# Patient Record
Sex: FEMALE | Race: WHITE | NOT HISPANIC OR LATINO | Employment: OTHER | URBAN - METROPOLITAN AREA
[De-identification: names, ages, dates, MRNs, and addresses within clinical notes are randomized per-mention and may not be internally consistent; named-entity substitution may affect disease eponyms.]

---

## 2017-01-05 ENCOUNTER — COMMUNICATION - HEALTHEAST (OUTPATIENT)
Dept: FAMILY MEDICINE | Facility: CLINIC | Age: 72
End: 2017-01-05

## 2017-01-12 ENCOUNTER — AMBULATORY - HEALTHEAST (OUTPATIENT)
Dept: FAMILY MEDICINE | Facility: CLINIC | Age: 72
End: 2017-01-12

## 2017-01-12 ENCOUNTER — COMMUNICATION - HEALTHEAST (OUTPATIENT)
Dept: NURSING | Facility: CLINIC | Age: 72
End: 2017-01-12

## 2017-01-12 DIAGNOSIS — I26.99 PULMONARY EMBOLISM (H): ICD-10-CM

## 2017-01-13 ENCOUNTER — COMMUNICATION - HEALTHEAST (OUTPATIENT)
Dept: FAMILY MEDICINE | Facility: CLINIC | Age: 72
End: 2017-01-13

## 2017-01-19 ENCOUNTER — COMMUNICATION - HEALTHEAST (OUTPATIENT)
Dept: NURSING | Facility: CLINIC | Age: 72
End: 2017-01-19

## 2017-01-19 DIAGNOSIS — I26.99 PULMONARY EMBOLISM (H): ICD-10-CM

## 2017-01-21 ENCOUNTER — COMMUNICATION - HEALTHEAST (OUTPATIENT)
Dept: FAMILY MEDICINE | Facility: CLINIC | Age: 72
End: 2017-01-21

## 2017-01-21 DIAGNOSIS — I10 ESSENTIAL HYPERTENSION WITH GOAL BLOOD PRESSURE LESS THAN 140/90: ICD-10-CM

## 2017-01-26 ENCOUNTER — COMMUNICATION - HEALTHEAST (OUTPATIENT)
Dept: NURSING | Facility: CLINIC | Age: 72
End: 2017-01-26

## 2017-01-26 DIAGNOSIS — I26.99 PULMONARY EMBOLISM (H): ICD-10-CM

## 2017-02-02 ENCOUNTER — COMMUNICATION - HEALTHEAST (OUTPATIENT)
Dept: NURSING | Facility: CLINIC | Age: 72
End: 2017-02-02

## 2017-02-02 DIAGNOSIS — I26.99 PULMONARY EMBOLISM (H): ICD-10-CM

## 2017-02-09 ENCOUNTER — COMMUNICATION - HEALTHEAST (OUTPATIENT)
Dept: NURSING | Facility: CLINIC | Age: 72
End: 2017-02-09

## 2017-02-09 DIAGNOSIS — I26.99 PULMONARY EMBOLISM (H): ICD-10-CM

## 2017-02-16 ENCOUNTER — COMMUNICATION - HEALTHEAST (OUTPATIENT)
Dept: FAMILY MEDICINE | Facility: CLINIC | Age: 72
End: 2017-02-16

## 2017-02-16 DIAGNOSIS — I26.99 PULMONARY EMBOLISM (H): ICD-10-CM

## 2017-02-23 ENCOUNTER — COMMUNICATION - HEALTHEAST (OUTPATIENT)
Dept: SCHEDULING | Facility: CLINIC | Age: 72
End: 2017-02-23

## 2017-02-23 DIAGNOSIS — I26.99 PULMONARY EMBOLISM (H): ICD-10-CM

## 2017-03-02 ENCOUNTER — COMMUNICATION - HEALTHEAST (OUTPATIENT)
Dept: NURSING | Facility: CLINIC | Age: 72
End: 2017-03-02

## 2017-03-02 DIAGNOSIS — I26.99 PULMONARY EMBOLISM (H): ICD-10-CM

## 2017-03-06 ENCOUNTER — OFFICE VISIT - HEALTHEAST (OUTPATIENT)
Dept: RHEUMATOLOGY | Facility: CLINIC | Age: 72
End: 2017-03-06

## 2017-03-06 DIAGNOSIS — Z79.01 ANTICOAGULANT LONG-TERM USE: ICD-10-CM

## 2017-03-06 DIAGNOSIS — E66.01 MORBID OBESITY DUE TO EXCESS CALORIES (H): ICD-10-CM

## 2017-03-06 DIAGNOSIS — M25.50 PAIN IN JOINT, MULTIPLE SITES: ICD-10-CM

## 2017-03-09 ENCOUNTER — COMMUNICATION - HEALTHEAST (OUTPATIENT)
Dept: NURSING | Facility: CLINIC | Age: 72
End: 2017-03-09

## 2017-03-09 DIAGNOSIS — I26.99 PULMONARY EMBOLISM (H): ICD-10-CM

## 2017-03-16 ENCOUNTER — COMMUNICATION - HEALTHEAST (OUTPATIENT)
Dept: NURSING | Facility: CLINIC | Age: 72
End: 2017-03-16

## 2017-03-16 DIAGNOSIS — I26.99 PULMONARY EMBOLISM (H): ICD-10-CM

## 2017-03-23 ENCOUNTER — AMBULATORY - HEALTHEAST (OUTPATIENT)
Dept: HEALTH INFORMATION MANAGEMENT | Facility: CLINIC | Age: 72
End: 2017-03-23

## 2017-03-24 ENCOUNTER — COMMUNICATION - HEALTHEAST (OUTPATIENT)
Dept: NURSING | Facility: CLINIC | Age: 72
End: 2017-03-24

## 2017-03-24 DIAGNOSIS — I26.99 PULMONARY EMBOLISM (H): ICD-10-CM

## 2017-03-30 ENCOUNTER — COMMUNICATION - HEALTHEAST (OUTPATIENT)
Dept: NURSING | Facility: CLINIC | Age: 72
End: 2017-03-30

## 2017-03-30 DIAGNOSIS — I26.99 PULMONARY EMBOLISM (H): ICD-10-CM

## 2017-04-06 ENCOUNTER — COMMUNICATION - HEALTHEAST (OUTPATIENT)
Dept: NURSING | Facility: CLINIC | Age: 72
End: 2017-04-06

## 2017-04-06 DIAGNOSIS — I26.99 PULMONARY EMBOLISM (H): ICD-10-CM

## 2017-04-08 ENCOUNTER — HOSPITAL ENCOUNTER (OUTPATIENT)
Dept: MAMMOGRAPHY | Facility: HOSPITAL | Age: 72
Discharge: HOME | End: 2017-04-08
Attending: FAMILY MEDICINE

## 2017-04-08 DIAGNOSIS — Z12.31 VISIT FOR SCREENING MAMMOGRAM: ICD-10-CM

## 2017-04-13 ENCOUNTER — COMMUNICATION - HEALTHEAST (OUTPATIENT)
Dept: NURSING | Facility: CLINIC | Age: 72
End: 2017-04-13

## 2017-04-13 DIAGNOSIS — I26.99 PULMONARY EMBOLISM (H): ICD-10-CM

## 2017-04-20 ENCOUNTER — COMMUNICATION - HEALTHEAST (OUTPATIENT)
Dept: NURSING | Facility: CLINIC | Age: 72
End: 2017-04-20

## 2017-04-20 DIAGNOSIS — I26.99 PULMONARY EMBOLISM (H): ICD-10-CM

## 2017-04-28 ENCOUNTER — OFFICE VISIT - HEALTHEAST (OUTPATIENT)
Dept: FAMILY MEDICINE | Facility: CLINIC | Age: 72
End: 2017-04-28

## 2017-04-28 ENCOUNTER — COMMUNICATION - HEALTHEAST (OUTPATIENT)
Dept: NURSING | Facility: CLINIC | Age: 72
End: 2017-04-28

## 2017-04-28 DIAGNOSIS — E78.00 PURE HYPERCHOLESTEROLEMIA: ICD-10-CM

## 2017-04-28 DIAGNOSIS — G47.33 OBSTRUCTIVE SLEEP APNEA (ADULT) (PEDIATRIC): ICD-10-CM

## 2017-04-28 DIAGNOSIS — F33.0 MILD EPISODE OF RECURRENT MAJOR DEPRESSIVE DISORDER (H): ICD-10-CM

## 2017-04-28 DIAGNOSIS — I10 ESSENTIAL HYPERTENSION WITH GOAL BLOOD PRESSURE LESS THAN 140/90: ICD-10-CM

## 2017-04-28 DIAGNOSIS — Z00.01 ENCOUNTER FOR GENERAL ADULT MEDICAL EXAMINATION WITH ABNORMAL FINDINGS: ICD-10-CM

## 2017-04-28 DIAGNOSIS — I26.99 OTHER PULMONARY EMBOLISM WITHOUT ACUTE COR PULMONALE, UNSPECIFIED CHRONICITY (H): ICD-10-CM

## 2017-04-28 DIAGNOSIS — R41.3 MEMORY LOSS: ICD-10-CM

## 2017-04-28 DIAGNOSIS — E03.9 HYPOTHYROIDISM, UNSPECIFIED TYPE: ICD-10-CM

## 2017-04-28 DIAGNOSIS — R82.90 BAD ODOR OF URINE: ICD-10-CM

## 2017-04-28 DIAGNOSIS — E66.01 MORBID OBESITY (H): ICD-10-CM

## 2017-04-28 DIAGNOSIS — D64.9 ANEMIA, UNSPECIFIED: ICD-10-CM

## 2017-04-28 LAB
CHOLEST SERPL-MCNC: 215 MG/DL
FASTING STATUS PATIENT QL REPORTED: YES
HDLC SERPL-MCNC: 50 MG/DL
LDLC SERPL CALC-MCNC: 135 MG/DL
TRIGL SERPL-MCNC: 152 MG/DL

## 2017-04-28 ASSESSMENT — MIFFLIN-ST. JEOR: SCORE: 1902.33

## 2017-04-30 LAB — SYPHILIS RPR SCREEN - HISTORICAL: NORMAL

## 2017-05-01 ENCOUNTER — COMMUNICATION - HEALTHEAST (OUTPATIENT)
Dept: FAMILY MEDICINE | Facility: CLINIC | Age: 72
End: 2017-05-01

## 2017-05-04 ENCOUNTER — COMMUNICATION - HEALTHEAST (OUTPATIENT)
Dept: NURSING | Facility: CLINIC | Age: 72
End: 2017-05-04

## 2017-05-04 DIAGNOSIS — I26.99 OTHER PULMONARY EMBOLISM WITHOUT ACUTE COR PULMONALE, UNSPECIFIED CHRONICITY (H): ICD-10-CM

## 2017-05-18 ENCOUNTER — COMMUNICATION - HEALTHEAST (OUTPATIENT)
Dept: FAMILY MEDICINE | Facility: CLINIC | Age: 72
End: 2017-05-18

## 2017-05-23 ENCOUNTER — COMMUNICATION - HEALTHEAST (OUTPATIENT)
Dept: NURSING | Facility: CLINIC | Age: 72
End: 2017-05-23

## 2017-05-23 ENCOUNTER — AMBULATORY - HEALTHEAST (OUTPATIENT)
Dept: LAB | Facility: CLINIC | Age: 72
End: 2017-05-23

## 2017-05-23 DIAGNOSIS — I26.99 PULMONARY EMBOLISM (H): ICD-10-CM

## 2017-05-23 DIAGNOSIS — I26.99 OTHER PULMONARY EMBOLISM WITHOUT ACUTE COR PULMONALE, UNSPECIFIED CHRONICITY (H): ICD-10-CM

## 2017-05-24 ENCOUNTER — RECORDS - HEALTHEAST (OUTPATIENT)
Dept: ADMINISTRATIVE | Facility: OTHER | Age: 72
End: 2017-05-24

## 2017-05-30 ENCOUNTER — AMBULATORY - HEALTHEAST (OUTPATIENT)
Dept: PULMONOLOGY | Facility: OTHER | Age: 72
End: 2017-05-30

## 2017-05-30 ENCOUNTER — COMMUNICATION - HEALTHEAST (OUTPATIENT)
Dept: PULMONOLOGY | Facility: OTHER | Age: 72
End: 2017-05-30

## 2017-05-30 DIAGNOSIS — R91.1 PULMONARY NODULE: ICD-10-CM

## 2017-06-07 ENCOUNTER — COMMUNICATION - HEALTHEAST (OUTPATIENT)
Dept: FAMILY MEDICINE | Facility: CLINIC | Age: 72
End: 2017-06-07

## 2017-06-07 DIAGNOSIS — F33.9 MAJOR DEPRESSIVE DISORDER, RECURRENT EPISODE, UNSPECIFIED: ICD-10-CM

## 2017-06-07 DIAGNOSIS — R60.9 EDEMA: ICD-10-CM

## 2017-06-07 DIAGNOSIS — E03.9 HYPOTHYROIDISM: ICD-10-CM

## 2017-06-08 ENCOUNTER — COMMUNICATION - HEALTHEAST (OUTPATIENT)
Dept: FAMILY MEDICINE | Facility: CLINIC | Age: 72
End: 2017-06-08

## 2017-06-09 ENCOUNTER — COMMUNICATION - HEALTHEAST (OUTPATIENT)
Dept: FAMILY MEDICINE | Facility: CLINIC | Age: 72
End: 2017-06-09

## 2017-06-09 DIAGNOSIS — Z12.11 COLON CANCER SCREENING: ICD-10-CM

## 2017-06-12 ENCOUNTER — COMMUNICATION - HEALTHEAST (OUTPATIENT)
Dept: FAMILY MEDICINE | Facility: CLINIC | Age: 72
End: 2017-06-12

## 2017-06-13 ENCOUNTER — COMMUNICATION - HEALTHEAST (OUTPATIENT)
Dept: FAMILY MEDICINE | Facility: CLINIC | Age: 72
End: 2017-06-13

## 2017-06-20 ENCOUNTER — RECORDS - HEALTHEAST (OUTPATIENT)
Dept: ADMINISTRATIVE | Facility: OTHER | Age: 72
End: 2017-06-20

## 2017-06-20 ENCOUNTER — COMMUNICATION - HEALTHEAST (OUTPATIENT)
Dept: ADMINISTRATIVE | Facility: CLINIC | Age: 72
End: 2017-06-20

## 2017-06-21 ENCOUNTER — AMBULATORY - HEALTHEAST (OUTPATIENT)
Dept: LAB | Facility: HOSPITAL | Age: 72
End: 2017-06-21

## 2017-06-21 ENCOUNTER — COMMUNICATION - HEALTHEAST (OUTPATIENT)
Dept: NURSING | Facility: CLINIC | Age: 72
End: 2017-06-21

## 2017-06-21 DIAGNOSIS — I26.99 PULMONARY EMBOLISM (H): ICD-10-CM

## 2017-06-21 DIAGNOSIS — I26.99 OTHER PULMONARY EMBOLISM WITHOUT ACUTE COR PULMONALE, UNSPECIFIED CHRONICITY (H): ICD-10-CM

## 2017-06-22 ENCOUNTER — AMBULATORY - HEALTHEAST (OUTPATIENT)
Dept: SCHEDULING | Facility: CLINIC | Age: 72
End: 2017-06-22

## 2017-06-24 ENCOUNTER — RECORDS - HEALTHEAST (OUTPATIENT)
Dept: ADMINISTRATIVE | Facility: OTHER | Age: 72
End: 2017-06-24

## 2017-06-26 ENCOUNTER — OFFICE VISIT - HEALTHEAST (OUTPATIENT)
Dept: PULMONOLOGY | Facility: OTHER | Age: 72
End: 2017-06-26

## 2017-06-26 ENCOUNTER — HOSPITAL ENCOUNTER (OUTPATIENT)
Dept: CT IMAGING | Facility: HOSPITAL | Age: 72
Discharge: HOME | End: 2017-06-26
Attending: INTERNAL MEDICINE

## 2017-06-26 DIAGNOSIS — R91.1 PULMONARY NODULE: ICD-10-CM

## 2017-06-26 DIAGNOSIS — R91.8 LUNG NODULES: ICD-10-CM

## 2017-06-30 ENCOUNTER — HOSPITAL ENCOUNTER (OUTPATIENT)
Dept: PET IMAGING | Facility: HOSPITAL | Age: 72
Discharge: HOME | End: 2017-06-30
Attending: PSYCHIATRY & NEUROLOGY

## 2017-06-30 ENCOUNTER — HOSPITAL ENCOUNTER (OUTPATIENT)
Dept: MRI IMAGING | Facility: HOSPITAL | Age: 72
Discharge: HOME | End: 2017-06-30
Attending: PSYCHIATRY & NEUROLOGY

## 2017-06-30 DIAGNOSIS — R41.89 COGNITIVE DECLINE: ICD-10-CM

## 2017-06-30 ASSESSMENT — MIFFLIN-ST. JEOR: SCORE: 1923.32

## 2017-07-07 ENCOUNTER — COMMUNICATION - HEALTHEAST (OUTPATIENT)
Dept: FAMILY MEDICINE | Facility: CLINIC | Age: 72
End: 2017-07-07

## 2017-07-11 ENCOUNTER — COMMUNICATION - HEALTHEAST (OUTPATIENT)
Dept: NURSING | Facility: CLINIC | Age: 72
End: 2017-07-11

## 2017-07-11 DIAGNOSIS — I26.99 OTHER PULMONARY EMBOLISM WITHOUT ACUTE COR PULMONALE, UNSPECIFIED CHRONICITY (H): ICD-10-CM

## 2017-07-12 ENCOUNTER — COMMUNICATION - HEALTHEAST (OUTPATIENT)
Dept: NURSING | Facility: CLINIC | Age: 72
End: 2017-07-12

## 2017-07-18 ENCOUNTER — COMMUNICATION - HEALTHEAST (OUTPATIENT)
Dept: NURSING | Facility: CLINIC | Age: 72
End: 2017-07-18

## 2017-07-18 ENCOUNTER — AMBULATORY - HEALTHEAST (OUTPATIENT)
Dept: LAB | Facility: CLINIC | Age: 72
End: 2017-07-18

## 2017-07-18 ENCOUNTER — OFFICE VISIT - HEALTHEAST (OUTPATIENT)
Dept: RHEUMATOLOGY | Facility: CLINIC | Age: 72
End: 2017-07-18

## 2017-07-18 DIAGNOSIS — I26.99 OTHER PULMONARY EMBOLISM WITHOUT ACUTE COR PULMONALE, UNSPECIFIED CHRONICITY (H): ICD-10-CM

## 2017-07-18 DIAGNOSIS — E66.01 MORBID OBESITY DUE TO EXCESS CALORIES (H): ICD-10-CM

## 2017-07-18 DIAGNOSIS — M25.562 ACUTE BILATERAL KNEE PAIN: ICD-10-CM

## 2017-07-18 DIAGNOSIS — M25.561 ACUTE BILATERAL KNEE PAIN: ICD-10-CM

## 2017-07-18 ASSESSMENT — MIFFLIN-ST. JEOR: SCORE: 1923.32

## 2017-08-12 ENCOUNTER — COMMUNICATION - HEALTHEAST (OUTPATIENT)
Dept: FAMILY MEDICINE | Facility: CLINIC | Age: 72
End: 2017-08-12

## 2017-08-14 ENCOUNTER — RECORDS - HEALTHEAST (OUTPATIENT)
Dept: ADMINISTRATIVE | Facility: OTHER | Age: 72
End: 2017-08-14

## 2017-08-14 ENCOUNTER — AMBULATORY - HEALTHEAST (OUTPATIENT)
Dept: LAB | Facility: CLINIC | Age: 72
End: 2017-08-14

## 2017-08-14 ENCOUNTER — COMMUNICATION - HEALTHEAST (OUTPATIENT)
Dept: NURSING | Facility: CLINIC | Age: 72
End: 2017-08-14

## 2017-08-14 DIAGNOSIS — I26.99 OTHER PULMONARY EMBOLISM WITHOUT ACUTE COR PULMONALE, UNSPECIFIED CHRONICITY (H): ICD-10-CM

## 2017-08-15 ENCOUNTER — COMMUNICATION - HEALTHEAST (OUTPATIENT)
Dept: FAMILY MEDICINE | Facility: CLINIC | Age: 72
End: 2017-08-15

## 2017-08-25 ENCOUNTER — COMMUNICATION - HEALTHEAST (OUTPATIENT)
Dept: FAMILY MEDICINE | Facility: CLINIC | Age: 72
End: 2017-08-25

## 2017-08-28 ENCOUNTER — COMMUNICATION - HEALTHEAST (OUTPATIENT)
Dept: FAMILY MEDICINE | Facility: CLINIC | Age: 72
End: 2017-08-28

## 2017-08-28 DIAGNOSIS — R60.9 EDEMA: ICD-10-CM

## 2017-08-28 DIAGNOSIS — E03.9 HYPOTHYROIDISM: ICD-10-CM

## 2017-08-28 DIAGNOSIS — F33.9 MAJOR DEPRESSIVE DISORDER, RECURRENT EPISODE, UNSPECIFIED: ICD-10-CM

## 2017-08-28 DIAGNOSIS — E78.5 HYPERLIPIDEMIA: ICD-10-CM

## 2017-08-30 ENCOUNTER — COMMUNICATION - HEALTHEAST (OUTPATIENT)
Dept: NURSING | Facility: CLINIC | Age: 72
End: 2017-08-30

## 2017-08-30 ENCOUNTER — COMMUNICATION - HEALTHEAST (OUTPATIENT)
Dept: SCHEDULING | Facility: CLINIC | Age: 72
End: 2017-08-30

## 2017-08-30 ENCOUNTER — COMMUNICATION - HEALTHEAST (OUTPATIENT)
Dept: FAMILY MEDICINE | Facility: CLINIC | Age: 72
End: 2017-08-30

## 2017-08-30 ENCOUNTER — OFFICE VISIT - HEALTHEAST (OUTPATIENT)
Dept: FAMILY MEDICINE | Facility: CLINIC | Age: 72
End: 2017-08-30

## 2017-08-30 DIAGNOSIS — L28.2 PRURITIC RASH: ICD-10-CM

## 2017-08-30 DIAGNOSIS — I26.99 OTHER PULMONARY EMBOLISM WITHOUT ACUTE COR PULMONALE, UNSPECIFIED CHRONICITY (H): ICD-10-CM

## 2017-08-30 DIAGNOSIS — I26.99 PULMONARY EMBOLISM (H): ICD-10-CM

## 2017-08-30 DIAGNOSIS — B37.2 INTERTRIGINOUS CANDIDIASIS: ICD-10-CM
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
## 2017-09-03 ENCOUNTER — COMMUNICATION - HEALTHEAST (OUTPATIENT)
Dept: SCHEDULING | Facility: CLINIC | Age: 72
End: 2017-09-03

## 2017-09-04 ENCOUNTER — OFFICE VISIT - HEALTHEAST (OUTPATIENT)
Dept: FAMILY MEDICINE | Facility: CLINIC | Age: 72
End: 2017-09-04

## 2017-09-04 DIAGNOSIS — B37.2 YEAST DERMATITIS: ICD-10-CM

## 2017-09-04 DIAGNOSIS — L08.9 PUSTULE: ICD-10-CM

## 2017-09-04 DIAGNOSIS — L28.2 PRURITIC RASH: ICD-10-CM

## 2017-09-06 ENCOUNTER — COMMUNICATION - HEALTHEAST (OUTPATIENT)
Dept: FAMILY MEDICINE | Facility: CLINIC | Age: 72
End: 2017-09-06

## 2017-09-07 ENCOUNTER — COMMUNICATION - HEALTHEAST (OUTPATIENT)
Dept: SCHEDULING | Facility: CLINIC | Age: 72
End: 2017-09-07

## 2017-09-07 ENCOUNTER — COMMUNICATION - HEALTHEAST (OUTPATIENT)
Dept: FAMILY MEDICINE | Facility: CLINIC | Age: 72
End: 2017-09-07

## 2017-09-11 ENCOUNTER — COMMUNICATION - HEALTHEAST (OUTPATIENT)
Dept: FAMILY MEDICINE | Facility: CLINIC | Age: 72
End: 2017-09-11

## 2017-09-11 DIAGNOSIS — I26.99 PULMONARY EMBOLISM (H): ICD-10-CM

## 2017-09-12 ENCOUNTER — RECORDS - HEALTHEAST (OUTPATIENT)
Dept: ADMINISTRATIVE | Facility: OTHER | Age: 72
End: 2017-09-12

## 2017-09-14 ENCOUNTER — COMMUNICATION - HEALTHEAST (OUTPATIENT)
Dept: NURSING | Facility: CLINIC | Age: 72
End: 2017-09-14

## 2017-09-14 ENCOUNTER — COMMUNICATION - HEALTHEAST (OUTPATIENT)
Dept: FAMILY MEDICINE | Facility: CLINIC | Age: 72
End: 2017-09-14

## 2017-09-14 ENCOUNTER — AMBULATORY - HEALTHEAST (OUTPATIENT)
Dept: LAB | Facility: CLINIC | Age: 72
End: 2017-09-14

## 2017-09-14 DIAGNOSIS — I26.99 PULMONARY EMBOLISM (H): ICD-10-CM

## 2017-09-14 DIAGNOSIS — I26.99 OTHER PULMONARY EMBOLISM WITHOUT ACUTE COR PULMONALE, UNSPECIFIED CHRONICITY (H): ICD-10-CM

## 2017-09-18 ENCOUNTER — AMBULATORY - HEALTHEAST (OUTPATIENT)
Dept: LAB | Facility: CLINIC | Age: 72
End: 2017-09-18

## 2017-09-18 ENCOUNTER — COMMUNICATION - HEALTHEAST (OUTPATIENT)
Dept: NURSING | Facility: CLINIC | Age: 72
End: 2017-09-18

## 2017-09-18 DIAGNOSIS — I26.99 PULMONARY EMBOLISM (H): ICD-10-CM

## 2017-09-18 DIAGNOSIS — I26.99 OTHER PULMONARY EMBOLISM WITHOUT ACUTE COR PULMONALE, UNSPECIFIED CHRONICITY (H): ICD-10-CM

## 2017-09-25 ENCOUNTER — AMBULATORY - HEALTHEAST (OUTPATIENT)
Dept: LAB | Facility: CLINIC | Age: 72
End: 2017-09-25

## 2017-09-25 ENCOUNTER — OFFICE VISIT - HEALTHEAST (OUTPATIENT)
Dept: INTERNAL MEDICINE | Facility: CLINIC | Age: 72
End: 2017-09-25

## 2017-09-25 ENCOUNTER — COMMUNICATION - HEALTHEAST (OUTPATIENT)
Dept: FAMILY MEDICINE | Facility: CLINIC | Age: 72
End: 2017-09-25

## 2017-09-25 ENCOUNTER — COMMUNICATION - HEALTHEAST (OUTPATIENT)
Dept: NURSING | Facility: CLINIC | Age: 72
End: 2017-09-25

## 2017-09-25 DIAGNOSIS — R79.1 SUPRATHERAPEUTIC INR: ICD-10-CM

## 2017-09-25 DIAGNOSIS — K62.5 RECTAL BLEEDING: ICD-10-CM

## 2017-09-25 DIAGNOSIS — I26.99 PULMONARY EMBOLISM (H): ICD-10-CM

## 2017-09-25 DIAGNOSIS — I26.99 OTHER PULMONARY EMBOLISM WITHOUT ACUTE COR PULMONALE, UNSPECIFIED CHRONICITY (H): ICD-10-CM

## 2017-09-28 ENCOUNTER — COMMUNICATION - HEALTHEAST (OUTPATIENT)
Dept: NURSING | Facility: CLINIC | Age: 72
End: 2017-09-28

## 2017-09-28 ENCOUNTER — AMBULATORY - HEALTHEAST (OUTPATIENT)
Dept: LAB | Facility: CLINIC | Age: 72
End: 2017-09-28

## 2017-09-28 DIAGNOSIS — I26.99 OTHER PULMONARY EMBOLISM WITHOUT ACUTE COR PULMONALE, UNSPECIFIED CHRONICITY (H): ICD-10-CM

## 2017-09-28 DIAGNOSIS — I26.99 PULMONARY EMBOLISM (H): ICD-10-CM

## 2017-10-05 ENCOUNTER — COMMUNICATION - HEALTHEAST (OUTPATIENT)
Dept: NURSING | Facility: CLINIC | Age: 72
End: 2017-10-05

## 2017-10-05 ENCOUNTER — AMBULATORY - HEALTHEAST (OUTPATIENT)
Dept: LAB | Facility: CLINIC | Age: 72
End: 2017-10-05

## 2017-10-05 DIAGNOSIS — I26.99 OTHER PULMONARY EMBOLISM WITHOUT ACUTE COR PULMONALE, UNSPECIFIED CHRONICITY (H): ICD-10-CM

## 2017-10-05 DIAGNOSIS — I26.99 PULMONARY EMBOLISM (H): ICD-10-CM

## 2017-10-09 ENCOUNTER — COMMUNICATION - HEALTHEAST (OUTPATIENT)
Dept: FAMILY MEDICINE | Facility: CLINIC | Age: 72
End: 2017-10-09

## 2017-10-09 DIAGNOSIS — M54.50 LUMBALGIA: ICD-10-CM

## 2017-10-09 DIAGNOSIS — I26.99 PULMONARY EMBOLISM (H): ICD-10-CM

## 2017-10-09 DIAGNOSIS — M47.816 FACET ARTHRITIS OF LUMBAR REGION: ICD-10-CM

## 2017-10-09 DIAGNOSIS — M53.3 SI (SACROILIAC) JOINT DYSFUNCTION: ICD-10-CM

## 2017-10-09 DIAGNOSIS — M79.18 MYOFASCIAL PAIN: ICD-10-CM

## 2017-10-10 ENCOUNTER — COMMUNICATION - HEALTHEAST (OUTPATIENT)
Dept: FAMILY MEDICINE | Facility: CLINIC | Age: 72
End: 2017-10-10

## 2017-10-10 ENCOUNTER — COMMUNICATION - HEALTHEAST (OUTPATIENT)
Dept: INTERNAL MEDICINE | Facility: CLINIC | Age: 72
End: 2017-10-10

## 2017-10-10 DIAGNOSIS — M54.5 LOW BACK PAIN, UNSPECIFIED BACK PAIN LATERALITY, UNSPECIFIED CHRONICITY, WITH SCIATICA PRESENCE UNSPECIFIED: ICD-10-CM

## 2017-10-10 DIAGNOSIS — M47.816 FACET ARTHRITIS OF LUMBAR REGION: ICD-10-CM

## 2017-10-10 DIAGNOSIS — M79.18 MYOFASCIAL PAIN: ICD-10-CM

## 2017-10-10 DIAGNOSIS — M53.3 SI (SACROILIAC) JOINT DYSFUNCTION: ICD-10-CM

## 2017-10-12 ENCOUNTER — RECORDS - HEALTHEAST (OUTPATIENT)
Dept: ADMINISTRATIVE | Facility: OTHER | Age: 72
End: 2017-10-12

## 2017-10-13 ENCOUNTER — RECORDS - HEALTHEAST (OUTPATIENT)
Dept: ADMINISTRATIVE | Facility: OTHER | Age: 72
End: 2017-10-13

## 2017-10-17 ENCOUNTER — OFFICE VISIT - HEALTHEAST (OUTPATIENT)
Dept: RHEUMATOLOGY | Facility: CLINIC | Age: 72
End: 2017-10-17

## 2017-10-17 ENCOUNTER — COMMUNICATION - HEALTHEAST (OUTPATIENT)
Dept: NURSING | Facility: CLINIC | Age: 72
End: 2017-10-17

## 2017-10-17 ENCOUNTER — AMBULATORY - HEALTHEAST (OUTPATIENT)
Dept: LAB | Facility: CLINIC | Age: 72
End: 2017-10-17

## 2017-10-17 DIAGNOSIS — M25.561 ACUTE BILATERAL KNEE PAIN: ICD-10-CM

## 2017-10-17 DIAGNOSIS — I26.99 OTHER PULMONARY EMBOLISM WITHOUT ACUTE COR PULMONALE, UNSPECIFIED CHRONICITY (H): ICD-10-CM

## 2017-10-17 DIAGNOSIS — I26.99 PULMONARY EMBOLISM (H): ICD-10-CM

## 2017-10-17 DIAGNOSIS — M25.50 PAIN IN JOINT, MULTIPLE SITES: ICD-10-CM

## 2017-10-17 DIAGNOSIS — M25.562 ACUTE BILATERAL KNEE PAIN: ICD-10-CM

## 2017-10-17 ASSESSMENT — MIFFLIN-ST. JEOR: SCORE: 1955.07

## 2017-10-19 ENCOUNTER — COMMUNICATION - HEALTHEAST (OUTPATIENT)
Dept: FAMILY MEDICINE | Facility: CLINIC | Age: 72
End: 2017-10-19

## 2017-11-03 ENCOUNTER — COMMUNICATION - HEALTHEAST (OUTPATIENT)
Dept: NURSING | Facility: CLINIC | Age: 72
End: 2017-11-03

## 2017-11-03 ENCOUNTER — AMBULATORY - HEALTHEAST (OUTPATIENT)
Dept: LAB | Facility: CLINIC | Age: 72
End: 2017-11-03

## 2017-11-03 DIAGNOSIS — I26.99 PULMONARY EMBOLISM (H): ICD-10-CM

## 2017-11-03 DIAGNOSIS — I26.99 OTHER PULMONARY EMBOLISM WITHOUT ACUTE COR PULMONALE, UNSPECIFIED CHRONICITY (H): ICD-10-CM

## 2017-11-27 ENCOUNTER — AMBULATORY - HEALTHEAST (OUTPATIENT)
Dept: LAB | Facility: CLINIC | Age: 72
End: 2017-11-27

## 2017-11-27 ENCOUNTER — COMMUNICATION - HEALTHEAST (OUTPATIENT)
Dept: NURSING | Facility: CLINIC | Age: 72
End: 2017-11-27

## 2017-11-27 DIAGNOSIS — I26.99 PULMONARY EMBOLISM (H): ICD-10-CM

## 2017-11-27 DIAGNOSIS — I26.99 OTHER PULMONARY EMBOLISM WITHOUT ACUTE COR PULMONALE, UNSPECIFIED CHRONICITY (H): ICD-10-CM

## 2017-11-29 ENCOUNTER — COMMUNICATION - HEALTHEAST (OUTPATIENT)
Dept: FAMILY MEDICINE | Facility: CLINIC | Age: 72
End: 2017-11-29

## 2017-11-29 DIAGNOSIS — F33.9 MAJOR DEPRESSIVE DISORDER, RECURRENT EPISODE (H): ICD-10-CM

## 2017-11-29 DIAGNOSIS — I10 HTN (HYPERTENSION): ICD-10-CM

## 2017-11-29 DIAGNOSIS — I10 ESSENTIAL HYPERTENSION WITH GOAL BLOOD PRESSURE LESS THAN 140/90: ICD-10-CM

## 2017-12-13 ENCOUNTER — OFFICE VISIT - HEALTHEAST (OUTPATIENT)
Dept: FAMILY MEDICINE | Facility: CLINIC | Age: 72
End: 2017-12-13

## 2017-12-13 ENCOUNTER — COMMUNICATION - HEALTHEAST (OUTPATIENT)
Dept: FAMILY MEDICINE | Facility: CLINIC | Age: 72
End: 2017-12-13

## 2017-12-13 DIAGNOSIS — M25.562 ACUTE BILATERAL KNEE PAIN: ICD-10-CM

## 2017-12-13 DIAGNOSIS — M25.561 ACUTE BILATERAL KNEE PAIN: ICD-10-CM

## 2017-12-13 ASSESSMENT — MIFFLIN-ST. JEOR: SCORE: 1937.38

## 2017-12-19 ENCOUNTER — COMMUNICATION - HEALTHEAST (OUTPATIENT)
Dept: FAMILY MEDICINE | Facility: CLINIC | Age: 72
End: 2017-12-19

## 2017-12-20 ENCOUNTER — OFFICE VISIT - HEALTHEAST (OUTPATIENT)
Dept: FAMILY MEDICINE | Facility: CLINIC | Age: 72
End: 2017-12-20

## 2017-12-20 ENCOUNTER — COMMUNICATION - HEALTHEAST (OUTPATIENT)
Dept: NURSING | Facility: CLINIC | Age: 72
End: 2017-12-20

## 2017-12-20 DIAGNOSIS — I10 ESSENTIAL HYPERTENSION: ICD-10-CM

## 2017-12-20 DIAGNOSIS — I26.99 OTHER PULMONARY EMBOLISM WITHOUT ACUTE COR PULMONALE, UNSPECIFIED CHRONICITY (H): ICD-10-CM

## 2017-12-20 DIAGNOSIS — M54.5 LOW BACK PAIN, UNSPECIFIED BACK PAIN LATERALITY, UNSPECIFIED CHRONICITY, WITH SCIATICA PRESENCE UNSPECIFIED: ICD-10-CM

## 2017-12-20 DIAGNOSIS — M25.562 LEFT KNEE PAIN: ICD-10-CM

## 2017-12-20 DIAGNOSIS — D64.9 NORMOCHROMIC NORMOCYTIC ANEMIA: ICD-10-CM

## 2017-12-28 ENCOUNTER — COMMUNICATION - HEALTHEAST (OUTPATIENT)
Dept: FAMILY MEDICINE | Facility: CLINIC | Age: 72
End: 2017-12-28

## 2017-12-28 DIAGNOSIS — M25.562 LEFT KNEE PAIN, UNSPECIFIED CHRONICITY: ICD-10-CM

## 2018-01-02 ENCOUNTER — RECORDS - HEALTHEAST (OUTPATIENT)
Dept: ADMINISTRATIVE | Facility: OTHER | Age: 73
End: 2018-01-02

## 2018-01-02 ENCOUNTER — AMBULATORY - HEALTHEAST (OUTPATIENT)
Dept: LAB | Facility: CLINIC | Age: 73
End: 2018-01-02

## 2018-01-02 ENCOUNTER — COMMUNICATION - HEALTHEAST (OUTPATIENT)
Dept: NURSING | Facility: CLINIC | Age: 73
End: 2018-01-02

## 2018-01-02 DIAGNOSIS — I26.99 OTHER PULMONARY EMBOLISM WITHOUT ACUTE COR PULMONALE, UNSPECIFIED CHRONICITY (H): ICD-10-CM

## 2018-01-02 LAB — INR PPP: 2.8 (ref 0.9–1.1)

## 2018-01-09 ENCOUNTER — RECORDS - HEALTHEAST (OUTPATIENT)
Dept: ADMINISTRATIVE | Facility: OTHER | Age: 73
End: 2018-01-09

## 2018-01-09 ENCOUNTER — COMMUNICATION - HEALTHEAST (OUTPATIENT)
Dept: SCHEDULING | Facility: CLINIC | Age: 73
End: 2018-01-09

## 2018-01-17 ENCOUNTER — AMBULATORY - HEALTHEAST (OUTPATIENT)
Dept: LAB | Facility: CLINIC | Age: 73
End: 2018-01-17

## 2018-01-17 ENCOUNTER — COMMUNICATION - HEALTHEAST (OUTPATIENT)
Dept: NURSING | Facility: CLINIC | Age: 73
End: 2018-01-17

## 2018-01-17 DIAGNOSIS — I26.99 OTHER PULMONARY EMBOLISM WITHOUT ACUTE COR PULMONALE, UNSPECIFIED CHRONICITY (H): ICD-10-CM

## 2018-01-17 LAB — INR PPP: 4.2 (ref 0.9–1.1)

## 2018-01-23 ENCOUNTER — COMMUNICATION - HEALTHEAST (OUTPATIENT)
Dept: FAMILY MEDICINE | Facility: CLINIC | Age: 73
End: 2018-01-23

## 2018-01-26 ENCOUNTER — COMMUNICATION - HEALTHEAST (OUTPATIENT)
Dept: FAMILY MEDICINE | Facility: CLINIC | Age: 73
End: 2018-01-26

## 2018-01-26 DIAGNOSIS — M25.562 LEFT KNEE PAIN, UNSPECIFIED CHRONICITY: ICD-10-CM

## 2018-01-27 ENCOUNTER — COMMUNICATION - HEALTHEAST (OUTPATIENT)
Dept: SCHEDULING | Facility: CLINIC | Age: 73
End: 2018-01-27

## 2018-01-29 ENCOUNTER — AMBULATORY - HEALTHEAST (OUTPATIENT)
Dept: FAMILY MEDICINE | Facility: CLINIC | Age: 73
End: 2018-01-29

## 2018-01-29 DIAGNOSIS — M25.562 LEFT KNEE PAIN, UNSPECIFIED CHRONICITY: ICD-10-CM

## 2018-02-05 ENCOUNTER — AMBULATORY - HEALTHEAST (OUTPATIENT)
Dept: LAB | Facility: CLINIC | Age: 73
End: 2018-02-05

## 2018-02-05 ENCOUNTER — COMMUNICATION - HEALTHEAST (OUTPATIENT)
Dept: NURSING | Facility: CLINIC | Age: 73
End: 2018-02-05

## 2018-02-05 DIAGNOSIS — I26.99 OTHER PULMONARY EMBOLISM WITHOUT ACUTE COR PULMONALE, UNSPECIFIED CHRONICITY (H): ICD-10-CM

## 2018-02-05 LAB — INR PPP: 3.8 (ref 0.9–1.1)

## 2018-02-07 ENCOUNTER — COMMUNICATION - HEALTHEAST (OUTPATIENT)
Dept: FAMILY MEDICINE | Facility: CLINIC | Age: 73
End: 2018-02-07

## 2018-02-19 ENCOUNTER — COMMUNICATION - HEALTHEAST (OUTPATIENT)
Dept: NURSING | Facility: CLINIC | Age: 73
End: 2018-02-19

## 2018-02-19 ENCOUNTER — AMBULATORY - HEALTHEAST (OUTPATIENT)
Dept: LAB | Facility: CLINIC | Age: 73
End: 2018-02-19

## 2018-02-19 ENCOUNTER — COMMUNICATION - HEALTHEAST (OUTPATIENT)
Dept: FAMILY MEDICINE | Facility: CLINIC | Age: 73
End: 2018-02-19

## 2018-02-19 DIAGNOSIS — I26.99 PULMONARY EMBOLISM (H): ICD-10-CM

## 2018-02-19 DIAGNOSIS — I26.99 OTHER PULMONARY EMBOLISM WITHOUT ACUTE COR PULMONALE, UNSPECIFIED CHRONICITY (H): ICD-10-CM

## 2018-02-19 LAB — INR PPP: 3.9 (ref 0.9–1.1)

## 2018-02-22 ENCOUNTER — COMMUNICATION - HEALTHEAST (OUTPATIENT)
Dept: FAMILY MEDICINE | Facility: CLINIC | Age: 73
End: 2018-02-22

## 2018-02-22 DIAGNOSIS — I10 HTN (HYPERTENSION): ICD-10-CM

## 2018-02-23 ENCOUNTER — COMMUNICATION - HEALTHEAST (OUTPATIENT)
Dept: FAMILY MEDICINE | Facility: CLINIC | Age: 73
End: 2018-02-23

## 2018-02-23 DIAGNOSIS — M25.562 LEFT KNEE PAIN, UNSPECIFIED CHRONICITY: ICD-10-CM

## 2018-02-27 ENCOUNTER — COMMUNICATION - HEALTHEAST (OUTPATIENT)
Dept: FAMILY MEDICINE | Facility: CLINIC | Age: 73
End: 2018-02-27

## 2018-03-01 ENCOUNTER — COMMUNICATION - HEALTHEAST (OUTPATIENT)
Dept: NURSING | Facility: CLINIC | Age: 73
End: 2018-03-01

## 2018-03-01 ENCOUNTER — AMBULATORY - HEALTHEAST (OUTPATIENT)
Dept: LAB | Facility: HOSPITAL | Age: 73
End: 2018-03-01

## 2018-03-01 DIAGNOSIS — I26.99 PULMONARY EMBOLISM (H): ICD-10-CM

## 2018-03-01 DIAGNOSIS — I26.99 OTHER PULMONARY EMBOLISM WITHOUT ACUTE COR PULMONALE, UNSPECIFIED CHRONICITY (H): ICD-10-CM

## 2018-03-01 LAB — INR PPP: 3.05 (ref 0.9–1.1)

## 2018-03-02 ENCOUNTER — COMMUNICATION - HEALTHEAST (OUTPATIENT)
Dept: FAMILY MEDICINE | Facility: CLINIC | Age: 73
End: 2018-03-02

## 2018-03-03 ENCOUNTER — COMMUNICATION - HEALTHEAST (OUTPATIENT)
Dept: SCHEDULING | Facility: CLINIC | Age: 73
End: 2018-03-03

## 2018-03-15 ENCOUNTER — COMMUNICATION - HEALTHEAST (OUTPATIENT)
Dept: NURSING | Facility: CLINIC | Age: 73
End: 2018-03-15

## 2018-03-15 ENCOUNTER — AMBULATORY - HEALTHEAST (OUTPATIENT)
Dept: LAB | Facility: CLINIC | Age: 73
End: 2018-03-15

## 2018-03-15 DIAGNOSIS — I26.99 PULMONARY EMBOLISM (H): ICD-10-CM

## 2018-03-15 DIAGNOSIS — I26.99 OTHER PULMONARY EMBOLISM WITHOUT ACUTE COR PULMONALE, UNSPECIFIED CHRONICITY (H): ICD-10-CM

## 2018-03-15 LAB — INR PPP: 3 (ref 0.9–1.1)

## 2018-03-26 ENCOUNTER — COMMUNICATION - HEALTHEAST (OUTPATIENT)
Dept: FAMILY MEDICINE | Facility: CLINIC | Age: 73
End: 2018-03-26

## 2018-03-26 DIAGNOSIS — M25.562 LEFT KNEE PAIN, UNSPECIFIED CHRONICITY: ICD-10-CM

## 2018-03-29 ENCOUNTER — AMBULATORY - HEALTHEAST (OUTPATIENT)
Dept: LAB | Facility: CLINIC | Age: 73
End: 2018-03-29

## 2018-03-29 ENCOUNTER — COMMUNICATION - HEALTHEAST (OUTPATIENT)
Dept: FAMILY MEDICINE | Facility: CLINIC | Age: 73
End: 2018-03-29

## 2018-03-29 ENCOUNTER — COMMUNICATION - HEALTHEAST (OUTPATIENT)
Dept: ANTICOAGULATION | Facility: CLINIC | Age: 73
End: 2018-03-29

## 2018-03-29 DIAGNOSIS — I26.99 PULMONARY EMBOLISM (H): ICD-10-CM

## 2018-03-29 DIAGNOSIS — I26.99 OTHER PULMONARY EMBOLISM WITHOUT ACUTE COR PULMONALE, UNSPECIFIED CHRONICITY (H): ICD-10-CM

## 2018-03-29 LAB — INR PPP: 5.5 (ref 0.9–1.1)

## 2018-04-06 ENCOUNTER — COMMUNICATION - HEALTHEAST (OUTPATIENT)
Dept: ANTICOAGULATION | Facility: CLINIC | Age: 73
End: 2018-04-06

## 2018-04-06 ENCOUNTER — AMBULATORY - HEALTHEAST (OUTPATIENT)
Dept: LAB | Facility: CLINIC | Age: 73
End: 2018-04-06

## 2018-04-06 DIAGNOSIS — I26.99 PULMONARY EMBOLISM (H): ICD-10-CM

## 2018-04-06 DIAGNOSIS — I26.99 OTHER PULMONARY EMBOLISM WITHOUT ACUTE COR PULMONALE, UNSPECIFIED CHRONICITY (H): ICD-10-CM

## 2018-04-06 LAB — INR PPP: 3.2 (ref 0.9–1.1)

## 2018-04-16 ENCOUNTER — AMBULATORY - HEALTHEAST (OUTPATIENT)
Dept: LAB | Facility: CLINIC | Age: 73
End: 2018-04-16

## 2018-04-16 ENCOUNTER — COMMUNICATION - HEALTHEAST (OUTPATIENT)
Dept: ANTICOAGULATION | Facility: CLINIC | Age: 73
End: 2018-04-16

## 2018-04-16 DIAGNOSIS — I26.99 OTHER PULMONARY EMBOLISM WITHOUT ACUTE COR PULMONALE, UNSPECIFIED CHRONICITY (H): ICD-10-CM

## 2018-04-16 DIAGNOSIS — I26.99 PULMONARY EMBOLISM (H): ICD-10-CM

## 2018-04-16 LAB — INR PPP: 1.4 (ref 0.9–1.1)

## 2018-04-23 ENCOUNTER — COMMUNICATION - HEALTHEAST (OUTPATIENT)
Dept: ANTICOAGULATION | Facility: CLINIC | Age: 73
End: 2018-04-23

## 2018-04-23 ENCOUNTER — AMBULATORY - HEALTHEAST (OUTPATIENT)
Dept: LAB | Facility: CLINIC | Age: 73
End: 2018-04-23

## 2018-04-23 ENCOUNTER — COMMUNICATION - HEALTHEAST (OUTPATIENT)
Dept: FAMILY MEDICINE | Facility: CLINIC | Age: 73
End: 2018-04-23

## 2018-04-23 DIAGNOSIS — M25.562 LEFT KNEE PAIN, UNSPECIFIED CHRONICITY: ICD-10-CM

## 2018-04-23 DIAGNOSIS — I26.99 PULMONARY EMBOLISM (H): ICD-10-CM

## 2018-04-23 DIAGNOSIS — I26.99 OTHER PULMONARY EMBOLISM WITHOUT ACUTE COR PULMONALE, UNSPECIFIED CHRONICITY (H): ICD-10-CM

## 2018-04-23 LAB — INR PPP: 1.6 (ref 0.9–1.1)

## 2018-04-30 ENCOUNTER — RECORDS - HEALTHEAST (OUTPATIENT)
Dept: ADMINISTRATIVE | Facility: OTHER | Age: 73
End: 2018-04-30

## 2018-05-07 ENCOUNTER — COMMUNICATION - HEALTHEAST (OUTPATIENT)
Dept: ANTICOAGULATION | Facility: CLINIC | Age: 73
End: 2018-05-07

## 2018-05-07 ENCOUNTER — AMBULATORY - HEALTHEAST (OUTPATIENT)
Dept: LAB | Facility: CLINIC | Age: 73
End: 2018-05-07

## 2018-05-07 DIAGNOSIS — I26.99 PULMONARY EMBOLISM (H): ICD-10-CM

## 2018-05-07 DIAGNOSIS — I26.99 OTHER PULMONARY EMBOLISM WITHOUT ACUTE COR PULMONALE, UNSPECIFIED CHRONICITY (H): ICD-10-CM

## 2018-05-07 LAB — INR PPP: 1.6 (ref 0.9–1.1)

## 2018-05-21 ENCOUNTER — COMMUNICATION - HEALTHEAST (OUTPATIENT)
Dept: ANTICOAGULATION | Facility: CLINIC | Age: 73
End: 2018-05-21

## 2018-05-21 ENCOUNTER — AMBULATORY - HEALTHEAST (OUTPATIENT)
Dept: LAB | Facility: CLINIC | Age: 73
End: 2018-05-21

## 2018-05-21 ENCOUNTER — COMMUNICATION - HEALTHEAST (OUTPATIENT)
Dept: FAMILY MEDICINE | Facility: CLINIC | Age: 73
End: 2018-05-21

## 2018-05-21 DIAGNOSIS — E03.9 HYPOTHYROIDISM: ICD-10-CM

## 2018-05-21 DIAGNOSIS — I26.99 OTHER PULMONARY EMBOLISM WITHOUT ACUTE COR PULMONALE, UNSPECIFIED CHRONICITY (H): ICD-10-CM

## 2018-05-21 DIAGNOSIS — R60.9 EDEMA: ICD-10-CM

## 2018-05-21 DIAGNOSIS — I10 ESSENTIAL HYPERTENSION WITH GOAL BLOOD PRESSURE LESS THAN 140/90: ICD-10-CM

## 2018-05-21 DIAGNOSIS — I26.99 PULMONARY EMBOLISM (H): ICD-10-CM

## 2018-05-21 LAB — INR PPP: 3.5 (ref 0.9–1.1)

## 2018-05-23 ENCOUNTER — COMMUNICATION - HEALTHEAST (OUTPATIENT)
Dept: FAMILY MEDICINE | Facility: CLINIC | Age: 73
End: 2018-05-23

## 2018-05-25 ENCOUNTER — COMMUNICATION - HEALTHEAST (OUTPATIENT)
Dept: FAMILY MEDICINE | Facility: CLINIC | Age: 73
End: 2018-05-25

## 2018-05-25 DIAGNOSIS — M25.562 LEFT KNEE PAIN, UNSPECIFIED CHRONICITY: ICD-10-CM

## 2018-06-01 ENCOUNTER — COMMUNICATION - HEALTHEAST (OUTPATIENT)
Dept: ANTICOAGULATION | Facility: CLINIC | Age: 73
End: 2018-06-01

## 2018-06-01 ENCOUNTER — OFFICE VISIT - HEALTHEAST (OUTPATIENT)
Dept: FAMILY MEDICINE | Facility: CLINIC | Age: 73
End: 2018-06-01

## 2018-06-01 DIAGNOSIS — G89.29 CHRONIC LOW BACK PAIN: ICD-10-CM

## 2018-06-01 DIAGNOSIS — I26.99 PULMONARY EMBOLISM (H): ICD-10-CM

## 2018-06-01 DIAGNOSIS — I26.99 OTHER PULMONARY EMBOLISM WITHOUT ACUTE COR PULMONALE, UNSPECIFIED CHRONICITY (H): ICD-10-CM

## 2018-06-01 DIAGNOSIS — I10 ESSENTIAL HYPERTENSION WITH GOAL BLOOD PRESSURE LESS THAN 140/90: ICD-10-CM

## 2018-06-01 DIAGNOSIS — G89.4 CHRONIC PAIN SYNDROME: ICD-10-CM

## 2018-06-01 DIAGNOSIS — M54.50 CHRONIC LOW BACK PAIN: ICD-10-CM

## 2018-06-01 DIAGNOSIS — E03.9 HYPOTHYROIDISM, UNSPECIFIED TYPE: ICD-10-CM

## 2018-06-01 DIAGNOSIS — E78.00 PURE HYPERCHOLESTEROLEMIA: ICD-10-CM

## 2018-06-01 DIAGNOSIS — R60.0 LOCALIZED EDEMA: ICD-10-CM

## 2018-06-01 LAB
ANION GAP SERPL CALCULATED.3IONS-SCNC: 10 MMOL/L (ref 5–18)
BUN SERPL-MCNC: 24 MG/DL (ref 8–28)
CALCIUM SERPL-MCNC: 10.4 MG/DL (ref 8.5–10.5)
CHLORIDE BLD-SCNC: 102 MMOL/L (ref 98–107)
CO2 SERPL-SCNC: 30 MMOL/L (ref 22–31)
CREAT SERPL-MCNC: 1.11 MG/DL (ref 0.6–1.1)
ERYTHROCYTE [DISTWIDTH] IN BLOOD BY AUTOMATED COUNT: 13.3 % (ref 11–14.5)
GFR SERPL CREATININE-BSD FRML MDRD: 48 ML/MIN/1.73M2
GLUCOSE BLD-MCNC: 100 MG/DL (ref 70–125)
HCT VFR BLD AUTO: 28.9 % (ref 35–47)
HGB BLD-MCNC: 9.7 G/DL (ref 12–16)
INR PPP: 2.3 (ref 0.9–1.1)
MCH RBC QN AUTO: 26.9 PG (ref 27–34)
MCHC RBC AUTO-ENTMCNC: 33.3 G/DL (ref 32–36)
MCV RBC AUTO: 81 FL (ref 80–100)
PLATELET # BLD AUTO: 430 THOU/UL (ref 140–440)
PMV BLD AUTO: 6.8 FL (ref 7–10)
POTASSIUM BLD-SCNC: 4.6 MMOL/L (ref 3.5–5)
RBC # BLD AUTO: 3.58 MILL/UL (ref 3.8–5.4)
SODIUM SERPL-SCNC: 142 MMOL/L (ref 136–145)
TSH SERPL DL<=0.005 MIU/L-ACNC: 2.21 UIU/ML (ref 0.3–5)
WBC: 9.7 THOU/UL (ref 4–11)

## 2018-06-11 ENCOUNTER — HOSPITAL ENCOUNTER (OUTPATIENT)
Dept: MAMMOGRAPHY | Facility: CLINIC | Age: 73
Discharge: HOME | End: 2018-06-11
Attending: FAMILY MEDICINE

## 2018-06-11 DIAGNOSIS — Z12.31 VISIT FOR SCREENING MAMMOGRAM: ICD-10-CM

## 2018-06-12 ENCOUNTER — COMMUNICATION - HEALTHEAST (OUTPATIENT)
Dept: ANTICOAGULATION | Facility: CLINIC | Age: 73
End: 2018-06-12

## 2018-06-12 DIAGNOSIS — Z86.711 HISTORY OF PULMONARY EMBOLISM: ICD-10-CM

## 2018-06-13 ENCOUNTER — COMMUNICATION - HEALTHEAST (OUTPATIENT)
Dept: FAMILY MEDICINE | Facility: CLINIC | Age: 73
End: 2018-06-13

## 2018-06-15 ENCOUNTER — COMMUNICATION - HEALTHEAST (OUTPATIENT)
Dept: SCHEDULING | Facility: CLINIC | Age: 73
End: 2018-06-15

## 2018-06-18 ENCOUNTER — COMMUNICATION - HEALTHEAST (OUTPATIENT)
Dept: SCHEDULING | Facility: CLINIC | Age: 73
End: 2018-06-18

## 2018-06-19 ENCOUNTER — AMBULATORY - HEALTHEAST (OUTPATIENT)
Dept: FAMILY MEDICINE | Facility: CLINIC | Age: 73
End: 2018-06-19

## 2018-06-20 ENCOUNTER — COMMUNICATION - HEALTHEAST (OUTPATIENT)
Dept: ANTICOAGULATION | Facility: CLINIC | Age: 73
End: 2018-06-20

## 2018-06-20 ENCOUNTER — AMBULATORY - HEALTHEAST (OUTPATIENT)
Dept: LAB | Facility: HOSPITAL | Age: 73
End: 2018-06-20

## 2018-06-20 DIAGNOSIS — Z86.711 HISTORY OF PULMONARY EMBOLISM: ICD-10-CM

## 2018-06-20 DIAGNOSIS — I26.99 PULMONARY EMBOLISM (H): ICD-10-CM

## 2018-06-20 LAB — INR PPP: 1.5 (ref 0.9–1.1)

## 2018-06-21 ENCOUNTER — COMMUNICATION - HEALTHEAST (OUTPATIENT)
Dept: FAMILY MEDICINE | Facility: CLINIC | Age: 73
End: 2018-06-21

## 2018-06-25 ENCOUNTER — OFFICE VISIT - HEALTHEAST (OUTPATIENT)
Dept: FAMILY MEDICINE | Facility: CLINIC | Age: 73
End: 2018-06-25

## 2018-06-25 DIAGNOSIS — G89.4 CHRONIC PAIN SYNDROME: ICD-10-CM

## 2018-06-25 DIAGNOSIS — M54.9 BACK PAIN: ICD-10-CM

## 2018-06-25 ASSESSMENT — MIFFLIN-ST. JEOR: SCORE: 1884.76

## 2018-06-27 ENCOUNTER — HOSPITAL ENCOUNTER (OUTPATIENT)
Dept: PHYSICAL MEDICINE AND REHAB | Facility: CLINIC | Age: 73
Discharge: HOME | End: 2018-06-27
Attending: PHYSICAL MEDICINE & REHABILITATION

## 2018-06-27 DIAGNOSIS — M47.816 ARTHROPATHY OF LUMBAR FACET JOINT: ICD-10-CM

## 2018-06-27 DIAGNOSIS — M54.50 LUMBAR SPINE PAIN: ICD-10-CM

## 2018-06-27 DIAGNOSIS — E66.3 OVERWEIGHT: ICD-10-CM

## 2018-06-27 DIAGNOSIS — M79.18 MYOFASCIAL PAIN: ICD-10-CM

## 2018-06-27 DIAGNOSIS — M47.816 LUMBAR SPONDYLOSIS: ICD-10-CM

## 2018-06-27 ASSESSMENT — MIFFLIN-ST. JEOR: SCORE: 1866.61

## 2018-06-28 ENCOUNTER — COMMUNICATION - HEALTHEAST (OUTPATIENT)
Dept: PHYSICAL MEDICINE AND REHAB | Facility: CLINIC | Age: 73
End: 2018-06-28

## 2018-06-28 ENCOUNTER — COMMUNICATION - HEALTHEAST (OUTPATIENT)
Dept: FAMILY MEDICINE | Facility: CLINIC | Age: 73
End: 2018-06-28

## 2018-06-28 DIAGNOSIS — M54.50 LUMBAR SPINE PAIN: ICD-10-CM

## 2018-06-28 DIAGNOSIS — M43.16 SPONDYLOLISTHESIS OF LUMBAR REGION: ICD-10-CM

## 2018-06-28 DIAGNOSIS — M47.816 ARTHROPATHY OF LUMBAR FACET JOINT: ICD-10-CM

## 2018-06-29 ENCOUNTER — RECORDS - HEALTHEAST (OUTPATIENT)
Dept: ADMINISTRATIVE | Facility: OTHER | Age: 73
End: 2018-06-29

## 2018-07-03 ENCOUNTER — HOSPITAL ENCOUNTER (OUTPATIENT)
Dept: PHYSICAL MEDICINE AND REHAB | Facility: CLINIC | Age: 73
Discharge: HOME | End: 2018-07-03
Attending: PHYSICAL MEDICINE & REHABILITATION

## 2018-07-03 DIAGNOSIS — M47.816 ARTHROPATHY OF LUMBAR FACET JOINT: ICD-10-CM

## 2018-07-03 DIAGNOSIS — M12.88 OTHER SPECIFIC ARTHROPATHIES, NOT ELSEWHERE CLASSIFIED, OTHER SPECIFIED SITE: ICD-10-CM

## 2018-07-06 ENCOUNTER — COMMUNICATION - HEALTHEAST (OUTPATIENT)
Dept: PHYSICAL MEDICINE AND REHAB | Facility: CLINIC | Age: 73
End: 2018-07-06

## 2018-07-12 ENCOUNTER — COMMUNICATION - HEALTHEAST (OUTPATIENT)
Dept: ANTICOAGULATION | Facility: CLINIC | Age: 73
End: 2018-07-12

## 2018-07-12 ENCOUNTER — OFFICE VISIT - HEALTHEAST (OUTPATIENT)
Dept: FAMILY MEDICINE | Facility: CLINIC | Age: 73
End: 2018-07-12

## 2018-07-12 DIAGNOSIS — I26.99 PULMONARY EMBOLISM (H): ICD-10-CM

## 2018-07-12 DIAGNOSIS — R60.0 LOCALIZED EDEMA: ICD-10-CM

## 2018-07-12 DIAGNOSIS — D64.9 NORMOCHROMIC NORMOCYTIC ANEMIA: ICD-10-CM

## 2018-07-12 DIAGNOSIS — M54.50 CHRONIC LOW BACK PAIN: ICD-10-CM

## 2018-07-12 DIAGNOSIS — I10 ESSENTIAL HYPERTENSION WITH GOAL BLOOD PRESSURE LESS THAN 140/90: ICD-10-CM

## 2018-07-12 DIAGNOSIS — E78.2 MIXED HYPERLIPIDEMIA: ICD-10-CM

## 2018-07-12 DIAGNOSIS — Z86.711 HISTORY OF PULMONARY EMBOLISM: ICD-10-CM

## 2018-07-12 DIAGNOSIS — G89.29 CHRONIC LOW BACK PAIN: ICD-10-CM

## 2018-07-12 LAB
ANION GAP SERPL CALCULATED.3IONS-SCNC: 10 MMOL/L (ref 5–18)
BUN SERPL-MCNC: 26 MG/DL (ref 8–28)
CALCIUM SERPL-MCNC: 10.5 MG/DL (ref 8.5–10.5)
CHLORIDE BLD-SCNC: 99 MMOL/L (ref 98–107)
CHOLEST SERPL-MCNC: 232 MG/DL
CO2 SERPL-SCNC: 30 MMOL/L (ref 22–31)
CREAT SERPL-MCNC: 1.13 MG/DL (ref 0.6–1.1)
ERYTHROCYTE [DISTWIDTH] IN BLOOD BY AUTOMATED COUNT: 14.5 % (ref 11–14.5)
FASTING STATUS PATIENT QL REPORTED: YES
GFR SERPL CREATININE-BSD FRML MDRD: 47 ML/MIN/1.73M2
GLUCOSE BLD-MCNC: 96 MG/DL (ref 70–125)
HCT VFR BLD AUTO: 32.4 % (ref 35–47)
HDLC SERPL-MCNC: 47 MG/DL
HGB BLD-MCNC: 10.9 G/DL (ref 12–16)
INR PPP: 1.5 (ref 0.9–1.1)
LDLC SERPL CALC-MCNC: 137 MG/DL
MCH RBC QN AUTO: 27.2 PG (ref 27–34)
MCHC RBC AUTO-ENTMCNC: 33.5 G/DL (ref 32–36)
MCV RBC AUTO: 81 FL (ref 80–100)
PLATELET # BLD AUTO: 404 THOU/UL (ref 140–440)
PMV BLD AUTO: 7 FL (ref 7–10)
POTASSIUM BLD-SCNC: 4.4 MMOL/L (ref 3.5–5)
RBC # BLD AUTO: 3.99 MILL/UL (ref 3.8–5.4)
SODIUM SERPL-SCNC: 139 MMOL/L (ref 136–145)
WBC: 10.4 THOU/UL (ref 4–11)

## 2018-07-13 ENCOUNTER — COMMUNICATION - HEALTHEAST (OUTPATIENT)
Dept: NURSING | Facility: CLINIC | Age: 73
End: 2018-07-13

## 2018-07-17 ENCOUNTER — HOSPITAL ENCOUNTER (OUTPATIENT)
Dept: PHYSICAL MEDICINE AND REHAB | Facility: CLINIC | Age: 73
Discharge: HOME | End: 2018-07-17
Attending: PHYSICAL MEDICINE & REHABILITATION

## 2018-07-17 DIAGNOSIS — G56.03 BILATERAL CARPAL TUNNEL SYNDROME: ICD-10-CM

## 2018-07-17 DIAGNOSIS — M43.16 SPONDYLOLISTHESIS OF LUMBAR REGION: ICD-10-CM

## 2018-07-17 DIAGNOSIS — M79.18 MYOFASCIAL PAIN: ICD-10-CM

## 2018-07-17 DIAGNOSIS — M54.50 LUMBAR SPINE PAIN: ICD-10-CM

## 2018-07-17 DIAGNOSIS — M47.816 ARTHROPATHY OF LUMBAR FACET JOINT: ICD-10-CM

## 2018-07-17 ASSESSMENT — MIFFLIN-ST. JEOR: SCORE: 1866.61

## 2018-07-18 ENCOUNTER — COMMUNICATION - HEALTHEAST (OUTPATIENT)
Dept: PHYSICAL MEDICINE AND REHAB | Facility: CLINIC | Age: 73
End: 2018-07-18

## 2018-07-19 ENCOUNTER — RECORDS - HEALTHEAST (OUTPATIENT)
Dept: ADMINISTRATIVE | Facility: OTHER | Age: 73
End: 2018-07-19

## 2018-07-20 ENCOUNTER — AMBULATORY - HEALTHEAST (OUTPATIENT)
Dept: NURSING | Facility: CLINIC | Age: 73
End: 2018-07-20

## 2018-07-23 ENCOUNTER — HOSPITAL ENCOUNTER (OUTPATIENT)
Dept: PHYSICAL MEDICINE AND REHAB | Facility: CLINIC | Age: 73
Discharge: HOME | End: 2018-07-23
Attending: PHYSICAL MEDICINE & REHABILITATION

## 2018-07-23 ENCOUNTER — COMMUNICATION - HEALTHEAST (OUTPATIENT)
Dept: FAMILY MEDICINE | Facility: CLINIC | Age: 73
End: 2018-07-23

## 2018-07-23 DIAGNOSIS — M54.50 LUMBAR SPINE PAIN: ICD-10-CM

## 2018-07-23 DIAGNOSIS — I26.99 PULMONARY EMBOLISM (H): ICD-10-CM

## 2018-07-23 DIAGNOSIS — M43.16 SPONDYLOLISTHESIS OF LUMBAR REGION: ICD-10-CM

## 2018-07-23 DIAGNOSIS — M47.816 ARTHROPATHY OF LUMBAR FACET JOINT: ICD-10-CM

## 2018-07-23 DIAGNOSIS — Z79.01 ON WARFARIN THERAPY: ICD-10-CM

## 2018-07-23 DIAGNOSIS — M12.88 OTHER SPECIFIC ARTHROPATHIES, NOT ELSEWHERE CLASSIFIED, OTHER SPECIFIED SITE: ICD-10-CM

## 2018-07-23 LAB — POC INR - HE - HISTORICAL: 1.6 (ref 0.9–1.1)

## 2018-07-25 ENCOUNTER — COMMUNICATION - HEALTHEAST (OUTPATIENT)
Dept: FAMILY MEDICINE | Facility: CLINIC | Age: 73
End: 2018-07-25

## 2018-07-25 DIAGNOSIS — I26.99 PULMONARY EMBOLISM (H): ICD-10-CM

## 2018-07-26 ENCOUNTER — COMMUNICATION - HEALTHEAST (OUTPATIENT)
Dept: FAMILY MEDICINE | Facility: CLINIC | Age: 73
End: 2018-07-26

## 2018-07-26 DIAGNOSIS — E78.5 HYPERLIPIDEMIA: ICD-10-CM

## 2018-07-28 ENCOUNTER — COMMUNICATION - HEALTHEAST (OUTPATIENT)
Dept: PHYSICAL MEDICINE AND REHAB | Facility: CLINIC | Age: 73
End: 2018-07-28

## 2018-07-30 ENCOUNTER — COMMUNICATION - HEALTHEAST (OUTPATIENT)
Dept: ANTICOAGULATION | Facility: CLINIC | Age: 73
End: 2018-07-30

## 2018-07-30 ENCOUNTER — COMMUNICATION - HEALTHEAST (OUTPATIENT)
Dept: FAMILY MEDICINE | Facility: CLINIC | Age: 73
End: 2018-07-30

## 2018-07-30 ENCOUNTER — AMBULATORY - HEALTHEAST (OUTPATIENT)
Dept: LAB | Facility: CLINIC | Age: 73
End: 2018-07-30

## 2018-07-30 ENCOUNTER — OFFICE VISIT - HEALTHEAST (OUTPATIENT)
Dept: RHEUMATOLOGY | Facility: CLINIC | Age: 73
End: 2018-07-30

## 2018-07-30 DIAGNOSIS — E66.01 MORBID OBESITY (H): ICD-10-CM

## 2018-07-30 DIAGNOSIS — M25.562 ACUTE BILATERAL KNEE PAIN: ICD-10-CM

## 2018-07-30 DIAGNOSIS — M25.561 ACUTE BILATERAL KNEE PAIN: ICD-10-CM

## 2018-07-30 DIAGNOSIS — I26.99 PULMONARY EMBOLISM (H): ICD-10-CM

## 2018-07-30 DIAGNOSIS — Z86.711 HISTORY OF PULMONARY EMBOLISM: ICD-10-CM

## 2018-07-30 LAB — INR PPP: 1.6 (ref 0.9–1.1)

## 2018-07-31 ENCOUNTER — COMMUNICATION - HEALTHEAST (OUTPATIENT)
Dept: PHYSICAL MEDICINE AND REHAB | Facility: CLINIC | Age: 73
End: 2018-07-31

## 2018-07-31 ENCOUNTER — COMMUNICATION - HEALTHEAST (OUTPATIENT)
Dept: NURSING | Facility: CLINIC | Age: 73
End: 2018-07-31

## 2018-08-01 ENCOUNTER — COMMUNICATION - HEALTHEAST (OUTPATIENT)
Dept: PHYSICAL MEDICINE AND REHAB | Facility: CLINIC | Age: 73
End: 2018-08-01

## 2018-08-01 ENCOUNTER — AMBULATORY - HEALTHEAST (OUTPATIENT)
Dept: PHYSICAL MEDICINE AND REHAB | Facility: CLINIC | Age: 73
End: 2018-08-01

## 2018-08-01 DIAGNOSIS — M47.16 LUMBAR SPONDYLOSIS WITH MYELOPATHY: ICD-10-CM

## 2018-08-01 DIAGNOSIS — M47.816 SPONDYLOSIS OF LUMBAR REGION WITHOUT MYELOPATHY OR RADICULOPATHY: ICD-10-CM

## 2018-08-02 ENCOUNTER — OFFICE VISIT - HEALTHEAST (OUTPATIENT)
Dept: FAMILY MEDICINE | Facility: CLINIC | Age: 73
End: 2018-08-02

## 2018-08-02 DIAGNOSIS — G89.4 CHRONIC PAIN SYNDROME: ICD-10-CM

## 2018-08-02 DIAGNOSIS — D64.9 NORMOCHROMIC NORMOCYTIC ANEMIA: ICD-10-CM

## 2018-08-02 DIAGNOSIS — I10 ESSENTIAL HYPERTENSION WITH GOAL BLOOD PRESSURE LESS THAN 140/90: ICD-10-CM

## 2018-08-02 DIAGNOSIS — Z86.711 HISTORY OF PULMONARY EMBOLISM: ICD-10-CM

## 2018-08-02 DIAGNOSIS — M54.50 CHRONIC MIDLINE LOW BACK PAIN WITHOUT SCIATICA: ICD-10-CM

## 2018-08-02 DIAGNOSIS — G89.29 CHRONIC MIDLINE LOW BACK PAIN WITHOUT SCIATICA: ICD-10-CM

## 2018-08-02 LAB
ERYTHROCYTE [DISTWIDTH] IN BLOOD BY AUTOMATED COUNT: 14.9 % (ref 11–14.5)
FERRITIN SERPL-MCNC: 26 NG/ML (ref 10–130)
FOLATE SERPL-MCNC: 6.1 NG/ML
HCT VFR BLD AUTO: 31.1 % (ref 35–47)
HGB BLD-MCNC: 10.7 G/DL (ref 12–16)
IRON SATN MFR SERPL: 17 % (ref 20–50)
IRON SERPL-MCNC: 63 UG/DL (ref 42–175)
MCH RBC QN AUTO: 27.3 PG (ref 27–34)
MCHC RBC AUTO-ENTMCNC: 34.3 G/DL (ref 32–36)
MCV RBC AUTO: 80 FL (ref 80–100)
PLATELET # BLD AUTO: 468 THOU/UL (ref 140–440)
PMV BLD AUTO: 7.2 FL (ref 7–10)
RBC # BLD AUTO: 3.9 MILL/UL (ref 3.8–5.4)
TIBC SERPL-MCNC: 375 UG/DL (ref 313–563)
TRANSFERRIN SERPL-MCNC: 300 MG/DL (ref 212–360)
VIT B12 SERPL-MCNC: 397 PG/ML (ref 213–816)
WBC: 12.5 THOU/UL (ref 4–11)

## 2018-08-03 LAB
BASOPHILS # BLD AUTO: 0 THOU/UL (ref 0–0.2)
BASOPHILS NFR BLD AUTO: 0 % (ref 0–2)
EOSINOPHIL # BLD AUTO: 0 THOU/UL (ref 0–0.4)
EOSINOPHIL NFR BLD AUTO: 0 % (ref 0–6)
ERYTHROCYTE [DISTWIDTH] IN BLOOD BY AUTOMATED COUNT: 15.8 % (ref 11–14.5)
HCT VFR BLD AUTO: 33.2 % (ref 35–47)
HGB BLD-MCNC: 10.3 G/DL (ref 12–16)
LAB AP CHARGES (HE HISTORICAL CONVERSION): NORMAL
LYMPHOCYTES # BLD AUTO: 4.3 THOU/UL (ref 0.8–4.4)
LYMPHOCYTES NFR BLD AUTO: 33 % (ref 20–40)
MCH RBC QN AUTO: 26.8 PG (ref 27–34)
MCHC RBC AUTO-ENTMCNC: 31 G/DL (ref 32–36)
MCV RBC AUTO: 86 FL (ref 80–100)
MONOCYTES # BLD AUTO: 1.1 THOU/UL (ref 0–0.9)
MONOCYTES NFR BLD AUTO: 9 % (ref 2–10)
NEUTROPHILS # BLD AUTO: 7.2 THOU/UL (ref 2–7.7)
NEUTROPHILS NFR BLD AUTO: 57 % (ref 50–70)
PATH REPORT.COMMENTS IMP SPEC: NORMAL
PATH REPORT.COMMENTS IMP SPEC: NORMAL
PATH REPORT.FINAL DX SPEC: NORMAL
PATH REPORT.MICROSCOPIC SPEC OTHER STN: ABNORMAL
PATH REPORT.MICROSCOPIC SPEC OTHER STN: NORMAL
PATH REPORT.RELEVANT HX SPEC: NORMAL
PLATELET # BLD AUTO: 469 THOU/UL (ref 140–440)
PMV BLD AUTO: 9.9 FL (ref 8.5–12.5)
RBC # BLD AUTO: 3.85 MILL/UL (ref 3.8–5.4)
WBC: 12.7 THOU/UL (ref 4–11)

## 2018-08-06 ENCOUNTER — AMBULATORY - HEALTHEAST (OUTPATIENT)
Dept: LAB | Facility: CLINIC | Age: 73
End: 2018-08-06

## 2018-08-06 ENCOUNTER — COMMUNICATION - HEALTHEAST (OUTPATIENT)
Dept: ANTICOAGULATION | Facility: CLINIC | Age: 73
End: 2018-08-06

## 2018-08-06 DIAGNOSIS — I26.99 PULMONARY EMBOLISM (H): ICD-10-CM

## 2018-08-06 DIAGNOSIS — Z86.711 HISTORY OF PULMONARY EMBOLISM: ICD-10-CM

## 2018-08-06 LAB — INR PPP: 1.6 (ref 0.9–1.1)

## 2018-08-10 ENCOUNTER — HOSPITAL ENCOUNTER (OUTPATIENT)
Dept: PHYSICAL MEDICINE AND REHAB | Facility: CLINIC | Age: 73
Discharge: HOME | End: 2018-08-10
Attending: PHYSICAL MEDICINE & REHABILITATION

## 2018-08-10 DIAGNOSIS — M47.816 SPONDYLOSIS OF LUMBAR REGION WITHOUT MYELOPATHY OR RADICULOPATHY: ICD-10-CM

## 2018-08-10 DIAGNOSIS — Z79.01 ON WARFARIN THERAPY: ICD-10-CM

## 2018-08-10 LAB — POC INR - HE - HISTORICAL: 1.7 (ref 0.9–1.1)

## 2018-08-13 ENCOUNTER — COMMUNICATION - HEALTHEAST (OUTPATIENT)
Dept: ANTICOAGULATION | Facility: CLINIC | Age: 73
End: 2018-08-13

## 2018-08-13 DIAGNOSIS — I26.99 PULMONARY EMBOLISM (H): ICD-10-CM

## 2018-08-14 ENCOUNTER — COMMUNICATION - HEALTHEAST (OUTPATIENT)
Dept: PHYSICAL MEDICINE AND REHAB | Facility: CLINIC | Age: 73
End: 2018-08-14

## 2018-08-17 ENCOUNTER — COMMUNICATION - HEALTHEAST (OUTPATIENT)
Dept: FAMILY MEDICINE | Facility: CLINIC | Age: 73
End: 2018-08-17

## 2018-08-17 ENCOUNTER — COMMUNICATION - HEALTHEAST (OUTPATIENT)
Dept: NURSING | Facility: CLINIC | Age: 73
End: 2018-08-17

## 2018-08-17 DIAGNOSIS — G89.4 CHRONIC PAIN SYNDROME: ICD-10-CM

## 2018-08-20 ENCOUNTER — COMMUNICATION - HEALTHEAST (OUTPATIENT)
Dept: FAMILY MEDICINE | Facility: CLINIC | Age: 73
End: 2018-08-20

## 2018-08-20 ENCOUNTER — AMBULATORY - HEALTHEAST (OUTPATIENT)
Dept: LAB | Facility: CLINIC | Age: 73
End: 2018-08-20

## 2018-08-20 ENCOUNTER — COMMUNICATION - HEALTHEAST (OUTPATIENT)
Dept: ANTICOAGULATION | Facility: CLINIC | Age: 73
End: 2018-08-20

## 2018-08-20 ENCOUNTER — COMMUNICATION - HEALTHEAST (OUTPATIENT)
Dept: CARE COORDINATION | Facility: CLINIC | Age: 73
End: 2018-08-20

## 2018-08-20 ENCOUNTER — AMBULATORY - HEALTHEAST (OUTPATIENT)
Dept: NURSING | Facility: CLINIC | Age: 73
End: 2018-08-20

## 2018-08-20 DIAGNOSIS — I26.99 PULMONARY EMBOLISM (H): ICD-10-CM

## 2018-08-20 DIAGNOSIS — Z86.711 HISTORY OF PULMONARY EMBOLISM: ICD-10-CM

## 2018-08-20 LAB — INR PPP: 1.8 (ref 0.9–1.1)

## 2018-08-22 ENCOUNTER — HOSPITAL ENCOUNTER (OUTPATIENT)
Dept: PHYSICAL MEDICINE AND REHAB | Facility: CLINIC | Age: 73
Discharge: HOME | End: 2018-08-22
Attending: PHYSICAL MEDICINE & REHABILITATION

## 2018-08-22 DIAGNOSIS — M79.18 MYOFASCIAL PAIN: ICD-10-CM

## 2018-08-22 DIAGNOSIS — M47.816 ARTHROPATHY OF LUMBAR FACET JOINT: ICD-10-CM

## 2018-08-22 DIAGNOSIS — M43.16 SPONDYLOLISTHESIS OF LUMBAR REGION: ICD-10-CM

## 2018-08-22 DIAGNOSIS — M53.3 SACROILIAC JOINT PAIN: ICD-10-CM

## 2018-08-22 DIAGNOSIS — M54.50 LUMBAR SPINE PAIN: ICD-10-CM

## 2018-08-22 ASSESSMENT — MIFFLIN-ST. JEOR: SCORE: 1875.68

## 2018-08-23 ENCOUNTER — COMMUNICATION - HEALTHEAST (OUTPATIENT)
Dept: FAMILY MEDICINE | Facility: CLINIC | Age: 73
End: 2018-08-23

## 2018-08-23 ENCOUNTER — AMBULATORY - HEALTHEAST (OUTPATIENT)
Dept: FAMILY MEDICINE | Facility: CLINIC | Age: 73
End: 2018-08-23

## 2018-08-23 DIAGNOSIS — F32.A DEPRESSION: ICD-10-CM

## 2018-08-23 DIAGNOSIS — G89.29 CHRONIC PAIN: ICD-10-CM

## 2018-08-27 ENCOUNTER — AMBULATORY - HEALTHEAST (OUTPATIENT)
Dept: FAMILY MEDICINE | Facility: CLINIC | Age: 73
End: 2018-08-27

## 2018-08-28 ENCOUNTER — COMMUNICATION - HEALTHEAST (OUTPATIENT)
Dept: NURSING | Facility: CLINIC | Age: 73
End: 2018-08-28

## 2018-09-07 ENCOUNTER — COMMUNICATION - HEALTHEAST (OUTPATIENT)
Dept: NURSING | Facility: CLINIC | Age: 73
End: 2018-09-07

## 2018-09-11 ENCOUNTER — COMMUNICATION - HEALTHEAST (OUTPATIENT)
Dept: ANTICOAGULATION | Facility: CLINIC | Age: 73
End: 2018-09-11

## 2018-09-13 ENCOUNTER — COMMUNICATION - HEALTHEAST (OUTPATIENT)
Dept: FAMILY MEDICINE | Facility: CLINIC | Age: 73
End: 2018-09-13

## 2018-09-13 DIAGNOSIS — M25.569 CHRONIC KNEE PAIN, UNSPECIFIED LATERALITY: ICD-10-CM

## 2018-09-13 DIAGNOSIS — G89.29 CHRONIC KNEE PAIN, UNSPECIFIED LATERALITY: ICD-10-CM

## 2018-09-14 ENCOUNTER — HOSPITAL ENCOUNTER (OUTPATIENT)
Dept: PHYSICAL MEDICINE AND REHAB | Facility: CLINIC | Age: 73
Discharge: HOME | End: 2018-09-14
Attending: PHYSICAL MEDICINE & REHABILITATION

## 2018-09-14 ENCOUNTER — AMBULATORY - HEALTHEAST (OUTPATIENT)
Dept: PHYSICAL MEDICINE AND REHAB | Facility: CLINIC | Age: 73
End: 2018-09-14

## 2018-09-14 ENCOUNTER — COMMUNICATION - HEALTHEAST (OUTPATIENT)
Dept: FAMILY MEDICINE | Facility: CLINIC | Age: 73
End: 2018-09-14

## 2018-09-14 ENCOUNTER — AMBULATORY - HEALTHEAST (OUTPATIENT)
Dept: LAB | Facility: CLINIC | Age: 73
End: 2018-09-14

## 2018-09-14 ENCOUNTER — COMMUNICATION - HEALTHEAST (OUTPATIENT)
Dept: ANTICOAGULATION | Facility: CLINIC | Age: 73
End: 2018-09-14

## 2018-09-14 DIAGNOSIS — M53.3 SACROILIAC JOINT PAIN: ICD-10-CM

## 2018-09-14 DIAGNOSIS — M79.10 MYALGIA: ICD-10-CM

## 2018-09-14 DIAGNOSIS — M25.512 ACUTE PAIN OF LEFT SHOULDER: ICD-10-CM

## 2018-09-14 DIAGNOSIS — M25.561 BILATERAL KNEE PAIN: ICD-10-CM

## 2018-09-14 DIAGNOSIS — M25.562 BILATERAL KNEE PAIN: ICD-10-CM

## 2018-09-14 DIAGNOSIS — G89.29 CHRONIC PAIN: ICD-10-CM

## 2018-09-14 DIAGNOSIS — M25.512 LEFT SHOULDER PAIN: ICD-10-CM

## 2018-09-14 DIAGNOSIS — F32.A DEPRESSION: ICD-10-CM

## 2018-09-14 DIAGNOSIS — I26.01 SEPTIC PULMONARY EMBOLISM WITH ACUTE COR PULMONALE, UNSPECIFIED CHRONICITY (H): ICD-10-CM

## 2018-09-14 DIAGNOSIS — M54.50 LUMBAR SPINE PAIN: ICD-10-CM

## 2018-09-14 LAB
C REACTIVE PROTEIN LHE: 1.1 MG/DL (ref 0–0.8)
ERYTHROCYTE [SEDIMENTATION RATE] IN BLOOD BY WESTERGREN METHOD: 43 MM/HR (ref 0–20)
POC INR - HE - HISTORICAL: 3.1 (ref 0.9–1.1)

## 2018-09-14 ASSESSMENT — MIFFLIN-ST. JEOR: SCORE: 1875.68

## 2018-09-16 ENCOUNTER — COMMUNICATION - HEALTHEAST (OUTPATIENT)
Dept: FAMILY MEDICINE | Facility: CLINIC | Age: 73
End: 2018-09-16

## 2018-09-17 ENCOUNTER — COMMUNICATION - HEALTHEAST (OUTPATIENT)
Dept: PHYSICAL MEDICINE AND REHAB | Facility: CLINIC | Age: 73
End: 2018-09-17

## 2018-09-17 ENCOUNTER — AMBULATORY - HEALTHEAST (OUTPATIENT)
Dept: PHYSICAL MEDICINE AND REHAB | Facility: CLINIC | Age: 73
End: 2018-09-17

## 2018-09-17 DIAGNOSIS — M17.0 OSTEOARTHRITIS OF BOTH KNEES, UNSPECIFIED OSTEOARTHRITIS TYPE: ICD-10-CM

## 2018-09-17 DIAGNOSIS — M25.512 LEFT SHOULDER PAIN: ICD-10-CM

## 2018-09-18 ENCOUNTER — AMBULATORY - HEALTHEAST (OUTPATIENT)
Dept: FAMILY MEDICINE | Facility: CLINIC | Age: 73
End: 2018-09-18

## 2018-09-18 ENCOUNTER — COMMUNICATION - HEALTHEAST (OUTPATIENT)
Dept: NURSING | Facility: CLINIC | Age: 73
End: 2018-09-18

## 2018-09-18 DIAGNOSIS — M25.562 LEFT KNEE PAIN, UNSPECIFIED CHRONICITY: ICD-10-CM

## 2018-09-20 ENCOUNTER — HOSPITAL ENCOUNTER (OUTPATIENT)
Dept: PHYSICAL MEDICINE AND REHAB | Facility: CLINIC | Age: 73
Discharge: HOME | End: 2018-09-20
Attending: PHYSICAL MEDICINE & REHABILITATION

## 2018-09-20 ENCOUNTER — COMMUNICATION - HEALTHEAST (OUTPATIENT)
Dept: FAMILY MEDICINE | Facility: CLINIC | Age: 73
End: 2018-09-20

## 2018-09-20 DIAGNOSIS — M53.3 SACROILIAC JOINT PAIN: ICD-10-CM

## 2018-09-20 DIAGNOSIS — I26.01 SEPTIC PULMONARY EMBOLISM WITH ACUTE COR PULMONALE, UNSPECIFIED CHRONICITY (H): ICD-10-CM

## 2018-09-20 DIAGNOSIS — Z79.01 ON WARFARIN THERAPY: ICD-10-CM

## 2018-09-20 DIAGNOSIS — M54.50 LUMBAR SPINE PAIN: ICD-10-CM

## 2018-09-20 LAB
INR PPP: 3.1 (ref 0.9–1.1)
POC INR - HE - HISTORICAL: 3.1 (ref 0.9–1.1)

## 2018-09-24 ENCOUNTER — COMMUNICATION - HEALTHEAST (OUTPATIENT)
Dept: FAMILY MEDICINE | Facility: CLINIC | Age: 73
End: 2018-09-24

## 2018-09-24 ENCOUNTER — COMMUNICATION - HEALTHEAST (OUTPATIENT)
Dept: SCHEDULING | Facility: CLINIC | Age: 73
End: 2018-09-24

## 2018-09-24 DIAGNOSIS — I26.99 PULMONARY EMBOLISM (H): ICD-10-CM

## 2018-09-25 ENCOUNTER — COMMUNICATION - HEALTHEAST (OUTPATIENT)
Dept: FAMILY MEDICINE | Facility: CLINIC | Age: 73
End: 2018-09-25

## 2018-09-27 ENCOUNTER — COMMUNICATION - HEALTHEAST (OUTPATIENT)
Dept: FAMILY MEDICINE | Facility: CLINIC | Age: 73
End: 2018-09-27

## 2018-09-27 ENCOUNTER — COMMUNICATION - HEALTHEAST (OUTPATIENT)
Dept: ANTICOAGULATION | Facility: CLINIC | Age: 73
End: 2018-09-27

## 2018-09-27 ENCOUNTER — AMBULATORY - HEALTHEAST (OUTPATIENT)
Dept: LAB | Facility: HOSPITAL | Age: 73
End: 2018-09-27

## 2018-09-27 DIAGNOSIS — M25.562 LEFT KNEE PAIN, UNSPECIFIED CHRONICITY: ICD-10-CM

## 2018-09-27 DIAGNOSIS — Z86.711 HISTORY OF PULMONARY EMBOLISM: ICD-10-CM

## 2018-09-27 DIAGNOSIS — I26.01 SEPTIC PULMONARY EMBOLISM WITH ACUTE COR PULMONALE, UNSPECIFIED CHRONICITY (H): ICD-10-CM

## 2018-09-27 LAB — INR PPP: 2.17 (ref 0.9–1.1)

## 2018-09-28 ENCOUNTER — RECORDS - HEALTHEAST (OUTPATIENT)
Dept: ADMINISTRATIVE | Facility: OTHER | Age: 73
End: 2018-09-28

## 2018-10-01 ENCOUNTER — COMMUNICATION - HEALTHEAST (OUTPATIENT)
Dept: FAMILY MEDICINE | Facility: CLINIC | Age: 73
End: 2018-10-01

## 2018-10-01 ENCOUNTER — HOSPITAL ENCOUNTER (OUTPATIENT)
Dept: PHYSICAL MEDICINE AND REHAB | Facility: CLINIC | Age: 73
Discharge: HOME | End: 2018-10-01
Attending: PHYSICAL MEDICINE & REHABILITATION

## 2018-10-01 DIAGNOSIS — Z79.01 ON WARFARIN THERAPY: ICD-10-CM

## 2018-10-01 DIAGNOSIS — R60.9 EDEMA: ICD-10-CM

## 2018-10-01 LAB — POC INR - HE - HISTORICAL: 2.3 (ref 0.9–1.1)

## 2018-10-02 LAB — INR PPP: 2.1 (ref 0.9–1.1)

## 2018-10-03 ENCOUNTER — OFFICE VISIT - HEALTHEAST (OUTPATIENT)
Dept: FAMILY MEDICINE | Facility: CLINIC | Age: 73
End: 2018-10-03

## 2018-10-03 ENCOUNTER — COMMUNICATION - HEALTHEAST (OUTPATIENT)
Dept: FAMILY MEDICINE | Facility: CLINIC | Age: 73
End: 2018-10-03

## 2018-10-03 DIAGNOSIS — F33.9 MAJOR DEPRESSIVE DISORDER, RECURRENT EPISODE (H): ICD-10-CM

## 2018-10-03 DIAGNOSIS — F41.9 ANXIETY: ICD-10-CM

## 2018-10-03 DIAGNOSIS — I10 ESSENTIAL HYPERTENSION WITH GOAL BLOOD PRESSURE LESS THAN 140/90: ICD-10-CM

## 2018-10-03 DIAGNOSIS — I26.01 SEPTIC PULMONARY EMBOLISM WITH ACUTE COR PULMONALE, UNSPECIFIED CHRONICITY (H): ICD-10-CM

## 2018-10-03 DIAGNOSIS — G89.4 CHRONIC PAIN SYNDROME: ICD-10-CM

## 2018-10-03 DIAGNOSIS — Z23 NEED FOR VACCINATION: ICD-10-CM

## 2018-10-04 ENCOUNTER — COMMUNICATION - HEALTHEAST (OUTPATIENT)
Dept: FAMILY MEDICINE | Facility: CLINIC | Age: 73
End: 2018-10-04

## 2018-10-11 ENCOUNTER — COMMUNICATION - HEALTHEAST (OUTPATIENT)
Dept: NURSING | Facility: CLINIC | Age: 73
End: 2018-10-11

## 2018-10-15 ENCOUNTER — COMMUNICATION - HEALTHEAST (OUTPATIENT)
Dept: FAMILY MEDICINE | Facility: CLINIC | Age: 73
End: 2018-10-15

## 2018-10-15 DIAGNOSIS — M25.562 LEFT KNEE PAIN, UNSPECIFIED CHRONICITY: ICD-10-CM

## 2018-10-17 ENCOUNTER — COMMUNICATION - HEALTHEAST (OUTPATIENT)
Dept: ANTICOAGULATION | Facility: CLINIC | Age: 73
End: 2018-10-17

## 2018-10-17 ENCOUNTER — AMBULATORY - HEALTHEAST (OUTPATIENT)
Dept: LAB | Facility: CLINIC | Age: 73
End: 2018-10-17

## 2018-10-17 DIAGNOSIS — I26.01 SEPTIC PULMONARY EMBOLISM WITH ACUTE COR PULMONALE, UNSPECIFIED CHRONICITY (H): ICD-10-CM

## 2018-10-17 DIAGNOSIS — Z86.711 HISTORY OF PULMONARY EMBOLISM: ICD-10-CM

## 2018-10-17 LAB — INR PPP: 2.5 (ref 0.9–1.1)

## 2018-10-24 ENCOUNTER — COMMUNICATION - HEALTHEAST (OUTPATIENT)
Dept: FAMILY MEDICINE | Facility: CLINIC | Age: 73
End: 2018-10-24

## 2018-10-31 ENCOUNTER — COMMUNICATION - HEALTHEAST (OUTPATIENT)
Dept: FAMILY MEDICINE | Facility: CLINIC | Age: 73
End: 2018-10-31

## 2018-10-31 DIAGNOSIS — G89.4 CHRONIC PAIN SYNDROME: ICD-10-CM

## 2018-11-01 ENCOUNTER — COMMUNICATION - HEALTHEAST (OUTPATIENT)
Dept: SCHEDULING | Facility: CLINIC | Age: 73
End: 2018-11-01

## 2018-11-01 DIAGNOSIS — M25.562 LEFT KNEE PAIN, UNSPECIFIED CHRONICITY: ICD-10-CM

## 2018-11-05 ENCOUNTER — OFFICE VISIT - HEALTHEAST (OUTPATIENT)
Dept: FAMILY MEDICINE | Facility: CLINIC | Age: 73
End: 2018-11-05

## 2018-11-05 DIAGNOSIS — J32.9 SINUSITIS: ICD-10-CM

## 2018-11-06 ENCOUNTER — COMMUNICATION - HEALTHEAST (OUTPATIENT)
Dept: ANTICOAGULATION | Facility: CLINIC | Age: 73
End: 2018-11-06

## 2018-11-06 DIAGNOSIS — I26.01 SEPTIC PULMONARY EMBOLISM WITH ACUTE COR PULMONALE, UNSPECIFIED CHRONICITY (H): ICD-10-CM

## 2018-11-07 ENCOUNTER — RECORDS - HEALTHEAST (OUTPATIENT)
Dept: ADMINISTRATIVE | Facility: OTHER | Age: 73
End: 2018-11-07

## 2018-11-12 ENCOUNTER — COMMUNICATION - HEALTHEAST (OUTPATIENT)
Dept: FAMILY MEDICINE | Facility: CLINIC | Age: 73
End: 2018-11-12

## 2018-11-12 DIAGNOSIS — D64.9 ANEMIA, UNSPECIFIED TYPE: ICD-10-CM

## 2018-11-13 ENCOUNTER — COMMUNICATION - HEALTHEAST (OUTPATIENT)
Dept: ANTICOAGULATION | Facility: CLINIC | Age: 73
End: 2018-11-13

## 2018-11-13 ENCOUNTER — AMBULATORY - HEALTHEAST (OUTPATIENT)
Dept: LAB | Facility: CLINIC | Age: 73
End: 2018-11-13

## 2018-11-13 ENCOUNTER — COMMUNICATION - HEALTHEAST (OUTPATIENT)
Dept: NURSING | Facility: CLINIC | Age: 73
End: 2018-11-13

## 2018-11-13 DIAGNOSIS — D64.9 ANEMIA, UNSPECIFIED TYPE: ICD-10-CM

## 2018-11-13 DIAGNOSIS — I26.01 SEPTIC PULMONARY EMBOLISM WITH ACUTE COR PULMONALE, UNSPECIFIED CHRONICITY (H): ICD-10-CM

## 2018-11-13 DIAGNOSIS — Z86.711 HISTORY OF PULMONARY EMBOLISM: ICD-10-CM

## 2018-11-13 LAB
ERYTHROCYTE [DISTWIDTH] IN BLOOD BY AUTOMATED COUNT: 14.4 % (ref 11–14.5)
HCT VFR BLD AUTO: 34.5 % (ref 35–47)
HGB BLD-MCNC: 10.6 G/DL (ref 12–16)
INR PPP: 1.6 (ref 0.9–1.1)
MCH RBC QN AUTO: 27.5 PG (ref 27–34)
MCHC RBC AUTO-ENTMCNC: 30.7 G/DL (ref 32–36)
MCV RBC AUTO: 90 FL (ref 80–100)
PLATELET # BLD AUTO: 373 THOU/UL (ref 140–440)
PMV BLD AUTO: 10 FL (ref 8.5–12.5)
RBC # BLD AUTO: 3.85 MILL/UL (ref 3.8–5.4)
WBC: 12.2 THOU/UL (ref 4–11)

## 2018-11-14 ENCOUNTER — COMMUNICATION - HEALTHEAST (OUTPATIENT)
Dept: FAMILY MEDICINE | Facility: CLINIC | Age: 73
End: 2018-11-14

## 2018-11-14 ENCOUNTER — RECORDS - HEALTHEAST (OUTPATIENT)
Dept: ADMINISTRATIVE | Facility: OTHER | Age: 73
End: 2018-11-14

## 2018-11-14 DIAGNOSIS — D72.829 LEUKOCYTOSIS, UNSPECIFIED TYPE: ICD-10-CM

## 2018-11-14 DIAGNOSIS — D64.9 NORMOCHROMIC NORMOCYTIC ANEMIA: ICD-10-CM

## 2018-11-16 ENCOUNTER — COMMUNICATION - HEALTHEAST (OUTPATIENT)
Dept: FAMILY MEDICINE | Facility: CLINIC | Age: 73
End: 2018-11-16

## 2018-11-20 ENCOUNTER — COMMUNICATION - HEALTHEAST (OUTPATIENT)
Dept: ONCOLOGY | Facility: CLINIC | Age: 73
End: 2018-11-20

## 2018-11-20 DIAGNOSIS — D64.9 NORMOCHROMIC NORMOCYTIC ANEMIA: ICD-10-CM

## 2018-11-20 DIAGNOSIS — D72.829 LEUKOCYTOSIS, UNSPECIFIED TYPE: ICD-10-CM

## 2018-11-26 ENCOUNTER — RECORDS - HEALTHEAST (OUTPATIENT)
Dept: ADMINISTRATIVE | Facility: OTHER | Age: 73
End: 2018-11-26

## 2018-11-28 ENCOUNTER — COMMUNICATION - HEALTHEAST (OUTPATIENT)
Dept: FAMILY MEDICINE | Facility: CLINIC | Age: 73
End: 2018-11-28

## 2018-11-28 ENCOUNTER — COMMUNICATION - HEALTHEAST (OUTPATIENT)
Dept: ANTICOAGULATION | Facility: CLINIC | Age: 73
End: 2018-11-28

## 2018-11-28 ENCOUNTER — AMBULATORY - HEALTHEAST (OUTPATIENT)
Dept: LAB | Facility: CLINIC | Age: 73
End: 2018-11-28

## 2018-11-28 DIAGNOSIS — Z86.711 HISTORY OF PULMONARY EMBOLISM: ICD-10-CM

## 2018-11-28 DIAGNOSIS — I26.01 SEPTIC PULMONARY EMBOLISM WITH ACUTE COR PULMONALE, UNSPECIFIED CHRONICITY (H): ICD-10-CM

## 2018-11-28 LAB — INR PPP: 1.9 (ref 0.9–1.1)

## 2018-12-07 ENCOUNTER — COMMUNICATION - HEALTHEAST (OUTPATIENT)
Dept: ANTICOAGULATION | Facility: CLINIC | Age: 73
End: 2018-12-07

## 2018-12-07 ENCOUNTER — OFFICE VISIT - HEALTHEAST (OUTPATIENT)
Dept: FAMILY MEDICINE | Facility: CLINIC | Age: 73
End: 2018-12-07

## 2018-12-07 DIAGNOSIS — E78.00 PURE HYPERCHOLESTEROLEMIA: ICD-10-CM

## 2018-12-07 DIAGNOSIS — G47.33 OBSTRUCTIVE SLEEP APNEA (ADULT) (PEDIATRIC): ICD-10-CM

## 2018-12-07 DIAGNOSIS — M25.562 LEFT KNEE PAIN, UNSPECIFIED CHRONICITY: ICD-10-CM

## 2018-12-07 DIAGNOSIS — E03.9 HYPOTHYROIDISM, UNSPECIFIED TYPE: ICD-10-CM

## 2018-12-07 DIAGNOSIS — Z86.711 HISTORY OF PULMONARY EMBOLISM: ICD-10-CM

## 2018-12-07 DIAGNOSIS — D64.9 NORMOCHROMIC NORMOCYTIC ANEMIA: ICD-10-CM

## 2018-12-07 DIAGNOSIS — I26.01 SEPTIC PULMONARY EMBOLISM WITH ACUTE COR PULMONALE, UNSPECIFIED CHRONICITY (H): ICD-10-CM

## 2018-12-07 DIAGNOSIS — R73.01 IMPAIRED FASTING GLUCOSE: ICD-10-CM

## 2018-12-07 DIAGNOSIS — G89.4 CHRONIC PAIN SYNDROME: ICD-10-CM

## 2018-12-07 DIAGNOSIS — I10 ESSENTIAL HYPERTENSION WITH GOAL BLOOD PRESSURE LESS THAN 140/90: ICD-10-CM

## 2018-12-07 DIAGNOSIS — Z79.01 ANTICOAGULANT LONG-TERM USE: ICD-10-CM

## 2018-12-07 DIAGNOSIS — Z01.810 PREOP CARDIOVASCULAR EXAM: ICD-10-CM

## 2018-12-07 DIAGNOSIS — F33.0 MILD EPISODE OF RECURRENT MAJOR DEPRESSIVE DISORDER (H): ICD-10-CM

## 2018-12-07 DIAGNOSIS — E66.01 MORBID OBESITY (H): ICD-10-CM

## 2018-12-07 LAB
ANION GAP SERPL CALCULATED.3IONS-SCNC: 11 MMOL/L (ref 5–18)
BUN SERPL-MCNC: 36 MG/DL (ref 8–28)
CALCIUM SERPL-MCNC: 11 MG/DL (ref 8.5–10.5)
CHLORIDE BLD-SCNC: 100 MMOL/L (ref 98–107)
CO2 SERPL-SCNC: 29 MMOL/L (ref 22–31)
CREAT SERPL-MCNC: 1.15 MG/DL (ref 0.6–1.1)
ERYTHROCYTE [DISTWIDTH] IN BLOOD BY AUTOMATED COUNT: 13.4 % (ref 11–14.5)
GFR SERPL CREATININE-BSD FRML MDRD: 46 ML/MIN/1.73M2
GLUCOSE BLD-MCNC: 101 MG/DL (ref 70–125)
HCT VFR BLD AUTO: 31.7 % (ref 35–47)
HGB BLD-MCNC: 10.7 G/DL (ref 12–16)
INR PPP: 1.5 (ref 0.9–1.1)
MCH RBC QN AUTO: 28 PG (ref 27–34)
MCHC RBC AUTO-ENTMCNC: 33.7 G/DL (ref 32–36)
MCV RBC AUTO: 83 FL (ref 80–100)
PLATELET # BLD AUTO: 379 THOU/UL (ref 140–440)
PMV BLD AUTO: 6.9 FL (ref 7–10)
POTASSIUM BLD-SCNC: 4.3 MMOL/L (ref 3.5–5)
RBC # BLD AUTO: 3.82 MILL/UL (ref 3.8–5.4)
SODIUM SERPL-SCNC: 140 MMOL/L (ref 136–145)
WBC: 8.9 THOU/UL (ref 4–11)

## 2018-12-07 ASSESSMENT — MIFFLIN-ST. JEOR: SCORE: 1875.68

## 2018-12-11 ENCOUNTER — COMMUNICATION - HEALTHEAST (OUTPATIENT)
Dept: ANTICOAGULATION | Facility: CLINIC | Age: 73
End: 2018-12-11

## 2018-12-12 ENCOUNTER — COMMUNICATION - HEALTHEAST (OUTPATIENT)
Dept: ANTICOAGULATION | Facility: CLINIC | Age: 73
End: 2018-12-12

## 2018-12-13 ENCOUNTER — AMBULATORY - HEALTHEAST (OUTPATIENT)
Dept: LAB | Facility: CLINIC | Age: 73
End: 2018-12-13

## 2018-12-13 ENCOUNTER — COMMUNICATION - HEALTHEAST (OUTPATIENT)
Dept: ANTICOAGULATION | Facility: CLINIC | Age: 73
End: 2018-12-13

## 2018-12-13 DIAGNOSIS — I26.01 SEPTIC PULMONARY EMBOLISM WITH ACUTE COR PULMONALE, UNSPECIFIED CHRONICITY (H): ICD-10-CM

## 2018-12-13 DIAGNOSIS — Z86.711 HISTORY OF PULMONARY EMBOLISM: ICD-10-CM

## 2018-12-13 LAB — INR PPP: 1.8 (ref 0.9–1.1)

## 2018-12-17 ENCOUNTER — RECORDS - HEALTHEAST (OUTPATIENT)
Dept: ADMINISTRATIVE | Facility: OTHER | Age: 73
End: 2018-12-17

## 2018-12-19 ENCOUNTER — RECORDS - HEALTHEAST (OUTPATIENT)
Dept: ADMINISTRATIVE | Facility: OTHER | Age: 73
End: 2018-12-19

## 2018-12-21 ENCOUNTER — COMMUNICATION - HEALTHEAST (OUTPATIENT)
Dept: FAMILY MEDICINE | Facility: CLINIC | Age: 73
End: 2018-12-21

## 2018-12-21 ENCOUNTER — RECORDS - HEALTHEAST (OUTPATIENT)
Dept: ADMINISTRATIVE | Facility: OTHER | Age: 73
End: 2018-12-21

## 2018-12-24 ENCOUNTER — COMMUNICATION - HEALTHEAST (OUTPATIENT)
Dept: FAMILY MEDICINE | Facility: CLINIC | Age: 73
End: 2018-12-24

## 2018-12-24 DIAGNOSIS — I26.01 SEPTIC PULMONARY EMBOLISM WITH ACUTE COR PULMONALE, UNSPECIFIED CHRONICITY (H): ICD-10-CM

## 2018-12-24 LAB — INR PPP: 1.6 (ref 0.9–1.1)

## 2018-12-26 ENCOUNTER — COMMUNICATION - HEALTHEAST (OUTPATIENT)
Dept: FAMILY MEDICINE | Facility: CLINIC | Age: 73
End: 2018-12-26

## 2018-12-26 ENCOUNTER — COMMUNICATION - HEALTHEAST (OUTPATIENT)
Dept: SCHEDULING | Facility: CLINIC | Age: 73
End: 2018-12-26

## 2018-12-26 DIAGNOSIS — I26.01 SEPTIC PULMONARY EMBOLISM WITH ACUTE COR PULMONALE, UNSPECIFIED CHRONICITY (H): ICD-10-CM

## 2018-12-26 LAB — INR PPP: 1.7 (ref 0.9–1.1)

## 2018-12-28 ENCOUNTER — COMMUNICATION - HEALTHEAST (OUTPATIENT)
Dept: SCHEDULING | Facility: CLINIC | Age: 73
End: 2018-12-28

## 2018-12-28 ENCOUNTER — COMMUNICATION - HEALTHEAST (OUTPATIENT)
Dept: NURSING | Facility: CLINIC | Age: 73
End: 2018-12-28

## 2018-12-28 DIAGNOSIS — I26.01 SEPTIC PULMONARY EMBOLISM WITH ACUTE COR PULMONALE, UNSPECIFIED CHRONICITY (H): ICD-10-CM

## 2018-12-28 LAB — INR PPP: 2.2 (ref 0.9–1.1)

## 2018-12-30 ENCOUNTER — RECORDS - HEALTHEAST (OUTPATIENT)
Dept: ADMINISTRATIVE | Facility: OTHER | Age: 73
End: 2018-12-30

## 2018-12-31 ENCOUNTER — RECORDS - HEALTHEAST (OUTPATIENT)
Dept: ADMINISTRATIVE | Facility: OTHER | Age: 73
End: 2018-12-31

## 2019-01-02 ENCOUNTER — RECORDS - HEALTHEAST (OUTPATIENT)
Dept: ADMINISTRATIVE | Facility: OTHER | Age: 74
End: 2019-01-02

## 2019-01-02 ENCOUNTER — COMMUNICATION - HEALTHEAST (OUTPATIENT)
Dept: FAMILY MEDICINE | Facility: CLINIC | Age: 74
End: 2019-01-02

## 2019-01-02 DIAGNOSIS — I26.01 SEPTIC PULMONARY EMBOLISM WITH ACUTE COR PULMONALE, UNSPECIFIED CHRONICITY (H): ICD-10-CM

## 2019-01-02 LAB — INR PPP: 2.4 (ref 0.9–1.1)

## 2019-01-04 ENCOUNTER — COMMUNICATION - HEALTHEAST (OUTPATIENT)
Dept: FAMILY MEDICINE | Facility: CLINIC | Age: 74
End: 2019-01-04

## 2019-01-04 DIAGNOSIS — F33.9 MAJOR DEPRESSIVE DISORDER, RECURRENT EPISODE (H): ICD-10-CM

## 2019-01-09 ENCOUNTER — OFFICE VISIT - HEALTHEAST (OUTPATIENT)
Dept: FAMILY MEDICINE | Facility: CLINIC | Age: 74
End: 2019-01-09

## 2019-01-09 ENCOUNTER — COMMUNICATION - HEALTHEAST (OUTPATIENT)
Dept: ANTICOAGULATION | Facility: CLINIC | Age: 74
End: 2019-01-09

## 2019-01-09 DIAGNOSIS — Z86.711 HISTORY OF PULMONARY EMBOLISM: ICD-10-CM

## 2019-01-09 DIAGNOSIS — M25.561 CHRONIC PAIN OF RIGHT KNEE: ICD-10-CM

## 2019-01-09 DIAGNOSIS — F41.9 ANXIETY: ICD-10-CM

## 2019-01-09 DIAGNOSIS — N18.30 CKD (CHRONIC KIDNEY DISEASE) STAGE 3, GFR 30-59 ML/MIN (H): ICD-10-CM

## 2019-01-09 DIAGNOSIS — I26.01 SEPTIC PULMONARY EMBOLISM WITH ACUTE COR PULMONALE, UNSPECIFIED CHRONICITY (H): ICD-10-CM

## 2019-01-09 DIAGNOSIS — G47.00 INSOMNIA, UNSPECIFIED TYPE: ICD-10-CM

## 2019-01-09 DIAGNOSIS — G89.29 CHRONIC PAIN OF RIGHT KNEE: ICD-10-CM

## 2019-01-09 DIAGNOSIS — D64.9 ANEMIA, UNSPECIFIED TYPE: ICD-10-CM

## 2019-01-09 LAB
ANION GAP SERPL CALCULATED.3IONS-SCNC: 10 MMOL/L (ref 5–18)
BUN SERPL-MCNC: 24 MG/DL (ref 8–28)
CALCIUM SERPL-MCNC: 10.9 MG/DL (ref 8.5–10.5)
CHLORIDE BLD-SCNC: 98 MMOL/L (ref 98–107)
CO2 SERPL-SCNC: 34 MMOL/L (ref 22–31)
CREAT SERPL-MCNC: 1.09 MG/DL (ref 0.6–1.1)
ERYTHROCYTE [DISTWIDTH] IN BLOOD BY AUTOMATED COUNT: 14.1 % (ref 11–14.5)
GFR SERPL CREATININE-BSD FRML MDRD: 49 ML/MIN/1.73M2
GLUCOSE BLD-MCNC: 96 MG/DL (ref 70–125)
HCT VFR BLD AUTO: 29.3 % (ref 35–47)
HGB BLD-MCNC: 9.6 G/DL (ref 12–16)
INR PPP: 2.9 (ref 0.9–1.1)
MCH RBC QN AUTO: 27.2 PG (ref 27–34)
MCHC RBC AUTO-ENTMCNC: 32.9 G/DL (ref 32–36)
MCV RBC AUTO: 83 FL (ref 80–100)
PLATELET # BLD AUTO: 672 THOU/UL (ref 140–440)
PMV BLD AUTO: 6.5 FL (ref 7–10)
POTASSIUM BLD-SCNC: 4.2 MMOL/L (ref 3.5–5)
RBC # BLD AUTO: 3.54 MILL/UL (ref 3.8–5.4)
SODIUM SERPL-SCNC: 142 MMOL/L (ref 136–145)
WBC: 9.8 THOU/UL (ref 4–11)

## 2019-01-11 ENCOUNTER — RECORDS - HEALTHEAST (OUTPATIENT)
Dept: ADMINISTRATIVE | Facility: OTHER | Age: 74
End: 2019-01-11

## 2019-01-15 ENCOUNTER — COMMUNICATION - HEALTHEAST (OUTPATIENT)
Dept: FAMILY MEDICINE | Facility: CLINIC | Age: 74
End: 2019-01-15

## 2019-01-15 ENCOUNTER — AMBULATORY - HEALTHEAST (OUTPATIENT)
Dept: FAMILY MEDICINE | Facility: CLINIC | Age: 74
End: 2019-01-15

## 2019-01-15 DIAGNOSIS — F41.9 ANXIETY: ICD-10-CM

## 2019-01-16 ENCOUNTER — RECORDS - HEALTHEAST (OUTPATIENT)
Dept: ADMINISTRATIVE | Facility: OTHER | Age: 74
End: 2019-01-16

## 2019-01-17 ENCOUNTER — COMMUNICATION - HEALTHEAST (OUTPATIENT)
Dept: SCHEDULING | Facility: CLINIC | Age: 74
End: 2019-01-17

## 2019-01-18 ENCOUNTER — COMMUNICATION - HEALTHEAST (OUTPATIENT)
Dept: FAMILY MEDICINE | Facility: CLINIC | Age: 74
End: 2019-01-18

## 2019-01-18 DIAGNOSIS — F33.9 MAJOR DEPRESSIVE DISORDER, RECURRENT EPISODE (H): ICD-10-CM

## 2019-01-18 DIAGNOSIS — R60.9 EDEMA: ICD-10-CM

## 2019-01-24 ENCOUNTER — COMMUNICATION - HEALTHEAST (OUTPATIENT)
Dept: ANTICOAGULATION | Facility: CLINIC | Age: 74
End: 2019-01-24

## 2019-01-28 ENCOUNTER — COMMUNICATION - HEALTHEAST (OUTPATIENT)
Dept: NURSING | Facility: CLINIC | Age: 74
End: 2019-01-28

## 2019-01-29 ENCOUNTER — AMBULATORY - HEALTHEAST (OUTPATIENT)
Dept: LAB | Facility: CLINIC | Age: 74
End: 2019-01-29

## 2019-01-29 ENCOUNTER — COMMUNICATION - HEALTHEAST (OUTPATIENT)
Dept: FAMILY MEDICINE | Facility: CLINIC | Age: 74
End: 2019-01-29

## 2019-01-29 DIAGNOSIS — I26.01 SEPTIC PULMONARY EMBOLISM WITH ACUTE COR PULMONALE, UNSPECIFIED CHRONICITY (H): ICD-10-CM

## 2019-01-29 DIAGNOSIS — Z86.711 HISTORY OF PULMONARY EMBOLISM: ICD-10-CM

## 2019-01-29 LAB — INR PPP: 4.2 (ref 0.9–1.1)

## 2019-02-11 ENCOUNTER — COMMUNICATION - HEALTHEAST (OUTPATIENT)
Dept: ANTICOAGULATION | Facility: CLINIC | Age: 74
End: 2019-02-11

## 2019-02-11 ENCOUNTER — COMMUNICATION - HEALTHEAST (OUTPATIENT)
Dept: FAMILY MEDICINE | Facility: CLINIC | Age: 74
End: 2019-02-11

## 2019-02-11 DIAGNOSIS — M25.562 LEFT KNEE PAIN, UNSPECIFIED CHRONICITY: ICD-10-CM

## 2019-02-13 ENCOUNTER — COMMUNICATION - HEALTHEAST (OUTPATIENT)
Dept: FAMILY MEDICINE | Facility: CLINIC | Age: 74
End: 2019-02-13

## 2019-02-13 DIAGNOSIS — E03.9 HYPOTHYROIDISM: ICD-10-CM

## 2019-02-18 ENCOUNTER — COMMUNICATION - HEALTHEAST (OUTPATIENT)
Dept: ANTICOAGULATION | Facility: CLINIC | Age: 74
End: 2019-02-18

## 2019-02-18 ENCOUNTER — AMBULATORY - HEALTHEAST (OUTPATIENT)
Dept: LAB | Facility: CLINIC | Age: 74
End: 2019-02-18

## 2019-02-18 ENCOUNTER — HOSPITAL ENCOUNTER (OUTPATIENT)
Dept: PHYSICAL MEDICINE AND REHAB | Facility: CLINIC | Age: 74
Discharge: HOME | End: 2019-02-18
Attending: PHYSICAL MEDICINE & REHABILITATION

## 2019-02-18 ENCOUNTER — AMBULATORY - HEALTHEAST (OUTPATIENT)
Dept: ADMINISTRATIVE | Facility: REHABILITATION | Age: 74
End: 2019-02-18

## 2019-02-18 DIAGNOSIS — M17.11 OSTEOARTHRITIS OF RIGHT KNEE: ICD-10-CM

## 2019-02-18 DIAGNOSIS — I26.01 SEPTIC PULMONARY EMBOLISM WITH ACUTE COR PULMONALE, UNSPECIFIED CHRONICITY (H): ICD-10-CM

## 2019-02-18 DIAGNOSIS — M25.512 CHRONIC LEFT SHOULDER PAIN: ICD-10-CM

## 2019-02-18 DIAGNOSIS — Z86.711 HISTORY OF PULMONARY EMBOLISM: ICD-10-CM

## 2019-02-18 DIAGNOSIS — Z79.01 ON WARFARIN THERAPY: ICD-10-CM

## 2019-02-18 DIAGNOSIS — M54.50 LUMBAR SPINE PAIN: ICD-10-CM

## 2019-02-18 DIAGNOSIS — M79.18 MYOFASCIAL PAIN: ICD-10-CM

## 2019-02-18 DIAGNOSIS — M43.16 SPONDYLOLISTHESIS OF LUMBAR REGION: ICD-10-CM

## 2019-02-18 DIAGNOSIS — M48.061 BILATERAL STENOSIS OF LATERAL RECESS OF LUMBAR SPINE: ICD-10-CM

## 2019-02-18 DIAGNOSIS — G89.29 CHRONIC LEFT SHOULDER PAIN: ICD-10-CM

## 2019-02-18 LAB — INR PPP: 2.1 (ref 0.9–1.1)

## 2019-02-18 ASSESSMENT — MIFFLIN-ST. JEOR: SCORE: 1862.08

## 2019-02-23 ENCOUNTER — COMMUNICATION - HEALTHEAST (OUTPATIENT)
Dept: SCHEDULING | Facility: CLINIC | Age: 74
End: 2019-02-23

## 2019-02-25 ENCOUNTER — COMMUNICATION - HEALTHEAST (OUTPATIENT)
Dept: FAMILY MEDICINE | Facility: CLINIC | Age: 74
End: 2019-02-25

## 2019-02-25 DIAGNOSIS — M25.562 LEFT KNEE PAIN, UNSPECIFIED CHRONICITY: ICD-10-CM

## 2019-02-26 ENCOUNTER — COMMUNICATION - HEALTHEAST (OUTPATIENT)
Dept: FAMILY MEDICINE | Facility: CLINIC | Age: 74
End: 2019-02-26

## 2019-02-26 DIAGNOSIS — R60.9 EDEMA: ICD-10-CM

## 2019-02-27 ENCOUNTER — COMMUNICATION - HEALTHEAST (OUTPATIENT)
Dept: FAMILY MEDICINE | Facility: CLINIC | Age: 74
End: 2019-02-27

## 2019-02-27 ENCOUNTER — COMMUNICATION - HEALTHEAST (OUTPATIENT)
Dept: NURSING | Facility: CLINIC | Age: 74
End: 2019-02-27

## 2019-02-27 DIAGNOSIS — I10 HTN (HYPERTENSION): ICD-10-CM

## 2019-03-04 ENCOUNTER — COMMUNICATION - HEALTHEAST (OUTPATIENT)
Dept: ANTICOAGULATION | Facility: CLINIC | Age: 74
End: 2019-03-04

## 2019-03-04 ENCOUNTER — AMBULATORY - HEALTHEAST (OUTPATIENT)
Dept: LAB | Facility: CLINIC | Age: 74
End: 2019-03-04

## 2019-03-04 ENCOUNTER — HOSPITAL ENCOUNTER (OUTPATIENT)
Dept: PHYSICAL MEDICINE AND REHAB | Facility: CLINIC | Age: 74
Discharge: HOME | End: 2019-03-04
Attending: PHYSICAL MEDICINE & REHABILITATION

## 2019-03-04 DIAGNOSIS — Z86.711 HISTORY OF PULMONARY EMBOLISM: ICD-10-CM

## 2019-03-04 DIAGNOSIS — M43.16 SPONDYLOLISTHESIS OF LUMBAR REGION: ICD-10-CM

## 2019-03-04 DIAGNOSIS — M48.061 BILATERAL STENOSIS OF LATERAL RECESS OF LUMBAR SPINE: ICD-10-CM

## 2019-03-04 DIAGNOSIS — I26.01 SEPTIC PULMONARY EMBOLISM WITH ACUTE COR PULMONALE, UNSPECIFIED CHRONICITY (H): ICD-10-CM

## 2019-03-04 DIAGNOSIS — Z79.01 ON WARFARIN THERAPY: ICD-10-CM

## 2019-03-04 DIAGNOSIS — M54.50 LUMBAR SPINE PAIN: ICD-10-CM

## 2019-03-04 LAB — INR PPP: 2.1 (ref 0.9–1.1)

## 2019-03-06 ENCOUNTER — RECORDS - HEALTHEAST (OUTPATIENT)
Dept: ADMINISTRATIVE | Facility: OTHER | Age: 74
End: 2019-03-06

## 2019-03-11 ENCOUNTER — COMMUNICATION - HEALTHEAST (OUTPATIENT)
Dept: FAMILY MEDICINE | Facility: CLINIC | Age: 74
End: 2019-03-11

## 2019-03-11 DIAGNOSIS — M25.562 LEFT KNEE PAIN, UNSPECIFIED CHRONICITY: ICD-10-CM

## 2019-03-22 ENCOUNTER — COMMUNICATION - HEALTHEAST (OUTPATIENT)
Dept: FAMILY MEDICINE | Facility: CLINIC | Age: 74
End: 2019-03-22

## 2019-03-25 ENCOUNTER — COMMUNICATION - HEALTHEAST (OUTPATIENT)
Dept: FAMILY MEDICINE | Facility: CLINIC | Age: 74
End: 2019-03-25

## 2019-03-25 DIAGNOSIS — M25.562 LEFT KNEE PAIN, UNSPECIFIED CHRONICITY: ICD-10-CM

## 2019-03-29 ENCOUNTER — COMMUNICATION - HEALTHEAST (OUTPATIENT)
Dept: PHYSICAL MEDICINE AND REHAB | Facility: CLINIC | Age: 74
End: 2019-03-29

## 2019-03-29 ENCOUNTER — COMMUNICATION - HEALTHEAST (OUTPATIENT)
Dept: FAMILY MEDICINE | Facility: CLINIC | Age: 74
End: 2019-03-29

## 2019-03-29 DIAGNOSIS — I26.99 PULMONARY EMBOLISM (H): ICD-10-CM

## 2019-04-01 ENCOUNTER — AMBULATORY - HEALTHEAST (OUTPATIENT)
Dept: LAB | Facility: CLINIC | Age: 74
End: 2019-04-01

## 2019-04-01 ENCOUNTER — COMMUNICATION - HEALTHEAST (OUTPATIENT)
Dept: ANTICOAGULATION | Facility: CLINIC | Age: 74
End: 2019-04-01

## 2019-04-01 DIAGNOSIS — I26.01 SEPTIC PULMONARY EMBOLISM WITH ACUTE COR PULMONALE, UNSPECIFIED CHRONICITY (H): ICD-10-CM

## 2019-04-01 DIAGNOSIS — Z86.711 HISTORY OF PULMONARY EMBOLISM: ICD-10-CM

## 2019-04-01 LAB — INR PPP: 1.5 (ref 0.9–1.1)

## 2019-04-03 ENCOUNTER — COMMUNICATION - HEALTHEAST (OUTPATIENT)
Dept: SCHEDULING | Facility: CLINIC | Age: 74
End: 2019-04-03

## 2019-04-03 ENCOUNTER — COMMUNICATION - HEALTHEAST (OUTPATIENT)
Dept: FAMILY MEDICINE | Facility: CLINIC | Age: 74
End: 2019-04-03

## 2019-04-03 DIAGNOSIS — I26.99 PULMONARY EMBOLISM (H): ICD-10-CM

## 2019-04-09 ENCOUNTER — COMMUNICATION - HEALTHEAST (OUTPATIENT)
Dept: ANTICOAGULATION | Facility: CLINIC | Age: 74
End: 2019-04-09

## 2019-04-09 ENCOUNTER — OFFICE VISIT - HEALTHEAST (OUTPATIENT)
Dept: FAMILY MEDICINE | Facility: CLINIC | Age: 74
End: 2019-04-09

## 2019-04-09 DIAGNOSIS — N18.30 CKD (CHRONIC KIDNEY DISEASE) STAGE 3, GFR 30-59 ML/MIN (H): ICD-10-CM

## 2019-04-09 DIAGNOSIS — E78.5 HYPERLIPIDEMIA LDL GOAL <100: ICD-10-CM

## 2019-04-09 DIAGNOSIS — G89.4 CHRONIC PAIN SYNDROME: ICD-10-CM

## 2019-04-09 DIAGNOSIS — R82.90 MALODOROUS URINE: ICD-10-CM

## 2019-04-09 DIAGNOSIS — I26.01 SEPTIC PULMONARY EMBOLISM WITH ACUTE COR PULMONALE, UNSPECIFIED CHRONICITY (H): ICD-10-CM

## 2019-04-09 DIAGNOSIS — D64.9 ANEMIA, UNSPECIFIED TYPE: ICD-10-CM

## 2019-04-09 DIAGNOSIS — M25.562 LEFT KNEE PAIN, UNSPECIFIED CHRONICITY: ICD-10-CM

## 2019-04-09 DIAGNOSIS — L30.4 INTERTRIGO: ICD-10-CM

## 2019-04-09 DIAGNOSIS — Z86.711 HISTORY OF PULMONARY EMBOLISM: ICD-10-CM

## 2019-04-09 LAB
ALBUMIN SERPL-MCNC: 3.9 G/DL (ref 3.5–5)
ALBUMIN UR-MCNC: NEGATIVE MG/DL
ALP SERPL-CCNC: 91 U/L (ref 45–120)
ALT SERPL W P-5'-P-CCNC: 14 U/L (ref 0–45)
ANION GAP SERPL CALCULATED.3IONS-SCNC: 14 MMOL/L (ref 5–18)
APPEARANCE UR: CLEAR
AST SERPL W P-5'-P-CCNC: 18 U/L (ref 0–40)
BILIRUB SERPL-MCNC: 0.4 MG/DL (ref 0–1)
BILIRUB UR QL STRIP: NEGATIVE
BUN SERPL-MCNC: 26 MG/DL (ref 8–28)
CALCIUM SERPL-MCNC: 10.9 MG/DL (ref 8.5–10.5)
CHLORIDE BLD-SCNC: 101 MMOL/L (ref 98–107)
CHOLEST SERPL-MCNC: 194 MG/DL
CO2 SERPL-SCNC: 27 MMOL/L (ref 22–31)
COLOR UR AUTO: YELLOW
CREAT SERPL-MCNC: 1.06 MG/DL (ref 0.6–1.1)
ERYTHROCYTE [DISTWIDTH] IN BLOOD BY AUTOMATED COUNT: 15.2 % (ref 11–14.5)
FASTING STATUS PATIENT QL REPORTED: YES
GFR SERPL CREATININE-BSD FRML MDRD: 51 ML/MIN/1.73M2
GLUCOSE BLD-MCNC: 92 MG/DL (ref 70–125)
GLUCOSE UR STRIP-MCNC: NEGATIVE MG/DL
HCT VFR BLD AUTO: 29.8 % (ref 35–47)
HDLC SERPL-MCNC: 49 MG/DL
HGB BLD-MCNC: 9.7 G/DL (ref 12–16)
HGB UR QL STRIP: NEGATIVE
INR PPP: 1.6 (ref 0.9–1.1)
KETONES UR STRIP-MCNC: NEGATIVE MG/DL
LDLC SERPL CALC-MCNC: 113 MG/DL
LEUKOCYTE ESTERASE UR QL STRIP: NEGATIVE
MCH RBC QN AUTO: 27.3 PG (ref 27–34)
MCHC RBC AUTO-ENTMCNC: 32.5 G/DL (ref 32–36)
MCV RBC AUTO: 84 FL (ref 80–100)
NITRATE UR QL: NEGATIVE
PH UR STRIP: 5.5 [PH] (ref 5–8)
PLATELET # BLD AUTO: 417 THOU/UL (ref 140–440)
PMV BLD AUTO: 6.8 FL (ref 7–10)
POTASSIUM BLD-SCNC: 4.3 MMOL/L (ref 3.5–5)
PROT SERPL-MCNC: 6.8 G/DL (ref 6–8)
RBC # BLD AUTO: 3.55 MILL/UL (ref 3.8–5.4)
SODIUM SERPL-SCNC: 142 MMOL/L (ref 136–145)
SP GR UR STRIP: 1.01 (ref 1–1.03)
TRIGL SERPL-MCNC: 162 MG/DL
UROBILINOGEN UR STRIP-ACNC: NORMAL
WBC: 10.6 THOU/UL (ref 4–11)

## 2019-04-16 ENCOUNTER — COMMUNICATION - HEALTHEAST (OUTPATIENT)
Dept: SCHEDULING | Facility: CLINIC | Age: 74
End: 2019-04-16

## 2019-04-23 ENCOUNTER — AMBULATORY - HEALTHEAST (OUTPATIENT)
Dept: LAB | Facility: CLINIC | Age: 74
End: 2019-04-23

## 2019-04-23 ENCOUNTER — COMMUNICATION - HEALTHEAST (OUTPATIENT)
Dept: FAMILY MEDICINE | Facility: CLINIC | Age: 74
End: 2019-04-23

## 2019-04-23 ENCOUNTER — COMMUNICATION - HEALTHEAST (OUTPATIENT)
Dept: ANTICOAGULATION | Facility: CLINIC | Age: 74
End: 2019-04-23

## 2019-04-23 DIAGNOSIS — I26.01 SEPTIC PULMONARY EMBOLISM WITH ACUTE COR PULMONALE, UNSPECIFIED CHRONICITY (H): ICD-10-CM

## 2019-04-23 DIAGNOSIS — L30.4 INTERTRIGO: ICD-10-CM

## 2019-04-23 DIAGNOSIS — Z86.711 HISTORY OF PULMONARY EMBOLISM: ICD-10-CM

## 2019-04-23 LAB — INR PPP: 2.2 (ref 0.9–1.1)

## 2019-04-26 ENCOUNTER — AMBULATORY - HEALTHEAST (OUTPATIENT)
Dept: CARE COORDINATION | Facility: CLINIC | Age: 74
End: 2019-04-26

## 2019-04-26 ENCOUNTER — COMMUNICATION - HEALTHEAST (OUTPATIENT)
Dept: NURSING | Facility: CLINIC | Age: 74
End: 2019-04-26

## 2019-04-30 ENCOUNTER — COMMUNICATION - HEALTHEAST (OUTPATIENT)
Dept: NURSING | Facility: CLINIC | Age: 74
End: 2019-04-30

## 2019-05-07 ENCOUNTER — COMMUNICATION - HEALTHEAST (OUTPATIENT)
Dept: SCHEDULING | Facility: CLINIC | Age: 74
End: 2019-05-07

## 2019-05-07 ENCOUNTER — COMMUNICATION - HEALTHEAST (OUTPATIENT)
Dept: ANTICOAGULATION | Facility: CLINIC | Age: 74
End: 2019-05-07

## 2019-05-07 ENCOUNTER — AMBULATORY - HEALTHEAST (OUTPATIENT)
Dept: LAB | Facility: CLINIC | Age: 74
End: 2019-05-07

## 2019-05-07 DIAGNOSIS — Z86.711 HISTORY OF PULMONARY EMBOLISM: ICD-10-CM

## 2019-05-07 DIAGNOSIS — I26.01 SEPTIC PULMONARY EMBOLISM WITH ACUTE COR PULMONALE, UNSPECIFIED CHRONICITY (H): ICD-10-CM

## 2019-05-07 LAB — INR PPP: 2.1 (ref 0.9–1.1)

## 2019-05-09 ENCOUNTER — OFFICE VISIT - HEALTHEAST (OUTPATIENT)
Dept: FAMILY MEDICINE | Facility: CLINIC | Age: 74
End: 2019-05-09

## 2019-05-09 DIAGNOSIS — J34.89 NOSTRIL SORE: ICD-10-CM

## 2019-05-14 ENCOUNTER — COMMUNICATION - HEALTHEAST (OUTPATIENT)
Dept: ANTICOAGULATION | Facility: CLINIC | Age: 74
End: 2019-05-14

## 2019-05-14 DIAGNOSIS — I26.99 OTHER PULMONARY EMBOLISM WITHOUT ACUTE COR PULMONALE, UNSPECIFIED CHRONICITY (H): ICD-10-CM

## 2019-06-05 ENCOUNTER — RECORDS - HEALTHEAST (OUTPATIENT)
Dept: ADMINISTRATIVE | Facility: OTHER | Age: 74
End: 2019-06-05

## 2019-06-06 ENCOUNTER — AMBULATORY - HEALTHEAST (OUTPATIENT)
Dept: LAB | Facility: CLINIC | Age: 74
End: 2019-06-06

## 2019-06-06 ENCOUNTER — COMMUNICATION - HEALTHEAST (OUTPATIENT)
Dept: ANTICOAGULATION | Facility: CLINIC | Age: 74
End: 2019-06-06

## 2019-06-06 DIAGNOSIS — I26.99 OTHER PULMONARY EMBOLISM WITHOUT ACUTE COR PULMONALE, UNSPECIFIED CHRONICITY (H): ICD-10-CM

## 2019-06-06 DIAGNOSIS — I26.01 SEPTIC PULMONARY EMBOLISM WITH ACUTE COR PULMONALE, UNSPECIFIED CHRONICITY (H): ICD-10-CM

## 2019-06-06 LAB — INR PPP: 2.7 (ref 0.9–1.1)

## 2019-07-03 ENCOUNTER — AMBULATORY - HEALTHEAST (OUTPATIENT)
Dept: FAMILY MEDICINE | Facility: CLINIC | Age: 74
End: 2019-07-03

## 2019-07-03 ENCOUNTER — COMMUNICATION - HEALTHEAST (OUTPATIENT)
Dept: ANTICOAGULATION | Facility: CLINIC | Age: 74
End: 2019-07-03

## 2019-07-03 ENCOUNTER — OFFICE VISIT - HEALTHEAST (OUTPATIENT)
Dept: FAMILY MEDICINE | Facility: CLINIC | Age: 74
End: 2019-07-03

## 2019-07-03 DIAGNOSIS — R53.83 FATIGUE, UNSPECIFIED TYPE: ICD-10-CM

## 2019-07-03 DIAGNOSIS — I26.01 SEPTIC PULMONARY EMBOLISM WITH ACUTE COR PULMONALE, UNSPECIFIED CHRONICITY (H): ICD-10-CM

## 2019-07-03 DIAGNOSIS — I26.99 OTHER PULMONARY EMBOLISM WITHOUT ACUTE COR PULMONALE, UNSPECIFIED CHRONICITY (H): ICD-10-CM

## 2019-07-03 DIAGNOSIS — E66.01 MORBID OBESITY (H): ICD-10-CM

## 2019-07-03 DIAGNOSIS — E03.9 HYPOTHYROIDISM, UNSPECIFIED TYPE: ICD-10-CM

## 2019-07-03 DIAGNOSIS — L30.4 INTERTRIGO: ICD-10-CM

## 2019-07-03 DIAGNOSIS — D64.9 ANEMIA, UNSPECIFIED TYPE: ICD-10-CM

## 2019-07-03 DIAGNOSIS — Z01.810 PRE-OPERATIVE CARDIOVASCULAR EXAMINATION: ICD-10-CM

## 2019-07-03 DIAGNOSIS — G47.33 OBSTRUCTIVE SLEEP APNEA (ADULT) (PEDIATRIC): ICD-10-CM

## 2019-07-03 DIAGNOSIS — I10 ESSENTIAL HYPERTENSION WITH GOAL BLOOD PRESSURE LESS THAN 140/90: ICD-10-CM

## 2019-07-03 DIAGNOSIS — M25.562 LEFT KNEE PAIN, UNSPECIFIED CHRONICITY: ICD-10-CM

## 2019-07-03 DIAGNOSIS — N18.30 CKD (CHRONIC KIDNEY DISEASE) STAGE 3, GFR 30-59 ML/MIN (H): ICD-10-CM

## 2019-07-03 DIAGNOSIS — J34.89 NASAL CRUSTING: ICD-10-CM

## 2019-07-03 DIAGNOSIS — E78.00 PURE HYPERCHOLESTEROLEMIA: ICD-10-CM

## 2019-07-03 LAB
ALBUMIN SERPL-MCNC: 4.1 G/DL (ref 3.5–5)
ALP SERPL-CCNC: 85 U/L (ref 45–120)
ALT SERPL W P-5'-P-CCNC: 17 U/L (ref 0–45)
ANION GAP SERPL CALCULATED.3IONS-SCNC: 7 MMOL/L (ref 5–18)
AST SERPL W P-5'-P-CCNC: 21 U/L (ref 0–40)
BILIRUB SERPL-MCNC: 0.5 MG/DL (ref 0–1)
BUN SERPL-MCNC: 25 MG/DL (ref 8–28)
CALCIUM SERPL-MCNC: 10.9 MG/DL (ref 8.5–10.5)
CHLORIDE BLD-SCNC: 99 MMOL/L (ref 98–107)
CO2 SERPL-SCNC: 33 MMOL/L (ref 22–31)
CREAT SERPL-MCNC: 1.09 MG/DL (ref 0.6–1.1)
ERYTHROCYTE [DISTWIDTH] IN BLOOD BY AUTOMATED COUNT: 13.2 % (ref 11–14.5)
GFR SERPL CREATININE-BSD FRML MDRD: 49 ML/MIN/1.73M2
GLUCOSE BLD-MCNC: 91 MG/DL (ref 70–125)
HCT VFR BLD AUTO: 31.7 % (ref 35–47)
HGB BLD-MCNC: 10.5 G/DL (ref 12–16)
INR PPP: 2.4 (ref 0.9–1.1)
MCH RBC QN AUTO: 28.9 PG (ref 27–34)
MCHC RBC AUTO-ENTMCNC: 33.2 G/DL (ref 32–36)
MCV RBC AUTO: 87 FL (ref 80–100)
PLATELET # BLD AUTO: 379 THOU/UL (ref 140–440)
PMV BLD AUTO: 6.7 FL (ref 7–10)
POTASSIUM BLD-SCNC: 4.3 MMOL/L (ref 3.5–5)
PROT SERPL-MCNC: 7 G/DL (ref 6–8)
RBC # BLD AUTO: 3.64 MILL/UL (ref 3.8–5.4)
SODIUM SERPL-SCNC: 139 MMOL/L (ref 136–145)
TSH SERPL DL<=0.005 MIU/L-ACNC: 12.9 UIU/ML (ref 0.3–5)
WBC: 10.4 THOU/UL (ref 4–11)

## 2019-07-03 ASSESSMENT — MIFFLIN-ST. JEOR: SCORE: 1889.29

## 2019-07-05 ENCOUNTER — COMMUNICATION - HEALTHEAST (OUTPATIENT)
Dept: FAMILY MEDICINE | Facility: CLINIC | Age: 74
End: 2019-07-05

## 2019-07-05 DIAGNOSIS — E03.9 HYPOTHYROIDISM, UNSPECIFIED TYPE: ICD-10-CM

## 2019-07-05 LAB
ATRIAL RATE - MUSE: 95 BPM
DIASTOLIC BLOOD PRESSURE - MUSE: NORMAL MMHG
INTERPRETATION ECG - MUSE: NORMAL
P AXIS - MUSE: 28 DEGREES
PR INTERVAL - MUSE: 188 MS
QRS DURATION - MUSE: 80 MS
QT - MUSE: 364 MS
QTC - MUSE: 457 MS
R AXIS - MUSE: 29 DEGREES
SYSTOLIC BLOOD PRESSURE - MUSE: NORMAL MMHG
T AXIS - MUSE: 58 DEGREES
VENTRICULAR RATE- MUSE: 95 BPM

## 2019-07-08 ENCOUNTER — COMMUNICATION - HEALTHEAST (OUTPATIENT)
Dept: FAMILY MEDICINE | Facility: CLINIC | Age: 74
End: 2019-07-08

## 2019-07-08 DIAGNOSIS — I26.01 SEPTIC PULMONARY EMBOLISM WITH ACUTE COR PULMONALE, UNSPECIFIED CHRONICITY (H): ICD-10-CM

## 2019-07-11 ENCOUNTER — COMMUNICATION - HEALTHEAST (OUTPATIENT)
Dept: NURSING | Facility: CLINIC | Age: 74
End: 2019-07-11

## 2019-07-17 ENCOUNTER — RECORDS - HEALTHEAST (OUTPATIENT)
Dept: ADMINISTRATIVE | Facility: OTHER | Age: 74
End: 2019-07-17

## 2019-07-18 ENCOUNTER — RECORDS - HEALTHEAST (OUTPATIENT)
Dept: ADMINISTRATIVE | Facility: OTHER | Age: 74
End: 2019-07-18

## 2019-07-19 ENCOUNTER — COMMUNICATION - HEALTHEAST (OUTPATIENT)
Dept: FAMILY MEDICINE | Facility: CLINIC | Age: 74
End: 2019-07-19

## 2019-07-22 ENCOUNTER — COMMUNICATION - HEALTHEAST (OUTPATIENT)
Dept: FAMILY MEDICINE | Facility: CLINIC | Age: 74
End: 2019-07-22

## 2019-07-22 ENCOUNTER — RECORDS - HEALTHEAST (OUTPATIENT)
Dept: ADMINISTRATIVE | Facility: OTHER | Age: 74
End: 2019-07-22

## 2019-07-22 DIAGNOSIS — R60.9 EDEMA: ICD-10-CM

## 2019-07-22 DIAGNOSIS — I26.01 SEPTIC PULMONARY EMBOLISM WITH ACUTE COR PULMONALE, UNSPECIFIED CHRONICITY (H): ICD-10-CM

## 2019-07-22 LAB — INR PPP: 1.3 (ref 0.9–1.1)

## 2019-07-23 ENCOUNTER — COMMUNICATION - HEALTHEAST (OUTPATIENT)
Dept: FAMILY MEDICINE | Facility: CLINIC | Age: 74
End: 2019-07-23

## 2019-07-24 ENCOUNTER — COMMUNICATION - HEALTHEAST (OUTPATIENT)
Dept: FAMILY MEDICINE | Facility: CLINIC | Age: 74
End: 2019-07-24

## 2019-07-24 ENCOUNTER — RECORDS - HEALTHEAST (OUTPATIENT)
Dept: ADMINISTRATIVE | Facility: OTHER | Age: 74
End: 2019-07-24

## 2019-07-25 ENCOUNTER — COMMUNICATION - HEALTHEAST (OUTPATIENT)
Dept: FAMILY MEDICINE | Facility: CLINIC | Age: 74
End: 2019-07-25

## 2019-07-25 DIAGNOSIS — I26.01 SEPTIC PULMONARY EMBOLISM WITH ACUTE COR PULMONALE, UNSPECIFIED CHRONICITY (H): ICD-10-CM

## 2019-07-25 LAB — INR PPP: 1.9 (ref 0.9–1.1)

## 2019-07-26 ENCOUNTER — COMMUNICATION - HEALTHEAST (OUTPATIENT)
Dept: FAMILY MEDICINE | Facility: CLINIC | Age: 74
End: 2019-07-26

## 2019-07-26 DIAGNOSIS — I26.01 SEPTIC PULMONARY EMBOLISM WITH ACUTE COR PULMONALE, UNSPECIFIED CHRONICITY (H): ICD-10-CM

## 2019-07-26 LAB — INR PPP: 2.3 (ref 0.9–1.1)

## 2019-07-29 ENCOUNTER — COMMUNICATION - HEALTHEAST (OUTPATIENT)
Dept: FAMILY MEDICINE | Facility: CLINIC | Age: 74
End: 2019-07-29

## 2019-07-30 ENCOUNTER — COMMUNICATION - HEALTHEAST (OUTPATIENT)
Dept: NURSING | Facility: CLINIC | Age: 74
End: 2019-07-30

## 2019-07-31 ENCOUNTER — RECORDS - HEALTHEAST (OUTPATIENT)
Dept: ADMINISTRATIVE | Facility: OTHER | Age: 74
End: 2019-07-31

## 2019-08-02 ENCOUNTER — COMMUNICATION - HEALTHEAST (OUTPATIENT)
Dept: FAMILY MEDICINE | Facility: CLINIC | Age: 74
End: 2019-08-02

## 2019-08-02 DIAGNOSIS — I26.01 SEPTIC PULMONARY EMBOLISM WITH ACUTE COR PULMONALE, UNSPECIFIED CHRONICITY (H): ICD-10-CM

## 2019-08-02 LAB — INR PPP: 3.5 (ref 0.9–1.1)

## 2019-08-09 ENCOUNTER — COMMUNICATION - HEALTHEAST (OUTPATIENT)
Dept: FAMILY MEDICINE | Facility: CLINIC | Age: 74
End: 2019-08-09

## 2019-08-09 ENCOUNTER — COMMUNICATION - HEALTHEAST (OUTPATIENT)
Dept: NURSING | Facility: CLINIC | Age: 74
End: 2019-08-09

## 2019-08-09 DIAGNOSIS — I26.01 SEPTIC PULMONARY EMBOLISM WITH ACUTE COR PULMONALE, UNSPECIFIED CHRONICITY (H): ICD-10-CM

## 2019-08-09 LAB — INR PPP: 3.1 (ref 0.9–1.1)

## 2019-08-14 ENCOUNTER — COMMUNICATION - HEALTHEAST (OUTPATIENT)
Dept: FAMILY MEDICINE | Facility: CLINIC | Age: 74
End: 2019-08-14

## 2019-08-14 DIAGNOSIS — I10 ESSENTIAL HYPERTENSION WITH GOAL BLOOD PRESSURE LESS THAN 140/90: ICD-10-CM

## 2019-08-15 ENCOUNTER — COMMUNICATION - HEALTHEAST (OUTPATIENT)
Dept: FAMILY MEDICINE | Facility: CLINIC | Age: 74
End: 2019-08-15

## 2019-08-16 ENCOUNTER — COMMUNICATION - HEALTHEAST (OUTPATIENT)
Dept: FAMILY MEDICINE | Facility: CLINIC | Age: 74
End: 2019-08-16

## 2019-08-16 DIAGNOSIS — I26.01 SEPTIC PULMONARY EMBOLISM WITH ACUTE COR PULMONALE, UNSPECIFIED CHRONICITY (H): ICD-10-CM

## 2019-08-16 LAB — INR PPP: 3.3 (ref 0.9–1.1)

## 2019-08-20 ENCOUNTER — OFFICE VISIT - HEALTHEAST (OUTPATIENT)
Dept: FAMILY MEDICINE | Facility: CLINIC | Age: 74
End: 2019-08-20

## 2019-08-20 DIAGNOSIS — M54.50 ACUTE LOW BACK PAIN WITHOUT SCIATICA, UNSPECIFIED BACK PAIN LATERALITY: ICD-10-CM

## 2019-08-20 DIAGNOSIS — E03.9 HYPOTHYROIDISM, UNSPECIFIED TYPE: ICD-10-CM

## 2019-08-20 DIAGNOSIS — N18.30 CKD (CHRONIC KIDNEY DISEASE) STAGE 3, GFR 30-59 ML/MIN (H): ICD-10-CM

## 2019-08-20 DIAGNOSIS — G89.4 CHRONIC PAIN SYNDROME: ICD-10-CM

## 2019-08-20 DIAGNOSIS — I10 ESSENTIAL HYPERTENSION WITH GOAL BLOOD PRESSURE LESS THAN 140/90: ICD-10-CM

## 2019-08-20 DIAGNOSIS — D64.9 ANEMIA, UNSPECIFIED TYPE: ICD-10-CM

## 2019-08-21 ENCOUNTER — COMMUNICATION - HEALTHEAST (OUTPATIENT)
Dept: CARE COORDINATION | Facility: CLINIC | Age: 74
End: 2019-08-21

## 2019-08-21 ENCOUNTER — COMMUNICATION - HEALTHEAST (OUTPATIENT)
Dept: NURSING | Facility: CLINIC | Age: 74
End: 2019-08-21

## 2019-08-22 ENCOUNTER — COMMUNICATION - HEALTHEAST (OUTPATIENT)
Dept: FAMILY MEDICINE | Facility: CLINIC | Age: 74
End: 2019-08-22

## 2019-08-22 DIAGNOSIS — I26.01 SEPTIC PULMONARY EMBOLISM WITH ACUTE COR PULMONALE, UNSPECIFIED CHRONICITY (H): ICD-10-CM

## 2019-08-22 LAB — INR PPP: 2.6 (ref 0.9–1.1)

## 2019-08-23 ENCOUNTER — RECORDS - HEALTHEAST (OUTPATIENT)
Dept: ADMINISTRATIVE | Facility: OTHER | Age: 74
End: 2019-08-23

## 2019-08-23 ENCOUNTER — COMMUNICATION - HEALTHEAST (OUTPATIENT)
Dept: NURSING | Facility: CLINIC | Age: 74
End: 2019-08-23

## 2019-09-05 ENCOUNTER — HOSPITAL ENCOUNTER (OUTPATIENT)
Dept: MAMMOGRAPHY | Facility: CLINIC | Age: 74
Discharge: HOME | End: 2019-09-05
Attending: FAMILY MEDICINE

## 2019-09-05 ENCOUNTER — COMMUNICATION - HEALTHEAST (OUTPATIENT)
Dept: FAMILY MEDICINE | Facility: CLINIC | Age: 74
End: 2019-09-05

## 2019-09-05 DIAGNOSIS — Z12.31 VISIT FOR SCREENING MAMMOGRAM: ICD-10-CM

## 2019-09-05 DIAGNOSIS — I26.01 SEPTIC PULMONARY EMBOLISM WITH ACUTE COR PULMONALE, UNSPECIFIED CHRONICITY (H): ICD-10-CM

## 2019-09-05 LAB — INR PPP: 2.6 (ref 0.9–1.1)

## 2019-09-13 ENCOUNTER — COMMUNICATION - HEALTHEAST (OUTPATIENT)
Dept: FAMILY MEDICINE | Facility: CLINIC | Age: 74
End: 2019-09-13

## 2019-09-13 DIAGNOSIS — E78.5 HYPERLIPIDEMIA: ICD-10-CM

## 2019-09-19 ENCOUNTER — COMMUNICATION - HEALTHEAST (OUTPATIENT)
Dept: FAMILY MEDICINE | Facility: CLINIC | Age: 74
End: 2019-09-19

## 2019-09-19 DIAGNOSIS — I26.01 SEPTIC PULMONARY EMBOLISM WITH ACUTE COR PULMONALE, UNSPECIFIED CHRONICITY (H): ICD-10-CM

## 2019-09-19 LAB — INR PPP: 3.1 (ref 0.9–1.1)

## 2019-09-21 ENCOUNTER — RECORDS - HEALTHEAST (OUTPATIENT)
Dept: ADMINISTRATIVE | Facility: OTHER | Age: 74
End: 2019-09-21

## 2019-09-24 ENCOUNTER — COMMUNICATION - HEALTHEAST (OUTPATIENT)
Dept: FAMILY MEDICINE | Facility: CLINIC | Age: 74
End: 2019-09-24

## 2019-09-24 DIAGNOSIS — I26.99 PULMONARY EMBOLISM (H): ICD-10-CM

## 2019-10-02 ENCOUNTER — OFFICE VISIT - HEALTHEAST (OUTPATIENT)
Dept: FAMILY MEDICINE | Facility: CLINIC | Age: 74
End: 2019-10-02

## 2019-10-02 ENCOUNTER — COMMUNICATION - HEALTHEAST (OUTPATIENT)
Dept: FAMILY MEDICINE | Facility: CLINIC | Age: 74
End: 2019-10-02

## 2019-10-02 ENCOUNTER — COMMUNICATION - HEALTHEAST (OUTPATIENT)
Dept: ANTICOAGULATION | Facility: CLINIC | Age: 74
End: 2019-10-02

## 2019-10-02 DIAGNOSIS — E03.9 HYPOTHYROIDISM, UNSPECIFIED TYPE: ICD-10-CM

## 2019-10-02 DIAGNOSIS — N18.30 CKD (CHRONIC KIDNEY DISEASE) STAGE 3, GFR 30-59 ML/MIN (H): ICD-10-CM

## 2019-10-02 DIAGNOSIS — D64.9 NORMOCHROMIC NORMOCYTIC ANEMIA: ICD-10-CM

## 2019-10-02 DIAGNOSIS — M79.10 MYALGIA: ICD-10-CM

## 2019-10-02 DIAGNOSIS — I10 ESSENTIAL HYPERTENSION WITH GOAL BLOOD PRESSURE LESS THAN 140/90: ICD-10-CM

## 2019-10-02 DIAGNOSIS — I26.99 OTHER PULMONARY EMBOLISM WITHOUT ACUTE COR PULMONALE, UNSPECIFIED CHRONICITY (H): ICD-10-CM

## 2019-10-02 DIAGNOSIS — Z23 FLU VACCINE NEED: ICD-10-CM

## 2019-10-02 DIAGNOSIS — E78.00 PURE HYPERCHOLESTEROLEMIA: ICD-10-CM

## 2019-10-02 DIAGNOSIS — B37.2 INTERTRIGINOUS CANDIDIASIS: ICD-10-CM

## 2019-10-02 DIAGNOSIS — I26.01 SEPTIC PULMONARY EMBOLISM WITH ACUTE COR PULMONALE, UNSPECIFIED CHRONICITY (H): ICD-10-CM

## 2019-10-02 LAB
ANION GAP SERPL CALCULATED.3IONS-SCNC: 11 MMOL/L (ref 5–18)
BUN SERPL-MCNC: 23 MG/DL (ref 8–28)
CALCIUM SERPL-MCNC: 10.4 MG/DL (ref 8.5–10.5)
CHLORIDE BLD-SCNC: 102 MMOL/L (ref 98–107)
CK SERPL-CCNC: 97 U/L (ref 30–190)
CO2 SERPL-SCNC: 28 MMOL/L (ref 22–31)
CREAT SERPL-MCNC: 1.24 MG/DL (ref 0.6–1.1)
ERYTHROCYTE [DISTWIDTH] IN BLOOD BY AUTOMATED COUNT: 14.5 % (ref 11–14.5)
ERYTHROCYTE [SEDIMENTATION RATE] IN BLOOD BY WESTERGREN METHOD: 58 MM/HR (ref 0–20)
GFR SERPL CREATININE-BSD FRML MDRD: 42 ML/MIN/1.73M2
GLUCOSE BLD-MCNC: 103 MG/DL (ref 70–125)
HCT VFR BLD AUTO: 30.7 % (ref 35–47)
HGB BLD-MCNC: 10.2 G/DL (ref 12–16)
INR PPP: 3.2 (ref 0.9–1.1)
MCH RBC QN AUTO: 27.5 PG (ref 27–34)
MCHC RBC AUTO-ENTMCNC: 33.3 G/DL (ref 32–36)
MCV RBC AUTO: 83 FL (ref 80–100)
PLATELET # BLD AUTO: 426 THOU/UL (ref 140–440)
PMV BLD AUTO: 6.9 FL (ref 7–10)
POTASSIUM BLD-SCNC: 3.9 MMOL/L (ref 3.5–5)
RBC # BLD AUTO: 3.72 MILL/UL (ref 3.8–5.4)
SODIUM SERPL-SCNC: 141 MMOL/L (ref 136–145)
TSH SERPL DL<=0.005 MIU/L-ACNC: 2.58 UIU/ML (ref 0.3–5)
WBC: 10 THOU/UL (ref 4–11)

## 2019-10-03 ENCOUNTER — COMMUNICATION - HEALTHEAST (OUTPATIENT)
Dept: FAMILY MEDICINE | Facility: CLINIC | Age: 74
End: 2019-10-03

## 2019-10-03 ENCOUNTER — AMBULATORY - HEALTHEAST (OUTPATIENT)
Dept: FAMILY MEDICINE | Facility: CLINIC | Age: 74
End: 2019-10-03

## 2019-10-03 DIAGNOSIS — M35.3 POLYMYALGIA RHEUMATICA (H): ICD-10-CM

## 2019-10-08 ENCOUNTER — COMMUNICATION - HEALTHEAST (OUTPATIENT)
Dept: FAMILY MEDICINE | Facility: CLINIC | Age: 74
End: 2019-10-08

## 2019-10-09 ENCOUNTER — COMMUNICATION - HEALTHEAST (OUTPATIENT)
Dept: FAMILY MEDICINE | Facility: CLINIC | Age: 74
End: 2019-10-09

## 2019-10-16 ENCOUNTER — AMBULATORY - HEALTHEAST (OUTPATIENT)
Dept: LAB | Facility: CLINIC | Age: 74
End: 2019-10-16

## 2019-10-16 ENCOUNTER — COMMUNICATION - HEALTHEAST (OUTPATIENT)
Dept: ANTICOAGULATION | Facility: CLINIC | Age: 74
End: 2019-10-16

## 2019-10-16 ENCOUNTER — COMMUNICATION - HEALTHEAST (OUTPATIENT)
Dept: FAMILY MEDICINE | Facility: CLINIC | Age: 74
End: 2019-10-16

## 2019-10-16 DIAGNOSIS — I26.99 OTHER PULMONARY EMBOLISM WITHOUT ACUTE COR PULMONALE, UNSPECIFIED CHRONICITY (H): ICD-10-CM

## 2019-10-16 DIAGNOSIS — I26.01 SEPTIC PULMONARY EMBOLISM WITH ACUTE COR PULMONALE, UNSPECIFIED CHRONICITY (H): ICD-10-CM

## 2019-10-16 DIAGNOSIS — F33.9 MAJOR DEPRESSIVE DISORDER, RECURRENT EPISODE (H): ICD-10-CM

## 2019-10-16 LAB — INR PPP: 2.4 (ref 0.9–1.1)

## 2019-10-30 ENCOUNTER — COMMUNICATION - HEALTHEAST (OUTPATIENT)
Dept: ANTICOAGULATION | Facility: CLINIC | Age: 74
End: 2019-10-30

## 2019-10-30 ENCOUNTER — OFFICE VISIT - HEALTHEAST (OUTPATIENT)
Dept: FAMILY MEDICINE | Facility: CLINIC | Age: 74
End: 2019-10-30

## 2019-10-30 DIAGNOSIS — D64.9 NORMOCHROMIC NORMOCYTIC ANEMIA: ICD-10-CM

## 2019-10-30 DIAGNOSIS — E78.00 PURE HYPERCHOLESTEROLEMIA: ICD-10-CM

## 2019-10-30 DIAGNOSIS — N18.30 CKD (CHRONIC KIDNEY DISEASE) STAGE 3, GFR 30-59 ML/MIN (H): ICD-10-CM

## 2019-10-30 DIAGNOSIS — R73.01 IMPAIRED FASTING GLUCOSE: ICD-10-CM

## 2019-10-30 DIAGNOSIS — I26.99 OTHER PULMONARY EMBOLISM WITHOUT ACUTE COR PULMONALE, UNSPECIFIED CHRONICITY (H): ICD-10-CM

## 2019-10-30 DIAGNOSIS — M35.3 POLYMYALGIA RHEUMATICA (H): ICD-10-CM

## 2019-10-30 LAB
ANION GAP SERPL CALCULATED.3IONS-SCNC: 10 MMOL/L (ref 5–18)
BUN SERPL-MCNC: 24 MG/DL (ref 8–28)
CALCIUM SERPL-MCNC: 10.2 MG/DL (ref 8.5–10.5)
CHLORIDE BLD-SCNC: 99 MMOL/L (ref 98–107)
CHOLEST SERPL-MCNC: 207 MG/DL
CO2 SERPL-SCNC: 31 MMOL/L (ref 22–31)
CREAT SERPL-MCNC: 1.06 MG/DL (ref 0.6–1.1)
ERYTHROCYTE [DISTWIDTH] IN BLOOD BY AUTOMATED COUNT: 14.6 % (ref 11–14.5)
ERYTHROCYTE [SEDIMENTATION RATE] IN BLOOD BY WESTERGREN METHOD: 34 MM/HR (ref 0–20)
FASTING STATUS PATIENT QL REPORTED: YES
GFR SERPL CREATININE-BSD FRML MDRD: 50 ML/MIN/1.73M2
GLUCOSE BLD-MCNC: 98 MG/DL (ref 70–125)
HBA1C MFR BLD: 6.1 % (ref 3.5–6)
HCT VFR BLD AUTO: 31.8 % (ref 35–47)
HDLC SERPL-MCNC: 64 MG/DL
HGB BLD-MCNC: 10.7 G/DL (ref 12–16)
INR PPP: 2.1 (ref 0.9–1.1)
LDLC SERPL CALC-MCNC: 119 MG/DL
MCH RBC QN AUTO: 27.8 PG (ref 27–34)
MCHC RBC AUTO-ENTMCNC: 33.6 G/DL (ref 32–36)
MCV RBC AUTO: 83 FL (ref 80–100)
PLATELET # BLD AUTO: 347 THOU/UL (ref 140–440)
PMV BLD AUTO: 6.9 FL (ref 7–10)
POTASSIUM BLD-SCNC: 4 MMOL/L (ref 3.5–5)
RBC # BLD AUTO: 3.85 MILL/UL (ref 3.8–5.4)
SODIUM SERPL-SCNC: 140 MMOL/L (ref 136–145)
TRIGL SERPL-MCNC: 122 MG/DL
WBC: 11.4 THOU/UL (ref 4–11)

## 2019-11-26 ENCOUNTER — COMMUNICATION - HEALTHEAST (OUTPATIENT)
Dept: ANTICOAGULATION | Facility: CLINIC | Age: 74
End: 2019-11-26

## 2019-11-26 ENCOUNTER — AMBULATORY - HEALTHEAST (OUTPATIENT)
Dept: LAB | Facility: CLINIC | Age: 74
End: 2019-11-26

## 2019-11-26 DIAGNOSIS — I26.99 OTHER PULMONARY EMBOLISM WITHOUT ACUTE COR PULMONALE, UNSPECIFIED CHRONICITY (H): ICD-10-CM

## 2019-11-26 DIAGNOSIS — I26.99 PULMONARY EMBOLISM (H): ICD-10-CM

## 2019-11-26 LAB — INR PPP: 1.8 (ref 0.9–1.1)

## 2019-12-04 ENCOUNTER — COMMUNICATION - HEALTHEAST (OUTPATIENT)
Dept: FAMILY MEDICINE | Facility: CLINIC | Age: 74
End: 2019-12-04

## 2019-12-04 DIAGNOSIS — M35.3 POLYMYALGIA RHEUMATICA (H): ICD-10-CM

## 2019-12-16 ENCOUNTER — AMBULATORY - HEALTHEAST (OUTPATIENT)
Dept: LAB | Facility: CLINIC | Age: 74
End: 2019-12-16

## 2019-12-16 ENCOUNTER — COMMUNICATION - HEALTHEAST (OUTPATIENT)
Dept: ANTICOAGULATION | Facility: CLINIC | Age: 74
End: 2019-12-16

## 2019-12-16 DIAGNOSIS — I26.99 OTHER PULMONARY EMBOLISM WITHOUT ACUTE COR PULMONALE, UNSPECIFIED CHRONICITY (H): ICD-10-CM

## 2019-12-16 DIAGNOSIS — I26.99 PULMONARY EMBOLISM (H): ICD-10-CM

## 2019-12-16 LAB — INR PPP: 1.7 (ref 0.9–1.1)

## 2019-12-31 ENCOUNTER — OFFICE VISIT - HEALTHEAST (OUTPATIENT)
Dept: FAMILY MEDICINE | Facility: CLINIC | Age: 74
End: 2019-12-31

## 2019-12-31 ENCOUNTER — COMMUNICATION - HEALTHEAST (OUTPATIENT)
Dept: ANTICOAGULATION | Facility: CLINIC | Age: 74
End: 2019-12-31

## 2019-12-31 DIAGNOSIS — E03.9 HYPOTHYROIDISM, UNSPECIFIED TYPE: ICD-10-CM

## 2019-12-31 DIAGNOSIS — N18.30 CKD (CHRONIC KIDNEY DISEASE) STAGE 3, GFR 30-59 ML/MIN (H): ICD-10-CM

## 2019-12-31 DIAGNOSIS — I26.99 PULMONARY EMBOLISM (H): ICD-10-CM

## 2019-12-31 DIAGNOSIS — D64.9 NORMOCHROMIC NORMOCYTIC ANEMIA: ICD-10-CM

## 2019-12-31 DIAGNOSIS — M35.3 POLYMYALGIA RHEUMATICA (H): ICD-10-CM

## 2019-12-31 DIAGNOSIS — I26.99 OTHER PULMONARY EMBOLISM WITHOUT ACUTE COR PULMONALE, UNSPECIFIED CHRONICITY (H): ICD-10-CM

## 2019-12-31 DIAGNOSIS — R73.01 IMPAIRED FASTING GLUCOSE: ICD-10-CM

## 2019-12-31 LAB
ANION GAP SERPL CALCULATED.3IONS-SCNC: 9 MMOL/L (ref 5–18)
BUN SERPL-MCNC: 29 MG/DL (ref 8–28)
CALCIUM SERPL-MCNC: 10.5 MG/DL (ref 8.5–10.5)
CHLORIDE BLD-SCNC: 101 MMOL/L (ref 98–107)
CO2 SERPL-SCNC: 31 MMOL/L (ref 22–31)
CREAT SERPL-MCNC: 1.08 MG/DL (ref 0.6–1.1)
ERYTHROCYTE [DISTWIDTH] IN BLOOD BY AUTOMATED COUNT: 14 % (ref 11–14.5)
ERYTHROCYTE [SEDIMENTATION RATE] IN BLOOD BY WESTERGREN METHOD: 38 MM/HR (ref 0–20)
GFR SERPL CREATININE-BSD FRML MDRD: 49 ML/MIN/1.73M2
GLUCOSE BLD-MCNC: 97 MG/DL (ref 70–125)
HCT VFR BLD AUTO: 32.5 % (ref 35–47)
HGB BLD-MCNC: 10.9 G/DL (ref 12–16)
INR PPP: 2.1 (ref 0.9–1.1)
MCH RBC QN AUTO: 28 PG (ref 27–34)
MCHC RBC AUTO-ENTMCNC: 33.6 G/DL (ref 32–36)
MCV RBC AUTO: 83 FL (ref 80–100)
PLATELET # BLD AUTO: 389 THOU/UL (ref 140–440)
PMV BLD AUTO: 7.3 FL (ref 7–10)
POTASSIUM BLD-SCNC: 3.9 MMOL/L (ref 3.5–5)
RBC # BLD AUTO: 3.9 MILL/UL (ref 3.8–5.4)
SODIUM SERPL-SCNC: 141 MMOL/L (ref 136–145)
TSH SERPL DL<=0.005 MIU/L-ACNC: 2.09 UIU/ML (ref 0.3–5)
WBC: 12.1 THOU/UL (ref 4–11)

## 2020-01-03 ENCOUNTER — COMMUNICATION - HEALTHEAST (OUTPATIENT)
Dept: FAMILY MEDICINE | Facility: CLINIC | Age: 75
End: 2020-01-03

## 2020-01-15 ENCOUNTER — COMMUNICATION - HEALTHEAST (OUTPATIENT)
Dept: FAMILY MEDICINE | Facility: CLINIC | Age: 75
End: 2020-01-15

## 2020-01-18 ENCOUNTER — COMMUNICATION - HEALTHEAST (OUTPATIENT)
Dept: FAMILY MEDICINE | Facility: CLINIC | Age: 75
End: 2020-01-18

## 2020-01-18 DIAGNOSIS — M35.3 POLYMYALGIA RHEUMATICA (H): ICD-10-CM

## 2020-01-20 ENCOUNTER — AMBULATORY - HEALTHEAST (OUTPATIENT)
Dept: LAB | Facility: CLINIC | Age: 75
End: 2020-01-20

## 2020-01-20 ENCOUNTER — COMMUNICATION - HEALTHEAST (OUTPATIENT)
Dept: ANTICOAGULATION | Facility: CLINIC | Age: 75
End: 2020-01-20

## 2020-01-20 DIAGNOSIS — I26.99 PULMONARY EMBOLISM (H): ICD-10-CM

## 2020-01-20 DIAGNOSIS — I26.99 OTHER PULMONARY EMBOLISM WITHOUT ACUTE COR PULMONALE, UNSPECIFIED CHRONICITY (H): ICD-10-CM

## 2020-01-20 LAB — INR PPP: 2 (ref 0.9–1.1)

## 2020-02-03 ENCOUNTER — COMMUNICATION - HEALTHEAST (OUTPATIENT)
Dept: FAMILY MEDICINE | Facility: CLINIC | Age: 75
End: 2020-02-03

## 2020-02-03 DIAGNOSIS — E03.9 HYPOTHYROIDISM, UNSPECIFIED TYPE: ICD-10-CM

## 2020-02-11 ENCOUNTER — COMMUNICATION - HEALTHEAST (OUTPATIENT)
Dept: FAMILY MEDICINE | Facility: CLINIC | Age: 75
End: 2020-02-11

## 2020-02-11 DIAGNOSIS — M35.3 POLYMYALGIA RHEUMATICA (H): ICD-10-CM

## 2020-02-16 ENCOUNTER — COMMUNICATION - HEALTHEAST (OUTPATIENT)
Dept: FAMILY MEDICINE | Facility: CLINIC | Age: 75
End: 2020-02-16

## 2020-02-16 DIAGNOSIS — M35.3 POLYMYALGIA RHEUMATICA (H): ICD-10-CM

## 2020-02-16 DIAGNOSIS — I26.99 PULMONARY EMBOLISM (H): ICD-10-CM

## 2020-02-26 ENCOUNTER — COMMUNICATION - HEALTHEAST (OUTPATIENT)
Dept: ANTICOAGULATION | Facility: CLINIC | Age: 75
End: 2020-02-26

## 2020-03-03 ENCOUNTER — COMMUNICATION - HEALTHEAST (OUTPATIENT)
Dept: FAMILY MEDICINE | Facility: CLINIC | Age: 75
End: 2020-03-03

## 2020-03-03 DIAGNOSIS — I10 HTN (HYPERTENSION): ICD-10-CM

## 2020-03-12 ENCOUNTER — COMMUNICATION - HEALTHEAST (OUTPATIENT)
Dept: FAMILY MEDICINE | Facility: CLINIC | Age: 75
End: 2020-03-12

## 2020-03-17 ENCOUNTER — COMMUNICATION - HEALTHEAST (OUTPATIENT)
Dept: ANTICOAGULATION | Facility: CLINIC | Age: 75
End: 2020-03-17

## 2020-03-17 ENCOUNTER — COMMUNICATION - HEALTHEAST (OUTPATIENT)
Dept: FAMILY MEDICINE | Facility: CLINIC | Age: 75
End: 2020-03-17

## 2020-03-17 ENCOUNTER — OFFICE VISIT - HEALTHEAST (OUTPATIENT)
Dept: FAMILY MEDICINE | Facility: CLINIC | Age: 75
End: 2020-03-17

## 2020-03-17 DIAGNOSIS — I26.99 OTHER PULMONARY EMBOLISM WITHOUT ACUTE COR PULMONALE, UNSPECIFIED CHRONICITY (H): ICD-10-CM

## 2020-03-17 DIAGNOSIS — D64.9 NORMOCHROMIC NORMOCYTIC ANEMIA: ICD-10-CM

## 2020-03-17 DIAGNOSIS — R73.01 IMPAIRED FASTING GLUCOSE: ICD-10-CM

## 2020-03-17 DIAGNOSIS — F33.0 MILD EPISODE OF RECURRENT MAJOR DEPRESSIVE DISORDER (H): ICD-10-CM

## 2020-03-17 DIAGNOSIS — M35.3 POLYMYALGIA RHEUMATICA (H): ICD-10-CM

## 2020-03-17 DIAGNOSIS — E03.9 HYPOTHYROIDISM, UNSPECIFIED TYPE: ICD-10-CM

## 2020-03-17 DIAGNOSIS — N18.30 CKD (CHRONIC KIDNEY DISEASE) STAGE 3, GFR 30-59 ML/MIN (H): ICD-10-CM

## 2020-03-17 DIAGNOSIS — I26.99 PULMONARY EMBOLISM (H): ICD-10-CM

## 2020-03-17 LAB
ANION GAP SERPL CALCULATED.3IONS-SCNC: 12 MMOL/L (ref 5–18)
BUN SERPL-MCNC: 28 MG/DL (ref 8–28)
CALCIUM SERPL-MCNC: 10.5 MG/DL (ref 8.5–10.5)
CHLORIDE BLD-SCNC: 99 MMOL/L (ref 98–107)
CO2 SERPL-SCNC: 31 MMOL/L (ref 22–31)
CREAT SERPL-MCNC: 0.94 MG/DL (ref 0.6–1.1)
ERYTHROCYTE [DISTWIDTH] IN BLOOD BY AUTOMATED COUNT: 13.9 % (ref 11–14.5)
ERYTHROCYTE [SEDIMENTATION RATE] IN BLOOD BY WESTERGREN METHOD: 39 MM/HR (ref 0–20)
GFR SERPL CREATININE-BSD FRML MDRD: 58 ML/MIN/1.73M2
GLUCOSE BLD-MCNC: 96 MG/DL (ref 70–125)
HBA1C MFR BLD: 6.2 % (ref 3.5–6)
HCT VFR BLD AUTO: 33.1 % (ref 35–47)
HGB BLD-MCNC: 11 G/DL (ref 12–16)
INR PPP: 2.2 (ref 0.9–1.1)
MCH RBC QN AUTO: 27.8 PG (ref 27–34)
MCHC RBC AUTO-ENTMCNC: 33.2 G/DL (ref 32–36)
MCV RBC AUTO: 84 FL (ref 80–100)
PLATELET # BLD AUTO: 352 THOU/UL (ref 140–440)
PMV BLD AUTO: 7.4 FL (ref 7–10)
POTASSIUM BLD-SCNC: 3.7 MMOL/L (ref 3.5–5)
RBC # BLD AUTO: 3.95 MILL/UL (ref 3.8–5.4)
SODIUM SERPL-SCNC: 142 MMOL/L (ref 136–145)
WBC: 12 THOU/UL (ref 4–11)

## 2020-03-17 ASSESSMENT — ANXIETY QUESTIONNAIRES
6. BECOMING EASILY ANNOYED OR IRRITABLE: NOT AT ALL
4. TROUBLE RELAXING: MORE THAN HALF THE DAYS
1. FEELING NERVOUS, ANXIOUS, OR ON EDGE: MORE THAN HALF THE DAYS
2. NOT BEING ABLE TO STOP OR CONTROL WORRYING: SEVERAL DAYS
5. BEING SO RESTLESS THAT IT IS HARD TO SIT STILL: NOT AT ALL
GAD7 TOTAL SCORE: 8
3. WORRYING TOO MUCH ABOUT DIFFERENT THINGS: MORE THAN HALF THE DAYS
7. FEELING AFRAID AS IF SOMETHING AWFUL MIGHT HAPPEN: SEVERAL DAYS

## 2020-03-17 ASSESSMENT — PATIENT HEALTH QUESTIONNAIRE - PHQ9: SUM OF ALL RESPONSES TO PHQ QUESTIONS 1-9: 6

## 2020-04-06 ENCOUNTER — COMMUNICATION - HEALTHEAST (OUTPATIENT)
Dept: FAMILY MEDICINE | Facility: CLINIC | Age: 75
End: 2020-04-06

## 2020-04-06 DIAGNOSIS — R60.9 EDEMA: ICD-10-CM

## 2020-04-14 ENCOUNTER — COMMUNICATION - HEALTHEAST (OUTPATIENT)
Dept: ANTICOAGULATION | Facility: CLINIC | Age: 75
End: 2020-04-14

## 2020-04-16 ENCOUNTER — COMMUNICATION - HEALTHEAST (OUTPATIENT)
Dept: FAMILY MEDICINE | Facility: CLINIC | Age: 75
End: 2020-04-16

## 2020-04-17 ENCOUNTER — COMMUNICATION - HEALTHEAST (OUTPATIENT)
Dept: ANTICOAGULATION | Facility: CLINIC | Age: 75
End: 2020-04-17

## 2020-04-17 ENCOUNTER — AMBULATORY - HEALTHEAST (OUTPATIENT)
Dept: LAB | Facility: CLINIC | Age: 75
End: 2020-04-17

## 2020-04-17 DIAGNOSIS — I26.99 OTHER PULMONARY EMBOLISM WITHOUT ACUTE COR PULMONALE, UNSPECIFIED CHRONICITY (H): ICD-10-CM

## 2020-04-17 DIAGNOSIS — I26.99 PULMONARY EMBOLISM (H): ICD-10-CM

## 2020-04-17 LAB — INR PPP: 1.7 (ref 0.9–1.1)

## 2020-04-30 ENCOUNTER — COMMUNICATION - HEALTHEAST (OUTPATIENT)
Dept: ANTICOAGULATION | Facility: CLINIC | Age: 75
End: 2020-04-30

## 2020-04-30 DIAGNOSIS — I26.99 PULMONARY EMBOLISM (H): ICD-10-CM

## 2020-05-01 ENCOUNTER — AMBULATORY - HEALTHEAST (OUTPATIENT)
Dept: LAB | Facility: CLINIC | Age: 75
End: 2020-05-01

## 2020-05-01 ENCOUNTER — COMMUNICATION - HEALTHEAST (OUTPATIENT)
Dept: ANTICOAGULATION | Facility: CLINIC | Age: 75
End: 2020-05-01

## 2020-05-01 DIAGNOSIS — I26.99 PULMONARY EMBOLISM (H): ICD-10-CM

## 2020-05-01 LAB — INR PPP: 1.9 (ref 0.9–1.1)

## 2020-05-15 ENCOUNTER — COMMUNICATION - HEALTHEAST (OUTPATIENT)
Dept: ANTICOAGULATION | Facility: CLINIC | Age: 75
End: 2020-05-15

## 2020-05-15 ENCOUNTER — AMBULATORY - HEALTHEAST (OUTPATIENT)
Dept: LAB | Facility: CLINIC | Age: 75
End: 2020-05-15

## 2020-05-15 DIAGNOSIS — I26.99 PULMONARY EMBOLISM (H): ICD-10-CM

## 2020-05-15 LAB — INR PPP: 4 (ref 0.9–1.1)

## 2020-05-22 ENCOUNTER — COMMUNICATION - HEALTHEAST (OUTPATIENT)
Dept: ANTICOAGULATION | Facility: CLINIC | Age: 75
End: 2020-05-22

## 2020-05-22 ENCOUNTER — AMBULATORY - HEALTHEAST (OUTPATIENT)
Dept: LAB | Facility: CLINIC | Age: 75
End: 2020-05-22

## 2020-05-22 DIAGNOSIS — I26.99 PULMONARY EMBOLISM (H): ICD-10-CM

## 2020-05-22 LAB — INR PPP: 4.1 (ref 0.9–1.1)

## 2020-06-04 ENCOUNTER — COMMUNICATION - HEALTHEAST (OUTPATIENT)
Dept: ANTICOAGULATION | Facility: CLINIC | Age: 75
End: 2020-06-04

## 2020-06-04 ENCOUNTER — AMBULATORY - HEALTHEAST (OUTPATIENT)
Dept: LAB | Facility: CLINIC | Age: 75
End: 2020-06-04

## 2020-06-04 DIAGNOSIS — I26.99 PULMONARY EMBOLISM (H): ICD-10-CM

## 2020-06-04 LAB — INR PPP: 4.2 (ref 0.9–1.1)

## 2020-06-17 ENCOUNTER — COMMUNICATION - HEALTHEAST (OUTPATIENT)
Dept: ANTICOAGULATION | Facility: CLINIC | Age: 75
End: 2020-06-17

## 2020-06-17 ENCOUNTER — AMBULATORY - HEALTHEAST (OUTPATIENT)
Dept: LAB | Facility: CLINIC | Age: 75
End: 2020-06-17

## 2020-06-17 DIAGNOSIS — I26.99 PULMONARY EMBOLISM (H): ICD-10-CM

## 2020-06-17 LAB — INR PPP: 2.6 (ref 0.9–1.1)

## 2020-06-22 ENCOUNTER — RECORDS - HEALTHEAST (OUTPATIENT)
Dept: ADMINISTRATIVE | Facility: OTHER | Age: 75
End: 2020-06-22

## 2020-07-02 ENCOUNTER — AMBULATORY - HEALTHEAST (OUTPATIENT)
Dept: LAB | Facility: CLINIC | Age: 75
End: 2020-07-02

## 2020-07-02 ENCOUNTER — COMMUNICATION - HEALTHEAST (OUTPATIENT)
Dept: ANTICOAGULATION | Facility: CLINIC | Age: 75
End: 2020-07-02

## 2020-07-02 DIAGNOSIS — I26.99 PULMONARY EMBOLISM (H): ICD-10-CM

## 2020-07-02 LAB — INR PPP: 2 (ref 0.9–1.1)

## 2020-07-03 ENCOUNTER — COMMUNICATION - HEALTHEAST (OUTPATIENT)
Dept: FAMILY MEDICINE | Facility: CLINIC | Age: 75
End: 2020-07-03

## 2020-07-03 DIAGNOSIS — M35.3 POLYMYALGIA RHEUMATICA (H): ICD-10-CM

## 2020-07-07 ENCOUNTER — OFFICE VISIT - HEALTHEAST (OUTPATIENT)
Dept: FAMILY MEDICINE | Facility: CLINIC | Age: 75
End: 2020-07-07

## 2020-07-07 ENCOUNTER — COMMUNICATION - HEALTHEAST (OUTPATIENT)
Dept: SCHEDULING | Facility: CLINIC | Age: 75
End: 2020-07-07

## 2020-07-07 DIAGNOSIS — R22.42 LOCALIZED SWELLING OF LEFT FOOT: ICD-10-CM

## 2020-07-07 DIAGNOSIS — L02.91 PHLEGMON: ICD-10-CM

## 2020-07-10 ENCOUNTER — COMMUNICATION - HEALTHEAST (OUTPATIENT)
Dept: SCHEDULING | Facility: CLINIC | Age: 75
End: 2020-07-10

## 2020-07-10 ENCOUNTER — OFFICE VISIT - HEALTHEAST (OUTPATIENT)
Dept: INTERNAL MEDICINE | Facility: CLINIC | Age: 75
End: 2020-07-10

## 2020-07-10 DIAGNOSIS — M79.671 PAIN IN BOTH FEET: ICD-10-CM

## 2020-07-10 DIAGNOSIS — M79.672 PAIN IN BOTH FEET: ICD-10-CM

## 2020-07-10 DIAGNOSIS — L03.032 CELLULITIS OF TOE OF LEFT FOOT: ICD-10-CM

## 2020-07-10 ASSESSMENT — MIFFLIN-ST. JEOR: SCORE: 1975.47

## 2020-07-13 ENCOUNTER — OFFICE VISIT - HEALTHEAST (OUTPATIENT)
Dept: FAMILY MEDICINE | Facility: CLINIC | Age: 75
End: 2020-07-13

## 2020-07-13 DIAGNOSIS — L03.90 CELLULITIS, UNSPECIFIED CELLULITIS SITE: ICD-10-CM

## 2020-07-15 ENCOUNTER — OFFICE VISIT - HEALTHEAST (OUTPATIENT)
Dept: FAMILY MEDICINE | Facility: CLINIC | Age: 75
End: 2020-07-15

## 2020-07-15 ENCOUNTER — COMMUNICATION - HEALTHEAST (OUTPATIENT)
Dept: SCHEDULING | Facility: CLINIC | Age: 75
End: 2020-07-15

## 2020-07-15 ENCOUNTER — COMMUNICATION - HEALTHEAST (OUTPATIENT)
Dept: FAMILY MEDICINE | Facility: CLINIC | Age: 75
End: 2020-07-15

## 2020-07-15 DIAGNOSIS — Z20.822 SUSPECTED COVID-19 VIRUS INFECTION: ICD-10-CM

## 2020-07-16 ENCOUNTER — OFFICE VISIT - HEALTHEAST (OUTPATIENT)
Dept: FAMILY MEDICINE | Facility: CLINIC | Age: 75
End: 2020-07-16

## 2020-07-16 DIAGNOSIS — Z20.822 SUSPECTED COVID-19 VIRUS INFECTION: ICD-10-CM

## 2020-07-16 DIAGNOSIS — L03.90 CELLULITIS, UNSPECIFIED CELLULITIS SITE: ICD-10-CM

## 2020-07-16 DIAGNOSIS — R50.9 FEVER, UNSPECIFIED FEVER CAUSE: ICD-10-CM

## 2020-07-16 DIAGNOSIS — R73.01 IMPAIRED FASTING GLUCOSE: ICD-10-CM

## 2020-07-16 DIAGNOSIS — M35.3 POLYMYALGIA RHEUMATICA (H): ICD-10-CM

## 2020-07-22 ENCOUNTER — AMBULATORY - HEALTHEAST (OUTPATIENT)
Dept: CARE COORDINATION | Facility: CLINIC | Age: 75
End: 2020-07-22

## 2020-07-22 ENCOUNTER — COMMUNICATION - HEALTHEAST (OUTPATIENT)
Dept: NURSING | Facility: CLINIC | Age: 75
End: 2020-07-22

## 2020-07-22 ENCOUNTER — COMMUNICATION - HEALTHEAST (OUTPATIENT)
Dept: ANTICOAGULATION | Facility: CLINIC | Age: 75
End: 2020-07-22

## 2020-07-22 ENCOUNTER — COMMUNICATION - HEALTHEAST (OUTPATIENT)
Dept: FAMILY MEDICINE | Facility: CLINIC | Age: 75
End: 2020-07-22

## 2020-07-22 DIAGNOSIS — I10 ESSENTIAL HYPERTENSION WITH GOAL BLOOD PRESSURE LESS THAN 140/90: ICD-10-CM

## 2020-07-22 DIAGNOSIS — U07.1 2019 NOVEL CORONAVIRUS DISEASE (COVID-19): ICD-10-CM

## 2020-07-22 DIAGNOSIS — E66.01 MORBID OBESITY (H): ICD-10-CM

## 2020-07-22 DIAGNOSIS — F33.9 MAJOR DEPRESSIVE DISORDER, RECURRENT EPISODE (H): ICD-10-CM

## 2020-07-22 DIAGNOSIS — I26.99 PULMONARY EMBOLISM (H): ICD-10-CM

## 2020-07-23 ENCOUNTER — COMMUNICATION - HEALTHEAST (OUTPATIENT)
Dept: SCHEDULING | Facility: CLINIC | Age: 75
End: 2020-07-23

## 2020-07-26 ENCOUNTER — COMMUNICATION - HEALTHEAST (OUTPATIENT)
Dept: SCHEDULING | Facility: CLINIC | Age: 75
End: 2020-07-26

## 2020-07-27 ENCOUNTER — COMMUNICATION - HEALTHEAST (OUTPATIENT)
Dept: SCHEDULING | Facility: CLINIC | Age: 75
End: 2020-07-27

## 2020-07-27 ENCOUNTER — COMMUNICATION - HEALTHEAST (OUTPATIENT)
Dept: LAB | Facility: CLINIC | Age: 75
End: 2020-07-27

## 2020-07-27 ENCOUNTER — AMBULATORY - HEALTHEAST (OUTPATIENT)
Dept: LAB | Facility: CLINIC | Age: 75
End: 2020-07-27

## 2020-07-27 ENCOUNTER — OFFICE VISIT - HEALTHEAST (OUTPATIENT)
Dept: FAMILY MEDICINE | Facility: CLINIC | Age: 75
End: 2020-07-27

## 2020-07-27 ENCOUNTER — COMMUNICATION - HEALTHEAST (OUTPATIENT)
Dept: FAMILY MEDICINE | Facility: CLINIC | Age: 75
End: 2020-07-27

## 2020-07-27 DIAGNOSIS — E03.9 ACQUIRED HYPOTHYROIDISM: ICD-10-CM

## 2020-07-27 DIAGNOSIS — F51.01 PRIMARY INSOMNIA: ICD-10-CM

## 2020-07-27 DIAGNOSIS — I26.99 PULMONARY EMBOLISM (H): ICD-10-CM

## 2020-07-27 DIAGNOSIS — F41.9 ANXIETY: ICD-10-CM

## 2020-07-27 DIAGNOSIS — D64.9 ANEMIA, UNSPECIFIED TYPE: ICD-10-CM

## 2020-07-27 DIAGNOSIS — Z86.16 HISTORY OF 2019 NOVEL CORONAVIRUS DISEASE (COVID-19): ICD-10-CM

## 2020-07-27 DIAGNOSIS — I10 BENIGN ESSENTIAL HYPERTENSION: ICD-10-CM

## 2020-07-27 LAB
ALBUMIN SERPL-MCNC: 3.3 G/DL (ref 3.5–5)
ALP SERPL-CCNC: 80 U/L (ref 45–120)
ALT SERPL W P-5'-P-CCNC: 30 U/L (ref 0–45)
ANION GAP SERPL CALCULATED.3IONS-SCNC: 14 MMOL/L (ref 5–18)
AST SERPL W P-5'-P-CCNC: 26 U/L (ref 0–40)
BILIRUB SERPL-MCNC: 0.6 MG/DL (ref 0–1)
BUN SERPL-MCNC: 30 MG/DL (ref 8–28)
CALCIUM SERPL-MCNC: 9.5 MG/DL (ref 8.5–10.5)
CHLORIDE BLD-SCNC: 95 MMOL/L (ref 98–107)
CO2 SERPL-SCNC: 26 MMOL/L (ref 22–31)
CREAT SERPL-MCNC: 1.29 MG/DL (ref 0.6–1.1)
ERYTHROCYTE [DISTWIDTH] IN BLOOD BY AUTOMATED COUNT: 13.9 % (ref 11–14.5)
GFR SERPL CREATININE-BSD FRML MDRD: 40 ML/MIN/1.73M2
GLUCOSE BLD-MCNC: 84 MG/DL (ref 70–125)
HCT VFR BLD AUTO: 34.9 % (ref 35–47)
HGB BLD-MCNC: 11.5 G/DL (ref 12–16)
INR PPP: 4.98 (ref 0.9–1.1)
MCH RBC QN AUTO: 27.6 PG (ref 27–34)
MCHC RBC AUTO-ENTMCNC: 32.8 G/DL (ref 32–36)
MCV RBC AUTO: 84 FL (ref 80–100)
PLATELET # BLD AUTO: 476 THOU/UL (ref 140–440)
PMV BLD AUTO: 7.6 FL (ref 7–10)
POTASSIUM BLD-SCNC: 4.2 MMOL/L (ref 3.5–5)
PROT SERPL-MCNC: 6.1 G/DL (ref 6–8)
RBC # BLD AUTO: 4.16 MILL/UL (ref 3.8–5.4)
SODIUM SERPL-SCNC: 135 MMOL/L (ref 136–145)
TSH SERPL DL<=0.005 MIU/L-ACNC: 0.75 UIU/ML (ref 0.3–5)
WBC: 12.8 THOU/UL (ref 4–11)

## 2020-07-27 ASSESSMENT — ANXIETY QUESTIONNAIRES
2. NOT BEING ABLE TO STOP OR CONTROL WORRYING: NEARLY EVERY DAY
6. BECOMING EASILY ANNOYED OR IRRITABLE: NOT AT ALL
1. FEELING NERVOUS, ANXIOUS, OR ON EDGE: NEARLY EVERY DAY
GAD7 TOTAL SCORE: 11
7. FEELING AFRAID AS IF SOMETHING AWFUL MIGHT HAPPEN: MORE THAN HALF THE DAYS
3. WORRYING TOO MUCH ABOUT DIFFERENT THINGS: NEARLY EVERY DAY
4. TROUBLE RELAXING: NOT AT ALL
5. BEING SO RESTLESS THAT IT IS HARD TO SIT STILL: NOT AT ALL

## 2020-07-28 ENCOUNTER — COMMUNICATION - HEALTHEAST (OUTPATIENT)
Dept: SCHEDULING | Facility: CLINIC | Age: 75
End: 2020-07-28

## 2020-07-28 ENCOUNTER — RECORDS - HEALTHEAST (OUTPATIENT)
Dept: LAB | Facility: CLINIC | Age: 75
End: 2020-07-28

## 2020-07-30 ENCOUNTER — AMBULATORY - HEALTHEAST (OUTPATIENT)
Dept: CARE COORDINATION | Facility: CLINIC | Age: 75
End: 2020-07-30

## 2020-07-30 ENCOUNTER — COMMUNICATION - HEALTHEAST (OUTPATIENT)
Dept: FAMILY MEDICINE | Facility: CLINIC | Age: 75
End: 2020-07-30

## 2020-07-30 DIAGNOSIS — U07.1 2019 NOVEL CORONAVIRUS DISEASE (COVID-19): ICD-10-CM

## 2020-07-30 DIAGNOSIS — I26.99 PULMONARY EMBOLISM (H): ICD-10-CM

## 2020-07-31 ENCOUNTER — COMMUNICATION - HEALTHEAST (OUTPATIENT)
Dept: NURSING | Facility: CLINIC | Age: 75
End: 2020-07-31

## 2020-08-03 ENCOUNTER — COMMUNICATION - HEALTHEAST (OUTPATIENT)
Dept: LAB | Facility: CLINIC | Age: 75
End: 2020-08-03

## 2020-08-03 ENCOUNTER — AMBULATORY - HEALTHEAST (OUTPATIENT)
Dept: LAB | Facility: CLINIC | Age: 75
End: 2020-08-03

## 2020-08-03 DIAGNOSIS — I26.99 PULMONARY EMBOLISM (H): ICD-10-CM

## 2020-08-03 LAB — INR PPP: 3.85 (ref 0.9–1.1)

## 2020-08-04 ENCOUNTER — COMMUNICATION - HEALTHEAST (OUTPATIENT)
Dept: FAMILY MEDICINE | Facility: CLINIC | Age: 75
End: 2020-08-04

## 2020-08-04 ENCOUNTER — COMMUNICATION - HEALTHEAST (OUTPATIENT)
Dept: SCHEDULING | Facility: CLINIC | Age: 75
End: 2020-08-04

## 2020-08-04 ENCOUNTER — OFFICE VISIT - HEALTHEAST (OUTPATIENT)
Dept: PHARMACY | Facility: CLINIC | Age: 75
End: 2020-08-04

## 2020-08-04 ENCOUNTER — NURSE TRIAGE (OUTPATIENT)
Dept: NURSING | Facility: CLINIC | Age: 75
End: 2020-08-04

## 2020-08-04 DIAGNOSIS — L03.90 CELLULITIS, UNSPECIFIED CELLULITIS SITE: ICD-10-CM

## 2020-08-04 DIAGNOSIS — U07.1 COVID-19 VIRUS INFECTION: ICD-10-CM

## 2020-08-04 DIAGNOSIS — F39 MOOD DISORDER (H): ICD-10-CM

## 2020-08-04 DIAGNOSIS — E03.9 HYPOTHYROIDISM, UNSPECIFIED TYPE: ICD-10-CM

## 2020-08-04 DIAGNOSIS — E55.9 VITAMIN D DEFICIENCY: ICD-10-CM

## 2020-08-04 DIAGNOSIS — D64.9 ANEMIA, UNSPECIFIED TYPE: ICD-10-CM

## 2020-08-04 DIAGNOSIS — M35.3 POLYMYALGIA RHEUMATICA (H): ICD-10-CM

## 2020-08-04 DIAGNOSIS — I26.99 PULMONARY EMBOLISM, UNSPECIFIED CHRONICITY, UNSPECIFIED PULMONARY EMBOLISM TYPE, UNSPECIFIED WHETHER ACUTE COR PULMONALE PRESENT (H): ICD-10-CM

## 2020-08-04 DIAGNOSIS — I10 ESSENTIAL HYPERTENSION WITH GOAL BLOOD PRESSURE LESS THAN 140/90: ICD-10-CM

## 2020-08-04 DIAGNOSIS — E78.00 PURE HYPERCHOLESTEROLEMIA: ICD-10-CM

## 2020-08-04 DIAGNOSIS — R65.10 SIRS (SYSTEMIC INFLAMMATORY RESPONSE SYNDROME) (H): ICD-10-CM

## 2020-08-04 PROCEDURE — 99605 MTMS BY PHARM NP 15 MIN: CPT | Performed by: PHARMACIST

## 2020-08-04 PROCEDURE — 99607 MTMS BY PHARM ADDL 15 MIN: CPT | Performed by: PHARMACIST

## 2020-08-05 ENCOUNTER — OFFICE VISIT - HEALTHEAST (OUTPATIENT)
Dept: FAMILY MEDICINE | Facility: CLINIC | Age: 75
End: 2020-08-05

## 2020-08-05 DIAGNOSIS — U07.1 INFECTION DUE TO 2019 NOVEL CORONAVIRUS: ICD-10-CM

## 2020-08-05 DIAGNOSIS — G47.00 INSOMNIA, UNSPECIFIED TYPE: ICD-10-CM

## 2020-08-05 DIAGNOSIS — I10 BENIGN ESSENTIAL HYPERTENSION: ICD-10-CM

## 2020-08-05 DIAGNOSIS — F41.9 ANXIETY: ICD-10-CM

## 2020-08-05 DIAGNOSIS — D64.9 NORMOCHROMIC NORMOCYTIC ANEMIA: ICD-10-CM

## 2020-08-06 NOTE — TELEPHONE ENCOUNTER
Additional Information    Negative: Nursing judgment, per information in Reference    Negative: Information only call about a Well Adult (no illness or injury)    Negative: Nursing judgment or information in reference    Negative: Nursing judgment or information in reference    Negative: Nursing judgment or information in reference    Negative: Nursing judgment or information in reference    Negative: Nursing judgment or information in reference    Negative: Nursing judgment or information in reference    Negative: Nursing judgment or information in reference    Negative: Nursing judgment or information in reference    Negative: Nursing judgment or information in reference    Negative: Nursing judgment or information in reference    Negative: Nursing judgment or information in reference    Negative: Nursing judgment or information in reference    Nursing judgment or information in reference    Protocols used: NO GUIDELINE [id number]

## 2020-08-07 ENCOUNTER — COMMUNICATION - HEALTHEAST (OUTPATIENT)
Dept: SCHEDULING | Facility: CLINIC | Age: 75
End: 2020-08-07

## 2020-08-07 DIAGNOSIS — G89.29 CHRONIC BILATERAL LOW BACK PAIN WITHOUT SCIATICA: ICD-10-CM

## 2020-08-07 DIAGNOSIS — M54.50 CHRONIC BILATERAL LOW BACK PAIN WITHOUT SCIATICA: ICD-10-CM

## 2020-08-07 DIAGNOSIS — G89.4 CHRONIC PAIN SYNDROME: ICD-10-CM

## 2020-08-10 ENCOUNTER — AMBULATORY - HEALTHEAST (OUTPATIENT)
Dept: CARE COORDINATION | Facility: CLINIC | Age: 75
End: 2020-08-10

## 2020-08-10 ENCOUNTER — COMMUNICATION - HEALTHEAST (OUTPATIENT)
Dept: FAMILY MEDICINE | Facility: CLINIC | Age: 75
End: 2020-08-10

## 2020-08-10 ENCOUNTER — COMMUNICATION - HEALTHEAST (OUTPATIENT)
Dept: SCHEDULING | Facility: CLINIC | Age: 75
End: 2020-08-10

## 2020-08-10 ENCOUNTER — COMMUNICATION - HEALTHEAST (OUTPATIENT)
Dept: ANTICOAGULATION | Facility: CLINIC | Age: 75
End: 2020-08-10

## 2020-08-10 ENCOUNTER — AMBULATORY - HEALTHEAST (OUTPATIENT)
Dept: LAB | Facility: CLINIC | Age: 75
End: 2020-08-10

## 2020-08-10 DIAGNOSIS — U07.1 2019 NOVEL CORONAVIRUS DISEASE (COVID-19): ICD-10-CM

## 2020-08-10 DIAGNOSIS — I26.99 PULMONARY EMBOLISM (H): ICD-10-CM

## 2020-08-10 DIAGNOSIS — G89.4 CHRONIC PAIN SYNDROME: ICD-10-CM

## 2020-08-10 DIAGNOSIS — D64.9 ANEMIA, UNSPECIFIED TYPE: ICD-10-CM

## 2020-08-10 DIAGNOSIS — I26.99 PULMONARY EMBOLISM, UNSPECIFIED CHRONICITY, UNSPECIFIED PULMONARY EMBOLISM TYPE, UNSPECIFIED WHETHER ACUTE COR PULMONALE PRESENT (H): ICD-10-CM

## 2020-08-10 DIAGNOSIS — M35.3 POLYMYALGIA RHEUMATICA (H): ICD-10-CM

## 2020-08-10 LAB
ERYTHROCYTE [DISTWIDTH] IN BLOOD BY AUTOMATED COUNT: 13.5 % (ref 11–14.5)
HCT VFR BLD AUTO: 29.2 % (ref 35–47)
HGB BLD-MCNC: 10 G/DL (ref 12–16)
INR PPP: 3.8 (ref 0.9–1.1)
MCH RBC QN AUTO: 28.1 PG (ref 27–34)
MCHC RBC AUTO-ENTMCNC: 34.1 G/DL (ref 32–36)
MCV RBC AUTO: 82 FL (ref 80–100)
PLATELET # BLD AUTO: 523 THOU/UL (ref 140–440)
PMV BLD AUTO: 6.8 FL (ref 7–10)
RBC # BLD AUTO: 3.55 MILL/UL (ref 3.8–5.4)
WBC: 9.9 THOU/UL (ref 4–11)

## 2020-08-13 ENCOUNTER — COMMUNICATION - HEALTHEAST (OUTPATIENT)
Dept: NURSING | Facility: CLINIC | Age: 75
End: 2020-08-13

## 2020-08-13 ENCOUNTER — AMBULATORY - HEALTHEAST (OUTPATIENT)
Dept: LAB | Facility: CLINIC | Age: 75
End: 2020-08-13

## 2020-08-13 ENCOUNTER — COMMUNICATION - HEALTHEAST (OUTPATIENT)
Dept: ANTICOAGULATION | Facility: CLINIC | Age: 75
End: 2020-08-13

## 2020-08-13 DIAGNOSIS — I26.99 PULMONARY EMBOLISM, UNSPECIFIED CHRONICITY, UNSPECIFIED PULMONARY EMBOLISM TYPE, UNSPECIFIED WHETHER ACUTE COR PULMONALE PRESENT (H): ICD-10-CM

## 2020-08-13 DIAGNOSIS — I26.99 PULMONARY EMBOLISM (H): ICD-10-CM

## 2020-08-13 LAB — INR PPP: 2 (ref 0.9–1.1)

## 2020-08-13 ASSESSMENT — ACTIVITIES OF DAILY LIVING (ADL): DEPENDENT_IADLS:: CLEANING;TRANSPORTATION

## 2020-08-17 ENCOUNTER — COMMUNICATION - HEALTHEAST (OUTPATIENT)
Dept: ANTICOAGULATION | Facility: CLINIC | Age: 75
End: 2020-08-17

## 2020-08-17 ENCOUNTER — AMBULATORY - HEALTHEAST (OUTPATIENT)
Dept: LAB | Facility: CLINIC | Age: 75
End: 2020-08-17

## 2020-08-17 DIAGNOSIS — I26.99 PULMONARY EMBOLISM, UNSPECIFIED CHRONICITY, UNSPECIFIED PULMONARY EMBOLISM TYPE, UNSPECIFIED WHETHER ACUTE COR PULMONALE PRESENT (H): ICD-10-CM

## 2020-08-17 DIAGNOSIS — I26.99 PULMONARY EMBOLISM (H): ICD-10-CM

## 2020-08-17 LAB — INR PPP: 1.6 (ref 0.9–1.1)

## 2020-08-18 ENCOUNTER — COMMUNICATION - HEALTHEAST (OUTPATIENT)
Dept: FAMILY MEDICINE | Facility: CLINIC | Age: 75
End: 2020-08-18

## 2020-08-18 DIAGNOSIS — M35.3 POLYMYALGIA RHEUMATICA (H): ICD-10-CM

## 2020-08-19 ENCOUNTER — COMMUNICATION - HEALTHEAST (OUTPATIENT)
Dept: FAMILY MEDICINE | Facility: CLINIC | Age: 75
End: 2020-08-19

## 2020-08-19 DIAGNOSIS — I10 ESSENTIAL HYPERTENSION WITH GOAL BLOOD PRESSURE LESS THAN 140/90: ICD-10-CM

## 2020-08-23 ENCOUNTER — COMMUNICATION - HEALTHEAST (OUTPATIENT)
Dept: SCHEDULING | Facility: CLINIC | Age: 75
End: 2020-08-23

## 2020-08-23 DIAGNOSIS — R20.2 PARESTHESIA OF FOOT, BILATERAL: ICD-10-CM

## 2020-08-24 ENCOUNTER — AMBULATORY - HEALTHEAST (OUTPATIENT)
Dept: LAB | Facility: CLINIC | Age: 75
End: 2020-08-24

## 2020-08-24 ENCOUNTER — COMMUNICATION - HEALTHEAST (OUTPATIENT)
Dept: ANTICOAGULATION | Facility: CLINIC | Age: 75
End: 2020-08-24

## 2020-08-24 DIAGNOSIS — I26.99 PULMONARY EMBOLISM (H): ICD-10-CM

## 2020-08-24 DIAGNOSIS — I26.99 PULMONARY EMBOLISM, UNSPECIFIED CHRONICITY, UNSPECIFIED PULMONARY EMBOLISM TYPE, UNSPECIFIED WHETHER ACUTE COR PULMONALE PRESENT (H): ICD-10-CM

## 2020-08-24 LAB — INR PPP: 1.4 (ref 0.9–1.1)

## 2020-08-25 ENCOUNTER — COMMUNICATION - HEALTHEAST (OUTPATIENT)
Dept: FAMILY MEDICINE | Facility: CLINIC | Age: 75
End: 2020-08-25

## 2020-08-26 ENCOUNTER — COMMUNICATION - HEALTHEAST (OUTPATIENT)
Dept: SCHEDULING | Facility: CLINIC | Age: 75
End: 2020-08-26

## 2020-08-31 ENCOUNTER — AMBULATORY - HEALTHEAST (OUTPATIENT)
Dept: LAB | Facility: CLINIC | Age: 75
End: 2020-08-31

## 2020-08-31 ENCOUNTER — COMMUNICATION - HEALTHEAST (OUTPATIENT)
Dept: ANTICOAGULATION | Facility: CLINIC | Age: 75
End: 2020-08-31

## 2020-08-31 DIAGNOSIS — I26.99 PULMONARY EMBOLISM (H): ICD-10-CM

## 2020-08-31 DIAGNOSIS — I26.99 PULMONARY EMBOLISM, UNSPECIFIED CHRONICITY, UNSPECIFIED PULMONARY EMBOLISM TYPE, UNSPECIFIED WHETHER ACUTE COR PULMONALE PRESENT (H): ICD-10-CM

## 2020-08-31 LAB — INR PPP: 1.9 (ref 0.9–1.1)

## 2020-09-01 ENCOUNTER — COMMUNICATION - HEALTHEAST (OUTPATIENT)
Dept: SCHEDULING | Facility: CLINIC | Age: 75
End: 2020-09-01

## 2020-09-01 DIAGNOSIS — M35.3 POLYMYALGIA RHEUMATICA (H): ICD-10-CM

## 2020-09-08 ENCOUNTER — OFFICE VISIT - HEALTHEAST (OUTPATIENT)
Dept: FAMILY MEDICINE | Facility: CLINIC | Age: 75
End: 2020-09-08

## 2020-09-08 ENCOUNTER — COMMUNICATION - HEALTHEAST (OUTPATIENT)
Dept: FAMILY MEDICINE | Facility: CLINIC | Age: 75
End: 2020-09-08

## 2020-09-08 ENCOUNTER — COMMUNICATION - HEALTHEAST (OUTPATIENT)
Dept: ANTICOAGULATION | Facility: CLINIC | Age: 75
End: 2020-09-08

## 2020-09-08 DIAGNOSIS — F33.9 MAJOR DEPRESSIVE DISORDER, RECURRENT EPISODE (H): ICD-10-CM

## 2020-09-08 DIAGNOSIS — Z78.0 ASYMPTOMATIC MENOPAUSAL STATE: ICD-10-CM

## 2020-09-08 DIAGNOSIS — Z13.820 SCREENING FOR OSTEOPOROSIS: ICD-10-CM

## 2020-09-08 DIAGNOSIS — E78.00 PURE HYPERCHOLESTEROLEMIA: ICD-10-CM

## 2020-09-08 DIAGNOSIS — I26.99 PULMONARY EMBOLISM (H): ICD-10-CM

## 2020-09-08 DIAGNOSIS — F41.9 ANXIETY: ICD-10-CM

## 2020-09-08 DIAGNOSIS — Z86.16 HISTORY OF 2019 NOVEL CORONAVIRUS DISEASE (COVID-19): ICD-10-CM

## 2020-09-08 DIAGNOSIS — G47.33 OBSTRUCTIVE SLEEP APNEA (ADULT) (PEDIATRIC): ICD-10-CM

## 2020-09-08 DIAGNOSIS — E03.9 HYPOTHYROIDISM, UNSPECIFIED TYPE: ICD-10-CM

## 2020-09-08 DIAGNOSIS — E78.5 HYPERLIPIDEMIA: ICD-10-CM

## 2020-09-08 DIAGNOSIS — I26.99 PULMONARY EMBOLISM, UNSPECIFIED CHRONICITY, UNSPECIFIED PULMONARY EMBOLISM TYPE, UNSPECIFIED WHETHER ACUTE COR PULMONALE PRESENT (H): ICD-10-CM

## 2020-09-08 DIAGNOSIS — I10 ESSENTIAL HYPERTENSION WITH GOAL BLOOD PRESSURE LESS THAN 140/90: ICD-10-CM

## 2020-09-08 DIAGNOSIS — R73.01 IMPAIRED FASTING GLUCOSE: ICD-10-CM

## 2020-09-08 DIAGNOSIS — Z00.01 ENCOUNTER FOR GENERAL ADULT MEDICAL EXAMINATION WITH ABNORMAL FINDINGS: ICD-10-CM

## 2020-09-08 DIAGNOSIS — Z23 ENCOUNTER FOR IMMUNIZATION: ICD-10-CM

## 2020-09-08 DIAGNOSIS — M35.3 POLYMYALGIA RHEUMATICA (H): ICD-10-CM

## 2020-09-08 DIAGNOSIS — S31.109A OPEN WOUND OF ABDOMINAL WALL, INITIAL ENCOUNTER: ICD-10-CM

## 2020-09-08 LAB
ANION GAP SERPL CALCULATED.3IONS-SCNC: 12 MMOL/L (ref 5–18)
BUN SERPL-MCNC: 25 MG/DL (ref 8–28)
CALCIUM SERPL-MCNC: 10.6 MG/DL (ref 8.5–10.5)
CHLORIDE BLD-SCNC: 100 MMOL/L (ref 98–107)
CHOLEST SERPL-MCNC: 200 MG/DL
CO2 SERPL-SCNC: 28 MMOL/L (ref 22–31)
CREAT SERPL-MCNC: 0.89 MG/DL (ref 0.6–1.1)
ERYTHROCYTE [DISTWIDTH] IN BLOOD BY AUTOMATED COUNT: 15.2 % (ref 11–14.5)
ERYTHROCYTE [SEDIMENTATION RATE] IN BLOOD BY WESTERGREN METHOD: 38 MM/HR (ref 0–20)
FASTING STATUS PATIENT QL REPORTED: YES
GFR SERPL CREATININE-BSD FRML MDRD: >60 ML/MIN/1.73M2
GLUCOSE BLD-MCNC: 88 MG/DL (ref 70–125)
HBA1C MFR BLD: 5.7 %
HCT VFR BLD AUTO: 33.5 % (ref 35–47)
HDLC SERPL-MCNC: 62 MG/DL
HGB BLD-MCNC: 10.8 G/DL (ref 12–16)
INR PPP: 2.7 (ref 0.9–1.1)
LDLC SERPL CALC-MCNC: 112 MG/DL
MCH RBC QN AUTO: 27.5 PG (ref 27–34)
MCHC RBC AUTO-ENTMCNC: 32.2 G/DL (ref 32–36)
MCV RBC AUTO: 85 FL (ref 80–100)
PLATELET # BLD AUTO: 467 THOU/UL (ref 140–440)
PMV BLD AUTO: 7.2 FL (ref 7–10)
POTASSIUM BLD-SCNC: 4 MMOL/L (ref 3.5–5)
RBC # BLD AUTO: 3.93 MILL/UL (ref 3.8–5.4)
SODIUM SERPL-SCNC: 140 MMOL/L (ref 136–145)
TRIGL SERPL-MCNC: 131 MG/DL
WBC: 15.4 THOU/UL (ref 4–11)

## 2020-09-09 ENCOUNTER — COMMUNICATION - HEALTHEAST (OUTPATIENT)
Dept: FAMILY MEDICINE | Facility: CLINIC | Age: 75
End: 2020-09-09

## 2020-09-10 ENCOUNTER — AMBULATORY - HEALTHEAST (OUTPATIENT)
Dept: SCHEDULING | Facility: CLINIC | Age: 75
End: 2020-09-10

## 2020-09-10 DIAGNOSIS — Z13.820 SCREENING FOR OSTEOPOROSIS: ICD-10-CM

## 2020-09-10 DIAGNOSIS — Z78.0 ASYMPTOMATIC MENOPAUSAL STATE: ICD-10-CM

## 2020-09-15 ENCOUNTER — COMMUNICATION - HEALTHEAST (OUTPATIENT)
Dept: ANTICOAGULATION | Facility: CLINIC | Age: 75
End: 2020-09-15

## 2020-09-15 ENCOUNTER — OFFICE VISIT - HEALTHEAST (OUTPATIENT)
Dept: PODIATRY | Facility: CLINIC | Age: 75
End: 2020-09-15

## 2020-09-15 ENCOUNTER — AMBULATORY - HEALTHEAST (OUTPATIENT)
Dept: LAB | Facility: CLINIC | Age: 75
End: 2020-09-15

## 2020-09-15 DIAGNOSIS — M20.41 HAMMER TOES OF BOTH FEET: ICD-10-CM

## 2020-09-15 DIAGNOSIS — I26.99 PULMONARY EMBOLISM (H): ICD-10-CM

## 2020-09-15 DIAGNOSIS — M77.42 METATARSALGIA OF BOTH FEET: ICD-10-CM

## 2020-09-15 DIAGNOSIS — M20.42 HAMMER TOES OF BOTH FEET: ICD-10-CM

## 2020-09-15 DIAGNOSIS — I26.99 PULMONARY EMBOLISM, UNSPECIFIED CHRONICITY, UNSPECIFIED PULMONARY EMBOLISM TYPE, UNSPECIFIED WHETHER ACUTE COR PULMONALE PRESENT (H): ICD-10-CM

## 2020-09-15 DIAGNOSIS — M77.41 METATARSALGIA OF BOTH FEET: ICD-10-CM

## 2020-09-15 LAB — INR PPP: 1.5 (ref 0.9–1.1)

## 2020-09-15 ASSESSMENT — MIFFLIN-ST. JEOR: SCORE: 1897.23

## 2020-09-16 ENCOUNTER — COMMUNICATION - HEALTHEAST (OUTPATIENT)
Dept: OTHER | Facility: CLINIC | Age: 75
End: 2020-09-16

## 2020-09-17 ENCOUNTER — COMMUNICATION - HEALTHEAST (OUTPATIENT)
Dept: FAMILY MEDICINE | Facility: CLINIC | Age: 75
End: 2020-09-17

## 2020-09-24 ENCOUNTER — COMMUNICATION - HEALTHEAST (OUTPATIENT)
Dept: ANTICOAGULATION | Facility: CLINIC | Age: 75
End: 2020-09-24

## 2020-09-24 ENCOUNTER — AMBULATORY - HEALTHEAST (OUTPATIENT)
Dept: LAB | Facility: CLINIC | Age: 75
End: 2020-09-24

## 2020-09-24 DIAGNOSIS — I26.99 PULMONARY EMBOLISM, UNSPECIFIED CHRONICITY, UNSPECIFIED PULMONARY EMBOLISM TYPE, UNSPECIFIED WHETHER ACUTE COR PULMONALE PRESENT (H): ICD-10-CM

## 2020-09-24 DIAGNOSIS — I26.99 PULMONARY EMBOLISM (H): ICD-10-CM

## 2020-09-24 LAB — INR PPP: 1.4 (ref 0.9–1.1)

## 2020-10-01 ENCOUNTER — COMMUNICATION - HEALTHEAST (OUTPATIENT)
Dept: ANTICOAGULATION | Facility: CLINIC | Age: 75
End: 2020-10-01

## 2020-10-01 ENCOUNTER — AMBULATORY - HEALTHEAST (OUTPATIENT)
Dept: LAB | Facility: CLINIC | Age: 75
End: 2020-10-01

## 2020-10-01 DIAGNOSIS — I26.99 PULMONARY EMBOLISM, UNSPECIFIED CHRONICITY, UNSPECIFIED PULMONARY EMBOLISM TYPE, UNSPECIFIED WHETHER ACUTE COR PULMONALE PRESENT (H): ICD-10-CM

## 2020-10-01 DIAGNOSIS — I26.99 PULMONARY EMBOLISM (H): ICD-10-CM

## 2020-10-01 LAB — INR PPP: 2.6 (ref 0.9–1.1)

## 2020-10-08 ENCOUNTER — COMMUNICATION - HEALTHEAST (OUTPATIENT)
Dept: FAMILY MEDICINE | Facility: CLINIC | Age: 75
End: 2020-10-08

## 2020-10-12 ENCOUNTER — AMBULATORY - HEALTHEAST (OUTPATIENT)
Dept: LAB | Facility: CLINIC | Age: 75
End: 2020-10-12

## 2020-10-12 ENCOUNTER — COMMUNICATION - HEALTHEAST (OUTPATIENT)
Dept: ANTICOAGULATION | Facility: CLINIC | Age: 75
End: 2020-10-12

## 2020-10-12 DIAGNOSIS — I26.99 PULMONARY EMBOLISM, UNSPECIFIED CHRONICITY, UNSPECIFIED PULMONARY EMBOLISM TYPE, UNSPECIFIED WHETHER ACUTE COR PULMONALE PRESENT (H): ICD-10-CM

## 2020-10-12 DIAGNOSIS — I26.99 PULMONARY EMBOLISM (H): ICD-10-CM

## 2020-10-12 LAB — INR PPP: 4 (ref 0.9–1.1)

## 2020-10-13 ENCOUNTER — COMMUNICATION - HEALTHEAST (OUTPATIENT)
Dept: FAMILY MEDICINE | Facility: CLINIC | Age: 75
End: 2020-10-13

## 2020-10-23 ENCOUNTER — COMMUNICATION - HEALTHEAST (OUTPATIENT)
Dept: ANTICOAGULATION | Facility: CLINIC | Age: 75
End: 2020-10-23

## 2020-10-23 ENCOUNTER — OFFICE VISIT - HEALTHEAST (OUTPATIENT)
Dept: VASCULAR SURGERY | Facility: CLINIC | Age: 75
End: 2020-10-23

## 2020-10-23 ENCOUNTER — AMBULATORY - HEALTHEAST (OUTPATIENT)
Dept: LAB | Facility: CLINIC | Age: 75
End: 2020-10-23

## 2020-10-23 DIAGNOSIS — E66.01 OBESITY, CLASS III, BMI 40-49.9 (MORBID OBESITY) (H): ICD-10-CM

## 2020-10-23 DIAGNOSIS — I26.99 PULMONARY EMBOLISM, UNSPECIFIED CHRONICITY, UNSPECIFIED PULMONARY EMBOLISM TYPE, UNSPECIFIED WHETHER ACUTE COR PULMONALE PRESENT (H): ICD-10-CM

## 2020-10-23 DIAGNOSIS — I26.99 PULMONARY EMBOLISM (H): ICD-10-CM

## 2020-10-23 DIAGNOSIS — T80.90XS INJECTION SITE REACTION, SEQUELA: ICD-10-CM

## 2020-10-23 DIAGNOSIS — Z79.52 CURRENT USE OF STEROID MEDICATION: ICD-10-CM

## 2020-10-23 DIAGNOSIS — S31.109A OPEN WOUND OF ABDOMINAL WALL, INITIAL ENCOUNTER: ICD-10-CM

## 2020-10-23 DIAGNOSIS — Z79.01 CHRONIC ANTICOAGULATION: ICD-10-CM

## 2020-10-23 LAB — INR PPP: 1.7 (ref 0.9–1.1)

## 2020-10-23 ASSESSMENT — MIFFLIN-ST. JEOR: SCORE: 1936.92

## 2020-10-26 LAB
BACTERIA SPEC CULT: ABNORMAL
GRAM STAIN RESULT: ABNORMAL
GRAM STAIN RESULT: ABNORMAL

## 2020-10-27 ENCOUNTER — COMMUNICATION - HEALTHEAST (OUTPATIENT)
Dept: VASCULAR SURGERY | Facility: CLINIC | Age: 75
End: 2020-10-27

## 2020-11-16 ENCOUNTER — COMMUNICATION - HEALTHEAST (OUTPATIENT)
Dept: VASCULAR SURGERY | Facility: CLINIC | Age: 75
End: 2020-11-16

## 2020-11-17 ENCOUNTER — AMBULATORY - HEALTHEAST (OUTPATIENT)
Dept: LAB | Facility: CLINIC | Age: 75
End: 2020-11-17

## 2020-11-17 ENCOUNTER — HOSPITAL ENCOUNTER (OUTPATIENT)
Dept: MAMMOGRAPHY | Facility: CLINIC | Age: 75
Discharge: HOME | End: 2020-11-17
Attending: FAMILY MEDICINE

## 2020-11-17 ENCOUNTER — COMMUNICATION - HEALTHEAST (OUTPATIENT)
Dept: ANTICOAGULATION | Facility: CLINIC | Age: 75
End: 2020-11-17

## 2020-11-17 DIAGNOSIS — I26.99 PULMONARY EMBOLISM, UNSPECIFIED CHRONICITY, UNSPECIFIED PULMONARY EMBOLISM TYPE, UNSPECIFIED WHETHER ACUTE COR PULMONALE PRESENT (H): ICD-10-CM

## 2020-11-17 DIAGNOSIS — I26.99 PULMONARY EMBOLISM (H): ICD-10-CM

## 2020-11-17 DIAGNOSIS — Z12.31 VISIT FOR SCREENING MAMMOGRAM: ICD-10-CM

## 2020-11-17 LAB — INR PPP: 2.4 (ref 0.9–1.1)

## 2020-11-23 ENCOUNTER — OFFICE VISIT - HEALTHEAST (OUTPATIENT)
Dept: VASCULAR SURGERY | Facility: CLINIC | Age: 75
End: 2020-11-23

## 2020-11-23 DIAGNOSIS — S31.109A OPEN WOUND OF ABDOMINAL WALL, INITIAL ENCOUNTER: ICD-10-CM

## 2020-11-23 DIAGNOSIS — Z79.01 CHRONIC ANTICOAGULATION: ICD-10-CM

## 2020-11-23 DIAGNOSIS — E66.01 OBESITY, CLASS III, BMI 40-49.9 (MORBID OBESITY) (H): ICD-10-CM

## 2020-11-23 DIAGNOSIS — Z79.52 CURRENT USE OF STEROID MEDICATION: ICD-10-CM

## 2020-11-24 ENCOUNTER — COMMUNICATION - HEALTHEAST (OUTPATIENT)
Dept: FAMILY MEDICINE | Facility: CLINIC | Age: 75
End: 2020-11-24

## 2020-11-24 ENCOUNTER — OFFICE VISIT - HEALTHEAST (OUTPATIENT)
Dept: RHEUMATOLOGY | Facility: CLINIC | Age: 75
End: 2020-11-24

## 2020-11-24 DIAGNOSIS — G89.29 CHRONIC BILATERAL LOW BACK PAIN WITHOUT SCIATICA: ICD-10-CM

## 2020-11-24 DIAGNOSIS — M15.0 PRIMARY OSTEOARTHRITIS INVOLVING MULTIPLE JOINTS: ICD-10-CM

## 2020-11-24 DIAGNOSIS — M54.50 CHRONIC BILATERAL LOW BACK PAIN WITHOUT SCIATICA: ICD-10-CM

## 2020-11-24 DIAGNOSIS — M25.50 PAIN IN JOINT, MULTIPLE SITES: ICD-10-CM

## 2020-11-24 ASSESSMENT — MIFFLIN-ST. JEOR: SCORE: 1936.92

## 2020-12-02 ENCOUNTER — COMMUNICATION - HEALTHEAST (OUTPATIENT)
Dept: ANTICOAGULATION | Facility: CLINIC | Age: 75
End: 2020-12-02

## 2020-12-04 ENCOUNTER — OFFICE VISIT - HEALTHEAST (OUTPATIENT)
Dept: FAMILY MEDICINE | Facility: CLINIC | Age: 75
End: 2020-12-04

## 2020-12-04 ENCOUNTER — COMMUNICATION - HEALTHEAST (OUTPATIENT)
Dept: ANTICOAGULATION | Facility: CLINIC | Age: 75
End: 2020-12-04

## 2020-12-04 DIAGNOSIS — Z01.818 PREOP GENERAL PHYSICAL EXAM: ICD-10-CM

## 2020-12-04 DIAGNOSIS — I26.99 PULMONARY EMBOLISM (H): ICD-10-CM

## 2020-12-04 DIAGNOSIS — F33.0 MILD EPISODE OF RECURRENT MAJOR DEPRESSIVE DISORDER (H): ICD-10-CM

## 2020-12-04 DIAGNOSIS — R73.01 IMPAIRED FASTING GLUCOSE: ICD-10-CM

## 2020-12-04 DIAGNOSIS — I26.99 PULMONARY EMBOLISM, UNSPECIFIED CHRONICITY, UNSPECIFIED PULMONARY EMBOLISM TYPE, UNSPECIFIED WHETHER ACUTE COR PULMONALE PRESENT (H): ICD-10-CM

## 2020-12-04 DIAGNOSIS — I10 ESSENTIAL HYPERTENSION WITH GOAL BLOOD PRESSURE LESS THAN 140/90: ICD-10-CM

## 2020-12-04 DIAGNOSIS — Z01.818 PREOPERATIVE EXAMINATION: ICD-10-CM

## 2020-12-04 DIAGNOSIS — H26.9 CATARACT OF RIGHT EYE, UNSPECIFIED CATARACT TYPE: ICD-10-CM

## 2020-12-04 DIAGNOSIS — M35.3 POLYMYALGIA RHEUMATICA (H): ICD-10-CM

## 2020-12-04 DIAGNOSIS — E03.9 HYPOTHYROIDISM, UNSPECIFIED TYPE: ICD-10-CM

## 2020-12-04 DIAGNOSIS — E78.00 PURE HYPERCHOLESTEROLEMIA: ICD-10-CM

## 2020-12-04 LAB — INR PPP: 1.9 (ref 0.9–1.1)

## 2020-12-04 ASSESSMENT — PATIENT HEALTH QUESTIONNAIRE - PHQ9: SUM OF ALL RESPONSES TO PHQ QUESTIONS 1-9: 1

## 2020-12-28 ENCOUNTER — COMMUNICATION - HEALTHEAST (OUTPATIENT)
Dept: ANTICOAGULATION | Facility: CLINIC | Age: 75
End: 2020-12-28

## 2021-01-04 ENCOUNTER — COMMUNICATION - HEALTHEAST (OUTPATIENT)
Dept: FAMILY MEDICINE | Facility: CLINIC | Age: 76
End: 2021-01-04

## 2021-01-04 DIAGNOSIS — I26.99 PULMONARY EMBOLISM, UNSPECIFIED CHRONICITY, UNSPECIFIED PULMONARY EMBOLISM TYPE, UNSPECIFIED WHETHER ACUTE COR PULMONALE PRESENT (H): ICD-10-CM

## 2021-01-05 ENCOUNTER — AMBULATORY - HEALTHEAST (OUTPATIENT)
Dept: LAB | Facility: CLINIC | Age: 76
End: 2021-01-05

## 2021-01-05 ENCOUNTER — COMMUNICATION - HEALTHEAST (OUTPATIENT)
Dept: ANTICOAGULATION | Facility: CLINIC | Age: 76
End: 2021-01-05

## 2021-01-05 DIAGNOSIS — I26.99 PULMONARY EMBOLISM (H): ICD-10-CM

## 2021-01-05 DIAGNOSIS — I26.99 PULMONARY EMBOLISM, UNSPECIFIED CHRONICITY, UNSPECIFIED PULMONARY EMBOLISM TYPE, UNSPECIFIED WHETHER ACUTE COR PULMONALE PRESENT (H): ICD-10-CM

## 2021-01-05 LAB — INR PPP: 1.4 (ref 0.9–1.1)

## 2021-01-06 ENCOUNTER — COMMUNICATION - HEALTHEAST (OUTPATIENT)
Dept: FAMILY MEDICINE | Facility: CLINIC | Age: 76
End: 2021-01-06

## 2021-01-06 DIAGNOSIS — E78.00 PURE HYPERCHOLESTEROLEMIA: ICD-10-CM

## 2021-01-06 DIAGNOSIS — M35.3 POLYMYALGIA RHEUMATICA (H): ICD-10-CM

## 2021-01-06 DIAGNOSIS — R60.9 EDEMA: ICD-10-CM

## 2021-01-06 DIAGNOSIS — F41.9 ANXIETY: ICD-10-CM

## 2021-01-06 DIAGNOSIS — E03.9 HYPOTHYROIDISM, UNSPECIFIED TYPE: ICD-10-CM

## 2021-01-06 DIAGNOSIS — I10 HTN (HYPERTENSION): ICD-10-CM

## 2021-01-06 DIAGNOSIS — I10 ESSENTIAL HYPERTENSION WITH GOAL BLOOD PRESSURE LESS THAN 140/90: ICD-10-CM

## 2021-01-06 DIAGNOSIS — I26.99 PULMONARY EMBOLISM, UNSPECIFIED CHRONICITY, UNSPECIFIED PULMONARY EMBOLISM TYPE, UNSPECIFIED WHETHER ACUTE COR PULMONALE PRESENT (H): ICD-10-CM

## 2021-01-06 DIAGNOSIS — F33.9 MAJOR DEPRESSIVE DISORDER, RECURRENT EPISODE (H): ICD-10-CM

## 2021-01-22 ENCOUNTER — COMMUNICATION - HEALTHEAST (OUTPATIENT)
Dept: ANTICOAGULATION | Facility: CLINIC | Age: 76
End: 2021-01-22

## 2021-01-22 ENCOUNTER — AMBULATORY - HEALTHEAST (OUTPATIENT)
Dept: LAB | Facility: CLINIC | Age: 76
End: 2021-01-22

## 2021-01-22 DIAGNOSIS — I26.99 PULMONARY EMBOLISM, UNSPECIFIED CHRONICITY, UNSPECIFIED PULMONARY EMBOLISM TYPE, UNSPECIFIED WHETHER ACUTE COR PULMONALE PRESENT (H): ICD-10-CM

## 2021-01-22 DIAGNOSIS — I26.99 PULMONARY EMBOLISM (H): ICD-10-CM

## 2021-01-22 LAB — INR PPP: 3.6 (ref 0.9–1.1)

## 2021-01-29 ENCOUNTER — COMMUNICATION - HEALTHEAST (OUTPATIENT)
Dept: FAMILY MEDICINE | Facility: CLINIC | Age: 76
End: 2021-01-29

## 2021-02-05 ENCOUNTER — AMBULATORY - HEALTHEAST (OUTPATIENT)
Dept: LAB | Facility: CLINIC | Age: 76
End: 2021-02-05

## 2021-02-05 ENCOUNTER — COMMUNICATION - HEALTHEAST (OUTPATIENT)
Dept: ANTICOAGULATION | Facility: CLINIC | Age: 76
End: 2021-02-05

## 2021-02-05 DIAGNOSIS — I26.99 PULMONARY EMBOLISM (H): ICD-10-CM

## 2021-02-05 DIAGNOSIS — I26.99 PULMONARY EMBOLISM, UNSPECIFIED CHRONICITY, UNSPECIFIED PULMONARY EMBOLISM TYPE, UNSPECIFIED WHETHER ACUTE COR PULMONALE PRESENT (H): ICD-10-CM

## 2021-02-05 LAB — INR PPP: 2.8 (ref 0.9–1.1)

## 2021-02-12 ENCOUNTER — IMMUNIZATION (OUTPATIENT)
Dept: PEDIATRICS | Facility: CLINIC | Age: 76
End: 2021-02-12
Payer: COMMERCIAL

## 2021-02-12 PROCEDURE — 91300 PR COVID VAC PFIZER DIL RECON 30 MCG/0.3 ML IM: CPT

## 2021-02-12 PROCEDURE — 0001A PR COVID VAC PFIZER DIL RECON 30 MCG/0.3 ML IM: CPT

## 2021-02-26 ENCOUNTER — COMMUNICATION - HEALTHEAST (OUTPATIENT)
Dept: ANTICOAGULATION | Facility: CLINIC | Age: 76
End: 2021-02-26

## 2021-03-02 ENCOUNTER — RECORDS - HEALTHEAST (OUTPATIENT)
Dept: ADMINISTRATIVE | Facility: OTHER | Age: 76
End: 2021-03-02

## 2021-03-03 ENCOUNTER — AMBULATORY - HEALTHEAST (OUTPATIENT)
Dept: LAB | Facility: CLINIC | Age: 76
End: 2021-03-03

## 2021-03-03 ENCOUNTER — COMMUNICATION - HEALTHEAST (OUTPATIENT)
Dept: ANTICOAGULATION | Facility: CLINIC | Age: 76
End: 2021-03-03

## 2021-03-03 DIAGNOSIS — I26.99 PULMONARY EMBOLISM (H): ICD-10-CM

## 2021-03-03 DIAGNOSIS — I26.99 PULMONARY EMBOLISM, UNSPECIFIED CHRONICITY, UNSPECIFIED PULMONARY EMBOLISM TYPE, UNSPECIFIED WHETHER ACUTE COR PULMONALE PRESENT (H): ICD-10-CM

## 2021-03-03 LAB — INR PPP: 3 (ref 0.9–1.1)

## 2021-03-05 ENCOUNTER — IMMUNIZATION (OUTPATIENT)
Dept: PEDIATRICS | Facility: CLINIC | Age: 76
End: 2021-03-05
Attending: INTERNAL MEDICINE
Payer: COMMERCIAL

## 2021-03-05 PROCEDURE — 0002A PR COVID VAC PFIZER DIL RECON 30 MCG/0.3 ML IM: CPT

## 2021-03-05 PROCEDURE — 91300 PR COVID VAC PFIZER DIL RECON 30 MCG/0.3 ML IM: CPT

## 2021-03-10 ENCOUNTER — COMMUNICATION - HEALTHEAST (OUTPATIENT)
Dept: FAMILY MEDICINE | Facility: CLINIC | Age: 76
End: 2021-03-10

## 2021-03-24 ENCOUNTER — COMMUNICATION - HEALTHEAST (OUTPATIENT)
Dept: FAMILY MEDICINE | Facility: CLINIC | Age: 76
End: 2021-03-24

## 2021-03-25 ENCOUNTER — COMMUNICATION - HEALTHEAST (OUTPATIENT)
Dept: ANTICOAGULATION | Facility: CLINIC | Age: 76
End: 2021-03-25

## 2021-03-25 DIAGNOSIS — I26.99 PULMONARY EMBOLISM, UNSPECIFIED CHRONICITY, UNSPECIFIED PULMONARY EMBOLISM TYPE, UNSPECIFIED WHETHER ACUTE COR PULMONALE PRESENT (H): ICD-10-CM

## 2021-03-30 ENCOUNTER — RECORDS - HEALTHEAST (OUTPATIENT)
Dept: ADMINISTRATIVE | Facility: OTHER | Age: 76
End: 2021-03-30

## 2021-03-30 LAB — INR PPP: 2.4 (ref 0.9–1.1)

## 2021-03-31 ENCOUNTER — COMMUNICATION - HEALTHEAST (OUTPATIENT)
Dept: SCHEDULING | Facility: CLINIC | Age: 76
End: 2021-03-31

## 2021-03-31 DIAGNOSIS — I26.99 PULMONARY EMBOLISM, UNSPECIFIED CHRONICITY, UNSPECIFIED PULMONARY EMBOLISM TYPE, UNSPECIFIED WHETHER ACUTE COR PULMONALE PRESENT (H): ICD-10-CM

## 2021-04-06 ENCOUNTER — RECORDS - HEALTHEAST (OUTPATIENT)
Dept: ADMINISTRATIVE | Facility: OTHER | Age: 76
End: 2021-04-06

## 2021-04-06 LAB — INR PPP: 2.4 (ref 0.9–1.1)

## 2021-04-13 LAB — INR PPP: 1.5 (ref 0.9–1.1)

## 2021-04-14 ENCOUNTER — COMMUNICATION - HEALTHEAST (OUTPATIENT)
Dept: ANTICOAGULATION | Facility: CLINIC | Age: 76
End: 2021-04-14

## 2021-04-14 DIAGNOSIS — I26.99 PULMONARY EMBOLISM, UNSPECIFIED CHRONICITY, UNSPECIFIED PULMONARY EMBOLISM TYPE, UNSPECIFIED WHETHER ACUTE COR PULMONALE PRESENT (H): ICD-10-CM

## 2021-04-20 ENCOUNTER — RECORDS - HEALTHEAST (OUTPATIENT)
Dept: ADMINISTRATIVE | Facility: OTHER | Age: 76
End: 2021-04-20

## 2021-04-20 LAB — INR PPP: 1.9 (ref 0.9–1.1)

## 2021-04-21 ENCOUNTER — COMMUNICATION - HEALTHEAST (OUTPATIENT)
Dept: FAMILY MEDICINE | Facility: CLINIC | Age: 76
End: 2021-04-21

## 2021-04-21 DIAGNOSIS — I26.99 PULMONARY EMBOLISM, UNSPECIFIED CHRONICITY, UNSPECIFIED PULMONARY EMBOLISM TYPE, UNSPECIFIED WHETHER ACUTE COR PULMONALE PRESENT (H): ICD-10-CM

## 2021-04-23 ENCOUNTER — OFFICE VISIT - HEALTHEAST (OUTPATIENT)
Dept: FAMILY MEDICINE | Facility: CLINIC | Age: 76
End: 2021-04-23

## 2021-04-23 DIAGNOSIS — M35.3 POLYMYALGIA RHEUMATICA (H): ICD-10-CM

## 2021-04-23 DIAGNOSIS — M54.50 LUMBAR SPINE PAIN: ICD-10-CM

## 2021-04-23 DIAGNOSIS — E66.01 OBESITY, CLASS III, BMI 40-49.9 (MORBID OBESITY) (H): ICD-10-CM

## 2021-04-23 ASSESSMENT — ANXIETY QUESTIONNAIRES
1. FEELING NERVOUS, ANXIOUS, OR ON EDGE: MORE THAN HALF THE DAYS
3. WORRYING TOO MUCH ABOUT DIFFERENT THINGS: NEARLY EVERY DAY
5. BEING SO RESTLESS THAT IT IS HARD TO SIT STILL: NOT AT ALL
4. TROUBLE RELAXING: MORE THAN HALF THE DAYS
6. BECOMING EASILY ANNOYED OR IRRITABLE: NOT AT ALL
7. FEELING AFRAID AS IF SOMETHING AWFUL MIGHT HAPPEN: NEARLY EVERY DAY
2. NOT BEING ABLE TO STOP OR CONTROL WORRYING: NEARLY EVERY DAY
GAD7 TOTAL SCORE: 13

## 2021-04-23 ASSESSMENT — PATIENT HEALTH QUESTIONNAIRE - PHQ9: SUM OF ALL RESPONSES TO PHQ QUESTIONS 1-9: 2

## 2021-04-27 ENCOUNTER — RECORDS - HEALTHEAST (OUTPATIENT)
Dept: ADMINISTRATIVE | Facility: OTHER | Age: 76
End: 2021-04-27

## 2021-04-27 ENCOUNTER — COMMUNICATION - HEALTHEAST (OUTPATIENT)
Dept: ANTICOAGULATION | Facility: CLINIC | Age: 76
End: 2021-04-27

## 2021-04-27 DIAGNOSIS — I26.99 PULMONARY EMBOLISM, UNSPECIFIED CHRONICITY, UNSPECIFIED PULMONARY EMBOLISM TYPE, UNSPECIFIED WHETHER ACUTE COR PULMONALE PRESENT (H): ICD-10-CM

## 2021-04-27 LAB — INR PPP: 1.9 (ref 0.9–1.1)

## 2021-04-28 ENCOUNTER — COMMUNICATION - HEALTHEAST (OUTPATIENT)
Dept: FAMILY MEDICINE | Facility: CLINIC | Age: 76
End: 2021-04-28

## 2021-05-04 LAB — INR PPP: 4.1 (ref 0.9–1.1)

## 2021-05-05 ENCOUNTER — COMMUNICATION - HEALTHEAST (OUTPATIENT)
Dept: FAMILY MEDICINE | Facility: CLINIC | Age: 76
End: 2021-05-05

## 2021-05-05 DIAGNOSIS — I26.99 PULMONARY EMBOLISM, UNSPECIFIED CHRONICITY, UNSPECIFIED PULMONARY EMBOLISM TYPE, UNSPECIFIED WHETHER ACUTE COR PULMONALE PRESENT (H): ICD-10-CM

## 2021-05-11 ENCOUNTER — AMBULATORY - HEALTHEAST (OUTPATIENT)
Dept: FAMILY MEDICINE | Facility: CLINIC | Age: 76
End: 2021-05-11

## 2021-05-11 ENCOUNTER — COMMUNICATION - HEALTHEAST (OUTPATIENT)
Dept: SCHEDULING | Facility: CLINIC | Age: 76
End: 2021-05-11

## 2021-05-11 DIAGNOSIS — R60.9 EDEMA: ICD-10-CM

## 2021-05-13 ENCOUNTER — COMMUNICATION - HEALTHEAST (OUTPATIENT)
Dept: ANTICOAGULATION | Facility: CLINIC | Age: 76
End: 2021-05-13

## 2021-05-14 ENCOUNTER — RECORDS - HEALTHEAST (OUTPATIENT)
Dept: ADMINISTRATIVE | Facility: OTHER | Age: 76
End: 2021-05-14

## 2021-05-16 LAB — INR PPP: 4.1 (ref 0.9–1.1)

## 2021-05-17 ENCOUNTER — COMMUNICATION - HEALTHEAST (OUTPATIENT)
Dept: FAMILY MEDICINE | Facility: CLINIC | Age: 76
End: 2021-05-17

## 2021-05-17 DIAGNOSIS — I26.99 PULMONARY EMBOLISM, UNSPECIFIED CHRONICITY, UNSPECIFIED PULMONARY EMBOLISM TYPE, UNSPECIFIED WHETHER ACUTE COR PULMONALE PRESENT (H): ICD-10-CM

## 2021-05-24 ENCOUNTER — RECORDS - HEALTHEAST (OUTPATIENT)
Dept: ADMINISTRATIVE | Facility: CLINIC | Age: 76
End: 2021-05-24

## 2021-05-25 ENCOUNTER — RECORDS - HEALTHEAST (OUTPATIENT)
Dept: ADMINISTRATIVE | Facility: CLINIC | Age: 76
End: 2021-05-25

## 2021-05-25 ENCOUNTER — RECORDS - HEALTHEAST (OUTPATIENT)
Dept: ADMINISTRATIVE | Facility: OTHER | Age: 76
End: 2021-05-25

## 2021-05-25 LAB — INR PPP: 1.8 (ref 0.9–1.1)

## 2021-05-26 ENCOUNTER — COMMUNICATION - HEALTHEAST (OUTPATIENT)
Dept: ANTICOAGULATION | Facility: CLINIC | Age: 76
End: 2021-05-26

## 2021-05-26 ENCOUNTER — RECORDS - HEALTHEAST (OUTPATIENT)
Dept: ADMINISTRATIVE | Facility: CLINIC | Age: 76
End: 2021-05-26

## 2021-05-26 DIAGNOSIS — I26.99 PULMONARY EMBOLISM, UNSPECIFIED CHRONICITY, UNSPECIFIED PULMONARY EMBOLISM TYPE, UNSPECIFIED WHETHER ACUTE COR PULMONALE PRESENT (H): ICD-10-CM

## 2021-05-26 NOTE — TELEPHONE ENCOUNTER
Medication Question or Clarification  Who is calling: Patient  What medication are you calling about? HYDROcodone-acetaminophen (NORCO) 7.5-325 mg per tablet   Who prescribed the medication?: Julien Garnica MD  What is your question/concern?: patient states about 3 weeks ago she weaned down to taking 2 tabs two times a day instead of four times a day. Questioning a weaning schedule as she understands these are addictive. Or requesting an alternative medication that would provide pain management that is non controlled?   Pharmacy: [facility]   Okay to leave a detailed message?: Yes  Site CMT - Please call the pharmacy to obtain any additional needed information.

## 2021-05-26 NOTE — TELEPHONE ENCOUNTER
Spoke with patient who declines to meet with Tigist. States she would prefer to have Dr. Hanson call her to discuss this. I did notify patient that it is likely that he will want her to schedule an appointment but patient wants to speak with him over the phone prior to scheduling.     Please call patient per request.

## 2021-05-26 NOTE — TELEPHONE ENCOUNTER
Happy to help! First of all, although I am happy to hear that she is interested in coming off opioids, tapering can lead to withdrawal and I would recommend that we come up with a tapering schedule together! Tapering can depend on how long she has been on the medication, amount taken per day, etc, so it is not one-size fits all!     I would be happy to help to come up with a plan, discuss non-opioid alternatives, and discuss withdrawal medications (if needed).     Could you call her and put her on my schedule for next week to discuss further? Thanks!     Tigist Bedolla, Pharm.D., BCACP  Medication Therapy Management Pharmacist  [facility] and [facility]

## 2021-05-27 ENCOUNTER — RECORDS - HEALTHEAST (OUTPATIENT)
Dept: ADMINISTRATIVE | Facility: CLINIC | Age: 76
End: 2021-05-27

## 2021-05-27 VITALS — OXYGEN SATURATION: 92 % | SYSTOLIC BLOOD PRESSURE: 124 MMHG | HEART RATE: 101 BPM | DIASTOLIC BLOOD PRESSURE: 80 MMHG

## 2021-05-27 ASSESSMENT — PATIENT HEALTH QUESTIONNAIRE - PHQ9
SUM OF ALL RESPONSES TO PHQ QUESTIONS 1-9: 6
SUM OF ALL RESPONSES TO PHQ QUESTIONS 1-9: 1
SUM OF ALL RESPONSES TO PHQ QUESTIONS 1-9: 2

## 2021-05-27 NOTE — TELEPHONE ENCOUNTER
Controlled Substance Refill Request  Medication Name:   Requested Prescriptions     Pending Prescriptions Disp Refills     HYDROcodone-acetaminophen (NORCO) 7.5-325 mg per tablet 60 tablet 0     Sig: Take 2 tablets by mouth every 4 (four) hours as needed for pain. Max 8/day.     Date Last Fill: 3/11/19  Pharmacy: [facility] in [place]  Submit electronically to pharmacy  Controlled Substance Agreement Date Scanned:   Encounter-Level CSA Scan Date:    There are no encounter-level csa scan date.       Last office visit with prescriber/PCP: 1/9/2019 Julien Garnica MD OR same dept: 1/9/2019 Julien Garnica MD OR same specialty: 1/9/2019 Julien Garnica MD  Last physical: 12/7/2018 Last MTM visit: Visit date not found      Patient is asking for a refill of her medication until she can be seen.

## 2021-05-27 NOTE — PROGRESS NOTES
Assessment/Plan:    1. Chronic pain syndrome  Discussed chronic pain management for lower back and left knee concerns.  Right knee continues to improve status post total knee arthroplasty December 17, 2018 with Dr. Daniels.  Patient instructed on use of hydrocodone acetaminophen 7.5/325 using 2 tablets in the morning 1 tablet at bedtime.  Did instruct patient to decrease to 1 tablet morning and evening ideally with afternoon dose as needed.  Total dose of acetaminophen not to exceed 3000 mg in a day.    2. Osteoarthritis Of The Knee  As above, osteoarthritis bilateral knees status post right knee total arthroplasty and is considering left total knee arthroplasty in June or July 2019.    3. CKD (chronic kidney disease) stage 3, GFR 30-59 ml/min (H)  CKD stage III.  Ensure stable renal function.  - Comprehensive Metabolic Panel    4. Anemia, unspecified type  History of acute on chronic anemia.  CBC pending.  - HM2(CBC w/o Differential)    5. Left knee pain, unspecified chronicity  Refill provided as noted above.  - HYDROcodone-acetaminophen (NORCO) 7.5-325 mg per tablet; Take 1 tablet by mouth every 6 (six) hours as needed for pain. Max 8/day.  Dispense: 90 tablet; Refill: 0    6. Malodorous urine  Describes malodorous urine without dysuria urgency or frequency.  May be associated with intertrigo.  UA UC pending.  - Urinalysis-UC if Indicated    7. Intertrigo  Nystatin cream topically 3 times daily as needed.  - nystatin (MYCOSTATIN) cream; apply topically to affected areas three times per day  Dispense: 60 g; Refill: 1          Subjective:    Nancy Oropeza is seen today for chronic lower back and knee pain.  Status post right total knee arthroplasty.  His left knee concerns ongoing and is considering total knee replacement in June or July 2019.  Has had malodorous urine.  Some rash in folds of skin and groin.  Lisinopril hydrochlorothiazide as well as amlodipine for hypertension management.  Acute on chronic  anemia status post total knee arthroplasty with hemoglobin improving from 7.2 up to 8.9 prior to 2019 visit with hemoglobin 9.6 and MCV 83.  Continue citalopram for anxiety management.  Wants LFTs monitor.  Continues warfarin anticoagulation 2.5 mg daily except 3.75 mg on  with history of pulmonary emboli.  No bleeding concerns currently.  Has had issues with constipation at times in the past.  Comprehensive review of systems as above otherwise all negative.       5 children - son with Factor V abnormality  14 grandchildren   9 great-grandchildren   No smoke (quit 16 years ago after 1 ppd x 30+ years)   EtOH: occ wine   Mom -  88 COPD   Dad -  61 massive MI   1 bro -  67 blood clot (lung)   2 sis - one of which is  age 62 small cell lung CA (h/o smoker)  Surgeries: ventral hernia repair with muscle flap 10/4/12 with post-op complication of seroma with J.P. drain in place followed by interventional radiology q 2 weeks);  age 25; groin hernia age 3; CAPRICE-BSO  by Dr. Herbert Carmen (due to benign cyst x 2); right TKA 18 (Dr. Small)  Hospitalizations: as above   Work: retired  ([employer] as )     12/18/15 FYI: Patient was admitted for dyspnea, secondary to bilateral multiple pulmonary emboli. She overall did well and was discharged home on Lovenox, relatively high dose given her weight as well as Warfarin. I would like her to be seen today at your clinic. I believe she has an appointment for INR, CBC, and BMP check as well as re-dosing of her Warfarin. I suspect she will need an additional day of Lovenox as she is not quite therapeutic today. You can refer to my discharge summary for the INR's and the Warfarin dosing. Thank you. Dr. Garza    Past Surgical History:   Procedure Laterality Date     BREAST BIOPSY Left 1970s     HYSTERECTOMY  2010     OOPHORECTOMY  2010        Family History   Problem Relation Age of Onset      Breast cancer Sister 75     Stomach cancer Paternal Grandfather      Lung cancer Sister         Past Medical History:   Diagnosis Date     Depression      Disease of thyroid gland      HTN (hypertension)      Hypercholesteremia      Morbid obesity (H)      Pulmonary emboli (H) 12/14/2015     Yeast infection         Social History     Tobacco Use     Smoking status: Former Smoker     Smokeless tobacco: Never Used     Tobacco comment: quite 20 yrs ago   Substance Use Topics     Alcohol use: No     Comment: occasionally     Drug use: No        Current Outpatient Medications   Medication Sig Dispense Refill     amLODIPine (NORVASC) 5 MG tablet Take 1 tablet (5 mg total) by mouth daily. 90 tablet 3     cholecalciferol, vitamin D3, 1,000 unit tablet Take 1,000 Units by mouth daily. 2 capsules once a day       citalopram (CELEXA) 40 MG tablet Take 1 tablet (40 mg total) by mouth daily. 90 tablet 3     furosemide (LASIX) 20 MG tablet Take 1 tablet (20 mg total) by mouth daily. 90 tablet 3     HYDROcodone-acetaminophen (NORCO) 7.5-325 mg per tablet Take 1 tablet by mouth every 6 (six) hours as needed for pain. Max 8/day. 90 tablet 0     levothyroxine (SYNTHROID, LEVOTHROID) 125 MCG tablet Take 1 tablet (125 mcg total) by mouth daily. 90 tablet 2     lisinopril-hydrochlorothiazide (PRINZIDE,ZESTORETIC) 20-12.5 mg per tablet Take 2 tablets by mouth daily. 180 tablet 3     pravastatin (PRAVACHOL) 80 MG tablet TAKE ONE TABLET BY MOUTH   EVERY NIGHT AT BEDTIME 90 tablet 3     warfarin (COUMADIN) 2.5 MG tablet Take 1 to 1.5 tablets (2.5 to 3.75 mg) by mouth daily. Adjust dose based on INR results as directed. 90 tablet 1     warfarin (COUMADIN/JANTOVEN) 2.5 MG tablet Take 1 to 1.5 tablets (2.5 to 3.75 mg) by mouth daily. Adjust dose per INR results as instructed. 90 tablet 1     nystatin (MYCOSTATIN) cream apply topically to affected areas three times per day 60 g 1     No current facility-administered medications for this visit.            Objective:    Vitals:    04/09/19 1252   BP: 120/60   Pulse: 81   SpO2: 95%   Weight: (!) 294 lb (133.4 kg)      Body mass index is 44.7 kg/m .    Alert.  No apparent distress.  Evidence for intertrigo bilateral groin noted.  Chest clear.  Cardiac exam regular.  Pleasant.  Nontoxic.      This note has been dictated using voice recognition software and as a result may contain minor grammatical errors and unintended word substitutions.

## 2021-05-27 NOTE — TELEPHONE ENCOUNTER
ANTICOAGULATION  MANAGEMENT    Assessment     Today's INR result of 1.5 is Subtherapeutic (goal INR of 2.0-3.0)        Missed dose(s) may be affecting INR- missed on 3/29.    No new diet changes affecting INR    No new medication/supplements affecting INR    Continues to tolerate warfarin with no reported s/s of bleeding or thromboembolism     Previous INR was Therapeutic    Plan:     Spoke with Nancy regarding INR result and instructed:     Warfarin Dosing Instructions:  Take booster dose of 5 mg today only then continue current warfarin dose 3.75 mg daily on Mon; and 2.5 mg daily rest of week  (0 % change)    Instructed patient to follow up no later than: OV on 4/9.     Education provided: importance of taking warfarin as instructed    Nancy verbalizes understanding and agrees to warfarin dosing plan.    Instructed to call the [facility] for any changes, questions or concerns. (#[phone])   ?   Vincent Baird RN    Subjective/Objective:      Nancy Symikki, a 73 y.o. female is on warfarin.     Nancy reports:     Home warfarin dose: verbally confirmed home dose with pt and updated on anticoagulation calendar     Missed doses: Yes, missed on 3/29.     Medication changes:  No     S/S of bleeding or thromboembolism:  No     New Injury or illness:  No     Changes in diet or alcohol consumption:  No     Upcoming surgery, procedure or cardioversion:  No    Anticoagulation Episode Summary     Current INR goal:   2.0-3.0   TTR:   49.5 % (3.3 y)   Next INR check:   4/9/2019   INR from last check:   1.50! (4/1/2019)   Weekly max warfarin dose:      Target end date:   12/21/2016   INR check location:      Preferred lab:      Send INR reminders to:   ANTICOAGULATION POOL B (MPW,HUG,STW,RVL,OAK,RLN)    Indications    Pulmonary embolism (H) [I26.99]           Comments:            Anticoagulation Care Providers     Provider Role Specialty Phone number    Julien Garnica MD Referring Family Medicine  [phone]

## 2021-05-27 NOTE — TELEPHONE ENCOUNTER
"Patient had called and left message on nurse navigation line at 1221 PM today. Patient wanting to talk to provider regarding the \"injections that are not working.\" Returned patient's call to discuss. Left message to return call.     Patient is schedule for follow up with Ibrahima Perdomo DO on 4/8/19.  "

## 2021-05-27 NOTE — TELEPHONE ENCOUNTER
"ANTICOAGULATION  MANAGEMENT    Assessment     Today's INR result of 1.6 is Subtherapeutic (goal INR of 2.0-3.0)        Missed dose(s) may be affecting INR- \"most likely missed a dose\"; unsure what day. Pt will switch to taking in AM again so she doesn't forget.     No new diet changes affecting INR    No new medication/supplements affecting INR    Continues to tolerate warfarin with no reported s/s of bleeding or thromboembolism     Previous INR was Subtherapeutic    Plan:     Spoke with Nancy regarding INR result and instructed:     Warfarin Dosing Instructions:  Take booster dose of 3.75 mg today only then continue current warfarin dose    3.75 mg every Mon; 2.5 mg all other days         Instructed patient to follow up no later than: 2 weeks.     Education provided: importance of taking warfarin as instructed    Nancy verbalizes understanding and agrees to warfarin dosing plan.    Instructed to call the [facility] for any changes, questions or concerns. (#[phone])   ?   Vincent Baird RN    Subjective/Objective:      Nancy Oropeza, a 73 y.o. female is on warfarin.     Nancy reports:     Home warfarin dose: verbally confirmed home dose with pt and updated on anticoagulation calendar     Missed doses: Yes: most likely missed one dose.     Medication changes:  No     S/S of bleeding or thromboembolism:  No     New Injury or illness:  No     Changes in diet or alcohol consumption:  No     Upcoming surgery, procedure or cardioversion:  No    Anticoagulation Episode Summary     Current INR goal:   2.0-3.0   TTR:   49.1 % (3.3 y)   Next INR check:   4/23/2019   INR from last check:   1.60! (4/9/2019)   Weekly max warfarin dose:      Target end date:   12/21/2016   INR check location:      Preferred lab:      Send INR reminders to:   ANTICOAGULATION POOL B (MPW,HUG,STW,RVL,OAK,RLN)    Indications    Pulmonary embolism (H) [I26.99]           Comments:            Anticoagulation Care Providers     " Provider Role Specialty Phone number    Julien Garnica MD Referring Family Medicine [phone]

## 2021-05-27 NOTE — TELEPHONE ENCOUNTER
Medication Request  Medication name: Warfarin  Pharmacy Name and Location: [facility], [place], [address] [phone]  Reason for request: Needs refill, patient has 3 days left of medication  When did you use medication last?:  today  Patient offered appointment:  Patient has appointment on 4/9/19  Okay to leave a detailed message: yes

## 2021-05-27 NOTE — TELEPHONE ENCOUNTER
Call from pt     Status update for patient     > Currently treating the belly rash from most recent visit      > Has going through the 2 tubes of a the Nystatin without resolution  > Not any worse - just not healed      > Wondering if other suggested care?       > She did not want to continue the Nystatin due to cost       A/P:   > Condition worsens with heat / moisture - need to work to limiting that to help heal this         > Suggested placing dry cotton cloth into the folds to help keep everything dry - change as needed   > Discussed topical powders to keep skin dry as well   > ensure skin is clean and left open to air as much as possible    > Callback if this continues to worsen or if would like to try Nystatin again        Wilver Saldana, RN   Triage and Medication Refills

## 2021-05-27 NOTE — TELEPHONE ENCOUNTER
Patient Returning Call  Reason for call:  Provider call  Information relayed to patient:  The patient has opted to come in clinic to discuss the issue in person with provider on April 9 at her appointment.   No call back needed.   Patient has additional questions:  No  If YES, what are your questions/concerns:  NA  Okay to leave a detailed message?: No call back needed

## 2021-05-27 NOTE — TELEPHONE ENCOUNTER
Pt called in states she has constipation.  The constipation is for the last 3 month.  Pt was using stool softener.  The stool was hard.  There was blood in stool.  Pt has chronic constipation.  Pt didn't change diet.  Pt is taking oxycodone.  Has little abdominal pain.  No fever, no vomit.  The disposition is to be seen with in 24 hours.  Care advice given per protocol.  Patient agrees with care advice given.   Agreed to call back if he has additional symptoms or questions.      Tomás Dempsey RN, [facility] Triage/Med Refill 4/3/2019 4:53 PM        Reason for Disposition    [1] Minor bleeding from rectum (e.g., blood just on toilet paper, few drops, streaks on surface of normal formed BM) AND [2] 3 or more times    Protocols used: CONSTIPATION-A-AH

## 2021-05-28 ENCOUNTER — RECORDS - HEALTHEAST (OUTPATIENT)
Dept: ADMINISTRATIVE | Facility: CLINIC | Age: 76
End: 2021-05-28

## 2021-05-28 ENCOUNTER — COMMUNICATION - HEALTHEAST (OUTPATIENT)
Dept: FAMILY MEDICINE | Facility: CLINIC | Age: 76
End: 2021-05-28

## 2021-05-28 ASSESSMENT — ANXIETY QUESTIONNAIRES
GAD7 TOTAL SCORE: 8
GAD7 TOTAL SCORE: 11
GAD7 TOTAL SCORE: 13

## 2021-05-28 NOTE — PROGRESS NOTES
Assessment/Plan:        1. Nostril sore  Exam findings were discussed and reassurance given  Advised to using bacitracin to the anterior nostrils and monitor symptoms      Close follow up with any continuing concerns or symptoms.            Subjective:    Patient ID:   Nancy Oropeza is a 74 y.o. female comes in for having her nostrils checked.  She's been experiencing bleeding and scabs to form mainly in the left nostril for the past 1.5 weeks.  She has been using vaseline to help this condition.  no other concerns.        Review of Systems  Allergy: reviewed  General : negative  ENT : See HPI   Dermatological : negative    Hematological and Lymphatic : negative    The following patient's history were reviewed and updated as appropriate:   She  has a past medical history of Depression, Disease of thyroid gland, HTN (hypertension), Hypercholesteremia, Morbid obesity (H), Pulmonary emboli (H) (12/14/2015), and Yeast infection..      Outpatient Encounter Medications as of 5/9/2019   Medication Sig Dispense Refill     amLODIPine (NORVASC) 5 MG tablet Take 1 tablet (5 mg total) by mouth daily. 90 tablet 3     cholecalciferol, vitamin D3, 1,000 unit tablet Take 1,000 Units by mouth daily. 2 capsules once a day       citalopram (CELEXA) 40 MG tablet Take 1 tablet (40 mg total) by mouth daily. 90 tablet 3     furosemide (LASIX) 20 MG tablet Take 1 tablet (20 mg total) by mouth daily. 90 tablet 3     HYDROcodone-acetaminophen (NORCO) 7.5-325 mg per tablet Take 1 tablet by mouth every 6 (six) hours as needed for pain. Max 8/day. 90 tablet 0     levothyroxine (SYNTHROID, LEVOTHROID) 125 MCG tablet Take 1 tablet (125 mcg total) by mouth daily. 90 tablet 2     lisinopril-hydrochlorothiazide (PRINZIDE,ZESTORETIC) 20-12.5 mg per tablet Take 2 tablets by mouth daily. 180 tablet 3     nystatin (MYCOSTATIN) cream apply topically to affected areas three times per day 60 g 1     pravastatin (PRAVACHOL) 80 MG tablet TAKE ONE TABLET  BY MOUTH   EVERY NIGHT AT BEDTIME 90 tablet 3     triamcinolone (KENALOG) 0.5 % cream Apply to rash twice per day for 2 weeks. 30 g 2     warfarin (COUMADIN) 2.5 MG tablet Take 1 to 1.5 tablets (2.5 to 3.75 mg) by mouth daily. Adjust dose based on INR results as directed. 90 tablet 1     warfarin (COUMADIN/JANTOVEN) 2.5 MG tablet Take 1 to 1.5 tablets (2.5 to 3.75 mg) by mouth daily. Adjust dose per INR results as instructed. 90 tablet 1     No facility-administered encounter medications on file as of 5/9/2019.          Objective:   /78 (Patient Site: Right Arm, Patient Position: Sitting, Cuff Size: Adult Large)   Pulse 94   Wt (!) 301 lb 3.2 oz (136.6 kg)   SpO2 96%   BMI 45.80 kg/m        Physical Exam  General: NAD, well hydrated   Facial exam: normal to inspection, symmetric with no swelling  Nasal exam: No acute bleeding or epistaxis, anterior nostrils are normal with no scab formation, or folliculitis  Oropharynx: Normal

## 2021-05-28 NOTE — TELEPHONE ENCOUNTER
Please see below message and advise  (patient is requesting nystatin, diflucan, and triamcinolone cream)    Reviewed up-to-date and it doesn't appear triamcinolone cream is recommended for treatment of intertrigo

## 2021-05-28 NOTE — TELEPHONE ENCOUNTER
"Anticoagulation Annual Referral Renewal Review    Nancy Oropeza's chart reviewed for annual renewal of referral to anticoagulation monitoring.        Criteria for anticoagulation nurse and/or pharmacist renewal met   Warfarin indication: PE Yes , DVT/PE with previous provider documentation patient to be on extended anticoagulation   Current with INR monitoring/compliant Yes Yes   Date of last office visit 5/9/19 Yes, had office visit within last year   Time in Therapeutic Range (TTR) 27 % No, TTR < 60 %       Nancy Oropeza did NOT meet all criteria for anticoagulation management program initiated renewal and requires provider review. Using dot phrase, \".acmrenewalprovider\", please advise if Nancy's anticoagulation management referral should be renewed or if patient should be seen in office to review anticoagulation therapy      Vincent Baird RN  8:28 AM      "

## 2021-05-28 NOTE — TELEPHONE ENCOUNTER
ANTICOAGULATION  MANAGEMENT    Assessment     Today's INR result of 2.1 is Therapeutic (goal INR of 2.0-3.0)        Warfarin taken as previously instructed    No new diet changes affecting INR    No new medication/supplements affecting INR    Continues to tolerate warfarin with no reported s/s of bleeding or thromboembolism     Previous INR was Therapeutic    Plan:     Spoke with Nancy regarding INR result and instructed:     Warfarin Dosing Instructions:  Continue current warfarin dose    3.75 mg every Mon; 2.5 mg all other days         Instructed patient to follow up no later than: 4 weeks. Appt is made for 6/4.     Education provided: importance of taking warfarin as instructed    Nancy verbalizes understanding and agrees to warfarin dosing plan.    Instructed to call the [facility] for any changes, questions or concerns. (#[phone])   ?   Vincent Baird RN    Subjective/Objective:      Nancy Oropeza, a 73 y.o. female is on warfarin.     Nancy reports:     Home warfarin dose: template incorrect; verbally confirmed home dose with pt and updated on anticoagulation calendar     Missed doses: No     Medication changes:  No     S/S of bleeding or thromboembolism:  No     New Injury or illness:  No     Changes in diet or alcohol consumption:  No     Upcoming surgery, procedure or cardioversion:  No    Anticoagulation Episode Summary     Current INR goal:   2.0-3.0   TTR:   49.5 % (3.4 y)   Next INR check:   6/4/2019   INR from last check:   2.10 (5/7/2019)   Weekly max warfarin dose:      Target end date:   12/21/2016   INR check location:      Preferred lab:      Send INR reminders to:   ANTICOAGULATION POOL B (MPW,HUG,STW,RVL,OAK,RLN)    Indications    Pulmonary embolism (H) [I26.99]           Comments:            Anticoagulation Care Providers     Provider Role Specialty Phone number    Julien Garnica MD Referring Family Medicine [phone]

## 2021-05-28 NOTE — TELEPHONE ENCOUNTER
ANTICOAGULATION  MANAGEMENT    Assessment     Today's INR result of 2.2 is Therapeutic (goal INR of 2.0-3.0)        Warfarin taken as previously instructed    No new diet changes affecting INR    No new medication/supplements affecting INR    Continues to tolerate warfarin with no reported s/s of bleeding or thromboembolism     Previous INR was Subtherapeutic    Plan:     Spoke with Nancy regarding INR result and instructed:     Warfarin Dosing Instructions:  Continue current warfarin dose    3.75 mg every Mon; 2.5 mg all other days         Instructed patient to follow up no later than: 2 weeks. Appt is made for 5/7.     Education provided: importance of taking warfarin as instructed    Nancy verbalizes understanding and agrees to warfarin dosing plan.    Instructed to call the [facility] for any changes, questions or concerns. (#[phone])   ?   Vincent Baird RN    Subjective/Objective:      Nancy Oropeza, a 73 y.o. female is on warfarin.     Nancy reports:     Home warfarin dose: verbally confirmed home dose with pt and updated on anticoagulation calendar     Missed doses: No     Medication changes:  No     S/S of bleeding or thromboembolism:  No     New Injury or illness:  No     Changes in diet or alcohol consumption:  No     Upcoming surgery, procedure or cardioversion:  No    Anticoagulation Episode Summary     Current INR goal:   2.0-3.0   TTR:   48.9 % (3.3 y)   Next INR check:   5/7/2019   INR from last check:   2.20 (4/23/2019)   Weekly max warfarin dose:      Target end date:   12/21/2016   INR check location:      Preferred lab:      Send INR reminders to:   ANTICOAGULATION POOL B (MPW,HUG,STW,RVL,OAK,RLN)    Indications    Pulmonary embolism (H) [I26.99]           Comments:            Anticoagulation Care Providers     Provider Role Specialty Phone number    Julien Garnica MD Referring Family Medicine [phone]

## 2021-05-28 NOTE — TELEPHONE ENCOUNTER
Scab that continues to build in left side of nose.  Has tried vasoline in nose but it dries up.    Dried blood comes out at times.  Trying to keep scab soft x 1.5 weeks.    Patient would like the sore looked at since it does not seem to be healing.    Made appt at her next available convienece.    Fabienne Quintana, RN, [facility] Nurse Triage/Med Refills RN       Reason for Disposition    [1] Wound > 48 hours old AND [2] it becomes more tender    Protocols used: WOUND INFECTION-A-AH

## 2021-05-28 NOTE — PROGRESS NOTES
Patient has accomplished goals and has no other goals that this patient would like to work with Clinic Care Coordination/Health Care Home. Care Guide sent request to [facility] RN pool to review for Maintenance and update emergency plan.

## 2021-05-29 ENCOUNTER — RECORDS - HEALTHEAST (OUTPATIENT)
Dept: ADMINISTRATIVE | Facility: CLINIC | Age: 76
End: 2021-05-29

## 2021-05-29 NOTE — TELEPHONE ENCOUNTER
ANTICOAGULATION  MANAGEMENT    Assessment     Today's INR result of 2.7 is Therapeutic (goal INR of 2.0-3.0)        Warfarin taken as previously instructed    No new diet changes affecting INR    No new medication/supplements affecting INR    Continues to tolerate warfarin with no reported s/s of bleeding or thromboembolism     Previous INR was Therapeutic     Left knee surgery 7/16.     Plan:     Spoke with Nancy regarding INR result and instructed:     Warfarin Dosing Instructions:  Continue current warfarin dose    3.75 mg every Mon; 2.5 mg all other days       Instructed patient to follow up no later than: 4 weeks.     Education provided: importance of taking warfarin as instructed    Nancy verbalizes understanding and agrees to warfarin dosing plan.    Instructed to call the [facility] for any changes, questions or concerns. (#[phone])   ?   Vincent Baird RN    Subjective/Objective:      Nancy Oropeza, a 74 y.o. female is on warfarin.     Nancy reports:     Home warfarin dose: template incorrect; verbally confirmed home dose with pt and updated on anticoagulation calendar     Missed doses: No     Medication changes:  No     S/S of bleeding or thromboembolism:  No     New Injury or illness:  No     Changes in diet or alcohol consumption:  No     Upcoming surgery, procedure or cardioversion:  Yes: left knee surgery 7/16.     Anticoagulation Episode Summary     Current INR goal:   2.0-3.0   TTR:   50.7 % (3.4 y)   Next INR check:   7/4/2019   INR from last check:   2.70 (6/6/2019)   Weekly max warfarin dose:      Target end date:   12/21/2016   INR check location:      Preferred lab:      [facility]    Indications    Pulmonary embolism (H) [I26.99]           Comments:            Anticoagulation Care Providers     Provider Role Specialty Phone number    Julien Garnica MD Referring Family Medicine [phone]

## 2021-05-30 ENCOUNTER — RECORDS - HEALTHEAST (OUTPATIENT)
Dept: ADMINISTRATIVE | Facility: CLINIC | Age: 76
End: 2021-05-30

## 2021-05-30 ENCOUNTER — RECORDS - HEALTHEAST (OUTPATIENT)
Dept: ADMINISTRATIVE | Facility: OTHER | Age: 76
End: 2021-05-30

## 2021-05-30 ENCOUNTER — HEALTH MAINTENANCE LETTER (OUTPATIENT)
Age: 76
End: 2021-05-30

## 2021-05-30 VITALS — WEIGHT: 293 LBS | BODY MASS INDEX: 47.99 KG/M2

## 2021-05-30 VITALS — BODY MASS INDEX: 47.09 KG/M2 | WEIGHT: 293 LBS | HEIGHT: 66 IN

## 2021-05-30 NOTE — TELEPHONE ENCOUNTER
Yes the thyroid test is abnormal and may indicate she needs to increase her dose.   Please confirm the dose she has been taking if and how often she might miss taking a dose of her current medication.  Based on follow-up we will decide what we should do.

## 2021-05-30 NOTE — TELEPHONE ENCOUNTER
ACN called back and spoke with pt. Advised her that such small increase of Levothyroxine should not affect her INR. Therefore we does not have to adjust her warfarin dosing.

## 2021-05-30 NOTE — TELEPHONE ENCOUNTER
Patient Returning Call  Reason for call:  Increase blood pressure  Information relayed to patient:  Patient denies any shortness of breath, chest pain or change in sensation.  She states she had surgery and some of the medications were stopped.  She is now taking the furosemide 20 mg daily, amlodipine 5 mg daily and generic Prinzide  20-12.5 mg daily for hypertension.     Patient has additional questions:  No  If YES, what are your questions/concerns:  NA  Okay to leave a detailed message?: Yes

## 2021-05-30 NOTE — TELEPHONE ENCOUNTER
Please call patient: We will increase her levothyroxine up to 137 mcg/day.  She should return for a lab visit in 8 weeks to recheck thyroid.

## 2021-05-30 NOTE — TELEPHONE ENCOUNTER
Have patient use amlodipine 5 mg twice daily instead of only once daily.  Notify with update regarding blood pressure results in next 2 weeks.

## 2021-05-30 NOTE — TELEPHONE ENCOUNTER
Who is calling:  Patient   Reason for Call:  calling to check on below request. Patient requesting someone please call her today.  Date of last appointment with primary care: 7/3/19  Okay to leave a detailed message: Yes

## 2021-05-30 NOTE — TELEPHONE ENCOUNTER
Patient Returning Call  Reason for call:  Returning VM  Information relayed to patient:  Informed Levothyroxine  will be increased up to 137 mcg/day.  She should return for a lab visit in 8 weeks to recheck thyroid.  Patient has additional questions:  No  If YES, what are your questions/concerns:  n/a  Okay to leave a detailed message?: Yes

## 2021-05-30 NOTE — TELEPHONE ENCOUNTER
Orders being requested: Skilled nursing  2 x week x 2   1 week x 7  2 PRN   Reason service is needed/diagnosis:  INR  and pain management   When are orders needed by: today  Where to send Orders: Phone:  [phone]  Okay to leave detailed message?  Yes      Orders being requested:    Reason service is needed/diagnosis:  Advance care directive  When are orders needed by: today Where to send Orders: Phone:  Phone:  [phone]  Okay to leave detailed message?  Yes

## 2021-05-30 NOTE — TELEPHONE ENCOUNTER
I don't understand the below message    Reason contacted:  Orders request  Information relayed:  Called and spoke with Latricia to get more information. She states the order for Tubigrip needs to be sent to [facility] rather than [facility]. Requested more information in regards to the catheter and Latricia states patient does not use a catheter. She said she notified Britany that patients calf is 54 cm around.     Also there is a drug interaction between citalopram and warfarin    Per micromedex:   Concurrent use of CITALOPRAM and ANTICOAGULANTS may result in an increased risk of bleeding.         **Please advise on drug interaction between citalopram and warfarin.    Additional questions:  No  Further follow-up needed:  Yes  Okay to leave a detailed message:  Yes

## 2021-05-30 NOTE — TELEPHONE ENCOUNTER
ACN reviewed hold/ bridge plan as ordered in encounter 7/3.     Pt is having knee surgery on 7/17 at 5 am. Pt said the plan is keep her for a couple nights in hospital after surgery.     Instructed pt to start holding warfarin 7/12 to 7/16. Resume warfarin the evening of surgery if ok with surgeon.  Start Lovenox 7/14 AM. Take Lovenox q12h. Last dose of Lovenox will be 7/15 PM.   Surgeon will decide when to resume on Lovenox. Continue Lovenox until INR is >= 2.3    Recheck INR by 7/22. INR appt is made for 7/22.     Pt was able to read back instructions correctly.

## 2021-05-30 NOTE — TELEPHONE ENCOUNTER
Order faxed    Reason contacted:  Orders request  Information relayed:  Patient notified the order was sent to [facility] per request.  Additional questions:  No  Further follow-up needed:  No  Okay to leave a detailed message:  No

## 2021-05-30 NOTE — TELEPHONE ENCOUNTER
Provider Communication  Who is calling:  [facility] in which provider is associated:  [facility]  Reason for call:  [facility] said the request for Tubi- has to be faxed directly to them from the Dr. [place] can't request anymore.  Please fax Prescription Tubi-  Catheter 54cm around to [facility] at [phone]  Urgency for return call:  as available  Okay to leave detailed message?:  Yes

## 2021-05-30 NOTE — TELEPHONE ENCOUNTER
FYI - Status Update  Who is Calling: Patient  Update: Patient started levothyroxine 137 MCG on 7/6/19 and the patient is having surgery on the 7/17/19 and is wondering how this will affect their dosing. Please call the patient back to discuss this further, thank you.  Okay to leave a detailed message?:  Yes

## 2021-05-30 NOTE — TELEPHONE ENCOUNTER
Orders being requested: PT 2 x/wk for 4 wks, then 1 x/wk for 2 wks.   ROM strengthening, gait, ambulating.  Reason service is needed/diagnosis: Left TKA  When are orders needed by: asap  Where to send Orders: Phone:  Gabriela at [phone]  Okay to leave detailed message?  Yes      PT states also noted on patient to have a systolic blood pressure over the perimeter of 150.  Blood pressure today was 158/84

## 2021-05-30 NOTE — TELEPHONE ENCOUNTER
This patient have any symptoms of concern such as lightheadedness, chest pain shortness of breath or any change overall in how she feels?

## 2021-05-30 NOTE — TELEPHONE ENCOUNTER
Left message to call back for: orders request  Information to relay to patient:  Notified ok for requested orders per Dr. Garnica. Please advise if this is not ok

## 2021-05-30 NOTE — TELEPHONE ENCOUNTER
Called and spoke with Nancy  Regular compression stockings are too difficult to get one the patient therefore the home care nurse suggested patient get Tubigrip size G    See order pended under the communications tab  They would like this order sent to [facility] today if possible.

## 2021-05-30 NOTE — TELEPHONE ENCOUNTER
ANTICOAGULATION  MANAGEMENT    Assessment     Today's INR result of 2.4 is Therapeutic (goal INR of 2.0-3.0)        Warfarin taken as previously instructed    No new diet changes affecting INR    No new medication/supplements affecting INR    Continues to tolerate warfarin with no reported s/s of bleeding or thromboembolism     Previous INR was Therapeutic     Left knee surgery 7/17. To hold warfarin 5 days prior and bridge with Lovenox per PCP. Awaiting bridge orders from pharmacist (see other anticoagulation encounter from today)    Plan:     Spoke with Nancy regarding INR result and instructed:     Warfarin Dosing Instructions:  Continue current warfarin dose    3.75 mg every Mon; 2.5 mg all other days     ACN will call patient next week to review hold and bridge instructions.     Instructed patient to follow up no later than: 4-6 days after resuming warfarin.    Education provided: importance of taking warfarin as instructed    Nancy verbalizes understanding and agrees to warfarin dosing plan.    Instructed to call the [facility] for any changes, questions or concerns. (#[phone])   ?   Vincent Baird RN    Subjective/Objective:      Nancy Oropeza, a 74 y.o. female is on warfarin.     Nancy reports:     Home warfarin dose: verbally confirmed home dose with pt and updated on anticoagulation calendar     Missed doses: No     Medication changes:  No     S/S of bleeding or thromboembolism:  No     New Injury or illness:  No     Changes in diet or alcohol consumption:  No     Upcoming surgery, procedure or cardioversion:  Yes: knee surgery 7/17.     Anticoagulation Episode Summary     Current INR goal:   2.0-3.0   TTR:   51.8 % (3.5 y)   Next INR check:   7/22/2019   INR from last check:   No new INR was available at last check   Weekly max warfarin dose:      Target end date:   12/21/2016   INR check location:      Preferred lab:      Send INR reminders to:   [facility]    Indications     Pulmonary embolism (H) [I26.99]           Comments:            Anticoagulation Care Providers     Provider Role Specialty Phone number    Julien Garnica MD Referring Family Medicine [phone]

## 2021-05-30 NOTE — TELEPHONE ENCOUNTER
ANTICOAGULATION  MANAGEMENT- Home Care/Care Facility Result    Assessment     Today's INR result of 2.3 is Therapeutic (goal INR of 2.0-3.0)        Warfarin taken as previously instructed    No new diet changes affecting INR    No new medication/supplements affecting INR    Continues to tolerate warfarin with no reported s/s of bleeding or thromboembolism     Previous INR was Subtherapeutic    Plan:     Spoke with home care nurse Dasha discussed INR result and instructed:     Warfarin Dosing Instructions: Resume home maintenance dose    3.75 mg every Mon; 2.5 mg all other days     Discontinue Lovenox now.     Next INR to be drawn: 1 week.    Education provided: importance of taking warfarin as instructed    Dasha verbalizes understanding and agrees to warfarin dosing plan.   ?   Vincent Baird RN    Subjective/Objective:      Nancy GARZON Satabhay, a 74 y.o. female is established on warfarin.     Home care/care facility RN's report of Nancy INR, recent warfarin dosing, diet changes, medication changes, and symptoms is documented below.    Additional findings: verbally confirmed home dose with Dasha and updated on anticoagulation calendar    Anticoagulation Episode Summary     Current INR goal:   2.0-3.0   TTR:   51.5 % (3.6 y)   Next INR check:   8/2/2019   INR from last check:   2.30 (7/26/2019)   Weekly max warfarin dose:      Target end date:   12/21/2016   INR check location:      Preferred lab:      Send INR reminders to:   [facility]    Indications    Pulmonary embolism (H) [I26.99]           Comments:            Anticoagulation Care Providers     Provider Role Specialty Phone number    Julien Garnica MD Referring Family Medicine [phone]

## 2021-05-30 NOTE — TELEPHONE ENCOUNTER
Left message to call back for: orders request  Information to relay to patient:  Below message via VM

## 2021-05-30 NOTE — TELEPHONE ENCOUNTER
"Orders being requested: \"[employer]\" compression stockings, size G  Reason service is needed/diagnosis: Bilateral knee replacement, post-op  When are orders needed by: as soon as possible   Where to send Orders: [facility] in [place] (Nancy will call back with the fax and phone number)  Okay to leave detailed message?  Yes  [phone]    "

## 2021-05-30 NOTE — TELEPHONE ENCOUNTER
Given the ongoing nature of elevated blood pressure and pulse with the absence of any symptoms we will send to Dr. Garnica to address tomorrow.  If any symptoms arise would recommend patient is evaluated on a more basis.

## 2021-05-30 NOTE — TELEPHONE ENCOUNTER
Reason contacted:  results  Information relayed:  Below message    Spoke with patient and confirmed that she has been taking Levothyroxine 125 mg once daily.  She does not believe she has missed any doses of her Levothyroxine.    She would like a new prescription to go to [facility] on [address] in [place]       Please advise  Additional questions:  No  Further follow-up needed:  Yes  Okay to leave a detailed message:  Yes

## 2021-05-30 NOTE — TELEPHONE ENCOUNTER
Orders being requested: Home Care   Delay of start until 7/22/19  Reason service is needed/diagnosis: caller stated the patient is asking for this delay of start of home care services including skilled nursing, physical therapy and occupational therapy.   When are orders needed by: ASAP  Where to send Orders: Phone:  verbal orders to caller  Okay to leave detailed message?  Yes

## 2021-05-30 NOTE — TELEPHONE ENCOUNTER
TAVIA-PROCEDURAL ANTICOAGULATION  MANAGEMENT    Assessment     Warfarin interruption plan for knee surgery on 2019.    PE Treatment and History of VTE      Risk stratification for thromboembolism: high. (Due to ortho procedure)      2018 American Society of Hematology recommends against bridging in low and moderate risk VTE patients holding warfarin    Plan       Pre-Procedure:    Hold warfarin until after procedure startin2019     Lovenox 140 mg subq Q 12 hrs (1 mg/kg Q 12 hr for CrCl 45ml/min with IBW)     Start Lovenox: 2019 AM  Last dose of Lovenox prior to procedure: 07/15/2019 PM or  2019 AM if surgery later than 10AM 2019     Post-Procedure:    Resume home warfarin dose if okay with provider doing procedure on night of procedure, PM: 3.75mg    Resume Lovenox ~ 24 hrs post procedure when okay with provider doing procedure. Continue until INR >= 2.3    Recheck INR 5 days after resuming warfarin   ?   Marlene Mcmillan, PharmD    Subjective/Objective:      stephanie Shah 74 y.o. female    Reason for Anticoagulation: History of VTE    Goal INR Range: 2-3    Patient bridged in past: Yes    Pertinent History:    Wt Readings from Last 3 Encounters:   19 (!) 300 lb (136.1 kg)   19 (!) 301 lb 3.2 oz (136.6 kg)   19 (!) 294 lb (133.4 kg)        Ideal body weight: 63.9 kg (140 lb 14 oz)  Adjusted ideal body weight: 92.8 kg (204 lb 8.4 oz)     BMI: There is no height or weight on file to calculate BMI.    Lab Results   Component Value Date    INR 2.40 (H) 2019    INR 2.70 (H) 2019    INR 2.10 (H) 2019     Lab Results   Component Value Date    HGB 10.5 (L) 2019    HCT 31.7 (L) 2019     2019     Lab Results   Component Value Date    CREATININE 1.06 2019    CREATININE 1.09 2019    CREATININE 1.15 (H) 2018     Estimated CrCl: CrCl cannot be calculated (Patient's most recent lab result is older than the  maximum 5 days allowed.).

## 2021-05-30 NOTE — TELEPHONE ENCOUNTER
Left message to call back for: status update regarding BP and pulse  Information to relay to patient:  Below message

## 2021-05-30 NOTE — TELEPHONE ENCOUNTER
Who is calling:  The patient   Reason for Call:  The patient is calling to check the status of the medication request. The patient is aware that the request is pending review. The patient would like this marked as high priority. The patient would like to  the script today.   Date of last appointment with primary care: 7/3/2019  Okay to leave a detailed message: No

## 2021-05-30 NOTE — TELEPHONE ENCOUNTER
Central PA team  [phone]  Pool: HE PA MED ([id number])          PA has been initiated.       PA form completed and faxed insurance via Cover My Meds     Key:  Key: [id number] - PA Case ID: [id number]     Medication:  Bactroban Nasal 2% ointment    Insurance:  [employer]        Response will be received via fax and may take up to 5-10 business days depending on plan

## 2021-05-30 NOTE — TELEPHONE ENCOUNTER
Per Dr. Garnica ok to continue citalopram and will monitor    Left message to call back for: medication problem  Information to relay to patient:  Latricia notified of the above information via VM

## 2021-05-30 NOTE — PROGRESS NOTES
Preoperative Exam    Scheduled Procedure: left total knee TKA  Surgery Date:  7/17/19  Surgery Location: [facility]    Surgeon:  Dr. Bertram Garnica    Assessment/Plan:     1. Pre-operative cardiovascular examination  Preoperative cardiovascular exam for left total knee arthroplasty secondary to severe osteoarthritis.  No contraindication identified to schedule procedure.    2. Osteoarthritis Of The Knee  Left knee osteoarthritis.  Scheduled total knee arthroplasty as noted.  Chronic pain syndrome reviewed.  Patient is requesting refill on hydrocodone acetaminophen 7.5/325 using 2 tablets every 4 hours as needed.  - HYDROcodone-acetaminophen (NORCO) 7.5-325 mg per tablet; Take 1 tablet by mouth every 6 (six) hours as needed for pain. Max 8/day.  Dispense: 90 tablet; Refill: 0    3. Septic pulmonary embolism with acute cor pulmonale, unspecified chronicity (H)  History of bilateral pulmonary emboli.  Chronic anticoagulation required.  Has seen Dr. Rooney with [facility] November 26, 2018.  Hypercoagulable workup negative including factor V prothrombin mutation as well as lupus anticoagulant and cardiolipin antibody.  Discussion regarding need for chronic anticoagulation.  Consideration for switching to Xarelto 20 mg daily without need for loading dose per hematologist, Dr. Rooney.  Inclined to continue warfarin anticoagulation with Lovenox bridging with anticoagulation nurse management.  INR pending.    4. Essential hypertension with goal blood pressure less than 140/90  Hypertension, stable.  Continues lisinopril hydrochlorothiazide 20/12.5 using 2 tablets daily and amlodipine 5 mg daily.    5. Hypercholesterolemia  Continues pravastatin 80 mg at bedtime for lipid management.    6. Hypothyroidism, unspecified type  Check TSH with current levothyroxine 125 mcg daily.    7. CKD (chronic kidney disease) stage 3, GFR 30-59 ml/min (H)  History of CKD stage III.    8. Anemia, unspecified type  Check CBC regarding  underlying history of any with prior hemoglobin 9.7.    9. Obstructive Sleep Apnea  History of CALE.  Not requiring CPAP.  Fatigue issues.  Consider repeat sleep study if persistent concerns.    10. Morbid Obesity  BMI 45.61.  Therapeutic lifestyle changes reviewed for weight loss goal.    11. Fatigue, unspecified type  Fatigue likely multifactorial.  Labs pending.  Notify with results.  - HM2(CBC w/o Differential)  - Comprehensive Metabolic Panel  - Thyroid Stimulating Hormone (TSH)    12. Intertrigo  Intertrigo.  Lamisil AT powder plus nystatin cream as directed.    13. Left knee pain, unspecified chronicity  As above.    14. Nasal crusting  Mupirocin 2% nasal ointment as directed for right sided crusting described.  - mupirocin (BACTROBAN) 2 % nasal ointment; Use one-half of tube in each nostril twice daily for five (5) days. After application, press sides of nose together and gently massage.  Dispense: 10 g; Refill: 0    15. Other pulmonary embolism without acute cor pulmonale, unspecified chronicity (H)  INR obtained today.  - INR          Surgical Procedure Risk: Vascular/High (reported cardiac risk often > 5%)  Have you had prior anesthesia?: Yes  Have you or any family members had a previous anesthesia reaction:  Yes: slow to wake up  Do you or any family members have a history of a clotting or bleeding disorder?: son with Factor V  Cardiac Risk Assessment: no increased risk for major cardiac complications    Patient approved for surgery with general or local anesthesia.    Please Note:  Patient is taking medications for Chronic Pain.    Functional Status: Independent  Patient plans to recover at home with family.     Subjective:      Nancy Oropeza is a 74 y.o. female who presents for a preoperative consultation.  History of bilateral knee osteoarthritis.  Status post right total knee arthroplasty with Dr. Daniels.  Now has scheduled left total knee arthroplasty with Dr. Bertram Garnica scheduled at  [facility]. Chronic pain syndrome associated with this as well as other arthralgias etc.  Patient is requesting use utilization of hydrocodone is seen in  typically 2 tablets every 4 hours as needed.  This does work for pain however does cause her to feel a little loopy and will use lowest effective dose.  Has seen hematologist through [facility] with hypercoagulable workup negative.  Was told that she could discontinue warfarin and switch to Xarelto and use 20 mg daily without need for loading dose.  Patient has elected to remain on warfarin anticoagulation. Remains on chronic meds for hypertension, hypercholesterolemia and hypothyroidism.  History of CALE however does not require CPAP.  Impaired fasting glucose history.  No recent illness.  No bleeding concerns currently.  Comprehensive review of systems as above otherwise all negative.    All other systems reviewed and are negative, other than those listed in the HPI.       5 children - son with Factor V abnormality  14 grandchildren   9 great-grandchildren   Grew up in [place], [place] til age 12...   No smoke (quit 16 years ago after 1 ppd x 30+ years)   EtOH: occ wine   Mom -  88 COPD   Dad -  61 massive MI   1 bro -  67 blood clot (lung)   2 sis - one of which is  age 62 small cell lung CA (h/o smoker)  Surgeries: ventral hernia repair with muscle flap 10/4/12 with post-op complication of seroma with J.P. drain in place followed by interventional radiology q 2 weeks);  age 25; groin hernia age 3; CAPRICE-BSO 2011 by Dr. Herbert Carmen (due to benign cyst x 2); right TKA 16 (Dr. Small)  Hospitalizations: as above   Work: retired  ([employer] as )     Pertinent History  Do you have difficulty breathing or chest pain after walking up a flight of stairs: No - sometimes  History of obstructive sleep apnea: Yes: not using CPAP due to poor tolerance  Steroid use in the last 6 months:  No  Frequent Aspirin/NSAID use: No  Prior Blood Transfusion: No  Prior Blood Transfusion Reaction: No  If for some reason prior to, during or after the procedure, if it is medically indicated, would you be willing to have a blood transfusion?:  There is no transfusion refusal.    Current Outpatient Medications   Medication Sig Dispense Refill     amLODIPine (NORVASC) 5 MG tablet Take 1 tablet (5 mg total) by mouth daily. 90 tablet 3     cholecalciferol, vitamin D3, 1,000 unit tablet Take 1,000 Units by mouth daily. 2 capsules once a day       citalopram (CELEXA) 40 MG tablet Take 1 tablet (40 mg total) by mouth daily. 90 tablet 3     furosemide (LASIX) 20 MG tablet Take 1 tablet (20 mg total) by mouth daily. 90 tablet 3     HYDROcodone-acetaminophen (NORCO) 7.5-325 mg per tablet Take 1 tablet by mouth every 6 (six) hours as needed for pain. Max 8/day. 90 tablet 0     levothyroxine (SYNTHROID, LEVOTHROID) 125 MCG tablet Take 1 tablet (125 mcg total) by mouth daily. 90 tablet 2     lisinopril-hydrochlorothiazide (PRINZIDE,ZESTORETIC) 20-12.5 mg per tablet Take 2 tablets by mouth daily. 180 tablet 3     pravastatin (PRAVACHOL) 80 MG tablet TAKE ONE TABLET BY MOUTH   EVERY NIGHT AT BEDTIME 90 tablet 3     warfarin (COUMADIN) 2.5 MG tablet Take 1 to 1.5 tablets (2.5 to 3.75 mg) by mouth daily. Adjust dose based on INR results as directed. 90 tablet 1     warfarin (COUMADIN/JANTOVEN) 2.5 MG tablet Take 1 to 1.5 tablets (2.5 to 3.75 mg) by mouth daily. Adjust dose per INR results as instructed. 90 tablet 1     mupirocin (BACTROBAN) 2 % nasal ointment Use one-half of tube in each nostril twice daily for five (5) days. After application, press sides of nose together and gently massage. 10 g 0     nystatin (MYCOSTATIN) cream apply topically to affected areas three times per day 60 g 1     No current facility-administered medications for this visit.         Allergies   Allergen Reactions     Atorvastatin Myalgia     Legs  hurt/sore     Simvastatin Myalgia     Legs hurt, 12/2015 tolerated pravastatin at home     Sulfa (Sulfonamide Antibiotics) Rash     Sulfasalazine Rash       Patient Active Problem List   Diagnosis     Morbid Obesity     Osteoarthritis Of The Knee     Back pain     Major Depression, Recurrent     Obstructive Sleep Apnea     Essential hypertension with goal blood pressure less than 140/90     Edema     Hypothyroidism     Hypercholesterolemia     Normochromic, Normocytic Anemia     Arthralgias In Multiple Sites     Cataract     Impaired Fasting Glucose     Multiple lung nodules on CT     Angioedema     Hypokalemia     Pulmonary embolism (H)     Anticoagulant long-term use     Acute bilateral knee pain     Chronic pain syndrome       Past Medical History:   Diagnosis Date     Depression      Disease of thyroid gland      HTN (hypertension)      Hypercholesteremia      Morbid obesity (H)      Pulmonary emboli (H) 12/14/2015     Yeast infection        Past Surgical History:   Procedure Laterality Date     BREAST BIOPSY Left 1970s     HYSTERECTOMY  2010     OOPHORECTOMY  2010       Social History     Socioeconomic History     Marital status:      Spouse name: Not on file     Number of children: Not on file     Years of education: Not on file     Highest education level: Not on file   Occupational History     Not on file   Social Needs     Financial resource strain: Not on file     Food insecurity:     Worry: Not on file     Inability: Not on file     Transportation needs:     Medical: Not on file     Non-medical: Not on file   Tobacco Use     Smoking status: Former Smoker     Smokeless tobacco: Never Used     Tobacco comment: quite 20 yrs ago   Substance and Sexual Activity     Alcohol use: No     Comment: occasionally     Drug use: No     Sexual activity: Not on file   Lifestyle     Physical activity:     Days per week: Not on file     Minutes per session: Not on file     Stress: Not on file   Relationships      "Social connections:     Talks on phone: Not on file     Gets together: Not on file     Attends Mosque service: Not on file     Active member of club or organization: Not on file     Attends meetings of clubs or organizations: Not on file     Relationship status: Not on file     Intimate partner violence:     Fear of current or ex partner: Not on file     Emotionally abused: Not on file     Physically abused: Not on file     Forced sexual activity: Not on file   Other Topics Concern     Not on file   Social History Narrative    Patient arrived in the ED with her sister Kassy 09/10/17       Patient Care Team:  Julien Garnica MD as PCP - General  Tigist Bedolla PharmD as Pharmacist (Pharmacist)  Cathy Willams MBBS as Physician (Rheumatology)  Martine Rick as Clinic Care Coordination Care Guide (Primary Care - CC)          Objective:     Vitals:    07/03/19 1115   BP: 122/70   Pulse: 97   SpO2: 95%   Weight: (!) 300 lb (136.1 kg)   Height: 5' 8\" (1.727 m)         Physical Exam:  Physical Exam   General Appearance: Alert, pleasant, appears stated age.  Morbid obesity.  Head: Normocephalic, without obvious abnormality  Eyes: PERRL, conjunctiva/corneas clear, EOM's intact  Ears: Normal TM's and external ear canals, both ears  Nose: Nares normal, septum midline,mucosa normal, no drainage.  Mild crusting inferior aspect right nares.  Throat: Lips, mucosa, and tongue normal; teeth and gums normal; oropharynx is clear  Neck: Supple,without lymphadenopathy or thyromegally  Lungs: Clear to auscultation bilaterally, respirations unlabored  Breasts: Nopalpable masses, tenderness, asymmetry, or nipple discharge. No axillary or supraclavicular lymphadenopathy  Heart: Regular rate and rhythm, no murmur   Abdomen: Soft, non-tender, no masses, no organomegaly  Pelvic:Not examined  Extremities: Extremities with strong and symmetric pulses, no cyanosis or edema  Skin: Skin color, texture normal, or lesions.  Right groin " and left inferior breast intertrigo noted.  Neurologic: Normal      There are no Patient Instructions on file for this visit.    EKG:  NSR.  Normal axis.    Labs:  Recent Results (from the past 24 hour(s))   Electrocardiogram Perform and Read    Collection Time: 07/03/19 11:25 AM   Result Value Ref Range    SYSTOLIC BLOOD PRESSURE  mmHg    DIASTOLIC BLOOD PRESSURE  mmHg    VENTRICULAR RATE 95 BPM    ATRIAL RATE 95 BPM    P-R INTERVAL 188 ms    QRS DURATION 80 ms    Q-T INTERVAL 364 ms    QTC CALCULATION (BEZET) 457 ms    P Axis 28 degrees    R AXIS 29 degrees    T AXIS 58 degrees    MUSE DIAGNOSIS       Normal sinus rhythm with sinus arrhythmia  Normal ECG  When compared with ECG of 05-NOV-2018 18:04,  No significant change was found     INR    Collection Time: 07/03/19 12:36 PM   Result Value Ref Range    INR 2.40 (H) 0.90 - 1.10       Immunization History   Administered Date(s) Administered     Influenza high dose, seasonal 09/19/2014, 09/11/2015, 10/03/2018     Influenza, seasonal,quad inj 6-35 mos 10/01/2013     Pneumo Conj 13-V (2010&after) 01/06/2015     Pneumo Polysac 23-V 10/21/2008     Td, adult adsorbed, PF 10/07/2003     Tdap 02/03/2012     ZOSTER, LIVE 05/26/2009           Electronically signed by Julien Garnica MD 07/03/19 11:17 AM

## 2021-05-30 NOTE — TELEPHONE ENCOUNTER
ANTICOAGULATION  MANAGEMENT- Home Care/Care Facility Result    Assessment     Today's INR result of 1.9 is Subtherapeutic (goal INR of 2.0-3.0)        Warfarin taken as previously instructed    No new diet changes affecting INR    Concurrent use of Lovenox and warfarin may increase risk of bleeding, but not expected to affect INR    Continues to tolerate warfarin with no reported s/s of bleeding or thromboembolism     Previous INR was Subtherapeutic    Plan:     Spoke with home care nurse Dasha discussed INR result and instructed:     Warfarin Dosing Instructions: Take total of 5 mg today.    Continue Lovenox q12h.     Next INR to be drawn: tmr Fri 7/26.     Education provided: importance of taking warfarin as instructed    Dasha verbalizes understanding and agrees to warfarin dosing plan.   ?   Vincent Baird RN    Subjective/Objective:      Nancy Oropeza, a 74 y.o. female is established on warfarin.     Home care/care facility RN's report of Nancy INR, recent warfarin dosing, diet changes, medication changes, and symptoms is documented below.    Additional findings: verbally confirmed home dose with Dasha and updated on anticoagulation calendar    Anticoagulation Episode Summary     Current INR goal:   2.0-3.0   TTR:   51.4 % (3.6 y)   Next INR check:   7/26/2019   INR from last check:   1.90! (7/25/2019)   Weekly max warfarin dose:      Target end date:   12/21/2016   INR check location:      Preferred lab:      Send INR reminders to:   XENIA HURTADO    Indications    Pulmonary embolism (H) [I26.99]           Comments:            Anticoagulation Care Providers     Provider Role Specialty Phone number    Julien Garnica MD Referring Family Medicine [phone]

## 2021-05-30 NOTE — TELEPHONE ENCOUNTER
INR result is 2.3  INR   Date Value Ref Range Status   07/25/2019 1.90 (!) 0.9 - 1.1 Final       Will the patient be seen, or did they already see, MD or CNP today? No    Most Recent Warfarin dose day/week- home care only started seeing patient on the 22nd Sunday Monday Tuesday Wednesday Thursday Friday Saturday Sunday Monday Tuesday Wednesday Thursday Friday Saturday    5 5 2.5 5         Has the patient missed any doses of Coumadin, Warfarin, Jantoven in the past 7 days? No    Has the patients medications changed since the last visit? No    Has the patient experienced any bleeding recently? No    Has the patient experienced any injuries or illness recently? No    Has the patient experienced any 'new' shortness of breath, severe headaches, or changes in vision recently? No    Has the patient had any changes in their diet, or alcohol consumption? No    Is the patient here today to prepare for any type of upcoming surgery, procedure, or for a cardioversion procedure? No    What phone number can we reach the patient at today? home phone listed in demographics.

## 2021-05-30 NOTE — TELEPHONE ENCOUNTER
INR result is   INR   Date Value Ref Range Status   07/22/2019 1.30 (!) 0.9 - 1.1 Final     7/25/19         1.9    Will the patient be seen, or did they already see, MD or CNP today? No    Most Recent Warfarin dose day/week  Sunday Monday Tuesday Wednesday Thursday Friday Saturday Sunday Monday Tuesday Wednesday Thursday Friday Saturday    5 mg 5 mg 2.5  2.5          Has the patient missed any doses of Coumadin, Warfarin, Jantoven in the past 7 days? No    Has the patients medications changed since the last visit? No    Has the patient experienced any bleeding recently? No    Has the patient experienced any injuries or illness recently? No    Has the patient experienced any 'new' shortness of breath, severe headaches, or changes in vision recently? No    Has the patient had any changes in their diet, or alcohol consumption? No    Is the patient here today to prepare for any type of upcoming surgery, procedure, or for a cardioversion procedure? No    What phone number can we reach the patient at today? [facility]  [phone].

## 2021-05-30 NOTE — TELEPHONE ENCOUNTER
INR result is 1.3  INR   Date Value Ref Range Status   07/03/2019 2.40 (H) 0.90 - 1.10 Final       Will the patient be seen, or did they already see, MD or CNP today? No    Most Recent Warfarin dose day/week  Sunday Monday Tuesday Wednesday Thursday Friday Saturday    1.5 2.5 2.5 2.5 2.5 2.5     Sunday Monday Tuesday Wednesday Thursday Friday Saturday   2.5 1.5        Patient is also on lovenox because of a recent surgery  Has the patient missed any doses of Coumadin, Warfarin, Jantoven in the past 7 days? No    Has the patients medications changed since the last visit? No    Has the patient experienced any bleeding recently? No    Has the patient experienced any injuries or illness recently? No    Has the patient experienced any 'new' shortness of breath, severe headaches, or changes in vision recently? Yes headache that comes and goes    Has the patient had any changes in their diet, or alcohol consumption? No    Is the patient here today to prepare for any type of upcoming surgery, procedure, or for a cardioversion procedure? No    What phone number can we reach the patient at today? Please call Geovanna back with dosing.

## 2021-05-30 NOTE — TELEPHONE ENCOUNTER
Who is calling:  Dasha giron  Reason for Call:  States noted that patients blood pressure has been in upper 150/80's and heart rates 105-110 for last week and a half.  Date of last appointment with primary care: 7/3/2019  Okay to leave a detailed message: Yes

## 2021-05-30 NOTE — PROGRESS NOTES
Scheduled Follow Up Call: Attempt 1   Community Health Worker called and left a message for the patient. If the patient is returning my call, please transfer the patient to Martine Rick at ext. [phone].  Next Outreach: 8/8/19  Last Assessment date: 7/20/18- Pt on CCC Maintenance since 4/30/19

## 2021-05-30 NOTE — TELEPHONE ENCOUNTER
Test Results  Who is calling?:  Lesly  Who ordered the test:  Julien Garnica MD   Type of test: Lab  Date of test:  7/3/19  Where was the test performed:  clinic  What are your questions/concerns?:  Patient's TSH level is 12.90.  She had Dr. Garnica check her TSH because she has been feeling tired, a little cold, and sluggish.  Okay to leave a detailed message?:  Yes

## 2021-05-30 NOTE — TELEPHONE ENCOUNTER
ANTICOAGULATION  MANAGEMENT- Home Care/Care Facility Result    Assessment     Today's INR result of 1.3 is Subtherapeutic (goal INR of 2.0-3.0)        Warfarin recently held as instructed which may be affecting INR. Resumed after knee surgery 7/17.     No new diet changes affecting INR    Concurrent use of Lovenox q12h and warfarin may increase risk of bleeding, but not expected to affect INR    Continues to tolerate warfarin with no reported s/s of bleeding or thromboembolism     Previous INR was Therapeutic    Plan:     Spoke with home care nurse Geovanna discussed INR result and instructed:     Warfarin Dosing Instructions: Take booster dose of 5 mg today and 5 mg tomorrow 7/23 then continue current warfarin dose    3.75 mg every Mon; 2.5 mg all other days     Continue Lovenox 30 mg q12h.     Next INR to be drawn: Thurs 7/25.     Education provided: importance of taking warfarin as instructed    Geovanna verbalizes understanding and agrees to warfarin dosing plan.   ?   Vincent Baird RN    Subjective/Objective:      Nancy Oropeza, a 74 y.o. female is established on warfarin.     Home care/care facility RN's report of Nancy INR, recent warfarin dosing, diet changes, medication changes, and symptoms is documented below.    Additional findings: template incorrect; verbally confirmed home dose with Geovanna and updated on anticoagulation calendar    Anticoagulation Episode Summary     Current INR goal:   2.0-3.0   TTR:   51.6 % (3.6 y)   Next INR check:   7/25/2019   INR from last check:   1.30! (7/22/2019)   Weekly max warfarin dose:      Target end date:   12/21/2016   INR check location:      Preferred lab:      [facility]    Indications    Pulmonary embolism (H) [I26.99]           Comments:            Anticoagulation Care Providers     Provider Role Specialty Phone number    Julien Garnica MD Referring Family Medicine [phone]

## 2021-05-30 NOTE — TELEPHONE ENCOUNTER
Who is calling:  Patient's daughter, Nancy  Reason for Call:  She is calling back to give information (as noted in previous message.)   Please send compression stockings order to [facility].  Phone number is [phone].  Fax number is [phone].  Date of last appointment with primary care: n/a  Okay to leave a detailed message: Yes.  Please let Nancy know when order is faxed.

## 2021-05-30 NOTE — TELEPHONE ENCOUNTER
Dr. Garnica,    Looks like you would like patient to bridge with Lovenox for upcoming knee surgery. ACN will have ACM Pharmacist assist with bridge orders. But how many days would you like patient to hold warfarin prior to surgery?

## 2021-05-31 VITALS — HEIGHT: 67 IN | BODY MASS INDEX: 45.99 KG/M2 | WEIGHT: 293 LBS

## 2021-05-31 VITALS — BODY MASS INDEX: 47.82 KG/M2 | WEIGHT: 293 LBS

## 2021-05-31 VITALS — BODY MASS INDEX: 45.99 KG/M2 | WEIGHT: 293 LBS | HEIGHT: 67 IN

## 2021-05-31 VITALS — WEIGHT: 293 LBS | BODY MASS INDEX: 45.99 KG/M2 | HEIGHT: 67 IN

## 2021-05-31 VITALS — BODY MASS INDEX: 47.93 KG/M2 | WEIGHT: 293 LBS

## 2021-05-31 VITALS — WEIGHT: 293 LBS | BODY MASS INDEX: 49.9 KG/M2

## 2021-05-31 VITALS — WEIGHT: 293 LBS | BODY MASS INDEX: 48.4 KG/M2

## 2021-05-31 VITALS — WEIGHT: 293 LBS | BODY MASS INDEX: 48.93 KG/M2

## 2021-05-31 NOTE — TELEPHONE ENCOUNTER
INR result is 3.1  INR   Date Value Ref Range Status   08/02/2019 3.50 (!) 0.9 - 1.1 Final       Will the patient be seen, or did they already see, MD or CNP today? No    Most Recent Warfarin dose day/week  Sunday Monday Tuesday Wednesday Thursday Friday Saturday        1.25 2.5     Sunday Monday Tuesday Wednesday Thursday Friday Saturday   2.5 3.75 2.5 2.5 2.5         Has the patient missed any doses of Coumadin, Warfarin, Jantoven in the past 7 days? No    Has the patients medications changed since the last visit? Yes Started Levothyroxin for Thyroid    Has the patient experienced any bleeding recently? No    Has the patient experienced any injuries or illness recently? No    Has the patient experienced any 'new' shortness of breath, severe headaches, or changes in vision recently? No    Has the patient had any changes in their diet, or alcohol consumption? No    Is the patient here today to prepare for any type of upcoming surgery, procedure, or for a cardioversion procedure? No    What phone number can we reach the patient at today? Malena [phone]

## 2021-05-31 NOTE — TELEPHONE ENCOUNTER
ANTICOAGULATION  MANAGEMENT- Home Care/Care Facility Result    Assessment     Today's INR result of 3.3 is Supratherapeutic (goal INR of 2.0-3.0)        Warfarin taken as previously instructed    Decreased greens/vitamink K intake may be affecting INR- will get back to normal greens.     No new medication/supplements affecting INR    Continues to tolerate warfarin with no reported s/s of bleeding or thromboembolism     Previous INR was Supratherapeutic    Plan:     Spoke with [facility] nurse Opal discussed INR result and instructed:     Warfarin Dosing Instructions: Take one time lower dose of 1.25 mg today only then continue current warfarin dose 2.5 mg daily.    Next INR to be drawn: 1 week.    Education provided: importance of consistent vitamin K intake and importance of taking warfarin as instructed    Opal verbalizes understanding and agrees to warfarin dosing plan.   ?   Vincent Baird RN    Subjective/Objective:      Nancy Oropeza, a 74 y.o. female is established on warfarin.     Home care/care facility RN's report of Nancy INR, recent warfarin dosing, diet changes, medication changes, and symptoms is documented below.    Additional findings: none    Anticoagulation Episode Summary     Current INR goal:   2.0-3.0   TTR:   50.9 % (3.6 y)   Next INR check:   8/23/2019   INR from last check:   3.30! (8/16/2019)   Weekly max warfarin dose:      Target end date:   12/21/2016   INR check location:      Preferred lab:      Send INR reminders to:   XENIA HURTADO    Indications    Pulmonary embolism (H) [I26.99]           Comments:            Anticoagulation Care Providers     Provider Role Specialty Phone number    Julien Garnica MD Referring Family Medicine [phone]

## 2021-05-31 NOTE — PROGRESS NOTES
Clinic Care Coordination Contact    Assessment: Care Coordinator [facility] contacted patient for 2 month follow up.  Patient has continued to follow the plan of care and assessment is negative for any new needs or concerns.    Enrollment status: Graduated.      Plan: No further outreaches at this time.  Patient will continue to follow the plan of care.  If new needs arise a new Care Coordination referral may be placed.  FYI to PCP      Living with Osteoarthritis   Osteoarthritis is a chronic disease and the most common type of arthritis. But it doesn t have to keep you from leading an active life. You can help control symptoms by exercising and losing weight if you are overweight. Using special tools also helps make life easier. Be sure to see your healthcare provider for scheduled checkups and lab work. If you have questions or concerns between office visits, call your healthcare provider's office.  Make exercise part of your life  Gentle exercise can help lessen your pain. Keep the following in mind:  Choose exercises that improve joint motion and make your muscles stronger. Your healthcare provider or a physical therapist may suggest a few.  Stretching and flexibility activities such as yoga and maryann chi may improve pain and joint motion.  Try low-impact sports, such as walking, biking, or doing exercises in a warm pool.  Most people should exercise for at least 30 minutes a day on most days of the week. This can be broken up into shorter periods throughout the day.  Don t push yourself too hard at first. Slowly build up over time.  Make sure you warm up for 5 to 10 minutes before you exercise.  If pain and stiffness increase, don't exercise as hard or as long.  Watch your weight  If you weigh more than you should, your weight-bearing joints are under extra pressure. This makes your symptoms worse. To reduce pain and stiffness, try losing a few of those extra pounds. The tips below may help:  Start a weight-loss program  with the help of your healthcare provider.  Ask your friends and family for support.  Join a weight-loss group.  Use special tools  Even simple tasks can be hard to do when your joints hurt. Special tools called assistive devices can make things easier by reducing strain and protecting your joints. Ask your healthcare provider where to find these and other helpful tools:  Long-handled reachers or grabbers  Jar openers and button threaders  Large  for pencils, garden tools, and other handheld objects  Use mobility and other aids  People with arthritis and other joint problems often use mobility aids to help with walking. For example, they may use canes or walkers. They may also use splints or braces to support joints. Talk with your healthcare provider or physical therapist about these aids:  A cane to reduce knee or hip pain and help prevent falls  Splints for your wrists or other joints  A brace to support a weak knee joint  Orthotics for toe and foot involvement  Medical and surgical treatments  Discuss medical treatments with your healthcare provider to help reduce your pain and improve joint mobility. Treatments may include:  Topical medicines such as lidocaine, capsaicin, and diclofenac gel  Oral medicines such as acetaminophen, NSAIDs (nonsteroidal anti-inflammatory drugs) such as ibuprofen and naproxen, or opioids  Injections in affected joints such as corticosteroids in various joints, or hyaluronic acid in the knee joints  Surgical repair or surgical joint replacement with artificial joints  Complementary therapies such as heat and cold treatment, massage, acupuncture, supplements, cognitive training, meditation, and others. Discuss these options with your healthcare provider.     Pulmonary Embolism (PE)  A pulmonary embolus is most often due to a blood clot that develops in a deep vein of the leg (deep vein thrombosis). If that clot, breaks loose and travels to the lung, it is called a pulmonary  embolism (PE). This can cut off the flow of blood in the lungs.  A blood clot in the lungs is a medical emergency and may cause death.   Healthcare providers use the term venous thromboembolism (VTE) to describe these 2 conditions: deep vein thrombosis and pulmonary embolism. They use the term VTE because the 2 conditions are very closely related. And, because their prevention and treatment are also closely related.    How is pulmonary embolism diagnosed?  Your healthcare provider examines you and asks about your symptoms and health history. You may also have one or more of the following:    Blood tests to check for blood clotting or other problems    Imaging tests to look for clots in the veins or lung    Electrocardiography (ECG) to test how well the heart is working  How is pulmonary embolism treated?    Blood-thinning medicines (anticoagulants). These medicines thin the blood. They may be given as a pill, as an injection, or through a tube into a vein (intravenous or IV). Blood thinners help prevent more blood clots from forming. They also help to prevent an existing clot from getting larger.    Thrombolysis. Thrombolytic medicines are used to quickly dissolve a blood clot. A long, narrow tube (catheter) is used to deliver medicine directly to the clot. Thrombolytic medicines increase the risk of bleeding so they are used very carefully.    Inferior vena cava (IVC) filter surgery. The vena cava is the body s largest vein. It carries blood from the body to the heart. A small filter traps blood clots in the lower body and prevents them from traveling to the lungs. The filter is inserted into the vein through a catheter. The filter may be used if blood thinners cannot be taken or if they don't work.     Pulmonary embolectomy. This is a procedure to remove a blood clot in the lungs. It may be done with surgery or with a catheter inserted in the body. It may be done when other treatments aren't safe or don't  work.  What are the long-term concerns?  With treatment, blood clots are usually dissolved or removed. Some treatments can even help prevent future clots. But having a PE can put you at risk for another life-threatening blood clot. So, you will likely need to take anticoagulants to help keep blood clots from forming again. You may need to take this medicine for months or years.  You may also need to make lifestyle changes. This may include getting more active and eating healthier. You may need to wear elastic (compression) stockings and and take breaks on long trips.     Call 911  Call 911 or get emergency help if you have symptoms of a blood clot that has traveled to the lungs. The symptoms include:    Chest pain    Trouble breathing    Coughing (may cough up blood)    Fainting    Fast heartbeat    Sweating  Call 911 if you have heavy or uncontrolled bleeding.  When to call your healthcare provider  Call your healthcare provider if you have swelling or pain in your leg, arm, or other area. These are symptoms of a blood clot.  You may have bleeding if you take medicine to help prevent blood clots.  Call your healthcare provider if you have signs or symptoms of bleeding. This includes:    Blood in the urine    Bleeding with bowel movements    Bleeding from the nose, gums, a cut, or vagina      High Blood Pressure (Hypertension)  You have been diagnosed with high blood pressure (also called hypertension). This means the force of blood against your artery walls is too strong. It also means your heart is working hard to move blood. High blood pressure usually has no symptoms, but over time, it can damage your heart, blood vessels, eyes, kidneys, and other organs. With help from your doctor, you can manage your blood pressure and protect your health.  Taking medications    Learn to take your own blood pressure. Keep a record of your results. Ask your doctor which readings mean that you need medical attention.    Take  your blood pressure medication exactly as directed. Don t skip doses. Missing doses can cause your blood pressure to get out of control.    Avoid medications that contain heart stimulants, including over-the-counter drugs. Check for warnings about high blood pressure on the label.    Check with your doctor before taking a decongestant. Some decongestants can worsen high blood pressure.  Lifestyle changes    Maintain a healthy weight. Get help to lose any extra pounds.    Cut back on salt.  o Limit canned, dried, packaged, and fast foods.  o Don t add salt to your food at the table.  o Season foods with herbs instead of salt when you cook.    Follow the DASH (Dietary Approaches to Stop Hypertension) eating plan. This plan recommends vegetables, fruits, whole gains, and other heart healthy foods.    Begin an exercise program. Ask your doctor how to get started. The American Heart Association recommends aerobic exercise 3 to 4 times a week for an average of 40 minutes at a time, with your doctor's approval. Simple activities like walking or gardening can help.    Break the smoking habit. Enroll in a stop-smoking program to improve your chances of success. Ask your health care provider about programs and medications to help you stop smoking.    Limit drinks that contain caffeine (coffee, black or green tea, cola) to 2 per day.    Never take stimulants such as amphetamines or cocaine; these drugs can be deadly for someone with high blood pressure.    Control your stress. Learn stress-management techniques.    Limit alcohol to no more than 1 drink a day for women and 2 drinks a day for men.  Follow-up care  Make a follow-up appointment as directed by our staff.     When to seek medical care  Call your doctor immediately if you have any of the following:    Chest pain or shortness of breath (call 911)    Moderate to severe headache    Weakness in the muscles of your face, arms, or legs    Trouble speaking    Extreme  drowsiness    Confusion    Fainting or dizziness    Pulsating or rushing sound in your ears    Unexplained nosebleed    Weakness, tingling, or numbness of your face, arms, or legs    Change in vision    Blood pressure measured at home that is greater than 180/110

## 2021-05-31 NOTE — PROGRESS NOTES
"Assessment/Plan:    1. Osteoarthritis Of The Knee  Left knee osteoarthritis, advanced status post left total knee arthroplasty July 18, 2019.  Continues to do well.  No longer requiring physical therapy outside of home exercise program etc.  No longer requiring hydrocodone acetaminophen 7.5/325 for breakthrough symptoms of pain fortunately.    2. Hypothyroidism, unspecified type  Prior TSH was elevated at 12.9 with fatigue noted.  Had increase levothyroxine from 125 mcg up to 137 mcg daily.  Will reassess TSH on new dose in October at follow-up office visit with patient filling prescription on August 2, 2019 for current dosing.    3. Essential hypertension with goal blood pressure less than 140/90  Continues antihypertensive medication.  Recognizes benefits of appropriate arm cough circumference with blood pressure 110/60 today.    4. CKD (chronic kidney disease) stage 3, GFR 30-59 ml/min (H)  History of CKD stage III.    5. Anemia, unspecified type  Acute on chronic blood loss anemia following surgery with preoperative hemoglobin 10.5 and hemoglobin 9.4 on July 18, 2019.  Reassess at follow-up visit in October 2019.    6. Chronic pain syndrome  Discussed chronic pain syndrome.  Discussed avoidance of narcotic analgesics.  She has a \"few tablets\" of the hydrocodone acetaminophen for breakthrough symptoms only otherwise anticipate no additional refills.  Encouraged patient not to use any of these pills so that emotionally she is never without.  Can be seen back through [facility] for described lower back pain.    7. Acute low back pain without sciatica, unspecified back pain laterality  As above, activity as tolerated and avoid sedentary performance in order to improve lower back concerns.  Has been seen through spine care clinic and describes issues at L3 and L5.    TORIE 7 questionnaire 9 out of 21 with PHQ 9 questionnaire 5 out of 27.    40 minutes total time with patient, > 50% with counseling and " coordination of cares.           Subjective:    Nancy Oropeza is seen today for hospital follow-up.  Had been seen July total knee arthroplasty.  Performed in the hospital.  No complications.  Doing well.  Not requiring physical therapy other than home exercises.  Status post right total knee arthroplasty 2018 with Dr. Daniels also noted.  Patient did have poor energy with described fatigue thyroid level of 12.9 July 3, 2019.  Increased levothyroxine from 125 mcg up to 137 mcg daily.  Last prescription filled 2019.  Still has poor energy.  Normochromic normocytic anemia with hemoglobin 10.5 preoperatively with hemoglobin mary of 9.4 noted 2019 without ongoing concerns for ecchymoses, left knee swelling etc. at surgical site.  No cough or shortness of breath with prior history of PE noted.  Continues chronic anticoagulation management.  Feels her mood is been stable.  Comprehensive review of systems as above otherwise all negative.       5 children - son with Factor V abnormality  14 grandchildren   9 great-grandchildren   Grew up in [place], [place] til age 12...   No smoke (quit 16 years ago after 1 ppd x 30+ years)   EtOH: occ wine   Mom -  88 COPD   Dad -  61 massive MI   1 bro -  67 blood clot (lung)   2 sis - one of which is  age 62 small cell lung CA (h/o smoker)  Surgeries: ventral hernia repair with muscle flap 10/4/12 with post-op complication of seroma with J.P. drain in place followed by interventional radiology q 2 weeks);  age 25; groin hernia age 3; CAPRICE-BSO  by Dr. Herbert Carmen (due to benign cyst x 2); right TKA 18 (Dr. Small)  Hospitalizations: as above   Work: retired  ([employer] as )     12/18/15 FYI: Patient was admitted for dyspnea, secondary to bilateral multiple pulmonary emboli. She overall did well and was discharged home on Lovenox, relatively high dose given her weight as  well as Warfarin. I would like her to be seen today at your clinic. I believe she has an appointment for INR, CBC, and BMP check as well as re-dosing of her Warfarin. I suspect she will need an additional day of Lovenox as she is not quite therapeutic today. You can refer to my discharge summary for the INR's and the Warfarin dosing. Thank you. Dr. Garza    Past Surgical History:   Procedure Laterality Date     BREAST BIOPSY Left 1970s     HYSTERECTOMY  2010     OOPHORECTOMY  2010        Family History   Problem Relation Age of Onset     Breast cancer Sister 75     Stomach cancer Paternal Grandfather      Lung cancer Sister         Past Medical History:   Diagnosis Date     Depression      Disease of thyroid gland      HTN (hypertension)      Hypercholesteremia      Morbid obesity (H)      Pulmonary emboli (H) 12/14/2015     Yeast infection         Social History     Tobacco Use     Smoking status: Former Smoker     Smokeless tobacco: Never Used     Tobacco comment: quite 20 yrs ago   Substance Use Topics     Alcohol use: No     Comment: occasionally     Drug use: No        Current Outpatient Medications   Medication Sig Dispense Refill     amLODIPine (NORVASC) 5 MG tablet Take 1 tablet (5 mg total) by mouth daily. 90 tablet 3     citalopram (CELEXA) 40 MG tablet Take 1 tablet (40 mg total) by mouth daily. 90 tablet 3     furosemide (LASIX) 20 MG tablet Take 1 tablet (20 mg total) by mouth daily. 90 tablet 3     levothyroxine (SYNTHROID, LEVOTHROID) 137 MCG tablet Take 1 tablet (137 mcg total) by mouth daily. 30 tablet 6     lisinopril-hydrochlorothiazide (PRINZIDE,ZESTORETIC) 20-12.5 mg per tablet Take 2 tablets by mouth daily. 180 tablet 3     pravastatin (PRAVACHOL) 80 MG tablet TAKE ONE TABLET BY MOUTH   EVERY NIGHT AT BEDTIME 90 tablet 3     warfarin (COUMADIN) 2.5 MG tablet Take 1 to 1.5 tablets (2.5 to 3.75 mg) by mouth daily. Adjust dose based on INR results as directed. 90 tablet 1     cholecalciferol,  vitamin D3, 1,000 unit tablet Take 1,000 Units by mouth daily. 2 capsules once a day       HYDROcodone-acetaminophen (NORCO) 7.5-325 mg per tablet Take 1 tablet by mouth every 6 (six) hours as needed for pain. Max 8/day. 90 tablet 0     No current facility-administered medications for this visit.           Objective:    Vitals:    08/20/19 1221   BP: 110/60   Pulse: (!) 104   SpO2: 95%   Weight: (!) 296 lb (134.3 kg)      Body mass index is 45.01 kg/m .    Alert.  No apparent distress.  Left knee status post total knee arthroplasty with anterior incision site healed well without wound dehiscence or signs of infection etc.  No significant calf tenderness or acute pitting edema etc. at the ankle.  Cardiac exam regular.  Chest clear.      This note has been dictated using voice recognition software and as a result may contain minor grammatical errors and unintended word substitutions.

## 2021-05-31 NOTE — TELEPHONE ENCOUNTER
ANTICOAGULATION  MANAGEMENT- Home Care/Care Facility Result    Assessment     Today's INR result of 2.6 is Therapeutic (goal INR of 2.0-3.0)        Warfarin taken as previously instructed    No new diet changes affecting INR    No new medication/supplements affecting INR    Continues to tolerate warfarin with no reported s/s of bleeding or thromboembolism     Previous INR was Supratherapeutic    Plan:     Spoke with home care nurse Dasha discussed INR result and instructed:     Warfarin Dosing Instructions: Continue current warfarin dose 2.5 mg daily.    Next INR to be drawn: 2 weeks.     Education provided: importance of taking warfarin as instructed    Dasha verbalizes understanding and agrees to warfarin dosing plan.   ?   Vincent Baird RN    Subjective/Objective:      Nancy Oropeza, a 74 y.o. female is established on warfarin.     Home care/care facility RN's report of Nancy INR, recent warfarin dosing, diet changes, medication changes, and symptoms is documented below.    Additional findings: verbally confirmed home dose with Dasha and updated on anticoagulation calendar    Anticoagulation Episode Summary     Current INR goal:   2.0-3.0   TTR:   51.0 % (3.6 y)   Next INR check:   9/5/2019   INR from last check:   2.60 (8/22/2019)   Weekly max warfarin dose:      Target end date:   12/21/2016   INR check location:      Preferred lab:      Send INR reminders to:   XENIA HURTADO    Indications    Pulmonary embolism (H) [I26.99]           Comments:            Anticoagulation Care Providers     Provider Role Specialty Phone number    Julien Garnica MD Referring Family Medicine [phone]

## 2021-05-31 NOTE — TELEPHONE ENCOUNTER
Left message to call back for: BP  Information to relay to patient:  Dasha notified of the below message via .      Reason contacted:  BP  Information relayed:  Lesly notified of the below message   Additional questions:  No  Further follow-up needed:  No  Okay to leave a detailed message:  No

## 2021-05-31 NOTE — TELEPHONE ENCOUNTER
INR result is 3.3 finger stick  INR   Date Value Ref Range Status   08/09/2019 3.10 (!) 0.9 - 1.1 Final       Will the patient be seen, or did they already see, MD or CNP today? No    Most Recent Warfarin dose day/week  Sunday Monday Tuesday Wednesday Thursday Friday Saturday        2.5 2.5     Sunday Monday Tuesday Wednesday Thursday Friday Saturday   2.5 2.5 2.5 2.5 2.5         Has the patient missed any doses of Coumadin, Warfarin, Jantoven in the past 7 days? No    Has the patients medications changed since the last visit? No    Has the patient experienced any bleeding recently? No    Has the patient experienced any injuries or illness recently? No    Has the patient experienced any 'new' shortness of breath, severe headaches, or changes in vision recently? No    Has the patient had any changes in their diet, or alcohol consumption? No    Is the patient here today to prepare for any type of upcoming surgery, procedure, or for a cardioversion procedure? No    What phone number can we reach the patient at today? [facility]  [phone].

## 2021-05-31 NOTE — TELEPHONE ENCOUNTER
INR result is 3.5  INR   Date Value Ref Range Status   07/26/2019 2.30 (!) 0.9 - 1.1 Final       Will the patient be seen, or did they already see, MD or CNP today? No    Most Recent Warfarin dose day/week  Sunday Monday Tuesday Wednesday Thursday Friday Saturday   2.5 3.75 2.5 2.5 2.5 2.5 2.5     Sunday Monday Tuesday Wednesday Thursday Friday Saturday                Has the patient missed any doses of Coumadin, Warfarin, Jantoven in the past 7 days? No    Has the patients medications changed since the last visit? No    Has the patient experienced any bleeding recently? No    Has the patient experienced any injuries or illness recently? No    Has the patient experienced any 'new' shortness of breath, severe headaches, or changes in vision recently? No    Has the patient had any changes in their diet, or alcohol consumption? No    Is the patient here today to prepare for any type of upcoming surgery, procedure, or for a cardioversion procedure? No    What phone number can we reach the patient at today? home phone listed in demographics.

## 2021-05-31 NOTE — TELEPHONE ENCOUNTER
INR result is 2.6  INR   Date Value Ref Range Status   08/16/2019 3.30 (!) 0.9 - 1.1 Final       Will the patient be seen, or did they already see, MD or CNP today? No    Most Recent Warfarin dose day/week  Sunday Monday Tuesday Wednesday Thursday Friday Saturday        1.25 2.5     Sunday Monday Tuesday Wednesday Thursday Friday Saturday   2.5 2.5 2.5 2.5          Has the patient missed any doses of Coumadin, Warfarin, Jantoven in the past 7 days? No    Has the patients medications changed since the last visit? No    Has the patient experienced any bleeding recently? No    Has the patient experienced any injuries or illness recently? No    Has the patient experienced any 'new' shortness of breath, severe headaches, or changes in vision recently? No    Has the patient had any changes in their diet, or alcohol consumption? No    Is the patient here today to prepare for any type of upcoming surgery, procedure, or for a cardioversion procedure? No    What phone number can we reach the patient at today? SALONI Lou [phone]

## 2021-05-31 NOTE — TELEPHONE ENCOUNTER
FYI - Status Update  Who is Calling: Home Care  Update: Ron from [facility] is discharging patient form PT. Patient has met all her goals.  Okay to leave a detailed message?:  No return call needed

## 2021-05-31 NOTE — TELEPHONE ENCOUNTER
ANTICOAGULATION  MANAGEMENT- Home Care/Care Facility Result    Assessment     Today's INR result of 3.5 is Supratherapeutic (goal INR of 2.0-3.0)        Warfarin taken as previously instructed    No new diet changes affecting INR    No new medication/supplements affecting INR    Continues to tolerate warfarin with no reported s/s of bleeding or thromboembolism     Previous INR was Therapeutic    Plan:     Spoke with home care nurse Dasha discussed INR result and instructed:     Warfarin Dosing Instructions: Take one time lower dose of 1.25 mg today only then continue current warfarin dose    3.75 mg every Mon; 2.5 mg all other days         Next INR to be drawn: 1 week.     Education provided: importance of taking warfarin as instructed    Dasha verbalizes understanding and agrees to warfarin dosing plan.   ?   Vincent Baird RN    Subjective/Objective:      Nancy Oropeza, a 74 y.o. female is established on warfarin.     Home care/care facility RN's report of Nancy INR, recent warfarin dosing, diet changes, medication changes, and symptoms is documented below.    Additional findings: verbally confirmed home dose with Dasha and updated on anticoagulation calendar    Anticoagulation Episode Summary     Current INR goal:   2.0-3.0   TTR:   51.5 % (3.6 y)   Next INR check:   8/9/2019   INR from last check:   3.50! (8/2/2019)   Weekly max warfarin dose:      Target end date:   12/21/2016   INR check location:      Preferred lab:      Send INR reminders to:   XENIA HURTADO    Indications    Pulmonary embolism (H) [I26.99]           Comments:            Anticoagulation Care Providers     Provider Role Specialty Phone number    Julien Garnica MD Referring Family Medicine [phone]

## 2021-05-31 NOTE — TELEPHONE ENCOUNTER
ANTICOAGULATION  MANAGEMENT- Home Care/Care Facility Result    Assessment     Today's INR result of 3.1 is Supratherapeutic (goal INR of 2.0-3.0)        Warfarin taken as previously instructed    No new diet changes affecting INR    No new medication/supplements affecting INR    Continues to tolerate warfarin with no reported s/s of bleeding or thromboembolism     Previous INR was Supratherapeutic    Plan:     Spoke with home care nurse Malena discussed INR result and instructed:     Warfarin Dosing Instructions: Change warfarin dose to 2.5 mg daily (same amount pt took the past 7 days)    Next INR to be drawn: 1 week.     Education provided: importance of taking warfarin as instructed    Malena verbalizes understanding and agrees to warfarin dosing plan.   ?   Vincent Baird RN    Subjective/Objective:      Nancy GARZON Kerriemikki, a 74 y.o. female is established on warfarin.     Home care/care facility RN's report of Nancy INR, recent warfarin dosing, diet changes, medication changes, and symptoms is documented below.    Additional findings: verbally confirmed home dose with Malena and updated on anticoagulation calendar    Anticoagulation Episode Summary     Current INR goal:   2.0-3.0   TTR:   51.2 % (3.6 y)   Next INR check:   8/16/2019   INR from last check:   3.10! (8/9/2019)   Weekly max warfarin dose:      Target end date:   12/21/2016   INR check location:      Preferred lab:      Send INR reminders to:   XENIA HURTADO    Indications    Pulmonary embolism (H) [I26.99]           Comments:            Anticoagulation Care Providers     Provider Role Specialty Phone number    Julien Garnica MD Referring Family Medicine [phone]

## 2021-05-31 NOTE — PROGRESS NOTES
Patient has accomplished goals and has no other goals that this patient would like to work with Clinic Care Coordination/Health Care Home. Care Guide sent request to [facility] RN pool to review for Graduation and update emergency plan.    Maintenance since 4/30/19

## 2021-05-31 NOTE — TELEPHONE ENCOUNTER
Refill Approved    Rx renewed per Medication Renewal Policy. Medication was last renewed on 7/12/18.    Sneha Crockett, [facility] Triage/Med Refill 8/14/2019     Requested Prescriptions   Pending Prescriptions Disp Refills     amLODIPine (NORVASC) 5 MG tablet 90 tablet 3     Sig: Take 1 tablet (5 mg total) by mouth daily.       Calcium-Channel Blockers Protocol Passed - 8/14/2019  9:54 AM        Passed - PCP or prescribing provider visit in past 12 months or next 3 months     Last office visit with prescriber/PCP: 4/9/2019 Julien Garnica MD OR same dept: 4/9/2019 Julien Garnica MD OR same specialty: 5/9/2019 Jay Hanley MD  Last physical: 7/3/2019 Last MTM visit: Visit date not found   Next visit within 3 mo: Visit date not found  Next physical within 3 mo: Visit date not found  Prescriber OR PCP: Julien Garnica MD  Last diagnosis associated with med order: 1. Essential hypertension with goal blood pressure less than 140/90  - amLODIPine (NORVASC) 5 MG tablet; Take 1 tablet (5 mg total) by mouth daily.  Dispense: 90 tablet; Refill: 3    If protocol passes may refill for 12 months if within 3 months of last provider visit (or a total of 15 months).             Passed - Blood pressure filed in past 12 months     BP Readings from Last 1 Encounters:   07/03/19 122/70

## 2021-05-31 NOTE — PROGRESS NOTES
My Clinic Care Coordination Wellness Plan  This Graduation Wellness Plan provides private information in regard to the work I have done with my Care Team at my Primary Care Clinic.  This document provides insight on the goals I have accomplished.  My Care Team congratulates me on my journey to maintain wellness.  This document will help guide me on my journey to maintain a healthy lifestyle.  I will use this to help me overcome any barriers I may encounter.  If I should have any questions or concerns, I will contact the members of my Care Team or contact my Primary Care Clinic for assistance.     [facility]  [address], [address] [place] [address]  [phone]    My Preferred Method of Contact:  Phone: [phone]    My Primary/Preferred Language:  English    Preferred Learning Style:  Reading information and Pictures/Diagrams    Emergency Contact: Extended Emergency Contact Information  Primary Emergency Contact: Nancy Dacosta   [place]  Mobile Phone: [phone]  Relation: Child  Secondary Emergency Contact: July Barr   [facility]  Home Phone: [phone]  Relation: Child     PCP:  Julien Garnica MD  Specialists:    Care Team            Julien Garnica MD   [phone] PCP - General    Tigist Bedolla, PharmD Pharmacist, Pharmacist    Cathy Willams MBBS   [phone] Physician, Rheumatology        Accomplishments:  Goals        Patient Stated      COMPLETED: I have accomplished finding an agencies that could help with housekeeping services. (pt-stated)      Personal Plan  I have been approved for a Waiver and have a house keeper now to help me clean my home. If I start having trouble with that I will call my Waiver worker.         COMPLETED: I have accomplished finding services to help me get a lift chair.   (pt-stated)      Personal Plan  I have been approved for a Waiver and my Waiver worker is assisting me in getting a Lift Chair  through the Waiver.        COMPLETED: I have accomplished looking into [employer] with the help of my Care Guide. (pt-stated)      Personal Plan  I have decided not to sign up for [employer] but if I change my mind in the future I will call my Waiver worker to get signed up.         COMPLETED: I have accplished regining the strength in my legs and arms.  (pt-stated)      Personal Plan  If I feel like I am having trouble with not having enough strength in my arms and legs I will make an appointment with my PCP.             Advanced Directive/Living Will: The patient was given information regarding Adanced Directives/Living Will    Clinical Emergency Plan    Living with Osteoarthritis   Osteoarthritis is a chronic disease and the most common type of arthritis. But it doesn t have to keep you from leading an active life. You can help control symptoms by exercising and losing weight if you are overweight. Using special tools also helps make life easier. Be sure to see your healthcare provider for scheduled checkups and lab work. If you have questions or concerns between office visits, call your healthcare provider's office.  Make exercise part of your life  Gentle exercise can help lessen your pain. Keep the following in mind:    Choose exercises that improve joint motion and make your muscles stronger. Your healthcare provider or a physical therapist may suggest a few.    Stretching and flexibility activities such as yoga and maryann chi may improve pain and joint motion.    Try low-impact sports, such as walking, biking, or doing exercises in a warm pool.    Most people should exercise for at least 30 minutes a day on most days of the week. This can be broken up into shorter periods throughout the day.    Don t push yourself too hard at first. Slowly build up over time.    Make sure you warm up for 5 to 10 minutes before you exercise.    If pain and stiffness increase, don't exercise as hard or as long.  Watch  your weight  If you weigh more than you should, your weight-bearing joints are under extra pressure. This makes your symptoms worse. To reduce pain and stiffness, try losing a few of those extra pounds. The tips below may help:    Start a weight-loss program with the help of your healthcare provider.    Ask your friends and family for support.    Join a weight-loss group.  Use special tools  Even simple tasks can be hard to do when your joints hurt. Special tools called assistive devices can make things easier by reducing strain and protecting your joints. Ask your healthcare provider where to find these and other helpful tools:    Long-handled reachers or grabbers    Jar openers and button threaders    Large  for pencils, garden tools, and other handheld objects  Use mobility and other aids  People with arthritis and other joint problems often use mobility aids to help with walking. For example, they may use canes or walkers. They may also use splints or braces to support joints. Talk with your healthcare provider or physical therapist about these aids:    A cane to reduce knee or hip pain and help prevent falls    Splints for your wrists or other joints    A brace to support a weak knee joint    Orthotics for toe and foot involvement  Medical and surgical treatments  Discuss medical treatments with your healthcare provider to help reduce your pain and improve joint mobility. Treatments may include:    Topical medicines such as lidocaine, capsaicin, and diclofenac gel    Oral medicines such as acetaminophen, NSAIDs (nonsteroidal anti-inflammatory drugs) such as ibuprofen and naproxen, or opioids    Injections in affected joints such as corticosteroids in various joints, or hyaluronic acid in the knee joints    Surgical repair or surgical joint replacement with artificial joints  Complementary therapies such as heat and cold treatment, massage, acupuncture, supplements, cognitive training, meditation, and  others. Discuss these options with your healthcare provider.      Pulmonary Embolism (PE)  A pulmonary embolus is most often due to a blood clot that develops in a deep vein of the leg (deep vein thrombosis). If that clot, breaks loose and travels to the lung, it is called a pulmonary embolism (PE). This can cut off the flow of blood in the lungs.  A blood clot in the lungs is a medical emergency and may cause death.   Healthcare providers use the term venous thromboembolism (VTE) to describe these 2 conditions: deep vein thrombosis and pulmonary embolism. They use the term VTE because the 2 conditions are very closely related. And, because their prevention and treatment are also closely related.    How is pulmonary embolism diagnosed?  Your healthcare provider examines you and asks about your symptoms and health history. You may also have one or more of the following:    Blood tests to check for blood clotting or other problems    Imaging tests to look for clots in the veins or lung    Electrocardiography (ECG) to test how well the heart is working  How is pulmonary embolism treated?    Blood-thinning medicines (anticoagulants). These medicines thin the blood. They may be given as a pill, as an injection, or through a tube into a vein (intravenous or IV). Blood thinners help prevent more blood clots from forming. They also help to prevent an existing clot from getting larger.    Thrombolysis. Thrombolytic medicines are used to quickly dissolve a blood clot. A long, narrow tube (catheter) is used to deliver medicine directly to the clot. Thrombolytic medicines increase the risk of bleeding so they are used very carefully.    Inferior vena cava (IVC) filter surgery. The vena cava is the body s largest vein. It carries blood from the body to the heart. A small filter traps blood clots in the lower body and prevents them from traveling to the lungs. The filter is inserted into the vein through a catheter. The filter  may be used if blood thinners cannot be taken or if they don't work.     Pulmonary embolectomy. This is a procedure to remove a blood clot in the lungs. It may be done with surgery or with a catheter inserted in the body. It may be done when other treatments aren't safe or don't work.  What are the long-term concerns?  With treatment, blood clots are usually dissolved or removed. Some treatments can even help prevent future clots. But having a PE can put you at risk for another life-threatening blood clot. So, you will likely need to take anticoagulants to help keep blood clots from forming again. You may need to take this medicine for months or years.  You may also need to make lifestyle changes. This may include getting more active and eating healthier. You may need to wear elastic (compression) stockings and and take breaks on long trips.      Call 911  Call 911 or get emergency help if you have symptoms of a blood clot that has traveled to the lungs. The symptoms include:    Chest pain    Trouble breathing    Coughing (may cough up blood)    Fainting    Fast heartbeat    Sweating  Call 911 if you have heavy or uncontrolled bleeding.  When to call your healthcare provider  Call your healthcare provider if you have swelling or pain in your leg, arm, or other area. These are symptoms of a blood clot.  You may have bleeding if you take medicine to help prevent blood clots.  Call your healthcare provider if you have signs or symptoms of bleeding. This includes:    Blood in the urine    Bleeding with bowel movements    Bleeding from the nose, gums, a cut, or vagina      High Blood Pressure (Hypertension)  You have been diagnosed with high blood pressure (also called hypertension). This means the force of blood against your artery walls is too strong. It also means your heart is working hard to move blood. High blood pressure usually has no symptoms, but over time, it can damage your heart, blood vessels, eyes,  kidneys, and other organs. With help from your doctor, you can manage your blood pressure and protect your health.  Taking medications    Learn to take your own blood pressure. Keep a record of your results. Ask your doctor which readings mean that you need medical attention.    Take your blood pressure medication exactly as directed. Don t skip doses. Missing doses can cause your blood pressure to get out of control.    Avoid medications that contain heart stimulants, including over-the-counter drugs. Check for warnings about high blood pressure on the label.    Check with your doctor before taking a decongestant. Some decongestants can worsen high blood pressure.  Lifestyle changes    Maintain a healthy weight. Get help to lose any extra pounds.    Cut back on salt.  ? Limit canned, dried, packaged, and fast foods.  ? Don t add salt to your food at the table.  ? Season foods with herbs instead of salt when you cook.    Follow the DASH (Dietary Approaches to Stop Hypertension) eating plan. This plan recommends vegetables, fruits, whole gains, and other heart healthy foods.    Begin an exercise program. Ask your doctor how to get started. The American Heart Association recommends aerobic exercise 3 to 4 times a week for an average of 40 minutes at a time, with your doctor's approval. Simple activities like walking or gardening can help.    Break the smoking habit. Enroll in a stop-smoking program to improve your chances of success. Ask your health care provider about programs and medications to help you stop smoking.    Limit drinks that contain caffeine (coffee, black or green tea, cola) to 2 per day.    Never take stimulants such as amphetamines or cocaine; these drugs can be deadly for someone with high blood pressure.    Control your stress. Learn stress-management techniques.    Limit alcohol to no more than 1 drink a day for women and 2 drinks a day for men.  Follow-up care  Make a follow-up appointment as  directed by our staff.     When to seek medical care  Call your doctor immediately if you have any of the following:    Chest pain or shortness of breath (call 911)    Moderate to severe headache    Weakness in the muscles of your face, arms, or legs    Trouble speaking    Extreme drowsiness    Confusion    Fainting or dizziness    Pulsating or rushing sound in your ears    Unexplained nosebleed    Weakness, tingling, or numbness of your face, arms, or legs    Change in vision    Blood pressure measured at home that is greater than 180/110         All [facility] clinic patients have access to a Nurse 24 hours a day, 7 days a week.  If you have questions or want advice from a Nurse, please know [facility] is here for you.  You can call your clinic and they will connect you or you can call Care Connection at [phone].  [facility] also has Walk In Care clinics in multiple locations.  Call the number listed above for more information about our Walk In Care clinics or visit the [facility] website at [email or web address].

## 2021-05-31 NOTE — PROGRESS NOTES
Patient has accomplished goals and has no other goals that this patient would like to work with Clinic Care Coordination/Health Care Home. Care Guide sent request to [facility] RN pool to review for Graduation and update emergency plan.     Maintenance since 4/30/19

## 2021-06-01 ENCOUNTER — RECORDS - HEALTHEAST (OUTPATIENT)
Dept: ADMINISTRATIVE | Facility: CLINIC | Age: 76
End: 2021-06-01

## 2021-06-01 VITALS — BODY MASS INDEX: 44.85 KG/M2 | WEIGHT: 293 LBS

## 2021-06-01 VITALS — BODY MASS INDEX: 44.41 KG/M2 | HEIGHT: 68 IN | WEIGHT: 293 LBS

## 2021-06-01 VITALS — WEIGHT: 293 LBS | HEIGHT: 68 IN | BODY MASS INDEX: 44.41 KG/M2

## 2021-06-01 VITALS — WEIGHT: 293 LBS | BODY MASS INDEX: 45.16 KG/M2

## 2021-06-01 VITALS — HEIGHT: 68 IN | WEIGHT: 293 LBS | BODY MASS INDEX: 44.41 KG/M2

## 2021-06-01 VITALS — WEIGHT: 293 LBS | BODY MASS INDEX: 46.13 KG/M2

## 2021-06-01 VITALS — BODY MASS INDEX: 47.61 KG/M2 | WEIGHT: 293 LBS

## 2021-06-01 NOTE — TELEPHONE ENCOUNTER
INR result is   INR   Date Value Ref Range Status   08/22/2019 2.60 (!) 0.9 - 1.1 Final     9/05/19        2.6    Will the patient be seen, or did they already see, MD or CNP today? No    Most Recent Warfarin dose day/week  Sunday Monday Tuesday Wednesday Thursday Friday Saturday       2.5 mg 2.5 mg 2.5 mg     Sunday Monday Tuesday Wednesday Thursday Friday Saturday   2.5 mg 2.5 mg 2.5 mg 2.5 mg          Has the patient missed any doses of Coumadin, Warfarin, Jantoven in the past 7 days? No    Has the patients medications changed since the last visit? No    Has the patient experienced any bleeding recently? No    Has the patient experienced any injuries or illness recently? No    Has the patient experienced any 'new' shortness of breath, severe headaches, or changes in vision recently? No    Has the patient had any changes in their diet, or alcohol consumption? No    Is the patient here today to prepare for any type of upcoming surgery, procedure, or for a cardioversion procedure? No    What phone number can we reach the patient at today? [facility]  [phone].

## 2021-06-01 NOTE — TELEPHONE ENCOUNTER
RN cannot approve Refill Request    RN can NOT refill this medication Protocol failed and NO refill given        Sneha Crockett, [facility] Triage/Med Refill 9/25/2019    Requested Prescriptions   Pending Prescriptions Disp Refills     warfarin (COUMADIN) 2.5 MG tablet 90 tablet 1     Sig: Take 1 to 1.5 tablets (2.5 to 3.75 mg) by mouth daily. Adjust dose based on INR results as directed.       Warfarin Refill Protocol  Failed - 9/24/2019  1:36 PM        Failed -  Route to appropriate pool/provider     Last Anticoagulation Summary:   Anticoagulation Episode Summary     Current INR goal:   2.0-3.0   TTR:   54.0 %   Next INR check:   10/2/2019   INR from last check:   3.10! (9/19/2019)   Weekly max warfarin dose:      Target end date:   12/21/2016   INR check location:      Preferred lab:      Send INR reminders to:   XENIA HURTADO    Indications    Pulmonary embolism (H) [I26.99]           Comments:            Anticoagulation Care Providers     Provider Role Specialty Phone number    Julien Garnica MD Referring Family Medicine [phone]                Passed - Provider visit in last year     Last office visit with prescriber/PCP: 8/20/2019 Julien Garnica MD OR same dept: 8/20/2019 Julien Garnica MD OR same specialty: 8/20/2019 Julien Garnica MD  Last physical: 7/3/2019 Last MTM visit: Visit date not found    Next appt within 3 mo: Visit date not found Next physical within 3 mo: Visit date not found  Prescriber OR PCP: Julien Garnica MD  Last diagnosis associated with med order: 1. Pulmonary embolism (H)  - warfarin (COUMADIN) 2.5 MG tablet; Take 1 to 1.5 tablets (2.5 to 3.75 mg) by mouth daily. Adjust dose based on INR results as directed.  Dispense: 90 tablet; Refill: 1    If protocol passes may refill for 6 months if within 3 months of last provider visit (or a total of 9 months).

## 2021-06-01 NOTE — TELEPHONE ENCOUNTER
ANTICOAGULATION  MANAGEMENT    Assessment     Today's INR result of 3.2 is Supratherapeutic (goal INR of 2.0-3.0)        Warfarin taken as previously instructed    No new diet changes affecting INR    No new medication/supplements affecting INR    Continues to tolerate warfarin with no reported s/s of bleeding or thromboembolism     Previous INR was Supratherapeutic    Plan:     Spoke with Nancy regarding INR result and instructed:     Warfarin Dosing Instructions:  Change warfarin dose to 1.25 mg daily on Wed; and 2.5 mg daily rest of week  (7 % change)    Instructed patient to follow up no later than: 2 weeks. Appt is made for 10/16.     Education provided: importance of taking warfarin as instructed    Ginny verbalizes understanding and agrees to warfarin dosing plan.    Instructed to call the [facility] for any changes, questions or concerns. (#[phone])   ?   Vincent Baird RN    Subjective/Objective:      Nancy Oropeza, a 74 y.o. female is on warfarin.     Nancy reports:     Home warfarin dose: template incorrect; verbally confirmed home dose with pt and updated on anticoagulation calendar     Missed doses: No     Medication changes:  No     S/S of bleeding or thromboembolism:  No     New Injury or illness:  No     Changes in diet or alcohol consumption:  No     Upcoming surgery, procedure or cardioversion:  No    Anticoagulation Episode Summary     Current INR goal:   2.0-3.0   TTR:   51.1 %   Next INR check:   10/16/2019   INR from last check:   3.20! (10/2/2019)   Weekly max warfarin dose:      Target end date:   12/21/2016   INR check location:      Preferred lab:      Send INR reminders to:   XENIA HURTADO    Indications    Pulmonary embolism (H) [I26.99]           Comments:            Anticoagulation Care Providers     Provider Role Specialty Phone number    Julien Garnica MD Referring Family Medicine [phone]

## 2021-06-01 NOTE — TELEPHONE ENCOUNTER
Patient Returning Call  Reason for call:  Patient returin clinic call  Information relayed to patient: Patient says that the INR nurse contacted her about the results and the dose plan.  Patient has additional questions:  No  If YES, what are your questions/concerns:  NA    Okay to leave a detailed message?: No call back needed

## 2021-06-01 NOTE — TELEPHONE ENCOUNTER
Refill Request  Did you contact pharmacy: Yes.  Patient was informed there were no remaining refills on file at her pharmacy.  Medication name:   Requested Prescriptions     Pending Prescriptions Disp Refills     warfarin (COUMADIN) 2.5 MG tablet 90 tablet 1     Sig: Take 1 to 1.5 tablets (2.5 to 3.75 mg) by mouth daily. Adjust dose based on INR results as directed.     Who prescribed the medication: Julien Garnica MD   Pharmacy Name and Location: [facility])  Is patient out of medication: No.  4 days left  Patient notified refills processed in 72 hours:  yes  Okay to leave a detailed message: no

## 2021-06-01 NOTE — TELEPHONE ENCOUNTER
ANTICOAGULATION  MANAGEMENT- Home Care/Care Facility Result    Assessment     Today's INR result of 2.6 is Therapeutic (goal INR of 2.0-3.0)        Warfarin taken as previously instructed    No new diet changes affecting INR    No new medication/supplements affecting INR    Continues to tolerate warfarin with no reported s/s of bleeding or thromboembolism     Previous INR was Therapeutic    Plan:     Spoke with home care nurse Dasha discussed INR result and instructed:     Warfarin Dosing Instructions: Continue current warfarin dose 2.5 mg daily.    Next INR to be drawn: 2 weeks.     Education provided: importance of taking warfarin as instructed    Dasha verbalizes understanding and agrees to warfarin dosing plan.   ?   Vincent Baird RN    Subjective/Objective:      Nancy Oropeza, a 74 y.o. female is established on warfarin.     Home care/care facility RN's report of Nancy INR, recent warfarin dosing, diet changes, medication changes, and symptoms is documented below.    Additional findings: verbally confirmed home dose with Dasha and updated on anticoagulation calendar    Anticoagulation Episode Summary     Current INR goal:   2.0-3.0   TTR:   53.2 %   Next INR check:   9/19/2019   INR from last check:   2.60 (9/5/2019)   Weekly max warfarin dose:      Target end date:   12/21/2016   INR check location:      Preferred lab:      Send INR reminders to:   XENIA HURTADO    Indications    Pulmonary embolism (H) [I26.99]           Comments:            Anticoagulation Care Providers     Provider Role Specialty Phone number    Julien Garnica MD Referring Family Medicine [phone]

## 2021-06-01 NOTE — TELEPHONE ENCOUNTER
INR result is 3.1  INR   Date Value Ref Range Status   09/05/2019 2.60 (!) 0.9 - 1.1 Final       Will the patient be seen, or did they already see, MD or CNP today? No    Most Recent Warfarin dose day/week  Sunday Monday Tuesday Wednesday Thursday Friday Saturday       2.5 mg 2.5 mg 2.5 mg     Sunday Monday Tuesday Wednesday Thursday Friday Saturday   2.5 mg 2.5 mg 2.5 mg 2.5 mg          Has the patient missed any doses of Coumadin, Warfarin, Jantoven in the past 7 days? No    Has the patients medications changed since the last visit? No    Has the patient experienced any bleeding recently? No    Has the patient experienced any injuries or illness recently? No    Has the patient experienced any 'new' shortness of breath, severe headaches, or changes in vision recently? No    Has the patient had any changes in their diet, or alcohol consumption? No    Is the patient here today to prepare for any type of upcoming surgery, procedure, or for a cardioversion procedure? No    What phone number can we reach the patient at today? [phone] Dasha.

## 2021-06-01 NOTE — TELEPHONE ENCOUNTER
Medication Question or Clarification  Who is calling: Patient   nystatin-triamcinolone (MYCOLOG) ointment 60 g 1 10/2/2019  --   Sig: apply topically to affected area twice daily x 14 days as needed   Sent to pharmacy as: nystatin-triamcinolone 100,000 unit/gram-0.1 % topical ointment (MYCOLOG)   E-Prescribing Status: Receipt confirmed by pharmacy (10/2/2019  1:57 PM CDT)     What medication are you calling about ?: see above   What dose do you take ?     How often are you taking the medication?:     Who prescribed the medication?:  Julien Garnica MD  What is your question/concern?: PT just seen in clinic.  RX not covered by insurance- please send 2 different RX for above requested med to be covered, Please call patient asap and advise  Pharmacy:  [facility] #[id number] [[facility] [address] [place] [address] N.  [phone]  Associate Signed OrdersProvidersCurrent Interactions  Okay to leave a detailed message?: Yes  Site CMT - Please call the pharmacy to obtain any additional needed information.

## 2021-06-01 NOTE — TELEPHONE ENCOUNTER
ANTICOAGULATION  MANAGEMENT- Home Care/Care Facility Result    Assessment     Today's INR result of 3.1 is Supratherapeutic (goal INR of 2.0-3.0)        Warfarin taken as previously instructed    No new diet changes affecting INR    No new medication/supplements affecting INR    Continues to tolerate warfarin with no reported s/s of bleeding or thromboembolism     Previous INR was Therapeutic     Home care is discharging today.     Plan:     Spoke with home care nurse Dasha discussed INR result and instructed:     Warfarin Dosing Instructions: Continue current warfarin dose 2.5 mg daily.    Next INR to be drawn: OV on 10/2.     Education provided: importance of taking warfarin as instructed    Dasha verbalizes understanding and agrees to warfarin dosing plan.   ?   Vincent Baird RN    Subjective/Objective:      Nancy Oropeza, a 74 y.o. female is established on warfarin.     Home care/care facility RN's report of Nancy INR, recent warfarin dosing, diet changes, medication changes, and symptoms is documented below.    Additional findings: verbally confirmed home dose with Dasha and updated on anticoagulation calendar    Anticoagulation Episode Summary     Current INR goal:   2.0-3.0   TTR:   54.2 %   Next INR check:   10/2/2019   INR from last check:   3.10! (9/19/2019)   Weekly max warfarin dose:      Target end date:   12/21/2016   INR check location:      Preferred lab:      Send INR reminders to:   XENIA HURTADO    Indications    Pulmonary embolism (H) [I26.99]           Comments:            Anticoagulation Care Providers     Provider Role Specialty Phone number    Julien Garnica MD Referring Family Medicine [phone]

## 2021-06-01 NOTE — PROGRESS NOTES
Assessment/Plan:    1. Hypothyroidism, unspecified type  Hypothyroidism.  Recent TSH elevation at 12.9 with subsequent increase of levothyroxine from 125 mcg up to 137 mcg daily.  Recheck TSH today.  Will notify with results.  - Thyroid Stimulating Hormone (TSH)    2. CKD (chronic kidney disease) stage 3, GFR 30-59 ml/min (H)  CKD stage III.  Ensure stable renal function.  - Basic Metabolic Panel    3. Normochromic normocytic anemia  History of normochromic normocytic anemia.  Repeat CBC.  - HM2(CBC w/o Differential)    4. Myalgia  Myalgias likely secondary to multiple concerns.  Check CK level while on statin therapy and hold off on pravastatin 80 mg at bedtime until follow-up evaluation in 4 weeks.  Check sedimentation rate to ensure no evidence for polymyalgia rheumatica etc.  - Erythrocyte Sedimentation Rate  - Basic Metabolic Panel  - CK Total    5. Essential hypertension with goal blood pressure less than 140/90  Hypertension stable on amlodipine 5 mg daily and lisinopril hydrochlorothiazide 20/12.5 using 2 tablets daily.    6.  Intertriginous candidiasis  Nystatin triamcinolone ointment twice daily x14 days as needed.    7.  Hyperlipidemia  Hold off on pravastatin 80 mg at bedtime until scheduled follow-up in 4 weeks due to multiple myalgias.    8. Flu vaccine need  High-dose flu shot provided.  Shingrix immunization recommended through local pharmacy.  - Influenza High Dose, Seasonal 65+ yrs          Subjective:    Nancy GARZON Sandip is seen today for multiple concerns.  Achiness and soreness.  Wondering if it is because of hypothyroidism.  Recent TSH was elevated at 12.9 therefore increase levothyroxine from 125 mcg up to 137 mcg daily.  Still achy.  No longer using Vicodin or oxycodone.  Uses Tylenol Extra Strength 3 tablets twice daily.  Patient with history of hypertension.  Continues amlodipine as well as lisinopril hydrochlorothiazide.  Normochromic normocytic anemia.  No history of polymyalgia  rheumatica.  Is on warfarin anticoagulation needs INR recheck today.  Is not had shingles series through local pharmacy.  Fatigue issues.  Prior hemoglobin 9.4 with MCV 92.  Comprehensive review of systems as above otherwise all negative.       5 children - son with Factor V abnormality  14 grandchildren   9 great-grandchildren   Grew up in [place], [place] til age 12...   No smoke (quit 16 years ago after 1 ppd x 30+ years)   EtOH: occ wine   Mom -  88 COPD   Dad -  61 massive MI   1 bro -  67 blood clot (lung)   2 sis - one of which is  age 62 small cell lung CA (h/o smoker)  Surgeries: ventral hernia repair with muscle flap 10/4/12 with post-op complication of seroma with J.P. drain in place followed by interventional radiology q 2 weeks);  age 25; groin hernia age 3; CAPRICE-BSO 2011 by Dr. Herbert Carmen (due to benign cyst x 2); right TKA 18 (Dr. Small)  Hospitalizations: as above   Work: retired  ([employer] as )     Past Surgical History:   Procedure Laterality Date     BREAST BIOPSY Left 1970s     HYSTERECTOMY  2010     OOPHORECTOMY          Family History   Problem Relation Age of Onset     Breast cancer Sister 75     Stomach cancer Paternal Grandfather      Lung cancer Sister         Past Medical History:   Diagnosis Date     Depression      Disease of thyroid gland      HTN (hypertension)      Hypercholesteremia      Morbid obesity (H)      Pulmonary emboli (H) 2015     Yeast infection         Social History     Tobacco Use     Smoking status: Former Smoker     Smokeless tobacco: Never Used     Tobacco comment: quite 20 yrs ago   Substance Use Topics     Alcohol use: No     Comment: occasionally     Drug use: No        Current Outpatient Medications   Medication Sig Dispense Refill     amLODIPine (NORVASC) 5 MG tablet Take 1 tablet (5 mg total) by mouth daily. 90 tablet 3     cholecalciferol, vitamin D3, 1,000 unit tablet Take  1,000 Units by mouth daily. 2 capsules once a day       citalopram (CELEXA) 40 MG tablet Take 1 tablet (40 mg total) by mouth daily. 90 tablet 3     furosemide (LASIX) 20 MG tablet Take 1 tablet (20 mg total) by mouth daily. 90 tablet 3     levothyroxine (SYNTHROID, LEVOTHROID) 137 MCG tablet Take 1 tablet (137 mcg total) by mouth daily. 30 tablet 6     lisinopril-hydrochlorothiazide (PRINZIDE,ZESTORETIC) 20-12.5 mg per tablet Take 2 tablets by mouth daily. 180 tablet 3     pravastatin (PRAVACHOL) 80 MG tablet TAKE ONE TABLET BY MOUTH   EVERY NIGHT AT BEDTIME 90 tablet 3     warfarin (COUMADIN) 2.5 MG tablet Take 1 tablet (2.5 mg) by mouth daily. Adjust dose based on INR results as directed. 90 tablet 1     nystatin-triamcinolone (MYCOLOG) ointment apply topically to affected area twice daily x 14 days as needed 60 g 1     No current facility-administered medications for this visit.           Objective:    Vitals:    10/02/19 1329   BP: 128/60   Pulse: (!) 104   SpO2: 95%   Weight: (!) 297 lb (134.7 kg)      Body mass index is 45.16 kg/m .    Alert.  No apparent distress.  Blood pressure at goal.  Chest clear.  Cardiac exam regular.  Extremities warm and dry.  No rash.      This note has been dictated using voice recognition software and as a result may contain minor grammatical errors and unintended word substitutions.

## 2021-06-01 NOTE — TELEPHONE ENCOUNTER
Refill Approved    Rx renewed per Medication Renewal Policy. Medication was last renewed on 7/26/18.    Sneha Crockett, [facility] Triage/Med Refill 9/13/2019     Requested Prescriptions   Pending Prescriptions Disp Refills     pravastatin (PRAVACHOL) 80 MG tablet [Pharmacy Med Name: PRAVASTATIN  TAB 80MG] 90 tablet 3     Sig: TAKE ONE TABLET BY MOUTH   EVERY NIGHT AT BEDTIME       Statins Refill Protocol (Hmg CoA Reductase Inhibitors) Passed - 9/13/2019 12:28 PM        Passed - PCP or prescribing provider visit in past 12 months      Last office visit with prescriber/PCP: 8/20/2019 Julien Garnica MD OR same dept: 8/20/2019 Julien Garnica MD OR same specialty: 8/20/2019 Julien Garnica MD  Last physical: 7/3/2019 Last MTM visit: Visit date not found   Next visit within 3 mo: Visit date not found  Next physical within 3 mo: Visit date not found  Prescriber OR PCP: Julien Garnica MD  Last diagnosis associated with med order: 1. Hyperlipidemia  - pravastatin (PRAVACHOL) 80 MG tablet [Pharmacy Med Name: PRAVASTATIN  TAB 80MG]; TAKE ONE TABLET BY MOUTH   EVERY NIGHT AT BEDTIME  Dispense: 90 tablet; Refill: 3    If protocol passes may refill for 12 months if within 3 months of last provider visit (or a total of 15 months).

## 2021-06-02 ENCOUNTER — RECORDS - HEALTHEAST (OUTPATIENT)
Dept: ADMINISTRATIVE | Facility: CLINIC | Age: 76
End: 2021-06-02

## 2021-06-02 VITALS — WEIGHT: 293 LBS | BODY MASS INDEX: 46.51 KG/M2

## 2021-06-02 VITALS — BODY MASS INDEX: 45.8 KG/M2 | WEIGHT: 293 LBS

## 2021-06-02 VITALS — BODY MASS INDEX: 44.41 KG/M2 | WEIGHT: 293 LBS | HEIGHT: 68 IN

## 2021-06-02 VITALS — WEIGHT: 292 LBS | BODY MASS INDEX: 44.4 KG/M2

## 2021-06-02 VITALS — BODY MASS INDEX: 44.7 KG/M2 | WEIGHT: 293 LBS

## 2021-06-02 VITALS — WEIGHT: 293 LBS | HEIGHT: 68 IN | BODY MASS INDEX: 44.41 KG/M2

## 2021-06-02 NOTE — PROGRESS NOTES
Assessment/Plan:    1. Polymyalgia rheumatica (H)  Polymyalgia rheumatica, improved.  Repeat sedimentation rate with a recent level of 58.  Is on prednisone 15 mg daily and feels outstanding.  Will decrease to 12.5 mg over next 4 weeks then 10 mg x 4 weeks then recheck in office.  Will likely decrease after that by approximately 1 mg every 2 to 4 weeks as tolerated.  - Erythrocyte Sedimentation Rate    2. CKD (chronic kidney disease) stage 3, GFR 30-59 ml/min (H)  Ensure stable renal function with CKD stage III.  - Basic Metabolic Panel    3. Normochromic normocytic anemia  History normochromic normocytic anemia prior hemoglobin 10.2 and MCV 83.  CBC pending.  - HM2(CBC w/o Differential)    4. Hypercholesterolemia  Check lipid cascade today while fasting.  - Lipid Cascade    5. Impaired fasting glucose  History of impaired fasting glucose.  Is on prednisone 15 mg daily currently.  Check A1c and will continue to follow closely while on prednisone taper for polymyalgia rheumatica.  - Basic Metabolic Panel  - Glycosylated Hemoglobin A1c      The following high BMI interventions were performed this visit: encouragement to exercise, weight monitoring, weight loss from baseline weight and lifestyle education regarding diet .  Ensure ongoing efforts to achieve weight goal < 280 pounds initially, < 260 pounds ideally.         Subjective:    Nancy GARZON Sandip is seen today for follow-up evaluation.  Had been seen October 2, 2019 with multiple myalgias.  Sedimentation rate elevated at 58.  Started on prednisone 50 mg daily.  Feels outstanding.  Does have some insomnia.  Increased appetite.  Always feels like she needs to finish a project due to medication side effect however these are not of any significant concern for patient and she wants to continue to feel well like this.  Does have underlying CKD stage III as well as normochromic normocytic anemia.  Hypercholesterolemia remains on pravastatin 80 mg at bedtime.  Fasting.   Impaired fasting glucose history with prior A1c of 5.9% November 3, 2016.  Has underlying hypothyroidism with recent TSH 12.9 improved to 2.58 after dose increased from 125 mcg up to 137 mcg daily.  Review of systems otherwise suggest euthyroid.  Comprehensive review of systems as above otherwise all negative.       5 children - son with Factor V abnormality  14 grandchildren   9 great-grandchildren   Grew up in [place], [place] til age 12...   No smoke (quit 16 years ago after 1 ppd x 30+ years)   EtOH: occ wine   Mom -  88 COPD   Dad -  61 massive MI   1 bro -  67 blood clot (lung)   2 sis - one of which is  age 62 small cell lung CA (h/o smoker)  Surgeries: ventral hernia repair with muscle flap 10/4/12 with post-op complication of seroma with J.P. drain in place followed by interventional radiology q 2 weeks);  age 25; groin hernia age 3; CAPRICE-BSO 2011 by Dr. Herbert Carmen (due to benign cyst x 2); right TKA 18 (Dr. Small)  Hospitalizations: as above   Work: retired  ([employer] as )     Past Surgical History:   Procedure Laterality Date     BREAST BIOPSY Left 1970s     HYSTERECTOMY  2010     OOPHORECTOMY  2010        Family History   Problem Relation Age of Onset     Breast cancer Sister 75     Stomach cancer Paternal Grandfather      Lung cancer Sister         Past Medical History:   Diagnosis Date     Depression      Disease of thyroid gland      HTN (hypertension)      Hypercholesteremia      Morbid obesity (H)      Pulmonary emboli (H) 2015     Yeast infection         Social History     Tobacco Use     Smoking status: Former Smoker     Smokeless tobacco: Never Used     Tobacco comment: quite 20 yrs ago   Substance Use Topics     Alcohol use: No     Comment: occasionally     Drug use: No        Current Outpatient Medications   Medication Sig Dispense Refill     amLODIPine (NORVASC) 5 MG tablet Take 1 tablet (5 mg total) by mouth  daily. 90 tablet 3     cholecalciferol, vitamin D3, 1,000 unit tablet Take 1,000 Units by mouth daily. 2 capsules once a day       citalopram (CELEXA) 40 MG tablet Take 1 tablet (40 mg total) by mouth daily. 90 tablet 3     furosemide (LASIX) 20 MG tablet Take 1 tablet (20 mg total) by mouth daily. 90 tablet 3     levothyroxine (SYNTHROID, LEVOTHROID) 137 MCG tablet Take 1 tablet (137 mcg total) by mouth daily. 30 tablet 6     lisinopril-hydrochlorothiazide (PRINZIDE,ZESTORETIC) 20-12.5 mg per tablet Take 2 tablets by mouth daily. 180 tablet 3     nystatin-triamcinolone (MYCOLOG) ointment apply topically to affected area twice daily x 14 days as needed 60 g 1     pravastatin (PRAVACHOL) 80 MG tablet TAKE ONE TABLET BY MOUTH   EVERY NIGHT AT BEDTIME 90 tablet 3     predniSONE (DELTASONE) 5 MG tablet Take 15 mg by mouth daily. 90 tablet 1     warfarin (COUMADIN) 2.5 MG tablet Take 1 tablet (2.5 mg) by mouth daily. Adjust dose based on INR results as directed. 90 tablet 1     No current facility-administered medications for this visit.           Objective:    Vitals:    10/30/19 1054   BP: 120/60   Patient Site: Right Arm   Patient Position: Sitting   Cuff Size: Adult Large   Pulse: 80   Weight: (!) 299 lb 4.8 oz (135.8 kg)      Body mass index is 45.51 kg/m .    Alert.  No apparent distress.  Morbid obesity.  Chest clear.  Cardiac exam regular.  Extremities warm and dry.      This note has been dictated using voice recognition software and as a result may contain minor grammatical errors and unintended word substitutions.

## 2021-06-02 NOTE — TELEPHONE ENCOUNTER
Test Results  Who is calling?:  Patient   Who ordered the test:  Julien Garnica MD    Type of test: Lab  Date of test:  Yesterday  Where was the test performed:  In clinic lab  What are your questions/concerns?:  I would like Julien Garnica MD feedback on my lab results. Patient was given the following message Written by Julien Garnica MD on 10/2/2019  6:00 PM   Your complete blood count results show stable anemia.  Mild.  We will continue to follow closely.  Please follow up with other labs.    Okay to leave a detailed message?:  Yes

## 2021-06-02 NOTE — TELEPHONE ENCOUNTER
Who is calling:  Patient  Reason for Call:  Patient was checking the status of this request. Patient would like a call back before the weekend starts.  Date of last appointment with primary care: 8/20/19  Okay to leave a detailed message: yes

## 2021-06-02 NOTE — TELEPHONE ENCOUNTER
Central PA team  [phone]  Pool: HE PA MED ([id number])          PA has been initiated.       PA form completed and faxed insurance via Cover My Meds     Key:  [id number] - PA Case ID: [id number] - Rx #: [id number]     Medication:    Nystatin-Triamcinolone 457393-9.1UNIT/GM-% ointment    Insurance:  [facility]        Response will be received via fax and may take up to 5-10 business days depending on plan

## 2021-06-02 NOTE — TELEPHONE ENCOUNTER
Patient Returning Call  Reason for call: Patient calling back   Information relayed to patient:NONE-   Patient picked up her Prednisone from pharmacy, spoke to MD last night regarding results,  Patient questions, does she still take Tylenol  Extra strength & has her Levothyroxine dose changed at all.        Patient has additional questions:  Yes  If YES, what are your questions/concerns:  Patient questions, does she still take Tylenol  Extra strength & has her Levothyroxine dose changed at all.          Okay to leave a detailed message?:  Yes

## 2021-06-02 NOTE — TELEPHONE ENCOUNTER
Prior Authorization Request  Who s requesting:  Pharmacy  Pharmacy Name and Location: [facility] #[id number]  Medication Name: nystatin-triamcinolone ointment   Insurance Plan: [employer]   Insurance Member ID Number:  [id number]  Informed patient that prior authorizations can take up to 10 business days for response:   No  Okay to leave a detailed message: No        KEY: [id number]

## 2021-06-02 NOTE — TELEPHONE ENCOUNTER
ANTICOAGULATION  MANAGEMENT    Assessment     Today's INR result of 2.1 is Therapeutic (goal INR of 2.0-3.0)        Warfarin taken as previously instructed    No new diet changes affecting INR    No new medication/supplements affecting INR    Continues to tolerate warfarin with no reported s/s of bleeding or thromboembolism     Previous INR was Therapeutic    Plan:     Spoke with Nancy regarding INR result and instructed:     Warfarin Dosing Instructions:  Continue current warfarin dose 1.25 mg daily on Wednesdays; and 2.5 mg daily rest of week  (0 % change)    Instructed patient to follow up no later than: one  Month.    Education provided: importance of therapeutic range, importance of following up for INR monitoring at instructed interval and importance of taking warfarin as instructed    Lesly verbalizes understanding and agrees to warfarin dosing plan.    Instructed to call the [facility] for any changes, questions or concerns. (#[phone])   ?   Mari Marin RN    Subjective/Objective:      Nancy Oropeza, a 74 y.o. female is on warfarin.     Nancy reports:     Home warfarin dose: verbally confirmed home dose with Lesly and updated on anticoagulation calendar     Missed doses: No     Medication changes:  No     S/S of bleeding or thromboembolism:  No     New Injury or illness:  No     Changes in diet or alcohol consumption:  No     Upcoming surgery, procedure or cardioversion:  No    Anticoagulation Episode Summary     Current INR goal:   2.0-3.0   TTR:   50.1 %   Next INR check:   11/27/2019   INR from last check:   2.10 (10/30/2019)   Weekly max warfarin dose:      Target end date:   12/21/2016   INR check location:      Preferred lab:      Send INR reminders to:   XENIA HURTADO    Indications    Pulmonary embolism (H) [I26.99]           Comments:            Anticoagulation Care Providers     Provider Role Specialty Phone number    Julien Garnica MD Referring Family Medicine  [phone]

## 2021-06-02 NOTE — TELEPHONE ENCOUNTER
ANTICOAGULATION  MANAGEMENT    Assessment     Today's INR result of 2.4 is Therapeutic (goal INR of 2.0-3.0)        Warfarin taken as previously instructed    No new diet changes affecting INR    No new medication/supplements affecting INR    Continues to tolerate warfarin with no reported s/s of bleeding or thromboembolism     Previous INR was Supratherapeutic    Plan:     Spoke with Nancy regarding INR result and instructed:     Warfarin Dosing Instructions:  Continue current warfarin dose 1.25 mg daily on Wed; and 2.5 mg daily rest of week  (0 % change)    Instructed patient to follow up no later than: 2 weeks (OV 10/3)    Education provided: importance of taking warfarin as instructed    Nancy verbalizes understanding and agrees to warfarin dosing plan.    Instructed to call the [facility] for any changes, questions or concerns. (#[phone])   ?   Vincent Baird RN    Subjective/Objective:      Nancy Oropeza, a 74 y.o. female is on warfarin.     Nancy reports:     Home warfarin dose: verbally confirmed home dose with pt and updated on anticoagulation calendar     Missed doses: No     Medication changes:  No     S/S of bleeding or thromboembolism:  No     New Injury or illness:  No     Changes in diet or alcohol consumption:  No     Upcoming surgery, procedure or cardioversion:  No    Anticoagulation Episode Summary     Current INR goal:   2.0-3.0   TTR:   50.1 %   Next INR check:   10/30/2019   INR from last check:   2.40 (10/16/2019)   Weekly max warfarin dose:      Target end date:   12/21/2016   INR check location:      Preferred lab:      [facility]    Indications    Pulmonary embolism (H) [I26.99]           Comments:            Anticoagulation Care Providers     Provider Role Specialty Phone number    Julien Garnica MD Referring Family Medicine [phone]

## 2021-06-02 NOTE — TELEPHONE ENCOUNTER
Can she continue tylenol extra strength?   TSH was normal so she should continue her current thyroid dose correct?

## 2021-06-02 NOTE — TELEPHONE ENCOUNTER
FYI - Status Update  Who is Calling: Patient  Update: Requests call back, has questions about dosing.  Okay to leave a detailed message?:  Yes

## 2021-06-02 NOTE — TELEPHONE ENCOUNTER
Notify patient.  Yes, okay to use Tylenol.  Also, okay to continue current dose of thyroid medication.

## 2021-06-02 NOTE — TELEPHONE ENCOUNTER
Refill Approved    Rx renewed per Medication Renewal Policy. Medication was last renewed on 1/9/19.    Sneha Crockett, [facility] Connection Triage/Med Refill 10/16/2019     Requested Prescriptions   Pending Prescriptions Disp Refills     citalopram (CELEXA) 40 MG tablet 90 tablet 3     Sig: Take 1 tablet (40 mg total) by mouth daily.       SSRI Refill Protocol  Passed - 10/16/2019  7:15 PM        Passed - PCP or prescribing provider visit in last year     Last office visit with prescriber/PCP: 10/2/2019 Julien Garnica MD OR same dept: 10/2/2019 Julien Garnica MD OR same specialty: 10/2/2019 Julien Garnica MD  Last physical: 7/3/2019 Last MTM visit: Visit date not found   Next visit within 3 mo: Visit date not found  Next physical within 3 mo: Visit date not found  Prescriber OR PCP: Julien Garnica MD  Last diagnosis associated with med order: 1. Major depressive disorder, recurrent episode  - citalopram (CELEXA) 40 MG tablet; Take 1 tablet (40 mg total) by mouth daily.  Dispense: 90 tablet; Refill: 3    If protocol passes may refill for 12 months if within 3 months of last provider visit (or a total of 15 months).

## 2021-06-03 VITALS — WEIGHT: 293 LBS | BODY MASS INDEX: 45.01 KG/M2

## 2021-06-03 VITALS
DIASTOLIC BLOOD PRESSURE: 60 MMHG | BODY MASS INDEX: 45.16 KG/M2 | SYSTOLIC BLOOD PRESSURE: 128 MMHG | WEIGHT: 293 LBS | OXYGEN SATURATION: 95 % | HEART RATE: 104 BPM

## 2021-06-03 VITALS
SYSTOLIC BLOOD PRESSURE: 120 MMHG | BODY MASS INDEX: 45.51 KG/M2 | WEIGHT: 293 LBS | DIASTOLIC BLOOD PRESSURE: 60 MMHG | HEART RATE: 80 BPM

## 2021-06-03 VITALS
BODY MASS INDEX: 47.01 KG/M2 | WEIGHT: 293 LBS | HEART RATE: 95 BPM | OXYGEN SATURATION: 95 % | SYSTOLIC BLOOD PRESSURE: 124 MMHG | DIASTOLIC BLOOD PRESSURE: 76 MMHG

## 2021-06-03 VITALS — BODY MASS INDEX: 44.41 KG/M2 | WEIGHT: 293 LBS | HEIGHT: 68 IN

## 2021-06-03 NOTE — TELEPHONE ENCOUNTER
ANTICOAGULATION  MANAGEMENT    Assessment     Today's INR result of 1.8 is Subtherapeutic (goal INR of 2.0-3.0)        Warfarin taken as previously instructed    No new diet changes affecting INR    No new medication/supplements affecting INR    Continues to tolerate warfarin with no reported s/s of bleeding or thromboembolism     Previous INR was Therapeutic    Plan:     Spoke with Nancy regarding INR result and instructed:     Warfarin Dosing Instructions:  Change warfarin dose to 2.5 mg daily  (7.7 % change)    Instructed patient to follow up no later than: 2 weeks.    Education provided: importance of taking warfarin as instructed    Nancy verbalizes understanding and agrees to warfarin dosing plan.    Instructed to call the [facility] for any changes, questions or concerns. (#[phone])   ?   Vincent Baird RN    Subjective/Objective:      Nancy Oropeza, a 74 y.o. female is on warfarin.     Nancy reports:     Home warfarin dose: as updated on anticoagulation calendar per template     Missed doses: No     Medication changes:  No     S/S of bleeding or thromboembolism:  No     New Injury or illness:  No     Changes in diet or alcohol consumption:  No     Upcoming surgery, procedure or cardioversion:  No    Anticoagulation Episode Summary     Current INR goal:   2.0-3.0   TTR:   52.4 % (1 y)   Next INR check:   12/10/2019   INR from last check:   1.80! (11/26/2019)   Weekly max warfarin dose:      Target end date:   12/21/2016   INR check location:      Preferred lab:      Send INR reminders to:   XEN[facility] HURTADO    Indications    Pulmonary embolism (H) [I26.99]           Comments:            Anticoagulation Care Providers     Provider Role Specialty Phone number    Julien Garnica MD Referring Family Medicine [phone]

## 2021-06-04 ENCOUNTER — COMMUNICATION - HEALTHEAST (OUTPATIENT)
Dept: ANTICOAGULATION | Facility: CLINIC | Age: 76
End: 2021-06-04

## 2021-06-04 ENCOUNTER — COMMUNICATION - HEALTHEAST (OUTPATIENT)
Dept: FAMILY MEDICINE | Facility: CLINIC | Age: 76
End: 2021-06-04

## 2021-06-04 VITALS
HEART RATE: 104 BPM | WEIGHT: 293 LBS | OXYGEN SATURATION: 96 % | BODY MASS INDEX: 49.02 KG/M2 | RESPIRATION RATE: 16 BRPM | SYSTOLIC BLOOD PRESSURE: 140 MMHG | TEMPERATURE: 98.2 F | DIASTOLIC BLOOD PRESSURE: 78 MMHG

## 2021-06-04 VITALS
HEART RATE: 94 BPM | TEMPERATURE: 97.7 F | SYSTOLIC BLOOD PRESSURE: 124 MMHG | WEIGHT: 293 LBS | DIASTOLIC BLOOD PRESSURE: 80 MMHG | RESPIRATION RATE: 22 BRPM | BODY MASS INDEX: 44.41 KG/M2 | HEIGHT: 68 IN | OXYGEN SATURATION: 94 %

## 2021-06-04 VITALS
BODY MASS INDEX: 47.74 KG/M2 | DIASTOLIC BLOOD PRESSURE: 70 MMHG | OXYGEN SATURATION: 92 % | WEIGHT: 293 LBS | SYSTOLIC BLOOD PRESSURE: 130 MMHG | HEART RATE: 102 BPM

## 2021-06-04 VITALS
SYSTOLIC BLOOD PRESSURE: 150 MMHG | OXYGEN SATURATION: 95 % | HEART RATE: 113 BPM | WEIGHT: 293 LBS | DIASTOLIC BLOOD PRESSURE: 80 MMHG | TEMPERATURE: 97.6 F | BODY MASS INDEX: 47.93 KG/M2

## 2021-06-04 VITALS
HEIGHT: 67 IN | RESPIRATION RATE: 20 BRPM | WEIGHT: 293 LBS | DIASTOLIC BLOOD PRESSURE: 64 MMHG | HEART RATE: 80 BPM | SYSTOLIC BLOOD PRESSURE: 136 MMHG | TEMPERATURE: 98.8 F | BODY MASS INDEX: 45.99 KG/M2

## 2021-06-04 VITALS
HEART RATE: 96 BPM | BODY MASS INDEX: 45.99 KG/M2 | SYSTOLIC BLOOD PRESSURE: 122 MMHG | TEMPERATURE: 97.8 F | WEIGHT: 293 LBS | RESPIRATION RATE: 18 BRPM | HEIGHT: 67 IN | DIASTOLIC BLOOD PRESSURE: 64 MMHG

## 2021-06-04 DIAGNOSIS — I26.99 PULMONARY EMBOLISM, UNSPECIFIED CHRONICITY, UNSPECIFIED PULMONARY EMBOLISM TYPE, UNSPECIFIED WHETHER ACUTE COR PULMONALE PRESENT (H): ICD-10-CM

## 2021-06-04 DIAGNOSIS — M54.50 LUMBAR SPINE PAIN: ICD-10-CM

## 2021-06-04 DIAGNOSIS — M35.3 POLYMYALGIA RHEUMATICA (H): ICD-10-CM

## 2021-06-04 NOTE — TELEPHONE ENCOUNTER
ANTICOAGULATION  MANAGEMENT    Assessment     Today's INR result of 1.7 is Subtherapeutic (goal INR of 2.0-3.0)        Warfarin taken as previously instructed    No new diet changes affecting INR    Interaction between prednisone and warfarin may be affecting INR - reports she started about 3-4 weeks ago, was doing 3 tablets daily in the beginning, but now doing 2 tablets daily    Reported shortness of breath on template, reports she has a cold x1.5 weeks, shortness of breath is with activity, discussed when she should seek medical treatment    Continues to tolerate warfarin with no reported s/s of bleeding or thromboembolism     Previous INR was Subtherapeutic    Plan:     Met with Nancy in clinic regarding INR result and instructed:     Warfarin Dosing Instructions:  Take 3.75 mg today only then change warfarin dose to 3.75 mg daily on Wed; and 2.5 mg daily rest of week  (7.1 % change)    Instructed patient to follow up no later than: 1-2 weeks     Education provided: impact of vitamin K foods on INR, target INR goal and significance of current INR result, potential interaction between warfarin and prednisone, importance of notifying clinic for changes in medications, importance of notifying clinic for diarrhea, nausea/vomiting, reduced intake and/or illness and written instructions provided    Lesly verbalizes understanding and agrees to warfarin dosing plan.    Instructed to call the [facility] for any changes, questions or concerns. (#[phone])   ?   Sarah Toledo RN    Subjective/Objective:      Nancy Oropeza, a 74 y.o. female is on warfarin.     Nancy reports:     Home warfarin dose: verbally confirmed home dose with Lesly and updated on anticoagulation calendar     Missed doses: No     Medication changes:  Yes: prednisone for past 3-4 weeks     S/S of bleeding or thromboembolism:  No     New Injury or illness:  Yes: cold/cough for 1.5 weeks,      Changes in diet or alcohol consumption:   No     Upcoming surgery, procedure or cardioversion:  No    Anticoagulation Episode Summary     Current INR goal:   2.0-3.0   TTR:   52.4 % (1 y)   Next INR check:   12/30/2019   INR from last check:   1.70! (12/16/2019)   Weekly max warfarin dose:      Target end date:   12/21/2016   INR check location:      Preferred lab:      Send INR reminders to:   [facility] HURTAD[facility]    Indications    Pulmonary embolism (H) [I26.99]           Comments:            Anticoagulation Care Providers     Provider Role Specialty Phone number    Julien Garnica MD Referring Family Medicine [phone]

## 2021-06-04 NOTE — TELEPHONE ENCOUNTER
Prior Authorization Request  Who s requesting:  Patient  Pharmacy Name and Location: [facility]  Medication Name: predniSONE (DELTASONE) 5 MG tablet  Insurance Plan: [employer]  Insurance Member ID Number:  [id number]  Informed patient that prior authorizations can take up to 10 business days for response:   Yes  Okay to leave a detailed message: Yes

## 2021-06-04 NOTE — TELEPHONE ENCOUNTER
ANTICOAGULATION  MANAGEMENT    Assessment     Today's INR result of 2.1 is Therapeutic (goal INR of 2.0-3.0)        Warfarin taken as previously instructed    No new diet changes affecting INR    No new medication/supplements affecting INR    Continues to tolerate warfarin with no reported s/s of bleeding or thromboembolism     Previous INR was Subtherapeutic    Plan:     Spoke with Nancy regarding INR result and instructed:     Warfarin Dosing Instructions:  Continue current warfarin dose    3.75 mg every Wed; 2.5 mg all other days         Instructed patient to follow up no later than: 2 weeks.    Education provided: importance of taking warfarin as instructed    Nancy verbalizes understanding and agrees to warfarin dosing plan.    Instructed to call the [facility] for any changes, questions or concerns. (#[phone])   ?   Vincent Baird RN    Subjective/Objective:      Nancy Oropeza, a 74 y.o. female is on warfarin.     Nancy reports:     Home warfarin dose: template incorrect; verbally confirmed home dose with pt and updated on anticoagulation calendar     Missed doses: No     Medication changes:  No     S/S of bleeding or thromboembolism:  No     New Injury or illness:  No     Changes in diet or alcohol consumption:  No     Upcoming surgery, procedure or cardioversion:  No    Anticoagulation Episode Summary     Current INR goal:   2.0-3.0   TTR:   52.3 % (1 y)   Next INR check:   1/14/2020   INR from last check:   2.10 (12/31/2019)   Weekly max warfarin dose:      Target end date:   12/21/2016   INR check location:      Preferred lab:      Send INR reminders to:   [facility]EN[facility] HURTADO    Indications    Pulmonary embolism (H) [I26.99]           Comments:            Anticoagulation Care Providers     Provider Role Specialty Phone number    Julien Garnica MD Referring Family Medicine [phone]

## 2021-06-04 NOTE — PROGRESS NOTES
Assessment/Plan:    1. Polymyalgia rheumatica (H)  Polymyalgia rheumatica with significant improvement.  Patient had not decreased from 12.5 mg daily however and will decrease to 10 mg daily for the next 4 weeks then anticipate decreasing by 1 mg every 2 to 4 weeks following.  Follow-up in office in 3 months.  Check sedimentation rate today.  CBC repeated with likely mild leukocytosis associated with prednisone use anticipated.  - Erythrocyte Sedimentation Rate  - HM2(CBC w/o Differential)    2. Other pulmonary embolism without acute cor pulmonale, unspecified chronicity (H)  History of pulmonary emboli noted.  Continues chronic warfarin anticoagulation with INR 2.1 today.  - INR    3. CKD (chronic kidney disease) stage 3, GFR 30-59 ml/min (H)  CKD stage III.  Ensure stable.  - Basic Metabolic Panel    4. Normochromic normocytic anemia  Has had mild normochromic normocytic anemia.  Ensure stable.  - HM2(CBC w/o Differential)    5. Impaired fasting glucose  Impaired fasting glucose.  Is on prednisone currently 12.5 mg and completing slow taper for polymyalgia rheumatica management.  Recent A1c of 6.1% October 30, 2019 will reassess at follow-up in 3 months.  - Basic Metabolic Panel    6. Hypothyroidism, unspecified type  Check TSH.  Continues levothyroxine 137 mcg daily.  Refill provided per patient request.  - levothyroxine (SYNTHROID, LEVOTHROID) 137 MCG tablet; Take 1 tablet (137 mcg total) by mouth daily.  Dispense: 90 tablet; Refill: 3  - Thyroid Stimulating Hormone (TSH)            Subjective:    Nancy RYANN Oropeza is seen today for follow-up evaluation.  Diagnosed recently with polymyalgia rheumatica at office visit October 2, 2019.  Started on prednisone with nearly complete resolution of multiple myalgias.  Sedimentation rate was 58 initially with subsequent improvement noted.  Has gained 10 pounds.  Hungry all the time.  Some night sweats.  No cough.  Continues cholesterol management with pravastatin 80 mg  at bedtime.  History of pulmonary emboli.  Continues chronic warfarin anticoagulation.  Prior A1c increasing from 5.9% up to 6.1% 2019.  Thyroid improvement with dose adjustment of levothyroxine from 125 mcg up to 137 mcg daily with most recent TSH improvement from 12.9 down to 2.58.  Comprehensive review of systems as above otherwise all negative.       5 children - son with Factor V abnormality  14 grandchildren   9 great-grandchildren   Grew up in [place], [place] til age 12...   No smoke (quit 16 years ago after 1 ppd x 30+ years)   EtOH: occ wine   Mom -  88 COPD   Dad -  61 massive MI   1 bro -  67 blood clot (lung)   2 sis - one of which is  age 62 small cell lung CA (h/o smoker)  Surgeries: ventral hernia repair with muscle flap 10/4/12 with post-op complication of seroma with J.P. drain in place followed by interventional radiology q 2 weeks);  age 25; groin hernia age 3; CAPRICE-BSO  by Dr. Herbert Carmen (due to benign cyst x 2); right TKA 18 (Dr. Small)  Hospitalizations: as above   Work: retired  ([employer] as )     Past Surgical History:   Procedure Laterality Date     BREAST BIOPSY Left 1970s     HYSTERECTOMY  2010     OOPHORECTOMY          Family History   Problem Relation Age of Onset     Breast cancer Sister 75     Stomach cancer Paternal Grandfather      Lung cancer Sister         Past Medical History:   Diagnosis Date     Depression      Disease of thyroid gland      HTN (hypertension)      Hypercholesteremia      Morbid obesity (H)      Pulmonary emboli (H) 2015     Yeast infection         Social History     Tobacco Use     Smoking status: Former Smoker     Smokeless tobacco: Never Used     Tobacco comment: quite 20 yrs ago   Substance Use Topics     Alcohol use: No     Comment: occasionally     Drug use: No        Current Outpatient Medications   Medication Sig Dispense Refill     amLODIPine (NORVASC)  5 MG tablet Take 1 tablet (5 mg total) by mouth daily. 90 tablet 3     cholecalciferol, vitamin D3, 1,000 unit tablet Take 1,000 Units by mouth daily. 2 capsules once a day       citalopram (CELEXA) 40 MG tablet Take 1 tablet (40 mg total) by mouth daily. 90 tablet 3     furosemide (LASIX) 20 MG tablet Take 1 tablet (20 mg total) by mouth daily. 90 tablet 3     levothyroxine (SYNTHROID, LEVOTHROID) 137 MCG tablet Take 1 tablet (137 mcg total) by mouth daily. 90 tablet 3     lisinopril-hydrochlorothiazide (PRINZIDE,ZESTORETIC) 20-12.5 mg per tablet Take 2 tablets by mouth daily. 180 tablet 3     nystatin-triamcinolone (MYCOLOG) ointment apply topically to affected area twice daily x 14 days as needed 60 g 1     pravastatin (PRAVACHOL) 80 MG tablet TAKE ONE TABLET BY MOUTH   EVERY NIGHT AT BEDTIME 90 tablet 3     predniSONE (DELTASONE) 5 MG tablet Take 15 mg by mouth daily. 90 tablet 1     warfarin (COUMADIN) 2.5 MG tablet Take 1 tablet (2.5 mg) by mouth daily. Adjust dose based on INR results as directed. 90 tablet 1     No current facility-administered medications for this visit.           Objective:    Vitals:    12/31/19 1019   BP: 124/76   Patient Site: Right Arm   Patient Position: Sitting   Cuff Size: Adult Large   Pulse: 95   SpO2: 95%   Weight: (!) 309 lb 3.2 oz (140.3 kg)      Body mass index is 47.01 kg/m .    Alert.  No apparent distress.  Chest clear.  Cardiac exam regular.  Extremities warm and dry.  BMI 47.01.      This note has been dictated using voice recognition software and as a result may contain minor grammatical errors and unintended word substitutions.

## 2021-06-04 NOTE — TELEPHONE ENCOUNTER
RN cannot approve Refill Request    RN can NOT refill this medication med is not covered by policy/route to provider. Last office visit: 10/30/2019 Julien Garnica MD Last Physical: 7/3/2019 Last MTM visit: Visit date not found Last visit same specialty: 10/30/2019 Julien Garnica MD.  Next visit within 3 mo: Visit date not found  Next physical within 3 mo: Visit date not found      Marlene Soliz, [facility] Triage/Med Refill 12/4/2019    Requested Prescriptions   Pending Prescriptions Disp Refills     predniSONE (DELTASONE) 5 MG tablet 90 tablet 1     Sig: Take 15 mg by mouth daily.       There is no refill protocol information for this order

## 2021-06-04 NOTE — TELEPHONE ENCOUNTER
Central PA team  [phone]  Pool: HE PA MED ([id number])          PA has been initiated.       PA form completed and faxed insurance via Cover My Meds     Key:  [id number]     Medication:  Prednisone 5mg    Insurance:  [facility]         Response will be received via fax and may take up to 5-10 business days depending on plan

## 2021-06-04 NOTE — TELEPHONE ENCOUNTER
Test Results  Who is calling?: patient  Who ordered the test:  Julien Garnica MD  Type of test: Lab  Date of test:  12/31/19  Where was the test performed:  [facility]  What are your questions/concerns?:  Requesting results. Relayed all result comments from provider to the patient. Please mail test results to the patient.   Okay to leave a detailed message?:  Yes

## 2021-06-05 VITALS
WEIGHT: 293 LBS | HEIGHT: 67 IN | BODY MASS INDEX: 45.99 KG/M2 | HEART RATE: 88 BPM | SYSTOLIC BLOOD PRESSURE: 134 MMHG | DIASTOLIC BLOOD PRESSURE: 76 MMHG

## 2021-06-05 NOTE — TELEPHONE ENCOUNTER
New Appointment Needed  What is the reason for the visit:    Same Date/Next Day Appt Request  What is the reason for your visit?: Same day INR lab for today at 1:50pm    Provider Preference: Lab  How soon do you need to be seen?: today  Waitlist offered?: No  Okay to leave a detailed message:  Yes, not needed

## 2021-06-05 NOTE — TELEPHONE ENCOUNTER
RN cannot approve Refill Request    RN can NOT refill this medication med is not covered by policy/route to provider. Last office visit: 12/31/2019 Julien Garnica MD Last Physical: 7/3/2019 Last MTM visit: Visit date not found Last visit same specialty: 12/31/2019 Julien Garnica MD.  Next visit within 3 mo: Visit date not found  Next physical within 3 mo: Visit date not found      Rain Frye, [facility] Triage/Med Refill 1/18/2020    Requested Prescriptions   Pending Prescriptions Disp Refills     predniSONE (DELTASONE) 5 MG tablet [Pharmacy Med Name: predniSONE Oral Tablet 5 MG] 72 tablet 0     Sig: TAKE THREE TABLETS BY MOUTH DAILY       There is no refill protocol information for this order

## 2021-06-05 NOTE — TELEPHONE ENCOUNTER
ANTICOAGULATION  MANAGEMENT    Assessment     Today's INR result of 2.0 is Therapeutic (goal INR of 2.0-3.0)        Warfarin taken as previously instructed    No new diet changes affecting INR    No new medication/supplements affecting INR    Continues to tolerate warfarin with no reported s/s of bleeding or thromboembolism     Previous INR was Therapeutic    Plan:     Spoke with Nancy regarding INR result and instructed:     Warfarin Dosing Instructions:  Continue current warfarin dose    3.75 mg every Wed; 2.5 mg all other days         Instructed patient to follow up no later than: 4 weeks. Appt is made for 2/17.    Education provided: importance of taking warfarin as instructed    Nancy verbalizes understanding and agrees to warfarin dosing plan.    Instructed to call the [facility] for any changes, questions or concerns. (#[phone])   ?   Vincent Baird RN    Subjective/Objective:      Nancy Oropeza, a 74 y.o. female is on warfarin.     Nancy reports:     Home warfarin dose: verbally confirmed home dose with pt and updated on anticoagulation calendar     Missed doses: No     Medication changes:  No     S/S of bleeding or thromboembolism:  No     New Injury or illness:  No     Changes in diet or alcohol consumption:  No     Upcoming surgery, procedure or cardioversion:  No    Anticoagulation Episode Summary     Current INR goal:   2.0-3.0   TTR:   54.8 % (1 y)   Next INR check:   2/17/2020   INR from last check:   2.00 (1/20/2020)   Weekly max warfarin dose:      Target end date:   12/21/2016   INR check location:      Preferred lab:      Send INR reminders to:   XEN[facility] HURTADO    Indications    Pulmonary embolism (H) [I26.99]           Comments:            Anticoagulation Care Providers     Provider Role Specialty Phone number    Julien Garnica MD Referring Family Medicine [phone]

## 2021-06-06 NOTE — TELEPHONE ENCOUNTER
Who is calling:  Patient  Reason for Call:  Patient stated that she would like this prescription sent to [facility] instead of [id number].  Okay to leave a detailed message: Yes  [phone]

## 2021-06-06 NOTE — TELEPHONE ENCOUNTER
Upcoming Appointment Question  When is the appointment: 03/17/20  What is your appointment for?: Three months F/U appointment for [facility]  Who is your appointment scheduled with?: PCP only  What is your question/concern?: patient is questioning if Julien García traveled out of country in last three weeks . If provider traveled patient wants to cancel her appointment . Please call patient with information .  Okay to leave a detailed message?: No

## 2021-06-06 NOTE — TELEPHONE ENCOUNTER
ANTICOAGULATION  MANAGEMENT PROGRAM    Nancy Oropeza is overdue for INR check.     Left message to call and schedule INR appointment as soon as possible.      Kirsten Augustin RN

## 2021-06-06 NOTE — TELEPHONE ENCOUNTER
Reason contacted:  General question  Information relayed:  Notified Dr. Garnica has not traveled out of the country within the last three weeks.   Additional questions:  No  Further follow-up needed:  No  Okay to leave a detailed message:  No

## 2021-06-06 NOTE — TELEPHONE ENCOUNTER
RN cannot approve Refill Request    RN can NOT refill this medication med is not covered by policy/route to provider. Last office visit: 12/31/2019 Julien Garnica MD Last Physical: 7/3/2019 Last MTM visit: Visit date not found Last visit same specialty: 12/31/2019 Julien Garnica MD.  Next visit within 3 mo: Visit date not found  Next physical within 3 mo: Visit date not found      Evie Conn, [facility] Triage/Med Refill 2/15/2020    Requested Prescriptions   Pending Prescriptions Disp Refills     predniSONE (DELTASONE) 5 MG tablet [Pharmacy Med Name: predniSONE Oral Tablet 5 MG] 72 tablet 0     Sig: TAKE THREE TABLETS BY MOUTH DAILY       There is no refill protocol information for this order

## 2021-06-06 NOTE — TELEPHONE ENCOUNTER
Who is calling:  Patient   Reason for Call:  Patient has an appointment on March 17th with her PCP and states that a nurse told her she can get her INR done at that visit.  Patient declined to schedule an additional appointment   Date of last appointment with primary care: n/a  Okay to leave a detailed message: Yes

## 2021-06-06 NOTE — PROGRESS NOTES
Assessment/Plan:    1. Polymyalgia rheumatica (H)  Polymyalgia rheumatica, improved.  Sed rate pending.  Prednisone 10 mg will decrease to 9 mg daily x4 weeks then 8 mg daily x4 weeks etc.  Reassess at annual wellness visit in 4 months.  Mild white count elevation secondary to prednisone use.  Stable.  - Erythrocyte Sedimentation Rate  - predniSONE (DELTASONE) 1 MG tablet; Take 4 mg by mouth daily Use along with 5 mg tablet (9 mg total).  Decrease by 1 mg per month for polymyalgia rheumatica as directed..  Dispense: 360 tablet; Refill: 1    2. Impaired fasting glucose  Impaired fasting glucose.  Basic metabolic panel obtained.  A1c today 6.2% while on prednisone 10 mg daily with anticipated improvement as continued dose taper.  - Basic Metabolic Panel  - Glycosylated Hemoglobin A1c    3. CKD (chronic kidney disease) stage 3, GFR 30-59 ml/min (H)  CKD stage IIIa.  Ensure stable renal function.  Ensure adequate hydration.  - Basic Metabolic Panel    4. Normochromic normocytic anemia  CBC to ensure stable mild normochromic normocytic anemia.  - HM2(CBC w/o Differential)    5. Hypothyroidism, unspecified type  Continues levothyroxine 137 mcg daily.    6. Other pulmonary embolism without acute cor pulmonale, unspecified chronicity (H)  INR obtained.  INR 2.2.  Continue warfarin anticoagulation.  - INR    7.  Recurrent major depression  Continue citalopram 40 mg daily.  PHQ 9 questionnaire 6 out of 27 and TORIE 7 questionnaire 8 out of 21.      Subjective:    Nancy RYANN Oropeza is seen today for polymyalgia rheumatica follow-up.  Utilizing prednisone 10 mg daily.  Impaired fasting glucose associated with prednisone use and underlying morbid obesity.  CKD stage III.  Normochromic normocytic anemia.  Hypothyroidism with thyroid replacement with levothyroxine 137 mcg daily.  Pravastatin 80 mg at bedtime.  Continue citalopram 40 mg daily as well for depression which she feels is adequately controlled.  Amlodipine 5 mg daily and  lisinopril hydrochlorothiazide .5 use 2 tablets daily.  Denies cough, shortness of breath or fever.  No recent illness.  Comprehensive review of systems as above otherwise all negative.       5 children - son with Factor V abnormality  14 grandchildren   9 great-grandchildren   Grew up in [place], [place] til age 12...   No smoke (quit 16 years ago after 1 ppd x 30+ years)   EtOH: occ wine   Mom -  88 COPD   Dad -  61 massive MI   1 bro -  67 blood clot (lung)   2 sis - one of which is  age 62 small cell lung CA (h/o smoker)  Surgeries: ventral hernia repair with muscle flap 10/4/12 with post-op complication of seroma with J.P. drain in place followed by interventional radiology q 2 weeks);  age 25; groin hernia age 3; CAPRICE-BSO 2011 by Dr. Herbert Carmen (due to benign cyst x 2); right TKA 18 (Dr. Small)  Hospitalizations: as above   Work: retired  ([employer] as )     Past Surgical History:   Procedure Laterality Date     BREAST BIOPSY Left 1970s     HYSTERECTOMY  2010     OOPHORECTOMY          Family History   Problem Relation Age of Onset     Breast cancer Sister 75     Stomach cancer Paternal Grandfather      Lung cancer Sister         Past Medical History:   Diagnosis Date     Depression      Disease of thyroid gland      HTN (hypertension)      Hypercholesteremia      Morbid obesity (H)      Pulmonary emboli (H) 2015     Yeast infection         Social History     Tobacco Use     Smoking status: Former Smoker     Smokeless tobacco: Never Used     Tobacco comment: quite 20 yrs ago   Substance Use Topics     Alcohol use: No     Comment: occasionally     Drug use: No        Current Outpatient Medications   Medication Sig Dispense Refill     amLODIPine (NORVASC) 5 MG tablet Take 1 tablet (5 mg total) by mouth daily. 90 tablet 3     cholecalciferol, vitamin D3, 1,000 unit tablet Take 1,000 Units by mouth daily. 2 capsules once a day        citalopram (CELEXA) 40 MG tablet Take 1 tablet (40 mg total) by mouth daily. 90 tablet 3     furosemide (LASIX) 20 MG tablet Take 1 tablet (20 mg total) by mouth daily. 90 tablet 3     levothyroxine (SYNTHROID, LEVOTHROID) 137 MCG tablet Take 1 tablet (137 mcg total) by mouth daily. 90 tablet 3     lisinopriL-hydrochlorothiazide (PRINZIDE,ZESTORETIC) 20-12.5 mg per tablet Take 2 tablets by mouth daily. 180 tablet 3     pravastatin (PRAVACHOL) 80 MG tablet TAKE ONE TABLET BY MOUTH   EVERY NIGHT AT BEDTIME 90 tablet 3     predniSONE (DELTASONE) 5 MG tablet Take 10 mg by mouth daily .  Continue to wean from medication as directed for polymyalgia rheumatica management.. 60 tablet 2     warfarin ANTICOAGULANT (COUMADIN) 2.5 MG tablet Take 1 tablet (2.5 mg) by mouth daily. Adjust dose based on INR results as directed. 90 tablet 1     nystatin-triamcinolone (MYCOLOG) ointment apply topically to affected area twice daily x 14 days as needed 60 g 1     predniSONE (DELTASONE) 1 MG tablet Take 4 mg by mouth daily Use along with 5 mg tablet (9 mg total).  Decrease by 1 mg per month for polymyalgia rheumatica as directed.. 360 tablet 1     No current facility-administered medications for this visit.           Objective:    Vitals:    03/17/20 0957   BP: 130/70   Pulse: (!) 102   SpO2: 92%   Weight: (!) 314 lb (142.4 kg)      Body mass index is 47.74 kg/m .    Alert.  No apparent distress.  BMI 47.74.  Cooperative and forthcoming.  Does not appear depressed etc.  Smiling.  No active psychoses.      This note has been dictated using voice recognition software and as a result may contain minor grammatical errors and unintended word substitutions.

## 2021-06-06 NOTE — TELEPHONE ENCOUNTER
RN cannot approve Refill Request    RN can NOT refill this medication med is not covered by policy/route to provider and Protocol failed and NO refill given.    Sneha Crockett, [facility] Connection Triage/Med Refill 2/19/2020    Requested Prescriptions   Pending Prescriptions Disp Refills     warfarin ANTICOAGULANT (COUMADIN) 2.5 MG tablet 90 tablet 1     Sig: Take 1 tablet (2.5 mg) by mouth daily. Adjust dose based on INR results as directed.       Warfarin Refill Protocol  Failed - 2/16/2020 12:08 PM        Failed -  Route to appropriate pool/provider     Last Anticoagulation Summary:   Anticoagulation Episode Summary     Current INR goal:   2.0-3.0   TTR:   57.0 % (11.2 mo)   Next INR check:   2/17/2020   INR from last check:   2.00 (1/20/2020)   Weekly max warfarin dose:      Target end date:   12/21/2016   INR check location:      Preferred lab:      Send INR reminders to:   XENIA HURTADO    Indications    Pulmonary embolism (H) [I26.99]           Comments:            Anticoagulation Care Providers     Provider Role Specialty Phone number    Julien Garnica MD Referring Family Medicine [phone]                Passed - Provider visit in last year     Last office visit with prescriber/PCP: 12/31/2019 Julien Garnica MD OR same dept: 12/31/2019 Julien Garnica MD OR same specialty: 12/31/2019 Julien Garnica MD  Last physical: 7/3/2019 Last MTM visit: Visit date not found    Next appt within 3 mo: Visit date not found Next physical within 3 mo: Visit date not found  Prescriber OR PCP: Julien Garnica MD  Last diagnosis associated with med order: 1. Pulmonary embolism (H)  - warfarin ANTICOAGULANT (COUMADIN) 2.5 MG tablet; Take 1 tablet (2.5 mg) by mouth daily. Adjust dose based on INR results as directed.  Dispense: 90 tablet; Refill: 1    2. Polymyalgia rheumatica (H)  - predniSONE (DELTASONE) 5 MG tablet; Take 10 mg by mouth daily .  Continue to wean from medication as directed for polymyalgia  rheumatica management..  Dispense: 60 tablet; Refill: 2    If protocol passes may refill for 6 months if within 3 months of last provider visit (or a total of 9 months).          predniSONE (DELTASONE) 5 MG tablet 60 tablet 2     Sig: Take 10 mg by mouth daily .  Continue to wean from medication as directed for polymyalgia rheumatica management..       There is no refill protocol information for this order

## 2021-06-06 NOTE — TELEPHONE ENCOUNTER
Refill Approved    Rx renewed per Medication Renewal Policy. Medication was last renewed on 2/27/19.    Marlene Soliz, [facility] Connection Triage/Med Refill 3/4/2020     Requested Prescriptions   Pending Prescriptions Disp Refills     lisinopriL-hydrochlorothiazide (PRINZIDE,ZESTORETIC) 20-12.5 mg per tablet 180 tablet 3     Sig: Take 2 tablets by mouth daily.       Diuretics/Combination Diuretics Refill Protocol  Passed - 3/3/2020  9:17 AM        Passed - Visit with PCP or prescribing provider visit in past 12 months     Last office visit with prescriber/PCP: 12/31/2019 Julien Garnica MD OR same dept: 12/31/2019 Julien Garnica MD OR same specialty: 12/31/2019 Julien Garnica MD  Last physical: 7/3/2019 Last MTM visit: Visit date not found   Next visit within 3 mo: Visit date not found  Next physical within 3 mo: Visit date not found  Prescriber OR PCP: Julien Garnica MD  Last diagnosis associated with med order: 1. HTN (hypertension)  - lisinopriL-hydrochlorothiazide (PRINZIDE,ZESTORETIC) 20-12.5 mg per tablet; Take 2 tablets by mouth daily.  Dispense: 180 tablet; Refill: 3    If protocol passes may refill for 12 months if within 3 months of last provider visit (or a total of 15 months).             Passed - Serum Potassium in past 12 months      Lab Results   Component Value Date    Potassium 3.9 12/31/2019             Passed - Serum Sodium in past 12 months      Lab Results   Component Value Date    Sodium 141 12/31/2019             Passed - Blood pressure on file in past 12 months     BP Readings from Last 1 Encounters:   12/31/19 124/76             Passed - Serum Creatinine in past 12 months      Creatinine   Date Value Ref Range Status   12/31/2019 1.08 0.60 - 1.10 mg/dL Final

## 2021-06-07 NOTE — TELEPHONE ENCOUNTER
Anticoagulation Annual Referral Renewal Review    Nancy Oropeza's chart reviewed for annual renewal of referral to anticoagulation monitoring.        Criteria for anticoagulation nurse and/or pharmacist renewal met   Warfarin indication: PE Yes , DVT/PE with previous provider documentation patient to be on extended anticoagulation   Current with INR monitoring/compliant Yes Yes   Date of last office visit 3/17/20 Yes, had office visit within last year   Time in Therapeutic Range (TTR) 66 % Yes, TTR > 60%       Nancy Oropeza met all criteria for anticoagulation management program initiated renewal.  New INR standing orders and anticoagulation referral renewal placed.      Vincent Baird RN  1:43 PM

## 2021-06-07 NOTE — TELEPHONE ENCOUNTER
ANTICOAGULATION  MANAGEMENT PROGRAM    Nancy Oropeza is overdue for INR check.     Spoke with pt and scheduled INR appointment on 4/17.      Vincent Baird RN

## 2021-06-07 NOTE — TELEPHONE ENCOUNTER
ANTICOAGULATION  MANAGEMENT    Assessment     Today's INR result of 1.7 is Subtherapeutic (goal INR of 2.0-3.0)        More warfarin taken than instructed which may be affecting INR    Increased greens/vitamin K intake may be affecting INR- will cut back to normal greens.     No new medication/supplements affecting INR    Continues to tolerate warfarin with no reported s/s of bleeding or thromboembolism     Previous INR was Therapeutic    Plan:     Spoke with Nancy regarding INR result and instructed:     Warfarin Dosing Instructions:  Take booster dose of 3.75 mg today only then change warfarin dose to 2.5 mg daily (same amount pt took the past 7 days).    Instructed patient to follow up no later than: 2 weeks.     Education provided: importance of taking warfarin as instructed    Nancy verbalizes understanding and agrees to warfarin dosing plan.    Instructed to call the [facility] for any changes, questions or concerns. (#[phone])   ?   Vincent Baird RN    Subjective/Objective:      Nancy Oropeza, a 74 y.o. female is on warfarin.     Nancy reports:     Home warfarin dose: verbally confirmed home dose with pt and updated on anticoagulation calendar     Missed doses: No     Medication changes:  No     S/S of bleeding or thromboembolism:  No     New Injury or illness:  No     Changes in diet or alcohol consumption:  Yes: had more broccoli     Upcoming surgery, procedure or cardioversion:  No    Anticoagulation Episode Summary     Current INR goal:   2.0-3.0   TTR:   66.3 % (1 y)   Next INR check:   5/1/2020   INR from last check:   1.70! (4/17/2020)   Weekly max warfarin dose:      Target end date:   12/21/2016   INR check location:      Preferred lab:      Send INR reminders to:   XENIA HURTADO    Indications    Pulmonary embolism (H) [I26.99]           Comments:            Anticoagulation Care Providers     Provider Role Specialty Phone number    Julien Garnica MD Referring Family  Medicine [phone]

## 2021-06-07 NOTE — TELEPHONE ENCOUNTER
Who is calling:  Patient  Reason for Call:  Patient stated she never received her results on her blood work from 3/17/20. Patient was informed of her results from the letter sent on 3/17/20.    Patient stated she does not use [employer] anymore because she does not have a computer anymore. Patient stated she would like the letter mailed to her.  Date of last appointment with primary care: 3/17/20  Okay to leave a detailed message: No call back needed.

## 2021-06-07 NOTE — TELEPHONE ENCOUNTER
ANTICOAGULATION  MANAGEMENT    Assessment     Today's INR result of 1.9 is Subtherapeutic (goal INR of 2.0-3.0)        Warfarin taken as previously instructed    No new diet changes affecting INR    No new medication/supplements affecting INR    Continues to tolerate warfarin with no reported s/s of bleeding or thromboembolism     Previous INR was Subtherapeutic    Plan:     Spoke with Nancy regarding INR result and instructed:     Warfarin Dosing Instructions:  Change warfarin dose to 3.75 mg daily on Fri; and 2.5 mg daily rest of week  (7 % change)    Instructed patient to follow up no later than: 2 weeks.     Education provided: importance of taking warfarin as instructed    Nancy verbalizes understanding and agrees to warfarin dosing plan.    Instructed to call the [facility] for any changes, questions or concerns. (#[phone])   ?   Vincent Baird RN    Subjective/Objective:      Nancy Oropeza, a 74 y.o. female is on warfarin.     Nancy reports:     Home warfarin dose: template incorrect; verbally confirmed home dose with pt and updated on anticoagulation calendar     Missed doses: No     Medication changes:  No     S/S of bleeding or thromboembolism:  No     New Injury or illness:  No     Changes in diet or alcohol consumption:  No     Upcoming surgery, procedure or cardioversion:  No    Anticoagulation Episode Summary     Current INR goal:   2.0-3.0   TTR:   62.7 % (1 y)   Next INR check:   5/15/2020   INR from last check:   1.90! (5/1/2020)   Weekly max warfarin dose:      Target end date:   12/21/2016   INR check location:      Preferred lab:      Send INR reminders to:   XENIA HURTADO    Indications    Pulmonary embolism (H) [I26.99]           Comments:            Anticoagulation Care Providers     Provider Role Specialty Phone number    Julien Garnica MD Referring Family Medicine [phone]

## 2021-06-08 NOTE — TELEPHONE ENCOUNTER
ANTICOAGULATION  MANAGEMENT    Assessment     Today's INR result of 4.1 is Supratherapeutic (goal INR of 2.0-3.0)        More warfarin taken than instructed which may be affecting INR    No new diet changes affecting INR    No new medication/supplements affecting INR    Continues to tolerate warfarin with no reported s/s of bleeding or thromboembolism     Previous INR was Supratherapeutic    Plan:     Spoke with Nancy regarding INR result and instructed:     Warfarin Dosing Instructions:  Hold today only then continue current warfarin dose 1.25 mg daily on Sat; and 2.5 mg daily rest of week.    Instructed patient to follow up no later than: 10 days. Appt is made for 6/1.    Education provided: importance of following up for INR monitoring at instructed interval and importance of taking warfarin as instructed    Nancy verbalizes understanding and agrees to warfarin dosing plan.    Instructed to call the [facility] for any changes, questions or concerns. (#[phone])   ?   Vincent Baird RN    Subjective/Objective:      Nancy Oropeza, a 75 y.o. female is on warfarin.     Nancy reports:     Home warfarin dose: verbally confirmed home dose with pt and updated on anticoagulation calendar     Missed doses: No     Medication changes:  No     S/S of bleeding or thromboembolism:  No     New Injury or illness:  No     Changes in diet or alcohol consumption:  No     Upcoming surgery, procedure or cardioversion:  No    Anticoagulation Episode Summary     Current INR goal:   2.0-3.0   TTR:   58.8 % (1 y)   Next INR check:   6/1/2020   INR from last check:   4.10! (5/22/2020)   Weekly max warfarin dose:      Target end date:   12/21/2016   INR check location:      Preferred lab:      Send INR reminders to:   XENIA HURTADO    Indications    Pulmonary embolism (H) [I26.99]           Comments:            Anticoagulation Care Providers     Provider Role Specialty Phone number    Julien Garnica MD Referring  Family Medicine [phone]

## 2021-06-08 NOTE — TELEPHONE ENCOUNTER
ANTICOAGULATION  MANAGEMENT    Assessment     Today's INR result of 2.6 is Therapeutic (goal INR of 2.0-3.0)        Warfarin taken as previously instructed    No new diet changes affecting INR    No new medication/supplements affecting INR    Continues to tolerate warfarin with no reported s/s of bleeding or thromboembolism     Previous INR was Supratherapeutic    Plan:     Spoke with Nancy regarding INR result and instructed:     Warfarin Dosing Instructions:  Continue current warfarin dose    1.25 mg every Mon, Wed, Sat; 2.5 mg all other days         Instructed patient to follow up no later than: 2 weeks.    Education provided: importance of taking warfarin as instructed    Nancy verbalizes understanding and agrees to warfarin dosing plan.    Instructed to call the [facility] for any changes, questions or concerns. (#[phone])   ?   Vincent Baird RN    Subjective/Objective:      Nancy Oropeza, a 75 y.o. female is on warfarin.     Nancy reports:     Home warfarin dose: verbally confirmed home dose with pt and updated on anticoagulation calendar     Missed doses: No     Medication changes:  No     S/S of bleeding or thromboembolism:  No     New Injury or illness:  No     Changes in diet or alcohol consumption:  No     Upcoming surgery, procedure or cardioversion:  No    Anticoagulation Episode Summary     Current INR goal:   2.0-3.0   TTR:   52.6 % (1 y)   Next INR check:   7/1/2020   INR from last check:   2.60 (6/17/2020)   Weekly max warfarin dose:      Target end date:   12/21/2016   INR check location:      Preferred lab:      Send INR reminders to:   XENIA HURTADO    Indications    Pulmonary embolism (H) [I26.99]           Comments:            Anticoagulation Care Providers     Provider Role Specialty Phone number    Julien Garnica MD Referring Family Medicine [phone]

## 2021-06-08 NOTE — TELEPHONE ENCOUNTER
ANTICOAGULATION  MANAGEMENT    Assessment     Today's INR result of 4.0 is Supratherapeutic (goal INR of 2.0-3.0)        Less warfarin taken than instructed which may be affecting INR    No new diet changes affecting INR    No new medication/supplements affecting INR    Continues to tolerate warfarin with no reported s/s of bleeding or thromboembolism     Previous INR was Subtherapeutic    Plan:     Spoke with Nancy regarding INR result and instructed:     Warfarin Dosing Instructions: hold warfarin today then  Change warfarin dose to 1.25 mg daily on Sat; and 2.5 mg daily rest of week  (13 % change)    Instructed patient to follow up no later than: 7-10 days    Education provided: importance of therapeutic range and target INR goal and significance of current INR result    Nancy verbalizes understanding and agrees to warfarin dosing plan.    Instructed to call the [facility] for any changes, questions or concerns. (#[phone])   ?   Kisrten Augustin RN    Subjective/Objective:      Nancy Oropeza, a 75 y.o. female is on warfarin.     Nancy reports:     Home warfarin dose: verbally confirmed home dose with Nancy and updated on anticoagulation calendar     Missed doses: No     Medication changes:  No     S/S of bleeding or thromboembolism:  No     New Injury or illness:  No     Changes in diet or alcohol consumption:  No     Upcoming surgery, procedure or cardioversion:  No    Anticoagulation Episode Summary     Current INR goal:   2.0-3.0   TTR:   60.7 % (1 y)   Next INR check:   5/25/2020   INR from last check:   4.00! (5/15/2020)   Weekly max warfarin dose:      Target end date:   12/21/2016   INR check location:      Preferred lab:      Send INR reminders to:   XENIA HURTADO    Indications    Pulmonary embolism (H) [I26.99]           Comments:            Anticoagulation Care Providers     Provider Role Specialty Phone number    Julien Garnica MD Referring Family Medicine [phone]

## 2021-06-08 NOTE — TELEPHONE ENCOUNTER
ANTICOAGULATION  MANAGEMENT    Assessment     Today's INR result of 4.2 is Supratherapeutic (goal INR of 2.0-3.0)        Warfarin taken as previously instructed    No new diet changes affecting INR    No new medication/supplements affecting INR    Continues to tolerate warfarin with no reported s/s of bleeding or thromboembolism     Previous INR was Supratherapeutic    Plan:     Spoke with Nancy regarding INR result and instructed:     Warfarin Dosing Instructions:  Hold today only then change warfarin dose to 1.25 mg daily on Mon, Wed, Sat; and 2.5 mg daily rest of week  (15 % change)    Instructed patient to follow up no later than: 10 days.     Education provided: importance of following up for INR monitoring at instructed interval and importance of taking warfarin as instructed    Nancy verbalizes understanding and agrees to warfarin dosing plan.    Instructed to call the [facility] for any changes, questions or concerns. (#[phone])   ?   Vincent Baird RN    Subjective/Objective:      Nancy Oropeza, a 75 y.o. female is on warfarin.     Nancy reports:     Home warfarin dose: verbally confirmed home dose with pt and updated on anticoagulation calendar     Missed doses: No     Medication changes:  No     S/S of bleeding or thromboembolism:  No     New Injury or illness:  No     Changes in diet or alcohol consumption:  No     Upcoming surgery, procedure or cardioversion:  No    Anticoagulation Episode Summary     Current INR goal:   2.0-3.0   TTR:   55.3 % (1 y)   Next INR check:   6/15/2020   INR from last check:   4.20! (6/4/2020)   Weekly max warfarin dose:      Target end date:   12/21/2016   INR check location:      Preferred lab:      Send INR reminders to:   XENIA HURTADO    Indications    Pulmonary embolism (H) [I26.99]           Comments:            Anticoagulation Care Providers     Provider Role Specialty Phone number    Julien Garnica MD Referring Family Medicine [phone]

## 2021-06-09 ENCOUNTER — COMMUNICATION - HEALTHEAST (OUTPATIENT)
Dept: FAMILY MEDICINE | Facility: CLINIC | Age: 76
End: 2021-06-09

## 2021-06-09 ENCOUNTER — RECORDS - HEALTHEAST (OUTPATIENT)
Dept: ADMINISTRATIVE | Facility: OTHER | Age: 76
End: 2021-06-09

## 2021-06-09 DIAGNOSIS — I26.99 PULMONARY EMBOLISM, UNSPECIFIED CHRONICITY, UNSPECIFIED PULMONARY EMBOLISM TYPE, UNSPECIFIED WHETHER ACUTE COR PULMONALE PRESENT (H): ICD-10-CM

## 2021-06-09 LAB — INR PPP: 5.2 (ref 0.9–1.1)

## 2021-06-09 NOTE — PROGRESS NOTES
Clinic Care Coordination Contact  Community Health Worker Initial Outreach            Patient accepts CC:No, I'm good. I'm so glad to be home. I don't want to go back there, it wasn't good. The nursing staff were very good. It was just hard being there.   But I'm feeling good. She asked about her call out to Dr. Garnica. I let her know it is still pending, but I can see the note in the chart. She thanked me for letting her know.

## 2021-06-09 NOTE — PROGRESS NOTES
Internal Medicine Office Visit  [facility] [place]  Patient Name: Nancy Oropeza  Patient Age: 75 y.o.  YOB: 1945  MRN: [id number]    Date of Visit: 7/10/2020  Reason for Office Visit:   Chief Complaint   Patient presents with     Cellulitis     left leg. Follow up from [facility]. Still having swelling in some of her toes. Patient is elevating. Still taking antibitotic,            Assessment / Plan / Medical Decision Makin. Pain in both feet  - Ambulatory referral to Podiatry    2. Cellulitis of toe of left foot  - cefTRIAXone (ROCEPHIN) injection 1 g  Patient is advised to continue Augmentin as prescribed she will follow-up on Monday and video visit, sooner if needed.  Patient advised if symptoms persist, worsen or new symptoms arise they are to seek medical care.  All patients questions addressed. Patient verbalized understanding and agreement with plan.       Orders Placed This Encounter   Procedures     Ambulatory referral to Podiatry   Followup: Return in about 4 days (around 2020). earlier if needed.    Health Maintenance Review  Health Maintenance   Topic Date Due     DEPRESSION ACTION PLAN  1945     DXA SCAN  2008     ZOSTER VACCINES (2 of 3) 2009     MEDICARE ANNUAL WELLNESS VISIT  2018     INFLUENZA VACCINE RULE BASED (1) 2020     FALL RISK ASSESSMENT  10/30/2020     TD 18+ HE  2022     ADVANCE CARE PLANNING  2022     LIPID  10/30/2024     PNEUMOCOCCAL IMMUNIZATION 65+ HIGH/HIGHEST RISK  Completed         I am having Lesly Oropeza maintain her cholecalciferol (vitamin D3), amLODIPine, pravastatin, citalopram, levothyroxine, warfarin ANTICOAGULANT, lisinopriL-hydrochlorothiazide, predniSONE, furosemide, predniSONE, and amoxicillin-clavulanate. We administered cefTRIAXone.      HPI:  Nancy Oropeza is a 75 y.o. year old who presents to the office today with chronic conditions including Depression, the  hypertension, hypothyroidism, hypercholesterolemia, morbid obesity, pulmonary embolism.  Patient was seen in walk-in care on 7/7/2020 due to foot pain diagnosed with cellulitis she was given Augmentin 875 twice daily x10.  Patient reports that improvement was noted except for on the third toe left foot and of area of dorsal aspect left foot.  Patient reports that she does not walk on her forefoot more around the pads of her foot and has increased pain that runs from the plantar to dorsal aspect of her foot due to altered gait.  She indicates that she feels like she has socks on her toes.  She has not been followed by podiatry.       Review of Systems- pertinent positive in bold:  Constitutional: Fever, chills, night sweats, fainting, weight change, fatigue, dizziness, sleeping difficulties, loud snoring/pauses in breathing  Eyes: change in vision, blurred or double vision, redness/eye pain  Ears, nose, mouth, throat: change in hearing, ear pain, hoarseness, difficulty swallowing, sores in the mouth or throat  Respiratory: shortness of breath, cough, bloody sputum, wheezing  Cardiovascular: chest pain, palpitations   Gastrointestinal: abdominal pain, heartburn/indigestion, nausea/vomiting, change in appetite, change in bowel habits, constipation or diarrhea, rectal bleeding/dark stools, difficulty swallowing  Urinary: painful urination, frequent urination, urinary urgency/incontinence, blood in urine/dark urine, nocturia (new or increasing), muscle cramps, swelling of hands, feet, ankles, leg pain/redness  Skin: See HPI  Hematologic/lymphatic: swollen lymph glands, abnormal bruising/bleeding  Endocrine: excessive thirst/urination, cold or heat intolerance  Neurologic/emotional: worrisome memory change, numbness/tingling, anxiety, mood swings      Current Scheduled Meds:  Outpatient Encounter Medications as of 7/10/2020   Medication Sig Dispense Refill     amLODIPine (NORVASC) 5 MG tablet Take 1 tablet (5 mg total) by  mouth daily. 90 tablet 3     amoxicillin-clavulanate (AUGMENTIN) 875-125 mg per tablet Take 1 tablet by mouth 2 (two) times a day for 10 days. 20 tablet 0     cholecalciferol, vitamin D3, 1,000 unit tablet Take 1,000 Units by mouth daily. 2 capsules once a day       citalopram (CELEXA) 40 MG tablet Take 1 tablet (40 mg total) by mouth daily. 90 tablet 3     furosemide (LASIX) 20 MG tablet TAKE 1 TABLET DAILY 90 tablet 3     levothyroxine (SYNTHROID, LEVOTHROID) 137 MCG tablet Take 1 tablet (137 mcg total) by mouth daily. 90 tablet 3     lisinopriL-hydrochlorothiazide (PRINZIDE,ZESTORETIC) 20-12.5 mg per tablet Take 2 tablets by mouth daily. 180 tablet 3     pravastatin (PRAVACHOL) 80 MG tablet TAKE ONE TABLET BY MOUTH   EVERY NIGHT AT BEDTIME 90 tablet 3     predniSONE (DELTASONE) 1 MG tablet Take 4 mg by mouth daily Use along with 5 mg tablet (9 mg total).  Decrease by 1 mg per month for polymyalgia rheumatica as directed.. (Patient taking differently: Take 1 mg by mouth daily Use along with 5 mg tablet (9 mg total).  Decrease by 1 mg per month for polymyalgia rheumatica as directed..) 360 tablet 1     predniSONE (DELTASONE) 5 MG tablet TAKE 2 TABLETS ( 10 MG ) DAILY. CONTINUE TO WEAN FROM MEDICATION AS DIRECTED FOR POLYMYALGIA RHEUMATICA MANAGEMENT. (Patient taking differently: Take 5 mg by mouth daily .) 60 tablet 11     warfarin ANTICOAGULANT (COUMADIN) 2.5 MG tablet Take 1 tablet (2.5 mg) by mouth daily. Adjust dose based on INR results as directed. 90 tablet 1     Facility-Administered Encounter Medications as of 7/10/2020   Medication Dose Route Frequency Provider Last Rate Last Dose     [COMPLETED] cefTRIAXone (ROCEPHIN) injection 1 g  1 g Intramuscular Once Khushboo Lind CNP   1 g at 07/10/20 2524     Past Medical History:   Diagnosis Date     Depression      Disease of thyroid gland      HTN (hypertension)      Hypercholesteremia      Morbid obesity (H)      Pulmonary emboli (H) 12/14/2015      "Yeast infection      Past Surgical History:   Procedure Laterality Date     BREAST BIOPSY Left 1970s     HYSTERECTOMY  2010     OOPHORECTOMY  2010     Social History     Tobacco Use     Smoking status: Former Smoker     Smokeless tobacco: Never Used     Tobacco comment: quite 20 yrs ago   Substance Use Topics     Alcohol use: No     Comment: occasionally     Drug use: No     Family History   Problem Relation Age of Onset     Breast cancer Sister 75     Stomach cancer Paternal Grandfather      Lung cancer Sister      Social History     Social History Narrative    Patient arrived in the ED with her sister Kassy 09/10/17       Objective / Physical Examination:  Vitals:    07/10/20 1528   BP: 124/80   Pulse: 94   Resp: 22   Temp: 97.7  F (36.5  C)   SpO2: 94%   Weight: (!) 319 lb (144.7 kg)   Height: 5' 8\" (1.727 m)     Wt Readings from Last 3 Encounters:   07/10/20 (!) 319 lb (144.7 kg)   07/07/20 (!) 322 lb 6.4 oz (146.2 kg)   03/17/20 (!) 314 lb (142.4 kg)     Body mass index is 48.5 kg/m .     General Appearance: Alert and oriented, cooperative, affect appropriate, speech clear, in no apparent distress  Head: Normocephalic, atraumatic  Eyes:  Conjunctivae clear and sclerae non-icteric  Lungs:  Normal inspiratory and expiratory effort  Feet: Patient notes of a mild 1 swelling bilateral feet she has erythema at the base of the third toe left foot along with the third toe she has discomfort on the plantar and dorsal aspect of the left foot at the base of the third toe  Neuro: Alert and oriented, follows commands appropriately.     Us Foot Non Vascular Left    Result Date: 7/7/2020  EXAM DATE:         07/07/2020 EXAM: US EXTREMITY LIMITED LEFT LOCATION: [facility] DATE/TIME: 7/7/2020 2:00 PM INDICATION: Swelling and pain over the pad of the left plantar surface. R/o mass or abscess. COMPARISON: None. TECHNIQUE: Routine. FINDINGS: No foreign material or abscess seen. There is nonmarginated diminished " echogenicity within the superficial foot tissue which is most suggestive of edema or phlegmon. IMPRESSION: 1.  Plantar foot edema or phlegmon in the area of pain. 2.  No foreign material seen. 3.  No abscess seen.     Us Venous Leg Left    Result Date: 7/7/2020  EXAM DATE:         07/07/2020 EXAM: US LOWER EXTREMITY VEINS LTD LEFT LOCATION: [facility] DATE/TIME: 7/7/2020 2:45 PM INDICATION: Foot swelling. History of DVT. On blood thinners. COMPARISON: 12/14/2015 TECHNIQUE: Venous Duplex ultrasound of the left lower extremity with and without compression, augmentation and duplex. Color flow and spectral Doppler with waveform analysis performed. FINDINGS: Exam includes the common femoral, femoral, popliteal, and contralateral common femoral veins as well as segmentally visualized deep calf veins and greater saphenous vein. LEFT: No deep vein thrombosis. No superficial thrombophlebitis. No popliteal cyst. IMPRESSION: 1.  No deep venous thrombosis in the left lower extremity.     Recent Results (from the past 240 hour(s))   INR   Result Value Ref Range    INR 2.00 (H) 0.90 - 1.10           Khushboo Lind, CNP

## 2021-06-09 NOTE — TELEPHONE ENCOUNTER
Dr. Garnica can you call patient per request? She would like to discuss with you stopping the prednisone she takes for polymyalgia rheumatics. She reports since starting this medication she has had problems with her blood sugar and weight gain. She would really like to hear from you.    Phone number: [phone].        Patient notified of the below information regarding dexamethasone and warfarin.   She is scheduled for a lab only appointment on Monday to have her INR checked.   She is to call [phone] upon her arrival to the clinic and wait in her car.  Staff will need to wear full PPE and bring patient to the old physical therapy office to have her INR checked.      She reports her first symptoms started on 7/16/2020. She sounded well on the phone.

## 2021-06-09 NOTE — PROGRESS NOTES
"Nancy Oropeza is a 75 y.o. female who is being evaluated via a billable video visit.      The patient has been notified of following:     \"This video visit will be conducted via a call between you and your physician/provider. We have found that certain health care needs can be provided without the need for an in-person physical exam.  This service lets us provide the care you need with a video conversation.  If a prescription is necessary we can send it directly to your pharmacy.  If lab work is needed we can place an order for that and you can then stop by our lab to have the test done at a later time.    Video visits are billed at different rates depending on your insurance coverage. Please reach out to your insurance provider with any questions.    If during the course of the call the physician/provider feels a video visit is not appropriate, you will not be charged for this service.\"    Patient has given verbal consent to a Video visit? Yes  How would you like to obtain your AVS? AVS Preference: Mail a copy.  If dropped by the video visit, the video invitation should be sent to: Text to cell phone: [phone]  Will anyone else be joining your video visit? No        Video Start Time: 2:22 PM    Additional provider notes: GENERAL: Healthy, alert and no distress  EYES: Eyes grossly normal to inspection. No discharge or erythema, or obvious scleral/conjunctival abnormalities.  RESP: No audible wheeze, cough, or visible cyanosis.  No visible retractions or increased work of breathing.    NEURO: Cranial nerves grossly intact. Mentation and speech appropriate for age.  PSYCH: Mentation appears normal, affect normal/bright, judgement and insight intact, normal speech and appearance well-groomed    Video visit completed.  Patient states feeling awful.  Has had fever with chills.  Describes T-max is 101 with current temperature 98.9.  Using Tylenol Extra Strength 2 tablets every 6 hours.  Has been treated July 7 " for left foot cellulitis and started on Augmentin.  Subsequently seen in follow-up July 10 and received ceftriaxone injection.  3 days after starting Augmentin had developed diarrhea.  Has felt like poor appetite.  Headache.  Patient's daughter with recent diagnosis of Covid-19.  Last exposure perhaps a week and a half ago however additional daughter with more recent exposure who lives with her.  Patient treated for polymyalgia rheumatica and has been using prednisone 6 mg daily with benefits described.  No current issues with cough or shortness of breath.  Denies rash.  Remains on chronic medications including lisinopril hydrochlorothiazide 20/12.5 using 2 tablets daily plus amlodipine 5 mg daily for hypertension.  Thyroid replacement with levothyroxine 137 mcg daily.  Virtual visit completed yesterday with suspected COVID-19 virus infection with COVID-19 testing scheduled for tomorrow.  Comprehensive review of systems as above otherwise all negative.      1. Fever, unspecified fever cause  Fever noted.  T-max 101 described.  Tylenol Extra Strength using 2 tablets every 6 hours as needed as analgesic antipyretic management with benefit.    2. Suspected COVID-19 virus infection  Patient's daughter with Covid-19 infection.  Her other daughter currently has testing pending.  Due to significant comorbidities did recommend further assessment for immediate Covid-19 testing and face-to-face assessment to determine etiology of current symptoms of fever, headache, etc.    3. Cellulitis, unspecified cellulitis site  Left foot cellulitis and is completing Augmentin.  Had received ceftriaxone.  Diarrhea associated with Augmentin use likely.  Fever with chills.  Recommend assessment and consideration for blood cultures etc.    4. Polymyalgia rheumatica (H)  Has been utilizing prednisone 6 mg daily with benefit.  Did not take morning dose today.    5. Impaired fasting glucose  Impaired fasting glucose associated with chronic  prednisone use for polymyalgia rheumatica.  Most recent A1c is 6.2% March 17, 2020.           Us Foot Non Vascular Left  Result Date: 7/7/2020    EXAM DATE:         07/07/2020 EXAM: US EXTREMITY LIMITED LEFT LOCATION: [facility] DATE/TIME: 7/7/2020 2:00 PM INDICATION: Swelling and pain over the pad of the left plantar surface. R/o mass or abscess. COMPARISON: None. TECHNIQUE: Routine. FINDINGS: No foreign material or abscess seen. There is nonmarginated diminished echogenicity within the superficial foot tissue which is most suggestive of edema or phlegmon. IMPRESSION: 1.  Plantar foot edema or phlegmon in the area of pain. 2.  No foreign material seen. 3.  No abscess seen.          Us Venous Leg Left  Result Date: 7/7/2020    EXAM DATE:         07/07/2020 EXAM: US LOWER EXTREMITY VEINS LTD LEFT LOCATION: [facility] DATE/TIME: 7/7/2020 2:45 PM INDICATION: Foot swelling. History of DVT. On blood thinners. COMPARISON: 12/14/2015 TECHNIQUE: Venous Duplex ultrasound of the left lower extremity with and without compression, augmentation and duplex. Color flow and spectral Doppler with waveform analysis performed. FINDINGS: Exam includes the common femoral, femoral, popliteal, and contralateral common femoral veins as well as segmentally visualized deep calf veins and greater saphenous vein. LEFT: No deep vein thrombosis. No superficial thrombophlebitis. No popliteal cyst. IMPRESSION: 1.  No deep venous thrombosis in the left lower extremity.       Video-Visit Details    Type of service:  Video Visit    Video End Time (time video stopped): 2:39 PM  Originating Location (pt. Location): Home    Distant Location (provider location):  [facility]     Platform used for Video Visit: Suki Garnica MD   [facility] - Axel Valente, Benjamin Castanon, Tiffany, Ramiro Suggs  Office Number: [phone]    Initial Consult Note    CHIEF COMPLAINT: Patient is a 47y old  Female who presents with a chief complaint of Slurred Speech, left arm weakness (17 Sep 2018 09:42)      HPI:  47 year old female with PMH of asthma and fibroids presenting with a chief complaint of slurred speech, left arm weakness and unsteady gait. Patient states around 4PM patient noticed when she spoke her words sounded incoherent and her left arm began to feel weak. Patient's  observed the right side of her face began to droop and as she walked towards her  she her gait was unsteady but was able to localize whether her left or right leg felt weak. Patient stated symptoms continued for about 5-10 minutes and then resolved. Patient denies previous episodes in the past. At time of interview patient denied any light headedness, weakness, difficulty swallowing, chest pain, difficulty breathing, or paresthesias. Patient endorses on and off cough for which she was prescribed a 5 day course of prednisone and symbicort inhaler (started course yesterday). Patient starting L-estro 2 weeks ago for abnormal uterine bleeding and smoked 1/2 pack cigarettes/day.     In the ED, HR; 82, BP: 124/50, RR: 18, T:98, 100% on RA  Labs significant for microcytic anemia - hemoglobin of 8.5 with MCV of 71, normal D-dimer of 187, hyopkalemia of 3.4, Hermes WNL, LFTs WNL  EKG: NSR, T wave inversions in V1-V3.  CXR: pending official read, on personal review appears unchanged from previous done on 2017.  CT Head non contrast: Extensive bilateral basal ganglia and cerebellar dentate nuclei calcifications. No acute hemorrhage, edema territorial infarct, or mass effect.    Patient given 40 K Cl, one dose aspirin. (16 Sep 2018 19:03)    On my interview, she reports that her symptoms have resolved.  She has no cardiac history, and has never seen a cardiologist.  SHe denies chest pain, dyspnea, PND, orthopnea, LE swelling, dizziness, or syncope.       PAST MEDICAL & SURGICAL HISTORY:  Fibroids  Asthma  H/O:  section      SOCIAL HISTORY:  Current smoker    FAMILY HISTORY:  No pertinent family history in first degree relatives    No family history of acute MI or sudden cardiac death.    MEDICATIONS  (STANDING):  aspirin enteric coated 325 milliGRAM(s) Oral daily  buDESOnide 160 MICROgram(s)/formoterol 4.5 MICROgram(s) Inhaler 2 Puff(s) Inhalation two times a day  enoxaparin Injectable 40 milliGRAM(s) SubCutaneous every 24 hours  ferrous    sulfate 325 milliGRAM(s) Oral daily  influenza   Vaccine 0.5 milliLiter(s) IntraMuscular once  loratadine 10 milliGRAM(s) Oral daily  montelukast 10 milliGRAM(s) Oral daily  predniSONE   Tablet 20 milliGRAM(s) Oral daily    MEDICATIONS  (PRN):  ALBUTerol    90 MICROgram(s) HFA Inhaler 1 Puff(s) Inhalation every 6 hours PRN Shortness of Breath and/or Wheezing      Allergies    penicillin (Rash)          REVIEW OF SYSTEMS:    All other review of systems is negative unless indicated above    VITAL SIGNS:   Vital Signs Last 24 Hrs  T(C): 36.7 (17 Sep 2018 07:30), Max: 37.2 (16 Sep 2018 21:18)  T(F): 98 (17 Sep 2018 07:30), Max: 98.9 (16 Sep 2018 21:18)  HR: 79 (17 Sep 2018 07:30) (74 - 100)  BP: 136/68 (17 Sep 2018 07:30) (119/55 - 158/79)  BP(mean): --  RR: 19 (17 Sep 2018 07:30) (16 - 19)  SpO2: 98% (17 Sep 2018 07:30) (98% - 100%)    I&O's Summary    16 Sep 2018 07:01  -  17 Sep 2018 07:00  --------------------------------------------------------  IN: 120 mL / OUT: 0 mL / NET: 120 mL        On Exam:    Constitutional: NAD, alert and oriented x 3  Lungs:  Non-labored, breath sounds are clear bilaterally, No wheezing, rales or rhonchi  Cardiovascular: RRR.  S1 and S2 positive.  No murmurs, rubs, gallops or clicks  Gastrointestinal: Bowel Sounds present, soft, nontender.   Lymph: No peripheral edema. No cervical lymphadenopathy.  Neurological: Alert, no focal deficits  Skin: No rashes or ulcers   Psych:  Mood & affect appropriate.    LABS: All Labs Reviewed:                        8.1    9.35  )-----------( 465      ( 17 Sep 2018 06:46 )             29.7                         8.5    12.16 )-----------( 477      ( 16 Sep 2018 17:39 )             30.8     17 Sep 2018 06:46    145    |  114    |  11     ----------------------------<  111    4.2     |  24     |  0.58   16 Sep 2018 17:39    143    |  110    |  12     ----------------------------<  90     3.4     |  24     |  0.70     Ca    8.2        17 Sep 2018 06:46  Ca    8.6        16 Sep 2018 17:39    TPro  7.4    /  Alb  3.5    /  TBili  0.1    /  DBili  x      /  AST  16     /  ALT  27     /  AlkPhos  49     16 Sep 2018 17:39    PT/INR - ( 16 Sep 2018 17:39 )   PT: 11.6 sec;   INR: 1.06 ratio         PTT - ( 16 Sep 2018 17:39 )  PTT:27.2 sec  CARDIAC MARKERS ( 16 Sep 2018 17:39 )  <.015 ng/mL / x     / 85 U/L / x     / <1.0 ng/mL      Blood Culture:      @ 06:46  TSH: 0.50      RADIOLOGY:  < from: US Duplex Carotid Arteries Complete, Bilateral (18 @ 09:04) >    EXAM:  US DPLX CAROTIDS COMPL BI                            PROCEDURE DATE:  2018          INTERPRETATION:  CLINICAL STATEMENT: Evaluate for carotid artery   stenosis.   CVA/TIA.    TECHNIQUE: Carotid artery ultrasound was performed.    COMPARISON: None.    FINDINGS:  There is no significant narrowing in the visualized common carotid and   internal carotid arteries.    Peak systolic velocity measurements in centimeters per second as   described below.    Findings on the right:  CCA: 86  ICA: 69  ICA/CCA ratio: 0.8  There is antegrade flow in the right vertebral artery.    Findings on the left:  CCA: 89  ICA: 99  ICA/CCA ratio: 1.1  There is antegrade flow in the left vertebral artery.    IMPRESSION:  No evidence of hemodynamically significant stenosis by velocity   measurements.    Measurement of carotid stenosis is based on velocity parameters that   correlate the residual internal carotid diameter with that of the more   distal vessel in accordance with a method such as the [place] American   Symptomatic Carotid Endarterectomy Trial (NASCET).                 TABITHA WHEELER M.D., ATTENDING RADIOLOGIST  This document has been electronically signed. Sep 17 2018 10:23AM              < end of copied text >        < from: Xray Chest 2 Views PA/Lat (18 @ 19:05) >    EXAM:  XR CHEST PA LAT 2V                            PROCEDURE DATE:  2018          INTERPRETATION:  Cough.    PA lateral. Prior 2017.    Cardiac silhouette appears slightly prominent but is exaggerated by   shallow inspiration. No consolidation or effusion. Bilateral rudimentary   cervical ribs.    Impression: No active disease.                MALENA GARCIA M.D., ATTENDING RADIOLOGIST  This document has been electronically signed. Sep 17 2018  9:47AM              < end of copied text >      < from: CT Head No Cont (18 @ 17:43) >    EXAM:  CT BRAIN                            PROCEDURE DATE:  2018          INTERPRETATION:  History: Left facial droop.      Noncontrast head CT.  Normal parenchymal brain volume and ventricular size and configuration.   Cavum septum pellucidum, anatomic variant. Extensive bilateral basal   ganglia and cerebellar dentate nuclei calcifications. There is no acute   intra-axial or extra-axial hemorrhage edema territorial infarct or mass   effect. Mastoids paranasal sinuses clear. Cranium intact.    Impression: No acute findings.                MALENA GARCIA M.D., ATTENDING RADIOLOGIST  This document has been electronically signed. Sep 16 2018  5:49PM              < end of copied text >    EKG:  NSR with nonspecific ST and T changes

## 2021-06-09 NOTE — TELEPHONE ENCOUNTER
Patient states for the past three days her left foot has been swollen, including the toes and the pad of her foot is hot.  Has been icing and elevating; Tylenol provides slight relief.  Patient has difficulty walking.  History of pulmonary embolism.  Advised to be seen in office today.  No appointment availability; patient will go to [facility].    SALONI [facility] Triage Nurse Advisor    Reason for Disposition    SEVERE pain (e.g., excruciating, unable to do any normal activities)    Additional Information    Negative: Followed an ankle or foot injury    Negative: Ankle pain is the main symptom    Negative: Entire foot is cool or blue in comparison to other foot    Negative: Purple or black skin on foot or toe    Negative: Red area or streak and fever    Negative: Swollen foot and fever    Negative: Patient sounds very sick or weak to the triager    Protocols used: FOOT PAIN-A-OH

## 2021-06-09 NOTE — TELEPHONE ENCOUNTER
Per review of ER notes patient should take dexamethasone 6 mg for 10 days starting 7/19/2020 and stop prednisone while taking dexamethasone. Resume prednisone 6 mg daily once dexamethasone is complete.    Confirmed with ACM [facility] patients current warfarin dose is 1.25 mg on Monday, Wednesday, and Saturday and 2.5 mg all other days (Tueday, Thursday, Friday, Sunday).  She will need an INR check on Monday 7/27/2020      Dr. Garnica: please review how we should proceed with patient having her INR checked on Monday due to positive COVID-19 result?  [facility] needs patients INR checked on Monday and this cannot be postponed to a later date.

## 2021-06-09 NOTE — TELEPHONE ENCOUNTER
Patient Returning Call  Reason for call:  Patient returning call to check on the status of this request.  Information relayed to patient:  I need Julien Garnica MD to return my call today Please! I only need to speak to him and I wanted all day yesterday.  Patient has additional questions:  yes  If YES, what are your questions/concerns:  Please return call ASAP  Okay to leave a detailed message?: Yes

## 2021-06-09 NOTE — TELEPHONE ENCOUNTER
RN cannot approve Refill Request    RN can NOT refill this medication med is not covered by policy/route to provider. Last office visit: 3/17/2020 Julien Garnica MD Last Physical: 7/3/2019 Last MTM visit: Visit date not found Last visit same specialty: 3/17/2020 Julien Garnica MD.  Next visit within 3 mo: Visit date not found  Next physical within 3 mo: Visit date not found      Rain Frye, [facility] Triage/Med Refill 7/4/2020    Requested Prescriptions   Pending Prescriptions Disp Refills     predniSONE (DELTASONE) 5 MG tablet [Pharmacy Med Name: PREDNISONE TABS 5MG] 60 tablet 11     Sig: TAKE 2 TABLETS ( 10 MG ) DAILY. CONTINUE TO WEAN FROM MEDICATION AS DIRECTED FOR POLYMYALGIA RHEUMATICA MANAGEMENT.       There is no refill protocol information for this order

## 2021-06-09 NOTE — TELEPHONE ENCOUNTER
RN Triage:     Daughter calling in stating that since Sunday night/Monday morning. Body aches and fever 102 temp.  Fever goes down with tylenol. She is drinking fluids. NO difficulty breath, she has body aches. She has some loose stools but on antibiotics for a cellulitis.    Patient advised to have a virtual visit today.   Home care suggestions reviewed with patient.   Pippa Alvarado RN, BSN [facility] Triage Nurse      COVID 19 Nurse Triage Plan/Patient Instructions    Please be aware that novel coronavirus (COVID-19) may be circulating in the community. If you develop symptoms such as fever, cough, or SOB or if you have concerns about the presence of another infection including coronavirus (COVID-19), please contact your health care provider or visit [email or web address].     Disposition/Instructions    Additional COVID19 information to add for patients.   How can I protect others?  If you have symptoms (fever, cough, body aches or trouble breathing): Stay home and away from others (self-isolate) until:    At least 10 days have passed since your symptoms started. And     You ve had no fever--and no medicine that reduces fever--for 3 full days (72 hours). And      Your other symptoms have resolved (gotten better).     If you don t have symptoms, but a test showed that you have COVID-19 (you tested positive):    Stay home and away from others (self-isolate) until at least 10 days have passed since the date of your first positive COVID-19 test.    During this time:    Stay in your own room, even for meals. Use your own bathroom if you can.     Stay away from others in your home. No hugging, kissing or shaking hands. No visitors.    Don t go to work, school or anywhere else.     Clean  high touch  surfaces often (doorknobs, counters, handles, etc.). Use a household cleaning spray or wipes. You ll find a full list on the EPA website:   www.epa.gov/pesticide-registration/list-n-disinfectants-use-against-sars-cov-2.    Cover your mouth and nose with a mask, tissue or washcloth to avoid spreading germs.    Wash your hands and face often. Use soap and water.    Caregivers in these groups are at risk for severe illness due to COVID-19:  o People 65 years and older  o People who live in a nursing home or long-term care facility  o People with chronic disease (lung, heart, cancer, diabetes, kidney, liver, immunologic)  o People who have a weakened immune system, including those who:  - Are in cancer treatment  - Take medicine that weakens the immune system, such as corticosteroids  - Had a bone marrow or organ transplant  - Have an immune deficiency  - Have poorly controlled HIV or AIDS  - Are obese (body mass index of 40 or higher)  - Smoke regularly    Caregivers should wear gloves while washing dishes, handling laundry and cleaning bedrooms and bathrooms.    Use caution when washing and drying laundry: Don t shake dirty laundry, and use the warmest water setting that you can.    For more tips, go to www.cdc.gov/coronavirus/2019-ncov/downloads/10Things.pdf.    How can I take care of myself?  1. Get lots of rest. Drink extra fluids (unless a doctor has told you not to).     2. Take Tylenol (acetaminophen) for fever or pain. If you have liver or kidney problems, ask your family doctor if it s okay to take Tylenol.     Adults can take either:     650 mg (two 325 mg pills) every 4 to 6 hours, or     1,000 mg (two 500 mg pills) every 8 hours as needed.     Note: Don t take more than 3,000 mg in one day.   Acetaminophen is found in many medicines (both prescribed and over-the-counter medicines). Read all labels to be sure you don t take too much.     For children, check the Tylenol bottle for the right dose. The dose is based on the child s age or weight.    3. If you have other health problems (like cancer, heart failure, an organ transplant or severe  kidney disease): Call your specialty clinic if you don t feel better in the next 2 days.    4. Know when to call 911: Emergency warning signs include:    Trouble breathing or shortness of breath    Pain or pressure in the chest that doesn t go away    Feeling confused like you haven t felt before, or not being able to wake up    Bluish-colored lips or face    What are the symptoms of COVID-19?     The most common symptoms are cough, fever and trouble breathing.     Less common symptoms include body aches, chills, diarrhea (loose, watery poops), fatigue (feeling very tired), headache, runny nose, sore throat and loss of smell.    COVID-19 can cause severe coughing (bronchitis) and lung infection (pneumonia).    How does it spread?     The virus may spread when a person coughs or sneezes into the air. The virus can travel about 6 feet this way, and it can live on surfaces.      Common  (household disinfectants) will kill the virus.    Who is at risk?  Anyone can catch COVID-19 if they re around someone who has the virus.    How can others protect themselves?     Stay away from people who have COVID-19 (or symptoms of COVID-19).    Wash hands often with soap and water. Or, use hand  with at least 60% alcohol.    Avoid touching the eyes, nose or mouth.     Wear a face mask when you go out in public, when sick or when caring for a sick person.    Where can I get more information?    [facility]: About COVID-19: [email or web address]    CDC: What to Do If You re Sick: www.cdc.gov/coronavirus/2019-ncov/about/steps-when-sick.html    CDC: Ending Home Isolation: www.cdc.gov/coronavirus/2019-ncov/hcp/disposition-in-home-patients.html     CDC: Caring for Someone: www.cdc.gov/coronavirus/2019-ncov/if-you-are-sick/care-for-someone.html    [facility]: Interim Guidance for Hospital Discharge to Home: www.health.[facility].[place].us/diseases/coronavirus/hcp/hospdischarge.pdf    [facility]  trials (COVID-19 research studies): clinicalaffairs.[facility]/umn-clinical-trials     Below are the COVID-19 hotlines at the [place] Department of [facility] ([facility]). Interpreters are available.   o For health questions: Call 948-981-3357 or 1-600.838.9453 (7 a.m. to 7 p.m.)  o For questions about schools and childcare: Call 930-208-5146 or 1-492.894.2771 (7 a.m. to 7 p.m.)            Thank you for taking steps to prevent the spread of this virus.  o Limit your contact with others.  o Wear a simple mask to cover your cough.  o Wash your hands well and often.    [facility]: About COVID-19: [email or web address]    CDC: What to Do If You're Sick: www.cdc.gov/coronavirus/2019-ncov/about/steps-when-sick.html    CDC: Ending Home Isolation: www.cdc.gov/coronavirus/2019-ncov/hcp/disposition-in-home-patients.html     CDC: Caring for Someone: www.cdc.gov/coronavirus/2019-ncov/if-you-are-sick/care-for-someone.html     [facility]: Interim Guidance for Hospital Discharge to Home: www.[facility].mn./diseases/coronavirus/hcp/hospdischarge.pdf    [facility] clinical trials (COVID-19 research studies): clinicalaffairs.[facility]-clinical-trials     Below are the COVID-19 hotlines at the [place] [facility] ([facility]). Interpreters are available.   o For health questions: Call 172-561-0828 or 1-171.360.9189 (7 a.m. to 7 p.m.)  o For questions about schools and childcare: Call 684-463-1053 or 1-669.824.5030 (7 a.m. to 7 p.m.)       COVID 19 Nurse Triage Plan/Patient Instructions    Please be aware that novel coronavirus (COVID-19) may be circulating in the community. If you develop symptoms such as fever, cough, or SOB or if you have concerns about the presence of another infection including coronavirus (COVID-19), please contact your health care provider or visit [email or web address].     Disposition/Instructions    Virtual Visit with provider recommended. Reference Visit Selection Guide.    Thank  you for taking steps to prevent the spread of this virus.  o Limit your contact with others.  o Wear a simple mask to cover your cough.  o Wash your hands well and often.    Resources    [facility]: About COVID-19: [email or web address]    CDC: What to Do If You're Sick: www.cdc.gov/coronavirus/2019-ncov/about/steps-when-sick.html    CDC: Ending Home Isolation: www.cdc.gov/coronavirus/2019-ncov/hcp/disposition-in-home-patients.html     CDC: Caring for Someone: www.cdc.gov/coronavirus/2019-ncov/if-you-are-sick/care-for-someone.html     [facility]: Interim Guidance for Hospital Discharge to Home: www.[facility].mn./diseases/coronavirus/hcp/hospdischarge.pdf    [facility] clinical trials (COVID-19 research studies): clinicalaffairs.[facility]-clinical-trials     Below are the COVID-19 hotlines at the [place] Department of Health ([facility]). Interpreters are available.   o For health questions: Call 202-791-6623 or 1-387.181.4621 (7 a.m. to 7 p.m.)  o For questions about schools and childcare: Call 280-748-5474 or 1-325.455.5109 (7 a.m. to 7 p.m.)     Reason for Disposition    [1] Fever > 101 F (38.3 C) AND [2] age > 60    HIGH RISK patient (e.g., age > 64 years, diabetes, heart or lung disease, weak immune system)    Additional Information    Negative: SEVERE difficulty breathing (e.g., struggling for each breath, speaks in single words)    Negative: Difficult to awaken or acting confused (e.g., disoriented, slurred speech)    Negative: Bluish (or gray) lips or face now    Negative: Shock suspected (e.g., cold/pale/clammy skin, too weak to stand, low BP, rapid pulse)    Negative: Sounds like a life-threatening emergency to the triager    Negative: [1] COVID-19 exposure AND [2] no symptoms    Negative: COVID-19 and Breastfeeding, questions about    Negative: [1] Adult with possible COVID-19 symptoms AND [2] triager concerned about severity of symptoms or other causes    Negative: SEVERE or  constant chest pain or pressure (Exception: mild central chest pain, present only when coughing)    Negative: MODERATE difficulty breathing (e.g., speaks in phrases, SOB even at rest, pulse 100-120)    Negative: Fever > 103 F (39.4 C)    Negative: Chest pain or pressure    Negative: MILD difficulty breathing (e.g., minimal/no SOB at rest, SOB with walking, pulse <100)    Negative: Patient sounds very sick or weak to the triager    Protocols used: CORONAVIRUS (COVID-19) DIAGNOSED OR [id number] 5.16.20

## 2021-06-09 NOTE — PROGRESS NOTES
"Nancy Oropeza is a 75 y.o. female who is being evaluated via a billable telephone visit.      The patient has been notified of following:     \"This telephone visit will be conducted via a call between you and your physician/provider. We have found that certain health care needs can be provided without the need for a physical exam.  This service lets us provide the care you need with a short phone conversation.  If a prescription is necessary we can send it directly to your pharmacy.  If lab work is needed we can place an order for that and you can then stop by our lab to have the test done at a later time.    Telephone visits are billed at different rates depending on your insurance coverage. During this emergency period, for some insurers they may be billed the same as an in-person visit.  Please reach out to your insurance provider with any questions.    If during the course of the call the physician/provider feels a telephone visit is not appropriate, you will not be charged for this service.\"    Patient has given verbal consent to a Telephone visit? Yes    What phone number would you like to be contacted at? [phone]    Patient would like to receive their AVS by AVS Preference: Nisha.    Additional provider notes:  Assessment/Plan:    1. Suspected COVID-19 virus infection  Symptoms concerning for potential COVID19 infection. Discussed recommendation for testing and pt agrees; discussed pt will receive phone call to schedule testing. Discussed general supportive cares including tylenol as needed; discussed signs/symptoms that would warrant ER evaluation. Discussed self isolation until test results return.  - Symptomatic COVID-19 Virus (CORONAVIRUS) PCR; Future      Phone call duration:  9 minutes    Follow up: as needed    Norma Doss MD  [facility]    Subjective:    Patient ID: Nancy Oropeza is a 75 y.o. female had telephone visit today for acute illness    Acute " illness  -body aches, fever 102F, chills/sweaty - started Sunday/Monday  -no shortness of breath   -coughing, headache  -some diarrhea  -on antibiotics for cellulitis - augmentin  -using tylenol - helpful temporarily  -family members also not feeling well  -daughter is caregiver - she is sick, sx started Monday, flu-like sx, work recommended testing for COVID19, feeling good now - daughter, son in law and son all scheduled for testing  -no loss of taste or smell  -no nausea or vomiting  -nasal congestion  -no sore throat      Exam:  General: Answering questions appropriately, linear thought process, does not sound short of breath, no cough heard - daughter primarily answering questions    Patient Active Problem List   Diagnosis     Morbid Obesity     Osteoarthritis Of The Knee     Back pain     Major Depression, Recurrent     Obstructive Sleep Apnea     Essential hypertension with goal blood pressure less than 140/90     Edema     Hypothyroidism     Hypercholesterolemia     Normochromic, Normocytic Anemia     Arthralgias In Multiple Sites     Cataract     Impaired Fasting Glucose     Multiple lung nodules on CT     Angioedema     Hypokalemia     Pulmonary embolism (H)     Anticoagulant long-term use     Acute bilateral knee pain     Chronic pain syndrome     Past Medical History:   Diagnosis Date     Depression      Disease of thyroid gland      HTN (hypertension)      Hypercholesteremia      Morbid obesity (H)      Pulmonary emboli (H) 12/14/2015     Yeast infection      Past Surgical History:   Procedure Laterality Date     BREAST BIOPSY Left 1970s     HYSTERECTOMY  2010     OOPHORECTOMY  2010     Current Outpatient Medications on File Prior to Visit   Medication Sig Dispense Refill     amLODIPine (NORVASC) 5 MG tablet Take 1 tablet (5 mg total) by mouth daily. 90 tablet 3     amoxicillin-clavulanate (AUGMENTIN) 875-125 mg per tablet Take 1 tablet by mouth 2 (two) times a day for 10 days. 20 tablet 0      cholecalciferol, vitamin D3, 1,000 unit tablet Take 1,000 Units by mouth daily. 2 capsules once a day       citalopram (CELEXA) 40 MG tablet Take 1 tablet (40 mg total) by mouth daily. 90 tablet 3     furosemide (LASIX) 20 MG tablet TAKE 1 TABLET DAILY 90 tablet 3     levothyroxine (SYNTHROID, LEVOTHROID) 137 MCG tablet Take 1 tablet (137 mcg total) by mouth daily. 90 tablet 3     lisinopriL-hydrochlorothiazide (PRINZIDE,ZESTORETIC) 20-12.5 mg per tablet Take 2 tablets by mouth daily. 180 tablet 3     multivitamin (ONE A DAY) per tablet Take 1 tablet by mouth.       pravastatin (PRAVACHOL) 80 MG tablet TAKE ONE TABLET BY MOUTH   EVERY NIGHT AT BEDTIME 90 tablet 3     predniSONE (DELTASONE) 1 MG tablet Take 4 mg by mouth daily Use along with 5 mg tablet (9 mg total).  Decrease by 1 mg per month for polymyalgia rheumatica as directed.. (Patient taking differently: Take 1 mg by mouth daily Use along with 5 mg tablet (9 mg total).  Decrease by 1 mg per month for polymyalgia rheumatica as directed..) 360 tablet 1     predniSONE (DELTASONE) 5 MG tablet TAKE 2 TABLETS ( 10 MG ) DAILY. CONTINUE TO WEAN FROM MEDICATION AS DIRECTED FOR POLYMYALGIA RHEUMATICA MANAGEMENT. (Patient taking differently: Take 5 mg by mouth daily .) 60 tablet 11     warfarin ANTICOAGULANT (COUMADIN) 2.5 MG tablet Take 1 tablet (2.5 mg) by mouth daily. Adjust dose based on INR results as directed. 90 tablet 1     No current facility-administered medications on file prior to visit.      Allergies   Allergen Reactions     Atorvastatin Myalgia     Legs hurt/sore     Paroxetine Unknown     Simvastatin Myalgia     Legs hurt, 12/2015 tolerated pravastatin at home     Sulfa (Sulfonamide Antibiotics) Rash     Sulfasalazine Rash     Social History     Socioeconomic History     Marital status:      Spouse name: Not on file     Number of children: Not on file     Years of education: Not on file     Highest education level: Not on file   Occupational  History     Not on file   Social Needs     Financial resource strain: Not on file     Food insecurity     Worry: Not on file     Inability: Not on file     Transportation needs     Medical: Not on file     Non-medical: Not on file   Tobacco Use     Smoking status: Former Smoker     Smokeless tobacco: Never Used     Tobacco comment: quite 20 yrs ago   Substance and Sexual Activity     Alcohol use: No     Comment: occasionally     Drug use: No     Sexual activity: Not on file   Lifestyle     Physical activity     Days per week: Not on file     Minutes per session: Not on file     Stress: Not on file   Relationships     Social connections     Talks on phone: Not on file     Gets together: Not on file     Attends [employer] service: Not on file     Active member of club or organization: Not on file     Attends meetings of clubs or organizations: Not on file     Relationship status: Not on file     Intimate partner violence     Fear of current or ex partner: Not on file     Emotionally abused: Not on file     Physically abused: Not on file     Forced sexual activity: Not on file   Other Topics Concern     Not on file   Social History Narrative    Patient arrived in the ED with her sister Kassy 09/10/17     Family History   Problem Relation Age of Onset     Breast cancer Sister 75     Stomach cancer Paternal Grandfather      Lung cancer Sister      Review of systems is as stated in HPI, and the remainder of system review is otherwise negative.

## 2021-06-09 NOTE — TELEPHONE ENCOUNTER
ANTICOAGULATION  MANAGEMENT    Assessment     Today's INR result of 2.0 is Therapeutic (goal INR of 2.0-3.0)        Warfarin taken as previously instructed    No new diet changes affecting INR    No new medication/supplements affecting INR    Continues to tolerate warfarin with no reported s/s of bleeding or thromboembolism     Previous INR was Therapeutic    Plan:     Spoke with Nancy regarding INR result and instructed:     Warfarin Dosing Instructions:  Continue current warfarin dose    1.25 mg every Mon, Wed, Sat; 2.5 mg all other days         Instructed patient to follow up no later than: OV on 7/23.    Education provided: importance of taking warfarin as instructed    Nancy verbalizes understanding and agrees to warfarin dosing plan.    Instructed to call the [facility] for any changes, questions or concerns. (#[phone])   ?   Vincent Baird RN    Subjective/Objective:      Nancy Oropeza, a 75 y.o. female is on warfarin.     Nancy reports:     Home warfarin dose: verbally confirmed home dose with pt and updated on anticoagulation calendar     Missed doses: No     Medication changes:  No     S/S of bleeding or thromboembolism:  No     New Injury or illness:  No     Changes in diet or alcohol consumption:  No     Upcoming surgery, procedure or cardioversion:  No    Anticoagulation Episode Summary     Current INR goal:   2.0-3.0   TTR:   52.5 % (1 y)   Next INR check:   7/23/2020   INR from last check:   2.00 (7/2/2020)   Weekly max warfarin dose:      Target end date:   12/21/2016   INR check location:      Preferred lab:      Send INR reminders to:   XENIA HURTADO    Indications    Pulmonary embolism (H) [I26.99]           Comments:            Anticoagulation Care Providers     Provider Role Specialty Phone number    Julien Garnica MD Referring Family Medicine [phone]

## 2021-06-09 NOTE — TELEPHONE ENCOUNTER
Just got home from [facility] x1 night  [place] for x6 night    Patient has started to have itching on her torso and arms.    Took a hot shower, did not help, in fact made things worse    Hives all over torso and arms  -getting bigger  -clusters    Dexamethasone   Lovanox at hospital   -Only new meds  -Was on the whole time in hospital    Oxygen 95%  HR 84    No fever 97.3  No tongue swelling  No facial swelling   No abdominal pain     Triaged to a disposition of Home Care. Home Care advice given. Patient to call back tomorrow if symptoms are not better or are worse to schedule a telephone visit. If emergent symptoms develop tonight, patient to call 911.     COVID 19 Nurse Triage Plan/Patient Instructions    Please be aware that novel coronavirus (COVID-19) may be circulating in the community. If you develop symptoms such as fever, cough, or SOB or if you have concerns about the presence of another infection including coronavirus (COVID-19), please contact your health care provider or visit [email or web address].     Disposition/Instructions    Home care recommended. Follow home care protocol based instructions. and Virtual Visit with provider recommended. Reference Visit Selection Guide.    Thank you for taking steps to prevent the spread of this virus.  o Limit your contact with others.  o Wear a simple mask to cover your cough.  o Wash your hands well and often.    Resources    [facility]: About COVID-19: [email or web address]    CDC: What to Do If You're Sick: www.cdc.gov/coronavirus/2019-ncov/about/steps-when-sick.html    CDC: Ending Home Isolation: www.cdc.gov/coronavirus/2019-ncov/hcp/disposition-in-home-patients.html     CDC: Caring for Someone: www.cdc.gov/coronavirus/2019-ncov/if-you-are-sick/care-for-someone.html     [facility]: Interim Guidance for Hospital Discharge to Home: www.health.[facility].mn.[place]/diseases/coronavirus/hcp/hospdischarge.pdf    [facility] clinical trials  (COVID-19 research studies): clinicalaffairs.[facility]-clinical-trials     Below are the COVID-19 hotlines at the [place] Department of Health ([facility]). Interpreters are available.   o For health questions: Call 038-644-8681 or 1-135.657.4505 (7 a.m. to 7 p.m.)  o For questions about schools and childcare: Call 777-486-7708 or 1-249.581.4145 (7 a.m. to 7 p.m.)     Reason for Disposition    Widespread hives    Additional Information    Negative: [1] Life-threatening reaction (anaphylaxis) in the past to similar substance (e.g., food, insect bite/sting, chemical, etc.) AND [2] < 2 hours since exposure    Negative: Difficulty breathing or wheezing now    Negative: [1] Swollen tongue AND [2] rapid onset    Negative: [1] Hoarseness or cough now AND [2] rapid onset    Negative: Shock suspected (e.g., cold/pale/clammy skin, too weak to stand, low BP, rapid pulse)    Negative: Difficult to awaken or acting confused (e.g., disoriented, slurred speech)    Negative: Sounds like a life-threatening emergency to the triager    Negative: [1] Bee, wasp, or yellow jacket sting AND [2] within last 24 hours    Negative: Blood-colored, dark red or purple rash    Negative: [1] Drug rash suspected AND [2] started taking new medicine within last 2 weeks(Exception: antihistamine, eye drops, ear drops, decongestant or other OTC cough/cold medicines)    Negative: Doesn't match the SYMPTOMS of hives    Negative: Swollen tongue    Negative: [1] Widespread hives AND [2] onset < 2 hours of exposure to high-risk allergen (e.g., peanuts, tree nuts, fish or shellfish)    Negative: Patient sounds very sick or weak to the triager    Negative: [1] MODERATE-SEVERE hives persist (i.e., hives interfere with normal activities or work) AND [2] taking antihistamine (e.g., Benadryl, Claritin) > 24 hours    Negative: [1] Hives have become worse AND [2] taking oral steroids (e.g., prednisone) > 24 hours    Negative: Joint swelling    Negative: [1] Abdominal  pain AND [2] pain present > 12 hours    Negative: Fever    Negative: [1] Widespread hives, itching or facial swelling AND [2] onset > 2 hours after exposure to high-risk allergen (e.g., sting, nuts, 1st dose of antibiotic)    Negative: [1] Hives from food reaction AND [2] diagnosis never confirmed by a HCP    Negative: Hives persist > 1 week    Negative: [1] Hives has occurred 3 or more times in the last year AND [2] the cause was not found    Negative: [1] Hives from food reaction AND [2] diagnosis already confirmed    Negative: Localized hives    Protocols used: HIVES-CYRIL-CHAZ Tejada RN Triage Nurse Advisor

## 2021-06-09 NOTE — PROGRESS NOTES
ASSESSMENT AND PLAN:  Nancy Oropeza 71 y.o. female is here with exacerbation of pain in her knees, she rated this as severe.  Various options were discussed.  Of these she chooses to go for corticosteroid injections.  She is aware of the other options including Visco supplementation for example.  She does not feel that she is ready for surgical intervention.  Under aseptic technique the following injections were put in each knee under ultrasound guidance.  She is to return for follow-up on as-needed basis.  Diagnoses and all orders for this visit:    Osteoarthritis Of The Knee  -     triamcinolone acetonide 40 mg/mL injection 40 mg (KENALOG-40); Inject 1 mL (40 mg total) into the joint once.  -     triamcinolone acetonide 40 mg/mL injection 40 mg (KENALOG-40); Inject 1 mL (40 mg total) into the joint once.    Morbid obesity due to excess calories    Arthralgias In Multiple Sites  -     triamcinolone acetonide 40 mg/mL injection 40 mg (KENALOG-40); Inject 1 mL (40 mg total) into the joint once.  -     triamcinolone acetonide 40 mg/mL injection 40 mg (KENALOG-40); Inject 1 mL (40 mg total) into the joint once.    Anticoagulant long-term use          HISTORY OF PRESENTING ILLNESS:  Nancy Oropeza 71 y.o. is here for  severe flare up of pain.  Joints affected include both knee(s). This has gone on for a few weeks ago. Pain is described as sharp. It is when active and at night/ wakes from sleep at night.  Her symptoms are severe. The symptoms are gradually worsening. Symptoms include pain, tenderness to touch.  Treatment to date has been without significant relief.    Further historical information, including ROS and limitation in activities as noted in the multidimensional health assessment questionnaire scanned in the EMR and in the assessment and plan section.    ALLERGIES:Atorvastatin; Simvastatin; and Sulfa (sulfonamide antibiotics)    PAST MEDICAL/ACTIVE PROBLEMS/MEDICATION/SOCIAL DATA  Past Medical  History:   Diagnosis Date     Depression      Disease of thyroid gland      HTN (hypertension)      Hypercholesteremia      Morbid obesity      Pulmonary emboli 12/14/2015     History   Smoking Status     Former Smoker   Smokeless Tobacco     Not on file     Comment: quite 20 yrs ago     Patient Active Problem List   Diagnosis     Morbid Obesity     Osteoarthritis Of The Knee     Lower Back Pain     Major Depression, Recurrent     Obstructive Sleep Apnea     Hypertension     Edema     Hypothyroidism     Hypercholesterolemia     Normochromic, Normocytic Anemia     Arthralgias In Multiple Sites     Cataract     Impaired Fasting Glucose     Multiple lung nodules on CT     Angioedema     Hypokalemia     Pulmonary embolism     Current Outpatient Prescriptions   Medication Sig Dispense Refill     amLODIPine (NORVASC) 10 MG tablet Take 1 tablet (10 mg total) by mouth daily. 30 tablet 5     citalopram (CELEXA) 40 MG tablet Take 1 tablet (40 mg total) by mouth daily. 90 tablet 3     diclofenac sodium (VOLTAREN) 1 % Gel apply 2 grams QID to affected areas as needed (avoid use with occlusive dressings or heat) 100 g 2     ergocalciferol (ERGOCALCIFEROL) 50,000 unit capsule Take 1 capsule (50,000 Units total) by mouth 2 (two) times a week. x 12 weeks, then use vitamin D 2,000 units daily 24 capsule 0     furosemide (LASIX) 20 MG tablet Take 1 tablet (20 mg total) by mouth daily. 90 tablet 3     levothyroxine (SYNTHROID, LEVOTHROID) 125 MCG tablet Take 1 tablet (125 mcg total) by mouth daily. 90 tablet 3     lisinopril-hydrochlorothiazide (PRINZIDE,ZESTORETIC) 20-12.5 mg per tablet Take 2 tablets by mouth  daily 180 tablet 3     pravastatin (PRAVACHOL) 80 MG tablet Take 1 tablet by mouth at  bedtime 90 tablet 3     traMADol (ULTRAM) 50 mg tablet Take 1 tab for pain. Max 3/day. 30 tablet 0     No current facility-administered medications for this visit.        DETAILED EXAMINATION (six area) :  Vitals:    03/06/17 1242   BP:  140/80   Patient Site: Left Arm   Patient Position: Sitting   Cuff Size: Adult Regular   Pulse: 88   Weight: (!) 311 lb (141.1 kg)     Alert oriented. Head including the face is examined for malar rash, heliotropes, scarring, lupus pernio. Eyes examined for redness such as in episcleritis/scleritis, periorbital lesions.   Neck examined  for lymph nodes, range of motion Both upper and lower extremities (all four) examined for swollen, warm &/or  tender joints, range of motion, rash, muscle weakness, edema. The salient normal / abnormal findings are appended.  Joint line tenderness, bilaterally, knees.  Scattered [place] such as right index.      LAB / IMAGING DATA:  ALT   Date Value Ref Range Status   11/03/2016 18 0 - 45 U/L Final   05/26/2016 16 0 - 45 U/L Final   12/13/2015 15 0 - 45 U/L Final     ALBUMIN   Date Value Ref Range Status   11/03/2016 4.0 3.5 - 5.0 g/dL Final   05/26/2016 4.0 3.5 - 5.0 g/dL Final   12/13/2015 3.7 3.5 - 5.0 g/dL Final     CREATININE   Date Value Ref Range Status   11/03/2016 0.85 0.60 - 1.10 mg/dL Final   05/26/2016 0.81 0.60 - 1.10 mg/dL Final   02/26/2016 0.72 0.60 - 1.10 mg/dL Final       WBC   Date Value Ref Range Status   11/03/2016 8.3 4.0 - 11.0 thou/uL Final   05/26/2016 9.0 4.0 - 11.0 thou/uL Final   01/06/2015 10.1 4.0 - 11.0 thou/uL Final     HEMOGLOBIN   Date Value Ref Range Status   11/03/2016 11.8 (L) 12.0 - 16.0 g/dL Final   05/26/2016 11.6 (L) 12.0 - 16.0 g/dL Final   02/26/2016 12.1 12.0 - 16.0 g/dL Final     PLATELETS   Date Value Ref Range Status   11/03/2016 327 140 - 440 thou/uL Final   05/26/2016 358 140 - 440 thou/uL Final   02/26/2016 398 140 - 440 thou/uL Final       No results found for: SHAE, RF, SEDRATE

## 2021-06-09 NOTE — PATIENT INSTRUCTIONS - HE
-Complete antibiotics as ordered. Take with food to help prevent stomach upset.  -Elevate the affected limb as much as possible. This will help keep the swelling down.  -Tylenol as needed for discomfort  -If develop a fever, increased redness, pain, drainage or swelling from the area, should contact primary care clinic/doctor.  -Should see improvement in next 3-5 days after starting antibiotics.  -Follow up if symptoms worsen or fail to improve.

## 2021-06-09 NOTE — TELEPHONE ENCOUNTER
Patient calls today about worsening symptoms r/t foot infection. She was seen on Went to walk in clinic on Monday for swollen toes. Has been Augmentin since Monday afternoon. Symptoms have mostly gotten better, however she states she has developed new red streak on the middle toe. This new red streak is warm to the touch. She also has some increased pain on the pad of the foot. Symptoms seem localized to the middle toe and middle of the foot. Denies fevers. Denies numbness/tingling in the foot.    Recommended that patient be seen today for the worsening red streak. Office visit scheduled this afternoon with Khushboo Lind.     Reason for Disposition    [1] Red streak runs from area of infection AND [2] new onset    Additional Information    Negative: SEVERE pain    Negative: [1] SEVERE pain with bending of finger (or toe) AND [2] cellulitis on hand (or foot)    Negative: Fever > 104 F (40 C)    Negative: [1] Widespread rash AND [2] bright red, sunburn-like    Negative: Black (necrotic), dark purple, or blisters develop in area of cellulitis    Negative: Patient sounds very sick or weak to the triager    [1] Taking antibiotic > 24 hours AND [2] red streak (or line) runs from area of infection    Negative: [1] Taking antibiotic > 24 hours AND [2] fever > 100.5 F (38.1 C)    Negative: [1] Fever > 100.0 F (37.8 C) AND [2] new onset    Protocols used: CELLULITIS ON ANTIBIOTIC FOLLOW-UP CALL-A-

## 2021-06-09 NOTE — TELEPHONE ENCOUNTER
ANTICOAGULATION  MANAGEMENT: Discharge Review    Nancy Oropeza chart reviewed for anticoagulation continuity of care    Hospital admission on  7/16 to 7/21 for hypoxia; Covid-19 virus infection.     Discharge disposition: Home    INR Results:       Recent labs: (last 7 days)     07/16/20  1738 07/17/20  0644 07/17/20  1433 07/18/20  0640 07/19/20  1025 07/20/20  1327 07/21/20  0848   INR 1.32* 1.44* 1.39* 1.62* 1.87* 2.16* 2.49*       Warfarin inpatient management: more warfarin administered than maintenance regimen    Warfarin discharge instructions: home regimen continued     Medication Changes Affecting Anticoagulation: Yes: Dexamethasone x3 days.    Additional Factors Affecting Anticoagulation: Yes, covid virus infection.     Plan     Recommend to check INR on 7/27 due to unstable INR.    Claire De Jesus CMA spoke with pt. INR appt was made for 7/27.    Anticoagulation calendar updated    Vincent Baird RN

## 2021-06-09 NOTE — TELEPHONE ENCOUNTER
Caller is daughter; reports patient exposed to Covid- 19 an patient has been ill for  3 days with fever and body aches and has scheduled  testing   Inquiring about care for  patient;  Daughter has tested positive for Covid but has minor symptoms; other family symptomatic but tests pending   Triage protocol reviewed  Advised  That  Patient should be monitired  Closely and may need assistance with  ADL's   advised to refer to  [facility] guidelines for  Home caring for  Covid- 19 patient's  Reviewed    COVID 19 Nurse Triage Plan/Patient Instructions    Please be aware that novel coronavirus (COVID-19) may be circulating in the community. If you develop symptoms such as fever, cough, or SOB or if you have concerns about the presence of another infection including coronavirus (COVID-19), please contact your health care provider or visit [email or web address].     Disposition/Instructions    Home care recommended. Follow home care protocol based instructions.    Thank you for taking steps to prevent the spread of this virus.  o Limit your contact with others.  o Wear a simple mask to cover your cough.  o Wash your hands well and often.    Resources    [facility]: About COVID-19: [email or web address]    CDC: What to Do If You're Sick: www.cdc.gov/coronavirus/2019-ncov/about/steps-when-sick.html    CDC: Ending Home Isolation: www.cdc.gov/coronavirus/2019-ncov/hcp/disposition-in-home-patients.html     CDC: Caring for Someone: www.cdc.gov/coronavirus/2019-ncov/if-you-are-sick/care-for-someone.html     [facility]: Interim Guidance for Hospital Discharge to Home: www.health.[facility].mn.us/diseases/coronavirus/hcp/hospdischarge.pdf    [facility] clinical trials (COVID-19 research studies): clinicalaffairs.[facility].edu/umn-clinical-trials     Below are the COVID-19 hotlines at the [facility] ([facility]). Interpreters are available.   o For health questions: Call 962-531-4141 or 1-365.533.5240 (7 a.m. to 7  p.m.)  o For questions about schools and childcare: Call 519-329-0033 or 1-887.873.2828 (7 a.m. to 7 p.m.)            Reason for Disposition    [1] Fever > 101 F (38.3 C) AND [2] age > 60    Additional Information    Negative: SEVERE difficulty breathing (e.g., struggling for each breath, speaks in single words)    Negative: Difficult to awaken or acting confused (e.g., disoriented, slurred speech)    Negative: Bluish (or gray) lips or face now    Negative: Shock suspected (e.g., cold/pale/clammy skin, too weak to stand, low BP, rapid pulse)    Negative: Sounds like a life-threatening emergency to the triager    Negative: [1] COVID-19 exposure AND [2] no symptoms    Negative: COVID-19 and Breastfeeding, questions about    Negative: [1] Adult with possible COVID-19 symptoms AND [2] triager concerned about severity of symptoms or other causes    Negative: SEVERE or constant chest pain or pressure (Exception: mild central chest pain, present only when coughing)    Negative: MODERATE difficulty breathing (e.g., speaks in phrases, SOB even at rest, pulse 100-120)    Negative: Patient sounds very sick or weak to the triager    Protocols used: CORONAVIRUS (COVID-19) DIAGNOSED OR [id number] 5.16.20

## 2021-06-09 NOTE — TELEPHONE ENCOUNTER
Medication Question or Clarification  Who is calling: Lesly  What medication are you calling about (include dose and sig)?: Dexamethasone 2 mg, 3 tablets for 3 days  Who prescribed the medication?: Hospitalist  What is your question/concern?:     1) She wants to clarify that she would not take her daily dose of Prednisone while taking dexamethasone.    2)  Patient's INR is at 2.49.  What dose of warfarin should she take?    Requested Pharmacy: n/a  Okay to leave a detailed message?: Yes

## 2021-06-09 NOTE — PROGRESS NOTES
"Nancy Oropeza is a 75 y.o. female who is being evaluated via a billable video visit.    Chief Complaint   Patient presents with     Follow-up     Cellulitis     Nancy Oropeza is seen in follow up of ongoing treatment of cellulitis of the left foot/leg. She has received 1 gm of Rocephin on 7/10/20 and has continued Augmentin twice daily. She had a negative doppler for DVT and plain xray which showed edema only. She is now feeling better, she has 3 days of more of Augmentin, she still has one swollen toe and this is still has thick calluses on her forefoot.  She wonders if there is anything she can do to help with there callus areas of her foot. She as not heard from the podiatrist yet.  ROS: negative for fever, cough, malaise, fatigue, myalgias and as in HPI.      The patient has been notified of following:     \"This video visit will be conducted via a call between you and your physician/provider. We have found that certain health care needs can be provided without the need for an in-person physical exam.  This service lets us provide the care you need with a video conversation.  If a prescription is necessary we can send it directly to your pharmacy.  If lab work is needed we can place an order for that and you can then stop by our lab to have the test done at a later time.    Video visits are billed at different rates depending on your insurance coverage. Please reach out to your insurance provider with any questions.    If during the course of the call the physician/provider feels a video visit is not appropriate, you will not be charged for this service.\"    Patient has given verbal consent to a Video visit? Yes  How would you like to obtain your AVS? AVS Preference: [employer]  Patient would like the video invitation sent by: Other e-mail: [email or web address]  Will anyone else be joining your video visit? No      Video Start Time: 1100    Additional provider notes: face: no flushing, no edema, symetric.  GENERAL: " Healthy, alert and no distress  EYES: Eyes grossly normal to inspection. No discharge or erythema, or obvious scleral/conjunctival abnormalities.  RESP: No audible wheeze, cough, or visible cyanosis.  No visible retractions or increased work of breathing.    NEURO: Cranial nerves grossly intact. Mentation and speech appropriate for age.  PSYCH: Mentation appears normal, affect normal/bright, judgement and insight intact, normal speech and appearance well-groomed      Video-Visit Details    Type of service:  Video Visit    Video End Time (time video stopped): 11:14 AM  Originating Location (pt. Location): Home    Distant Location (provider location):  [facility]     Platform used for Video Visit: [facility]  Assessment and Plan:  1. Cellulitis, unspecified cellulitis site       Finish antibiotics, continue to elevate leg and stay off your feet until antibiotic is complete. Epson salt soaks 2-3 times daily for 10 minutes and vasiline on feet at night with cotton socks to soften calluses. Do not use pumas since there is current infection. Follow up with podiatry as planned. They should call for appointment.  She voices understanding.    Rosalba Helton PA-C

## 2021-06-09 NOTE — TELEPHONE ENCOUNTER
Discussed with patient recent hospitalization for Covid-19.  Now at home and doing well.  Decreased appetite remains and has lost about 15 pounds otherwise able to maintain hydration etc.  Has had some decreased taste and smell associated with the recent infection.  Patient interested in remaining off prednisone after completing dexamethasone for Covid-19 treatment and will monitor for recurrent concerns for polymyalgia rheumatica while off steroids.  Will follow-up as scheduled.

## 2021-06-10 NOTE — TELEPHONE ENCOUNTER
ANTICOAGULATION  MANAGEMENT    Assessment     Today's INR result of 2.0 is Therapeutic (goal INR of 2.0-3.0)        Warfarin taken as previously instructed    No new diet changes affecting INR    No new medication/supplements affecting INR    Continues to tolerate warfarin with no reported s/s of bleeding or thromboembolism     Previous INR was Supratherapeutic    Plan:     Spoke on phone with Nancy regarding INR result and instructed:     Warfarin Dosing Instructions:  Continue current warfarin dose 1.25 mg daily.    Instructed patient to follow up no later than: Mon 8/17.    Education provided: importance of following up for INR monitoring at instructed interval and importance of taking warfarin as instructed    Nancy verbalizes understanding and agrees to warfarin dosing plan.    Instructed to call the [facility] for any changes, questions or concerns. (#[phone])   ?   Vincent Baird RN    Subjective/Objective:      Nancy Oropeza, a 75 y.o. female is on warfarin.     Nancy reports:     Home warfarin dose: verbally confirmed home dose with pt and updated on anticoagulation calendar     Missed doses: No     Medication changes:  No     S/S of bleeding or thromboembolism:  No     New Injury or illness:  No     Changes in diet or alcohol consumption:  No     Upcoming surgery, procedure or cardioversion:  No    Anticoagulation Episode Summary     Current INR goal:   2.0-3.0   TTR:   52.1 % (11.6 mo)   Next INR check:   8/17/2020   INR from last check:   2.00 (8/13/2020)   Weekly max warfarin dose:      Target end date:   12/21/2016   INR check location:      Preferred lab:      Send INR reminders to:   XENIA HURTADO    Indications    Pulmonary embolism (H) [I26.99]           Comments:            Anticoagulation Care Providers     Provider Role Specialty Phone number    Julien Garnica MD Referring Family Medicine [phone]

## 2021-06-10 NOTE — TELEPHONE ENCOUNTER
The patient calls today requesting pain medicine from her PCP Dr. Garnica for her polymyalgia. She states she was previously on prednisone for this but stopped that medication a few weeks ago. Since stopping the prednisone the polymalgia pain has gotten worse. She asks to have pain medicine prescribed. Patient states she was recently in the hospital 07/28/20-07/30/20 for sepsis and COVID.     I told her I will send message to PCP Dr. Garnica about this request. Someone from his care team will call her back today with his request.     FYI: I did complete a separate encounter note for this patient today (see in chart) regarding new onset RLE swelling. Reports right lower leg is noticeably more swollen than the left and hot to the touch. Patient advised to be seen in ER for this today to r/o infection or DVT. Patient verbalizes understanding of this disposition and will have family member bring her to the ER today for assessment of this issue.    Britany Shaikh RN    Reason for Disposition    Caller requesting a NON-URGENT new prescription or refill and triager unable to refill per department policy    Protocols used: MEDICATION QUESTION CALL-A-OH

## 2021-06-10 NOTE — TELEPHONE ENCOUNTER
FYI - Status Update  Who is Calling: Patient  Update: Released from [facility] today and wondering what her dosing for her warfarin should be. Please call patient.  Okay to leave a detailed message?:  Yes

## 2021-06-10 NOTE — TELEPHONE ENCOUNTER
Just home from hospital, after COVID hospitalization, doing okay.     Takes warfarin, unsure of todays dose. Last checked on Tuesday, was on Lovenox in hospital.     Has appointment in am to recheck INR. She is going to go by her usual Sunday dose of a full tab of 2.5 mg and check in am when see's Dr. Garnica.

## 2021-06-10 NOTE — TELEPHONE ENCOUNTER
"Please notify patient:  \"As for repeat testing, the CDC now suggests that those who were diagnosed with symptomatic COVID-19, and who remain asymptomatic after recovery, should not have a retest within three months. Neither, the agency advises, do they need to re-quarantine, even if they come into close contact with someone currently infected with coronavirus.  Only if symptoms reemerge should another test be carried out.\"    FYI:  Patient's first positive test was 7/16/20.  "

## 2021-06-10 NOTE — TELEPHONE ENCOUNTER
Spoke with patient and received verbal communication to speak with her daughter Lakeisha.  Notified Lakeisha of the below information and communicated with INR nurse to contact the patient regarding INR.    Lakeisha states she works in a clinic and is concerned that the patient was sent out of the clinic without having a provider consulted with her elevated INR and symptoms of feeling weak and cold etc.     She states she understands concerns regarding COVID-19 but this patient is also a person and should be treated with compassion and like any other patient. She would like supervisor to know this information.

## 2021-06-10 NOTE — PROGRESS NOTES
Clinic Care Coordination Contact  Community Health Worker Initial Outreach    CHW Initial Information Gathering:  Referral Source: ED Follow-Up  Preferred Hospital: [facility]  [phone]  Preferred Urgent Care: [facility], [phone]  Current living arrangement:: I live alone  Type of residence:: Other([place] [facility] [place])  Community Resources: PCA  Supplies used at home:: None  Equipment Currently Used at Home: none  Informal Support system:: Friends  No PCP office visit in Past Year: No  Transportation means:: Friend, Family       Patient accepts CC: Yes. Patient scheduled for assessment with the RN on 8/13/20 at 10:00 am. Patient noted desire to discuss concerns regarding her COVID-19 testing..    Next Outreach: 9/2/20

## 2021-06-10 NOTE — TELEPHONE ENCOUNTER
ANTICOAGULATION  MANAGEMENT    Assessment     Today's INR result of 3.8 is Supratherapeutic (goal INR of 2.0-3.0)        Warfarin taken as previously instructed    No new diet changes affecting INR  Has low appetite, finished levaquin yesterday.    No new medication/supplements affecting INR    Continues to tolerate warfarin with no reported s/s of bleeding or thromboembolism     Previous INR was Supratherapeutic    Plan:     Spoke with Nancy regarding INR result and instructed:     Warfarin Dosing Instructions:  hold today then change warfarin dose to 3.85 mg daily on sun/tue/thu; and 1.25 mg daily rest of week  (9 % change)    Instructed patient to follow up no later than: one week      Lesly verbalizes understanding and agrees to warfarin dosing plan.    Instructed to call the [facility] for any changes, questions or concerns. (#[phone])   ?   Renan Boo RN    Subjective/Objective:      Nancy Oropeza, a 75 y.o. female is on warfarin.     Nancy reports:     Home warfarin dose: verbally confirmed home dose with Lesly and updated on anticoagulation calendar     Missed doses: No     Medication changes:  No     S/S of bleeding or thromboembolism:  No     New Injury or illness:  No     Changes in diet or alcohol consumption:  No     Upcoming surgery, procedure or cardioversion:  No    Anticoagulation Episode Summary     Current INR goal:   2.0-3.0   TTR:   51.6 % (11.6 mo)   Next INR check:   8/10/2020   INR from last check:   3.85! (8/3/2020)   Weekly max warfarin dose:      Target end date:   12/21/2016   INR check location:      Preferred lab:      Send INR reminders to:   XENIA HURTADO    Indications    Pulmonary embolism (H) [I26.99]           Comments:            Anticoagulation Care Providers     Provider Role Specialty Phone number    Julien Garnica MD Referring Family Medicine [phone]

## 2021-06-10 NOTE — TELEPHONE ENCOUNTER
ANTICOAGULATION  MANAGEMENT    Assessment     Yesterday's INR result of 3.04 is Therapeutic (goal INR of 2.0-3.0)        Warfarin taken as previously instructed    No new diet changes affecting INR    Interaction between levofloxacin  and warfarin may be affecting INR - for 5 days, will be complete 8/2    Continues to tolerate warfarin with no reported s/s of bleeding or thromboembolism     Previous INR was Supratherapeutic     Patient went from ED to hospital admission 7/28/2020 and was discharged same day. She presented with fever, diaphoresis, tachycardia, and hypotension. Recently diagnosed with COVID19, they ruled it at SIRS/sepis.     Plan:     Spoke with Nancy regarding INR result and instructed:     Warfarin Dosing Instructions:  Continue current warfarin dose 1.25 mg daily on Mondays, Wednesdays and Saturdays; and 2.5 mg daily rest of week  (0 % change)    Instructed patient to follow up no later than: 8/3/2020, to ensure INR remains therapeutic, per micromedex levofloxacin may cause a rise in INR     Education provided: importance of therapeutic range, importance of following up for INR monitoring at instructed interval, importance of taking warfarin as instructed, potential interaction between warfarin and levofloxacin  and when to seek medical attention/emergency care    Lesly verbalizes understanding and agrees to warfarin dosing plan. She was able to repeat plan back to writer.     Instructed to call the [facility] for any changes, questions or concerns. (#[phone])   ?   Priscilla Frost RN    Subjective/Objective:      Nancy Oropeza, a 75 y.o. female is on warfarin.     Nancy reports:     Home warfarin dose: verbally confirmed home dose with Lesly  and updated on anticoagulation calendar     Missed doses: No     Medication changes:  Yes, levofloxacin 5 days      S/S of bleeding or thromboembolism:  No     New Injury or illness:  Yes, sepsis r/t COVID19      Changes in diet or alcohol  consumption:  No     Upcoming surgery, procedure or cardioversion:  No    Anticoagulation Episode Summary     Current INR goal:   2.0-3.0   TTR:   51.2 % (11.7 mo)   Next INR check:   8/3/2020   INR from last check:   3.04! (7/30/2020)   Weekly max warfarin dose:      Target end date:   12/21/2016   INR check location:      Preferred lab:      Send INR reminders to:   XENIA HURTADO    Indications    Pulmonary embolism (H) [I26.99]           Comments:            Anticoagulation Care Providers     Provider Role Specialty Phone number    Julien Garnica MD Referring Family Medicine [phone]

## 2021-06-10 NOTE — TELEPHONE ENCOUNTER
Nancy was recently in the hospital and has been home now for three days and is feeling weak and muscles are tightening.   Lesly was diagnosed with covid cough is still present and blood pressure is good and sats are 92 or 93%.  Denies shortness of breath.  FNA reminded Lesly that she has a telephone visit today regarding ambulation and Lesly forgot and will wait for call.  Lesly has questions on when she should set up a primary visit.  FNA advised to speak with MD first at 10:00 am and he will advise.      COVID 19 Nurse Triage Plan/Patient Instructions    Please be aware that novel coronavirus (COVID-19) may be circulating in the community. If you develop symptoms such as fever, cough, or SOB or if you have concerns about the presence of another infection including coronavirus (COVID-19), please contact your health care provider or visit [email or web address].     Disposition/Instructions    Home care recommended. Follow home care protocol based instructions.    Thank you for taking steps to prevent the spread of this virus.  o Limit your contact with others.  o Wear a simple mask to cover your cough.  o Wash your hands well and often.    Resources    [facility]: About COVID-19: [email or web address]    CDC: What to Do If You're Sick: www.cdc.gov/coronavirus/2019-ncov/about/steps-when-sick.html    CDC: Ending Home Isolation: www.cdc.gov/coronavirus/2019-ncov/hcp/disposition-in-home-patients.html     CDC: Caring for Someone: www.cdc.gov/coronavirus/2019-ncov/if-you-are-sick/care-for-someone.html     [facility]: Interim Guidance for Hospital Discharge to Home: www.health.[facility].mn.us/diseases/coronavirus/hcp/hospdischarge.pdf    [facility] clinical trials (COVID-19 research studies): clinicalaffairs.[facility]/umn-clinical-trials     Below are the COVID-19 hotlines at the [place] Department of Health ([facility]). Interpreters are available.   o For health questions: Call 105-584-1704 or 1-323.633.8073 (7  a.m. to 7 p.m.)  o For questions about schools and childcare: Call 254-213-5122 or 1-642.452.5609 (7 a.m. to 7 p.m.)       Reason for Disposition    Nursing judgment or information in reference    Additional Information    Negative: Nursing judgment, per information in Reference    Negative: Information only call about a Well Adult (no illness or injury)    Negative: Nursing judgment or information in reference    Negative: Nursing judgment or information in reference    Negative: Nursing judgment or information in reference    Negative: Nursing judgment or information in reference    Negative: Nursing judgment or information in reference    Negative: Nursing judgment or information in reference    Negative: Nursing judgment or information in reference    Negative: Nursing judgment or information in reference    Negative: Nursing judgment or information in reference    Negative: Nursing judgment or information in reference    Negative: Nursing judgment or information in reference    Negative: Nursing judgment or information in reference    Negative: Nursing judgment or information in reference    Protocols used: NO GUIDELINE [id number]

## 2021-06-10 NOTE — TELEPHONE ENCOUNTER
Spoke with patient.  Patient wants to remain off prednisone with h/o PMR.  Multiple arthralgias/myalgias.  Short-term use of oxycodone 5 mg use 1/2-1 tablet every 6 hours as needed.

## 2021-06-10 NOTE — TELEPHONE ENCOUNTER
Patient Returning Call  Reason for call:  Patient is returning call to Julien Garnica MD   Information relayed to patient:  Patient was advised a referral was entered for rheumatologist.   Patient has additional questions:  Yes  If YES, what are your questions/concerns:  Patient stated I need to talk to him ASAP as I need something for this pain! Please return call.    Okay to leave a detailed message?: Yes

## 2021-06-10 NOTE — TELEPHONE ENCOUNTER
Reason contacted:  Medication question  Information relayed:  Below message. Patient had to cancel an appointment with her podiatrist because she tested positive for COVID-19. She reports they will not reschedule her appointment until she has a negative COVID-19 test.    Could you place an order for her to be retested?    Additional questions:  No  Further follow-up needed:  Yes  Okay to leave a detailed message:  No

## 2021-06-10 NOTE — TELEPHONE ENCOUNTER
Patient calling w/ some questions.    Patient asked when her amlodipine was renewed.  FNA advised Rx refilled was sent on 8/23/20 to [facility].     Patient says she just finished the medrol dose pack yesterday and says was told to call back if she needs another medication. Patient says she feels good today.  FNA advised she should call back if she has any symptoms and message can be relayed to Dr. Garnica to see what he recommends for patient.    Patient asked is she needs another COVID-19 test if she had it in July.  Patient reports no symptoms.  FNA advised of the CDC guideline for being off self-isolation and that she does not need one unless there's certain circumstances it is needed again.    Patient asked if her [facility] message re: podiatry was seen in [facility].  FNA advise message did show up in her chart.    Patient verbalized understanding.        Reason for Disposition    Information only question and nurse able to answer    Additional Information    Negative: Nursing judgment    Negative: Nursing judgment    Negative: Nursing judgment    Negative: Nursing judgment    Protocols used: INFORMATION ONLY CALL - NO TRIAGE-A-OH

## 2021-06-10 NOTE — TELEPHONE ENCOUNTER
ANTICOAGULATION  MANAGEMENT    Assessment     Today's INR result of 1.4 is Subtherapeutic (goal INR of 2.0-3.0)        Warfarin taken as previously instructed    No new diet changes affecting INR    Interaction between Prednisone and warfarin may be affecting INR- will take last dose tmr. (for polymyalgia).     Continues to tolerate warfarin with no reported s/s of bleeding or thromboembolism     Previous INR was Subtherapeutic    Plan:     Spoke on phone with Nancy regarding INR result and instructed:     Warfarin Dosing Instructions:  Take booster dose of 3.75 mg today only then change warfarin dose to 2.5 mg daily on Mon, Thur; and 1.25 mg daily rest of week  (12.5 % change)    Instructed patient to follow up no later than: 1 week.     Education provided: importance of following up for INR monitoring at instructed interval and importance of taking warfarin as instructed    Nancy verbalizes understanding and agrees to warfarin dosing plan.    Instructed to call the [facility] for any changes, questions or concerns. (#[phone])   ?   Vincent Baird RN    Subjective/Objective:      Nancy Oropeza, a 75 y.o. female is on warfarin.     Nancy reports:     Home warfarin dose: template incorrect; verbally confirmed home dose with pt and updated on anticoagulation calendar     Missed doses: No     Medication changes:  Yes: Prednisone     S/S of bleeding or thromboembolism:  No     New Injury or illness:  Yes: polymyalgia     Changes in diet or alcohol consumption:  No     Upcoming surgery, procedure or cardioversion:  No    Anticoagulation Episode Summary     Current INR goal:   2.0-3.0   TTR:   50.3 % (11.6 mo)   Next INR check:   8/31/2020   INR from last check:   1.40! (8/24/2020)   Weekly max warfarin dose:      Target end date:   12/21/2016   INR check location:      Preferred lab:      Send INR reminders to:   XENIA HURTADO    Indications    Pulmonary embolism (H) [I26.99]           Comments:             Anticoagulation Care Providers     Provider Role Specialty Phone number    Julien Garnica MD Referring Family Medicine [phone]

## 2021-06-10 NOTE — TELEPHONE ENCOUNTER
Question following Office Visit  When did you see your provider: today  What is your question: I have 2 questions and a concern.    We were told Trazodone would be ordered to [facility] to assist with sleep and anxiety.  This has not been send and I am waiting for this as she is not able to get to the pharmacy she is too weak.    I am very disappointed as to the way she was treated due to having a Hx of a positive Covid 19 test.  Please let me explain on the call back.    What is going to happen with the INR at 6.4?   Okay to leave a detailed message: Yes    Caller stated this is an urgent request and I will be calling back at 4:00 if no returned call.

## 2021-06-10 NOTE — PROGRESS NOTES
"Nancy Oropeza is a 75 y.o. female who is being evaluated via a billable telephone visit.      The patient has been notified of following:     \"This telephone visit will be conducted via a call between you and your physician/provider. We have found that certain health care needs can be provided without the need for a physical exam.  This service lets us provide the care you need with a short phone conversation.  If a prescription is necessary we can send it directly to your pharmacy.  If lab work is needed we can place an order for that and you can then stop by our lab to have the test done at a later time.    Telephone visits are billed at different rates depending on your insurance coverage. During this emergency period, for some insurers they may be billed the same as an in-person visit.  Please reach out to your insurance provider with any questions.    If during the course of the call the physician/provider feels a telephone visit is not appropriate, you will not be charged for this service.\"    Patient has given verbal consent to a Telephone visit? Yes    What phone number would you like to be contacted at? [phone]    Patient would like to receive their AVS by AVS Preference: MyChart.       5 children (Nancy, Lakeisha, etc.) - son with Factor V abnormality  14 grandchildren   9 great-grandchildren   Grew up in [place], [place] til age 12...   No smoke (quit 16 years ago after 1 ppd x 30+ years)   EtOH: occ wine   Mom -  88 COPD   Dad -  61 massive MI   1 bro -  67 blood clot (lung)   2 sis - one of which is  age 62 small cell lung CA (h/o smoker)  Surgeries: ventral hernia repair with muscle flap 10/4/12 with post-op complication of seroma with J.P. drain in place followed by interventional radiology q 2 weeks);  age 25; groin hernia age 3; CAPRICE-BSO  by Dr. Herbert Carmen (due to benign cyst x 2); right TKA 18 (Dr. Small)  Hospitalizations: as " above   Work: retired 2001 ([employer] as )     Additional provider notes: Virtual visit completed.  Reviewed recent hospitalization initially at [facility] with transfer to [place] July 17 July through July 21, 2020 secondary to COVID-19 infection.  It did receive EUA round disappear and dexamethasone.  Was able to be subsequently discharged to home.  Due to low blood pressure readings did present to ER September 17 and was transferred to [facility] on discharged after admission July 28 July 30, 2020 at home to complete a 14-day quarantine.  Some muscle pain in The Arms Otherwise No Hip or Shoulder Concerns with Polymyalgia Rheumatica History.  Has Been on Prednisone 6 Mg Daily in the past and Does Not Want to Go Back on Any Steroids despite Potential Risk of Adrenal Insufficiency Concerns.  Remains on Lisinopril Hydrochlorothiazide 20/12.5 Using 2 Tablets Daily and Amlodipine 5 Mg Daily for Hypertension.  Citalopram 40 Mg Daily for Anxiety with Benefit.  Does Remain on Pravastatin 80 Mg at Bedtime Which She Has Tolerated in the past without History of Myositis Etc.        Assessment/Plan:    1. Infection due to 2019 novel coronavirus  Transitional care management reviewed.  Recent hospitalization at [facility] through [place] July 17 through July 21 with ED U a REM disappear and dexamethasone utilized.  Subsequent readmission with hypotension July 28 through July 30, 2020 at [facility].  Patient continues to improve.  Dry nonproductive cough.  No shortness of breath or fever.  Has completed 7 days of a 14-day quarantine and continues to do better slowly.  We will continue to monitor notify if any setbacks etc.    2. Insomnia, unspecified type  Insomnia.  Did discuss correlation with steroid use including dexamethasone.  Anticipate self-limited.  Patient declines further intervention and is is using Excedrin PM as needed.    3. Normochromic normocytic  anemia  Normochromic normocytic anemia.  Recent worsening of hemoglobin noted.  Will recheck hemogram on August 10 at time of INR draw otherwise has resumed chronic warfarin anticoagulation.    4. Anxiety  Anxiety disorder.  Citalopram 40 mg daily beneficial.    5. Benign essential hypertension  States blood pressure 149/78 at home today.  We will continue to monitor while on lisinopril hydrochlorothiazide 20/12.5 using 2 tablets daily and amlodipine 5 mg daily.        7/28/20:  CT Chest  IMPRESSION:      1.  Multifocal subpleural lung opacities are present which are patchy greatest in the posterior lower lobes. These kind of opacities have been described in the setting of COVID pneumonia, however the sparsity and shape suggest the organizing/fibrosing   phase of lung inflammation suggesting infection has been present for more than 1 week.   2.  Emphysema.  3.  Cholelithiasis      Phone call duration:  15 minutes    Julien Garnica MD

## 2021-06-10 NOTE — TELEPHONE ENCOUNTER
Patient asked for blood pressure check.  Added pt to CSS schedule and went to find a staff member.    Went back to room to inform patient that someone would be in shortly and she had left.

## 2021-06-10 NOTE — TELEPHONE ENCOUNTER
INITIAL INR > 5.5 NOTIFICATION- Anticoagulation Management Program    Nancy Oropeza had an initial point of care INR of 6.4    Venous confirmation of INR required per protocol. STAT venous sample drawn and being sent out for confirmation.    Patient left due to recent positive COVID DX  Please contact patient at home.    Thank you!    Fabienne Archer, CMA

## 2021-06-10 NOTE — TELEPHONE ENCOUNTER
There is an interaction between citalopram and trazodone so I had to find a new medication to prescribe for sleep. I have sent in hydroxyzine instead. She can take this at night to help with sleep. She can also take it during the day to help with anxiety. The INR team should be in touch with warfarin instructions.

## 2021-06-10 NOTE — TELEPHONE ENCOUNTER
"Nancy has multiple concerns:    1) has 6 tablets of amlodipine left, requests Rx to be sent to [facility] - FNA explained that a renewal Rx sent today.    2) Asks for schedule of appointments:  - 8/24/20 INR appointment  - 9/8/20 9:40 AM with Dr. Garnica for AWV  - 11/24/20 11 AM with Dr. Willams (rheumatology)    3) Reports that she feels her polymyalgia symptoms have significantly improved with Medrol dosepak. \"I am feeling good now I am on this methylprednosolone.\" - FNA advised her to update clinic when treatment is completed, to inform whether symptoms have improved or not.    4) Asks for an appointment with podiatry, Dr. Matthews. Previously scheduled to be seen on 8/10 but cancelled as she tested positive for COVID. Requests referral to podiatry.   Concern: toes are curling up and she walks on the pad of her left foot. Has numbness on both feet \"feels like I am wearing socks.\"    No fever, no shortness of breath. No COVID symptoms.    Disposition: Call PCP in 24 hours. She verbalized understanding of above instructions.    Lisa Burger RN/[place] Nurse Advisor      Reason for Disposition    [1] Swollen foot AND [2] no fever  (Exceptions: localized bump from bunions, calluses, insect bite, sting)    Additional Information    Negative: Entire foot is cool or blue in comparison to other foot    Negative: Purple or black skin on foot or toe    Negative: [1] Red area or streak AND [2] fever    Negative: [1] Swollen foot AND [2] fever    Negative: Patient sounds very sick or weak to the triager    Negative: [1] SEVERE pain (e.g., excruciating, unable to do any normal activities) AND [2] not improved after 2 hours of pain medicine    Negative: [1] Looks infected (spreading redness, pus) AND [2] large red area (> 2 in. or 5 cm)    Negative: Looks like a boil, infected sore, or deep ulcer    Negative: [1] Redness of the skin AND [2] no fever    Protocols used: FOOT PAIN-A-AH      "

## 2021-06-10 NOTE — TELEPHONE ENCOUNTER
INITIAL INR > 5.5 NOTIFICATION- Anticoagulation Management Program    Nancy Oropeza had an initial point of care INR of 6.8    Venous confirmation of INR required per protocol. STAT venous sample drawn and being sent out for confirmation.    Anticoagulation template scanned into EHR. Patient phone call with Anticoagulation Management Program (ACM) being arranged for patient triage prior to discharge.     MYRANDA AndersonT

## 2021-06-10 NOTE — TELEPHONE ENCOUNTER
Patient Returning Call  Reason for call:  Patient called back.  Information relayed to patient:  n/a  Patient has additional questions:  Yes  If YES, what are your questions/concerns:  States needs covering provider to send the rest of the med to the new pharmacy listed.  Please address today and call patient when sent.  Okay to leave a detailed message?: Yes

## 2021-06-10 NOTE — TELEPHONE ENCOUNTER
I spoke with Lesly this morning. I agree that I would recommend an appointment with Dr. Garnica sooner than Sept. She abruptly stopped her prednisone and I think it would be a good idea for her to be assessed for any adrenal issues from not taper the prednisone. Thanks!     Tigist Bedolla, Pharm.D., [facility]  Medication Therapy Management Pharmacist  [facility] and [facility]

## 2021-06-10 NOTE — TELEPHONE ENCOUNTER
Given her positive COVID-19 result on 7/28/2020 should this be an in clinic appointment or virtual appointment?

## 2021-06-10 NOTE — TELEPHONE ENCOUNTER
ANTICOAGULATION  MANAGEMENT    Assessment     Today's INR result of 4.98 is Supratherapeutic (goal INR of 2.0-3.0)        Warfarin taken as previously instructed    No new diet changes affecting INR    Interaction between Dexamethasone and warfarin may be affecting INR- finished.    Continues to tolerate warfarin with no reported s/s of bleeding or thromboembolism     Previous INR was Therapeutic     Hospitalized 7/16-7/21 for Covid-19 infection.    Plan:     Spoke with Nancy regarding INR result and instructed:     Warfarin Dosing Instructions:  Hold today and tmr 7/28 then continue current warfarin dose 1.25 mg every Mon, Wed, Sat; 2.5 mg all other days    Instructed patient to follow up no later than: Thurs 7/30. Appt made.     Education provided: importance of following up for INR monitoring at instructed interval, importance of taking warfarin as instructed, monitoring for bleeding signs and symptoms and when to seek medical attention/emergency care    Nancy verbalizes understanding and agrees to warfarin dosing plan.    Instructed to call the [facility] for any changes, questions or concerns. (#[phone])   ?   Vincent Baird RN    Subjective/Objective:      Nancy Oropeza, a 75 y.o. female is on warfarin.     Nancy reports:     Home warfarin dose: verbally confirmed home dose with pt and updated on anticoagulation calendar     Missed doses: No     Medication changes:  Yes: dexamethasone     S/S of bleeding or thromboembolism:  No     New Injury or illness:  Yes: covid 19     Changes in diet or alcohol consumption:  No     Upcoming surgery, procedure or cardioversion:  No    Anticoagulation Episode Summary     Current INR goal:   2.0-3.0   TTR:   51.3 % (11.8 mo)   Next INR check:   7/30/2020   INR from last check:   4.98! (7/27/2020)   Weekly max warfarin dose:      Target end date:   12/21/2016   INR check location:      Preferred lab:      Send INR reminders to:   XENIA HURTADO     Indications    Pulmonary embolism (H) [I26.99]           Comments:            Anticoagulation Care Providers     Provider Role Specialty Phone number    Julien Garinca MD Referring Family Medicine [phone]

## 2021-06-10 NOTE — TELEPHONE ENCOUNTER
Drug Change Request  Who is calling?:  Patient  What is the current medication?:    oxyCODONE (ROXICODONE) 5 MG immediate release tablet  20 tablet  0  8/11/2020      Sig - Route: Take 0.5-1 tablets (2.5-5 mg total) by mouth every 6 (six) hours as needed for pain. - Oral     Sent to pharmacy as: oxyCODONE 5 mg tablet (ROXICODONE)     Earliest Fill Date: 8/11/2020     Notes to Pharmacy: chronic pain     E-Prescribing Status: Receipt confirmed by pharmacy (8/11/2020  1:14 PM CDT)         What alternative is being requested?:   Provider to recommend.  Why the request to change?:    Patient states the above medication did not relieve the pain due to polymyalgia.  Patient does not want prednisone.  Requested Pharmacy?: [id number]  Okay to leave a detailed message?:  Yes

## 2021-06-10 NOTE — TELEPHONE ENCOUNTER
"Pt calling  Was inpatient, discharged 7/21/2020 , was tx for covid 19 infection & acute  Hypoxic respiratory failure     \"Shakey from nerves\"   Feels weak & anxious  \"Anxiety is through the roof\"  Is on celexa   PO2 93    /55  No appetite  Last BM 4-5 days ago  + dry cough  Denies shortness of breath  Denies chest pain, pressure, or tightness  Is generally feeling better with exception of anxiety & constipation.    Per protocol pt to be seen within 3 days  Pt has not had post hospital appt yet.  scheduled phone visit for today   Pt agrees with plan    Sneha Garzon RN  [place] Nurse Advisor            Reason for Disposition    Patient wants to be seen     Pt feels anxiety is related to heightened fear of covid-19 symptoms possibly returning in the future    Additional Information    Negative: Chest pain    Negative: Palpitations, skipped heart beat, or rapid heart beat    Negative: Cough is main symptom    Negative: Suicide thoughts, threats, attempts, or questions    Negative: Depression is main problem or symptom (e.g., feelings of sadness or hopelessness)    Negative: Difficulty breathing and persists > 10 minutes and not relieved by reassurance provided by triager    Negative: Lightheadedness or dizziness and persists > 10 minutes and not relieved by reassurance provided by triager    Negative: Alcohol or drug abuse, known or suspected, and feeling very shaky (i.e., visible tremors of hands)    Negative: Patient sounds very sick or weak to the triager    Negative: Patient sounds very upset or troubled to the triager    Negative: Symptoms interfere with work or school    Negative: Symptoms of anxiety or panic and has not been evaluated for this by physician    Negative: Started on anti-anxiety medication and no relief    Negative: Significant weight loss (or gain) and not dieting    Negative: Taking thyroid medications    Negative: Caffeine abuse, known or suspected (e.g., > 2 cups of coffee/tea or > " 4 cans of soda / day)    Negative: Alcohol or drug abuse, known or suspected    Negative: Taking herbal remedies    Negative: Recent traumatic event (e.g., death of a loved one, job loss, victim/witness of crime)    Negative: Requesting to talk to a counselor (e.g., mental health worker, psychiatrist)    Protocols used: ANXIETY AND PANIC ATTACK-A-OH

## 2021-06-10 NOTE — TELEPHONE ENCOUNTER
"ANTICOAGULATION  MANAGEMENT    Nancy Oropeza is on warfarin and had a point of care INR result > 5.5 or an \"error message\" on point of care meter today.    Afternoon INR; STAT venous INR processing and STAT  requested from lab    Spoke with Nancy via phone while at the lab and reviewed triage questions for potential signs and symptoms of bleeding.      Patient Response     Have you had any bleeding in the last week?   No   Have you passed any red, black, or tarry stools in last week?   No   Have you vomited/spit up any red or coffee ground material in last week?   No   Have you had any new, severe abdominal pain or bloating develop in last week?   No   Have you fallen or had any injuries in last week?   No   Do you currently have a severe, sudden onset headache?   No   Do you currently have any severe sudden changes in your vision?   No   Do you currently have any new onset numbness, weakness/paralysis?   No     Assessment/Plan:     Nancy's responses were negative for signs and symptoms of bleeding; may discharge from clinic    Nancy instructed to:       Hold warfarin today until venous INR result returns and receives follow up call from [facility]    Seek medical attention for new signs and symptoms of bleeding or a fall with injury.       "

## 2021-06-10 NOTE — PROGRESS NOTES
MTM Transition of Care Encounter  Assessment & Plan                                                     Post Discharge Medication Reconciliation Status: discharge medications reconciled and changed, per note/orders    Sepsis: Patient is feeling much better and completed antibiotic course.  Continue to monitor heart rate and oxygen at home.    COVID-19: Patient is recovering.  Reviewed to continue to take COVID precautions.    Recent left lower extremity cellulitis: Defer to podiatry next week.     Chronic pulmonary emboli: Last INR supra therapeutic.  Reviewed this is likely due to drug interaction with levofloxacin.  Will be rechecking INR next week, and at that point levofloxacin should be washed out.     Polymyalgia rheumatica: Taking no medication. Patient abruptly stopped prednisone which may cause symptoms of adrenal insufficiency. Per chart review, she has been on prednisone since at last Oct 2019. Will defer patient to PCP to address possible cortisol deficiency and risk of her underlying disease returning.   PLAN:   1. Defer to PCP about her abrupt prednisone discontinuation and assess her current symptoms due to risk of adrenal insufficiency     Hypertension/edema: Blood pressure at home uncontrolled.  Will be checked on Monday at podiatry appointment.  Encouraged her to continue to monitor at home, especially since she has lost weight.  If blood pressure is low, consider decreasing her antihypertensive and such as amlodipine which may be contributing to her edema.    Depression/anxiety: Patient is having some anxiety.  She does not want to use hydroxyzine.  We discussed that hydroxyzine and her hospitalization were likely unrelated.  She would like to work on lifestyle changes to help with her anxiety.  Discussed that she is on a higher than recommended dose of citalopram, therefore once her anxiety improves with her techniques, I would recommend decreasing citalopram to 20 mg.     Hypothyroidism: Last  TSH at goal.  Continue current dose and monitoring.    Chronic anemia: Last hemoglobin level was slightly low.  We will recheck with her upcoming INR to ensure that it is improving and not worsening.  Plan:  1.  Future hemoglobin level --order placed for Monday    Hyperlipidemia: Stable.  Patient is taking for primary prevention.  Reviewed that unfortunately pravastatin should be taken in the evening.  In the future, can consider switching her to rosuvastatin, which she can take in the morning with her other medications.  I can call her in a few months when she is running low on pravastatin to discuss this further.    Vitamin D deficiency: Last vitamin D level was right at goal, but that was an older value.  Did not discuss today, but due to her past chronic prednisone use, I would strongly suggest monitoring her bone density and ensuring adequate calcium and vitamin D supplementation.    Follow Up  1-2 months with MTM.  Will have Dr. Garnica follow-up with her    Subjective & Objective                                                       Nancy Oropeza is a 75 y.o. female called for a transitions of care visit. she was discharged from [facility] on 7/30/20 for sepsis. Of note, patient was previously in [place] from 7/17-7/21 with COVID-19.     Patient consented to a telehealth visit: Yes    Chief Complaint: Muscle aches     Medication Adherence/Access: Patient was familiar with her medications and had them with her for the appointment. Takes all of her medications in the morning, except for pravastatin in the evening which she admits she occasionally forgets.     Sepsis: Initially presented to [facility] on 7/28/2020, then was transferred to [facility] until 7/30/2020.  Presented with 1 day history of fever, tachycardia, hypotension, and leukocytosis.  Thought that her presentation was likely related to previous COVID.  Was discharged and recommended to return if respiratory issues worsen.  Was discharged on  levofloxacin 750 mg daily for 3 days. She completed the abx and denies any respiratory issues. Reports having a fever one day but otehrwise none. O2 at home has been 93 or 94%. HR continues to be fast, but she thinks she is anxious.     COVID-19: Positive test after known exposure.  Initially presented with fever, cough, diarrhea.  Patient was transferred to [place].  Onset of COVID symptoms started 7/11/2020 and date of positive test results on 7/16/2020.  She was given EUA remdesivir and dexamethasone.  She was eligible for convalescent plasma, but she improved clinically before starting. Since discharge she has lost her sense of smell and taste. Lost 15 lbs. Diarrhea has resolved.     Recent left lower extremity cellulitis:  Patient recently treated with 10 days of oral antibiotics for cellulitis and 4 of her left toes.  Reports her left toe is still ugly, but better overall. Will be meeting with podiatry on 8/10/20.       Chronic pulmonary emboli: History of unprovoked PE in 2015.  Taking warfarin.  No significant bleeding.   Last INR = 3.85 yesterday      Polymyalgia rheumatica: Taking no medication. Was on prednisone 6 mg daily chronically. Reports she is having muscle tightness and fatigue since stopping prednisone. She abruptly stopped and did not taper. Denies headache, nausea, or vomiting. She felt like the prednisone made her gain weight, insomnia, and affected her blood sugar. She does not want to be on prednisone ever again. Reports however that it was helpful for her pain.      Hypertension/edema: TEOFILO in hospital normalized, and she was resumed on furosemide and lisinopril-hydrochlorothiazide.  Currently taking lisinopril-hydrochlorothiazide 20-12.5 mg x 2 daily, furosemide 20 mg daily, and amlodipine 5 mg daily. Patient does monitor BP at home. Home BP =  116/70 mmHg . Denies lightheadedness/dizziness. Lost 15 lbs. Legs always swell, but in the morning they are down  BP Readings from Last 3  Encounters:   07/30/20 149/68   07/28/20 114/55   07/21/20 153/73     Wt Readings from Last 3 Encounters:   07/28/20 (!) 309 lb 11.2 oz (140.5 kg)   07/28/20 (!) 300 lb (136.1 kg)   07/17/20 (!) 317 lb 4.8 oz (143.9 kg)     Depression/anxiety: Continues citalopram 40 mg daily met with Dr. Del Cid on 7/27/2020 and given hydroxyzine 25 mg tablets to use as needed for anxiety. Took two then back into the hospital and she does not want to take more. Fine without them. Used to have a hx of panic attacks and worked herself out of it.      Hypothyroidism: Currently taking levothyroxine 137 mcg.  Last TSH checked 7/27/2020.     Chronic anemia: She wonders about her hemoglobin level.  She is not taking an iron supplement.   Last hemoglobin level = 8.7 on 7/30/2020    Hyperlipidemia: Currently taking pravastatin 80 mg at bedtime. Sometimes forgets since that is the only medication she takes in the evening.  Taking for primary prevention.  Denies myalgias, but history of myalgias on simvastatin and atorvastatin.. Last lipids checked 10/30/2019.    Vitamin D deficiency: Taking vitamin D 2000 units daily  Vitamin D, Total (25-Hydroxy)   Date Value Ref Range Status   06/21/2017 30.0 30.0 - 80.0 ng/mL Final         PMH: reviewed in EPIC   Allergies/ADRs: reviewed in EPIC   Alcohol: Reviewed in epic  Tobacco:   Social History     Tobacco Use   Smoking Status Former Smoker   Smokeless Tobacco Never Used   Tobacco Comment    quite 20 yrs ago     Recent Vitals:   BP Readings from Last 3 Encounters:   07/30/20 149/68   07/28/20 114/55   07/21/20 153/73      Wt Readings from Last 3 Encounters:   07/28/20 (!) 309 lb 11.2 oz (140.5 kg)   07/28/20 (!) 300 lb (136.1 kg)   07/17/20 (!) 317 lb 4.8 oz (143.9 kg)     ----------------    The patient declined an after visit summary    I spent 38 minutes with this patient today;  . All changes were made via collaborative practice agreement with Julien Garnica MD. A copy of the visit note was  provided to the patient's provider.     Tigist Bedolla PharmJewellD., BCACP  Medication Therapy Management Pharmacist  [facility] and [facility]    Telemedicine Visit Details    Type of service:  Telephone     Start Time: 10:04 AM  End Time (time video/phone call stopped): 10:42 AM    Originating Location (pt. Location): Home    Distant Location (provider location):  [facility] MEDICATION THERAPY MANAGEMENT [facility]    Mode of Communication:   Telephone     Current Outpatient Medications   Medication Sig Dispense Refill     amLODIPine (NORVASC) 5 MG tablet Take 1 tablet (5 mg total) by mouth daily. 90 tablet 3     cholecalciferol, vitamin D3, 1,000 unit tablet Take 2,000 Units by mouth daily.        citalopram (CELEXA) 40 MG tablet Take 1 tablet (40 mg total) by mouth daily. 90 tablet 3     furosemide (LASIX) 20 MG tablet TAKE 1 TABLET DAILY 90 tablet 3     hydrOXYzine HCL (ATARAX) 25 MG tablet Take 1-2 tablets by mouth every 6 hours as needed for anxiety or sleep. 30 tablet 1     levothyroxine (SYNTHROID, LEVOTHROID) 137 MCG tablet Take 1 tablet (137 mcg total) by mouth daily. 90 tablet 3     lisinopriL-hydrochlorothiazide (PRINZIDE,ZESTORETIC) 20-12.5 mg per tablet Take 2 tablets by mouth daily. 180 tablet 3     loperamide (IMODIUM) 2 mg capsule Take 1 capsule (2 mg total) by mouth 4 (four) times a day as needed for diarrhea.  0     pravastatin (PRAVACHOL) 80 MG tablet TAKE ONE TABLET BY MOUTH   EVERY NIGHT AT BEDTIME 90 tablet 3     warfarin ANTICOAGULANT (COUMADIN/JANTOVEN) 2.5 MG tablet Take 1.25-2.5 mg by mouth See Admin Instructions. Take half a tablet (1.25mg) on Monday, Wednesday and Saturday; Take 1 tablet (2.5mg) on all other days of the week. Adjust dose based on INR results as directed.       No current facility-administered medications for this visit.

## 2021-06-10 NOTE — TELEPHONE ENCOUNTER
FYI - Status Update  Who is Calling: Patient  Update: The [facility] pharmacy does not have the medication in stock,  Patient calls to request the prescription be sent to [id number], as she called them and they have the medication is stock.  Okay to leave a detailed message?:  No return call needed

## 2021-06-10 NOTE — PROGRESS NOTES
"Nancy Oropeza is a 75 y.o. female who is being evaluated via a billable telephone visit.      The patient has been notified of following:     \"This telephone visit will be conducted via a call between you and your physician/provider. We have found that certain health care needs can be provided without the need for a physical exam.  This service lets us provide the care you need with a short phone conversation.  If a prescription is necessary we can send it directly to your pharmacy.  If lab work is needed we can place an order for that and you can then stop by our lab to have the test done at a later time.    Telephone visits are billed at different rates depending on your insurance coverage. During this emergency period, for some insurers they may be billed the same as an in-person visit.  Please reach out to your insurance provider with any questions.    If during the course of the call the physician/provider feels a telephone visit is not appropriate, you will not be charged for this service.\"    Patient has given verbal consent to a Telephone visit? Yes    What phone number would you like to be contacted at? [phone] daughter is with her today     Patient would like to receive their AVS by AVS Preference: Nisha.        Assessment/Plan:     1. Anxiety     2. Acquired hypothyroidism  Thyroid Stimulating Hormone (TSH)   3. Anemia, unspecified type  HM2(CBC w/o Differential)   4. Benign essential hypertension  Comprehensive Metabolic Panel   5. History of 2019 novel coronavirus disease (COVID-19)         Diagnoses and all orders for this visit:    Anxiety  She is currently taking citalopram 40 mg daily.  We will have her continue this medication.  We will use hydroxyzine in addition to this on an as-needed basis for symptoms of anxiety.  Counseled her on use of this medication.  Prescription for hydroxyzine 25 mg tablet sent to pharmacy and directed her to take 1 to 2 tablets every 6 hours as needed.  " Encouraged follow-up with PCP for ongoing management of anxiety.  We will also check TSH today since she is coming in for labs to ensure that her dose of levothyroxine is appropriate    Acquired hypothyroidism  -     Thyroid Stimulating Hormone (TSH); Future; Expected date: 07/27/2020  -She is due for check of her TSH.  This will be done today.  Will adjust dose of levothyroxine as needed.  Discussed that if dose of levothyroxine is too high it can cause symptoms of anxiety as well as tachycardia    Anemia, unspecified type  -     HM2(CBC w/o Differential); Future; Expected date: 07/27/2020  -Reviewed labs during recent hospital stay at [place].  Noted presence of anemia.  Will recheck hemogram today given reported elevated pulse rate at home.  Encourage adequate nutrition.    Benign essential hypertension  -     Comprehensive Metabolic Panel; Future; Expected date: 07/27/2020  -She reports blood pressure is controlled with current meds.  We will check comprehensive metabolic panel    History of 2019 novel coronavirus disease (COVID-19)  She is recovering.  Provided reassurance.  Elevated pulse rate could be secondary to recent COVID-19 infection.  Continue supportive cares.  Recommend follow-up with PCP for hospital follow-up visit.             Subjective:      Nancy Oropeza is a 75 y.o. female who is evaluated today via telephone encounter to discuss concerns of anxiety as well as insomnia since recent hospitalization for COVID-19 infection.  Patient was hospitalized at [facility] July 17 through July 21 due to acute respiratory failure and hypoxia related to COVID-19 infection.  She has been home now for nearly 1 week.  Since she has been home she reports that she is shaky all the time.  She is very anxious and irritable.  She is not sleeping well at night.  She is eating and drinking well although she does have a decreased appetite.  Reports that she really has not slept much for the last 5 days.   She is monitoring her vital signs and reports that they are good.  Blood pressure has been good.  Oxygen sats have been around 95% on room air.  She has not had shortness of breath.  States that she has noticed that her pulse rate is a little bit high.  She is on citalopram 40 mg daily for anxiety and she has been on this medication for years.  They are currently on their way to the clinic for an INR check.  We reviewed her medications and allergies.  No other concerns or questions today.    Current Outpatient Medications   Medication Sig Dispense Refill     amLODIPine (NORVASC) 5 MG tablet Take 1 tablet (5 mg total) by mouth daily. 90 tablet 3     cholecalciferol, vitamin D3, 1,000 unit tablet Take 2,000 Units by mouth daily.        citalopram (CELEXA) 40 MG tablet Take 1 tablet (40 mg total) by mouth daily. 90 tablet 3     furosemide (LASIX) 20 MG tablet TAKE 1 TABLET DAILY 90 tablet 3     levothyroxine (SYNTHROID, LEVOTHROID) 137 MCG tablet Take 1 tablet (137 mcg total) by mouth daily. 90 tablet 3     lisinopriL-hydrochlorothiazide (PRINZIDE,ZESTORETIC) 20-12.5 mg per tablet Take 2 tablets by mouth daily. 180 tablet 3     pravastatin (PRAVACHOL) 80 MG tablet TAKE ONE TABLET BY MOUTH   EVERY NIGHT AT BEDTIME 90 tablet 3     warfarin ANTICOAGULANT (COUMADIN/JANTOVEN) 2.5 MG tablet Take 1.25-2.5 mg by mouth See Admin Instructions. Take half a tablet (1.25mg) on Monday, Wednesday and Saturday; Take 1 tablet (2.5mg) on all other days of the week. Adjust dose based on INR results as directed.       hydrOXYzine HCL (ATARAX) 25 MG tablet Take 1-2 tablets by mouth every 6 hours as needed for anxiety or sleep. 30 tablet 1     loperamide (IMODIUM) 2 mg capsule Take 1 capsule (2 mg total) by mouth 4 (four) times a day as needed for diarrhea.  0     No current facility-administered medications for this visit.        Past Medical History, Family History, and Social History reviewed.  Past Medical History:   Diagnosis Date      Depression      Disease of thyroid gland      HTN (hypertension)      Hypercholesteremia      Morbid obesity (H)      Pulmonary emboli (H) 12/14/2015     Yeast infection      Past Surgical History:   Procedure Laterality Date     BREAST BIOPSY Left 1970s     HYSTERECTOMY  2010     OOPHORECTOMY  2010     Atorvastatin; Paroxetine; Simvastatin; Sulfa (sulfonamide antibiotics); and Sulfasalazine  Family History   Problem Relation Age of Onset     Breast cancer Sister 75     Stomach cancer Paternal Grandfather      Lung cancer Sister      Social History     Socioeconomic History     Marital status:      Spouse name: Not on file     Number of children: Not on file     Years of education: Not on file     Highest education level: Not on file   Occupational History     Not on file   Social Needs     Financial resource strain: Not on file     Food insecurity     Worry: Not on file     Inability: Not on file     Transportation needs     Medical: Not on file     Non-medical: Not on file   Tobacco Use     Smoking status: Former Smoker     Smokeless tobacco: Never Used     Tobacco comment: quite 20 yrs ago   Substance and Sexual Activity     Alcohol use: No     Comment: occasionally     Drug use: No     Sexual activity: Not on file   Lifestyle     Physical activity     Days per week: Not on file     Minutes per session: Not on file     Stress: Not on file   Relationships     Social connections     Talks on phone: Not on file     Gets together: Not on file     Attends Anabaptism service: Not on file     Active member of club or organization: Not on file     Attends meetings of clubs or organizations: Not on file     Relationship status: Not on file     Intimate partner violence     Fear of current or ex partner: Not on file     Emotionally abused: Not on file     Physically abused: Not on file     Forced sexual activity: Not on file   Other Topics Concern     Not on file   Social History Narrative    Patient arrived in the  ED with her sister Kassy 09/10/17         Review of systems is as stated in HPI, and the remainder of the 10 system review is otherwise negative.    Objective:     There were no vitals filed for this visit. There is no height or weight on file to calculate BMI.    Exam: She is alert. She is speaking clearly. She does not sound short of breath      This note has been dictated using voice recognition software. Any grammatical or context distortions are unintentional and inherent to the the software.             Phone call duration:  10 minutes    Brittanie Del Cid MD

## 2021-06-10 NOTE — TELEPHONE ENCOUNTER
ANTICOAGULATION  MANAGEMENT    Assessment     Today's INR result of 1.6 is Subtherapeutic (goal INR of 2.0-3.0)        Warfarin taken as previously instructed    No new diet changes affecting INR    No new medication/supplements affecting INR    Continues to tolerate warfarin with no reported s/s of bleeding or thromboembolism     Previous INR was Therapeutic    Plan:     Spoke on phone with Nancy regarding INR result and instructed:     Warfarin Dosing Instructions:  Change warfarin dose to 2.5 mg daily on Mon; and 1.25 mg daily rest of week  (14 % change)    Instructed patient to follow up no later than: 1 week.    Education provided: importance of following up for INR monitoring at instructed interval and importance of taking warfarin as instructed    Nancy verbalizes understanding and agrees to warfarin dosing plan.    Instructed to call the [facility] for any changes, questions or concerns. (#[phone])   ?   Vincent Baird RN    Subjective/Objective:      Nancy Oropeza, a 75 y.o. female is on warfarin.     Nancy reports:     Home warfarin dose: verbally confirmed home dose with pt and updated on anticoagulation calendar     Missed doses: No     Medication changes:  No     S/S of bleeding or thromboembolism:  No     New Injury or illness:  No     Changes in diet or alcohol consumption:  No     Upcoming surgery, procedure or cardioversion:  No    Anticoagulation Episode Summary     Current INR goal:   2.0-3.0   TTR:   52.1 % (11.6 mo)   Next INR check:   8/24/2020   INR from last check:   1.60! (8/17/2020)   Weekly max warfarin dose:      Target end date:   12/21/2016   INR check location:      Preferred lab:      Send INR reminders to:   XENIA HURTADO    Indications    Pulmonary embolism (H) [I26.99]           Comments:            Anticoagulation Care Providers     Provider Role Specialty Phone number    Julien Garnica MD Referring Family Medicine [phone]

## 2021-06-10 NOTE — TELEPHONE ENCOUNTER
Daughter Lakeisha called stating that patient has a temp of 99.4.    Shaky, weak and cold.      Drinking and urinating.     Home care advice given per protocol.     Marlene Louise RN/[facility] Nurse Advisors    COVID 19 Nurse Triage Plan/Patient Instructions    Please be aware that novel coronavirus (COVID-19) may be circulating in the community. If you develop symptoms such as fever, cough, or SOB or if you have concerns about the presence of another infection including coronavirus (COVID-19), please contact your health care provider or visit [email or web address].     Disposition/Instructions    Home care recommended. Follow home care protocol based instructions.    Thank you for taking steps to prevent the spread of this virus.  o Limit your contact with others.  o Wear a simple mask to cover your cough.  o Wash your hands well and often.    Resources    [facility]: About COVID-19: [email or web address]    CDC: What to Do If You're Sick: www.cdc.gov/coronavirus/2019-ncov/about/steps-when-sick.html    CDC: Ending Home Isolation: www.cdc.gov/coronavirus/2019-ncov/hcp/disposition-in-home-patients.html     CDC: Caring for Someone: www.cdc.gov/coronavirus/2019-ncov/if-you-are-sick/care-for-someone.html     [facility]: Interim Guidance for Hospital Discharge to Home: www.health.[facility].mn.us/diseases/coronavirus/hcp/hospdischarge.pdf    [facility] clinical trials (COVID-19 research studies): clinicalaffairs.[facility]-clinical-trials     Below are the COVID-19 hotlines at the [facility] ([facility]). Interpreters are available.   o For health questions: Call 128-726-4661 or 1-908.783.5709 (7 a.m. to 7 p.m.)  o For questions about schools and childcare: Call 815-866-0033 or 1-391.473.7443 (7 a.m. to 7 p.m.)       Reason for Disposition    [1] Fever AND [2] no signs of serious infection or localizing symptoms (all other triage questions negative)    Additional Information    Negative:  Shock suspected (e.g., cold/pale/clammy skin, too weak to stand, low BP, rapid pulse)    Negative: Difficult to awaken or acting confused (e.g., disoriented, slurred speech)    Negative: [1] Difficulty breathing AND [2] bluish lips, tongue or face    Negative: New onset rash with multiple purple (or blood-colored) spots or dots    Negative: Sounds like a life-threatening emergency to the triager    Negative: Fever in a cancer patient who is currently (or recently) receiving chemotherapy or radiation therapy, or cancer patient who has metastatic or end-stage cancer and is receiving palliative care    Negative: Pregnant    Negative: Postpartum (from 0 to 6 weeks after delivery)    Negative: Fever onset within 24 hours of receiving vaccine    Negative: [1] Fever AND [2] within 21 days of Ebola EXPOSURE    Negative: Other symptom is present, see that guideline  (e.g., symptoms of cough, runny nose, sore throat, earache, abdominal pain, diarrhea, vomiting)    Negative: [1] Headache AND [2] stiff neck (can't touch chin to chest)    Negative: Difficulty breathing    Negative: IV drug abuse    Negative: [1] Drinking very little AND [2] dehydration suspected (e.g., no urine > 12 hours, very dry mouth, very lightheaded)    Negative: Patient sounds very sick or weak to the triager  (Exception: mild weakness and hasn't taken fever medicine)    Negative: Fever > 104 F (40 C)    Negative: [1] Fever > 101 F (38.3 C) AND [2] age > 60    Negative: [1] Fever > 100.0 F (37.8 C) AND [2] bedridden (e.g., nursing home patient, CVA, chronic illness, recovering from surgery)    Negative: [1] Fever > 100.0 F (37.8 C) AND [2] indwelling urinary catheter (e.g., Guerra, Coude)    Negative: [1] Fever > 100.0 F (37.8 C) AND [2] has port (portacath), central line, or PICC line    Negative: [1] Fever > 100.0 F (37.8 C) AND [2] diabetes mellitus or weak immune system (e.g., HIV positive, cancer chemo, splenectomy, organ transplant, chronic  steroids)    Negative: [1] Fever > 100.0 F (37.8 C) AND [2] surgery in the last month    Negative: Transplant patient (e.g., kidney, liver, lung, heart)    Negative: Fever present > 3 days (72 hours)    Negative: [1] Fever > 100.0 F (37.8 C) AND [2] foreign travel to a developing country in the last month    Negative: [1] Intermittent fever > 100.0 F (37.8 C) AND [2] lasts > 3 weeks    Protocols used: FEVER-A-AH

## 2021-06-10 NOTE — TELEPHONE ENCOUNTER
Prescription sent for Medrol Dosepak.  Patient to notify with update next week regarding response.  Telephone number was given for [facility] rheumatology in order to schedule appointment.

## 2021-06-10 NOTE — TELEPHONE ENCOUNTER
Who is calling:  Patient  Reason for Call:    Patient did go to Emergency Department today.  Patient declined pain management while she was there.  Patient is requesting status of pain medication request.  Please reach out to patient and advise.  Date of last appointment with primary care: 8/5/2020  Okay to leave a detailed message: Yes

## 2021-06-10 NOTE — TELEPHONE ENCOUNTER
Patient calls today about increased swelling in RLE. She states she typically has some edema in both lower extremities, however the right leg is a lot more swollen today. The right leg is also hot to the touch. This started 1-2 days ago but has gotten noticeably worse just since yesterday. Denies discoloration of the RLE. Denies numbness, tingling, pain, or weakness. Denies trouble breathing today. Does not think she has fever.    The patient was recently hospitalized for sepsis and COVID 07/28/20-07/30/20. Unable to schedule office visit for her today d/t COVID positive. The patient states she is unable to do video visit. I recommended that she return to the ER for the leg symptoms she is having as there is concern for infection or DVT at this time. The patient verbalizes understanding of this advice. She will call a family member to take her to the ER today to have R leg assessed.    Britany Shaikh RN      Additional Information    Negative: Difficulty breathing at rest    Negative: Entire foot is cool or blue in comparison to other side    Negative: SEVERE swelling (e.g., swelling extends above knee, entire leg is swollen, weeping fluid)    Negative: Thigh or calf pain and only 1 side and present > 1 hour    Thigh, calf, or ankle swelling in both legs, but one side is definitely more swollen    Negative: Thigh, calf, or ankle swelling in only one leg    Protocols used: LEG SWELLING AND EDEMA-A-OH

## 2021-06-10 NOTE — TELEPHONE ENCOUNTER
Nancy, patient's daughter calling reporting patient had a temperature 102.1. States her oxygen level is 93% with a blood pressure of 86/51. Nancy states she just left the patient but concerned about the her symptoms. RN offered to call patient to triage, but Nancy states she is not far from patient and will go to patient. Advised to call back when with patient to triage.     Usama Tavarez RN  [facility] Nurse Advisors

## 2021-06-10 NOTE — TELEPHONE ENCOUNTER
Called a half hour ago, here at 1130  Patient's daughter had been over to her mothers home because she could not get her to answer the phone. When she got there, the patient was confused, weak, and shaking.    At that time  Fever 102.1  BP 86/57  O2 93    Daughter gave patient tylenol at 11:30pm    Daughter called HE when she was on way home, because she just did not feel right leaving her alone. They asked her to go back and be near patient so that the proper questions could be asked.     Daughter calling back now.     History:   Today's INR 6.4  COVID19 positive 15 days ago   At [facility] for 5 days  Today was seen by Dr. Del Cid via virtual visit for anxiety. Patient was prescribed Hydroxyzine.  The doctor told her to take 2 at 4:30pm and 2 at 11pm  Patient took her BP meds in the morning, none at night    Patient is still weak and in and out of it.     Daughter took vitals again with home equipment    Now temp 101.3  Oxygen 94  79/62  Pulse 117    Triaged to a disposition of Call 911 EMS Now. Daughter is agreeable and will call now. Will have patient lay down with feet elevated until the EMS arrive. Will notify EMS of previous positive COVID19    COVID 19 Nurse Triage Plan/Patient Instructions    Please be aware that novel coronavirus (COVID-19) may be circulating in the community. If you develop symptoms such as fever, cough, or SOB or if you have concerns about the presence of another infection including coronavirus (COVID-19), please contact your health care provider or visit [email or web address].     Disposition/Instructions    Call to EMS/911 recommended. Follow protocol based instructions.    Bring Your Own Device:  Please also bring your smart device(s) (smart phones, tablets, laptops) and their charging cables for your personal use and to communicate with your care team during your visit.      Thank you for taking steps to prevent the spread of this virus.  o Limit your contact with others.  o Wear a simple mask  to cover your cough.  o Wash your hands well and often.    Resources    [facility]: About COVID-19: [email or web address]    CDC: What to Do If You're Sick: www.cdc.gov/coronavirus/2019-ncov/about/steps-when-sick.html    CDC: Ending Home Isolation: www.cdc.gov/coronavirus/2019-ncov/hcp/disposition-in-home-patients.html     CDC: Caring for Someone: www.cdc.gov/coronavirus/2019-ncov/if-you-are-sick/care-for-someone.html     [facility]: Interim Guidance for Hospital Discharge to Home: www.[facility].mn.us/diseases/coronavirus/hcp/hospdischarge.pdf    [facility] clinical trials (COVID-19 research studies): clinicalaffairs.[facility]-clinical-trials     Below are the COVID-19 hotlines at the [place] [facility] ([facility]). Interpreters are available.   o For health questions: Call 828-241-8433 or 1-487.543.9182 (7 a.m. to 7 p.m.)  o For questions about schools and childcare: Call 118-194-3914 or 1-342.140.8494 (7 a.m. to 7 p.m.)       Reason for Disposition    [1] Systolic BP < 90 AND [2] dizzy, lightheaded, or weak    Difficult to awaken or acting confused (e.g., disoriented, slurred speech)    Protocols used: LOW BLOOD PRESSURE-WILL Tejada RN Triage Nurse Advisor

## 2021-06-10 NOTE — TELEPHONE ENCOUNTER
FYI - Status Update  Who is Calling: Home Care  Update: Caller stated the patient is refusing skilled nursing. Caller reported she did talked to the patient over the phone and the patient seems to know and understand how to take her medications.   Okay to leave a detailed message: Yes  [phone]

## 2021-06-10 NOTE — TELEPHONE ENCOUNTER
Medication Question or Clarification  Who is calling: Patient states this is her fourth call?  What medication are you calling about (include dose and sig)?:     oxyCODONE (ROXICODONE) 5 MG immediate release tablet  2.5-5 mg, Oral, Every 6 hours PRN         Summary: Take 0.5-1 tablets (2.5-5 mg total) by mouth every 6 (six) hours as needed for pain., Starting Fri 8/7/2020, Normal      Who prescribed the medication?: Dr Garnica    What is your question/concern?: The patient states she only was given 12 tablets and quantity was 20.  Please call pharmacy and update patient.   Requested Pharmacy: [facility]   Heather to leave a detailed message?: Yes Please update patient.  [phone]

## 2021-06-10 NOTE — PROGRESS NOTES
Assessment and Plan:     1. Encounter for general adult medical examination with abnormal findings  Annual wellness visit completed.  Risk factors associated with poor diet, memory issues noted.  Immunizations reviewed and up-to-date.  Will complete Cologuard at earliest convenience and if normal likely no further testing indicated.  - Ambulatory referral to Gastroenterology    2. Morbid obesity  Dietary exercise modifications for weight goal less than 280 pounds initially, less than 260 pounds ideally.    3. Bad odor of urine  Urinalysis without evidence for obvious urinary tract infection.  Urine culture pending.  Ensure adequate hydration.  - Urinalysis-UC if Indicated    4. Essential hypertension with goal blood pressure less than 140/90  Continued use of lisinopril hydrochlorothiazide 20/12.5 taking 2 tablets once daily.  Ensure stable renal function and electrolytes.  Otherwise no side effects noted.  - Comprehensive Metabolic Panel    5. Hypercholesterolemia  Continues pravastatin 80 mg at bedtime for lipid management.  Check lipid cascade today while fasting.  - Lipid Cascade    6. Hypothyroidism, unspecified type  Continued use of levothyroxine 125  g daily for thyroid replacement.  Check TSH.  - Comprehensive Metabolic Panel  - Thyroid Stimulating Hormone (TSH)    7. Other pulmonary embolism without acute cor pulmonale, unspecified chronicity  Repeat INR today.  Patient would like to have INR is drawn back to this office and set a home monitoring.  Chronic anticoagulation noted with pulmonary emboli history.  - INR    8. Obstructive Sleep Apnea  Stable.    9. Mild episode of recurrent major depressive disorder  Continued use a citalopram 40 mg taking half tablet once daily.  PHQ 9 questionnaire 7 out of 27 with TORIE 7 questionnaire 9 out of 21.    10. Normochromic, Normocytic Anemia  Recheck CBC ensure stable or resolved anemia.  - HM2(CBC w/o Differential)    11. Memory loss  Discussed concerns with  progressive memory loss especially short-term memory.  Long-term memory appears excellent.  Did check vitamin B12, comprehensive metabolic panel, VDRL, TSH and CBC.  Referral was placed to neurology as well for further evaluation and treatment options.  - Vitamin B12  - Comprehensive Metabolic Panel  - Syphilis Screen, [place]  - Ambulatory referral to Neurology          - Urinalysis-UC if Indicated      The patient's current medical problems were reviewed.    I have had an Advance Directives discussion with the patient.  The following high BMI interventions were performed this visit: encouragement to exercise, weight monitoring, weight loss from baseline weight and lifestyle education regarding diet  The following health maintenance schedule was reviewed with the patient and provided in printed form in the after visit summary:   Health Maintenance   Topic Date Due     COLONOSCOPY  05/14/1961     DXA SCAN  05/14/2008     INFLUENZA VACCINE RULE BASED (1) 08/01/2016     DEPRESSION FOLLOW UP  10/28/2017     FALL RISK ASSESSMENT  04/28/2018     MAMMOGRAM  04/08/2019     ADVANCE DIRECTIVES DISCUSSED WITH PATIENT  01/06/2020     TD 18+ HE  02/03/2022     PNEUMOCOCCAL POLYSACCHARIDE VACCINE AGE 65 AND OVER  Completed     PNEUMOCOCCAL CONJUGATE VACCINE FOR ADULTS (PCV13 OR PREVNAR)  Completed     ZOSTER VACCINE  Completed        Subjective:   Chief Complaint: Nancy Oropeza is an 71 y.o. female here for an Annual Wellness visit.   HPI: Patient seen today for wellness visit.  Memory difficulties identified more short-term memory.  No headache.  No vision change.  No significant family history of cognitive impairment.  Continues vitamin D 2000 units using 2 capsules daily.  Levothyroxine 125  g daily for thyroid replacement.  Urine malodor.  No dysuria urgency or frequency.  Lisinopril hydrochlorothiazide 20/12.5 using 2 tablets daily for hypertension.  Pravastatin 80 mg at bedtime for lipid management.  Citalopram 40 mg  daily for depression, stable.  History of pulmonary emboli and is on chronic anticoagulation.  Home monitoring however this is expensive apparently would like to have INRs followed to this office going forward.  Denies chest pain or shortness of breath.  No regular exercise.    Review of Systems:  Please see above.  The rest of the review of systems are negative for all systems.    Patient Care Team:  Julien Garnica MD as PCP - General  Tigist Bedolla PharmD as Pharmacist (Pharmacist)  RENETTA Spence as Physician (Rheumatology)     Patient Active Problem List   Diagnosis     Morbid Obesity     Osteoarthritis Of The Knee     Lower Back Pain     Major Depression, Recurrent     Obstructive Sleep Apnea     Hypertension     Edema     Hypothyroidism     Hypercholesterolemia     Normochromic, Normocytic Anemia     Arthralgias In Multiple Sites     Cataract     Impaired Fasting Glucose     Multiple lung nodules on CT     Angioedema     Hypokalemia     Pulmonary embolism     Anticoagulant long-term use     Past Medical History:   Diagnosis Date     Depression      Disease of thyroid gland      HTN (hypertension)      Hypercholesteremia      Morbid obesity      Pulmonary emboli 12/14/2015      Past Surgical History:   Procedure Laterality Date     BREAST BIOPSY Left 1970s     HYSTERECTOMY  2010     OOPHORECTOMY  2010      Family History   Problem Relation Age of Onset     Breast cancer Sister 75     Stomach cancer Paternal Grandfather      Lung cancer Sister       Social History     Social History     Marital status:      Spouse name: N/A     Number of children: N/A     Years of education: N/A     Occupational History     Not on file.     Social History Main Topics     Smoking status: Former Smoker     Smokeless tobacco: Not on file      Comment: quite 20 yrs ago     Alcohol use No      Comment: occasionally     Drug use: No     Sexual activity: Not on file     Other Topics Concern     Not on file     Social  "History Narrative      Current Outpatient Prescriptions   Medication Sig Dispense Refill     amLODIPine (NORVASC) 10 MG tablet Take 1 tablet (10 mg total) by mouth daily. 30 tablet 5     cholecalciferol, vitamin D3, 1,000 unit tablet Take 1,000 Units by mouth daily. 2 capsules once a day       citalopram (CELEXA) 40 MG tablet Take 1 tablet (40 mg total) by mouth daily. 90 tablet 3     furosemide (LASIX) 20 MG tablet Take 1 tablet (20 mg total) by mouth daily. 90 tablet 3     levothyroxine (SYNTHROID, LEVOTHROID) 125 MCG tablet Take 1 tablet (125 mcg total) by mouth daily. 90 tablet 3     lisinopril-hydrochlorothiazide (PRINZIDE,ZESTORETIC) 20-12.5 mg per tablet Take 2 tablets by mouth  daily 180 tablet 3     pravastatin (PRAVACHOL) 80 MG tablet Take 1 tablet by mouth at  bedtime 90 tablet 3     traMADol (ULTRAM) 50 mg tablet Take 1 tab for pain. Max 3/day. 30 tablet 0     diclofenac sodium (VOLTAREN) 1 % Gel apply 2 grams QID to affected areas as needed (avoid use with occlusive dressings or heat) 100 g 2     ergocalciferol (ERGOCALCIFEROL) 50,000 unit capsule Take 1 capsule (50,000 Units total) by mouth 2 (two) times a week. x 12 weeks, then use vitamin D 2,000 units daily 24 capsule 0     No current facility-administered medications for this visit.       Objective:   Vital Signs:   Visit Vitals     /60     Pulse (!) 120     Ht 5' 6.25\" (1.683 m)     Wt (!) 309 lb (140.2 kg)     Breastfeeding No     BMI 49.5 kg/m2        VisionScreening:  No exam data present     PHYSICAL EXAM    General Appearance: Alert, pleasant, appears stated age.  Morbid obesity.  Head: Normocephalic, without obvious abnormality  Eyes: PERRL, conjunctiva/corneas clear, EOM's intact  Ears: Normal TM's and external ear canals, both ears  Nose: Nares normal, septum midline,mucosa normal, no drainage  Throat: Lips, mucosa, and tongue normal; teeth and gums normal; oropharynx is clear  Neck: Supple,without lymphadenopathy or " thyromegally  Lungs: Clear to auscultation bilaterally, respirations unlabored  Breasts: Nopalpable masses, tenderness, asymmetry, or nipple discharge. No axillary or supraclavicular lymphadenopathy  Heart: Regular rate and rhythm, no murmur   Abdomen: Soft, non-tender, no masses, no organomegaly  Pelvic:Not examined  Extremities: Extremities with strong and symmetric pulses, no cyanosis or edema  Skin: Skin color, texture normal, no rashes or lesions  Neurologic: Normal      Assessment Results 4/28/2017   Activities of Daily Living No help needed   Instrumental Activities of Daily Living No help needed   Get Up and Go Score Less than 12 seconds   Mini Cog Total Score 2     A Mini-Cog score of 0-2 suggests the possibility of dementia, score of 3-5 suggests no dementia    Identified Health Risks:     The patient was counseled and encouraged to consider modifying their diet and eating habits. She was provided with information on recommended healthy diet options.  The patient's PHQ-9 score is consistent with mild depression. She was provided with information regarding depression and was advised to schedule a follow up appointment in 12 weeks to further address this issue.  Information regarding advance directives (living funes), including where she can download the appropriate form, was provided to the patient via the AVS.       The patient was provided with appropriate referrals to address her memory problem.

## 2021-06-10 NOTE — TELEPHONE ENCOUNTER
RN Triage:    Spoke with 75 yr old Nancy who c/o:    Rx given for pain medication:  Oxycodone 5 mg.    Pt is asking if she can take Tylenol in between doses of Oxycodone?    PLAN:  Will consult with PCP or covering MD per protocol.  Please advise.    Sofia De La Paz RN   [facility] RN Triage        Reason for Disposition    Caller has URGENT medication question about med that PCP prescribed and triager unable to answer question    Additional Information    Negative: Drug overdose and triager unable to answer question    Negative: Caller requesting information unrelated to medicine    Negative: Caller requesting a prescription for Strep throat and has a positive culture result    Negative: Rash while taking a medication or within 3 days of stopping it    Negative: Immunization reaction suspected    Negative: Asthma and having symptoms of asthma (cough, wheezing, etc.)    Negative: Breastfeeding questions about mother's medicines and diet    Negative: MORE THAN A DOUBLE DOSE of a prescription or over-the-counter (OTC) drug    Negative: DOUBLE DOSE (an extra dose or lesser amount) of over-the-counter (OTC) drug and any symptoms (e.g., dizziness, nausea, pain, sleepiness)    Negative: DOUBLE DOSE (an extra dose or lesser amount) of prescription drug and any symptoms (e.g., dizziness, nausea, pain, sleepiness)    Negative: Took another person's prescription drug    Negative: DOUBLE DOSE (an extra dose or lesser amount) of prescription drug and NO symptoms (Exception: a double dose of antibiotics)    Negative: Diabetes drug error or overdose (e.g., took wrong type of insulin or took extra dose)    Negative: Caller has medication question about med not prescribed by PCP and triager unable to answer question (e.g., compatibility with other med, storage)    Negative: Request for URGENT new prescription or refill of 'essential' medication (i.e., likelihood of harm to patient if not taken) and triager unable to fill per  department policy    Negative: Prescription not at pharmacy and was prescribed today by PCP    Negative: Pharmacy calling with prescription questions and triager unable to answer question    Protocols used: MEDICATION QUESTION CALL-A-OH

## 2021-06-10 NOTE — TELEPHONE ENCOUNTER
Called the pharmacy and patient received 12 out of 20 tablets of oxycodone on 8/7/2020    Do you want to refill for another 20 tablets or 8 tablets?

## 2021-06-11 ENCOUNTER — COMMUNICATION - HEALTHEAST (OUTPATIENT)
Dept: FAMILY MEDICINE | Facility: CLINIC | Age: 76
End: 2021-06-11

## 2021-06-11 DIAGNOSIS — I26.99 PULMONARY EMBOLISM, UNSPECIFIED CHRONICITY, UNSPECIFIED PULMONARY EMBOLISM TYPE, UNSPECIFIED WHETHER ACUTE COR PULMONALE PRESENT (H): ICD-10-CM

## 2021-06-11 LAB — INR PPP: 4 (ref 0.9–1.1)

## 2021-06-11 NOTE — TELEPHONE ENCOUNTER
ANTICOAGULATION  MANAGEMENT    Assessment     9/15's INR result of 1.5 is Subtherapeutic (goal INR of 2.0-3.0)        Warfarin taken as previously instructed since last INR.    No new diet changes affecting INR    Interaction between Prednisone and warfarin may be affecting INR- noted pt started on 5 mg prednisone as maintenance dose since 9/11.    Continues to tolerate warfarin with no reported s/s of bleeding or thromboembolism     Previous INR was Therapeutic    Plan:     Spoke on phone with Nancy regarding INR result and instructed:     Warfarin Dosing Instructions:  Change warfarin dose to 1.25 mg daily on Mon, Thur, Sat; and 2.5 mg daily rest of week  (10 % change)    Instructed patient to follow up no later than: 1 week.    Education provided: importance of following up for INR monitoring at instructed interval and importance of taking warfarin as instructed    Nancy verbalizes understanding and agrees to warfarin dosing plan.    Instructed to call the [facility] for any changes, questions or concerns. (#[phone])   ?   Vincent Baird RN    Subjective/Objective:      Nancy Oropeza, a 75 y.o. female is on warfarin.     Nancy reports:     Home warfarin dose: verbally confirmed home dose with pt and updated on anticoagulation calendar     Missed doses: No     Medication changes:  No     S/S of bleeding or thromboembolism:  No     New Injury or illness:  No     Changes in diet or alcohol consumption:  No     Upcoming surgery, procedure or cardioversion:  No    Anticoagulation Episode Summary     Current INR goal:   2.0-3.0   TTR:   47.2 % (11.6 mo)   Next INR check:   9/22/2020   INR from last check:   1.50! (9/15/2020)   Weekly max warfarin dose:      Target end date:   12/21/2016   INR check location:      Preferred lab:      Send INR reminders to:   XENIA HURTADO    Indications    Pulmonary embolism (H) [I26.99]           Comments:            Anticoagulation Care Providers     Provider  Role Specialty Phone number    Julien Garnica MD Referring Family Medicine [phone]

## 2021-06-11 NOTE — PROGRESS NOTES
ASSESSMENT AND PLAN:  Nancy Oropeza 72 y.o. female is here for exacerbation of pain.  This is in the knees bilaterally.  She has well described osteoarthritis.  Her previous injections done here 4 months ago provided her only relief for about 3 weeks.  In the remote past she remembers having of Visco supplementation which was of no use she recalls.  She has the option of going for surgical intervention now replacement of the knee for example.  She wonders if she can still try a different type or a different dose of corticosteroid injections.  She has been getting triamcinolon here.  In some patients change of the steroid salt can for reasons are unclear provide better relief from pain.  This could happen in this patient.  To this and we talked about still doing the corticosteroid injections but this time with methylprednisolone otherwise felt to be more soluble than the crystalline triamcinolone.  Today under aseptic technique  40 mg of methylprednisolone injected into each of the knees.  Given the distribution of adipose tissues, the right knee was injected under ultrasound guidance of the left with the traditional approach.  She is to return for follow-up on as-needed basis.           Diagnoses and all orders for this visit:    Osteoarthritis Of The Knee  -     methylPREDNISolone acetate injection 40 mg (DEPO-MEDROL); Inject 1 mL (40 mg total) into the joint once.  -     methylPREDNISolone acetate injection 40 mg (DEPO-MEDROL); Inject 1 mL (40 mg total) into the joint once.    Morbid obesity due to excess calories    Acute bilateral knee pain          HISTORY OF PRESENTING ILLNESS:  Nancy Oropeza 72 y.o. is here for  severe flare up of pain.  Joints affected include both knee(s). This has gone on for a few weeks ago. Pain is described as sharp. It is when active and at night/ wakes from sleep at night.  Her symptoms are severe.  She had corticosteroid injections done here 4 months ago.  This is his  triamcinolone.  That provided her with 3 weeks or so of relief.  Prior to that she has had Visco supplementation elsewhere.  The symptoms are gradually worsening. Symptoms include pain, tenderness to touch.  Treatment to date has been without significant relief.    Further historical information, including ROS and limitation in activities as noted in the multidimensional health assessment questionnaire scanned in the EMR and in the assessment and plan section.    ALLERGIES:Atorvastatin; Simvastatin; and Sulfa (sulfonamide antibiotics)    PAST MEDICAL/ACTIVE PROBLEMS/MEDICATION/SOCIAL DATA  Past Medical History:   Diagnosis Date     Depression      Disease of thyroid gland      HTN (hypertension)      Hypercholesteremia      Morbid obesity      Pulmonary emboli 12/14/2015     History   Smoking Status     Former Smoker   Smokeless Tobacco     Not on file     Comment: quite 20 yrs ago     Patient Active Problem List   Diagnosis     Morbid Obesity     Osteoarthritis Of The Knee     Lower Back Pain     Major Depression, Recurrent     Obstructive Sleep Apnea     Hypertension     Edema     Hypothyroidism     Hypercholesterolemia     Normochromic, Normocytic Anemia     Arthralgias In Multiple Sites     Cataract     Impaired Fasting Glucose     Multiple lung nodules on CT     Angioedema     Hypokalemia     Pulmonary embolism     Anticoagulant long-term use     Current Outpatient Prescriptions   Medication Sig Dispense Refill     amLODIPine (NORVASC) 10 MG tablet Take 1 tablet (10 mg total) by mouth daily. 30 tablet 5     cholecalciferol, vitamin D3, 1,000 unit tablet Take 1,000 Units by mouth daily. 2 capsules once a day       citalopram (CELEXA) 40 MG tablet Take 1 tablet (40 mg total) by mouth daily. 90 tablet 0     furosemide (LASIX) 20 MG tablet Take 1 tablet (20 mg total) by mouth daily. 90 tablet 0     levothyroxine (SYNTHROID, LEVOTHROID) 125 MCG tablet Take 1 tablet (125 mcg total) by mouth daily. 90 tablet 0      "lisinopril-hydrochlorothiazide (PRINZIDE,ZESTORETIC) 20-12.5 mg per tablet Take 2 tablets by mouth  daily 180 tablet 3     pravastatin (PRAVACHOL) 80 MG tablet Take 1 tablet by mouth at  bedtime 90 tablet 3     traMADol (ULTRAM) 50 mg tablet Take 1 tab for pain. Max 3/day. 30 tablet 0     warfarin (COUMADIN) 5 MG tablet Take 5 mg by mouth daily. Take 1/2 tab Fridays, take 1 tab all other days. Adjust dose based on INR results as directed.       No current facility-administered medications for this visit.      DETAILED EXAMINATION  07/18/17  :  Vitals:    07/18/17 1042   BP: 136/70   Patient Site: Left Arm   Patient Position: Sitting   Cuff Size: Adult Large   Weight: (!) 311 lb (141.1 kg)   Height: 5' 7\" (1.702 m)     Alert oriented. Head including the face is examined for malar rash, heliotropes, scarring, lupus pernio. Eyes examined for redness such as in episcleritis/scleritis, periorbital lesions.   Neck examined  for lymph nodes, range of motion Both upper and lower extremities (all four) examined for swollen, warm &/or  tender joints, range of motion, rash, muscle weakness, edema. The salient normal / abnormal findings are appended.    Joint line tenderness, bilaterally, knees.  Scattered [place] such as right index.      LAB / IMAGING DATA:  ALT   Date Value Ref Range Status   04/28/2017 18 0 - 45 U/L Final   11/03/2016 18 0 - 45 U/L Final   05/26/2016 16 0 - 45 U/L Final     Albumin   Date Value Ref Range Status   04/28/2017 3.9 3.5 - 5.0 g/dL Final   11/03/2016 4.0 3.5 - 5.0 g/dL Final   05/26/2016 4.0 3.5 - 5.0 g/dL Final     Creatinine   Date Value Ref Range Status   04/28/2017 1.07 0.60 - 1.10 mg/dL Final   11/03/2016 0.85 0.60 - 1.10 mg/dL Final   05/26/2016 0.81 0.60 - 1.10 mg/dL Final       WBC   Date Value Ref Range Status   04/28/2017 12.1 (H) 4.0 - 11.0 thou/uL Final   11/03/2016 8.3 4.0 - 11.0 thou/uL Final   01/06/2015 10.1 4.0 - 11.0 thou/uL Final     Hemoglobin   Date Value Ref Range Status "   04/28/2017 11.6 (L) 12.0 - 16.0 g/dL Final   11/03/2016 11.8 (L) 12.0 - 16.0 g/dL Final   05/26/2016 11.6 (L) 12.0 - 16.0 g/dL Final     Platelets   Date Value Ref Range Status   04/28/2017 387 140 - 440 thou/uL Final   11/03/2016 327 140 - 440 thou/uL Final   05/26/2016 358 140 - 440 thou/uL Final       No results found for: SHAE, RF, SEDRATE

## 2021-06-11 NOTE — TELEPHONE ENCOUNTER
ANTICOAGULATION  MANAGEMENT    Assessment     Today's INR result of 1.4 is Subtherapeutic (goal INR of 2.0-3.0)        Warfarin taken as previously instructed    No new diet changes affecting INR    No new medication/supplements affecting INR    Continues to tolerate warfarin with no reported s/s of bleeding or thromboembolism     Previous INR was Subtherapeutic    Plan:     Spoke on phone with Nancy regarding INR result and instructed:     Warfarin Dosing Instructions:  Change warfarin dose to 1.25 mg daily on Mon; and 2.5 mg daily rest of week  (18 % change)    Instructed patient to follow up no later than: 1 week.    Education provided: importance of following up for INR monitoring at instructed interval and importance of taking warfarin as instructed    Nancy verbalizes understanding and agrees to warfarin dosing plan.    Instructed to call the [facility] for any changes, questions or concerns. (#[phone])   ?   Vincent Baird RN    Subjective/Objective:      Nancy Oropeza, a 75 y.o. female is on warfarin.     Nancy reports:     Home warfarin dose: verbally confirmed home dose with pt and updated on anticoagulation calendar     Missed doses: No     Medication changes:  No     S/S of bleeding or thromboembolism:  No     New Injury or illness:  No     Changes in diet or alcohol consumption:  No     Upcoming surgery, procedure or cardioversion:  No    Anticoagulation Episode Summary     Current INR goal:   2.0-3.0   TTR:   47.1 % (11.6 mo)   Next INR check:   10/1/2020   INR from last check:   1.40! (9/24/2020)   Weekly max warfarin dose:      Target end date:   12/21/2016   INR check location:      Preferred lab:      Send INR reminders to:   XENIA HURTADO    Indications    Pulmonary embolism (H) [I26.99]           Comments:            Anticoagulation Care Providers     Provider Role Specialty Phone number    Julien Garnica MD Referring Family Medicine [phone]

## 2021-06-11 NOTE — PROGRESS NOTES
FOOT AND ANKLE SURGERY/PODIATRY CONSULT NOTE         ASSESSMENT:   Hammertoes both feet  Metatarsalgia of both feet      TREATMENT:  I have recommended orthotics.  I informed the patient that if she continues to have discomfort after wearing her orthotics continuously for at least 2 to 3 months she should return to the clinic at which time I may recommend hammertoe correction in an attempt to alleviate her symptoms.        HPI: I was asked to see Nancy Oropeza today to evaluate and treat pain on the ball of both feet.  The patient stated that the pain is quite aggravating with weightbearing and ambulation.  It is an aching pain which is only relieved after she has been resting.  She has never had any trauma to her feet.  She has had surgery done on her feet in the past.  She had a bunionectomy done both feet.  She had lipomas removed from both ankles.  She complains of hammertoes both feet.  She stated that the toes do not cause any pain.  The patient was seen in consultation at the request of Julien Garniac MD for evaluation and treatment of bilateral foot pain.     Past Medical History:   Diagnosis Date     Depression      Disease of thyroid gland      HTN (hypertension)      Hypercholesteremia      Morbid obesity (H)      Pulmonary emboli (H) 12/14/2015     Yeast infection        Past Surgical History:   Procedure Laterality Date     BREAST BIOPSY Left 1970s     HYSTERECTOMY  2010     OOPHORECTOMY  2010       Allergies   Allergen Reactions     Atorvastatin Myalgia     Legs hurt/sore     Paroxetine Unknown     Simvastatin Myalgia     Legs hurt, 12/2015 tolerated pravastatin at home     Sulfa (Sulfonamide Antibiotics) Rash     Sulfasalazine Rash         Current Outpatient Medications:      amLODIPine (NORVASC) 5 MG tablet, TAKE 1 TABLET DAILY (NEW   DOSE 6/1/18), Disp: 90 tablet, Rfl: 3     cholecalciferol, vitamin D3, 1,000 unit tablet, Take 2,000 Units by mouth daily. , Disp: , Rfl:      citalopram (CELEXA)  40 MG tablet, Take 1 tablet (40 mg total) by mouth daily., Disp: 90 tablet, Rfl: 3     furosemide (LASIX) 20 MG tablet, TAKE 1 TABLET DAILY, Disp: 90 tablet, Rfl: 3     hydrOXYzine HCL (ATARAX) 25 MG tablet, Take 1-2 tablets by mouth every 6 hours as needed for anxiety or sleep., Disp: 30 tablet, Rfl: 1     levothyroxine (SYNTHROID, LEVOTHROID) 137 MCG tablet, Take 1 tablet (137 mcg total) by mouth daily., Disp: 90 tablet, Rfl: 3     lisinopriL-hydrochlorothiazide (PRINZIDE,ZESTORETIC) 20-12.5 mg per tablet, Take 2 tablets by mouth daily., Disp: 180 tablet, Rfl: 3     pravastatin (PRAVACHOL) 80 MG tablet, Take 1 tablet (80 mg total) by mouth at bedtime., Disp: 90 tablet, Rfl: 3     predniSONE (DELTASONE) 5 MG tablet, Take 5 mg by mouth daily., Disp: 30 tablet, Rfl: 2     warfarin ANTICOAGULANT (COUMADIN/JANTOVEN) 2.5 MG tablet, Take 0.5-1 tablets (1.25-2.5 mg total) by mouth See Admin Instructions. Take half a tablet (1.25mg) on Monday, Wednesday and Saturday; Take 1 tablet (2.5mg) on all other days of the week. Adjust dose based on INR results as directed., Disp: 90 tablet, Rfl: 3    Family History   Problem Relation Age of Onset     Breast cancer Sister 75     Stomach cancer Paternal Grandfather      Lung cancer Sister        Social History     Socioeconomic History     Marital status:      Spouse name: Not on file     Number of children: Not on file     Years of education: Not on file     Highest education level: Not on file   Occupational History     Not on file   Social Needs     Financial resource strain: Not on file     Food insecurity     Worry: Not on file     Inability: Not on file     Transportation needs     Medical: Not on file     Non-medical: Not on file   Tobacco Use     Smoking status: Former Smoker     Smokeless tobacco: Never Used     Tobacco comment: quite 20 yrs ago   Substance and Sexual Activity     Alcohol use: No     Comment: occasionally     Drug use: No     Sexual activity: Not on  file   Lifestyle     Physical activity     Days per week: Not on file     Minutes per session: Not on file     Stress: Not on file   Relationships     Social connections     Talks on phone: Not on file     Gets together: Not on file     Attends Orthodox service: Not on file     Active member of club or organization: Not on file     Attends meetings of clubs or organizations: Not on file     Relationship status: Not on file     Intimate partner violence     Fear of current or ex partner: Not on file     Emotionally abused: Not on file     Physically abused: Not on file     Forced sexual activity: Not on file   Other Topics Concern     Not on file   Social History Narrative    Patient arrived in the ED with her sister Kassy 09/10/17       Review of Systems - Patient denies fever, chills, rash, wound, stiffness, limping, numbness, weakness, heart burn, blood in stool, chest pain with activity, calf pain when walking, shortness of breath with activity, chronic cough, easy bleeding/bruising, swelling of ankles, excessive thirst, fatigue, depression, anxiety.  Patient admits to bilateral foot pain.      OBJECTIVE:  Appearance: alert, well appearing, and in no distress.    Vitals:    09/15/20 1403   BP: 136/64   Pulse: 80   Resp: 20   Temp: 98.8  F (37.1  C)       BMI= Body mass index is 48.81 kg/m .    General appearance: Patient is alert and fully cooperative with history & exam.  No sign of distress is noted during the visit.  Psychiatric: Affect is pleasant & appropriate.  Patient appears motivated to improve health.  Respiratory: Breathing is regular & unlabored while sitting.  HEENT: Hearing is intact to spoken word.  Speech is clear.  No gross evidence of visual impairment that would impact ambulation.    Vascular: Dorsalis pedis and posterior tibial pulses are palpable. There is no pedal hair growth bilaterally.  CFT < 3 sec from anterior tibial surface to distal digits bilaterally. There is no appreciable  edema noted.  Dermatologic: Turgor and texture are within normal limits. No coloration or temperature changes. No primary or secondary lesions noted.  Neurologic: All epicritic and proprioceptive sensations are grossly intact bilaterally.  Musculoskeletal: All active and passive ankle, subtalar, midtarsal, and 1st MPJ range of motion are grossly intact without pain or crepitus, with the exception of none. Manual muscle strength is within normal limits bilaterally. All dorsiflexors, plantarflexors, invertors, evertors are intact bilaterally. Tenderness present to the metatarsal heads both feet on palpation.  No tenderness to bilateral feet or ankles with range of motion.  Prominent metatarsal heads noted 2 through 5 bilaterally on the plantar aspect of both feet.  Calf is soft/non-tender without warmth/induration    Imaging:         No results found.       Luiz Watson; KUSUM  [facility] Foot & Ankle Surgery/Podiatry

## 2021-06-11 NOTE — TELEPHONE ENCOUNTER
ANTICOAGULATION  MANAGEMENT    Assessment     Today's INR result of 2.7 is Therapeutic (goal INR of 2.0-3.0)        Missed dose(s) may be affecting INR- missed yesterday    No new diet changes affecting INR    Interaction between Medrol pack and warfarin may be affecting INR- started on 9/1 and will finish the pack tmr, then will start on Prednisone 5 mg daily as maintenance dose.    Continues to tolerate warfarin with no reported s/s of bleeding or thromboembolism     Previous INR was Subtherapeutic    Plan:     Spoke on phone with Nancy regarding INR result and instructed:     Warfarin Dosing Instructions:  Continue current warfarin dose    2.5 mg every Mon, Wed, Fri; 1.25 mg all other days         Instructed patient to follow up no later than: 1 week.    Education provided: importance of following up for INR monitoring at instructed interval and importance of taking warfarin as instructed    Nancy verbalizes understanding and agrees to warfarin dosing plan.    Instructed to call the [facility] for any changes, questions or concerns. (#[phone])   ?   Vincent Baird RN    Subjective/Objective:      Nancy Oropeza, a 75 y.o. female is on warfarin.     Nancy reports:     Home warfarin dose: verbally confirmed home dose with pt and updated on anticoagulation calendar     Missed doses: Yes: yesterday.     Medication changes:  Yes: Medrol pack and prednisone.     S/S of bleeding or thromboembolism:  No     New Injury or illness:  Yes: polymyalgia rheumatica     Changes in diet or alcohol consumption:  No     Upcoming surgery, procedure or cardioversion:  No    Anticoagulation Episode Summary     Current INR goal:   2.0-3.0   TTR:   48.0 % (11.6 mo)   Next INR check:   9/15/2020   INR from last check:   2.70 (9/8/2020)   Weekly max warfarin dose:      Target end date:   12/21/2016   INR check location:      Preferred lab:      Send INR reminders to:   XENIA Wilson    Pulmonary  embolism (H) [I26.99]           Comments:            Anticoagulation Care Providers     Provider Role Specialty Phone number    Julien Garnica MD Referring Family Medicine [phone]

## 2021-06-11 NOTE — PATIENT INSTRUCTIONS - HE
Please call one of the [place] locations below to schedule an appointment. If you received a prescription please bring it with you to your appointment. Some locations are limited to what they carry.    Office Locations    [facility]  [address] [place] [id number]  [facility], [address]   Phone: [phone]   Orthotics and Prosthetics, [address]   Phone: [phone]

## 2021-06-11 NOTE — PROGRESS NOTES
CCx: lung nodule follow up    HPI: Ms. Oropeza is a 72 year old female who returns for lung nodule follow up.  She had 3mm lung nodules found on a CTA of her chest in December of 2015.  She had follow up CTs in Dec 2016 and again today.  She has no new symptoms to report and feels her lung health is good.  She has been on coumadin since her PE in 2015.  She gets some bruising on her arms from it, but otherwise has no issues and is on a fairly simple dose without INR fluctuations.      ROS:  Review of Systems - History obtained from the patient  General ROS: negative  Psychological ROS: negative  ENT ROS: negative  Allergy and Immunology ROS: negative  Endocrine ROS: negative  Respiratory ROS:  negative for - cough, hemoptysis, orthopnea, pleuritic pain or shortness of breath  Cardiovascular ROS: no chest pain or palpitations  Gastrointestinal ROS: no abdominal pain, change in bowel habits, or black or bloody stools  Genito-Urinary ROS: no dysuria, trouble voiding, or hematuria  Musculoskeletal ROS: negative  Neurological ROS: no TIA or stroke symptoms  Dermatological ROS: bruising from coumadin on arms      Current Meds:  Current Outpatient Prescriptions   Medication Sig     amLODIPine (NORVASC) 10 MG tablet Take 1 tablet (10 mg total) by mouth daily.     cholecalciferol, vitamin D3, 1,000 unit tablet Take 1,000 Units by mouth daily. 2 capsules once a day     citalopram (CELEXA) 40 MG tablet Take 1 tablet (40 mg total) by mouth daily.     furosemide (LASIX) 20 MG tablet Take 1 tablet (20 mg total) by mouth daily.     levothyroxine (SYNTHROID, LEVOTHROID) 125 MCG tablet Take 1 tablet (125 mcg total) by mouth daily.     lisinopril-hydrochlorothiazide (PRINZIDE,ZESTORETIC) 20-12.5 mg per tablet Take 2 tablets by mouth  daily     pravastatin (PRAVACHOL) 80 MG tablet Take 1 tablet by mouth at  bedtime     traMADol (ULTRAM) 50 mg tablet Take 1 tab for pain. Max 3/day.     warfarin (COUMADIN) 5 MG tablet Take 5 mg by  mouth daily. Take 1/2 tab Fridays, take 1 tab all other days. Adjust dose based on INR results as directed.       Labs:  No results found for this or any previous visit (from the past 72 hour(s)).    I have personally reviewed all pertinent imaging studies and PFT results unless otherwise noted.    Imaging studies:  Ct Chest Without Contrast    Result Date: 6/26/2017  CT CHEST WO CONTRAST 6/26/2017 8:40 AM INDICATION: Follow-up pulmonary nodules. TECHNIQUE: Routine chest. Dose reduction techniques were used. IV CONTRAST: None. COMPARISON: 12/6/2016, 12/14/2015 and 10/21/2012 FINDINGS: LUNGS AND PLEURA: Stable tiny right upper lobe nodules measuring 3 mm or less on images 17, 18, 19, 24, 25, 33 and 42. Biapical pleural parenchymal scar. No new mass or infiltrate. Calcified granuloma left lower lobe. Central airways are patent. MEDIASTINUM: No adenopathy. 3 vessel coronary atherosclerosis. No pleural nor pericardial effusion. LIMITED UPPER ABDOMEN: Cholelithiasis. Extensive diverticulosis. MUSCULOSKELETAL: Negative.     CONCLUSION: 1.  Stable tiny pulmonary nodules measuring 3 mm or less dating back to 2012 compatible with benign etiology. 2.  Cholelithiasis and colonic diverticulosis redemonstrated.        Physical Exam:  /76  Pulse 80  Resp 20  Wt (!) 311 lb 8 oz (141.3 kg)  SpO2 95% Comment: RA  BMI 49.9 kg/m2  General - Well nourished, obese  Ears/Mouth - TMs clear bilaterally,  OP pink moist, no thrush  Neck - no cervical lymphadenopathy  Lungs - Clear to ausculation bilaterally   CVS - regular rhythm with no murmurs, rubs or gallups  Abdomen - soft, NT, ND, NABS  Ext - no cyanosis, clubbing or edema  Skin - no rash - some ecchymoses on arms  Psychology - alert and oriented, answers appropriate      Assessment and Plan:Nancy Oropeza is a 72 y.o. with a past medical history significant for unprovoked blood clot and pulmonary nodules who presents to clinic today for follow up of her pulmonary  nodules.  She has stable 3mm nodules dating back to 2012 and no longer needs CT scans to follow these.  She should remain on anticoagulation indefinitely given her blood clots were unprovoked.     1) lung nodules- stable and now considered benign.  Stop imaging  2) Hx of PE - on coumadin, stable with minimal side effects.  Suggest she stay on this indefinitely  3) CALE - she is not using CPAP, she says her CALE was mild when diagnosed, but also admits she has gained a lot of weight since then  4) RTC prn      Electronically signed by:    Camden Guillen MD PhD  [facility] Pulmonary and Critical Care Medicine

## 2021-06-11 NOTE — TELEPHONE ENCOUNTER
ANTICOAGULATION  MANAGEMENT    Assessment     Today's INR result of 1.9 is Subtherapeutic (goal INR of 2.0-3.0)        Warfarin taken as previously instructed    No new diet changes affecting INR    No new medication/supplements affecting INR    Continues to tolerate warfarin with no reported s/s of bleeding or thromboembolism     Previous INR was Subtherapeutic    Plan:     Spoke on phone with Nancy regarding INR result and instructed:     Warfarin Dosing Instructions:  Change warfarin dose to 2.5 mg daily on Mon, Wed, Fri; and 1.25 mg daily rest of week  (11 % change)    Instructed patient to follow up no later than: OV 9/8.    Education provided: importance of following up for INR monitoring at instructed interval and importance of taking warfarin as instructed    Nancy verbalizes understanding and agrees to warfarin dosing plan.    Instructed to call the [facility] for any changes, questions or concerns. (#[phone])   ?   Vincent Baird RN    Subjective/Objective:      Nancy Oropeza, a 75 y.o. female is on warfarin.     Nancy reports:     Home warfarin dose: verbally confirmed home dose with pt and updated on anticoagulation calendar     Missed doses: No     Medication changes:  No     S/S of bleeding or thromboembolism:  No     New Injury or illness:  No     Changes in diet or alcohol consumption:  No     Upcoming surgery, procedure or cardioversion:  No    Anticoagulation Episode Summary     Current INR goal:   2.0-3.0   TTR:   48.3 % (11.6 mo)   Next INR check:   9/8/2020   INR from last check:   1.90! (8/31/2020)   Weekly max warfarin dose:      Target end date:   12/21/2016   INR check location:      Preferred lab:      Send INR reminders to:   XENIA HURTADO    Indications    Pulmonary embolism (H) [I26.99]           Comments:            Anticoagulation Care Providers     Provider Role Specialty Phone number    Julien Garnica MD Referring Family Medicine [phone]

## 2021-06-11 NOTE — PROGRESS NOTES
Assessment and Plan:       1. Encounter for general adult medical examination with abnormal findings  Annual wellness visit completed.  Suboptimal diet and need for exercise.  Memory difficulties short-term more so, described as stable.  We will continue to follow closely.  Annual wellness visits to continue.    2. Major depressive disorder, recurrent episode  History of depression and anxiety better with citalopram 40 mg daily.  PHQ 9 questionnaire 9 out of 27 with TORIE 7 questionnaire 11 out of 21.  - citalopram (CELEXA) 40 MG tablet; Take 1 tablet (40 mg total) by mouth daily.  Dispense: 90 tablet; Refill: 3    3. Hyperlipidemia  Pravastatin 80 mg at bedtime.  Check lipid cascade today while fasting.    4. Anxiety  History of depression and anxiety better with citalopram 40 mg daily.  PHQ 9 questionnaire 9 out of 27 with TORIE 7 questionnaire 11 out of 21.  - citalopram (CELEXA) 40 MG tablet; Take 1 tablet (40 mg total) by mouth daily.  Dispense: 90 tablet; Refill: 3    5. Hypothyroidism, unspecified type  Continues levothyroxine 137 mcg daily.    6. Pulmonary embolism, unspecified chronicity, unspecified pulmonary embolism type, unspecified whether acute cor pulmonale present (H)  Continues warfarin anticoagulation currently 2.5 mg using 1 tablet 3 days a week otherwise half tablet daily.  INR to be read checked today.  - warfarin ANTICOAGULANT (COUMADIN/JANTOVEN) 2.5 MG tablet; Take 0.5-1 tablets (1.25-2.5 mg total) by mouth See Admin Instructions. Take half a tablet (1.25mg) on Monday, Wednesday and Saturday; Take 1 tablet (2.5mg) on all other days of the week. Adjust dose based on INR results as directed.  Dispense: 90 tablet; Refill: 3    7. Essential hypertension with goal blood pressure less than 140/90  Hypertension reviewed blood pressure 146/80 on recheck.  Continue to monitor for goal less than 130/80 while on lisinopril hydrochlorothiazide 20/12.5 using 2 tablets daily and amlodipine 5 mg daily.  Blood  pressure reassessment in 3 months.  - Basic Metabolic Panel    8. Hypercholesterolemia  Pravastatin 80 mg at bedtime continues.  Check lipid cascade.  - pravastatin (PRAVACHOL) 80 MG tablet; Take 1 tablet (80 mg total) by mouth at bedtime.  Dispense: 90 tablet; Refill: 3  - Lipid Cascade    9. Impaired fasting glucose  Impaired fasting glucose.  A1c obtained as well as fasting glucose.  - Basic Metabolic Panel  - A1c    10. Obstructive Sleep Apnea  CALE history, stable.    11. Open wound of abdominal wall, initial encounter  Lower abdominal wound following prior hospitalization.  Wound care clinic referral provided.  Triple antibiotic ointment twice daily over next 2 weeks.  No significant surrounding cellulitis at this time requiring oral antibiotic.  - Ambulatory referral to Wound Clinic    12. Encounter for immunization  High-dose flu shot provided.  Shingrix immunization recommended to local pharmacy.  - Influenza,Quad,High Dose,PF 65 YR+    13. Screening for osteoporosis  Bone density scan obtained.  - DXA Bone Density Scan; Future    14. Asymptomatic menopausal state   As above.  - DXA Bone Density Scan; Future    15. Polymyalgia rheumatica (H)  PMR.  Improved with Medrol Dosepak currently.  He sees rheumatologist November 24, 2020.  Prednisone 5 mg daily recommended until assessed by rheumatologist.  - predniSONE (DELTASONE) 5 MG tablet; Take 5 mg by mouth daily.  Dispense: 30 tablet; Refill: 2  - HM2(CBC w/o Differential)  - Erythrocyte Sedimentation Rate    16. History of 2019 novel coronavirus disease (COVID-19)  Prior COVID-19 illness noted, resolved.        The patient's current medical problems were reviewed.    I have had an Advance Directives discussion with the patient.  The following are part of a depression follow up plan for the patient:  mental health care assessment  The following high BMI interventions were performed this visit: encouragement to exercise, weight monitoring, weight loss from  baseline weight and lifestyle education regarding diet  The following health maintenance schedule was reviewed with the patient and provided in printed form in the after visit summary:   Health Maintenance   Topic Date Due     DXA SCAN  05/14/2008     ZOSTER VACCINES (2 of 3) 07/21/2009     INFLUENZA VACCINE RULE BASED (1) 08/01/2020     MEDICARE ANNUAL WELLNESS VISIT  09/08/2021     FALL RISK ASSESSMENT  09/08/2021     TD 18+ HE  02/03/2022     LIPID  10/30/2024     ADVANCE CARE PLANNING  09/08/2025     PNEUMOCOCCAL IMMUNIZATION 65+ HIGH/HIGHEST RISK  Completed     DEPRESSION ACTION PLAN  Addressed     HEPATITIS B VACCINES  Aged Out        Subjective:     Chief Complaint: Nancy Oropeza is an 75 y.o. female here for an Annual Wellness visit.     HPI: In general doing well.  Prior Covid-19 infection earlier this summer with resolution noted.  Underlying depression and anxiety stable citalopram 40 mg daily.  Polymyalgia rheumatica.  Has wanted to be off prednisone.  Continues to have flares.  Better with Medrol Dosepak.  Pravastatin 80 mg at bedtime for lipid management.  Normochromic normocytic anemia history.  Lisinopril hydrochlorothiazide 20/12.5 using 2 tablets daily and amlodipine 5 mg daily for hypertension.  Underlying obstructive sleep apnea.  Has not had bone density scan in over 10 years.  Impaired fasting glucose history.  Suboptimal exercise and diet.  Short-term memory issues stable without difficulty with long-term memory described.  Feels that she just does not focus her try to remember.  Denies cough.  Needs INR checked with history of PE on chronic warfarin anticoagulation.  Has a wound which initially was a blister lower abdomen at time of hospitalization but now continues delayed healing with scant drainage.  Applying dressing changes herself daily.    Review of Systems:  Please see above.  The rest of the review of systems are negative for all systems.       5 children (Nancy  Lakeisha, etc.) - son with Factor V abnormality  14 grandchildren   9 great-grandchildren   Grew up in [facility] age 12...   No smoke (quit 16 years ago after 1 ppd x 30+ years)   EtOH: occ wine   Mom -  88 COPD   Dad -  61 massive MI   1 bro -  67 blood clot (lung)   2 sis - one of which is  age 62 small cell lung CA (h/o smoker)  Surgeries: ventral hernia repair with muscle flap 10/4/12 with post-op complication of seroma with J.P. drain in place followed by interventional radiology q 2 weeks);  age 25; groin hernia age 3; CAPRICE-BSO 2011 by Dr. Herbert Carmen (due to benign cyst x 2); right TKA 18 (Dr. Small)  Hospitalizations: as above   Work: retired  ([employer] as )     Patient Care Team:  Julien Garnica MD as PCP - General  Tigist Bedolla PharmD as Pharmacist (Pharmacist)  Cathy Willams MBBS as Physician (Rheumatology)  Julien Garnica MD as Assigned PCP     Patient Active Problem List   Diagnosis     Morbid Obesity     Osteoarthritis Of The Knee     Back pain     Major Depression, Recurrent     Obstructive Sleep Apnea     Essential hypertension with goal blood pressure less than 140/90     Edema     Hypothyroidism     Hypercholesterolemia     Normochromic, Normocytic Anemia     Arthralgias In Multiple Sites     Cataract     Impaired Fasting Glucose     Multiple lung nodules on CT     Angioedema     Hypokalemia     Pulmonary embolism (H)     Anticoagulant long-term use     Acute bilateral knee pain     Chronic pain syndrome     Hypoxia     2019 novel coronavirus disease (COVID-19)     Diarrhea     TEOFILO (acute kidney injury) (H)     Polymyalgia rheumatica (H)     Mood disorder (H)     COVID-19 virus infection     History of pulmonary embolism     SIRS (systemic inflammatory response syndrome) (H)     Sepsis (H)     Past Medical History:   Diagnosis Date     Depression      Disease of thyroid gland      HTN (hypertension)       Hypercholesteremia      Morbid obesity (H)      Pulmonary emboli (H) 12/14/2015     Yeast infection       Past Surgical History:   Procedure Laterality Date     BREAST BIOPSY Left 1970s     HYSTERECTOMY  2010     OOPHORECTOMY  2010      Family History   Problem Relation Age of Onset     Breast cancer Sister 75     Stomach cancer Paternal Grandfather      Lung cancer Sister       Social History     Socioeconomic History     Marital status:      Spouse name: Not on file     Number of children: Not on file     Years of education: Not on file     Highest education level: Not on file   Occupational History     Not on file   Social Needs     Financial resource strain: Not on file     Food insecurity     Worry: Not on file     Inability: Not on file     Transportation needs     Medical: Not on file     Non-medical: Not on file   Tobacco Use     Smoking status: Former Smoker     Smokeless tobacco: Never Used     Tobacco comment: quite 20 yrs ago   Substance and Sexual Activity     Alcohol use: No     Comment: occasionally     Drug use: No     Sexual activity: Not on file   Lifestyle     Physical activity     Days per week: Not on file     Minutes per session: Not on file     Stress: Not on file   Relationships     Social connections     Talks on phone: Not on file     Gets together: Not on file     Attends [employer] service: Not on file     Active member of club or organization: Not on file     Attends meetings of clubs or organizations: Not on file     Relationship status: Not on file     Intimate partner violence     Fear of current or ex partner: Not on file     Emotionally abused: Not on file     Physically abused: Not on file     Forced sexual activity: Not on file   Other Topics Concern     Not on file   Social History Narrative    Patient arrived in the ED with her sister Kassy 09/10/17      Current Outpatient Medications   Medication Sig Dispense Refill     amLODIPine (NORVASC) 5 MG tablet TAKE 1 TABLET  DAILY (NEW   DOSE 6/1/18) 90 tablet 3     cholecalciferol, vitamin D3, 1,000 unit tablet Take 2,000 Units by mouth daily.        citalopram (CELEXA) 40 MG tablet Take 1 tablet (40 mg total) by mouth daily. 90 tablet 3     furosemide (LASIX) 20 MG tablet TAKE 1 TABLET DAILY 90 tablet 3     hydrOXYzine HCL (ATARAX) 25 MG tablet Take 1-2 tablets by mouth every 6 hours as needed for anxiety or sleep. 30 tablet 1     levothyroxine (SYNTHROID, LEVOTHROID) 137 MCG tablet Take 1 tablet (137 mcg total) by mouth daily. 90 tablet 3     lisinopriL-hydrochlorothiazide (PRINZIDE,ZESTORETIC) 20-12.5 mg per tablet Take 2 tablets by mouth daily. 180 tablet 3     pravastatin (PRAVACHOL) 80 MG tablet Take 1 tablet (80 mg total) by mouth at bedtime. 90 tablet 3     warfarin ANTICOAGULANT (COUMADIN/JANTOVEN) 2.5 MG tablet Take 0.5-1 tablets (1.25-2.5 mg total) by mouth See Admin Instructions. Take half a tablet (1.25mg) on Monday, Wednesday and Saturday; Take 1 tablet (2.5mg) on all other days of the week. Adjust dose based on INR results as directed. 90 tablet 3     methylPREDNISolone (MEDROL DOSEPACK) 4 mg tablet Follow package directions 21 tablet 0     oxyCODONE (ROXICODONE) 5 MG immediate release tablet Take 0.5-1 tablets (2.5-5 mg total) by mouth every 6 (six) hours as needed for pain. 20 tablet 0     predniSONE (DELTASONE) 5 MG tablet Take 5 mg by mouth daily. 30 tablet 2     No current facility-administered medications for this visit.       Objective:   Vital Signs:   Visit Vitals  /80   Pulse (!) 113   Temp 97.6  F (36.4  C)   Wt (!) 306 lb (138.8 kg)   SpO2 95%   BMI 47.93 kg/m           VisionScreening:  No exam data present     PHYSICAL EXAM  General Appearance: Alert, pleasant, appears stated age.  BMI 47.93.  Head: Normocephalic, without obvious abnormality  Eyes: PERRL, conjunctiva/corneas clear, EOM's intact  Ears: Normal TM's and external ear canals, both ears  Nose: Nares normal, septum midline,mucosa normal, no  drainage  Throat: Lips, mucosa, and tongue normal; teeth and gums normal; oropharynx is clear  Neck: Supple,without lymphadenopathy or thyromegally  Lungs: Clear to auscultation bilaterally, respirations unlabored  Breasts: Deferred.  Mammogram to be arranged.  Heart: Regular rate and rhythm, no murmur   Abdomen: Soft, non-tender, no masses, no organomegaly   Pelvic:Not examined  Extremities: Extremities with strong and symmetric pulses, no cyanosis or edema  Skin: Skin color, texture normal, no rashes.  Shallow ulcer lower abdomen with scant whitish lining without evidence for purulence.  Minimal surrounding erythema.  Neurologic: Normal.  No significant memory difficulties with history taking today.    Assessment Results 9/8/2020   Activities of Daily Living No help needed   Instrumental Activities of Daily Living No help needed   Get Up and Go Score Less than 12 seconds   Mini Cog Total Score 0   Some recent data might be hidden     A Mini-Cog score of 0-2 suggests the possibility of dementia, score of 3-5 suggests no dementia      Identified Health Risks:     She is at risk for lack of exercise and has been provided with information to increase physical activity for the benefit of her well-being.  The patient was counseled and encouraged to consider modifying their diet and eating habits. She was provided with information on recommended healthy diet options.  The patient's PHQ-9 score is consistent with mild depression. She was provided with information regarding depression and was advised to schedule a follow up appointment in 12 weeks to further address this issue.  Patient's advanced directive was discussed and I am comfortable with the patient's wishes.        The patient was provided with appropriate referrals to address her memory problem.

## 2021-06-11 NOTE — TELEPHONE ENCOUNTER
Anticoagulation Management     Today's INR result of 1.5 is Subtherapeutic (goal INR of 2.0-3.0).     Follow up required to confirm warfarin doses taken. Patient is not home until 5 pm this evening to confirm the dose. ACN instructed patient to take booster dose of 2.5 mg warfarin tonight. ACN will call patient back tomorrow 9/16.         ACN to follow up    Vincent Baird RN

## 2021-06-11 NOTE — TELEPHONE ENCOUNTER
Refill provided for Medrol dosepak.  Will review 9/8/20 at scheduled follow-up office visit with me.  Scheduled to see Dr. Willams on 11/24/20.  H/O polymyalgia rheumatica reviewed.

## 2021-06-11 NOTE — TELEPHONE ENCOUNTER
Patient Returning Call  Reason for call:  Patient called back.  Information relayed to patient:  n/a  Patient has additional questions:  Yes  If YES, what are your questions/concerns:  Calling on the status of this message.  Patient is requesting providers nurse call her back today.  Okay to leave a detailed message?: Yes

## 2021-06-11 NOTE — TELEPHONE ENCOUNTER
Who is requesting the letter?  Nancy  Why is the letter needed? Patient has a history of polymyalgia rheumatica, osteoarthritis of the knees and back pain. Due to this diagnosis, she has difficulty getting out of chairs.  She would like Dr. Garnica to write a letter, stating patient's medical condition and his support in her getting a lift chair.    Patient gets financial assistance from [facility].    How would you like this letter returned? Please fax to Cora Snell, [facility], fax [phone].    Okay to leave a detailed message? Yes

## 2021-06-11 NOTE — TELEPHONE ENCOUNTER
ANTICOAGULATION  MANAGEMENT    Assessment     Today's INR result of 2.6 is Therapeutic (goal INR of 2.0-3.0)        Warfarin taken as previously instructed    No new diet changes affecting INR    No new medication/supplements affecting INR    Continues to tolerate warfarin with no reported s/s of bleeding or thromboembolism     Previous INR was Subtherapeutic    Plan:     Spoke on phone with Nancy regarding INR result and instructed:     Warfarin Dosing Instructions:  Continue current warfarin dose 1.25 mg every Mon; 2.5 mg all other days    Instructed patient to follow up no later than: Fri 10/9.    Education provided: importance of following up for INR monitoring at instructed interval and importance of taking warfarin as instructed    Nancy verbalizes understanding and agrees to warfarin dosing plan.    Instructed to call the [facility] for any changes, questions or concerns. (#[phone])   ?   Vincent Baird RN    Subjective/Objective:      Nancy Oropeza, a 75 y.o. female is on warfarin.     Nancy reports:     Home warfarin dose: verbally confirmed home dose with pt and updated on anticoagulation calendar     Missed doses: No     Medication changes:  No     S/S of bleeding or thromboembolism:  No     New Injury or illness:  No     Changes in diet or alcohol consumption:  No     Upcoming surgery, procedure or cardioversion:  No    Anticoagulation Episode Summary     Current INR goal:  2.0-3.0   TTR:  48.3 % (11.6 mo)   Next INR check:  10/9/2020   INR from last check:  2.60 (10/1/2020)   Weekly max warfarin dose:     Target end date:  12/21/2016   INR check location:     Preferred lab:     Send INR reminders to:  XENIA HURTADO    Indications    Pulmonary embolism (H) [I26.99]           Comments:           Anticoagulation Care Providers     Provider Role Specialty Phone number    Julien Garnica MD Referring Family Medicine [phone]

## 2021-06-11 NOTE — TELEPHONE ENCOUNTER
Has been off medication 6 days methyprednisone, and now pain is returning.  Pain is in arms and knees, and back.  Pain now 7/10 when she is moving and up and around.  Using heating pad.    Can she have something else for the pain till she is seen on 9/8/2-020 in clinic?    Uses [facility] on [address] in [place].    Provider please advise and have team update patient    Fabienne Quintana RN [facility] Triage Nurse Advisor

## 2021-06-12 NOTE — PATIENT INSTRUCTIONS - HE
Culture was taken today; this will take 5 days to get results we will call you with these and next steps    Go to your pharmacy and  Mupirocin ointment      Wound Care Instructions    Every 3 days Cleanse your abdominal wound wound(s) with Normal Saline after you have showered    Pat Dry with non-sterile gauze    Apply small dab of mupirocin ointment into/onto the wounds    Cover with endoform collagen; cut to the size of the wound    Cover with allevyn bordered foam 4x4      It is ok to get your wound wet in the bath or shower    SEEK MEDICAL CARE IF:    You have an increase in swelling, pain, or redness around the wound.    You have an increase in the amount of pus coming from the wound.    There is a bad smell coming from the wound.    The wound appears to be worsening/enlarging    You have a fever greater than 101.5 F        It is ok to continue current wound care treatment/products for the next 2-3 days until new wound care supplies are ordered and arrive. If longer than this please contact our office at [phone].      Jenn Nuñez DNP, RN, CNP, [facility]  [facility]  [phone]

## 2021-06-12 NOTE — TELEPHONE ENCOUNTER
FYI - Status Update  Who is Calling: Patient  Update: Patient stated she wanted to let Julien Garnica MD know that she is upset with the wound clinic, because they have canceled and rescheduled her appointment with them twice now. Patient stated she asked to see someone else but was told that option was available. Patient stated she will see them on 10/23/20.  Okay to leave a detailed message?:  No return call needed

## 2021-06-12 NOTE — TELEPHONE ENCOUNTER
Who is calling:  Patient  Reason for Call:  The patient is calling ACN regarding her Warfarin dosing as she has lost the slip she wrote it on. Please advise at the number provided.  Okay to leave a detailed message: Yes

## 2021-06-12 NOTE — TELEPHONE ENCOUNTER
Spoke with pt. She said she can't come in for INR tmr 10/9. ACN advised pt to check early next week. Appt is made for Monday 10/12. Pt verbalized understanding.

## 2021-06-12 NOTE — PROGRESS NOTES
S  Nancy Oropeza is a 72 y.o. female who presents in [facility] for 2 rashes:   -Rash all over chest, arms, legs- looks like bites. Present for at least a couple weeks. No one else lives at home with her and no one has this rash. Was given permethrin and treated 2 days ago. No difference afterward. The spots start out looking like a pustule and then they open up. Her son-in-law who is an  looked all over her apartment and saw no bed bugs. She has washed all her sheets and clothes in hot water but is still getting new spots. Taking benadryl and zyrtec every day but is still very itchy.      -Has had a yeast rash under breasts and pannus for about 5 weeks. This is getting better with fluconazole pills. Not using any topicals.   Past Medical History:   Diagnosis Date     Depression      Disease of thyroid gland      HTN (hypertension)      Hypercholesteremia      Morbid obesity      Pulmonary emboli 12/14/2015   Reviewed.   Current Outpatient Prescriptions on File Prior to Visit   Medication Sig Dispense Refill     amLODIPine (NORVASC) 10 MG tablet Take 1 tablet (10 mg total) by mouth daily. 30 tablet 5     cholecalciferol, vitamin D3, 1,000 unit tablet Take 1,000 Units by mouth daily. 2 capsules once a day       citalopram (CELEXA) 40 MG tablet TAKE ONE TABLET BY MOUTH EVERY DAY 90 tablet 0     furosemide (LASIX) 20 MG tablet TAKE ONE TABLET BY MOUTH EVERY DAY 90 tablet 2     levothyroxine (SYNTHROID, LEVOTHROID) 125 MCG tablet TAKE ONE TABLET BY MOUTH EVERY DAY 90 tablet 2     lisinopril-hydrochlorothiazide (PRINZIDE,ZESTORETIC) 20-12.5 mg per tablet Take 2 tablets by mouth  daily 180 tablet 3     permethrin (ELIMITE) 5 % cream apply topically to affected areas x 1, then repeat in 14 days 120 g 1     pravastatin (PRAVACHOL) 80 MG tablet TAKE ONE TABLET BY MOUTH EVERY NIGHT AT BEDTIME 90 tablet 2     warfarin (COUMADIN) 5 MG tablet Take 1/2 tab Fridays, take 1 tab all other days. Adjust dose based on INR  results as directed. 100 tablet 3     crotamiton (EURAX) 10 % lotion apply topically to affected areas x 1, then repeat in 24 hours 454 g 1     traMADol (ULTRAM) 50 mg tablet Take 1 tab for pain. Max 3/day. 30 tablet 0     No current facility-administered medications on file prior to visit.      ?  ROS:   General: No fevers, chills, weight loss  CV: No chest pain or palpitations.  Resp: No shortness of breath or cough. No hemoptysis.  GI: No nausea, vomiting, constipation, diarrhea, melena or abdominal pain  MS: No arthralgias or myalgias  Psych: Positive for anxiety  Skin: see HPI?  O  /68  Pulse 95  Temp 98.6  F (37  C) (Oral)   Resp 12  Wt (!) 305 lb 4.8 oz (138.5 kg)  SpO2 95%  BMI 47.82 kg/m2   Vitals reviewed. Nursing note reviewed.  General Appearance: Pleasant and alert, slightly anxious- appearing  HEENT: mucous membranes moist  CV: RRR, no murmur, rubs, gallops  Resp: No respiratory distress. Clear to auscultation bilaterally. No wheezes, rales, rhonchi  Skin: rash scattered across arms, chest, legs. Resembles bites, some scabbed over, some with excoriations. No rash on hands, no tracts or burrows noted. One pustule on chest. Shiny rash under breasts, resembles yeast.   A/P  Nancy was seen today for skin problem.    Diagnoses and all orders for this visit:    Pruritic rash: will try topical steroid, as she has not done this yet. STrongly encouraged NOT to scratch. If she develops new lesions, should consider alternative diagnosis such as bacterial, given presence of pustule (see below).   -     triamcinolone (KENALOG) 0.5 % cream; Apply to rash twice per day for 2 weeks.    Pustule: [facility] will call with results and prescribe antibiotic if indicated.    -     Culture, Wound    Yeast dermatitis: encouraged use of drying antifungal 2x/week to help keep area dry and help prevent continued intertrigo.   -     nystatin (MYCOSTATIN) powder;     Options for treatment and follow-up care were reviewed  with the patient and/or guardian. Nancy Oropeza and/or guardian engaged in the decision making process and verbalized understanding of the options discussed and agreed with the final plan.    Macrina Jenkins MD

## 2021-06-12 NOTE — TELEPHONE ENCOUNTER
Who is calling:  patient  Reason for Call:  Patient is scheduled for 10.9.2020 for INR- but her other appointment in the area got cancelled and patient does not want to make a trip just to have an INR done.   Wants to changer her INR to 10.23.2020 so she can do both visits on the same day  Date of last appointment with primary care: na  Okay to leave a detailed message: Yes

## 2021-06-12 NOTE — PROGRESS NOTES
Assessment:    1. Intertriginous candidiasis     2. Pruritic rash  crotamiton (EURAX) 10 % lotion   3. Insect bite     4. Other pulmonary embolism without acute cor pulmonale, unspecified chronicity  INR         Plan:    Did discuss intertrigo likely yeast etiology.  Patient is completing fluconazole 100 mg daily ×14 days.  Will repeat INR today while on warfarin anticoagulation with history of pulmonary emboli due to drug interaction that may result in elevated INR.  Did discuss pruritic rash unclear etiology with evidence for insect bites.  Daughter states that son-in-law check for bedbugs without evidence.  Did provide Eurax 10% lotion topically repeat 24 hours.  Notify persistent concerns.  Follow-up as scheduled.        Subjective:    Nancy Oropeza is seen today for ongoing rash.  Both in the groin region bilaterally as well as on extremities.  Insect bites on left ankle noted also areas involving forearm and right posterior aspect of arm and upper chest and lower abdomen.  Not involving hands at this time or behind ears etc.  No recent illness otherwise.  History of pulmonary emboli.  Continues chronic warfarin anticoagulation.  Was started recently on fluconazole 100 mg daily ×14 days due to intertrigo findings.  Is on day 3 apparently.  No nausea vomiting.  No palpitations or shortness of breath.    Previously seen by Dr. Donnell Funez (after Dr. Rigo Mcclain x 32 years)   Grew up in [place], [place] til age 12...      5 children - son with Factor V abnormality  14 grandchildren   9 great-grandchildren   No smoke (quit 16 years ago after 1 ppd x 30+ years)   EtOH: occ wine   Mom -  88 COPD   Dad -  61 massive MI   1 bro -  67 blood clot (lung)   2 sis - one of which is  age 62 small cell lung CA (h/o smoker)  Surgeries: ventral hernia repair with muscle flap 10/4/12 with post-op complication of seroma with J.P. drain in place followed by interventional radiology q  2 weeks);  age 25; groin hernia age 3; CAPRICE-BSO 2011 by Dr. Herbert Carmen (due to benign cyst x 2);   Hospitalizations: as above   Work: retired  ([employer] as )     12/18/15 FYI: Patient was admitted for dyspnea, secondary to bilateral multiple pulmonary emboli. She overall did well and was discharged home on Lovenox, relatively high dose given her weight as well as Warfarin. I would like her to be seen today at your clinic. I believe she has an appointment for INR, CBC, and BMP check as well as re-dosing of her Warfarin. I suspect she will need an additional day of Lovenox as she is not quite therapeutic today. You can refer to my discharge summary for the INR's and the Warfarin dosing. Thank you. Dr. Garza    Past Surgical History:   Procedure Laterality Date     BREAST BIOPSY Left 1970s     HYSTERECTOMY  2010     OOPHORECTOMY  2010        Family History   Problem Relation Age of Onset     Breast cancer Sister 75     Stomach cancer Paternal Grandfather      Lung cancer Sister         Past Medical History:   Diagnosis Date     Depression      Disease of thyroid gland      HTN (hypertension)      Hypercholesteremia      Morbid obesity      Pulmonary emboli 2015        Social History   Substance Use Topics     Smoking status: Former Smoker     Smokeless tobacco: None      Comment: quite 20 yrs ago     Alcohol use No      Comment: occasionally        Current Outpatient Prescriptions   Medication Sig Dispense Refill     amLODIPine (NORVASC) 10 MG tablet Take 1 tablet (10 mg total) by mouth daily. 30 tablet 5     cholecalciferol, vitamin D3, 1,000 unit tablet Take 1,000 Units by mouth daily. 2 capsules once a day       citalopram (CELEXA) 40 MG tablet TAKE ONE TABLET BY MOUTH EVERY DAY 90 tablet 0     furosemide (LASIX) 20 MG tablet TAKE ONE TABLET BY MOUTH EVERY DAY 90 tablet 2     levothyroxine (SYNTHROID, LEVOTHROID) 125 MCG tablet TAKE ONE TABLET BY MOUTH EVERY DAY 90 tablet 2      lisinopril-hydrochlorothiazide (PRINZIDE,ZESTORETIC) 20-12.5 mg per tablet Take 2 tablets by mouth  daily 180 tablet 3     pravastatin (PRAVACHOL) 80 MG tablet TAKE ONE TABLET BY MOUTH EVERY NIGHT AT BEDTIME 90 tablet 2     warfarin (COUMADIN) 5 MG tablet Take 1/2 tab Fridays, take 1 tab all other days. Adjust dose based on INR results as directed. 100 tablet 3     crotamiton (EURAX) 10 % lotion apply topically to affected areas x 1, then repeat in 24 hours 454 g 1     traMADol (ULTRAM) 50 mg tablet Take 1 tab for pain. Max 3/day. 30 tablet 0     No current facility-administered medications for this visit.           Objective:    Vitals:    08/30/17 1152   BP: 110/70   Pulse: 100   Weight: (!) 306 lb (138.8 kg)      Body mass index is 47.93 kg/(m^2).    Alert.  No apparent distress.  HEENT exam normal.  Chest clear.  Cardiac exam regular.  Skin exam with excoriations right lateral calf.  2 discrete papules left anterolateral ankle consistent with likely insect bite.  Multiple lesions on trunk and upper chest.  Relative sparing of wrists and anterior digital regions of hands etc.  No involvement behind the ears.

## 2021-06-12 NOTE — TELEPHONE ENCOUNTER
Called and spoke with pt. Reviewed warfarin dose: 1.25 mg daily on Mon, Wed, Fri; and 2.5 mg daily rest of week.   Pt verbalized understanding.

## 2021-06-12 NOTE — TELEPHONE ENCOUNTER
FYI - Status Update  Who is Calling: Patient  Update: Returned call, ACN not available for transfer at this time. Please call patient again.  Okay to leave a detailed message?:  Yes

## 2021-06-13 NOTE — PROGRESS NOTES
"Nancy Oropeza is a 75 y.o. female who is being evaluated via a billable video visit.      The patient has been notified of following:     \"This video visit will be conducted via a call between you and your physician/provider. We have found that certain health care needs can be provided without the need for an in-person physical exam.  This service lets us provide the care you need with a video conversation.  If a prescription is necessary we can send it directly to your pharmacy.  If lab work is needed we can place an order for that and you can then stop by our lab to have the test done at a later time.    Video visits are billed at different rates depending on your insurance coverage. Please reach out to your insurance provider with any questions.    If during the course of the call the physician/provider feels a video visit is not appropriate, you will not be charged for this service.\"    Patient has given verbal consent to a Video visit? Yes  How would you like to obtain your AVS? AVS Preference: Mail a copy.  If dropped by the video visit, the video invitation should be sent to: Text to cell phone: [phone]  Will anyone else be joining your video visit? No    Patient reports:    Change in status of the wound:  yes    Change of drainage- color, amount, odor:  no    Change of swelling:  no    Change in Pain status:  no    New wounds developed:  no    Reviewed with patient that I will contact them with scheduling a follow up appointment, supply needs and any further teaching that is identified by the provider during the call. I will also send out an AVS with all education, a wound measuring tape and their next scheduled visit. I will include instructions to assist with installing the application on their mobile device if appropriate for future video visits.   Video Start Time:     Additional provider notes:     Video-Visit Details    Type of service:  Video Visit    Video End Time (time video " stopped):  Originating Location (pt. Location): Home    Distant Location (provider location):  [facility]     Platform used for Video Visit: Suki Trorez [facility]

## 2021-06-13 NOTE — TELEPHONE ENCOUNTER
New Appointment Needed  What is the reason for the visit:  Dr. Julien Garnica does not have any pre op's listed before patient surgery on 12/15  Pre-Op Appt Request  When is the surgery : R.Cataract surgery on 12/15 with Dr. Church from [facility] Where is the surgery?:  [place] [facility]  Who is the surgeon? :  Dr. Church   What type of surgery is being done?: Right eye cataract surgery   Provider Preference: Julien Garnica   How soon do you need to be seen?: Before 12/15  Waitlist offered?: No   Okay to leave a detailed message:  Yes

## 2021-06-13 NOTE — TELEPHONE ENCOUNTER
Spoke with Lesly and provided the email address to send the photo to. She again stated that she doesn't think the appointment is really needed. Lesly will send the photo some time today. Follow up scheduled with Jenn Nuñez CNP on 11/24.

## 2021-06-13 NOTE — TELEPHONE ENCOUNTER
ANTICOAGULATION  MANAGEMENT    Assessment     Today's INR result of 2.4 is Therapeutic (goal INR of 2.0-3.0)        Warfarin taken as previously instructed    No new diet changes affecting INR    No new medication/supplements affecting INR    Continues to tolerate warfarin with no reported s/s of bleeding or thromboembolism     Previous INR was Subtherapeutic    Plan:     Spoke on phone with Nancy regarding INR result and instructed:     Warfarin Dosing Instructions:  Continue current warfarin dose 1.25 mg daily on sun/wed; and 2.5 mg daily rest of week  (0 % change)    Instructed patient to follow up no later than: two weeks    Lesly verbalizes understanding and agrees to warfarin dosing plan.    Instructed to call the [facility] for any changes, questions or concerns. (#[phone])   ?   Renan Boo RN    Subjective/Objective:      Nnacy Oropeza, a 75 y.o. female is on warfarin.     Nancy reports:     Home warfarin dose: as updated on anticoagulation calendar per template     Missed doses: No     Medication changes:  No     S/S of bleeding or thromboembolism:  No     New Injury or illness:  No     Changes in diet or alcohol consumption:  No     Upcoming surgery, procedure or cardioversion:  No    Anticoagulation Episode Summary     Current INR goal:  2.0-3.0   TTR:  44.9 % (11.6 mo)   Next INR check:  12/1/2020   INR from last check:  2.40 (11/17/2020)   Weekly max warfarin dose:     Target end date:  12/21/2016   INR check location:     Preferred lab:     Send INR reminders to:  XENIA HURTADO    Indications    Pulmonary embolism (H) [I26.99]           Comments:           Anticoagulation Care Providers     Provider Role Specialty Phone number    Julien Garnica MD Referring Family Medicine [phone]

## 2021-06-13 NOTE — PROGRESS NOTES
ASSESSMENT AND PLAN:  Nancy Oropeza 72 y.o. female is having exacerbation of pain.  This is in the knees bilaterally.  This is discussed with her.  She has osteoarthritis.  We discussed differential diagnosis including anserine bursitis referred pain in the distal femoral etiologies.  Overall the balance is in keeping with osteoarthritis related pain.  She has found methylprednisolone to be more useful than triamcinolone.  This was injected 40 mg of methylprednisolone under ultrasound guidance into each of her knees.  She tolerated the procedure well.  She is to return for follow-up here as needed basis.           Diagnoses and all orders for this visit:    Osteoarthritis Of The Knee  -     methylPREDNISolone acetate injection 40 mg (DEPO-MEDROL); Inject 1 mL (40 mg total) into the joint once.  -     methylPREDNISolone acetate injection 40 mg (DEPO-MEDROL); Inject 1 mL (40 mg total) into the joint once.    Arthralgias In Multiple Sites    Acute bilateral knee pain          HISTORY OF PRESENTING ILLNESS:  Nancy Oropeza 72 y.o. is here for  severe flare up of pain.  Joints affected include both knee(s). This has gone on for a few weeks . Pain is described as sharp.  She rates the pain 6.5/10.  On her previous visit she had methylprednisolone injected instead of Kenalog.  She thinks that methylprednisolone provided her more durable and better quality of response then did Kenalog prior to that.  She had those done under ultrasound guidance.   It is when active and at night/ wakes from sleep at night.  Her symptoms are severe.  she has had Visco supplementation elsewhere.  The symptoms are gradually worsening. Symptoms include pain, tenderness to touch. She has noted mild discomfort should discomfort in other areas such as the hands commensurate with osteoarthritis.  She has noted difficulty performing several day-to-day activities.  In the morning she noted stiffness lasting 15 minutes.    Further historical  information, including ROS and limitation in activities as noted in the multidimensional health assessment questionnaire scanned in the EMR and in the assessment and plan section.    ALLERGIES:Atorvastatin; Simvastatin; Sulfa (sulfonamide antibiotics); and Sulfasalazine    PAST MEDICAL/ACTIVE PROBLEMS/MEDICATION/SOCIAL DATA  Past Medical History:   Diagnosis Date     Depression      Disease of thyroid gland      HTN (hypertension)      Hypercholesteremia      Morbid obesity      Pulmonary emboli 12/14/2015     Yeast infection      History   Smoking Status     Former Smoker   Smokeless Tobacco     Never Used     Comment: quite 20 yrs ago     Patient Active Problem List   Diagnosis     Morbid Obesity     Osteoarthritis Of The Knee     Lower Back Pain     Major Depression, Recurrent     Obstructive Sleep Apnea     Hypertension     Edema     Hypothyroidism     Hypercholesterolemia     Normochromic, Normocytic Anemia     Arthralgias In Multiple Sites     Cataract     Impaired Fasting Glucose     Multiple lung nodules on CT     Angioedema     Hypokalemia     Pulmonary embolism     Anticoagulant long-term use     Acute bilateral knee pain     Current Outpatient Prescriptions   Medication Sig Dispense Refill     acetaminophen 500 mg coapsule Take 2 capsules by mouth.       amLODIPine (NORVASC) 10 MG tablet Take 1 tablet (10 mg total) by mouth daily. 30 tablet 5     cholecalciferol, vitamin D3, 1,000 unit tablet Take 1,000 Units by mouth daily. 2 capsules once a day       citalopram (CELEXA) 40 MG tablet TAKE ONE TABLET BY MOUTH EVERY DAY 90 tablet 0     furosemide (LASIX) 20 MG tablet TAKE ONE TABLET BY MOUTH EVERY DAY 90 tablet 2     levothyroxine (SYNTHROID, LEVOTHROID) 125 MCG tablet TAKE ONE TABLET BY MOUTH EVERY DAY 90 tablet 2     lisinopril-hydrochlorothiazide (PRINZIDE,ZESTORETIC) 20-12.5 mg per tablet Take 2 tablets by mouth  daily 180 tablet 3     pravastatin (PRAVACHOL) 80 MG tablet TAKE ONE TABLET BY MOUTH EVERY  "NIGHT AT BEDTIME 90 tablet 2     traMADol (ULTRAM) 50 mg tablet Take 1 tablet (50 mg total) by mouth every 8 (eight) hours as needed for pain. Max 3/day. 30 tablet 2     triamcinolone (KENALOG) 0.5 % cream Apply to rash twice per day for 2 weeks. 30 g 2     warfarin (COUMADIN) 5 MG tablet Take 1/2 tab Fridays, take 1 tab all other days. Adjust dose based on INR results as directed. 100 tablet 3     No current facility-administered medications for this visit.      DETAILED EXAMINATION  10/17/17  :  Vitals:    10/17/17 0956   BP: 132/70   Pulse: 80   Weight: (!) 318 lb (144.2 kg)   Height: 5' 7\" (1.702 m)     Alert oriented. Head including the face is examined for malar rash, heliotropes, scarring, lupus pernio. Eyes examined for redness such as in episcleritis/scleritis, periorbital lesions.   Neck examined  for range of motion Both upper and lower extremities (all four) examined for swollen, warm &/or  tender joints, range of motion, rash, muscle weakness, edema. The salient normal / abnormal findings are appended.   Joint line tenderness, bilaterally, knees.  She does not have detectable effusion in the knee joints.  Hamstrings are minimally tender.  Scattered [place].  LAB / IMAGING DATA:  ALT   Date Value Ref Range Status   09/10/2017 17 0 - 45 U/L Final   04/28/2017 18 0 - 45 U/L Final   11/03/2016 18 0 - 45 U/L Final     Albumin   Date Value Ref Range Status   09/10/2017 3.8 3.5 - 5.0 g/dL Final   04/28/2017 3.9 3.5 - 5.0 g/dL Final   11/03/2016 4.0 3.5 - 5.0 g/dL Final     Creatinine   Date Value Ref Range Status   09/10/2017 1.08 0.60 - 1.10 mg/dL Final   04/28/2017 1.07 0.60 - 1.10 mg/dL Final   11/03/2016 0.85 0.60 - 1.10 mg/dL Final       WBC   Date Value Ref Range Status   09/10/2017 11.1 (H) 4.0 - 11.0 thou/uL Final   04/28/2017 12.1 (H) 4.0 - 11.0 thou/uL Final   01/06/2015 10.1 4.0 - 11.0 thou/uL Final     Hemoglobin   Date Value Ref Range Status   09/10/2017 11.3 (L) 12.0 - 16.0 g/dL Final "   04/28/2017 11.6 (L) 12.0 - 16.0 g/dL Final   11/03/2016 11.8 (L) 12.0 - 16.0 g/dL Final     Platelets   Date Value Ref Range Status   09/10/2017 373 140 - 440 thou/uL Final   04/28/2017 387 140 - 440 thou/uL Final   11/03/2016 327 140 - 440 thou/uL Final       No results found for: SHAE, RF, SEDRATE

## 2021-06-13 NOTE — PROGRESS NOTES
[facility]  [address]  [facility] [address]  Dept: [phone]  Dept Fax: [phone]  Primary Provider: Natasha Moss MD  Pre-op Performing Provider: NATASHA MOSS    PREOPERATIVE EVALUATION:  Today's date: 12/4/2020    Nancy Oropeza is a 75 y.o. female who presents for a preoperative evaluation.    Surgical Information:  Surgery/Procedure: Cataract in the Right eye  Surgery Location: [facility]  Surgeon: DEEDEE  Surgery Date: 12/15/2020  Time of Surgery: NA  Where patient plans to recover: At home with family  Fax number for surgical facility: PH: [phone] F: [phone]    Type of Anesthesia Anticipated: to be determined    Subjective     HPI related to upcoming procedure: 75-year-old female with scheduled right eye cataract repair 12/15/2020. Had left eye cataract repair approximately 3 to 4 years ago. Underlying health problems have otherwise been stable. Remains on chronic warfarin anticoagulation with history of prior pulmonary emboli. Did have COVID-19 infection on 7/28/20. Complete recovery without residual concerns noted. History of PMR. Prednisone 5 mg daily. Had seen Dr. Willams 11/24/2020 as well. X-rays lower back without significant SI joint sacroiliitis. Continues home meds for underlying hypertension, hyperlipidemia, hypothyroidism. Not requiring CPAP for CALE history. Prior impaired fasting glucose with A1c of 5.7% 09/08/2020.      No flowsheet data found.    Health Care Directive:  Patient does not have a Health Care Directive or Living Will: Patient states has Advance Directive and will bring in a copy to clinic.    Preoperative Review of :    reviewed - no record of controlled substances prescribed.    See problem list for active medical problems.  Problems all longstanding and stable, except as noted/documented.  See ROS for pertinent symptoms related to these conditions.      Review of Systems  CONSTITUTIONAL: NEGATIVE for fever,  chills, change in weight  ENT/MOUTH: Negative for ear, mouth, and throat problems  RESP: NEGATIVE for significant cough or SOB  CV: NEGATIVE for chest pain, palpitations or peripheral edema    Patient Active Problem List    Diagnosis Date Noted     Primary osteoarthritis involving multiple joints 11/24/2020     SIRS (systemic inflammatory response syndrome) (H) 07/28/2020     Sepsis (H) 07/28/2020     COVID-19 virus infection 07/17/2020     Hypoxia 07/16/2020     2019 novel coronavirus disease (COVID-19) 07/16/2020     Diarrhea 07/16/2020     TEOFILO (acute kidney injury) (H) 07/16/2020     Polymyalgia rheumatica (H) 07/16/2020     Mood disorder (H) 07/16/2020     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     Postoperative anemia due to acute blood loss 12/18/2018     Chronic pain syndrome 06/01/2018     Acute bilateral knee pain 07/18/2017     Anticoagulant long-term use 03/06/2017     Pulmonary embolism (H) 12/21/2015     Multiple lung nodules on CT 12/17/2015     Angioedema 12/17/2015     Hypokalemia 12/17/2015     History of pulmonary embolism 01/01/2015     Cataract      Impaired Fasting Glucose      Morbid Obesity      Osteoarthritis Of The Knee      Back pain      Major Depression, Recurrent      Obstructive Sleep Apnea      Essential hypertension with goal blood pressure less than 140/90      Edema      Hypothyroidism      Hypercholesterolemia      Normochromic, Normocytic Anemia      Arthralgias In Multiple Sites      Past Medical History:   Diagnosis Date     2019 novel coronavirus disease (COVID-19) 7/16/2020     Acute bilateral knee pain 7/18/2017     TEOFILO (acute kidney injury) (H) 7/16/2020     Angioedema 12/17/2015     Anticoagulant long-term use 3/6/2017     Arthralgias In Multiple Sites     Created by Conversion      Back pain     Created by Conversion      Cataract     Created by Conversion      Chronic pain syndrome 6/1/2018     COVID-19 virus infection 7/17/2020     Depression      Diarrhea 7/16/2020     Disease of  thyroid gland      Edema     Created by Conversion      Essential hypertension with goal blood pressure less than 140/90     Created by Conversion  Replacement Utility updated for latest IMO load     History of pulmonary embolism 1/1/2015    Overview:  bilateral with acute cor pulmonale      HTN (hypertension)      Hypercholesteremia      Hypercholesterolemia     Created by Conversion      Hypokalemia 12/17/2015     Hypothyroidism     Created by Conversion  Replacement Utility updated for latest IMO load     Hypoxia 7/16/2020     Impaired fasting glucose     Created by Conversion      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     Major Depression, Recurrent     Created by Conversion  Replacement Utility updated for latest IMO load     Mood disorder (H) 7/16/2020     Morbid obesity (H)      Multiple lung nodules on CT 12/17/2015     Normochromic, Normocytic Anemia     Created by Conversion      Obstructive Sleep Apnea     Created by Conversion      Osteoarthritis Of The Knee     Created by Conversion  Replacement Utility updated for latest IMO load     Polymyalgia rheumatica (H) 7/16/2020     Postoperative anemia due to acute blood loss 12/18/2018     Pulmonary emboli (H) 12/14/2015     Pulmonary embolism (H) 12/21/2015     Sepsis (H) 7/28/2020     SIRS (systemic inflammatory response syndrome) (H) 7/28/2020     Yeast infection      Past Surgical History:   Procedure Laterality Date     BREAST BIOPSY Left 1970s     HERNIA REPAIR       HYSTERECTOMY  2010     OOPHORECTOMY  2010     Current Outpatient Medications   Medication Sig Dispense Refill     amLODIPine (NORVASC) 5 MG tablet TAKE 1 TABLET DAILY (NEW   DOSE 6/1/18) 90 tablet 3     cholecalciferol, vitamin D3, 1,000 unit tablet Take 2,000 Units by mouth daily.        citalopram (CELEXA) 40 MG tablet Take 1 tablet (40 mg total) by mouth daily. 90 tablet 3     furosemide (LASIX) 20 MG tablet TAKE 1 TABLET DAILY 90 tablet 3     hydrOXYzine HCL (ATARAX) 25 MG tablet Take 1-2 tablets by  mouth every 6 hours as needed for anxiety or sleep. 30 tablet 1     levothyroxine (SYNTHROID, LEVOTHROID) 137 MCG tablet Take 1 tablet (137 mcg total) by mouth daily. 90 tablet 3     lisinopriL-hydrochlorothiazide (PRINZIDE,ZESTORETIC) 20-12.5 mg per tablet Take 2 tablets by mouth daily. 180 tablet 3     pravastatin (PRAVACHOL) 80 MG tablet Take 1 tablet (80 mg total) by mouth at bedtime. 90 tablet 3     predniSONE (DELTASONE) 5 MG tablet Take 5 mg by mouth daily. 30 tablet 2     warfarin ANTICOAGULANT (COUMADIN/JANTOVEN) 2.5 MG tablet Take 0.5-1 tablets (1.25-2.5 mg total) by mouth See Admin Instructions. Take half a tablet (1.25mg) on Monday, Wednesday and Saturday; Take 1 tablet (2.5mg) on all other days of the week. Adjust dose based on INR results as directed. 90 tablet 3     prednisoLONE acetate (PRED-FORTE) 1 % ophthalmic suspension INSTILL 1 DROP INTO THE RIGHT EYE 4 TIMES A DAY FOR 1 WEEK, THEN TAPER. START AFTER SURGERY.       No current facility-administered medications for this visit.        Allergies   Allergen Reactions     Atorvastatin Myalgia     Legs hurt/sore     Paroxetine Unknown     Simvastatin Myalgia     Legs hurt, 12/2015 tolerated pravastatin at home     Sulfa (Sulfonamide Antibiotics) Rash     Sulfasalazine Rash       Social History     Tobacco Use     Smoking status: Former Smoker     Smokeless tobacco: Never Used     Tobacco comment: quite 20 yrs ago   Substance Use Topics     Alcohol use: No     Comment: occasionally      Family History   Problem Relation Age of Onset     Breast cancer Sister 75     Stomach cancer Paternal Grandfather      Lung cancer Sister      COPD Mother      Heart disease Mother      Heart attack Father      Social History     Substance and Sexual Activity   Drug Use No        Objective     /80 (Patient Site: Left Arm, Patient Position: Sitting, Cuff Size: Adult Large) Comment (Patient Site): Forearm  Pulse (!) 101   SpO2 92%   Physical Exam  GENERAL  APPEARANCE: healthy, alert and no distress  HENT: ear canals and TM's normal and nose and mouth without ulcers or lesions  RESP: lungs clear to auscultation - no rales, rhonchi or wheezes  CV: regular rate and rhythm, normal S1 S2, no S3 or S4 and no murmur, click or rub  ABDOMEN: soft, nontender, no HSM or masses and bowel sounds normal  NEURO: Normal strength and tone, sensory exam grossly normal, mentation intact and speech normal    Recent Labs   Lab Test 11/17/20  1344 10/23/20  1043 09/08/20  1049 09/08/20  1049 08/10/20  1424 08/10/20  1424 08/07/20  1130   HGB  --   --   --  10.8*  --  10.0* 9.3*   PLT  --   --   --  467*  --  523* 378   INR 2.40* 1.70*   < >  --    < > 3.80* 4.30*   NA  --   --   --  140  --   --  141   K  --   --   --  4.0  --   --  3.6   CREATININE  --   --   --  0.89  --   --  1.00    < > = values in this interval not displayed.        PRE-OP Diagnostics:   No labs were ordered during this visit.  No EKG required for low risk surgery (cataract, skin procedure, breast biopsy, etc).    REVISED CARDIAC RISK INDEX (RCRI)   The patient has the following serious cardiovascular risks for perioperative complications:   - No serious cardiac risks = 0 points    RCRI INTERPRETATION: 0 points: Class I (very low risk - 0.4% complication rate)            EXAM DATE:         11/24/2020  EXAM: X-RAY SACROILIAC JOINTS  LOCATION: [facility]  DATE/TIME: 11/24/2020 11:45 AM     INDICATION: Chronic pain.  COMPARISON: None.     IMPRESSION: Minimal degenerative change of both SI joints. No evidence of sacroiliitis.          Assessment & Plan      The proposed surgical procedure is considered LOW risk.    Preoperative examination  Preoperative examination completed for right eye cataract repair scheduled 12/15/2020. No contraindication identified.    Cataract of right eye, unspecified cataract type  Right eye cataract. Scheduled repair 12/15/2020.    Essential hypertension with goal blood  pressure less than 140/90  Continue current management for hypertension with lisinopril hydrochlorothiazide 20/12.5 using 2 tablets daily and amlodipine 5 mg daily. Recent basic metabolic panel 09/08/2020 unremarkable with normal renal function and electrolytes.    Results for orders placed or performed in visit on 09/08/20   Basic Metabolic Panel   Result Value Ref Range    Sodium 140 136 - 145 mmol/L    Potassium 4.0 3.5 - 5.0 mmol/L    Chloride 100 98 - 107 mmol/L    CO2 28 22 - 31 mmol/L    Anion Gap, Calculation 12 5 - 18 mmol/L    Glucose 88 70 - 125 mg/dL    Calcium 10.6 (H) 8.5 - 10.5 mg/dL    BUN 25 8 - 28 mg/dL    Creatinine 0.89 0.60 - 1.10 mg/dL    GFR MDRD Af Amer >60 >60 mL/min/1.73m2    GFR MDRD Non Af Amer >60 >60 mL/min/1.73m2          Mild episode of recurrent major depressive disorder (H)  Underlying depression stable with citalopram 40 mg daily.    Hypothyroidism, unspecified type  Levothyroxine 137 mcg daily.    Pulmonary embolism, unspecified chronicity, unspecified pulmonary embolism type, unspecified whether acute cor pulmonale present (H)  Continues warfarin anticoagulation. Check INR today.    Hypercholesterolemia  Continues use of pravastatin 80 mg at bedtime. Lipid cascade 09/08/2020 with total cholesterol 200, triglycerides 131, HDL 62 and .    Polymyalgia rheumatica (H)  Prednisone 5 mg daily currently and will continue recommendation for weaning by 1 mg every 4 weeks. Prior CBC 09/08/2020 with white count 15.4 likely secondary to ongoing prednisone use with stable normochromic normocytic anemia with hemoglobin 10.8 and MCV 85 likely secondary to anemia of chronic disease.    Impaired fasting glucose  Recent A1c of 5.7% 09/08/2020 and will follow closely while on prednisone taper.    Pulmonary embolism (H)  As above, INR while on chronic warfarin anticoagulation.  - INR    Lab Results   Component Value Date    WBC 15.4 (H) 09/08/2020    HGB 10.8 (L) 09/08/2020    HCT 33.5 (L)  09/08/2020    MCV 85 09/08/2020     (H) 09/08/2020        Risks and Recommendations:  The patient has the following additional risks and recommendations for perioperative complications:   - No identified additional risk factors other than previously addressed    Medication Instructions:  Patient is to take all scheduled medications on the day of surgery    RECOMMENDATION:  APPROVAL GIVEN to proceed with proposed procedure, without further diagnostic evaluation.    Signed Electronically by: Julien Garnica MD    Copy of this evaluation report is provided to requesting physician.    [facility] Preop Guidelines    Revised Cardiac Risk Index

## 2021-06-13 NOTE — TELEPHONE ENCOUNTER
ANTICOAGULATION  MANAGEMENT PROGRAM    Nancy Oropeza is overdue for INR check.     Spoke with Lesly and patient will have INR checked at next office visit on 12/4.      Sarah Toledo, RN

## 2021-06-13 NOTE — PROGRESS NOTES
"This document was created using a software with less than 100% fidelity, at times resulting in unintended, even erroneous syntax and grammar.  The reader is advised to keep this under consideration while reviewing, interpreting this note.      ASSESSMENT AND PLAN:  Nancy Oropeza 75 y.o. female is seen here on 11/24/20 for evaluation of low back pain, chronic, question if this might represent polymyalgia rheumatica.  While she has had quite impressive response to modest dose of prednisone, sed rate CRP remain chronically elevated, and she has widespread axial and appendicular degenerative joint disease, the likelihood that her low back pain represents polymyalgia rheumatica would appear to be less likely.  She does not have risk factors for some of the seronegative spondyloarthropathy group of conditions.  We will check x-rays of the sacroiliac area.  We will meet here in the next few weeks.  Diagnoses and all orders for this visit:    Chronic bilateral low back pain without sciatica  -     XR Sacroliac Joints 3 Or More VWS; Future; Expected date: 11/24/2020  -     XR Sacroliac Joints 3 Or More VWS    Arthralgias In Multiple Sites    Primary osteoarthritis involving multiple joints          HISTORY OF PRESENTING ILLNESS ON 11/24/20 :  Nancy Oropeza 75 y.o. is here for a moderately severe flare up of pain.  This is in her lower back.  This is gone on for several years.  She feels that the best option for her in this pain has been prednisone.  When she gets 10 mg of prednisone she feels like \"20 years old\" the pain subsides substantially.  As she is dropped to 5 mg daily the pain returns.  She has been to spine clinic more than once.  She has had several injections in the lumbar spine which provide her transient relief.  She reports no headache jaw claudication double vision.  She has not had pain across her shoulders or proximal upper extremities.  Her sed rate and CRP have been chronically, modestly, " elevated.  She reports pain at the bases of the thumbs.  She has had osteoarthritis of the knees and has felt quite a bit better since she had arthroplasties done there bilaterally over the last couple of years.  Her family physicians is concern was if given the response to the prednisone and the back pain it might represent polymyalgia with a similar syndrome.  Here for a moderately severe flare up of pain.  She reports no history of psoriasis herself or the family, ulcerative colitis, Crohn's disease.    There is no associated recent fall or trauma.  Over-the-counter treatment to date has been without significant relief.    Further historical information, including ROS and limitation in activities as noted in the multidimensional health assessment questionnaire scanned in the EMR and in the assessment and plan section.    ALLERGIES:Atorvastatin, Paroxetine, Simvastatin, Sulfa (sulfonamide antibiotics), and Sulfasalazine    PAST MEDICAL/ACTIVE PROBLEMS/MEDICATION/SOCIAL DATA  Past Medical History:   Diagnosis Date     2019 novel coronavirus disease (COVID-19) 7/16/2020     Acute bilateral knee pain 7/18/2017     TEOFILO (acute kidney injury) (H) 7/16/2020     Angioedema 12/17/2015     Anticoagulant long-term use 3/6/2017     Arthralgias In Multiple Sites     Created by Conversion      Back pain     Created by Conversion      Cataract     Created by Conversion      Chronic pain syndrome 6/1/2018     COVID-19 virus infection 7/17/2020     Depression      Diarrhea 7/16/2020     Disease of thyroid gland      Edema     Created by Conversion      Essential hypertension with goal blood pressure less than 140/90     Created by Conversion  Replacement Utility updated for latest IMO load     History of pulmonary embolism 1/1/2015    Overview:  bilateral with acute cor pulmonale      HTN (hypertension)      Hypercholesteremia      Hypercholesterolemia     Created by Conversion      Hypokalemia 12/17/2015     Hypothyroidism      Created by Conversion  Replacement Utility updated for latest IMO load     Hypoxia 7/16/2020     Impaired fasting glucose     Created by Conversion      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     Major Depression, Recurrent     Created by Conversion  Replacement Utility updated for latest IMO load     Mood disorder (H) 7/16/2020     Morbid obesity (H)      Multiple lung nodules on CT 12/17/2015     Normochromic, Normocytic Anemia     Created by Conversion      Obstructive Sleep Apnea     Created by Conversion      Osteoarthritis Of The Knee     Created by Conversion  Replacement Utility updated for latest IMO load     Polymyalgia rheumatica (H) 7/16/2020     Postoperative anemia due to acute blood loss 12/18/2018     Pulmonary emboli (H) 12/14/2015     Pulmonary embolism (H) 12/21/2015     Sepsis (H) 7/28/2020     SIRS (systemic inflammatory response syndrome) (H) 7/28/2020     Yeast infection      Social History     Tobacco Use   Smoking Status Former Smoker   Smokeless Tobacco Never Used   Tobacco Comment    quite 20 yrs ago     Patient Active Problem List   Diagnosis     Morbid Obesity     Osteoarthritis Of The Knee     Back pain     Major Depression, Recurrent     Obstructive Sleep Apnea     Essential hypertension with goal blood pressure less than 140/90     Edema     Hypothyroidism     Hypercholesterolemia     Normochromic, Normocytic Anemia     Arthralgias In Multiple Sites     Cataract     Impaired Fasting Glucose     Multiple lung nodules on CT     Angioedema     Hypokalemia     Pulmonary embolism (H)     Anticoagulant long-term use     Acute bilateral knee pain     Chronic pain syndrome     Hypoxia     2019 novel coronavirus disease (COVID-19)     Diarrhea     TEOFILO (acute kidney injury) (H)     Polymyalgia rheumatica (H)     Mood disorder (H)     COVID-19 virus infection     History of pulmonary embolism     SIRS (systemic inflammatory response syndrome) (H)     Sepsis (H)     Postoperative anemia due to acute blood loss  "    Left knee DJD     Current Outpatient Medications   Medication Sig Dispense Refill     amLODIPine (NORVASC) 5 MG tablet TAKE 1 TABLET DAILY (NEW   DOSE 6/1/18) 90 tablet 3     cholecalciferol, vitamin D3, 1,000 unit tablet Take 2,000 Units by mouth daily.        citalopram (CELEXA) 40 MG tablet Take 1 tablet (40 mg total) by mouth daily. 90 tablet 3     furosemide (LASIX) 20 MG tablet TAKE 1 TABLET DAILY 90 tablet 3     hydrOXYzine HCL (ATARAX) 25 MG tablet Take 1-2 tablets by mouth every 6 hours as needed for anxiety or sleep. 30 tablet 1     levothyroxine (SYNTHROID, LEVOTHROID) 137 MCG tablet Take 1 tablet (137 mcg total) by mouth daily. 90 tablet 3     lisinopriL-hydrochlorothiazide (PRINZIDE,ZESTORETIC) 20-12.5 mg per tablet Take 2 tablets by mouth daily. 180 tablet 3     pravastatin (PRAVACHOL) 80 MG tablet Take 1 tablet (80 mg total) by mouth at bedtime. 90 tablet 3     predniSONE (DELTASONE) 5 MG tablet Take 5 mg by mouth daily. 30 tablet 2     warfarin ANTICOAGULANT (COUMADIN/JANTOVEN) 2.5 MG tablet Take 0.5-1 tablets (1.25-2.5 mg total) by mouth See Admin Instructions. Take half a tablet (1.25mg) on Monday, Wednesday and Saturday; Take 1 tablet (2.5mg) on all other days of the week. Adjust dose based on INR results as directed. 90 tablet 3     No current facility-administered medications for this visit.          DETAILED EXAMINATION  11/24/20  :  Vitals:    11/24/20 1045   BP: 134/76   Patient Site: Left Arm   Patient Position: Sitting   Cuff Size: Adult Regular   Pulse: 88   Weight: (!) 314 lb (142.4 kg)   Height: 5' 7\" (1.702 m)     Alert oriented. Head including the face is examined for malar rash, heliotropes, scarring, lupus pernio. Eyes examined for redness such as in episcleritis/scleritis, periorbital lesions.   Neck/ Face examined for parotid gland swelling, range of motion of neck.  Left upper and lower and right upper and lower extremities examined for tenderness, swelling, warmth of the " appendicular joints, range of motion, edema, rash.  Some of the important findings included: No synovitis in palpable joints of upper and lower extremities mild tenderness in the first CMC she has intact range of motion of the shoulders no tenderness across the upper back.  As she noted her pain in the lumbosacral area.  Bilateral TKA scars.  No tenderness in the temporal areas.           LAB / IMAGING DATA:  ALT   Date Value Ref Range Status   07/27/2020 30 0 - 45 U/L Final   07/17/2020 23 0 - 45 U/L Final   07/16/2020 28 0 - 45 U/L Final     Albumin   Date Value Ref Range Status   07/27/2020 3.3 (L) 3.5 - 5.0 g/dL Final   07/17/2020 3.7 3.5 - 5.0 g/dL Final   07/16/2020 3.7 3.5 - 5.0 g/dL Final     Creatinine   Date Value Ref Range Status   09/08/2020 0.89 0.60 - 1.10 mg/dL Final   08/07/2020 1.00 0.60 - 1.10 mg/dL Final   07/30/2020 1.00 0.60 - 1.10 mg/dL Final       WBC   Date Value Ref Range Status   09/08/2020 15.4 (H) 4.0 - 11.0 thou/uL Final   08/10/2020 9.9 4.0 - 11.0 thou/uL Final   01/06/2015 10.1 4.0 - 11.0 thou/uL Final     Hemoglobin   Date Value Ref Range Status   09/08/2020 10.8 (L) 12.0 - 16.0 g/dL Final   08/10/2020 10.0 (L) 12.0 - 16.0 g/dL Final   08/07/2020 9.3 (L) 12.0 - 16.0 g/dL Final     Platelets   Date Value Ref Range Status   09/08/2020 467 (H) 140 - 440 thou/uL Final   08/10/2020 523 (H) 140 - 440 thou/uL Final   08/07/2020 378 140 - 440 thou/uL Final       Lab Results   Component Value Date    SEDRATE 38 (H) 09/08/2020

## 2021-06-13 NOTE — TELEPHONE ENCOUNTER
WU there are 2 notes in here with the same information. Actually 3, was having a computer glitch. Sorry about that!  If you look at the next telephone encounter it will have all the surgery information needed to help schedule the patient for their pre op. Dr. Julien Garnica does not have any pre ops until 12/16 and patient is having surgery on 12/15 if you read the next telephone encounter with all the information. Thank you!

## 2021-06-13 NOTE — TELEPHONE ENCOUNTER
Who is calling:  Patient  Reason for Call:  Patient states she is returning missed call from [facility].  Date of last appointment with primary care: na  Okay to leave a detailed message: Yes

## 2021-06-13 NOTE — PATIENT INSTRUCTIONS - HE
Your wound is now healed  Can leave open to the air  If the wound reopens or begins to drain begin to redress this and call the clinic for follow up appt

## 2021-06-13 NOTE — TELEPHONE ENCOUNTER
Patient returned call to come in 1 hour earlier for apt on 11/24, however she is unsure if the apt is needed.  She is stating that her wound is healing and will email us a photo to determine if she should come in.

## 2021-06-13 NOTE — PATIENT INSTRUCTIONS - HE

## 2021-06-14 NOTE — TELEPHONE ENCOUNTER
Refill request from [facility] for the following medications:    Amlodipine  Pravastatin  Prednisone

## 2021-06-14 NOTE — TELEPHONE ENCOUNTER
ANTICOAGULATION  MANAGEMENT PROGRAM    Nancy Oropeza is overdue for INR check.     Spoke with Lesly and scheduled INR appointment on 1/22/21.      Evie Lagos RN

## 2021-06-14 NOTE — TELEPHONE ENCOUNTER
Received a phone call from patient who states she is no longer using [facility] and her insurance requires she use [facility] instead.    She is wanting her prescriptions sent to [facility].  I reviewed all of her medications to ensure the dose and instructions are correct.     Please send to the pharmacy if appropriate.

## 2021-06-14 NOTE — PROGRESS NOTES
Assessment:    1. Left knee pain  HYDROcodone-acetaminophen (NORCO) 7.5-325 mg per tablet    Ambulatory referral to Orthopedic Surgery   2. Low back pain, unspecified back pain laterality, unspecified chronicity, with sciatica presence unspecified     3. Essential hypertension  Basic Metabolic Panel   4. Other pulmonary embolism without acute cor pulmonale, unspecified chronicity     5. Normochromic normocytic anemia  HM2(CBC w/o Differential)         Plan:    Discussed left knee pain likely osteoarthritic etiology as well.  Hydrocodone acetaminophen 7.5/325 use 1 tablet 3 times daily as needed sparingly for breakthrough pain otherwise continues Tylenol 1 g 3 times daily.  Do not take more than 3000 mg total dose of acetaminophen in a day.  Referral to see Dr. Wali Small with [facility] for further evaluation and treatment options otherwise states follow-up scheduled with Dr. collins in February 2018 for repeat injection etc.  Basic metabolic panel pending regarding hypertension management.  Warfarin 5 mg on Friday otherwise 2.5 mg daily.  Patient requesting piroxicam use for back and knee pain however did discuss interaction with warfarin and risk for GI bleed etc.        Subjective:    Nancy GARZON Satabhay is seen today for ongoing left knee pain.  Right knee actually feels better.  Has had injections in the past by Dr. collins.  Lower back pain more left-sided.  No significant radicular symptoms at this time.  Does have underlying hypertension.  Continues warfarin anticoagulation 5 mg on Friday otherwise 2.5 mg daily due to issues with prior pulmonary emboli without recurrence currently.  No shortness of breath or chest pain.  Had been on piroxicam in the distant past likely 2012 and felt that this is quite helpful however did discuss side effects of medication as well as drug in her extractions with warfarin.  Continues remainder of home medication including lisinopril hydrochlorthiazide 20/12.5  using 2 tablets daily.    Past Surgical History:   Procedure Laterality Date     BREAST BIOPSY Left 1970s     HYSTERECTOMY  2010     OOPHORECTOMY  2010        Family History   Problem Relation Age of Onset     Breast cancer Sister 75     Stomach cancer Paternal Grandfather      Lung cancer Sister         Past Medical History:   Diagnosis Date     Depression      Disease of thyroid gland      HTN (hypertension)      Hypercholesteremia      Morbid obesity      Pulmonary emboli 12/14/2015     Yeast infection         Social History   Substance Use Topics     Smoking status: Former Smoker     Smokeless tobacco: Never Used      Comment: quite 20 yrs ago     Alcohol use No      Comment: occasionally        Current Outpatient Prescriptions   Medication Sig Dispense Refill     amLODIPine (NORVASC) 10 MG tablet TAKE ONE TABLET (10 MG) BY MOUTH EVERY DAY 90 tablet 1     cholecalciferol, vitamin D3, 1,000 unit tablet Take 1,000 Units by mouth daily. 2 capsules once a day       citalopram (CELEXA) 40 MG tablet TAKE ONE TABLET BY MOUTH EVERY DAY 90 tablet 3     diclofenac sodium (VOLTAREN) 1 % Gel Apply twice daily 50 g 0     furosemide (LASIX) 20 MG tablet TAKE ONE TABLET BY MOUTH EVERY DAY 90 tablet 2     levothyroxine (SYNTHROID, LEVOTHROID) 125 MCG tablet TAKE ONE TABLET BY MOUTH EVERY DAY 90 tablet 2     lisinopril-hydrochlorothiazide (PRINZIDE,ZESTORETIC) 20-12.5 mg per tablet TAKE TWO TABLETS BY MOUTH EVERY  tablet 1     pravastatin (PRAVACHOL) 80 MG tablet TAKE ONE TABLET BY MOUTH EVERY NIGHT AT BEDTIME 90 tablet 2     warfarin (COUMADIN) 5 MG tablet Take 1/2 tab Fridays, take 1 tab all other days. Adjust dose based on INR results as directed. 100 tablet 3     HYDROcodone-acetaminophen (NORCO) 7.5-325 mg per tablet Take 1 tablet by mouth 3 (three) times a day as needed for pain. 60 tablet 0     No current facility-administered medications for this visit.           Objective:    Vitals:    12/20/17 1307   BP: 140/62    Pulse: 100   Weight: (!) 309 lb (140.2 kg)      Body mass index is 48.4 kg/(m^2).    Alert.  No apparent distress.  Transfer slowly however.  Osteoarthritic change of knees noted.  Chest clear.  Cardiac exam regular.  Extremities warm and dry.

## 2021-06-14 NOTE — PROGRESS NOTES
Assessment:     1. Acute bilateral knee pain  diclofenac sodium (VOLTAREN) 1 % Gel   2. Osteoarthritis Of The Knee  diclofenac sodium (VOLTAREN) 1 % Gel       Plan:     1. Acute bilateral knee pain  Patient's osteoarthritis history reviewed and noted she is ambulatory complaining of pain in her left knee realizes that she probably will need surgery has been getting injections by orthopedic surgery is trying to get some help so she can get through the [place] area.  Patient is on tramadol for pain allergies to sulfa noted therefore a trial of Celebrex contraindicated.  Patient will be treated with the following topical anti-inflammatory sensitivity to salicylates noted cross-reactivity and problem with this probably remote but noted risk-benefit ratio favors a trial of this will also increase her Tylenol to 2 extra strength tablets in the morning and in the evening  - diclofenac sodium (VOLTAREN) 1 % Gel; Apply twice daily  Dispense: 50 g; Refill: 0    2. Osteoarthritis Of The Knee  Patient will apply the following medication 3 times daily in addition to increasing her Tylenol dosage  - diclofenac sodium (VOLTAREN) 1 % Gel; Apply twice daily  Dispense: 50 g; Refill: 0      Subjective:   Patient is being seen complaining of pain in her left knee and her left leg and some radiation up from her left back patient does have severe osteoarthritis with end-stage disease of both knees she walks with a valgus deformity she is on the care of orthopedic surgery she is getting injections she realizes she is probably coming to surgery she like to get through the [place] holidays.  I did review her chart she is on tramadol for pain control.  I feel she is not taking enough Tylenol will increase her Tylenol Extra Strength to 2 tablets in the morning 2 in the evening, not suggest we try some topical Voltaren cross-reactivity sensitivity as salicylates noted.  Patient is on warfarin had PE and past no evidence PE at this exam no  "evidence DVT at this exam all medical questions asked answered patient will let us know how her clinical course goes along here follow-up let us know if pain changes in any way shape or form or there is no improvement.  Was a pleasure to meet and see the patient.    Review of Systems: A complete 14 point review of systems was obtained and is negative or as stated in the history of present illness.    Past Medical History:   Diagnosis Date     Depression      Disease of thyroid gland      HTN (hypertension)      Hypercholesteremia      Morbid obesity      Pulmonary emboli 12/14/2015     Yeast infection      Family History   Problem Relation Age of Onset     Breast cancer Sister 75     Stomach cancer Paternal Grandfather      Lung cancer Sister      Past Surgical History:   Procedure Laterality Date     BREAST BIOPSY Left 1970s     HYSTERECTOMY  2010     OOPHORECTOMY  2010     Social History   Substance Use Topics     Smoking status: Former Smoker     Smokeless tobacco: Never Used      Comment: quite 20 yrs ago     Alcohol use No      Comment: occasionally         Objective:   /60 (Patient Site: Left Arm, Patient Position: Sitting, Cuff Size: Adult Large)  Pulse 98  Temp 98.4  F (36.9  C) (Oral)   Resp 18  Ht 5' 7\" (1.702 m)  Wt (!) 314 lb 1.6 oz (142.5 kg)  SpO2 96%  BMI 49.2 kg/m2    General Appearance:  Alert, cooperative, no distress  Head:  Normocephalic, no obvious abnormality  Ears: TM anatomy normal  Eyes:  PERRL, EOM's intact, conjunctiva and corneas clear  Nose:  Nares symmetrical, septum midline, mucosa pink, no sinus tenderness  Throat:  Lips, tongue, and mucosa are moist, pink, and intact  Neck:  Supple, symmetrical, trachea midline, no adenopathy; thyroid: no enlargement, symmetric,no tenderness/mass/nodules; no carotid bruit, no JVD  Back:  Symmetrical, no curvature, ROM normal, no CVA tenderness  Chest/Breast:  No mass or tenderness  Lungs:  Clear to auscultation bilaterally, respirations " unlabored   Heart:  Normal PMI, regular rate & rhythm, S1 and S2 normal, no murmurs, rubs, or gallops  Abdomen:  Soft, non-tender, bowel sounds active all four quadrants, no mass, or organomegaly  Musculoskeletal: Patient complaining of compartment pain in left knee slightly worse than right knee patient has a valgus deformity of both knees due to osteoarthritis of both knees; trial will be topical anti-inflammatory and increase in Tylenol dosage I feel knee surgery is probably eminent down the road but her weight is a problem  Lymphatic:  No adenopathy  Skin/Hair/Nails:  Skin warm, dry, and intact, no rashes  Neurologic:  Alert and oriented x3, no cranial nerve deficits, normal strength and tone, gait steady  Extremities:  No edema.  Laura's sign negative.  No signs of DVT; patient is on Coumadin  Genitourinary: deferred  Pulses:  Equal bilaterally           This note has been dictated using voice recognition software. Any grammatical or context distortions are unintentional and inherent to the the software.

## 2021-06-14 NOTE — TELEPHONE ENCOUNTER
FYI - Status Update  Who is Calling: Patient  Update: Patient has questions, concerns about current dosing and her INR's. Requests call back from ACN.  Okay to leave a detailed message?:  No - as she has questions for you.

## 2021-06-14 NOTE — TELEPHONE ENCOUNTER
Who is calling:  Patient  Reason for Call:  Returning phone call from [facility]  Date of last appointment with primary care:   Okay to leave a detailed message: Yes

## 2021-06-14 NOTE — TELEPHONE ENCOUNTER
ANTICOAGULATION  MANAGEMENT PROGRAM    Nancy Oropeza is overdue for INR check.     Spoke with Amanda and scheduled INR appointment on 1/5.      Sarah Toledo, RN

## 2021-06-14 NOTE — TELEPHONE ENCOUNTER
Patient was calling to request her Warfarin be sent to [facility] and wanted to know what her out of pocket cost for this medication is.    Patient notified that we do not know what her out of pocket cost for medication is at the pharmacy.    Since she knows this is covered completely at her local pharmacy she will pick her medication up at [facility].

## 2021-06-15 NOTE — TELEPHONE ENCOUNTER
ANTICOAGULATION  MANAGEMENT PROGRAM    Nancy GARZON Kerriemikki is overdue for INR check.     Spoke with Lesly who declined to schedule INR at this time. States that she spoke with her insurance who informed her the home monitor should be getting mailed to her soon. She wants to wait for the home monitor to check her INR.    Yumiko Kaufman RN

## 2021-06-15 NOTE — TELEPHONE ENCOUNTER
Who is calling:  Patient   Reason for Call:  Has questions regarding home monitoring   Date of last appointment with primary care:   Okay to leave a detailed message: Yes

## 2021-06-15 NOTE — TELEPHONE ENCOUNTER
Called and spoke with Lesly.  She is wondering about the status of her home monitor-- states she spoke with [facility] who said they cover the monitor (50-80%) and Medicare will cover the rest.   Patient is wondering about the cost of the monitor in case insurance doesn't cover it completely.   Lesly also stated that the [facility] rep told her that she will either get a Rx from  for the monitor, or have the monitor mailed to her house, but they will need information on which one will be happening.     Informed patient that the process does take some time but that it has been started and we are just waiting on the order to be signed by .     Patient verbalized understanding and requested that ACN call her with any updates.       Yumiko Kaufman RN  Anticoagulation Clinic

## 2021-06-15 NOTE — TELEPHONE ENCOUNTER
ANTICOAGULATION  MANAGEMENT    Assessment     Today's INR result of 2.8 is Therapeutic (goal INR of 2.0-3.0)        Warfarin taken as previously instructed    No new diet changes affecting INR    No new medication/supplements affecting INR    Continues to tolerate warfarin with no reported s/s of bleeding or thromboembolism     Previous INR was Supratherapeutic    Plan:     Spoke on phone with Lesly regarding INR result and instructed:      Warfarin Dosing Instructions:  Continue current warfarin dose 1.25 mg daily on Fri; and 2.5 mg daily rest of week  (0 % change)    Instructed patient to follow up no later than: 2 weeks    Education provided: target INR goal and significance of current INR result, importance of following up for INR monitoring at instructed interval, importance of taking warfarin as instructed, importance of notifying clinic for changes in medications and importance of notifying clinic for diarrhea, nausea/vomiting, reduced intake and/or illness    Lesly verbalizes understanding and agrees to warfarin dosing plan.    Instructed to call the [facility] for any changes, questions or concerns. (#[phone])   ?   Sarah Toledo RN    Subjective/Objective:      Nancy Oropeza, a 75 y.o. female is on warfarin. Lesly Grace reports:     Home warfarin dose: as updated on anticoagulation calendar per template     Missed doses: No     Medication changes:  No     S/S of bleeding or thromboembolism:  No     New Injury or illness:  No     Changes in diet or alcohol consumption:  No     Upcoming surgery, procedure or cardioversion:  No    Anticoagulation Episode Summary     Current INR goal:  2.0-3.0   TTR:  40.5 % (11.6 mo)   Next INR check:  2/19/2021   INR from last check:  2.80 (2/5/2021)   Weekly max warfarin dose:     Target end date:  12/21/2016   INR check location:     Preferred lab:     Send INR reminders to:  XENIA HURTADO    Indications    Pulmonary embolism (H) [I26.99]            Comments:           Anticoagulation Care Providers     Provider Role Specialty Phone number    Julien Garnica MD Referring Family Medicine [phone]

## 2021-06-15 NOTE — TELEPHONE ENCOUNTER
ANTICOAGULATION  MANAGEMENT    Assessment     Today's INR result of 3.0 is Therapeutic (goal INR of 2.0-3.0)        Warfarin taken as previously instructed    No new diet changes affecting INR    No new medication/supplements affecting INR    Continues to tolerate warfarin with no reported s/s of bleeding or thromboembolism     Previous INR was Therapeutic    Plan:     Spoke on phone with Lesly regarding INR result and instructed:      Warfarin Dosing Instructions:  Continue current warfarin dose 1.25 mg daily on Fri; and 2.5 mg daily rest of week  (0 % change)    Instructed patient to follow up no later than: 4 weeks-- will call back to schedule if she doesn't get her home monitor by then.    Education provided: target INR goal and significance of current INR result, importance of notifying clinic for changes in medications, importance of notifying clinic for diarrhea, nausea/vomiting, reduced intake and/or illness and importance of notifying clinic of upcoming surgeries and procedures 2 weeks in advance    Lesly verbalizes understanding and agrees to warfarin dosing plan.    Instructed to call the [facility] for any changes, questions or concerns. (#[phone])   ?   Yumiko Kaufman RN    Subjective/Objective:      Nancy Oropeza, a 75 y.o. female is on warfarin. Lesly Grace reports:     Home warfarin dose: as updated on anticoagulation calendar per template     Missed doses: No     Medication changes:  No     S/S of bleeding or thromboembolism:  No     New Injury or illness:  No     Changes in diet or alcohol consumption:  No     Upcoming surgery, procedure or cardioversion:  No     Anticoagulation Episode Summary     Current INR goal:  2.0-3.0   TTR:  40.5 % (11.6 mo)   Next INR check:  3/31/2021   INR from last check:  3.00 (3/3/2021)   Weekly max warfarin dose:     Target end date:  12/21/2016   INR check location:     Preferred lab:     Send INR reminders to:  XENIA Wilson     Pulmonary embolism (H) [I26.99]           Comments:           Anticoagulation Care Providers     Provider Role Specialty Phone number    Julien Garnica MD Referring Family Medicine [phone]

## 2021-06-15 NOTE — TELEPHONE ENCOUNTER
Anticoagulation Management    Discussed INR home monitoring program with Nancy Oropeza reviewing:      [employer] requirements: >= 3 months of anticoagulation therapy, indication for chronic anticoagulation and order from provider    Required testing frequency (q1-2 weeks)    Home meters, testing supplies, meter training, and reporting of INR results done through an outside company. Patient would be contacted by home monitoring company to review insurance coverage with home monitoring company prior to enrolling.    [facility].    Home monitoring application may take several weeks and must continue to follow up with recommended INR monitoring in clinic until receives monitor and training completed.     Home monitoring terms reviewed with patient      Patient agrees to frequency of testing as directed by referring provider ( weekly or biweekly) Yes    Testing to be performed during business hours of [facility] Yes    Patient agrees they have the skill ( or a designated caregiver) necessary to perform the self test Yes    Patient agrees to report all INR results to INR home monitoring company No    Patient agrees to have additional INR test in clinic if a home result is critical Yes    Patient agrees to schedule an INR test at a [facility] clinic yearly for technique observation and quality check of INR results with their home meter Yes    Patient agrees to use a [facility] approved service provider and device for home monitoring Yes          Nancy Oropeza is interested home INR monitoring and requests order be submitted.        Sarah Toledo RN

## 2021-06-16 ENCOUNTER — COMMUNICATION - HEALTHEAST (OUTPATIENT)
Dept: ANTICOAGULATION | Facility: CLINIC | Age: 76
End: 2021-06-16

## 2021-06-16 NOTE — TELEPHONE ENCOUNTER
"Called [facility] again regarding not recieveing patient's results.   Spoke with Zohreh,  who stated that she sees the message from last week regarding the change request, however it has not been updated yet.     Zohreh placed another request. States that \"sometime in the next week\" we will receive a fax with a form to fill out and return, and then they can change the fax number.     Zohreh informed writer that patient's INR from yesterday was 1.9.      Yumiko Kaufman RN  Anticoagulation Clinic    "

## 2021-06-16 NOTE — TELEPHONE ENCOUNTER
Who is calling:  Patient   Reason for Call:  See below  Date of last appointment with primary care:   Okay to leave a detailed message: Yes    Patient is calling because her INR was done yesterday and she hasn't received a call back. Patient is very upset that she isn't getting a call back with the results of her INR the last few weeks.    Please call and advise.

## 2021-06-16 NOTE — TELEPHONE ENCOUNTER
ANTICOAGULATION  MANAGEMENT    Assessment     Today's INR result of 2.4 is Therapeutic (goal INR of 2.0-3.0)        Warfarin taken as previously instructed    No new diet changes affecting INR    No new medication/supplements affecting INR    Continues to tolerate warfarin with no reported s/s of bleeding or thromboembolism     Previous INR was Therapeutic    Plan:     Spoke on phone with Lesly regarding INR result and instructed:      Warfarin Dosing Instructions:  Continue current warfarin dose 1.25 mg daily on Fr; and 2.5 mg daily rest of week  (0 % change)    Instructed patient to follow up no later than: 1 week with home monitor    Education provided: target INR goal and significance of current INR result, importance of notifying clinic for changes in medications, monitoring for bleeding signs and symptoms, monitoring for clotting signs and symptoms, when to seek medical attention/emergency care, importance of notifying clinic for diarrhea, nausea/vomiting, reduced intake and/or illness and importance of notifying clinic of upcoming surgeries and procedures 2 weeks in advance    Lesly verbalizes understanding and agrees to warfarin dosing plan.    Instructed to call the [facility] for any changes, questions or concerns. (#[phone])   ?   Yumiko Kaufman RN    Subjective/Objective:      Nancy Oropeza, a 75 y.o. female is on warfarin.       Lesly reports:     Home warfarin dose: verbally confirmed home dose with Lesly and updated on anticoagulation calendar     Missed doses: No     Medication changes:  No     S/S of bleeding or thromboembolism:  No     New Injury or illness:  No     Changes in diet or alcohol consumption:  No     Upcoming surgery, procedure or cardioversion:  No    Anticoagulation Episode Summary     Current INR goal:  2.0-3.0   TTR:  40.6 % (11.6 mo)   Next INR check:  4/7/2021   INR from last check:  2.40 (3/30/2021)   Weekly max warfarin dose:     Target end date:  12/21/2016    INR check location:     Preferred lab:     Send INR reminders to:  XENIA HURTADO    Indications    Pulmonary embolism (H) [I26.99]           Comments:           Anticoagulation Care Providers     Provider Role Specialty Phone number    Julien Garnica MD Referring Family Medicine [phone]

## 2021-06-16 NOTE — TELEPHONE ENCOUNTER
Who is calling:  Robert  Reason for Call:  Notification -- See below  Date of last appointment with primary care:   Okay to leave a detailed message: therese Jones calling to notify PCP that patient is now dong her INR's at home. She successfully completed her 1st INR at home yesterday 3/30.

## 2021-06-16 NOTE — TELEPHONE ENCOUNTER
"ANTICOAGULATION  MANAGEMENT    Nancy Oropeza received home monitor and has submitted first result.  Reviewed home monitoring program management:      INR testing:    Ordered INR testing frequency is:  Q 1 week    INR should be tested Monday-Friday prior to 2 pm and submitted directly to home monitoring company on day of testing    INR test and monitor review at [facility] required annually for technique observation and quality check.     Venous INR drawn in clinic/lab required if INR > 5.5    Patient will be discharged from home monitoring if they do not meet requirements of home monitoring company or in clinic check.       Management and Communication with AC:    Patient will be called regarding all out of range INRS on the business day result is received for assessment and dosing instructions. If no call received by 4 PM; please contact [facility] at: [phone]    [facility] typically does not call patient for therapeutic results. Patient is to continue current warfarin maintenance dose and continue testing INR at ordered frequency.     Patient must call and notify AC if new medication started, dose missed, s/sx of bleeding or clotting or upcoming procedure    Patient will receive a check in call at least once every 12 weeks if INRs remain in range.    Nancy verbalizes understanding and agrees to INR testing and manage by exception communication plan    [employer] Maintenance Modifier: \"Anticoagulation: Home Monitoring\" added to chart    Yumiko Kaufman RN        "

## 2021-06-16 NOTE — TELEPHONE ENCOUNTER
FYI - Status Update  Who is Calling: Patient  Update: Needing to speak to an INR nurse in regards to INR machine she has.   Okay to leave a detailed message?:  Yes

## 2021-06-16 NOTE — TELEPHONE ENCOUNTER
Telephone Encounter by Kera Niño at 10/14/2019  1:58 PM     Author: Kera Niño Service: -- Author Type: --    Filed: 10/17/2019  7:44 AM Encounter Date: 10/9/2019 Status: Addendum    : Kera Niño    Related Notes: Original Note by Kera Niño filed at 10/14/2019  2:00 PM       [place] APPROVED:    Approval start date:07/16/2019  Approval end date:10/13/2020    Pharmacy has been notified of approval and will contact patient when medication is ready for pickup.                 patient

## 2021-06-16 NOTE — TELEPHONE ENCOUNTER
Telephone Encounter by Vincent Baird RN at 3/17/2020 12:32 PM     Author: Vincent Baird RN Service: -- Author Type: RN, Care Manager    Filed: 3/17/2020 12:40 PM Encounter Date: 3/17/2020 Status: Attested    : Vincent Baird RN (RN, Care Manager) Cosigner: Julien Garnica MD at 3/17/2020  1:49 PM    Attestation signed by Julien Garnica MD at 3/17/2020  1:49 PM                     ANTICOAGULATION  MANAGEMENT    Assessment     Today's INR result of 2.2 is Therapeutic (goal INR of 2.0-3.0)        Less warfarin taken than instructed which may be affecting INR- has been taking 1.25 mg every Wed for the past two weeks.     No new diet changes affecting INR    No new medication/supplements affecting INR    Continues to tolerate warfarin with no reported s/s of bleeding or thromboembolism     Previous INR was Therapeutic    Plan:     Spoke with Nancy regarding INR result and instructed:     Warfarin Dosing Instructions:  Continue current warfarin dose pt has been taking 1.25 mg daily on Wed; and 2.5 mg daily rest of week.    Instructed patient to follow up no later than: 3 weeks.    Education provided: importance of taking warfarin as instructed    Nancy verbalizes understanding and agrees to warfarin dosing plan.    Instructed to call the [facility] for any changes, questions or concerns. (#[phone])   ?   Vincent Baird RN    Subjective/Objective:      Nancy Oropeza, a 74 y.o. female is on warfarin.     Nancy reports:     Home warfarin dose: template incorrect; verbally confirmed home dose with pt and updated on anticoagulation calendar     Missed doses: No     Medication changes:  Yes, will be decreasing Prednisone from 10 mg daily to 9 mg daily.     S/S of bleeding or thromboembolism:  No     New Injury or illness:  No     Changes in diet or alcohol consumption:  No     Upcoming surgery, procedure or cardioversion:  No    Anticoagulation Episode Summary     Current  INR goal:   2.0-3.0   TTR:   62.9 % (1 y)   Next INR check:   4/7/2020   INR from last check:   2.20 (3/17/2020)   Weekly max warfarin dose:      Target end date:   12/21/2016   INR check location:      Preferred lab:      Send INR reminders to:   XENIA HURTADO    Indications    Pulmonary embolism (H) [I26.99]           Comments:            Anticoagulation Care Providers     Provider Role Specialty Phone number    Julien Garnica MD Referring Family Medicine [phone]

## 2021-06-16 NOTE — TELEPHONE ENCOUNTER
Who is calling:  Lesly  Reason for Call:  Patient called back stating she has been checking her INR. She reported her INR was 1.5 on 4/13 & 2.4 on 4/6. She said she called it it to her home monitoring company.  Date of last appointment with primary care: 12/4/2020  Okay to leave a detailed message: Yes

## 2021-06-16 NOTE — TELEPHONE ENCOUNTER
Telephone Encounter by Kera Niño at 7/10/2019  2:07 PM     Author: Kera Niño Service: -- Author Type: --    Filed: 7/10/2019  2:07 PM Encounter Date: 7/8/2019 Status: Signed    : Kera Niño APPROVED:    Approval start date:04/11/2019  Approval end date:07/09/2020    Pharmacy has been notified of approval and will contact patient when medication is ready for pickup.

## 2021-06-16 NOTE — PROGRESS NOTES
"Nancy Oropeza is a 75 y.o. female who is being evaluated via a billable telephone visit.      What phone number would you like to be contacted at? [phone]  How would you like to obtain your AVS? AVS Preference: Nisha.    Assessment & Plan     Lumbar spine pain  Reviewed patient's history of chronic lumbar spine pain, gradually worsening over the past several months.  Previously followed by the spine clinic.  We discussed options for evaluation and management going forward. I feel it would be best if patient is scheduled to follow-up with the spine clinic since she has been managed there previously for her symptoms.  She may need steroid injections.  We also discussed possibility of restarting physical therapy.  She will start with spine clinic initially.  She should notify us of any new symptoms in the meantime.  - Ambulatory referral to [facility] [place]    Polymyalgia rheumatica (H)  Continue prednisone 5 mg.    Obesity, Class III, BMI 40-49.9 (morbid obesity) (H)  Encouraged healthy lifestyle modifications in regards to diet and exercise for weight loss management.       BMI:   Estimated body mass index is 49.18 kg/m  as calculated from the following:    Height as of 11/24/20: 5' 7\" (1.702 m).    Weight as of 11/24/20: 314 lb (142.4 kg).       No follow-ups on file.    Rehana Luna, [facility]   Nancy Oropeza is 75 y.o. and presents today for the following health issues   HPI   Patient is here today to discuss chronic low back pain.  She has history of polymyalgia rheumatica and has been on prednisone 5 mg for a long time.  This has previously seem to help with her low back pain.  Her low back pain has been gradually worsening over the last several months.  Walking has become more difficult.  She feels that her posture is off and that she needs to grab onto something in order to help her ambulate.  For instance, walking around the grocery store " "holding onto the grocery cart is much more comfortable for her than walking independently.  She does have a cane and a walker but does not like to use it.  She has been trying to lose weight but finds this difficult.  She acknowledges that excess weight is likely contributing to the issue.  She has previously been seen at the spine clinic.  Reports having steroid injections in the past and also did some physical therapy.  She is wondering what the next step should be given worsening of symptoms.    Review of Systems  Review of Systems - pertinent positives noted in HPI, otherwise ROS is negative.        Objective    Vitals - Patient Reported  Systolic (Patient Reported): 125  Diastolic (Patient Reported): 78  Weight (Patient Reported): 310 lb (140.6 kg)  Height (Patient Reported): 5' 7.5\" (171.5 cm)  BMI (Based on Pt Reported Ht/Wt): 47.84  Pulse (Patient Reported): 80  Vitals:  No vitals were obtained today due to virtual visit.    Physical Exam    General:  Patient is pleasant and cooperative over the phone. No obvious sounds of distress. Answers questions appropriately.          Phone call duration: 9 minutes  "

## 2021-06-16 NOTE — TELEPHONE ENCOUNTER
Telephone Encounter by Vincent Baird RN at 5/15/2019  8:15 AM     Author: Vincent Baird RN Service: -- Author Type: RN, Care Manager    Filed: 5/15/2019  8:17 AM Encounter Date: 5/14/2019 Status: Signed    : Vincent Baird RN (RN, Care Manager)       Julien Garnica MD  You 15 hours ago (4:52 PM)      Provider Review: Anticoagulation Annual Referral Renewal     [facility] Renewal Decision:  Renew [facility] warfarin management     INR Range:   Continue management at current INR goal   Anticipated Duration of Therapy (from today):  Long-term anticoagulation     Julien Garnica MD   4:52 PM     Routing comment

## 2021-06-16 NOTE — TELEPHONE ENCOUNTER
Called and spoke with Lesly.   States that while she was waiting for [facility] to return call, she called the machine company who has set up a training time for her.     No further action needed.     Closing encounter.      Yumiko Kaufman RN  Anticoagulation Clinic

## 2021-06-16 NOTE — TELEPHONE ENCOUNTER
Telephone Encounter by Ale Braden LPN at 4/23/2019 11:20 AM     Author: Ale Braden LPN Service: -- Author Type: Licensed Nurse    Filed: 4/23/2019 11:22 AM Encounter Date: 4/23/2019 Status: Signed    : Ale Braden LPN (Licensed Nurse)       Medication Request  Medication name:   nystatin 100,000 unit/gram Apply to affected area 3 times daily      triamcinolone acetonide 0.5 % Apply to rash twice per day for 2 weeks.     And Diflucan tablets  [facility] and [facility]  Reason for request:  Yeast infection Rash under stomach folds      Nystatin that was prescribed on 4/9/2019 is not touching this  When did you use medication last?:   2017  Patient offered appointment:  patient declined  Okay to leave a detailed message: yes  [phone]

## 2021-06-16 NOTE — TELEPHONE ENCOUNTER
"Anticoagulation Annual Referral Renewal Review    Nancy Oropeza's chart reviewed for annual renewal of referral to anticoagulation monitoring.        Criteria for anticoagulation nurse and/or pharmacist renewal met   Warfarin indication: PE   Goal 2-3 Yes , DVT/PE with previous provider documentation patient to be on extended anticoagulation   Current with INR monitoring/compliant Yes No   Date of last office visit 12/4/20 Yes, had office visit within last year   Time in Therapeutic Range (TTR) 40.5 % No, TTR < 60 %       Nancy Oropeza did NOT meet all criteria for anticoagulation management program initiated renewal and requires provider review. Using dot phrase, \".acmrenewalprovider\", please advise if Nancy's anticoagulation management referral should be renewed or if patient should be seen in office to review anticoagulation therapy      Yumiko Kaufman RN  12:35 PM      "

## 2021-06-16 NOTE — TELEPHONE ENCOUNTER
Telephone Encounter by Maryam Foster at 1/3/2020  3:56 PM     Author: Maryam Foster Service: -- Author Type: --    Filed: 1/3/2020  3:58 PM Encounter Date: 1/3/2020 Status: Signed    : Maryam Foster APPROVED:    Approval start date: 1/1/2020  Approval end date:  1/30/2023    Pharmacy has been notified of approval and will contact patient when medication is ready for pickup.

## 2021-06-16 NOTE — TELEPHONE ENCOUNTER
ANTICOAGULATION  MANAGEMENT PROGRAM    Nancy Oropeza is overdue for INR check.     Left message to check INR with home meter and call results to home monitoring company as soon as possible.      Yumiko Kaufman RN

## 2021-06-17 ENCOUNTER — COMMUNICATION - HEALTHEAST (OUTPATIENT)
Dept: FAMILY MEDICINE | Facility: CLINIC | Age: 76
End: 2021-06-17

## 2021-06-17 DIAGNOSIS — I26.99 PULMONARY EMBOLISM, UNSPECIFIED CHRONICITY, UNSPECIFIED PULMONARY EMBOLISM TYPE, UNSPECIFIED WHETHER ACUTE COR PULMONALE PRESENT (H): ICD-10-CM

## 2021-06-17 LAB — INR PPP: 3 (ref 0.9–1.1)

## 2021-06-17 NOTE — TELEPHONE ENCOUNTER
Telephone Encounter by Marta Barboza RN at 12/4/2020  4:02 PM     Author: Marta Barboza RN Service: -- Author Type: Registered Nurse    Filed: 12/4/2020  4:21 PM Encounter Date: 12/4/2020 Status: Signed    : Marta Barboza RN (Registered Nurse)       ANTICOAGULATION  MANAGEMENT    Assessment     Today's INR result of 1.90 is Subtherapeutic (goal INR of 2.0-3.0)        No new diet changes affecting INR    No new medication/supplements affecting INR    Continues to tolerate warfarin with no reported s/s of bleeding or thromboembolism     Previous INR was Therapeutic 2.40 on 11/17/20.    Scheduled for RIGHT cataract surgery on 12/15/20    Plan:     Left detailed message for Lesly regarding INR result and instructed:   - advised to call back, if warfarin dose is taken differently.    Warfarin Dosing Instructions:   (has 2.5mg tabs)   - tonight, advised one time boostre ith 3.75mg warfarin dose, then continue current warfarin dose,   - thereafter, 1/25 mg daily on Wed/Sun; and 2.5 mg daily rest of week.    Instructed patient to follow up no later than:  1- 2 wk.                                                                                                                                                                                                                                                      Education provided: importance of consistent vitamin K intake, target INR goal and significance of current INR result and importance of taking warfarin as instructed    Instructed to call the [facility] for any changes, questions or concerns. (#[phone])   ?   Marta Barboza RN    Subjective/Objective:      Nancy Oropeza, a 75 y.o. female is on warfarin.     Nancy reports:     Home warfarin dose: N/A.     Missed doses: No     Medication changes:  No     S/S of bleeding or thromboembolism:  No     New Injury or illness:  No     Changes in diet or alcohol consumption:  No      Upcoming surgery, procedure or cardioversion:  Yes: scheduled right eye cataract surgery on 12/15/20    Anticoagulation Episode Summary     Current INR goal:  2.0-3.0   TTR:  48.8 % (11.6 mo)   Next INR check:  12/18/2020   INR from last check:  1.90 (12/4/2020)   Weekly max warfarin dose:     Target end date:  12/21/2016   INR check location:     Preferred lab:     Send INR reminders to:  XENIA HURTADO    Indications    Pulmonary embolism (H) [I26.99]           Comments:           Anticoagulation Care Providers     Provider Role Specialty Phone number    Julien Garnica MD Referring Family Medicine [phone]

## 2021-06-17 NOTE — TELEPHONE ENCOUNTER
Telephone Encounter by Yumiko Kaufman RN at 4/27/2021  4:01 PM     Author: Yumiko Kaufman RN Service: -- Author Type: Registered Nurse    Filed: 4/27/2021  4:42 PM Encounter Date: 4/27/2021 Status: Signed    : Yumiko Kaufman RN (Registered Nurse)       ANTICOAGULATION  MANAGEMENT    Assessment     Today's INR result of 1.9 is Subtherapeutic (goal INR of 2.0-3.0), verbally reported by patient, confirmed by mdINR       Warfarin taken as previously instructed    No new diet changes affecting INR    No new medication/supplements affecting INR    Continues to tolerate warfarin with no reported s/s of bleeding or thromboembolism     Previous INR was Subtherapeutic    Plan:     Spoke on phone with Lesly regarding INR result and instructed:      Warfarin Dosing Instructions:  Change warfarin dose to 3.75 mg daily on Tu/Th/Sa; and 2.5 mg daily rest of week  (6.3 % change)    Instructed patient to follow up no later than: 1 week with home monitor    Education provided: target INR goal and significance of current INR result, monitoring for clotting signs and symptoms and when to seek medical attention/emergency care    Lesly verbalizes understanding and agrees to warfarin dosing plan.    Instructed to call the [facility] for any changes, questions or concerns. (#[phone])   ?   Yumiko Kaufman RN    Subjective/Objective:      Nancy RYANN Oropeza, a 75 y.o. female is on warfarin. Lesly Grace reports:     Home warfarin dose: verbally confirmed home dose with Lesly and updated on anticoagulation calendar     Missed doses: No     Medication changes:  No     S/S of bleeding or thromboembolism:  No     New Injury or illness:  No     Changes in diet or alcohol consumption:  No     Upcoming surgery, procedure or cardioversion:  No    Anticoagulation Episode Summary     Current INR goal:  2.0-3.0   TTR:  43.4 % (11.6 mo)   Next INR check:  5/4/2021   INR from last check:  1.90 (4/27/2021)    Weekly max warfarin dose:     Target end date:  Indefinite   INR check location:     Preferred lab:     Send INR reminders to:  XENIA HURTADO    Indications    Pulmonary embolism (H) [I26.99]           Comments:           Anticoagulation Care Providers     Provider Role Specialty Phone number    Julien Garnica MD Referring Family Medicine [phone]

## 2021-06-17 NOTE — TELEPHONE ENCOUNTER
Telephone Encounter by Sarah Toledo RN at 1/22/2021 11:30 AM     Author: Sarah Toledo RN Service: -- Author Type: Registered Nurse    Filed: 1/22/2021 11:33 AM Encounter Date: 1/22/2021 Status: Signed    : Sarah Toledo RN (Registered Nurse)       ANTICOAGULATION  MANAGEMENT    Assessment     Today's INR result of 3.6 is Supratherapeutic (goal INR of 2.0-3.0)        Warfarin taken as previously instructed    No new diet changes affecting INR    No new medication/supplements affecting INR    Continues to tolerate warfarin with no reported s/s of bleeding or thromboembolism     Previous INR was Subtherapeutic    Plan:     Spoke on phone with Nancy regarding INR result and instructed:     Warfarin Dosing Instructions:  Change warfarin dose to 1.25 mg daily on Fri; and 2.5 mg daily rest of week  (7.1 % change)    Instructed patient to follow up no later than: 2 weeks    Education provided: target INR goal and significance of current INR result, importance of following up for INR monitoring at instructed interval, importance of taking warfarin as instructed, importance of notifying clinic for changes in medications and importance of notifying clinic for diarrhea, nausea/vomiting, reduced intake and/or illness    Lesly verbalizes understanding and agrees to warfarin dosing plan.    Instructed to call the [facility] for any changes, questions or concerns. (#[phone])   ?   Sarah Toledo RN    Subjective/Objective:      Nancy Oropeza, a 75 y.o. female is on warfarin.     Nancy reports:     Home warfarin dose: verbally confirmed home dose with Lesly and updated on anticoagulation calendar     Missed doses: No     Medication changes:  No     S/S of bleeding or thromboembolism:  No     New Injury or illness:  No     Changes in diet or alcohol consumption:  No     Upcoming surgery, procedure or cardioversion:  No    Anticoagulation Episode Summary     Current INR goal:  2.0-3.0   TTR:  43.5 %  (11.6 mo)   Next INR check:  2/5/2021   INR from last check:  3.60 (1/22/2021)   Weekly max warfarin dose:     Target end date:  12/21/2016   INR check location:     Preferred lab:     Send INR reminders to:  XENIA HURTADO    Indications    Pulmonary embolism (H) [I26.99]           Comments:           Anticoagulation Care Providers     Provider Role Specialty Phone number    Julien Garnica MD Referring Family Medicine [phone]

## 2021-06-17 NOTE — TELEPHONE ENCOUNTER
Telephone Encounter by Yumiko Kaufman RN at 5/5/2021  2:07 PM     Author: Yumiko Kaufman RN Service: -- Author Type: Registered Nurse    Filed: 5/5/2021  2:11 PM Encounter Date: 5/5/2021 Status: Signed    : Yumiko Kaufman RN (Registered Nurse)       ANTICOAGULATION  MANAGEMENT    Assessment   Patient reported results, confirmed with mdIN[facility]    Yesterday's INR result of 4.1 is Supratherapeutic (goal INR of 2.0-3.0)        Warfarin taken as previously instructed    No new diet changes affecting INR    No new medication/supplements affecting INR    Continues to tolerate warfarin with no reported s/s of bleeding or thromboembolism     Previous INR was Subtherapeutic     Chart reviewed-- previously when therapeutic, patient was on a weekly total of 16.25mg. Due to multiple subtherapeutic INRs, dosing was increased weekly with patient taking 21.25mg weekly.     Plan:     Spoke on phone with Lesly regarding INR result and instructed:      Warfarin Dosing Instructions: warfarin was taken yesterday prior to checking INR-- hold warfarin today then change warfarin dose to 3.75 mg daily on Fr; and 2.5 mg daily rest of week  (11.8 % change)    Instructed patient to follow up no later than: 1 week with home monitor    Education provided: target INR goal and significance of current INR result, monitoring for bleeding signs and symptoms and when to seek medical attention/emergency care    Lesly verbalizes understanding and agrees to warfarin dosing plan.    Instructed to call the [facility] for any changes, questions or concerns. (#[phone])   ?   Yumiko Kaufman RN    Subjective/Objective:      Nancy Oropeza, a 75 y.o. female is on warfarin.       Lesly reports:     Home warfarin dose: verbally confirmed home dose with Lesly and updated on anticoagulation calendar     Missed doses: No     Medication changes:  No     S/S of bleeding or thromboembolism:  No     New Injury or illness:  No      Changes in diet or alcohol consumption:  No     Upcoming surgery, procedure or cardioversion:  No    Anticoagulation Episode Summary     Current INR goal:  2.0-3.0   TTR:  43.6 % (11.6 mo)   Next INR check:  5/11/2021   INR from last check:  4.10 (5/4/2021)   Weekly max warfarin dose:     Target end date:  Indefinite   INR check location:     Preferred lab:     Send INR reminders to:  XENIA HURTADO    Indications    Pulmonary embolism (H) [I26.99]           Comments:           Anticoagulation Care Providers     Provider Role Specialty Phone number    Julien Garnica MD Referring Family Medicine [phone]

## 2021-06-17 NOTE — TELEPHONE ENCOUNTER
Does the fax number need to be change to show the coagulation clinic? If so they need a request to change in the letter head stating the patients name and date of birth and what needs to be changed like the fax number. They would need a signature form Dr. Garnica. Im sorry if the message is a little confusing, I was not sure on exactly what she was needing so if you need more info please give them a call.

## 2021-06-17 NOTE — TELEPHONE ENCOUNTER
Telephone Encounter by Yumiko Kaufman RN at 4/14/2021  1:19 PM     Author: Yumiko Kaufman RN Service: -- Author Type: Registered Nurse    Filed: 4/14/2021  1:26 PM Encounter Date: 4/14/2021 Status: Signed    : Yumiko Kaufman RN (Registered Nurse)       Called [facility]-- fax number was incorrect, so results were not being appropriately sent to [facility].   Updated fax number and results will be sent over.   Did verbally confirm the result patient reported of 1.5 yesterday.     ANTICOAGULATION  MANAGEMENT    Assessment     Yesterday's INR result of 1.5 is Subtherapeutic (goal INR of 2.0-3.0)        Warfarin taken as previously instructed    Increased greens/vitamin K intake may be affecting INR-- plans to continue to eat more greens, trying to eat healthier and lose weight    No new medication/supplements affecting INR    Continues to tolerate warfarin with no reported s/s of bleeding or thromboembolism     Previous INR was Therapeutic    Plan:     Spoke on phone with Lesly regarding INR result and instructed:      Warfarin Dosing Instructions:  Change warfarin dose to 3.75 mg daily on We; and 2.5 mg daily rest of week  (15.4 % change)    Instructed patient to follow up no later than: 1 week with home monitor    Education provided: importance of consistent vitamin K intake, impact of vitamin K foods on INR, target INR goal and significance of current INR result, monitoring for clotting signs and symptoms and when to seek medical attention/emergency care    Lesly verbalizes understanding and agrees to warfarin dosing plan.    Instructed to call the [facility] for any changes, questions or concerns. (#[phone])   ?   Yumiko Kaufman RN    Subjective/Objective:      Nancy Oropeza, a 75 y.o. female is on warfarin.       Lesly reports:     Home warfarin dose: verbally confirmed home dose with Lesly and updated on anticoagulation calendar     Missed doses: No     Medication changes:  No      S/S of bleeding or thromboembolism:  No     New Injury or illness:  No     Changes in diet or alcohol consumption:  Yes: increased greens     Upcoming surgery, procedure or cardioversion:  No    Anticoagulation Episode Summary     Current INR goal:  2.0-3.0   TTR:  43.5 % (11.6 mo)   Next INR check:  4/21/2021   INR from last check:  1.50 (4/13/2021)   Weekly max warfarin dose:     Target end date:  Indefinite   INR check location:     Preferred lab:     Send INR reminders to:  XEN[facility] HURTADO    Indications    Pulmonary embolism (H) [I26.99]           Comments:           Anticoagulation Care Providers     Provider Role Specialty Phone number    Julien Garnica MD Referring Family Medicine [phone]

## 2021-06-17 NOTE — TELEPHONE ENCOUNTER
Covid vaccine 2nd dose last month .  Hospitalized for covid in 7/2020 .  FNA triage call :   Presenting problem :  Pt called. Worseing leg swelling in the last 2-3 weeks up bilaterally the ankles , and already on Lasix 20 mg  and hydrochlorothiazide 12.5mg x 2 With Lisinopril  Daily.  Currently : 1&0, eating , and mostly  homebound  activity her usual , new pain in feet / ankles up to 7/10 at worse =  after  Standing or walking for awhile , no color change - but more numbness than usual , hx of numbness in feet especially the toes.   Guideline used : Leg swelling and Edema A Oh.   Disposition and recommendations : COVID 19 Nurse Triage Plan/Patient Instructions    Please be aware that novel coronavirus (COVID-19) may be circulating in the community. If you develop symptoms such as fever, cough, or SOB or if you have concerns about the presence of another infection including coronavirus (COVID-19), please contact your health care provider or visit  [email or web address].    Disposition/Instructions/ see provider in 3 days but Pt prefers virtual visit with provider within  1  Day but  No virtual or physical appt availabe with her PCP until 6/9/21 .   Hi priority message  Routed to her  PCP Nurse pool , Pt would like to speak to PCP to discuss adjusting Rxs  Due to worsening bilateral ankle swelling that is tender to the touch and painful  When prolonged  standing  Or walking   .     Virtual Visit with provider recommended. Reference Visit Selection Guide. and In-Person Visit with provider recommended. Reference Visit Selection Guide.    Thank you for taking steps to prevent the spread of this virus.  o Limit your contact with others.  o Wear a simple mask to cover your cough.  o Wash your hands well and often.    Caller verbalizes understanding and denies further questions and will call back if further symptoms to triage or questions  . Janell Willard RN  - [place] Nurse Advisor     Reason for Disposition     MILD swelling of both ankles (i.e., pedal edema) AND new onset or worsening    Additional Information    Negative: Sounds like a life-threatening emergency to the triager    Negative: Chest pain    Negative: Small area of swelling and followed an insect bite to the area    Negative: Followed a knee injury    Negative: Ankle or foot injury    Negative: Pregnant with leg swelling or edema    Negative: Difficulty breathing at rest    Negative: Entire foot is cool or blue in comparison to other side    Negative: SEVERE swelling (e.g., swelling extends above knee, entire leg is swollen, weeping fluid)    Negative: Thigh or calf pain and only 1 side and present > 1 hour    Negative: Thigh, calf, or ankle swelling in only one leg    Negative: Thigh, calf, or ankle swelling in both legs, but one side is definitely more swollen (Exception: longstanding difference between legs)    Negative: Cast on leg or ankle and has increasing pain    Negative: Can't walk or can barely stand (new onset)    Negative: Fever and red area (or area very tender to touch)    Negative: Patient sounds very sick or weak to the triager    Negative: Swelling of face, arm or hands (Exception: slight puffiness of fingers during hot weather)    Negative: Pregnant > 20 weeks and sudden weight gain (i.e., > 2 lbs, 1 kg in one week)    Negative: MODERATE swelling of both ankles (e.g., swelling extends up to the knees) AND new onset or worsening    Negative: Difficulty breathing with exertion AND worsening or new onset    Negative: Looks like a boil, infected sore, deep ulcer, or other infected rash (spreading redness, pus)    Negative: Patient wants to be seen    Protocols used: LEG SWELLING AND EDEMA-A-OH

## 2021-06-17 NOTE — TELEPHONE ENCOUNTER
Telephone Encounter by Renan Boo RN at 1/5/2021 10:18 AM     Author: Renan Boo RN Service: -- Author Type: Registered Nurse    Filed: 1/5/2021 10:24 AM Encounter Date: 1/5/2021 Status: Signed    : Renan Boo RN (Registered Nurse)       ANTICOAGULATION  MANAGEMENT    Assessment     Today's INR result of 1.4 is Subtherapeutic (goal INR of 2.0-3.0)        Warfarin taken as previously instructed    No new diet changes affecting INR    No new medication/supplements affecting INR    Continues to tolerate warfarin with no reported s/s of bleeding or thromboembolism     Previous INR was Subtherapeutic    Plan:     Spoke on phone with Nancy regarding INR result and instructed:     Warfarin Dosing Instructions:  Change warfarin dose to 2.5 mg daily  (16 % change)    Instructed patient to follow up no later than: one week      Lesly verbalizes understanding and agrees to warfarin dosing plan.    Instructed to call the [facility] for any changes, questions or concerns. (#[phone])   ?   Renan Boo RN    Subjective/Objective:      Nancy Oropeza, a 75 y.o. female is on warfarin.     Nancy reports:     Home warfarin dose: verbally confirmed home dose with Lesly and updated on anticoagulation calendar     Missed doses: No     Medication changes:  No     S/S of bleeding or thromboembolism:  No     New Injury or illness:  No     Changes in diet or alcohol consumption:  No     Upcoming surgery, procedure or cardioversion:  No    Anticoagulation Episode Summary     Current INR goal:  2.0-3.0   TTR:  46.2 % (11.6 mo)   Next INR check:  1/12/2021   INR from last check:  1.40 (1/5/2021)   Weekly max warfarin dose:     Target end date:  12/21/2016   INR check location:     Preferred lab:     Send INR reminders to:  XENIA HURTADO    Indications    Pulmonary embolism (H) [I26.99]           Comments:           Anticoagulation Care Providers     Provider Role Specialty Phone number    Julien Garnica  MD R[facility] [phone]

## 2021-06-17 NOTE — TELEPHONE ENCOUNTER
Telephone Encounter by Vincent Baird RN at 10/12/2020  3:47 PM     Author: Vincent Baird RN Service: -- Author Type: RN, Care Manager    Filed: 10/12/2020  3:51 PM Encounter Date: 10/12/2020 Status: Signed    : Vincent Baird RN (RN, Care Manager)       ANTICOAGULATION  MANAGEMENT    Assessment     Today's INR result of 4.0 is Supratherapeutic (goal INR of 2.0-3.0)        Warfarin taken as previously instructed    No new diet changes affecting INR    No new medication/supplements affecting INR    Continues to tolerate warfarin with no reported s/s of bleeding or thromboembolism     Previous INR was Therapeutic    Plan:     Spoke on phone with Nancy regarding INR result and instructed:     Warfarin Dosing Instructions:  Hold today only then change warfarin dose to 1.25 mg daily on Mon, Wed, Fri; and 2.5 mg daily rest of week  (15 % change)    Instructed patient to follow up no later than: 10 days    Education provided: importance of following up for INR monitoring at instructed interval and importance of taking warfarin as instructed    Nancy verbalizes understanding and agrees to warfarin dosing plan.    Instructed to call the [facility] for any changes, questions or concerns. (#[phone])   ?   Vincent Baird RN    Subjective/Objective:      Nancy Oropeza, a 75 y.o. female is on warfarin.     Nancy reports:     Home warfarin dose: verbally confirmed home dose with pt and updated on anticoagulation calendar     Missed doses: No     Medication changes:  No     S/S of bleeding or thromboembolism:  No     New Injury or illness:  No     Changes in diet or alcohol consumption:  No     Upcoming surgery, procedure or cardioversion:  No    Anticoagulation Episode Summary     Current INR goal:  2.0-3.0   TTR:  47.0 % (11.6 mo)   Next INR check:  10/23/2020   INR from last check:  4.00 (10/12/2020)   Weekly max warfarin dose:     Target end date:  12/21/2016   INR check  location:     Preferred lab:     Send INR reminders to:  XENIA HURTADO    Indications    Pulmonary embolism (H) [I26.99]           Comments:           Anticoagulation Care Providers     Provider Role Specialty Phone number    Julien Garnica MD Referring Family Medicine [phone]

## 2021-06-17 NOTE — TELEPHONE ENCOUNTER
Who is calling:  Lesly  Reason for Call:  Patient checked her INR at home yesterday with a result of 4.1 & hasn't received a call with instructions.  Date of last appointment with primary care: 4/23/21  Okay to leave a detailed message: Yes

## 2021-06-17 NOTE — TELEPHONE ENCOUNTER
Telephone Encounter by Kirstie Sethi at 11/20/2020  8:51 AM     Author: Kirstie Sethi Service: -- Author Type: Patient Access    Filed: 11/20/2020  8:53 AM Encounter Date: 11/16/2020 Status: Signed    : Kirstie Sethi (Patient Access)

## 2021-06-17 NOTE — TELEPHONE ENCOUNTER
Telephone Encounter by Yumiko Kaufman RN at 3/26/2021  9:23 AM     Author: Yumiko Kaufman RN Service: -- Author Type: Registered Nurse    Filed: 3/26/2021  9:25 AM Encounter Date: 3/25/2021 Status: Signed    : Yumiko Kaufman RN (Registered Nurse)       Julien Garnica MD Wolyniec, Brittany M, RN   Caller: Unspecified (Yesterday, 12:35 PM)          Provider Review: Anticoagulation Annual Referral Renewal     ACM Renewal Decision:  Renew ACM warfarin management     INR Range:   Continue management at current INR goal   Anticipated Duration of Therapy (from today):  Long-term anticoagulation       Julien Garnica MD   6:40 PM       ACM renewal referral placed.   Standing INR ordered.    Yumiko Kaufman RN  Anticoagulation Clinic

## 2021-06-17 NOTE — TELEPHONE ENCOUNTER
Telephone Encounter by Vincent Baird RN at 10/23/2020  1:28 PM     Author: Vincent Baird RN Service: -- Author Type: RN, Care Manager    Filed: 10/23/2020  1:29 PM Encounter Date: 10/23/2020 Status: Signed    : Vincent Baird RN (RN, Care Manager)       ANTICOAGULATION  MANAGEMENT    Assessment     Today's INR result of 1.7 is Subtherapeutic (goal INR of 2.0-3.0)        Warfarin taken as previously instructed    No new diet changes affecting INR    No new medication/supplements affecting INR    Continues to tolerate warfarin with no reported s/s of bleeding or thromboembolism     Previous INR was Supratherapeutic    Plan:     Spoke on phone with Nancy regarding INR result and instructed:     Warfarin Dosing Instructions:  Change warfarin dose to 1.25 mg daily on Sun, Wed; and 2.5 mg daily rest of week  (9 % change)    Instructed patient to follow up no later than: 2 weeks.    Education provided: importance of following up for INR monitoring at instructed interval and importance of taking warfarin as instructed    Nancy verbalizes understanding and agrees to warfarin dosing plan.    Instructed to call the [facility] for any changes, questions or concerns. (#[phone])   ?   Vincent Baird RN    Subjective/Objective:      Nancy Oropeza, a 75 y.o. female is on warfarin.     Nancy reports:     Home warfarin dose: verbally confirmed home dose with pt and updated on anticoagulation calendar     Missed doses: No     Medication changes:  No     S/S of bleeding or thromboembolism:  No     New Injury or illness:  No     Changes in diet or alcohol consumption:  No     Upcoming surgery, procedure or cardioversion:  No    Anticoagulation Episode Summary     Current INR goal:  2.0-3.0   TTR:  45.3 % (11.6 mo)   Next INR check:  11/6/2020   INR from last check:  1.70 (10/23/2020)   Weekly max warfarin dose:     Target end date:  12/21/2016   INR check location:     Preferred lab:      Send INR reminders to:  XENIA HURTADO    Indications    Pulmonary embolism (H) [I26.99]           Comments:           Anticoagulation Care Providers     Provider Role Specialty Phone number    Julien Garnica MD Referring Family Medicine [phone]

## 2021-06-17 NOTE — TELEPHONE ENCOUNTER
FYI - Status Update  Who is Calling: Patient  Update: INR was 4.1 was drawn on Tuesday 05.04.21 from home. Patient stated that she called in result. Im not seeing any messages. Patient does not know what her dosing should be.   Okay to leave a detailed message?:  Yes

## 2021-06-17 NOTE — TELEPHONE ENCOUNTER
Telephone Encounter by Yumiko Kaufman RN at 4/21/2021  3:42 PM     Author: Yumiko Kaufman RN Service: -- Author Type: Registered Nurse    Filed: 4/21/2021  3:44 PM Encounter Date: 4/21/2021 Status: Signed    : Yumiko Kaufman RN (Registered Nurse)       ANTICOAGULATION  MANAGEMENT    Assessment     Today's INR result of 1.9 is Subtherapeutic (goal INR of 2.0-3.0)        Warfarin taken as previously instructed    No new diet changes affecting INR-- continues with healthier eating/salads    No new medication/supplements affecting INR    Continues to tolerate warfarin with no reported s/s of bleeding or thromboembolism     Previous INR was Subtherapeutic    Plan:     Spoke on phone with Lesly regarding INR result and instructed:      Warfarin Dosing Instructions:  Change warfarin dose to 3.75 mg daily on W/Sa; and 2.5 mg daily rest of week  (6.7 % change)    Instructed patient to follow up no later than: 1 week with home monitor    Education provided: importance of consistent vitamin K intake, impact of vitamin K foods on INR, target INR goal and significance of current INR result, monitoring for clotting signs and symptoms, when to seek medical attention/emergency care and Importance of contacting anticoagulation clinic if you have not received warfarin dosing instructions on day of INR testing    Lesly verbalizes understanding and agrees to warfarin dosing plan.    Instructed to call the [facility] for any changes, questions or concerns. (#[phone])   ?   Yumiko Kaufman RN    Subjective/Objective:      Nancy Oropeza, a 75 y.o. female is on warfarin.       Lesly reports:     Home warfarin dose: verbally confirmed home dose with Lesly and updated on anticoagulation calendar     Missed doses: No     Medication changes:  No     S/S of bleeding or thromboembolism:  No     New Injury or illness:  No     Changes in diet or alcohol consumption:  No     Upcoming surgery, procedure or  cardioversion:  No    Anticoagulation Episode Summary     Current INR goal:  2.0-3.0   TTR:  43.5 % (11.6 mo)   Next INR check:  4/27/2021   INR from last check:  1.90 (4/20/2021)   Weekly max warfarin dose:     Target end date:  Indefinite   INR check location:     Preferred lab:     Send INR reminders to:  XENIA HURTADO    Indications    Pulmonary embolism (H) [I26.99]           Comments:           Anticoagulation Care Providers     Provider Role Specialty Phone number    Julien Garnica MD Referring Family Medicine [phone]

## 2021-06-17 NOTE — TELEPHONE ENCOUNTER
Telephone Encounter by Sylvie Carcamo RN at 1/4/2021  6:25 PM     Author: Sylvie Carcamo RN Service: -- Author Type: Registered Nurse    Filed: 1/4/2021  6:25 PM Encounter Date: 1/4/2021 Status: Signed    : Sylvie Carcamo RN (Registered Nurse)       RN cannot approve Refill Request    RN can NOT refill this medication med is not covered by policy/route to provider. Last office visit: 3/17/2020 Julien Garnica MD Last Physical: 12/4/2020 Last MTM visit: Visit date not found Last visit same specialty: 3/17/2020 Julien Garnica MD.  Next visit within 3 mo: Visit date not found  Next physical within 3 mo: Visit date not found      Kathya Higgins Connection Triage/Med Refill 1/4/2021    Requested Prescriptions   Pending Prescriptions Disp Refills   ? warfarin ANTICOAGULANT (COUMADIN/JANTOVEN) 2.5 MG tablet [Pharmacy Med Name: Warfarin Sodium Oral Tablet 2.5 MG] 90 tablet 0     Sig: Take 1 tablet (2.5 mg) by mouth daily. Adjust dose based on INR results as directed.       Warfarin Refill Protocol  Failed - 1/4/2021  6:20 PM        Failed -  Route to appropriate pool/provider     Last Anticoagulation Summary:   Anticoagulation Episode Summary     Current INR goal:  2.0-3.0   TTR:  51.0 % (10.6 mo)   Next INR check:  12/18/2020   INR from last check:  1.90 (12/4/2020)   Weekly max warfarin dose:     Target end date:  12/21/2016   INR check location:     Preferred lab:     Send INR reminders to:  XENIA HURTADO    Indications    Pulmonary embolism (H) [I26.99]           Comments:           Anticoagulation Care Providers     Provider Role Specialty Phone number    Julien Garnica MD Referring Family Medicine [phone]                Passed - Provider visit in last year     Last office visit with prescriber/PCP: 3/17/2020 Julien Garnica MD OR same dept: 3/17/2020 Julien Garnica MD OR same specialty: 3/17/2020 Julien Garnica MD  Last physical: 12/4/2020 Last MTM visit: Visit date not found     Next appt within 3 mo: Visit date not found Next physical within 3 mo: Visit date not found  Prescriber OR PCP: Julien Garnica MD  Last diagnosis associated with med order: 1. Pulmonary embolism, unspecified chronicity, unspecified pulmonary embolism type, unspecified whether acute cor pulmonale present (H)  - warfarin ANTICOAGULANT (COUMADIN/JANTOVEN) 2.5 MG tablet [Pharmacy Med Name: Warfarin Sodium Oral Tablet 2.5 MG]; Take 1 tablet (2.5 mg) by mouth daily. Adjust dose based on INR results as directed.  Dispense: 90 tablet; Refill: 0    If protocol passes may refill for 6 months if within 3 months of last provider visit (or a total of 9 months).

## 2021-06-17 NOTE — TELEPHONE ENCOUNTER
Spoke with patient.  Swelling in feet more so than ankles.  Will increase furosemide from 20 mg up to 40 mg each morning.  May titrate to lowest effective dose.  Patient does agree to schedule fasting physical exam in the next month or so in order to continue ongoing monitoring and management of chronic medical concerns.  Will notify if asymmetric swelling, worsening without improvement, orthopnea or PND symptoms etc.

## 2021-06-17 NOTE — TELEPHONE ENCOUNTER
ANTICOAGULATION  MANAGEMENT PROGRAM    Nancy Oropeza is overdue for INR check.     Spoke with Lesly. She received incorrect supplies from [employer] so is waiting on replacements and will then check her INR. She should be receiving the new supplies this weekend.      Yumiko Kaufman RN

## 2021-06-17 NOTE — TELEPHONE ENCOUNTER
Telephone Encounter by Yumiko Kaufman RN at 5/17/2021  5:27 PM     Author: Yumiko Kaufman RN Service: -- Author Type: Registered Nurse    Filed: 5/17/2021  5:28 PM Encounter Date: 5/17/2021 Status: Signed    : Yumiko Kaufman RN (Registered Nurse)       ANTICOAGULATION  MANAGEMENT    Assessment     Yesterday's self-reported INR result of 4.1 is Supratherapeutic (goal INR of 2.0-3.0) confirmed dose with mdINR       Warfarin taken as previously instructed    No new diet changes affecting INR    No new medication/supplements affecting INR    Continues to tolerate warfarin with no reported s/s of bleeding or thromboembolism     Previous INR was Supratherapeutic    Plan:     Spoke on phone with Lesly regarding INR result and instructed:      Warfarin Dosing Instructions:  hold today then change warfarin dose to 1.25 mg daily on W; and 2.5 mg daily rest of week  (13.3 % change)    Instructed patient to follow up no later than: 1 week with home monitor    Education provided: target INR goal and significance of current INR result, monitoring for bleeding signs and symptoms and when to seek medical attention/emergency care    Lesly verbalizes understanding and agrees to warfarin dosing plan.    Instructed to call the [facility] for any changes, questions or concerns. (#[phone])   ?   Yumiko Kaufman RN    Subjective/Objective:      Nancy Oropeza, a 76 y.o. female is on warfarin.       Lesly reports:     Home warfarin dose: verbally confirmed home dose with Lesly and updated on anticoagulation calendar     Missed doses: No     Medication changes:  No     S/S of bleeding or thromboembolism:  No     New Injury or illness:  No     Changes in diet or alcohol consumption:  No     Upcoming surgery, procedure or cardioversion:  No    Anticoagulation Episode Summary     Current INR goal:  2.0-3.0   TTR:  42.5 % (11.6 mo)   Next INR check:  5/24/2021   INR from last check:  4.10 (5/16/2021)    Weekly max warfarin dose:     Target end date:  Indefinite   INR check location:     Preferred lab:     Send INR reminders to:  XENIA HURTADO    Indications    Pulmonary embolism (H) [I26.99]           Comments:           Anticoagulation Care Providers     Provider Role Specialty Phone number    Julien Garnica MD Referring Family Medicine [phone]

## 2021-06-18 NOTE — PROGRESS NOTES
"Assessment/Plan:    1. Localized edema  Discussed multiple etiologies for edema including amlodipine, recent NSAID use, increased environment temperature this spring, dietary indiscretion, and lymphedema.  Did recommend compression stockings and patient will consider this however states \"they are expensive\".  Increase furosemide from 20 mg up to 40 mg daily ×5 days then may resume 20 mg dose if able.  Decrease amlodipine from 10 mg down to 5 mg currently and recheck blood pressure no later than 3 months.  Remain off piroxicam which she had discontinued 3 days ago.  Check basic metabolic panel, CBC and TSH.  - Basic Metabolic Panel  - HM2(CBC w/o Differential)    2. Hypercholesterolemia  History of hypercholesterolemia currently on pravastatin 80 mg at bedtime.    3. Hypothyroidism, unspecified type  Prior TSH 0.65 April 28, 2017.  Recheck TSH today.  - Thyroid Stimulating Hormone (TSH)    4. Essential hypertension with goal blood pressure less than 140/90  Blood pressure 120/70.  Decrease amlodipine 10 mg down to half tablet (5 mg) daily in order to determine if improvement in peripheral edema.  Recheck blood pressure in office later than 3 months.  - Basic Metabolic Panel  - amLODIPine (NORVASC) 10 MG tablet; Take 0.5 tablets (5 mg total) by mouth daily.  Dispense: 45 tablet; Refill: 1    5. Chronic pain syndrome  Chronic pain syndrome.  Using Tylenol Extra Strength 2 tablets up to 4 times daily.  Vicodin had not been helpful in the past.  Recently stopped piroxicam as well and encourage patient not to resume in future due to risk of GI bleed etc. while on chronic warfarin anticoagulation with history of bilateral pulmonary emboli.  - Ambulatory referral to Spine Care    6. Chronic low back pain  Worsening chronic low back pain.  Has been seen through spine care clinic in the past and referral was placed for reevaluation and other treatment options in order to improve ability to ambulate and quality of life.  - " Ambulatory referral to Spine Care    PHQ 9 questionnaire 3 out of 27 with TORIE 7 questionnaire 9 out of 21.        Subjective:    Nancy Oropeza is seen today for ankle swelling.  Perhaps left greater than right.  May have change more recently with weather.  Using furosemide 20 mg each morning.  Remains on amlodipine 10 mg daily as well as lisinopril hydrochlorothiazide 20/12.5 using 2 tablets daily for hypertension management.  Does not use compression stockings.  Citalopram 40 mg daily for depression.  Levothyroxine 125 mcg daily for thyroid replacement with TSH 0.65 2017.  Stop piroxicam 3 days ago.  Vicodin was not helpful for chronic low back pain.  Did have some benefits with home exercise program through spine care clinic in the past however has not been seen recently.  Wondering about possible use a lidocaine patch OTC.    Grew up in [facility] [place] 12...      5 children - son with Factor V abnormality  14 grandchildren   9 great-grandchildren   No smoke (quit 16 years ago after 1 ppd x 30+ years)   EtOH: occ wine   Mom -  88 COPD   Dad -  61 massive MI   1 bro -  67 blood clot (lung)   2 sis - one of which is  age 62 small cell lung CA (h/o smoker)  Surgeries: ventral hernia repair with muscle flap 10/4/12 with post-op complication of seroma with J.P. drain in place followed by interventional radiology q 2 weeks);  age 25; groin hernia age 3; CAPRICE-BSO  by Dr. Herbert Carmen (due to benign cyst x 2);   Hospitalizations: as above   Work: retired  ([employer] as )     12/18/15 FYI: Patient was admitted for dyspnea, secondary to bilateral multiple pulmonary emboli. She overall did well and was discharged home on Lovenox, relatively high dose given her weight as well as Warfarin. I would like her to be seen today at your clinic. I believe she has an appointment for INR, CBC, and BMP check as well as re-dosing of her Warfarin. I  suspect she will need an additional day of Lovenox as she is not quite therapeutic today. You can refer to my discharge summary for the INR's and the Warfarin dosing. Thank you. Dr. Garza    Past Surgical History:   Procedure Laterality Date     BREAST BIOPSY Left 1970s     HYSTERECTOMY  2010     OOPHORECTOMY  2010        Family History   Problem Relation Age of Onset     Breast cancer Sister 75     Stomach cancer Paternal Grandfather      Lung cancer Sister         Past Medical History:   Diagnosis Date     Depression      Disease of thyroid gland      HTN (hypertension)      Hypercholesteremia      Morbid obesity      Pulmonary emboli 12/14/2015     Yeast infection         Social History   Substance Use Topics     Smoking status: Former Smoker     Smokeless tobacco: Never Used      Comment: quite 20 yrs ago     Alcohol use No      Comment: occasionally        Current Outpatient Prescriptions   Medication Sig Dispense Refill     amLODIPine (NORVASC) 10 MG tablet Take 0.5 tablets (5 mg total) by mouth daily. 45 tablet 1     cholecalciferol, vitamin D3, 1,000 unit tablet Take 1,000 Units by mouth daily. 2 capsules once a day       citalopram (CELEXA) 40 MG tablet TAKE ONE TABLET BY MOUTH EVERY DAY 90 tablet 3     furosemide (LASIX) 20 MG tablet Take 1 tablet (20 mg total) by mouth daily. 90 tablet 1     levothyroxine (SYNTHROID, LEVOTHROID) 125 MCG tablet TAKE ONE TABLET BY MOUTH   EVERY DAY 90 tablet 2     lisinopril-hydrochlorothiazide (PRINZIDE,ZESTORETIC) 20-12.5 mg per tablet Take 2 tablets by mouth daily. 180 tablet 3     pravastatin (PRAVACHOL) 80 MG tablet TAKE ONE TABLET BY MOUTH EVERY NIGHT AT BEDTIME 90 tablet 2     warfarin (COUMADIN) 2.5 MG tablet Take 1/2 to 1 tablets (1.25 to 2.5 mg) by mouth daily. Adjust dose based on INR results as directed. 90 tablet 1     No current facility-administered medications for this visit.           Objective:    Vitals:    06/01/18 1011   BP: 120/70   Pulse: 97   SpO2:  97%   Weight: (!) 304 lb (137.9 kg)      Body mass index is 47.61 kg/(m^2).    Alert.  No apparent distress.  Transfer slowly however independently.  BMI 47.6.  Chest clear.  Cardiac exam regular.  Extremities with trace to 1+ left greater than right ankle edema with lymphedema changes without calf tenderness or redness etc.      This note has been dictated using voice recognition software and as a result may contain minor grammatical errors and unintended word substitutions.

## 2021-06-18 NOTE — PATIENT INSTRUCTIONS - HE
Patient Instructions by Julien Garnica MD at 9/8/2020  9:40 AM     Author: Julien Garnica MD Service: -- Author Type: Physician    Filed: 9/8/2020 10:20 AM Encounter Date: 9/8/2020 Status: Signed    : Julien Garnica MD (Physician)         Patient Education     Exercise for a Healthier Heart  You may wonder how you can improve the health of your heart. If youre thinking about exercise, youre on the right track. You dont need to become an athlete, but you do need a certain amount of brisk exercise to help strengthen your heart. If you have been diagnosed with a heart condition, your doctor may recommend exercise to help stabilize your condition. To help make exercise a habit, choose safe, fun activities.       Be sure to check with your health care provider before starting an exercise program.    Why exercise?  Exercising regularly offers many healthy rewards. It can help you do all of the following:    Improve your blood cholesterol levels to help prevent further heart trouble    Lower your blood pressure to help prevent a stroke or heart attack    Control diabetes, or reduce your risk of getting this disease    Improve your heart and lung function    Reach and maintain a healthy weight    Make your muscles stronger and more limber so you can stay active    Prevent falls and fractures by slowing the loss of bone mass (osteoporosis)    Manage stress better  Exercise tips  Ease into your routine. Set small goals. Then build on them.  Exercise on most days. Aim for a total of 150 or more minutes of moderate to  vigorous intensity activity each week. Consider 40 minutes, 3 to 4 times a week. For best results, activity should last for 40 minutes on average. It is OK to work up to the 40 minute period over time. Examples of moderate-intensity activity is walking one mile in 15 minutes or 30 to 45 minutes of yard work.  Step up your daily activity level. Along with your exercise program, try being more active  throughout the day. Walk instead of drive. Do more household tasks or yard work.  Choose one or more activities you enjoy. Walking is one of the easiest things you can do. You can also try swimming, riding a bike, or taking an exercise class.  Stop exercising and call your doctor if you:    Have chest pain or feel dizzy or lightheaded    Feel burning, tightness, pressure, or heaviness in your chest, neck, shoulders, back, or arms    Have unusual shortness of breath    Have increased joint or muscle pain    Have palpitations or an irregular heartbeat      [id number] [employer]. [address] [place] [address]. All rights reserved. This information is not intended as a substitute for professional medical care. Always follow your healthcare professional's instructions.         Patient Education   Understanding [employer] MyPlate  The USDA (US Department of Agriculture) has guidelines to help you make healthy food choices. These are called MyPlate. MyPlate shows the food groups that make up healthy meals using the image of a place setting. Before you eat, think about the healthiest choices for what to put onto your plate or into your cup or bowl. To learn more about building a healthy plate, visit www.choosemyplate.gov.       The Food Groups    Fruits: Any fruit or 100% fruit juice counts as part of the Fruit Group. Fruits may be fresh, canned, frozen, or dried, and may be whole, cut-up, or pureed. Make half your plate fruits and vegetables.    Vegetables: Any vegetable or 100% vegetable juice counts as a member of the Vegetable Group. Vegetables may be fresh, frozen, canned, or dried. They can be served raw or cooked and may be whole, cut-up, or mashed. Make half your plate fruits and vegetables.     Grains: All foods made from grains are part of the Grains Group. These include wheat, rice, oats, cornmeal, and barley such as bread, pasta, oatmeal, cereal, tortillas, and grits. Grains should be no more  than a quarter of your plate. At least half of your grains should be whole grains.    Protein: This group includes meat, poultry, seafood, beans and peas, eggs, processed soy products (like tofu), nuts (including nut butters), and seeds. Make protein choices no more than a quarter of your plate. Meat and poultry choices should be lean or low fat.    Dairy: All fluid milk products and foods made from milk that contain calcium, like yogurt and cheese are part of the Dairy Group. (Foods that have little calcium, such as cream, butter, and cream cheese, are not part of the group.) Most dairy choices should be low-fat or fat-free.    Oils: These are fats that are liquid at room temperature. They include canola, corn, olive, soybean, and sunflower oil. Foods that are mainly oil include mayonnaise, certain salad dressings, and soft margarines. You should have only 5 to 7 teaspoons of oils a day. You probably already get this much from the food you eat.  Use [employer] to Help Build Your Meals  The [employer] can help you plan and track your meals and activity. You can look up individual foods to see or compare their nutritional value. You can get guidelines for what and how much you should eat. You can compare your food choices. And you can assess personal physical activities and see ways you can improve. Go to www.[employer].gov/supertracker/.    9173-8256 [employer]. [address] [place] [address]. All rights reserved. This information is not intended as a substitute for professional medical care. Always follow your healthcare professional's instructions.           Patient Education   Depression and Suicide in Older Adults  Nearly 2 million older Americans have some type of depression. Sadly, some of them even take their own lives. Yet depression among older adults is often ignored. Learn the warning signs. You may help spare a loved one needless pain. You may also save a life.       What  Is Depression?  Depression is a mood disorder that affects the way you think and feel. The most common symptom is a feeling of deep sadness. People who are depressed also may seem tired and listless. And nothing seems to give them pleasure. Its normal to grieve or be sad sometimes. But sadness lessens or passes with time. Depression rarely goes away or improves on its own. Other symptoms of depression are:    Sleeping more or less than normal    Eating more or less than normal    Having headaches, stomachaches, or other pains that dont go away    Feeling nervous, empty, or worthless    Crying a great deal    Thinking or talking about suicide or death    Feeling confused or forgetful  What Causes It?  The causes of depression arent fully known. Certain chemicals in the brain play a role. Depression does run in families. And life stresses can also trigger depression in some people. That may be the case with older adults. They often face great burdens, such as the death of friends or a spouse. They may have failing health. And they are more likely to be alone, lonely, or poor.  How You Can Help  Often, depressed people may not want to ask for help. When they do, they may be ignored. Or, they may receive the wrong treatment. You can help by showing parents and older friends love and support. If they seem depressed, help them find the right treatment. Talk to your doctor. Or contact a local mental health center, social service agency, or hospital. With modern treatment, no one has to suffer from depression.  Resources:    National Ralph of Mental Health  125.188.9958  www.nimh.nih.gov    National Clarion on Mental Illness  548.427.9758  www.britney.org    Mental Health [facility]i  544.568.2271  www.[facility].org    National Suicide Hotline  136.930.2187 (800-SUICIDE)      [phone] [employer]. [address] [place] [address], [place] [address]. All rights reserved. This information is not intended as a substitute for  professional medical care. Always follow your healthcare professional's instructions.           Advance Directive  Patients advance directive was discussed and I am comfortable with the patients wishes.  Patient Education   Personalized Prevention Plan  You are due for the preventive services outlined below.  Your care team is available to assist you in scheduling these services.  If you have already completed any of these items, please share that information with your care team to update in your medical record.  Health Maintenance   Topic Date Due   ? DEPRESSION ACTION PLAN  1945   ? DXA SCAN  05/14/2008   ? ZOSTER VACCINES (2 of 3) 07/21/2009   ? MEDICARE ANNUAL WELLNESS VISIT  04/28/2018   ? INFLUENZA VACCINE RULE BASED (1) 08/01/2020   ? FALL RISK ASSESSMENT  10/30/2020   ? TD 18+ HE  02/03/2022   ? ADVANCE CARE PLANNING  04/28/2022   ? LIPID  10/30/2024   ? PNEUMOCOCCAL IMMUNIZATION 65+ HIGH/HIGHEST RISK  Completed   ? HEPATITIS B VACCINES  Aged Out

## 2021-06-18 NOTE — PROGRESS NOTES
Assessment/Plan:      Diagnoses and all orders for this visit:    Lumbar spine pain  -     MR Lumbar Spine Without Contrast; Future; Expected date: 6/27/18  -     MR Lumbar Spine Without Contrast; Standing  -     MR Lumbar Spine Without Contrast    Arthropathy of lumbar facet joint  -     MR Lumbar Spine Without Contrast; Future; Expected date: 6/27/18  -     MR Lumbar Spine Without Contrast; Standing  -     MR Lumbar Spine Without Contrast    Lumbar spondylosis  -     MR Lumbar Spine Without Contrast; Future; Expected date: 6/27/18  -     MR Lumbar Spine Without Contrast; Standing  -     MR Lumbar Spine Without Contrast    Myofascial pain    Overweight  -     Ambulatory referral to Bariatric Care: Surgical and Non-Surgical        Assessment: Pleasant 73-year-old female with a history of osteoarthritis of the knees, depression, hypertension, sleep apnea, hypothyroidism, hyper lipidemia,   pulmonary embolism with:    1.  Chronic lumbar spine pain for at least 20+ years worsening over the past 2 months at the lumbosacral junction.  Most consistent with facet arthropathy and myofascial pain.  She does have degenerative disc disease in lower lumbar spine with facet arthropathy and disc height loss L5-S1.  2.  Overweight/obesity.      Discussion:    1.  I discussed the diagnoses and treatment options.  We discussed that she has had pain for quite some time she has had some plain films and no advanced imaging.  She has had some physical therapy 2 years ago.  Has been on numerous medications as well.  2.  Given the duration of her symptoms recommend MRI lumbar spine to evaluate extent of facet arthropathy to identify any potential targets for intervention.  3.  We will get her to the bariatric center for nonoperative weight loss consultation.  4.  We will contact her with the results of MRI when available.  At that point would likely recommend physical therapy in combination with facet injections based on MRI  imaging.      It was our pleasure caring for your patient today, if there any questions or concerns please do not hesitate to contact us.      Subjective:   Patient ID: Nancy Oropeza is a 73 y.o. female.    History of Present Illness:Patient presents for evaluation of low back pain.  It has been approximately 2 years since her last visit.  Was referred by Dr. Garnica.  She has had at least 20 year history of pain across lumbosacral junction.  Has wax and wane but worsened over the past 2 months.  She was kneeling down extra bed to get some the off the floor difficult time getting up in April of this year.  Since then has had worsening pain which is fairly constant worse with any walking or standing as well as leaning\extension.  Better with heat as well as sitting.  She does bend forward or use a shopping cart which also helps.  No radiation down the legs numbness tingling weakness bowel or bladder incontinence.  Has had medications in the past given her constipation.    Is currently using Tylenol as needed.  She has had physical therapy 2 visits 2 years ago.  No interventions.  Had plain films at no advanced imaging.    Imaging: Plain films lumbar spine personally reviewed and discussed with the patient.  This is from May 2016.  Reveals d facet arthropathy most significant L5-S1 reported mild to moderate L1-2, L2-3 and L3-4 interbody height loss.  On my review, there appears to be disc height loss at L5-S1.    Review of systems: Complains of poor sleep not related to pain.  She has back pain joint pain knee pain swelling of her feet anxiety vision changes.  Remainder of 12 point review systems negative unless listed above.    Past Medical History:   Diagnosis Date     Depression      Disease of thyroid gland      HTN (hypertension)      Hypercholesteremia      Morbid obesity (H)      Pulmonary emboli (H) 12/14/2015     Yeast infection        The following portions of the patient's history were reviewed and  updated as appropriate: allergies, current medications, past family history, past medical history, past social history, past surgical history and problem list.      WHO 5: 8    AVANI Score: 60      Objective:   Physical Exam:    Vitals:    06/27/18 1355   BP: 141/67   Pulse: 91       General:  Well-appearing female in no acute distress.  Obese, pleasant, cooperative, and interactive throughout the examination and interview.  CV: Appears to have some mild nonpitting edema lower extremities..  Lymphatics: No cervical lymphadenopathy palpated. Eyes: sclera clear. Skin: No rashes or lesions seen over the head/neck, hairline, arms, legs,  .  Respirations unlabored.  MSK: Gait is nonantalgic.  Able to heel-toe stand without difficulty.  Negative Romberg.  Spine: normal AP curves of the C, T, and L spine.  Mildly decreased lumbar flexion on finger to floor testing..  Palpation: Tenderness to palpation over *lumbosacral junction L4-5 and L5-S1 paraspinals.  Extremities: Full range of motion of the elbows, and wrists with no effusions or tenderness to palpation.   Full range of motion of the hips,  and ankles with no effusions or tenderness to palpation.  Rapid SI knee with flexion-extension testing.  No effusions noted of significance.   No hypermobility of the upper or lower extremities.  Neurologic exam: Mental status: Patient is alert and oriented with normal affect.  Attention, knowledge, memory, and language are intact.  Normal coordination throughout the examination.  Reflexes are 2+ and symmetric biceps, triceps, brachioradialis, trace patellar, and 0 Achilles with down-going toes and Negative Leandro's.  Sensation is intact to light touch throughout the upper and lower extremities bilaterally.  Manual muscle testing reveals 5 out of 5 in the hip flexors, knee flexors/extensors, ankle plantar flexors, ankle  dorsiflexors, and EHL.  Upper extremities: Grossly normal strength . Normal muscle bulk and tone in the arms  and legs.    Negative seated   straight leg raise bilaterally.

## 2021-06-18 NOTE — PROGRESS NOTES
Assessment/Plan:    1. Chronic pain syndrome  2. Back pain  Discussed options spinal injection for management of chronic back pain since this has helped her in the past.  Referral placed to spine care center for evaluation and management per patient request.  Advised that she discontinue use of acetaminophen-codeine as this is not helping her pain and is causing worsening constipation.  Encouraged use of over-the-counter MiraLAX for management of constipation.  Patient agrees to this.  Discussed referral to pain clinic if spine injection does not provide adequate pain relief.  She understands and is content with this plan of care.    - Ambulatory referral to Spine Care    The following are part of a depression follow up plan for the patient:  mental health screening assessment    Subjective:    Nancy Oropeza is a 73 year old female seen today for follow-up of chronic pain. Symptoms have persisted but not worsened since her previous visit.  No new injury.   Patient reports that she has had back pain and joint pain for many years.  She has been taking acetaminophen-codeine lately for management of pain.  This is not providing adequate relief.  Also takes Tylenol extra strength 2 tablets up to 4 times a day for arthritic pain without relief.  Previously she was taking tramadol which was ineffective for her.  Naproxen use in the past caused significant lower extremity edema. Not recommended that she take ibuprofen due to warfarin use.  She reports pain with walking in particular.  Referral was made to spine care at her last visit on June 1, however never scheduled appointment.  She is requesting today that another referral be made.  She feels that a spine injection may be a good option for her.  Have this done over 20 years ago which provided significant relief of back pain. Review of systems is as stated in HPI, and the remainder of the 10 system review is otherwise unremarkable.    Past Medical History, Family  History, and Social History reviewed.  Previously seen by Dr. Donnell Funez (after Dr. Rigo Mcclain x 32 years)   Grew up in [place], [place] til age 12...      5 children - son with Factor V abnormality  14 grandchildren   9 great-grandchildren   No smoke (quit 16 years ago after 1 ppd x 30+ years)   EtOH: occ wine   Mom -  88 COPD   Dad -  61 massive MI   1 bro -  67 blood clot (lung)   2 sis - one of which is  age 62 small cell lung CA (h/o smoker)  Surgeries: ventral hernia repair with muscle flap 10/4/12 with post-op complication of seroma with J.P. drain in place followed by interventional radiology q 2 weeks);  age 25; groin hernia age 3; CAPRICE-BSO  by Dr. Herbert Carmen (due to benign cyst x 2);   Hospitalizations: as above   Work: retired  ([employer] as )     12/18/15 FYI: Patient was admitted for dyspnea, secondary to bilateral multiple pulmonary emboli. She overall did well and was discharged home on Lovenox, relatively high dose given her weight as well as Warfarin. I would like her to be seen today at your clinic. I believe she has an appointment for INR, CBC, and BMP check as well as re-dosing of her Warfarin. I suspect she will need an additional day of Lovenox as she is not quite therapeutic today. You can refer to my discharge summary for the INR's and the Warfarin dosing. Thank you. Dr. Garza    Past Surgical History:   Procedure Laterality Date     BREAST BIOPSY Left 1970s     HYSTERECTOMY  2010     OOPHORECTOMY  2010        Family History   Problem Relation Age of Onset     Breast cancer Sister 75     Stomach cancer Paternal Grandfather      Lung cancer Sister         Past Medical History:   Diagnosis Date     Depression      Disease of thyroid gland      HTN (hypertension)      Hypercholesteremia      Morbid obesity (H)      Pulmonary emboli (H) 2015     Yeast infection         Social History   Substance Use Topics      "Smoking status: Former Smoker     Smokeless tobacco: Never Used      Comment: quite 20 yrs ago     Alcohol use No      Comment: occasionally        Current Outpatient Prescriptions   Medication Sig Dispense Refill     acetaminophen-codeine (TYLENOL #3) 300-30 mg per tablet Take 1 tablet by mouth every 4 (four) hours as needed for pain. 60 tablet 0     cholecalciferol, vitamin D3, 1,000 unit tablet Take 1,000 Units by mouth daily. 2 capsules once a day       citalopram (CELEXA) 40 MG tablet TAKE ONE TABLET BY MOUTH EVERY DAY 90 tablet 3     furosemide (LASIX) 20 MG tablet Take 1 tablet (20 mg total) by mouth daily. 90 tablet 1     levothyroxine (SYNTHROID, LEVOTHROID) 125 MCG tablet TAKE ONE TABLET BY MOUTH   EVERY DAY 90 tablet 2     lisinopril-hydrochlorothiazide (PRINZIDE,ZESTORETIC) 20-12.5 mg per tablet Take 2 tablets by mouth daily. 180 tablet 3     pravastatin (PRAVACHOL) 80 MG tablet TAKE ONE TABLET BY MOUTH EVERY NIGHT AT BEDTIME 90 tablet 2     warfarin (COUMADIN) 2.5 MG tablet Take 1/2 to 1 tablets (1.25 to 2.5 mg) by mouth daily. Adjust dose based on INR results as directed. 90 tablet 1     amLODIPine (NORVASC) 10 MG tablet Take 0.5 tablets (5 mg total) by mouth daily. 45 tablet 1     No current facility-administered medications for this visit.           Objective:    Vitals:    06/25/18 1110   BP: 120/62   Patient Site: Left Arm   Patient Position: Sitting   Cuff Size: Adult Large   Pulse: 100   Weight: (!) 299 lb (135.6 kg)   Height: 5' 8\" (1.727 m)      Body mass index is 45.46 kg/(m^2).      General Appearance:  Alert, cooperative, no distress, appears stated age   Lungs:   Clear to auscultation bilaterally, respirations unlabored.  No expiratory wheeze or inspiratory crackles noted.   Heart:    Back:  Regular rate and rhythm, S1, S2 normal, no murmur, rub or gallop  Range of motion and strength intact.  Negative straight leg raise.  No CVA tenderness.   Extremities: Extremities normal.  No cyanosis " or edema   Neurologic:  Equal strength, sensation, reflexes throughout       This note has been dictated using voice recognition software. Any grammatical or context distortions are unintentional and inherent to the use of this software.

## 2021-06-19 NOTE — PROGRESS NOTES
Assessment/Plan:        Patient in clinic with daughter for assessment.  Daughter provides primary support.  Does not live with patient but sees her several times during the week and helps with household tasks. Patient is unable to walk far or participate in activiites due to painful knees and back.  Is having injections in knees and a block next week in back.  Is not a good candidate for surgery. Needs assistance in home with housekeeping and some errands.  Is able to provide own personal hygiene cares at this time.          Subjective:    Patient ID: Nancy Oropeza is a 73 y.o. female.    HPI    The following portions of the patient's history were reviewed and updated as appropriate: allergies, current medications, past family history, past medical history, past social history, past surgical history and problem list.    Review of Systems          Objective:    Physical Exam        RN Recommendations and Referrals  Plan to request a referral for home care PT for strengthening due to back pain and knee pain and decreased mobility.    Action Plan    RN Will  Be available to the patient as nursing needs arise  Reach out to anticoagulation to report that patient will now begin taking coumadin in the evening.      Care Guide Will  Review goals set at PCAM visit and create or add to action plan.  Reach out to [facility] if feel appropriate for patient to see her for assistance with insurance and home care.     Goals  Goals        Patient Stated      I would like assistance in finding agencies that could help with housekeeping services. (pt-stated)            Action steps to achieve this goal  1.  I will speak with the care guide when they reach out to discuss resources for home care for housekeeping services.    2.  I will explore the options that the care guides give me regarding any options for care in my home.    Date goal set: 7/20/18        I would like to find resources for a lift chair. (pt-stated)             Action steps to achieve this goal  1.  I will check with my insurance prior to speaking with a care guide to see if there is coverage for a lift chair.  2.  I will speak with the care guide when they reach out to explore options for a lift chair for my home.    Date goal set: 7/20/18.        I would like to regain strength in my legs and arms.  (pt-stated)            Action steps to achieve this goal  1.  I will accept home care for PT for exercises if my doctor orders this.  2.  I will do exercises as prescribed by PT if ordered to retain some strength.    Date goal set:  7/20/18              Clinic Care Coordination RN Assessed Needs  Patient Centered Assessment Method-PCAM TOTAL SCORE: 29 (7/20/2018  3:41 PM)  Level 2:  A score of 25-36 indicates that the patient has a moderate initial need for RN or SW intervention at the discretion of the .  The RN will add this patient to her panel and follow closely in partnership with the care guide until stable.  She will reach out to the care guide for support in care coordination needs and graduate the patient to standard care guide outreach when appropriate.      PCAM (Patient Centered Assessment Method)   HEALTH AND WELL-BEING  Other Physical Health Concerns:: Pain in back and knees, obesity  RN Assessment: Physical Health Needs: Mod to severe symptoms or problems that impact on daily life  RN Assessment: Physical Health Problems: Mod to severe impact upon mental well-being and preventing enjoyment of usual activities  Mental Health Concerns: Depression  RN Assessment:Other Mental Well-Being Concern: Mod to severe problems that interfere with function  Lifestyle/Habit Concerns: Exercise/Activity  RN Assessment: Lifestyle Behaviors: No identified areas of concern  SOCIAL ENVIRONMENT  Home Environment Concerns: Denies concerns that require further investigation  RN Assessment: Home Environment: Safe, stable, but with some inconsistency  Other Daily Activities  Concerns:: Not active outside the home due to pain with walking and actifity.   RN Assessment: Daily Activites: Restricted participation with some degree of social isolation  Social Network Concerns: Socially isolated  RN Assessment: Social Network: Restricted participation with some degree of social isolation (Has good support with family.  Used to [facility] activities [facility] center, but due to pain in knees and back is unable to walk there. )  Financial Status and Service Concerns: Denies concerns that require further investigation  RN Assessment: Financial Resources: Financially secure, some resource challenges  HEALTH LITERACY AND COMMUNICATION  Understanding of Health and Wellbeing Concerns: Little understanding which impacts on their ability to undertake better management  RN Assessment: Health Literacy: Reasonable to good understanding and already engages in managing health or is willing to undertake better management  Engagement Concerns: Some difficulties in communication with or without moderate barriers  RN Assessment: Engagement: Adequate communication, with or without minor barriers  Barriers to Compliance with Medical Recommendations: Denies concerns that require further investigation  SERVICE COORDINATION  Other Services: Other care/services in place with some coordination barriers  Coordination of Services: Required care/services in place with some coordination barriers  PCAM TOTAL SCORE: 29      Emergency Plan  Preventing Falls    Having a health problem can make you more likely to fall. Taking certain kinds of medicines may also increase your risk of falls. So, improving your health can help you avoid a fall. Work with your healthcare provider to manage health problems and to review your medicines. If you have your health under control, your risk of falling is lessened.    When to call your healthcare provider  Be sure to call your healthcare provider if you fall. Also call if you have any  of these signs or symptoms (someone else may need to point them out to you):    Feeling lightheaded or dizzy more than once a day    Losing your balance often or feeling unsteady on your feet    Feeling numbness in your legs or feet, or noticing a change in the way you walk    Having a steady decline in your memory or mental sharpness  Emergency Plan Recommendation:    When to Use the Emergency Department (ED)  An emergency means you could die if you don t get care quickly. Or you could be hurt permanently (disabled). Read below to know when to use -- and when not to use -- an emergency department (also called ED).    Dangers to your life  Here are examples of emergencies. These need immediate care:  A hard time breathing  Severe chest pain  Choking  Severe bleeding  Suddenly not able to move or speak  Blacking out (fainting)`  Poisoning    Dangers of permanent injuries  Here are other emergencies. These also need immediate care:  Deep cuts or severe burns  Broken bones, or sudden severe pain and swelling in a joint    When it s an emergency  If you have an emergency, follow these steps:    1. Go to the nearest emergency department  If you can, go to the hospital ED closest to you right away.  If you cannot get there right away, or if it is not safe to take yourself, call 911 or your police emergency number.  2. Call your primary care doctor  Tell your doctor about the emergency. Call within 24 hours of going to the ED.  If you cannot call, have someone call for you.  Go to your doctor (not the ED) for any follow-up care.    When it s not an emergency  If a problem is not an emergency, follow these steps:    1. Call your primary care doctor  If you don t know the name of your doctor, call your health plan.  If you cannot call, have someone call for you.  2. Follow instructions  Your doctor will tell you what you should do.  You may be told to see your doctor right away. You may be told to go to the ED. Or you may be  told to go to an urgent care center.  Follow your doctor s advice.  Managing Chronic Pain: Medicines  Medicines can help you live better with chronic pain. You may use over-the-counter or prescription medicines. It can take some time and trial and error to work out the best treatment plan for you. Work with your health care provider to find the best medicines for you, and to use them safely and effectively.  Tell your health care provider about all medicines you`re taking, including herbs and vitamins.   A part of your treatment plan  Depending on your situation and the type of pain, you may take medicines:    At times when pain is more intense than usual.    For pain relief throughout the day.    Before activities that tend to trigger pain, like going shopping or doing physical therapy.     To decrease sensitivity to pain and help you sleep.  There are 4 major groupings of medicines for the treatment of chronic pain:  Non-opioids. These include the commonly used medicine acetaminophen as well as the non-steroidal anti-inflammatory drugs (NSAIDs), like aspirin and ibuprofen, naproxen sodium, and ketoprofen. These all help control pain but NSAIDs also help relieve inflammation. These drugs are available over-the-counter. Some NSAIDS are available by prescription only.  Acetaminophen can cause liver damage if taken above the recommended dose. NSAIDs may cause stomach problems like bleeding ulcers. Using them over the long-term can cause heart problems and stroke in a very small number of people. None of these drugs is addictive.  Opioids. This includes drugs, like morphine, oxycodone, codeine, fentanyl, and methadone. Opioids may be used to treat breakthrough pain or severe chronic pain. Opioids are available only by prescription. These medicines may be effective for managing chronic pain. But they are controversial because of their side effects and because they can be addictive.    Adjuvants. This group includes  medicines that were originally made to treat other conditions, but were also found to relieve pain. Examples of adjuvant drugs include antidepressants and anticonvulsants.  Antidepressants. These help pain by working on the same brain chemicals that play a role in depression. They also help improve sleep. Tricyclic antidepressants are 1 group of antidepressants used for treating chronic pain caused by nerve injury (neuropathic pain). Examples include amitriptyline, nortriptyline, and desipramine. Serotonin-norepinephrine reuptake inhibitors (SNRIs), like duloxetine and milnacipran, are also used.  Some types of antidepressants are used in low doses for sleep problems. They may also be prescribed if you are very sensitive to pain or some kinds of nerve pain.  Anticonvulsants, developed to prevent seizures, can help certain pain conditions, particularly nerve (neuropathic) pain. Examples include gabapentin and pregabalin.  Other pain medicines    Topical. These medicines, like lidocaine or capsaicin, are applied to the skin to treat pain in one location.    Muscle relaxants. These may be used to stop painful muscle spasms. Drugs like cyclobenzaprine can be sedating.  Taking medicine safely    Take your medicine on time and in the right dose as prescribed.    Tell your health care professional if your medicine doesn't relieve your pain or work for a long enough time, or if you have side effects.    Don't take other people's medicines. They may not be safe for you.  Avoid alcohol, tobacco, and illegal drugs. These may interact with your medicines causing you harm.

## 2021-06-19 NOTE — PROGRESS NOTES
"Assessment  SW met with pt and daughter Nancy to discuss help at home.  Pt lives in one level [facility] home.  She has a Section 8 voucher that was originally her mothers and passed to her after mother's death.  She pays $525/month- which includes $45 for garage.  Her income is $933 Social Security and $365 pension from [employer].  Nancy currently assists pt with laundry, shopping, errands, meal prep.  Pt has grab bars and bathtub cut out for bathing/grooming.  Pt has order for lift chair from PCP.  ANALISA discussed pt contacting DME companies to look into getting chair potentially covered by Medicare.      ANALISA discussed requesting assessment for Alternative Care waiver through [facility].  Based on pts age and assets she may qualify for this wavier.  ANALISA described process of [facility] RN coming to home to do assessment and if approved pt being assigned wavier  to manage services such as , meals on wheels, life alert, scooter, ILS worker etc.  Pt understands and would like to request assessment.  ANALISA called [facility] and left message requesting pt or daughter be contacted to schedule assessment.      ANALISA provided pt with HCD.  Discussed instructions for completing, including notarizing and bringing copy to clinic to be scanned into chart.       Action Plan:    will:  1) Follow up on status of [place] assessment      Care Guide will:  Care Guide Delegation:   1)  Due Date:  Next outreach        Delegation:  Ask if pt scheduled with [place] assessment and update SW    2)  Due Date:   Next outreach       Delegation:  Ask if pt contacted DME about order for lift chair      Subjective     \"My daughter Nancy helps me with everything at this point.\"        Objective    Well groomed, appears slightly younger than stated age, pleasant, memory/judgment/insight appear intact       "

## 2021-06-19 NOTE — PROGRESS NOTES
Assessment/Plan:    1. Osteoarthritis Of The Knee  Severe osteoarthritis bilateral knee.  Follows with Dr. Willams, rheumatologist for knee injections etc.  Face-to-face visit completed today for seat lift chair.  Prescription provided.    2. Chronic midline low back pain without sciatica  Chronic mid low back pain.  Seen through spine care clinic.  This also contributes to difficulty with transfers from seated to standing position.    3. History of pulmonary embolism  History of pulmonary emboli.  Continues warfarin anticoagulation.  Mild dyspnea with exertion.    4. Normochromic normocytic anemia  History normochromic normocytic anemia.  The check CBC, iron levels, B12 and folate as well as peripheral smear today.  - HM2(CBC w/o Differential)  - Ferritin  - Morphology,Smear Review (MORP)  - Iron and Transferrin Iron Binding Capacity  - Vitamin B12  - Folate, Serum    5. Essential hypertension with goal blood pressure less than 140/90  Hypertension, stable.  Continue home meds.    6. Chronic pain syndrome  Patient requesting pain medication for knee pain as well as low back pain.  Has tried multiple medications including tramadol,, sulindac, piroxicam, oxycodone and hydrocodone acetaminophen.  Side effects or intolerance noted these medications as well as avoidance of NSAIDs ideally.  Trial of Tylenol 3 using 1-2 tablets every 6 hours as needed for breakthrough pain.  Maximum 3000 mg Tylenol per day.  - acetaminophen-codeine (TYLENOL #3) 300-30 mg per tablet; Take 1-2 tablets by mouth every 6 (six) hours as needed for pain.  Dispense: 60 tablet; Refill: 1        Subjective:    Nancy Oropeza is seen today for face-to-face visit for seat lift chair.  Patient with difficulties transferring from seated to standing position due to bilateral severe knee osteoarthritis.  Receives corticosteroid injections etc. with rheumatologist Dr. Willams.  Also chronic low back pain without sciatica.  Seen through spine care clinic  and has had multiple local injections etc. chronic pain associated with these concerns.  Has had intolerance previously to multiple medications including tramadol, oxycodone, Norco, piroxicam and sulindac.  Patient requesting other medication to be utilized on as-needed basis.  Known history of normochromic normocytic anemia prior hemoglobin 10.9 MCV 81.  Prior thyroid testing 2.21 on 2018.  Comprehensive review of systems as above otherwise all negative.    Grew up in [facility] age 12...      5 children - son with Factor V abnormality  14 grandchildren   9 great-grandchildren   No smoke (quit 16 years ago after 1 ppd x 30+ years)   EtOH: occ wine   Mom -  88 COPD   Dad -  61 massive MI   1 bro -  67 blood clot (lung)   2 sis - one of which is  age 62 small cell lung CA (h/o smoker)  Surgeries: ventral hernia repair with muscle flap 10/4/12 with post-op complication of seroma with J.P. drain in place followed by interventional radiology q 2 weeks);  age 25; groin hernia age 3; CAPRICE-BSO 2011 by Dr. Herbert Carmen (due to benign cyst x 2);   Hospitalizations: as above   Work: retired  ([employer] as )     Past Surgical History:   Procedure Laterality Date     BREAST BIOPSY Left 1970s     HYSTERECTOMY  2010     OOPHORECTOMY          Family History   Problem Relation Age of Onset     Breast cancer Sister 75     Stomach cancer Paternal Grandfather      Lung cancer Sister         Past Medical History:   Diagnosis Date     Depression      Disease of thyroid gland      HTN (hypertension)      Hypercholesteremia      Morbid obesity (H)      Pulmonary emboli (H) 2015     Yeast infection         Social History   Substance Use Topics     Smoking status: Former Smoker     Smokeless tobacco: Never Used      Comment: quite 20 yrs ago     Alcohol use No      Comment: occasionally        Current Outpatient Prescriptions   Medication Sig Dispense  Refill     acetaminophen (TYLENOL ORAL) Take by mouth.       amLODIPine (NORVASC) 5 MG tablet Take 1 tablet (5 mg total) by mouth daily. 90 tablet 3     cholecalciferol, vitamin D3, 1,000 unit tablet Take 1,000 Units by mouth daily. 2 capsules once a day       citalopram (CELEXA) 40 MG tablet TAKE ONE TABLET BY MOUTH EVERY DAY 90 tablet 3     furosemide (LASIX) 20 MG tablet Take 1 tablet (20 mg total) by mouth daily. 90 tablet 1     levothyroxine (SYNTHROID, LEVOTHROID) 125 MCG tablet TAKE ONE TABLET BY MOUTH   EVERY DAY 90 tablet 2     lisinopril-hydrochlorothiazide (PRINZIDE,ZESTORETIC) 20-12.5 mg per tablet Take 2 tablets by mouth daily. 180 tablet 3     pravastatin (PRAVACHOL) 80 MG tablet TAKE ONE TABLET BY MOUTH   EVERY NIGHT AT BEDTIME 90 tablet 3     warfarin (COUMADIN) 2.5 MG tablet Take 1/2 to 1 tablets (1.25 to 2.5 mg) by mouth daily. Adjust dose based on INR results as directed. 90 tablet 1     acetaminophen-codeine (TYLENOL #3) 300-30 mg per tablet Take 1-2 tablets by mouth every 6 (six) hours as needed for pain. 60 tablet 1     No current facility-administered medications for this visit.           Objective:    Vitals:    08/02/18 0847   BP: 128/78   Pulse: (!) 101   SpO2: 95%   Weight: (!) 297 lb (134.7 kg)      Body mass index is 45.16 kg/(m^2).    Alert.  No apparent distress at rest however difficulty with transfers due to bilateral knee pain and lower back pain.  Musculoskeletal exam shows evidence for significant osteoarthritis involving both knees.  Patient does demonstrate ability to ambulate once up however significant need for use of arms etc. putting strain on shoulders and upper back described.  Neurologic exam otherwise intact.      This note has been dictated using voice recognition software and as a result may contain minor grammatical errors and unintended word substitutions.

## 2021-06-19 NOTE — PROGRESS NOTES
Assessment/Plan:    1. Localized edema  Peripheral edema, improved following discontinuation of piroxicam as well as decreasing amlodipine from 10 mg down to 5 mg daily.  Has resumed furosemide 20 mg daily after increasing the 40 mg daily ×5 days.  Declines compression stockings.  We will continue to follow closely.    2. Essential hypertension with goal blood pressure less than 140/90  New prescription provided for lower dose amlodipine 5 mg daily due to peripheral edema concerns with 10 mg dosing.  Continues lisinopril hydrochlorothiazide 20/12.5 using 2 tablets daily.  Check basic metabolic panel to ensure stable renal function with history of mild renal insufficiency per  - Basic Metabolic Panel  - amLODIPine (NORVASC) 5 MG tablet; Take 1 tablet (5 mg total) by mouth daily.  Dispense: 90 tablet; Refill: 3    3. Chronic low back pain  Reviewed chronic lower back pain.  Had recent injections last Tuesday described at L4 and L5.  Follows up with Dr. aguilar July 17, 2018 and will discuss additional pain management options with patient receiving some relief from lidocaine patch otherwise prior poor response with hydrocodone, oxycodone, and tramadol.  Did not tolerate Tylenol with Codeine well.  - Ambulatory referral to Care Management (Primary Care)    4. Normochromic normocytic anemia  Repeat CBC to ensure stable or improved normochromic normocytic anemia with recent hemoglobin 9.7 MCV 81.  - HM2(CBC w/o Differential)    5. Mixed hyperlipidemia  Nonfasting today however to check total cholesterol as well as HDL and LDL cholesterol while on pravastatin 80 mg at bedtime.  - HDL Cholesterol  - Cholesterol, Total  - LDL Cholesterol, Direct    6. History of pulmonary embolism  INR pending regarding pulmonary emboli anticoagulation.  - INR            Subjective:    Nancy Oropeza is seen today for follow-up evaluation regarding leg swelling.  Had been seen in June 1, 2018.  Had increase furosemide to 40 mg daily ×5  days then was to resume 20 mg daily dose.  Also told to decrease amlodipine from 10 mg down to 5 mg daily.  Remained off piroxicam which may have been associated with edema.  Noted renal insufficiency with creatinine 1.11 GFR 48 hemoglobin 9.7 decreased from 11.1 with MCV 81 without concerns for current bleeding issues.  Received recent back injections ×2 with Dr. Theodore's office and will follow up with Dr. Theodore 2018.  Still has significant pain.  Patient does not drive.  Needs assistance with activities of daily living and wondering about PCA services or homemaker.  Continues thyroid replacement.  Also on lisinopril hydrochlorothiazide 20/12.5 using 2 tablets daily for hypertension management.    Previously seen by Dr. Donnell Funez (after Dr. Rigo Mcclain x 32 years)   Grew up in [place], [place] til age 12...      5 children - son with Factor V abnormality  14 grandchildren   9 great-grandchildren   No smoke (quit 16 years ago after 1 ppd x 30+ years)   EtOH: occ wine   Mom -  88 COPD   Dad -  61 massive MI   1 bro -  67 blood clot (lung)   2 sis - one of which is  age 62 small cell lung CA (h/o smoker)  Surgeries: ventral hernia repair with muscle flap 10/4/12 with post-op complication of seroma with J.P. drain in place followed by interventional radiology q 2 weeks);  age 25; groin hernia age 3; CAPRICE-BSO  by Dr. Herbert Carmen (due to benign cyst x 2);   Hospitalizations: as above   Work: retired  ([employer] as )     Past Surgical History:   Procedure Laterality Date     BREAST BIOPSY Left 1970s     HYSTERECTOMY  2010     OOPHORECTOMY  2010        Family History   Problem Relation Age of Onset     Breast cancer Sister 75     Stomach cancer Paternal Grandfather      Lung cancer Sister         Past Medical History:   Diagnosis Date     Depression      Disease of thyroid gland      HTN (hypertension)      Hypercholesteremia      Morbid  obesity (H)      Pulmonary emboli (H) 12/14/2015     Yeast infection         Social History   Substance Use Topics     Smoking status: Former Smoker     Smokeless tobacco: Never Used      Comment: quite 20 yrs ago     Alcohol use No      Comment: occasionally        Current Outpatient Prescriptions   Medication Sig Dispense Refill     amLODIPine (NORVASC) 5 MG tablet Take 1 tablet (5 mg total) by mouth daily. 90 tablet 3     cholecalciferol, vitamin D3, 1,000 unit tablet Take 1,000 Units by mouth daily. 2 capsules once a day       citalopram (CELEXA) 40 MG tablet TAKE ONE TABLET BY MOUTH EVERY DAY 90 tablet 3     furosemide (LASIX) 20 MG tablet Take 1 tablet (20 mg total) by mouth daily. 90 tablet 1     levothyroxine (SYNTHROID, LEVOTHROID) 125 MCG tablet TAKE ONE TABLET BY MOUTH   EVERY DAY 90 tablet 2     lisinopril-hydrochlorothiazide (PRINZIDE,ZESTORETIC) 20-12.5 mg per tablet Take 2 tablets by mouth daily. 180 tablet 3     pravastatin (PRAVACHOL) 80 MG tablet TAKE ONE TABLET BY MOUTH EVERY NIGHT AT BEDTIME 90 tablet 2     warfarin (COUMADIN) 2.5 MG tablet Take 1/2 to 1 tablets (1.25 to 2.5 mg) by mouth daily. Adjust dose based on INR results as directed. 90 tablet 1     No current facility-administered medications for this visit.           Objective:    Vitals:    07/12/18 1208   BP: 130/70   Pulse: 94   SpO2: 96%   Weight: (!) 295 lb (133.8 kg)      Body mass index is 44.85 kg/(m^2).    Alert.  No apparent distress.  Peripheral edema is improved significantly with some residual lymphedema however.  No calf tenderness.  Symmetric exam.  Chest clear.  Cardiac exam regular with blood pressure 124/64 on recheck.      This note has been dictated using voice recognition software and as a result may contain minor grammatical errors and unintended word substitutions.

## 2021-06-19 NOTE — PROGRESS NOTES
Scheduled F/U Call:  Attempt 1    Care Guide called patient.  If this patient is returning our call please transfer to Martine Rick at ext [phone].  Email: [email or web address]  Marital Status:   Children: Yes 2 daughters  Born and Raised:   Occupation: Retired  Living Situation: Lives alone in an apartment in [place]  Smoking, Alcohol, other:  Services receiving: Medicare  [facility] Enrollment: 7/20/18  [facility]/HC Follow up s: Every two weeks.  Next follow up date: 8/14/18    Action Plan     RN Will  Be available to the patient as nursing needs arise  Reach out to anticoagulation to report that patient will now begin taking coumadin in the evening.       Care Guide Will  Review goals set at PCAM visit and create or add to action plan. -Done  Reach out to [facility] SW if feel appropriate for patient to see her for assistance with insurance and home care. -Meeting with SW has been rescheduled for 8/20/18.

## 2021-06-19 NOTE — PROGRESS NOTES
Email: [email or web address]  Marital Status:   Children: Yes 2 daughters  Born and Raised:   Occupation: Retired  Living Situation: Lives alone in an apartment in [place]  Smoking, Alcohol, other:  Services receiving: Medicare  [facility] Enrollment: 7/20/18  [facility] Follow up s: Every two weeks.  Next follow up date: 8/14/18    Action Plan     RN Will  Be available to the patient as nursing needs arise  Reach out to anticoagulation to report that patient will now begin taking coumadin in the evening.       Care Guide Will  Review goals set at PCAM visit and create or add to action plan. -Done  Reach out to [facility] SW if feel appropriate for patient to see her for assistance with insurance and home care. -Meeting with SW on 8/13/18.

## 2021-06-19 NOTE — PROGRESS NOTES
My Clinic Care Coordination Care Plan    [facility]  [address], [address] [place] [address]  [phone]    My Preferred Method of Contact:  Phone: [phone]    My Primary/Preferred Language:  English    Preferred Learning Style:  Reading information and Pictures/Diagrams    Emergency Contact: Extended Emergency Contact Information  Primary Emergency Contact: Lakeisha Mcclain   [facility]  Home Phone: [phone]  Relation: Child  Secondary Emergency Contact: BarrJuly   [facility]  Home Phone: [phone]  Relation: Child     PCP:  Julien Garnica MD  Specialists:    Care Team            Julien Garnica MD PCP - General    [phone]     Tigist Bedolla, PharmD Pharmacist, Pharmacist    RENETTA Spence Physician, Rheumatology    [phone]     Cora Pearson, RN Registered Nurse    Martine Rick [facility] Care Coordination Care Guide, Clinic Care Coordination    [facility]. Phone: [phone]. Fax: [phone]          Hospitalizations and/or ED Visits  Most Recent: December 2015     Reason: Abnormal Lab    Concerns regarding my health Pain in my back and knees, finding services and equipment for in the home.     Advanced Directive/Living Will: The patient was given information regarding Adanced Directives/Living Will      Nancy elected to enroll in the [facility] Care Coordination.  Nancy was given a copy of the Clinic Care Coordination brochure and full description of how to access their care team both during clinic hours and after hours.     My Care Guide's Name and Phone Number: Martine Rick [phone]    The Care Guide and myself agreed to be in contact every 2 weeks.      Medication Update  The patient's medication list is up to date per Epic    Health Maintenance and Immunization Update  The patient's preventive health screenings and immunizations are up to date per Epic.    My Emergency Plan  Preventing Falls     Having a health problem can make you more likely to fall. Taking certain kinds of medicines may also increase your risk of falls. So, improving your health can help you avoid a fall. Work with your healthcare provider to manage health problems and to review your medicines. If you have your health under control, your risk of falling is lessened.     When to call your healthcare provider  Be sure to call your healthcare provider if you fall. Also call if you have any of these signs or symptoms (someone else may need to point them out to you):    Feeling lightheaded or dizzy more than once a day    Losing your balance often or feeling unsteady on your feet    Feeling numbness in your legs or feet, or noticing a change in the way you walk    Having a steady decline in your memory or mental sharpness  Emergency Plan Recommendation:     When to Use the Emergency Department (ED)  An emergency means you could die if you don t get care quickly. Or you could be hurt permanently (disabled). Read below to know when to use -- and when not to use -- an emergency department (also called ED).     Dangers to your life  Here are examples of emergencies. These need immediate care:  A hard time breathing  Severe chest pain  Choking  Severe bleeding  Suddenly not able to move or speak  Blacking out (fainting)`  Poisoning     Dangers of permanent injuries  Here are other emergencies. These also need immediate care:  Deep cuts or severe burns  Broken bones, or sudden severe pain and swelling in a joint     When it s an emergency  If you have an emergency, follow these steps:     1. Go to the nearest emergency department  If you can, go to the hospital ED closest to you right away.  If you cannot get there right away, or if it is not safe to take yourself, call 911 or your police emergency number.  2. Call your primary care doctor  Tell your doctor about the emergency. Call within 24 hours of going to the ED.  If you cannot call, have  someone call for you.  Go to your doctor (not the ED) for any follow-up care.     When it s not an emergency  If a problem is not an emergency, follow these steps:     1. Call your primary care doctor  If you don t know the name of your doctor, call your health plan.  If you cannot call, have someone call for you.  2. Follow instructions  Your doctor will tell you what you should do.  You may be told to see your doctor right away. You may be told to go to the ED. Or you may be told to go to an urgent care center.  Follow your doctor s advice.  Managing Chronic Pain: Medicines  Medicines can help you live better with chronic pain. You may use over-the-counter or prescription medicines. It can take some time and trial and error to work out the best treatment plan for you. Work with your health care provider to find the best medicines for you, and to use them safely and effectively.  Tell your health care provider about all medicines you`re taking, including herbs and vitamins.   A part of your treatment plan  Depending on your situation and the type of pain, you may take medicines:    At times when pain is more intense than usual.    For pain relief throughout the day.    Before activities that tend to trigger pain, like going shopping or doing physical therapy.     To decrease sensitivity to pain and help you sleep.  There are 4 major groupings of medicines for the treatment of chronic pain:  Non-opioids. These include the commonly used medicine acetaminophen as well as the non-steroidal anti-inflammatory drugs (NSAIDs), like aspirin and ibuprofen, naproxen sodium, and ketoprofen. These all help control pain but NSAIDs also help relieve inflammation. These drugs are available over-the-counter. Some NSAIDS are available by prescription only.  Acetaminophen can cause liver damage if taken above the recommended dose. NSAIDs may cause stomach problems like bleeding ulcers. Using them over the long-term can cause heart  problems and stroke in a very small number of people. None of these drugs is addictive.  Opioids. This includes drugs, like morphine, oxycodone, codeine, fentanyl, and methadone. Opioids may be used to treat breakthrough pain or severe chronic pain. Opioids are available only by prescription. These medicines may be effective for managing chronic pain. But they are controversial because of their side effects and because they can be addictive.    Adjuvants. This group includes medicines that were originally made to treat other conditions, but were also found to relieve pain. Examples of adjuvant drugs include antidepressants and anticonvulsants.  Antidepressants. These help pain by working on the same brain chemicals that play a role in depression. They also help improve sleep. Tricyclic antidepressants are 1 group of antidepressants used for treating chronic pain caused by nerve injury (neuropathic pain). Examples include amitriptyline, nortriptyline, and desipramine. Serotonin-norepinephrine reuptake inhibitors (SNRIs), like duloxetine and milnacipran, are also used.  Some types of antidepressants are used in low doses for sleep problems. They may also be prescribed if you are very sensitive to pain or some kinds of nerve pain.  Anticonvulsants, developed to prevent seizures, can help certain pain conditions, particularly nerve (neuropathic) pain. Examples include gabapentin and pregabalin.  Other pain medicines    Topical. These medicines, like lidocaine or capsaicin, are applied to the skin to treat pain in one location.    Muscle relaxants. These may be used to stop painful muscle spasms. Drugs like cyclobenzaprine can be sedating.  Taking medicine safely    Take your medicine on time and in the right dose as prescribed.    Tell your health care professional if your medicine doesn't relieve your pain or work for a long enough time, or if you have side effects.    Don't take other people's medicines. They may not  be safe for you.  Avoid alcohol, tobacco, and illegal drugs. These may interact with your medicines causing you harm.      All [facility] clinic patients have access to a Nurse 24 hours a day, 7 days a week.  If you have questions or want advice from a Nurse, please know [facility] is here for you.  You can call your clinic and they will connect you or you can call Care [facility] at [phone].  [facility] also has Walk In Care clinics in multiple locations.  Call the number listed above for more information about our Walk In Care clinics or visit the [facility] website at [email or web address].    Patient Support  I will ask my emergency contact for help in supporting me in these goals.  Relationship to patient: Daughter

## 2021-06-19 NOTE — PROGRESS NOTES
The Clinic Care Guide called the patient  today at the request of the PCP to discuss possible clinic care coordination enrollment. Clinic care coordination was described to the patient and immediate needs were discussed. The patient agreed to enrollment in clinic care coordination and future appointments were scheduled for an RN care coordination assessment. The patient was provided with contact information for the clinic care guide.    Priority: Routine Class: Internal Referral   Standing Status: Normal Status: Sent [2]   Ordering User: Natasha Moss MD Department: [facility] Medicine/ob   Auth Provider: NATASHA MOSS [facility] Provider: Natasha Moss MD   Diagnosis: Chronic low back pain

## 2021-06-19 NOTE — PROGRESS NOTES
ASSESSMENT AND PLAN:  Nancy Oropeza 73 y.o. female is here for moderately severe to severe exacerbation of pain.  This is in her knees associated with osteoarthritis.  We discussed various differentials.  Does not appear that this is referred pain either does not sound like a prepatellar or anserine bursa pain.  She would like to pursue local injections.  40 mg of Kenalog injected into each knee under ultrasound guidance, for increased risk of complications such as bleeding in view of the Coumadin background were discussed..  This is necessitated by distribution of adipose tissues.  He will return on as-needed basis.         Diagnoses and all orders for this visit:    Osteoarthritis Of The Knee  -     triamcinolone acetonide 40 mg/mL injection 40 mg (KENALOG-40); Inject 1 mL (40 mg total) into the joint once.  -     triamcinolone acetonide 40 mg/mL injection 40 mg (KENALOG-40); Inject 1 mL (40 mg total) into the joint once.    Morbid Obesity    Acute bilateral knee pain          HISTORY OF PRESENTING ILLNESS:  Nancy Oropeza 73 y.o. is here for  severe flare up of pain.  This is in her knees.  Bilateral.  Worse with activity.  Moderately severe to severe.  She cannot take over-the-counter measures such as ibuprofen because of the background of Coumadin.  She has had couple of injections elsewhere.  She is back here as she perceives that the injections have provided her a better response and any other interventions so far particularly given her inclination not to go for surgery.  She has had Visco supplementation elsewhere.  The symptoms are gradually worsening. Symptoms include pain, tenderness to touch. She has noted mild discomfort should discomfort in other areas such as the hands commensurate with osteoarthritis.  She has noted difficulty performing several day-to-day activities.  In the morning she noted stiffness lasting 15 minutes.    Further historical information, including ROS and limitation in  activities as noted in the multidimensional health assessment questionnaire scanned in the EMR and in the assessment and plan section.    ALLERGIES:Atorvastatin; Simvastatin; Sulfa (sulfonamide antibiotics); and Sulfasalazine    PAST MEDICAL/ACTIVE PROBLEMS/MEDICATION/SOCIAL DATA  Past Medical History:   Diagnosis Date     Depression      Disease of thyroid gland      HTN (hypertension)      Hypercholesteremia      Morbid obesity (H)      Pulmonary emboli (H) 12/14/2015     Yeast infection      History   Smoking Status     Former Smoker   Smokeless Tobacco     Never Used     Comment: quite 20 yrs ago     Patient Active Problem List   Diagnosis     Morbid Obesity     Osteoarthritis Of The Knee     Back pain     Major Depression, Recurrent     Obstructive Sleep Apnea     Essential hypertension with goal blood pressure less than 140/90     Edema     Hypothyroidism     Hypercholesterolemia     Normochromic, Normocytic Anemia     Arthralgias In Multiple Sites     Cataract     Impaired Fasting Glucose     Multiple lung nodules on CT     Angioedema     Hypokalemia     Pulmonary embolism (H)     Anticoagulant long-term use     Acute bilateral knee pain     Chronic pain syndrome     Current Outpatient Prescriptions   Medication Sig Dispense Refill     acetaminophen (TYLENOL ORAL) Take by mouth.       amLODIPine (NORVASC) 5 MG tablet Take 1 tablet (5 mg total) by mouth daily. (Patient taking differently: Take 2.5 mg by mouth daily. ) 90 tablet 3     cholecalciferol, vitamin D3, 1,000 unit tablet Take 1,000 Units by mouth daily. 2 capsules once a day       citalopram (CELEXA) 40 MG tablet TAKE ONE TABLET BY MOUTH EVERY DAY 90 tablet 3     furosemide (LASIX) 20 MG tablet Take 1 tablet (20 mg total) by mouth daily. 90 tablet 1     levothyroxine (SYNTHROID, LEVOTHROID) 125 MCG tablet TAKE ONE TABLET BY MOUTH   EVERY DAY 90 tablet 2     lisinopril-hydrochlorothiazide (PRINZIDE,ZESTORETIC) 20-12.5 mg per tablet Take 2 tablets by  mouth daily. 180 tablet 3     pravastatin (PRAVACHOL) 80 MG tablet TAKE ONE TABLET BY MOUTH   EVERY NIGHT AT BEDTIME 90 tablet 3     warfarin (COUMADIN) 2.5 MG tablet Take 1/2 to 1 tablets (1.25 to 2.5 mg) by mouth daily. Adjust dose based on INR results as directed. 90 tablet 1     No current facility-administered medications for this visit.      DETAILED EXAMINATION  07/30/18  :  Vitals:    07/30/18 1137   BP: 120/70   Patient Site: Right Arm   Patient Position: Sitting   Cuff Size: Adult Large   Pulse: 94   Weight: (!) 297 lb (134.7 kg)     Alert oriented. Head including the face is examined for malar rash, heliotropes, scarring, lupus pernio. Eyes examined for redness such as in episcleritis/scleritis, periorbital lesions.   Neck examined  for range of motion Both upper and lower extremities (all four) examined for swollen, warm &/or  tender joints, range of motion, rash, muscle weakness, edema. The salient normal / abnormal findings are appended.  She has marked tenderness of the joint line is in the knees bilaterally worse on the medial but also in the lateral aspect.  There is no effusion that is detectable however adipose tissue includes some of the usual landmarks.  LAB / IMAGING DATA:  ALT   Date Value Ref Range Status   09/10/2017 17 0 - 45 U/L Final   04/28/2017 18 0 - 45 U/L Final   11/03/2016 18 0 - 45 U/L Final     Albumin   Date Value Ref Range Status   09/10/2017 3.8 3.5 - 5.0 g/dL Final   04/28/2017 3.9 3.5 - 5.0 g/dL Final   11/03/2016 4.0 3.5 - 5.0 g/dL Final     Creatinine   Date Value Ref Range Status   07/12/2018 1.13 (H) 0.60 - 1.10 mg/dL Final   06/01/2018 1.11 (H) 0.60 - 1.10 mg/dL Final   12/20/2017 0.86 0.60 - 1.10 mg/dL Final       WBC   Date Value Ref Range Status   07/12/2018 10.4 4.0 - 11.0 thou/uL Final   06/01/2018 9.7 4.0 - 11.0 thou/uL Final   01/06/2015 10.1 4.0 - 11.0 thou/uL Final     Hemoglobin   Date Value Ref Range Status   07/12/2018 10.9 (L) 12.0 - 16.0 g/dL Final    06/01/2018 9.7 (L) 12.0 - 16.0 g/dL Final   12/20/2017 11.1 (L) 12.0 - 16.0 g/dL Final     Platelets   Date Value Ref Range Status   07/12/2018 404 140 - 440 thou/uL Final   06/01/2018 430 140 - 440 thou/uL Final   12/20/2017 425 140 - 440 thou/uL Final       No results found for: SHAE, RF, SEDRATE

## 2021-06-19 NOTE — PROGRESS NOTES
Assessment/Plan:      Diagnoses and all orders for this visit:    Lumbar spine pain  -     OPS Medial Branch Block Bilateral; Future; Expected date: 7/17/18    Arthropathy of lumbar facet joint  -     OPS Medial Branch Block Bilateral; Future; Expected date: 7/17/18    Spondylolisthesis of lumbar region  -     XR Lumbar Spine Flex and Ext 2 or 3 VWS; Future; Expected date: 7/17/18  -     OPS Medial Branch Block Bilateral; Future; Expected date: 7/17/18    Myofascial pain    Bilateral carpal tunnel syndrome        Assessment: Pleasant 73-year-old female with a history of osteoarthritis of the knees, depression, hypertension, sleep apnea, hypothyroidism, hyper lipidemia,   pulmonary embolism with:       1. Chronic lumbar spine pain for at least 20+ years worsening recently.  Most consistent with facet arthropathy and myofascial pain.    Degenerative disc disease in the lower lumbar spine were significantly severe facet arthropathy L4-5 as well as L5-S1 with spondylolisthesis L4 and L5.  2.  Myofascial pain lumbar spine.  3.  Bilateral hand numbness and tingling most consistent with carpal tunnel syndrome.  This is chronic..      Discussion:    1.  I discussed the diagnoses and treatment options.  We discussed her response to facet injections which are very favorable for a day and a half and then her pain returned and is quite intrusive on her lifestyle.  We discussed further diagnostics along with interventions.  Difficult for her to get to therapy given her mobility.  Also discussed carpal tunnel syndrome.  2.  Flexion-extension x-rays of lumbar spine to evaluate.  3.  I would like her to start therapy however she does not drive and is difficult to attend right now but she is working on some transportation with her children.  4.  Recommend medial branch blocks bilateral L3, 4, 5 progress to radiofrequency if appropriate.  This will target the L4-5 and L5-S1 facets.  5.  We discussed all tunnel symptoms.  We discussed  options of EMG as well as bracing.  She would like to start conservatively and will start with over-the-counter bracing wearing this at night.  6.  Follow-up with me 2-4 weeks after intervention such as radiofrequency if appropriate.      It was our pleasure caring for your patient today, if there any questions or concerns please do not hesitate to contact us.      Subjective:   Patient ID: Nancy Oropeza is a 73 y.o. female.    History of Present Illness: Patient presents for evaluation of low back pain.  Symptoms have worsened since last visit.  This is across the lumbosacral junction bilaterally.  Seems to be worse with any standing or walking.  Difficult to walk even a block without pain.  Better with sitting.  She does have some knee pain bilaterally but her back pain is at the lumbosacral junction there is no radiation into the legs.  No numbness or tingling into the legs.  She takes Tylenol 6 per day.  Her pain can reach an 8/10 at worst.    Since her last visit she did have facet injections at L4-5 bilaterally.  This gave her a day and a half of complete relief of symptoms and then her symptoms returned.  She is very pleased for that day and a half and took no medication during that time.    She also complains of bilateral hand numbness and tingling digits 1-3.  This is been present for years but worsening.  Wakes her up with numbness and tingling.  Also worse with activity such as talking on the phone or reading the paper.  Reports having potential EMG several years ago.  Does not remember the results.    Imaging: MRI report and images were personally reviewed and discussed with the patient.  A plastic model was utilized during the discussion.  MRI of the lumbar spine performed at [facility].  Degenerative disc disease lumbar spine throughout.  Severe facet arthropathy L4-5 with grade 1 spondylolisthesis.  Broad-based disc bulge results in lateral recess stenosis.  Significant facet arthropathy with fusion  L5-S1 bilaterally.    Review of Systems: Complains of numbness and tingling in the hands.  Weakness.  She has headaches and poor balance.  No bowel or bladder incontinence dizziness nausea vomiting or blurred vision.    Past Medical History:   Diagnosis Date     Depression      Disease of thyroid gland      HTN (hypertension)      Hypercholesteremia      Morbid obesity (H)      Pulmonary emboli (H) 12/14/2015     Yeast infection        The following portions of the patient's history were reviewed and updated as appropriate: allergies, current medications, past family history, past medical history, past social history, past surgical history and problem list.      Objective:   Physical Exam:    Vitals:    07/17/18 0803   BP: 150/68       General: Alert and oriented with normal affect.  Overweight, attention, knowledge, memory, and language are intact. No acute distress.   Eyes: Sclerae are clear.  Respirations: Unlabored. CV: No lower extremity edema.   Gait:  Nonantalgic  Tenderness at L4-5 and L5-S1 paraspinals.  Positive pain with facet loading bilaterally.  Mild decreased lumbar flexion on finger to floor testing  Negative Tinel's at the wrist positive Phalen's.  Sensation is intact to light touch throughout the upper and lower extremities.  Reflexes are 2+ and symmetric in the biceps triceps and brachioradialis with negative Hoffmans. 1+ patellar and 0 Achilles .    Manual muscle testing reveals:  Right /Left out of 5     5/5 elbow flexors  5/5 elbow extensors  5/5 wrist extensors  5/5 interosseus  5/5 finger flexors  5/5 hip flexors  5/5 knee flexors  5/5 knee extensors  5/5 ankle plantar flexors  5/5 ankle dorsiflexors  5/5  EHL

## 2021-06-19 NOTE — LETTER
Letter by Julien Garnica MD at      Author: Julien Garnica MD Service: -- Author Type: --    Filed:  Encounter Date: 7/22/2019 Status: (Other)         July 22, 2019     Patient: Nancy Oropeza   YOB: 1945       Order: Tubigrip    Size: G   Dispense: 1 box  Diagnosis: S/P total bilateral knee replacement (Z96.653)          Rehana Luna, CNP: ____________________________________________ Date: ___________________  NPI: [id number]

## 2021-06-20 NOTE — LETTER
Letter by Julien Garnica MD at      Author: Julien Garnica MD Service: -- Author Type: --    Filed:  Encounter Date: 9/9/2020 Status: (Other)         Nancy Oropeza  [address]  [facility] [address]             September 9, 2020         Dear Ms. Oropeza,    Below are the results from your recent visit:    Resulted Orders   Basic Metabolic Panel   Result Value Ref Range    Sodium 140 136 - 145 mmol/L    Potassium 4.0 3.5 - 5.0 mmol/L    Chloride 100 98 - 107 mmol/L    CO2 28 22 - 31 mmol/L    Anion Gap, Calculation 12 5 - 18 mmol/L    Glucose 88 70 - 125 mg/dL    Calcium 10.6 (H) 8.5 - 10.5 mg/dL    BUN 25 8 - 28 mg/dL    Creatinine 0.89 0.60 - 1.10 mg/dL    GFR MDRD Af Amer >60 >60 mL/min/1.73m2    GFR MDRD Non Af Amer >60 >60 mL/min/1.73m2    Narrative    Fasting Glucose reference range is 70-99 mg/dL per  American Diabetes Association (ADA) guidelines.   HM2(CBC w/o Differential)   Result Value Ref Range    WBC 15.4 (H) 4.0 - 11.0 thou/uL    RBC 3.93 3.80 - 5.40 mill/uL    Hemoglobin 10.8 (L) 12.0 - 16.0 g/dL    Hematocrit 33.5 (L) 35.0 - 47.0 %    MCV 85 80 - 100 fL    MCH 27.5 27.0 - 34.0 pg    MCHC 32.2 32.0 - 36.0 g/dL    RDW 15.2 (H) 11.0 - 14.5 %    Platelets 467 (H) 140 - 440 thou/uL    MPV 7.2 7.0 - 10.0 fL   Lipid Cascade   Result Value Ref Range    Cholesterol 200 (H) <=199 mg/dL    Triglycerides 131 <=149 mg/dL    HDL Cholesterol 62 >=50 mg/dL    LDL Calculated 112 <=129 mg/dL    Patient Fasting > 8hrs? Yes    Erythrocyte Sedimentation Rate   Result Value Ref Range    Sed Rate 38 (H) 0 - 20 mm/hr   Glycosylated Hemoglobin A1c   Result Value Ref Range    Hemoglobin A1c 5.7 (H) <=5.6 %      Comment:      Normal <5.7% Prediabete 5.7-6.4% Diabletes 6.5% or higher - adopted from ADA consensus guidelines       Your kidney function tests (i.e. Basic Metabolic Panel) were fine.    Your complete blood count results show mild elevation of your white blood count which typically happens while  "on steroid medication.  Make sure that you see the wound care clinic to ensure no concern for significant skin infection involving your abdominal wall.    Your low hemoglobin (anemia) has improved slightly.  We will continue to follow closely.     No evidence for diabetes noted.     Your cholesterol results were near goal.  Continue your current medication as discussed.   Ensure regular exercise, healthy diet, and weight loss modifications in order to further improve.  We will plan to recheck your labs while fasting in the next 6-12 months to ensure desired improvement.       Your \"inflammation test\" (i.e. Sed Rate) remains mild to moderately elevated while on steroid medication.  History of polymyalgia rheumatica.  Please see the rheumatologist 11/24/20 as scheduled.    Please call with questions or contact us using [employer].    Sincerely,        Electronically signed by Julien Garnica MD       "

## 2021-06-20 NOTE — PROGRESS NOTES
Email: [email or web address]  Marital Status:   Children: Yes 2 daughters  Born and Raised:   Occupation: Retired  Living Situation: Lives alone in an apartment in [place]  Smoking, Alcohol, other:  Services receiving: Medicare  CCC/HC Enrollment: 7/20/18  [facility]/HC Follow up s: Every two weeks.  Next follow up date: 10/2/18      Action Plan     RN Will  Be available to the patient as nursing needs arise  Reach out to anticoagulation to report that patient will now begin taking coumadin in the evening.       Care Guide Will  Review goals set at [facility] visit and create or add to action plan. -Done  Reach out to [facility] SW if feel appropriate for patient to see her for assistance with insurance and home care. -Meeting with SW has been rescheduled for 8/20/18.    Action Plan:    will:  1) Follow up on status of [place] choices assessment        Care Guide will:  Care Guide Delegation:   1)  Due Date:  Next outreach        Delegation:  Ask if pt scheduled with [place] choices assessment and update SW-  It will be to expensive to go through my insurance company, so I will see if the lift chair could be covered under the waiver.        2)  Due Date:   Next outreach       Delegation:  Ask if pt contacted DME about order for lift chair

## 2021-06-20 NOTE — PROGRESS NOTES
Assessment/Plan:      Diagnoses and all orders for this visit:    Lumbar spine pain  -     Ambulatory referral to Physical Therapy    Arthropathy of lumbar facet joint  -     Ambulatory referral to Physical Therapy    Spondylolisthesis of lumbar region  -     Ambulatory referral to Physical Therapy    Myofascial pain  -     Ambulatory referral to Physical Therapy    Sacroiliac joint pain  -     Ambulatory referral to Physical Therapy        Assessment: Pleasant 73-year-old female with a history of osteoarthritis of the knees, depression, hypertension, sleep apnea, hypothyroidism, hyper lipidemia,   pulmonary embolism with:    1.  Chronic lumbar spine pain for the past 20+ years.  Has worsened recently.  At the lumbosacral junction SI and lower lumbar spine.  Multifactorial.  Likely is a large component of sacroiliac joint pain and myofascial pain.  2.  L4-5 spondylolisthesis with significant facet arthropathy L4-5 and L5-S1.  Very good response to medial branch blocks initially but confirmatory blocks only gave 40% relief very temporarily.  I believe the facet arthropathy contributes to her pain but is not complete picture.  3.  She does have spinal stenosis at L4-5 related to the facet arthropathy and spondylolisthesis.  This is most significant in the lateral recess is classified as moderate to severe.  No claudication type symptoms unsure significance at this time.  But may contribute to low back pain.     4.  Bilateral gluteal and greater trochanter pain along with arm pain and diffuse myofascial pain.  May have a component of fibromyalgia type widespread myofascial pain.      Discussion:    1.  I discussed the diagnosis and treatment options with the patient and her daughter.  We discussed this at length today.  We discussed fibromyalgia/widespread myofascial pain.  We discussed options for the lumbar spine including further injections such as SI joint injections versus lumbar epidurals.  2.  I will recheck to  Dr. Garnica.  A trial of Cymbalta in place of citalopram may be a good option for her to help with some of her widespread myofascial pain symptoms along with degenerative changes in the lumbar spine and even her knee pain.  3.  We discussed activity pacing along with sleep hygiene and low inflammatory diet with regards to fibromyalgia/widespread myofascial pain.  4.  I would like her to start [facility] physical therapy program and get a home exercise program for her pool at her local exercise center.  [facility] therapy order provided.  5.  We discussed options such as SI joint injection versus lumbar epidural and even bursa injections in the greater trochanters but will hold off on steroid injections at this time.  6.  Follow-up with me in 6-8 weeks.      30 minutes were spent with this patient in addition to any procedure with greater than 50% in counseling and coordination of care.          It was our pleasure caring for your patient today, if there any questions or concerns please do not hesitate to contact us.      Subjective:   Patient ID: Nancy Oropeza is a 73 y.o. female.    History of Present Illness: Patient presents for follow-up of low back pain and gluteal pain.  Presents with her daughter who does have several questions and answer some questions for me today provide some of the history.  Patient has chronic low back pain at the lumbosacral junction but also points the SI joints gluteal tissues greater trochanters.  She does have some generalized pain as well in the shoulders legs and back.  She has poor sleep and if she overdoes her activity she has significant flare for several days after.    Since her last visit she had medial branch blocks and the initial blocks she had a very good reduction of pain for 1 week.  Her pain went down to 0 and the pain diary.  Confirmatory blocks however her pain dropped from a 5 to a 3 for several hours and then returned.  She is not pleased with the amount of pain relief  from the confirmatory blocks.  Her pain today is a 6/10 6/10 at worst 4/10 at best.  Worse with any bending standing and walking.  Better with Tylenol extra strength.  She has no radiation down the legs numbness tingling or weakness.      Imaging: I personally reviewed the lumbar MRI images.  This shows spondylolisthesis L4 and L5 with at least moderate lateral recess stenosis.  There is significant facet arthropathy L4-5 and L5-S1.    Flexion-extension x-rays personally reviewed from [facility] [place].  No instability from flexion to extension.    Review of Systems: She has no weakness in her legs.  She has pain in her knees.  She has no numbness or tingling in her legs.  No bowel or bladder incontinence dizziness nausea vomiting blurred vision.  She has poor balance.      Past Medical History:   Diagnosis Date     Depression      Disease of thyroid gland      HTN (hypertension)      Hypercholesteremia      Morbid obesity (H)      Pulmonary emboli (H) 12/14/2015     Yeast infection        The following portions of the patient's history were reviewed and updated as appropriate: allergies, current medications, past family history, past medical history, past social history, past surgical history and problem list.      Objective:   Physical Exam:    Vitals:    08/22/18 1503   BP: 128/78   Pulse: 98       General: Alert and oriented with normal affect.  Obese, attention, knowledge, memory, and language are intact. No acute distress.   Eyes: Sclerae are clear.  Respirations: Unlabored. CV: Does appear to have 1-2+ lower extremity nonpitting edema.  Gait: Cautious, nonantalgic  Negative supine straight leg raise bilaterally.  Tenderness to light palpation over the gluteal tissues greater trochanters SI region PSIS lumbar paraspinals.  She does have some low back pain but mostly lateral hip pain with Jose's position.  Sensation is intact to light touch throughout the  lower extremities.  Reflexes are   1+ patellar and 0Achilles       Manual muscle testing reveals:  Right /Left out of 5     5/5 hip flexors  5/5 knee flexors  5/5 knee extensors  5/5 ankle plantar flexors  5/5 ankle dorsiflexors  5/5  L

## 2021-06-20 NOTE — PROGRESS NOTES
Assessment/Plan:      Diagnoses and all orders for this visit:    Lumbar spine pain  -     OPS Joint Injection Sacroiliac Joint Bilateral; Future; Expected date: 9/14/18    Septic pulmonary embolism with acute cor pulmonale, unspecified chronicity (H)  -     POCT INR    Sacroiliac joint pain  -     OPS Joint Injection Sacroiliac Joint Bilateral; Future; Expected date: 9/14/18    Acute pain of left shoulder  -     XR Shoulder Left 2 or More VWS; Future; Expected date: 9/14/18    Bilateral knee pain  -     XR KNEE BILATERAL AP STANDING W SUNRISE; Future; Expected date: 9/14/18  -     XR KNEE BILATERAL AP STANDING W SUNRISE; Standing  -     XR KNEE BILATERAL AP STANDING W SUNRISE    Myalgia  -     Erythrocyte Sedimentation Rate; Future  -     C-Reactive Protein; Future        Assessment: Pleasant 73-year-old female with a history of osteoarthritis of the knees, depression, hypertension, sleep apnea, hypothyroidism, hyper lipidemia,   pulmonary embolism with:    1.  Worsening lumbar spine pain at the lumbosacral junction over the SI joints.  She has chronic pain for the past 20+ years which has progressively worsened.  Multifactorial.  She does have SI joint pain today.    2.  L4-5 spondylolisthesis with significant facet arthropathy L4-5 and L5-S1.  Very good response to medial branch blocks initially but confirmatory blocks only gave 40% relief very temporarily.  I believe the facet arthropathy contributes to her pain but is not complete picture.  3.  She does have spinal stenosis at L4-5 related to the facet arthropathy and spondylolisthesis.  This is most significant in the lateral recess is classified as moderate to severe.  No claudication type symptoms unsure significance at this time.  But may contribute to low back pain.     4.  Chronic left shoulder pain with acute flare over the past several weeks.  Most consistent with glenohumeral joint osteoarthritis.    5.  Chronic bilateral knee pain treated with  rheumatology worsened recently especially on the right.    6.  Myalgias.    7.  She does likely have a component of fibromyalgia type chronic widespread myofascial pain.      Discussion:    1.  We discussed the diagnosis of the shoulder knees and low back.  We discussed her recent trial of Cymbalta   .  She has been unable to do physical therapy secondary to climbing the stairs from her knee pain.      2.  Labs as above to evaluate for polymyalgia rheumatica.    3.  Will order bilateral sacroiliac joint injections to help with some of the low back pain as she is failed medial branch blocks.    4.  Plain films of the bilateral knees and left shoulder.    5.  She will contact her primary care provider and talk about increasing Cymbalta.    6.  INR was slightly elevated today she will talk to her INR clinic.    7.  We will contact her by phone with results of imaging.  If her knee pain has worsened, likely recommend surgical referral she has had physical supplementation and steroid in the past.          It was our pleasure caring for your patient today, if there any questions or concerns please do not hesitate to contact us.      Subjective:   Patient ID: Nancy Oropeza is a 73 y.o. female.    History of Present Illness: Patient presents for evaluation of multiple pain complaints.  Significant flare low back pain which has been worsening with time at the lumbosacral junction and gluteal region.  She points to the lumbar sacral junction to the SI joints.  Pain is an 8/10 today 10/10 at worst.  Worse with leaning back standing and walking.  Has not been able to do physical therapy as her knee pain is worsened with doing the stairs in the pool.  Unable to climb in and out of the pool.  She has started Cymbalta per PCP has not helped much.  She is on 30 mg.  Wondering if she can get an injection.    She also has had increase in left shoulder pain over the past couple of weeks.  Has flared off and on over time but she  complains of significant pain with any raising of her left arm.  She has pain diffusely in the arms and legs but the right shoulder is better than the left.    She also has bilateral knee pain which is chronic.  Has had rheumatology inject her knees with steroid and reports Visco supplementation in the past.    Imaging: Lumbar MRI personally reviewed showing spondylolisthesis L4-5 facet arthropathy L4-5 and L5-S1.  Moderate lateral recess stenosis L4-5.  This is done at [facility].    She has had bilateral x-rays of her knees in 2015 showing spurring medial joint space narrowing bilaterally.    Review of Systems: Complains of numbness and tingling and weakness in her arms.  She has poor balance.  No bowel or bladder incontinence headache dizziness nausea vomiting or blurred vision.    Past Medical History:   Diagnosis Date     Depression      Disease of thyroid gland      HTN (hypertension)      Hypercholesteremia      Morbid obesity (H)      Pulmonary emboli (H) 12/14/2015     Yeast infection        The following portions of the patient's history were reviewed and updated as appropriate: allergies, current medications, past family history, past medical history, past social history, past surgical history and problem list.      Objective:   Physical Exam:    Vitals:    09/14/18 1110   BP: 135/79   Pulse: 92       General: Alert and oriented with normal affect.  Overweight, attention, knowledge, memory, and language are intact. No acute distress.   Eyes: Sclerae are clear.  Respirations: Unlabored. CV: No lower extremity edema.   Gait: Cautious, waddling gait.  Decreased range of motion left shoulder internal rotation negative arm drop and empty can although these cause pain  .  Crepitus on range of motion of the left shoulder.  She also has pain to bilateral knees with palpation likely effusion difficult to assess given body habitus.  Tenderness lumbar spine at the SI joints  Lumbosacral junction.  Pain in the low back with  Jose's position and with thigh thrust bilaterally.  Negative AP compression.  Difficult to further assess given her body habitus.    Sensation is intact to light touch throughout the lower extremities.  Reflexes are1  patellar and 0 Achilles      Manual muscle testing reveals:  Right /Left out of 5  5/5 shoulder abductors       5/5 knee extensors  5/5 ankle plantar flexors  5/5 ankle dorsiflexors  5/5    ankle evertors

## 2021-06-20 NOTE — PROGRESS NOTES
Email: [email or web address]  Marital Status:   Children: Yes 2 daughters  Born and Raised:   Occupation: Retired  Living Situation: Lives alone in an apartment in [place]  Smoking, Alcohol, other:  Services receiving: Medicare  [facility] Enrollment: 7/20/18  [facility] Follow up s: Monthly  Next follow up date: 11/16/18

## 2021-06-20 NOTE — LETTER
Letter by Julien Garnica MD at      Author: Julien Garnica MD Service: -- Author Type: --    Filed:  Encounter Date: 8/10/2020 Status: (Other)         Nancy Oropeza  [address]  [facility] [address]             August 10, 2020         Dear Ms. Oropeza,    Below are the results from your recent visit:    Resulted Orders   HM2(CBC w/o Differential)   Result Value Ref Range    WBC 9.9 4.0 - 11.0 thou/uL    RBC 3.55 (L) 3.80 - 5.40 mill/uL    Hemoglobin 10.0 (L) 12.0 - 16.0 g/dL    Hematocrit 29.2 (L) 35.0 - 47.0 %    MCV 82 80 - 100 fL    MCH 28.1 27.0 - 34.0 pg    MCHC 34.1 32.0 - 36.0 g/dL    RDW 13.5 11.0 - 14.5 %    Platelets 523 (H) 140 - 440 thou/uL    MPV 6.8 (L) 7.0 - 10.0 fL       Your complete blood count results show improvement in your hemoglobin.  It remains low.  We will continue to follow closely.     Please call with questions or contact us using [employer].    Sincerely,        Electronically signed by Julien Garnica MD

## 2021-06-20 NOTE — LETTER
Letter by Julien Garnica MD at      Author: Julien Garnica MD Service: -- Author Type: --    Filed:  Encounter Date: 9/17/2020 Status: (Other)         September 17, 2020     Patient: Nancy Oropeza   YOB: 1945           To Whom It May Concern:    I am the primary care provider for Nancy Oropeza. She has a history of polymyalgia rheumatica, osteoarthritis of the knee, arthralgias of multiple sites, and back pain. Due to these conditions she has difficulty getting in and out of chairs. It is in my medical opinion Nancy Oropeza would benefit from a lift chair given these chronic medical conditions.     If you have any questions or concerns, please don't hesitate to call.    Sincerely,        Electronically signed by Julien Garnica MD

## 2021-06-20 NOTE — PROGRESS NOTES
Scheduled F/U Call:  Attempt 2  Care Guide called patient.  If this patient is returning our call please transfer to Martine Rick at ext [phone].  Email: [email or web address]  Marital Status:   Children: Yes 2 daughters  Born and Raised:   Occupation: Retired  Living Situation: Lives alone in an apartment in [place]  Smoking, Alcohol, other:  Services receiving: Medicare  [facility] Enrollment: 7/20/18  [facility]/HC Follow up s: Every two weeks.  Next follow up date: 9/5/18    Action Plan     RN Will  Be available to the patient as nursing needs arise  Reach out to anticoagulation to report that patient will now begin taking coumadin in the evening.       Care Guide Will  Review goals set at PCAM visit and create or add to action plan. -Done  Reach out to [facility] SW if feel appropriate for patient to see her for assistance with insurance and home care. -Meeting with SW has been rescheduled for 8/20/18.

## 2021-06-20 NOTE — PROGRESS NOTES
Nancy Oropeza is here today for bilateral SI joint injections.  The patient is not able to have the injection today because her INR was 3.1 (3.1 on recheck), which is outside of the high limit for our safety protocol for this injection.  The patient will be rescheduled after she contacts the provider who manages her warfarin to discuss any possible dose adjustments to bring her INR down.  The patient was encouraged to contact the [facility] if she had any questions or concerns prior to being seen back for her SI joint injections.  The patient verbalized understanding of the plan.    No charge for today s visit.

## 2021-06-20 NOTE — LETTER
"Letter by Julien Garnica MD at      Author: Julien Garnica MD Service: -- Author Type: --    Filed:  Encounter Date: 3/17/2020 Status: (Other)         Nancy Oropeza  [address]  [facility] [address]             March 17, 2020         Dear Ms. Oropeza,    Below are the results from your recent visit:    Resulted Orders   Basic Metabolic Panel   Result Value Ref Range    Sodium 142 136 - 145 mmol/L    Potassium 3.7 3.5 - 5.0 mmol/L    Chloride 99 98 - 107 mmol/L    CO2 31 22 - 31 mmol/L    Anion Gap, Calculation 12 5 - 18 mmol/L    Glucose 96 70 - 125 mg/dL    Calcium 10.5 8.5 - 10.5 mg/dL    BUN 28 8 - 28 mg/dL    Creatinine 0.94 0.60 - 1.10 mg/dL    GFR MDRD Af Amer >60 >60 mL/min/1.73m2    GFR MDRD Non Af Amer 58 (L) >60 mL/min/1.73m2    Narrative    Fasting Glucose reference range is 70-99 mg/dL per  American Diabetes Association (ADA) guidelines.   HM2(CBC w/o Differential)   Result Value Ref Range    WBC 12.0 (H) 4.0 - 11.0 thou/uL    RBC 3.95 3.80 - 5.40 mill/uL    Hemoglobin 11.0 (L) 12.0 - 16.0 g/dL    Hematocrit 33.1 (L) 35.0 - 47.0 %    MCV 84 80 - 100 fL    MCH 27.8 27.0 - 34.0 pg    MCHC 33.2 32.0 - 36.0 g/dL    RDW 13.9 11.0 - 14.5 %    Platelets 352 140 - 440 thou/uL    MPV 7.4 7.0 - 10.0 fL   Glycosylated Hemoglobin A1c   Result Value Ref Range    Hemoglobin A1c 6.2 (H) 3.5 - 6.0 %   Erythrocyte Sedimentation Rate   Result Value Ref Range    Sed Rate 39 (H) 0 - 20 mm/hr       Your kidney function remains mildly reduced.  It appears stable.  Ensure adequate hydration.  We will continue to follow closely.     Your complete blood count results show mildly increased white blood count (likely due to prednisone use) as well as mildly reduced hemoglobin.  They both appear stable.  We will continue to follow closely.     Your labs suggest \"pre-diabetes\" while on prednisone which increases your blood sugar.   Goal \"average blood sugar\" (i.e. A1c) is < 5.7%.  Your A1c was 6.2%. Ensure " "regular exercise, healthy diet, and weight loss modifications in order to further improve.   We will continue to follow closely.      Your \"inflammation test\" (i.e. Sed Rate) remains mildly elevated.  It is stable.  Continue your current medication for polymyalgia rheumatica as discussed.     Please call with questions or contact us using [employer].    Sincerely,        Electronically signed by Julien Garnica MD       "

## 2021-06-20 NOTE — PROGRESS NOTES
Assessment/Plan:    1. Chronic pain syndrome  Chronic pain syndrome reviewed.  Using hydrocodone acetaminophen 7.5/325 using 2 tablets twice daily with third dose available on as-needed basis.  Mild sedation only.  Prophylactic management to avoid constipation reviewed.  Reassess at follow-up office visit in 3 months.    2. Anxiety  Per patient request will discontinue duloxetine 60 mg daily and resume citalopram 40 mg use half tablet daily times 2 weeks then 1 tablet daily following which has been more beneficial for anxiety and depression management.    3. Major depressive disorder, recurrent episode  Per patient request will discontinue duloxetine 60 mg daily and resume citalopram 40 mg use half tablet daily times 2 weeks then 1 tablet daily following which has been more beneficial for anxiety and depression management.  - citalopram (CELEXA) 40 MG tablet; Take 1 tablet (40 mg total) by mouth daily.  Dispense: 90 tablet; Refill: 3    4. Essential hypertension with goal blood pressure less than 140/90  Hypertension, stable.  Continues lisinopril hydrochlorothiazide 20/12.5 using 2 tablets daily and amlodipine 5 mg daily.    5. Need for vaccination  High-dose flu shot given today.  - Influenza High Dose, Seasonal 65+ yrs    The following are part of a depression follow up plan for the patient:  mental health care assessment         Subjective:    Nancy Oropeza is seen today for follow-up evaluation.  Chronic pain has improved since increasing hydrocodone acetaminophen 7.5/325 from 1 tablet up to 2 tablets and now using twice daily with third dose available on as-needed basis however not consistent.  Depression and anxiety with some suboptimal control of duloxetine which she had switch to in place of citalopram to see if benefits as well with chronic pain.  Patient requesting resumption of citalopram for depression and anxiety management.  Questions regarding antihypertensive medication.  States blood pressure  "well controlled on current dosing having decreased amlodipine down to 5 mg daily plus continuing lisinopril hydrochlorothiazide 20/12.5 using 2 tablets daily.  Furosemide available 20 mg use 1 or 2 tablets every morning as needed and has been more consistent with this.  No orthopnea or PND.  Needs flu shot today.  No longer being seen by Dr. Willams.  Went to [facility] and received 3 \"numbing shots\".  Told that this would last hopefully 6 months up to a year.  Main require total knee arthroplasties in the future for osteoarthritis management however trying to avoid this.  Is on warfarin for prior pulmonary emboli and agrees to continue this currently without bleeding concerns identified.    Previously seen by Dr. Donnell Funez (after Dr. Rigo Mcclain x 32 years)   Grew up in [place], [place] til age 12...      5 children - son with Factor V abnormality  14 grandchildren   9 great-grandchildren   No smoke (quit 16 years ago after 1 ppd x 30+ years)   EtOH: occ wine   Mom -  88 COPD   Dad -  61 massive MI   1 bro -  67 blood clot (lung)   2 sis - one of which is  age 62 small cell lung CA (h/o smoker)  Surgeries: ventral hernia repair with muscle flap 10/4/12 with post-op complication of seroma with J.P. drain in place followed by interventional radiology q 2 weeks);  age 25; groin hernia age 3; CAPRICE-BSO  by Dr. Herbert Carmen (due to benign cyst x 2);   Hospitalizations: as above   Work: retired  ([employer] as )     Past Surgical History:   Procedure Laterality Date     BREAST BIOPSY Left 1970s     HYSTERECTOMY  2010     OOPHORECTOMY  2010        Family History   Problem Relation Age of Onset     Breast cancer Sister 75     Stomach cancer Paternal Grandfather      Lung cancer Sister         Past Medical History:   Diagnosis Date     Depression      Disease of thyroid gland      HTN (hypertension)      Hypercholesteremia      Morbid obesity " (H)      Pulmonary emboli (H) 12/14/2015     Yeast infection         Social History   Substance Use Topics     Smoking status: Former Smoker     Smokeless tobacco: Never Used      Comment: quite 20 yrs ago     Alcohol use No      Comment: occasionally        Current Outpatient Prescriptions   Medication Sig Dispense Refill     amLODIPine (NORVASC) 5 MG tablet Take 1 tablet (5 mg total) by mouth daily. 90 tablet 3     cholecalciferol, vitamin D3, 1,000 unit tablet Take 1,000 Units by mouth daily. 2 capsules once a day       furosemide (LASIX) 20 MG tablet Take 1 tablet (20 mg total) by mouth daily. 90 tablet 1     HYDROcodone-acetaminophen (NORCO) 7.5-325 mg per tablet Take 2 tablets by mouth 3 (three) times a day as needed for pain. 120 tablet 0     levothyroxine (SYNTHROID, LEVOTHROID) 125 MCG tablet TAKE ONE TABLET BY MOUTH   EVERY DAY 90 tablet 2     lisinopril-hydrochlorothiazide (PRINZIDE,ZESTORETIC) 20-12.5 mg per tablet Take 2 tablets by mouth daily. 180 tablet 3     pravastatin (PRAVACHOL) 80 MG tablet TAKE ONE TABLET BY MOUTH   EVERY NIGHT AT BEDTIME 90 tablet 3     warfarin (COUMADIN) 2.5 MG tablet Take 1/2 to 1 tablets (1.25 to 2.5 mg) by mouth daily. Adjust dose based on INR results as directed. 90 tablet 1     citalopram (CELEXA) 40 MG tablet Take 1 tablet (40 mg total) by mouth daily. 90 tablet 3     No current facility-administered medications for this visit.           Objective:    Vitals:    10/03/18 1441   BP: 128/68   Pulse: 76   SpO2: 94%   Weight: (!) 303 lb 6.4 oz (137.6 kg)      Body mass index is 46.13 kg/(m^2).    Alert.  No apparent distress at rest however does transfer somewhat slowly.  Morbid obesity with BMI 46.13.  Chest clear.  Cardiac exam regular.  Extremities warm and dry.  Does not appear sedated.  No psychomotor agitation.  Cooperative and forthcoming.  Patient's daughter is present today.      This note has been dictated using voice recognition software and as a result may  contain minor grammatical errors and unintended word substitutions.

## 2021-06-20 NOTE — PROGRESS NOTES
Email: [email or web address]  Marital Status:   Children: Yes 2 daughters  Born and Raised:   Occupation: Retired  Living Situation: Lives alone in an apartment in [place]  Smoking, Alcohol, other:  Services receiving: Medicare  [facility] Enrollment: 7/20/18  [facility]/HC Follow up s: Every two weeks.  Next follow up date: 9/5/18    Action Plan     RN Will  Be available to the patient as nursing needs arise  Reach out to anticoagulation to report that patient will now begin taking coumadin in the evening.       Care Guide Will  Review goals set at PCAM visit and create or add to action plan. -Done  Reach out to [facility] SW if feel appropriate for patient to see her for assistance with insurance and home care. -Meeting with SW has been rescheduled for 8/20/18.    Action Plan:    will:  1) Follow up on status of [place] choices assessment        Care Guide will:  Care Guide Delegation:   1)  Due Date:  Next outreach        Delegation:  Ask if pt scheduled with [place] assessment and update SW-  It will be to expensive to go through my insurance company, so I will see if the lift chair could be covered under the waiver.        2)  Due Date:   Next outreach       Delegation:  Ask if pt contacted DME about order for lift chair

## 2021-06-21 NOTE — LETTER
"Letter by Jenn Nuñez NP at      Author: Jenn Nuñez NP Service: -- Author Type: --    Filed:  Encounter Date: 10/23/2020 Status: (Other)       10/23/2020    [facility]  Fax: [phone] Wound Dressing Rx and Order Form  Customer Service: [phone] Order Status: New Order   Verbal: Irene            Patient Info:  Name: Nancy Oropeza  : 1945  [address]  [place] [id number]  Phone: [phone]      Insurance Info:  Primary: Payor: MEDICA / Plan: MEDICA MEDICARE ADVANTAGE / Product Type: MEDICARE ADVANTAGE /    Secondary: [place]ICA[place]  [id number] - ([employer]/[facility])      Physician Info:   Name:  Jenn Nuñez NP   Dept Address/Phones:   [address] [place] [address], [address]  [facility] [address] [phone]  Fax: [phone]    Lymphedema circumferential measurements (in cm):  No data recorded  No data recorded    Wound info:  Encounter Diagnoses   Name Primary?   ? Open wound of abdominal wall, initial encounter Yes   ? Current use of steroid medication    ? Obesity, Class III, BMI 40-49.9 (morbid obesity) (H)    ? Chronic anticoagulation    ? Injection site reaction, sequela      VASC Wound 10/23/20 ABD (Active)   Pre Size Length 1 10/23/20 0900   Pre Size Width 1 10/23/20 0900   Pre Size Depth 0.1 10/23/20 0900   Pre Total Sq cm 1 10/23/20 0900     Drainage: Moderate  Thickness:  Full  Duration of Need: 30  Days Supply: 30  Start Date: 10/23/2020  Starter Kit: Ancillary Kit (saline, gloves, gauze)  Qualifying wound/Debridement Yes      Dressing Type Brand Size Number of pieces Frequency of change   Primary Endoform collagen fenestrated  2\"x2\" 6 every 3 days   Secondary Bordered foam adhesive Allevyn 4\"x4\" Max allowed every 3 days   cleaning Normal saline bullets  5ml 1 box every 3 days   cleaning Square gauze  4\"x4\" 1 loaf every 3 days     Note: If total out of pocket is more than $50.00 please contact the " patient before processing order.     OK to forward to covered supplier.     Electronically Signed Physician: Jenn Nuñez NP Date: 10/23/2020

## 2021-06-21 NOTE — PROGRESS NOTES
Email: [email or web address]  Marital Status:   Children: Yes 2 daughters  Born and Raised:   Occupation: Retired  Living Situation: Lives alone in an apartment in [place]  Smoking, Alcohol, other:  Services receiving: Medicare  [facility] Enrollment: 7/20/18  [facility] Follow up s: Monthly  Next follow up date: 12/27/18

## 2021-06-21 NOTE — LETTER
Letter by Julien Garnica MD at      Author: Julien Garnica MD Service: -- Author Type: --    Filed:  Encounter Date: 4/28/2021 Status: (Other)             April 29, 2021      To whom it may concern,    Please change the fax number to [phone] for INR results. This is our Anticoagulation department and they manage INR results.     Sincerely,        Electronically signed by Julien Garnica MD

## 2021-06-21 NOTE — PROGRESS NOTES
Subjective:      Patient ID: Nancy Oropeza is a 73 y.o. female.    Chief Complaint:    HPI Nancy Oropeza is a 73 y.o. female who presents today complaining of congestion and frontal HA x 1 week. She is chronically on Norco, so she has not taken any new pain meds.  She cannot take ibuprofen or NSAIDs because of her warfarin.  She has been using steam and a nasal spray for her sxs, she is unsure what is called but she knows it is not Flonase.  She has also been trying steam to help with her symptoms.  She states that the inside of her nostrils feel very dry and burn and she has tenderness to palpation of her forehead.  Things seem to be getting better, but then they suddenly worsened over the past couple of days.  She reports subjective fever, chills, nausea, photophobia, and neck stiffness.  She denies any history of migraine disorder.        Social History   Substance Use Topics     Smoking status: Former Smoker     Smokeless tobacco: Never Used      Comment: quite 20 yrs ago     Alcohol use No      Comment: occasionally       Review of Systems   Constitutional: Positive for chills and fever (subjective).   HENT: Positive for congestion. Negative for ear pain, sinus pain, sinus pressure and sore throat.    Eyes: Positive for photophobia.   Respiratory: Negative for cough.    Gastrointestinal: Positive for nausea. Negative for abdominal pain, diarrhea and vomiting.   Musculoskeletal: Positive for neck stiffness.   Neurological: Positive for headaches (posterior and frontal).       Objective:     /68 (Patient Site: Left Arm, Patient Position: Sitting, Cuff Size: Adult Large)  Pulse 97  Temp 99.2  F (37.3  C) (Oral)   Resp 20  Wt (!) 305 lb 14.4 oz (138.8 kg)  SpO2 96%  BMI 46.51 kg/m2    Physical Exam   Constitutional: She appears well-developed and well-nourished. No distress.   HENT:   Head: Normocephalic and atraumatic.   Right Ear: Tympanic membrane, external ear and ear canal normal.   Left  Ear: Tympanic membrane, external ear and ear canal normal.   Nose: Mucosal edema present. Right sinus exhibits frontal sinus tenderness. Right sinus exhibits no maxillary sinus tenderness. Left sinus exhibits frontal sinus tenderness. Left sinus exhibits no maxillary sinus tenderness.   Mouth/Throat: Uvula is midline. Mucous membranes are dry. No oropharyngeal exudate, posterior oropharyngeal edema or posterior oropharyngeal erythema.   Eyes: Conjunctivae are normal.   Neck: Normal range of motion. Neck supple. Muscular tenderness present. No Brudzinski's sign and no Kernig's sign noted.   Cardiovascular: Normal rate, regular rhythm and normal heart sounds.  Exam reveals no gallop and no friction rub.    No murmur heard.  Pulmonary/Chest: Effort normal and breath sounds normal. No respiratory distress. She has no wheezes. She has no rales.   Lymphadenopathy:     She has no cervical adenopathy.   Skin: She is not diaphoretic.   Psychiatric: She has a normal mood and affect. Her behavior is normal. Judgment and thought content normal.   Nursing note and vitals reviewed.      Assessment:     Procedures  1. Sinusitis  amoxicillin-clavulanate (AUGMENTIN) 875-125 mg per tablet         Patient Instructions   1.  Take antibiotic according to bottle instructions with food to avoid stomach upset.  If you are prone to stomach upset with antibiotics, I recommend adding a probiotic to this regimen.  Culturelle is a trusted brand.  2.  I recommend using Mucinex (guaifenasin) to help thin mucus secretions.  Adding a saline nasal spray can also be helpful with clearing sinus congestion. Continue with with the steam.   3.  Follow-up if you not having any improvement in your symptoms over the next 4 days.

## 2021-06-22 ENCOUNTER — COMMUNICATION - HEALTHEAST (OUTPATIENT)
Dept: FAMILY MEDICINE | Facility: CLINIC | Age: 76
End: 2021-06-22

## 2021-06-22 ENCOUNTER — RECORDS - HEALTHEAST (OUTPATIENT)
Dept: ADMINISTRATIVE | Facility: OTHER | Age: 76
End: 2021-06-22

## 2021-06-22 DIAGNOSIS — I26.99 PULMONARY EMBOLISM, UNSPECIFIED CHRONICITY, UNSPECIFIED PULMONARY EMBOLISM TYPE, UNSPECIFIED WHETHER ACUTE COR PULMONALE PRESENT (H): ICD-10-CM

## 2021-06-22 LAB — INR PPP: 4 (ref 0.9–1.1)

## 2021-06-22 NOTE — PROGRESS NOTES
Email: [email or web address]  Marital Status:   Children: Yes 2 daughters  Born and Raised:   Occupation: Retired  Living Situation: Lives alone in an apartment in [place]  Smoking, Alcohol, other:  Services receiving: Medicare  [facility] Enrollment: 7/20/18  [facility] Follow up s: Monthly  Next follow up date: 1/28/19

## 2021-06-22 NOTE — TELEPHONE ENCOUNTER
INR result is 2.4  INR   Date Value Ref Range Status   12/28/2018 2.20 (!) 0.9 - 1.1 Final       Will the patient be seen, or did they already see, MD or CNP today? No    Most Recent Warfarin dose day/week  Sunday Monday Tuesday Wednesday Thursday Friday Saturday        2.5 3.75     Sunday Monday Tuesday Wednesday Thursday Friday Saturday   2.5 2.5 3.75           Has the patient missed any doses of Coumadin, Warfarin, Jantoven in the past 7 days? No    Has the patients medications changed since the last visit? No    Has the patient experienced any bleeding recently? No    Has the patient experienced any injuries or illness recently? Yes bruise on back side of lower right leg/    Has the patient experienced any 'new' shortness of breath, severe headaches, or changes in vision recently? No    Has the patient had any changes in their diet, or alcohol consumption? No    Is the patient here today to prepare for any type of upcoming surgery, procedure, or for a cardioversion procedure? No    What phone number can we reach the patient at today? home phone listed in demographics.

## 2021-06-22 NOTE — TELEPHONE ENCOUNTER
ANTICOAGULATION  MANAGEMENT- Home Care/Care Facility Result    Assessment     Today's INR result of 2.4 is Therapeutic (goal INR of 2.0-3.0)        Warfarin taken as previously instructed    No new diet changes affecting INR    No new medication/supplements affecting INR    Continues to tolerate warfarin with no reported s/s of bleeding or thromboembolism     Previous INR was Therapeutic    Plan:     Spoke with home care nurse Luis Enrique discussed INR result and instructed:     Warfarin Dosing Instructions: Change warfarin dose to 2.5 mg daily on Mon, Wed, Fri; and 3.75 mg daily rest of week  (0 % change; same amount pt took the past 7 days)    Next INR to be drawn: 1 week at OV 1/9. (Home care discharges today)    Education provided: importance of taking warfarin as instructed    Luis Enrique verbalizes understanding and agrees to warfarin dosing plan.   ?   Vincent Baird RN    Subjective/Objective:      Nancy Oropeza, a 73 y.o. female is established on warfarin.     Home care/care facility RN's report of Nancy INR, recent warfarin dosing, diet changes, medication changes, and symptoms is documented below.    Additional findings: verbally confirmed home dose with Luis Enrique and updated on anticoagulation calendar    Anticoagulation Episode Summary     Current INR goal:   2.0-3.0   TTR:   50.2 % (3 y)   Next INR check:   1/9/2019   INR from last check:   2.40 (1/2/2019)   Weekly max warfarin dose:      Target end date:   12/21/2016   INR check location:      Preferred lab:      Send INR reminders to:   ANTICOAGULATION POOL B (MPW,HUG,STW,RVL,OAK,RLN)    Indications    Pulmonary embolism (H) [I26.99]           Comments:            Anticoagulation Care Providers     Provider Role Specialty Phone number    Julien Garnica MD Referring Family Medicine [phone]

## 2021-06-22 NOTE — PROGRESS NOTES
Preoperative Exam    Scheduled Procedure: right knee TKA  Surgery Date:  12/17/18  Surgery Location: [facility]    Surgeon:  Dr. Small    Assessment/Plan:     1. Preop cardiovascular exam  Patient seen today for scheduled right knee TKA due to osteoarthritis.  No contraindication to scheduled procedure.    2. Osteoarthritis Of The Knee  Bilateral knee osteoarthritis.  Scheduled right total knee arthroplasty as noted above.    3. Chronic pain syndrome  Chronic pain management reviewed.  Patient is requesting refill on hydrocodone acetaminophen 7.5/325 using 2 tablets every 4 hours as needed.  Will provide partial prescription to cover patient pain medication need until surgery scheduled December 17, 2018.  - HYDROcodone-acetaminophen (NORCO) 7.5-325 mg per tablet; Take 2 tablets by mouth every 4 (four) hours as needed for pain Max 8/day..  Dispense: 120 tablet; Refill: 0    4. Septic pulmonary embolism with acute cor pulmonale, unspecified chronicity (H)  History of bilateral pulmonary emboli.  Chronic anticoagulation required.  Has seen Dr. Rooney with [facility] November 26, 2018.  Hypercoagulable workup negative including factor V prothrombin mutation as well as lupus anticoagulant and cardiolipin antibody.  Discussion regarding need for chronic anticoagulation.  Consideration for switching to Xarelto 20 mg daily without need for loading dose per hematologist, Dr. Rooney.  Patient will contact hematologist to determine if appropriate to start Xarelto prior to scheduled surgery however is inclined to continue warfarin anticoagulation with Lovenox bridging with anticoagulation nurse management and subsequently switch to Xarelto following surgery once stable.  INR = 1.6 today.    5. Essential hypertension with goal blood pressure less than 140/90  Hypertension, stable.  Continues lisinopril hydrochlorothiazide 20/12.5 using 2 tablets daily and amlodipine 5 mg daily.  - Basic Metabolic Panel    6.  Hypercholesterolemia  Continues pravastatin 80 mg at bedtime for lipid management.    7. Hypothyroidism, unspecified type  History of hypothyroidism and remains on levothyroxine 125 mcg daily for thyroid replacement.    8. Impaired fasting glucose  History of impaired fasting glucose.  Therapeutic lifestyle changes reviewed.    9. Obstructive Sleep Apnea  Patient states not requiring CPAP for CALE management.    10. Morbid Obesity  History of morbid obesity with BMI 45.16.  Therapeutic lifestyle changes reviewed for weight loss goal.    11. Mild episode of recurrent major depressive disorder (H)  Continue citalopram 40 mg daily for depression.    12. Anticoagulant long-term use  As above, chronic anticoagulation with history of bilateral pulmonary emboli.  Hypercoagulable workup negative.    13. Normochromic normocytic anemia  Check CBC regarding normochromic normocytic anemia  - HM2(CBC w/o Differential)        Surgical Procedure Risk: Vascular/High (reported cardiac risk often > 5%)  Have you had prior anesthesia?: Yes  Have you or any family members had a previous anesthesia reaction:  Yes: hard to wake up  Do you or any family members have a history of a clotting or bleeding disorder?: Yes:  hx of lung PE  Cardiac Risk Assessment: no increased risk for major cardiac complications    Patient approved for surgery with general or local anesthesia.    Please Note:  Patient is taking medications for Chronic Pain.    Functional Status: Independent  Patient plans to recover at a TCU.     Subjective:      Nancy Oropeza is a 73 y.o. female who presents for a preoperative consultation.  History of bilateral knee osteoarthritis.  Scheduled right total knee arthroplasty with Dr. Daniels.  Will likely have left total knee arthroplasty down the road.  Chronic pain syndrome associated with this as well as other arthralgias etc.  Patient is requesting use utilization of hydrocodone is seen in 5/325 typically 2 tablets  every 4 hours as needed.  This does work for pain however does cause her to feel a little loopy and will use lowest effective dose.  Has seen hematologist through [facility] with hypercoagulable workup negative.  Was told that she could discontinue warfarin and switch to Xarelto and use 20 mg daily without need for loading dose.  Remains on chronic meds for hypertension, hypercholesterolemia and hypothyroidism.  History of CALE however does not require CPAP.  Impaired fasting glucose history.  No recent illness.  No bleeding concerns currently.  Comprehensive review of systems as above otherwise all negative.    All other systems reviewed and are negative, other than those listed in the HPI.    Previously seen by Dr. Donnell Funez (after Dr. Rigo Mcclain x 32 years)   Grew up in [place], [place] til age 12...      5 children - son with Factor V abnormality  14 grandchildren   9 great-grandchildren   No smoke (quit 16 years ago after 1 ppd x 30+ years)   EtOH: occ wine   Mom -  88 COPD   Dad -  61 massive MI   1 bro -  67 blood clot (lung)   2 sis - one of which is  age 62 small cell lung CA (h/o smoker)  Surgeries: ventral hernia repair with muscle flap 10/4/12 with post-op complication of seroma with J.P. drain in place followed by interventional radiology q 2 weeks);  age 25; groin hernia age 3; CAPRICE-BSO  by Dr. Herbert Carmen (due to benign cyst x 2);   Hospitalizations: as above   Work: retired  ([employer] as )     12/18/15 FYI: Patient was admitted for dyspnea, secondary to bilateral multiple pulmonary emboli. She overall did well and was discharged home on Lovenox, relatively high dose given her weight as well as Warfarin. I would like her to be seen today at your clinic. I believe she has an appointment for INR, CBC, and BMP check as well as re-dosing of her Warfarin. I suspect she will need an additional day of Lovenox as she is not  quite therapeutic today. You can refer to my discharge summary for the INR's and the Warfarin dosing. Thank you. Dr. Garza      Pertinent History  Do you have difficulty breathing or chest pain after walking up a flight of stairs: Yes: baseline  History of obstructive sleep apnea: Yes: not using CPAP  Steroid use in the last 6 months: No  Frequent Aspirin/NSAID use: No  Prior Blood Transfusion: No  Prior Blood Transfusion Reaction: No  If for some reason prior to, during or after the procedure, if it is medically indicated, would you be willing to have a blood transfusion?:  There is no transfusion refusal.    Current Outpatient Medications   Medication Sig Dispense Refill     amLODIPine (NORVASC) 5 MG tablet Take 1 tablet (5 mg total) by mouth daily. 90 tablet 3     cholecalciferol, vitamin D3, 1,000 unit tablet Take 1,000 Units by mouth daily. 2 capsules once a day       citalopram (CELEXA) 40 MG tablet Take 1 tablet (40 mg total) by mouth daily. 90 tablet 3     furosemide (LASIX) 20 MG tablet Take 1 tablet (20 mg total) by mouth daily. 90 tablet 1     HYDROcodone-acetaminophen (NORCO) 7.5-325 mg per tablet Take 2 tablets by mouth every 4 (four) hours as needed for pain Max 8/day.. 120 tablet 0     levothyroxine (SYNTHROID, LEVOTHROID) 125 MCG tablet TAKE ONE TABLET BY MOUTH   EVERY DAY 90 tablet 2     lisinopril-hydrochlorothiazide (PRINZIDE,ZESTORETIC) 20-12.5 mg per tablet Take 2 tablets by mouth daily. 180 tablet 3     ondansetron (ZOFRAN) 4 MG tablet Take 1 tablet (4 mg total) by mouth every 6 (six) hours. 12 tablet 0     pravastatin (PRAVACHOL) 80 MG tablet TAKE ONE TABLET BY MOUTH   EVERY NIGHT AT BEDTIME 90 tablet 3     warfarin (COUMADIN) 2.5 MG tablet Take 1/2 to 1 tablets (1.25 to 2.5 mg) by mouth daily. Adjust dose based on INR results as directed. 90 tablet 1     No current facility-administered medications for this visit.         Allergies   Allergen Reactions     Atorvastatin Myalgia     Legs  hurt/sore     Simvastatin Myalgia     Legs hurt, 12/2015 tolerated pravastatin at home     Sulfa (Sulfonamide Antibiotics) Rash     Sulfasalazine Rash       Patient Active Problem List   Diagnosis     Morbid Obesity     Osteoarthritis Of The Knee     Back pain     Major Depression, Recurrent     Obstructive Sleep Apnea     Essential hypertension with goal blood pressure less than 140/90     Edema     Hypothyroidism     Hypercholesterolemia     Normochromic, Normocytic Anemia     Arthralgias In Multiple Sites     Cataract     Impaired Fasting Glucose     Multiple lung nodules on CT     Angioedema     Hypokalemia     Pulmonary embolism (H)     Anticoagulant long-term use     Acute bilateral knee pain     Chronic pain syndrome       Past Medical History:   Diagnosis Date     Depression      Disease of thyroid gland      HTN (hypertension)      Hypercholesteremia      Morbid obesity (H)      Pulmonary emboli (H) 12/14/2015     Yeast infection        Past Surgical History:   Procedure Laterality Date     BREAST BIOPSY Left 1970s     HYSTERECTOMY  2010     OOPHORECTOMY  2010       Social History     Socioeconomic History     Marital status:      Spouse name: Not on file     Number of children: Not on file     Years of education: Not on file     Highest education level: Not on file   Social Needs     Financial resource strain: Not on file     Food insecurity - worry: Not on file     Food insecurity - inability: Not on file     Transportation needs - medical: Not on file     Transportation needs - non-medical: Not on file   Occupational History     Not on file   Tobacco Use     Smoking status: Former Smoker     Smokeless tobacco: Never Used     Tobacco comment: quite 20 yrs ago   Substance and Sexual Activity     Alcohol use: No     Comment: occasionally     Drug use: No     Sexual activity: Not on file   Other Topics Concern     Not on file   Social History Narrative    Patient arrived in the ED with her sister  "Kassy 09/10/17       Patient Care Team:  Julien Garnica MD as PCP - General  Tigist Bedolla PharmD as Pharmacist (Pharmacist)  Cathy Willams MBBS as Physician (Rheumatology)  Cora Pearson RN as Registered Nurse  Martine Rick as Clinic Care Coordination Care Guide (Clinic Care Coordination)          Objective:     Vitals:    12/07/18 1234   BP: 138/70   Pulse: (!) 109   SpO2: 96%   Weight: (!) 297 lb (134.7 kg)   Height: 5' 8\" (1.727 m)         Physical Exam:  Physical Exam     General Appearance:    Alert, cooperative, no distress, appears stated age.  Morbid obesity.   Head:    Normocephalic, without obvious abnormality, atraumatic   Eyes:    PERRL, conjunctiva/corneas clear, EOM's intact, fundi     benign, both eyes        Ears:    Normal TM's and external ear canals, both ears   Nose:   Nares normal, septum midline, mucosa normal, no drainage    or sinus tenderness   Throat:   Lips, mucosa, and tongue normal; teeth and gums normal   Neck:   Supple, symmetrical, trachea midline, no adenopathy;        thyroid:  No enlargement/tenderness/nodules; no carotid    bruit or JVD   Back:     Symmetric, no curvature, ROM normal, no CVA tenderness   Lungs:     Clear to auscultation bilaterally, respirations unlabored   Chest wall:    No tenderness or deformity   Heart:    Regular rate and rhythm, S1 and S2 normal, no murmur, rub   or gallop   Abdomen:     Soft, non-tender, bowel sounds active all four quadrants,     no masses, no organomegaly.     Extremities:   Extremities normal, atraumatic, no cyanosis or edema.  Bilateral knee osteoarthritis noted.   Pulses:   2+ and symmetric all extremities   Skin:   Skin color, texture, turgor normal, no rashes or lesions   Lymph nodes:   Cervical, supraclavicular, and axillary nodes normal   Neurologic:   CNII-XII intact. Normal strength, sensation and reflexes       throughout        There are no Patient Instructions on file for this visit.    EKG (11/5/18):  Normal " sinus rhythm   Normal ECG   When compared with ECG of 13-DEC-2015 22:52,   No significant change was found   Confirmed by ASA LAMAR MD LOC:JN ([id number]) on 11/6/2018 9:29:16 AM     Labs:  Recent Results (from the past 24 hour(s))   HM2(CBC w/o Differential)    Collection Time: 12/07/18  1:35 PM   Result Value Ref Range    WBC 8.9 4.0 - 11.0 thou/uL    RBC 3.82 3.80 - 5.40 mill/uL    Hemoglobin 10.7 (L) 12.0 - 16.0 g/dL    Hematocrit 31.7 (L) 35.0 - 47.0 %    MCV 83 80 - 100 fL    MCH 28.0 27.0 - 34.0 pg    MCHC 33.7 32.0 - 36.0 g/dL    RDW 13.4 11.0 - 14.5 %    Platelets 379 140 - 440 thou/uL    MPV 6.9 (L) 7.0 - 10.0 fL   INR    Collection Time: 12/07/18  1:43 PM   Result Value Ref Range    INR 1.50 (H) 0.90 - 1.10       Immunization History   Administered Date(s) Administered     Influenza high dose, seasonal 09/19/2014, 09/11/2015, 10/03/2018     Influenza, seasonal,quad inj 6-35 mos 10/01/2013     Pneumo Conj 13-V (2010&after) 01/06/2015     Pneumo Polysac 23-V 10/21/2008     Td, adult adsorbed, PF 10/07/2003     Tdap 02/03/2012     ZOSTER, LIVE 05/26/2009           Electronically signed by Julien Garnica MD 12/07/18 12:37 PM

## 2021-06-22 NOTE — TELEPHONE ENCOUNTER
ANTICOAGULATION  MANAGEMENT    Assessment     Today's INR result of 2.9 is Therapeutic (goal INR of 2.0-3.0)        Warfarin taken as previously instructed    No new diet changes affecting INR    No interaction expected between Ambien and warfarin- starting today    Continues to tolerate warfarin with no reported s/s of bleeding or thromboembolism     Previous INR was Therapeutic    Plan:     Spoke with Nancy regarding INR result and instructed:     Warfarin Dosing Instructions:  Continue current warfarin dose    2.5 mg on Mon, Wed, Fri; 3.75 mg all other days         Instructed patient to follow up no later than: 2 weeks. Appt is made for 1/23.     Education provided: importance of taking warfarin as instructed and no interaction anticipated between warfarin and Ambien    Nancy verbalizes understanding and agrees to warfarin dosing plan.    Instructed to call the [facility] for any changes, questions or concerns. (#[phone])   ?   Vincent Baird RN    Subjective/Objective:      Nancy Oropeza, a 73 y.o. female is on warfarin.     Nancy reports:     Home warfarin dose: template incorrect; verbally confirmed home dose with pt and updated on anticoagulation calendar     Missed doses: No     Medication changes:  Yes: starting Ambien     S/S of bleeding or thromboembolism:  No     New Injury or illness:  No     Changes in diet or alcohol consumption:  No     Upcoming surgery, procedure or cardioversion:  No    Anticoagulation Episode Summary     Current INR goal:   2.0-3.0   TTR:   50.5 % (3 y)   Next INR check:   1/23/2019   INR from last check:   2.90 (1/9/2019)   Weekly max warfarin dose:      Target end date:   12/21/2016   INR check location:      Preferred lab:      Send INR reminders to:   ANTICOAGULATION POOL B (MPW,HUG,STW,RVL,OAK,RLN)    Indications    Pulmonary embolism (H) [I26.99]           Comments:            Anticoagulation Care Providers     Provider Role Specialty Phone number     Julien Garnica MD [facility] [phone]

## 2021-06-22 NOTE — TELEPHONE ENCOUNTER
Refill Approved    Rx renewed per Medication Renewal Policy. Medication was last renewed on 10 03 18 for one year  Ok to send to mail order    Marisela Valdo [facility] Triage/Med Refill 1/4/2019     Requested Prescriptions   Pending Prescriptions Disp Refills     citalopram (CELEXA) 40 MG tablet 90 tablet 3     Sig: Take 1 tablet (40 mg total) by mouth daily.    SSRI Refill Protocol  Passed - 1/4/2019 12:16 PM       Passed - PCP or prescribing provider visit in last year    Last office visit with prescriber/PCP: 10/3/2018 Julien Garnica MD OR same dept: 10/3/2018 Julien Garnica MD OR same specialty: 10/3/2018 Julien Garnica MD  Last physical: 12/7/2018 Last MTM visit: Visit date not found   Next visit within 3 mo: Visit date not found  Next physical within 3 mo: Visit date not found  Prescriber OR PCP: Julien Garnica MD  Last diagnosis associated with med order: 1. Major depressive disorder, recurrent episode  - citalopram (CELEXA) 40 MG tablet; Take 1 tablet (40 mg total) by mouth daily.  Dispense: 90 tablet; Refill: 3    If protocol passes may refill for 12 months if within 3 months of last provider visit (or a total of 15 months).

## 2021-06-22 NOTE — PROGRESS NOTES
Assessment/Plan:    1. Insomnia, unspecified type  Insomnia with increased anxiety symptoms.  Zolpidem 5 mg use half tablet initially at bedtime on as-needed basis may use 1 tablet if not effective.  - zolpidem (AMBIEN) 5 MG tablet; Take 0.5-1 tablets (2.5-5 mg total) by mouth at bedtime as needed for sleep.  Dispense: 30 tablet; Refill: 2    2. Anemia, unspecified type  Postoperative anemia noted.  Hemoglobin had decreased to 7.2 following prior right total knee arthroplasty December 17, 2018 then subsequent improvement to 8.9.  Most recent hemoglobin 8.7 and is on a One-A-Day iron supplement currently.  Will follow up with Dr. Daniels on January 16, 2019.  - HM2(CBC w/o Differential)    3. CKD (chronic kidney disease) stage 3, GFR 30-59 ml/min (H)  Chronic kidney disease stage III.  Ensure stable renal function with basic metabolic panel pending.  - Basic Metabolic Panel    4. Anxiety  Discussed anxiety symptoms.  Citalopram 40 mg daily seems to help however increased symptoms at night primarily.  Will treat underlying insomnia to see if effective management.  TORIE 7 questionnaire 15 out of 21.  PHQ 9 questionnaire 11 out of 27.    5. Chronic pain of right knee  As above, follow-up with Dr. Daniels January 16, 2019 following prior right total knee arthroplasty with likely joint effusion residual.  Does not appear to be septic joint.        Subjective:    Nancy GARZON Satrimikki is seen today for several concerns.  Increased anxiety.  Using citalopram 40 mg daily.  In fact is okay during the day however at night mind races and she worries.  Is not had sleep aid use.  Does have postoperative anemia.  Chronic kidney disease stage III.  Has noticed some tightening of right knee without significant pain.  Feels heavy.  Postoperative anemia with hemoglobin mary of 7.2 did improved to 8.9 subsequently and continues iron supplement.  Not requiring hydrocodone acetaminophen.  No longer on oxycodone.  Had been using up to  4500 mg of acetaminophen a day unfortunately.  Is on warfarin anticoagulation chronically with history of prior pulmonary emboli with hypercoagulable workup negative.  Some irritation to the distal tip of the tongue especially with spicy or salty foods.  Comprehensive review of systems as above otherwise all negative.    Previously seen by Dr. Donnell Funez (after Dr. Rigo Mcclain x 32 years)   Grew up in [place], [place] til age 12...      5 children - son with Factor V abnormality  14 grandchildren   9 great-grandchildren   No smoke (quit 16 years ago after 1 ppd x 30+ years)   EtOH: occ wine   Mom -  88 COPD   Dad -  61 massive MI   1 bro -  67 blood clot (lung)   2 sis - one of which is  age 62 small cell lung CA (h/o smoker)  Surgeries: ventral hernia repair with muscle flap 10/4/12 with post-op complication of seroma with J.P. drain in place followed by interventional radiology q 2 weeks);  age 25; groin hernia age 3; CAPRICE-BSO 2011 by Dr. Herbert Carmen (due to benign cyst x 2); right TKA 18 (Dr. Small)  Hospitalizations: as above   Work: retired  ([employer] as )     Past Surgical History:   Procedure Laterality Date     BREAST BIOPSY Left 1970s     HYSTERECTOMY  2010     OOPHORECTOMY  2010        Family History   Problem Relation Age of Onset     Breast cancer Sister 75     Stomach cancer Paternal Grandfather      Lung cancer Sister         Past Medical History:   Diagnosis Date     Depression      Disease of thyroid gland      HTN (hypertension)      Hypercholesteremia      Morbid obesity (H)      Pulmonary emboli (H) 2015     Yeast infection         Social History     Tobacco Use     Smoking status: Former Smoker     Smokeless tobacco: Never Used     Tobacco comment: quite 20 yrs ago   Substance Use Topics     Alcohol use: No     Comment: occasionally     Drug use: No        Current Outpatient Medications   Medication Sig  Dispense Refill     amLODIPine (NORVASC) 5 MG tablet Take 1 tablet (5 mg total) by mouth daily. 90 tablet 3     cholecalciferol, vitamin D3, 1,000 unit tablet Take 1,000 Units by mouth daily. 2 capsules once a day       citalopram (CELEXA) 40 MG tablet Take 1 tablet (40 mg total) by mouth daily. 90 tablet 3     furosemide (LASIX) 20 MG tablet Take 1 tablet (20 mg total) by mouth daily. 90 tablet 1     HYDROcodone-acetaminophen (NORCO) 7.5-325 mg per tablet Take 2 tablets by mouth every 4 (four) hours as needed for pain Max 8/day.. 120 tablet 0     levothyroxine (SYNTHROID, LEVOTHROID) 125 MCG tablet TAKE ONE TABLET BY MOUTH   EVERY DAY 90 tablet 2     lisinopril-hydrochlorothiazide (PRINZIDE,ZESTORETIC) 20-12.5 mg per tablet Take 2 tablets by mouth daily. 180 tablet 3     pravastatin (PRAVACHOL) 80 MG tablet TAKE ONE TABLET BY MOUTH   EVERY NIGHT AT BEDTIME 90 tablet 3     warfarin (COUMADIN) 2.5 MG tablet Take 1/2 to 1 tablets (1.25 to 2.5 mg) by mouth daily. Adjust dose based on INR results as directed. 90 tablet 1     zolpidem (AMBIEN) 5 MG tablet Take 0.5-1 tablets (2.5-5 mg total) by mouth at bedtime as needed for sleep. 30 tablet 2     No current facility-administered medications for this visit.           Objective:    Vitals:    01/09/19 1250   BP: 140/64   Pulse: (!) 103   SpO2: 96%   Weight: (!) 292 lb (132.5 kg)      Body mass index is 44.4 kg/m .    Alert.  No apparent distress at rest.  Transfers slowly however independently then uses cane to assist with ambulation.  Moving much better.  Chest clear.  Cardiac exam regular.  Extremities otherwise warm and dry.  Right knee swelling compared to left with postoperative incision site doing well without wound dehiscence or signs of secondary infection.      This note has been dictated using voice recognition software and as a result may contain minor grammatical errors and unintended word substitutions.

## 2021-06-23 NOTE — TELEPHONE ENCOUNTER
Who is calling:   Pharmacy  Reason for Call:   New Pharmacy for the patient  Date of last appointment with primary care:  1/9/2019  Has the patient been recently seen:  Yes  Okay to leave a detailed message: No

## 2021-06-23 NOTE — TELEPHONE ENCOUNTER
Okay to remove zolpidem from med list if ineffective.  Does she want me to send an alternative?  (i.e. Lunesta 2 mg at bedtime as needed)

## 2021-06-23 NOTE — TELEPHONE ENCOUNTER
Pt calling.  She is wondering if she can take hydroxizine 50 mg and citalopram 40 mg together.    Writer gave Pt PCP message from 1/15/19 below:    Citalopram dose of 40 mg daily is maximum preferred dose.  Continue this medication and dose.  I will send a prescription, in addition, to her pharmacy for hydroxyzine 50 mg use one tablet every 4-6 hours as needed for symptoms of anxiety    Pt was very appreciative for the info and had no additional questions.  Brianne Molina, RN, [facility] RN Triage/Med Refills    Reason for Disposition    Caller has medication question only, adult not sick, and triager answers question    Protocols used: MEDICATION QUESTION CALL-A-

## 2021-06-23 NOTE — TELEPHONE ENCOUNTER
Patient called back and said that she is not looking for an alternative for the zolpidem. She was wanted to let PCP know that she is not taking it anymore.

## 2021-06-23 NOTE — TELEPHONE ENCOUNTER
Medication Question or Clarification  Who is calling: Patient  What medication are you calling about?: citalopram (CELEXA) 40 MG tablet  What dose do you take?: 40 mg  How often are you taking the medication?: Daily  Who prescribed the medication?: Julien Garnica MD  What is your question/concern?: Patient reports she is wanting to know if Julien Garnica MD can increase medication dosage.  Patient reports increased anxiety lately and she is wanting to either have medication increased or another medication to help her relax.  She believe this is also why she cant sleep as she cant relax.  Please call and advise on medication.  Pharmacy: [facility] #[id number]  Okay to leave a detailed message?: Yes  Site CMT - Please call the pharmacy to obtain any additional needed information.

## 2021-06-23 NOTE — PROGRESS NOTES
Email: [email or web address]  Marital Status:   Children: Yes 2 daughters  Born and Raised:   Occupation: Retired  Living Situation: Lives alone in an apartment in [place]  Smoking, Alcohol, other:  Services receiving: Medicare  CCC/[facility] Enrollment: 7/20/18  CCC/[facility] Follow up s: Monthly  Next follow up date: 2/27/19

## 2021-06-23 NOTE — TELEPHONE ENCOUNTER
Refill Approved- New Pharmacy  The Lasix was renewed on 10/2/18 for 90/1 at old pharmacy.  The citalopram was renewed 1/4/19 for 90/3 at the old pharmacy  Last OV 1/9/19    Hannah Michaud, [facility] Triage/Med Refill 1/19/2019     Requested Prescriptions   Pending Prescriptions Disp Refills     furosemide (LASIX) 20 MG tablet 90 tablet 1     Sig: Take 1 tablet (20 mg total) by mouth daily.    Diuretics/Combination Diuretics Refill Protocol  Passed - 1/19/2019  1:46 AM       Passed - Visit with PCP or prescribing provider visit in past 12 months    Last office visit with prescriber/PCP: 1/9/2019 Julien Garnica MD OR same dept: 1/9/2019 Julien Garnica MD OR same specialty: 1/9/2019 Julien Garnica MD  Last physical: 12/7/2018 Last MTM visit: Visit date not found   Next visit within 3 mo: Visit date not found  Next physical within 3 mo: Visit date not found  Prescriber OR PCP: Julien Garnica MD  Last diagnosis associated with med order: 1. Edema  - furosemide (LASIX) 20 MG tablet; Take 1 tablet (20 mg total) by mouth daily.  Dispense: 90 tablet; Refill: 1    2. Major depressive disorder, recurrent episode  - citalopram (CELEXA) 40 MG tablet; Take 1 tablet (40 mg total) by mouth daily.  Dispense: 90 tablet; Refill: 3    If protocol passes may refill for 12 months if within 3 months of last provider visit (or a total of 15 months).            Passed - Serum Potassium in past 12 months     Lab Results   Component Value Date    Potassium 4.2 01/09/2019            Passed - Serum Sodium in past 12 months     Lab Results   Component Value Date    Sodium 142 01/09/2019            Passed - Blood pressure on file in past 12 months    BP Readings from Last 1 Encounters:   01/09/19 140/64            Passed - Serum Creatinine in past 12 months     Creatinine   Date Value Ref Range Status   01/09/2019 1.09 0.60 - 1.10 mg/dL Final             citalopram (CELEXA) 40 MG tablet 90 tablet 3     Sig: Take 1 tablet (40 mg  total) by mouth daily.    SSRI Refill Protocol  Passed - 1/19/2019  1:46 AM       Passed - PCP or prescribing provider visit in last year    Last office visit with prescriber/PCP: 1/9/2019 Julien Garnica MD OR same dept: 1/9/2019 Julien Garnica MD OR same specialty: 1/9/2019 Julien Garnica MD  Last physical: 12/7/2018 Last MTM visit: Visit date not found   Next visit within 3 mo: Visit date not found  Next physical within 3 mo: Visit date not found  Prescriber OR PCP: Julien Garnica MD  Last diagnosis associated with med order: 1. Edema  - furosemide (LASIX) 20 MG tablet; Take 1 tablet (20 mg total) by mouth daily.  Dispense: 90 tablet; Refill: 1    2. Major depressive disorder, recurrent episode  - citalopram (CELEXA) 40 MG tablet; Take 1 tablet (40 mg total) by mouth daily.  Dispense: 90 tablet; Refill: 3    If protocol passes may refill for 12 months if within 3 months of last provider visit (or a total of 15 months).

## 2021-06-23 NOTE — TELEPHONE ENCOUNTER
Medication Question or Clarification  Who is calling: Patient  What medication are you calling about?: zolpidem (AMBIEN) 5 MG tablet  What dose do you take?: 5 mg  How often are you taking the medication?: Daily  Who prescribed the medication?: Julien Garnica MD  What is your question/concern?: Patient is calling to report medication did not do anything for her to help her sleep and she stopped taking medication.  Please advise if patient should do anything different or just remove from patients medication list.  Pharmacy: N/A  Okay to leave a detailed message?: Yes  Site CMT - Please call the pharmacy to obtain any additional needed information.

## 2021-06-23 NOTE — TELEPHONE ENCOUNTER
ANTICOAGULATION  MANAGEMENT PROGRAM    Nancy Oropeza is overdue for INR check.  Reminder call made.    Spoke with pt and scheduled INR appointment on 2/18 .    Vincent Baird RN

## 2021-06-23 NOTE — TELEPHONE ENCOUNTER
Controlled Substance Refill Request  Medication Name:   Requested Prescriptions     Pending Prescriptions Disp Refills     HYDROcodone-acetaminophen (NORCO) 7.5-325 mg per tablet 120 tablet 0     Sig: Take 2 tablets by mouth every 4 (four) hours as needed for pain. Max 8/day.     Date Last Fill: 12/07/18   Pharmacy:  [facility] [id number] [place] [address] - [facility] [address] AT [facility] [address]       Submit electronically to pharmacy  Controlled Substance Agreement Date Scanned:   Encounter-Level CSA Scan Date:    There are no encounter-level csa scan date.       Last office visit with prescriber/PCP: 1/9/2019 Julien Garnica MD OR same dept: 1/9/2019 Julien Garnica MD OR same specialty: 1/9/2019 Jluien Garnica MD  Last physical: 12/7/2018 Last MTM visit: Visit date not found

## 2021-06-23 NOTE — TELEPHONE ENCOUNTER
Patient Returning Call  Reason for call:  Medication question  Information relayed to patient:  Dr. Garnica's message that he sent another medication to the pharmacy for the patient  Patient has additional questions:  No  If YES, what are your questions/concerns:  n/a  Okay to leave a detailed message?: No call back needed

## 2021-06-23 NOTE — TELEPHONE ENCOUNTER
ANTICOAGULATION  MANAGEMENT PROGRAM    Nancy Oropeza is overdue for INR check.  Reminder call made.    Spoke with pt and scheduled INR appointment on 1/29 .    Vincent Baird RN

## 2021-06-23 NOTE — TELEPHONE ENCOUNTER
Who is calling:  Nancy  Reason for Call:  Nancy is checking to see if we have her dosing figured out until her next INR since she feels her inr today (1/29) is high.  Date of last appointment with primary care: 1/9/19  Has the patient been recently seen:  Yes  Okay to leave a detailed message: Yes

## 2021-06-23 NOTE — TELEPHONE ENCOUNTER
Citalopram dose of 40 mg daily is maximum preferred dose.  Continue this medication and dose.  I will send a prescription, in addition, to her pharmacy for hydroxyzine 50 mg use one tablet every 4-6 hours as needed for symptoms of anxiety.

## 2021-06-23 NOTE — TELEPHONE ENCOUNTER
ANTICOAGULATION  MANAGEMENT    Assessment     Today's INR result of 4.2 is Supratherapeutic (goal INR of 2.0-3.0)        Less warfarin taken than instructed which may be affecting INR    No new diet changes affecting INR    No new medication/supplements affecting INR    Continues to tolerate warfarin with no reported s/s of bleeding or thromboembolism     Previous INR was Therapeutic    Plan:     Spoke with Nancy regarding INR result and instructed:     Warfarin Dosing Instructions:  Hold today then change warfarin dose to 3.75 mg daily on Mon; and 2.5 mg daily rest of week  (11.7 % change from total amount pt took the past 7 days)    Instructed patient to follow up no later than: 10 days. Appt is made for 2/7.     Education provided: importance of taking warfarin as instructed    Nancy verbalizes understanding and agrees to warfarin dosing plan.    Instructed to call the [facility] for any changes, questions or concerns. (#[phone])   ?   Vincent Baird RN    Subjective/Objective:      Nancy Oropeza, a 73 y.o. female is on warfarin.     Nancy reports:     Home warfarin dose: verbally confirmed home dose with pt and updated on anticoagulation calendar     Missed doses: Yes: missed on 1/16.      Medication changes:  Yes: stopped Ambien and started Hydroxyzine     S/S of bleeding or thromboembolism:  No     New Injury or illness:  No     Changes in diet or alcohol consumption:  No     Upcoming surgery, procedure or cardioversion:  No    Anticoagulation Episode Summary     Current INR goal:   2.0-3.0   TTR:   49.8 % (3.1 y)   Next INR check:   2/8/2019   INR from last check:   4.20! (1/29/2019)   Weekly max warfarin dose:      Target end date:   12/21/2016   INR check location:      Preferred lab:      Send INR reminders to:   ANTICOAGULATION POOL B (MPW,HUG,STW,RVL,OAK,RLN)    Indications    Pulmonary embolism (H) [I26.99]           Comments:            Anticoagulation Care Providers     Provider  Role Specialty Phone number    Julien Garnica MD Referring Family Medicine [phone]

## 2021-06-23 NOTE — TELEPHONE ENCOUNTER
Please note there are 2 encounters for this patient    Left message to call back for: medication question  Information to relay to patient:  Below message

## 2021-06-24 NOTE — TELEPHONE ENCOUNTER
Patient has 4 tablets left.    Controlled Substance Refill Request  Medication Name:   Requested Prescriptions     Pending Prescriptions Disp Refills     HYDROcodone-acetaminophen (NORCO) 7.5-325 mg per tablet 60 tablet 0     Sig: Take 2 tablets by mouth every 4 (four) hours as needed for pain. Max 8/day.     Date Last Fill: 2.25.2019  [facility]      Submit electronically to pharmacy  Controlled Substance Agreement Date Scanned:   Encounter-Level CSA Scan Date:    There are no encounter-level csa scan date.       Last office visit with prescriber/PCP: 1/9/2019 Julien Garnica MD OR same dept: 1/9/2019 Julien Garnica MD OR same specialty: 1/9/2019 Julien Garnica MD  Last physical: 12/7/2018 Last MTM visit: Visit date not found

## 2021-06-24 NOTE — TELEPHONE ENCOUNTER
Refill Approved    Rx renewed per Medication Renewal Policy. Medication was last renewed on 5/22/18.    Sneha Crockett, [facility] Triage/Med Refill 2/13/2019     Requested Prescriptions   Pending Prescriptions Disp Refills     levothyroxine (SYNTHROID, LEVOTHROID) 125 MCG tablet 90 tablet 2     Sig: Take 1 tablet (125 mcg total) by mouth daily.    Thyroid Hormones Protocol Passed - 2/13/2019  3:14 PM       Passed - Provider visit in past 12 months or next 3 months    Last office visit with prescriber/PCP: 1/9/2019 Julien Garnica MD OR same dept: 1/9/2019 Julien Garnica MD OR same specialty: 1/9/2019 Julien Garnica MD  Last physical: 12/7/2018 Last MTM visit: Visit date not found   Next visit within 3 mo: Visit date not found  Next physical within 3 mo: Visit date not found  Prescriber OR PCP: Julien Garnica MD  Last diagnosis associated with med order: 1. Hypothyroidism  - levothyroxine (SYNTHROID, LEVOTHROID) 125 MCG tablet; Take 1 tablet (125 mcg total) by mouth daily.  Dispense: 90 tablet; Refill: 2    If protocol passes may refill for 12 months if within 3 months of last provider visit (or a total of 15 months).            Passed - TSH on file in past 12 months for patient age 12 & older    TSH   Date Value Ref Range Status   11/05/2018 1.15 0.30 - 5.00 uIU/mL Final

## 2021-06-24 NOTE — PROGRESS NOTES
Assessment/Plan:      Diagnoses and all orders for this visit:    Lumbar spine pain  -     Ambulatory referral to Physical Therapy  -     OPS TFESI Lumbar Bilateral    Bilateral stenosis of lateral recess of lumbar spine  -     Ambulatory referral to Physical Therapy  -     OPS TFESI Lumbar Bilateral    Myofascial pain    Chronic left shoulder pain    On warfarin therapy    Spondylolisthesis of lumbar region  -     Ambulatory referral to Physical Therapy  -     OPS TFESI Lumbar Bilateral        Assessment: Pleasant 73 y.o. female with a history of osteoarthritis of the knees, depression, hypertension, sleep apnea, hypothyroidism, hyper lipidemia,   pulmonary embolism with:       1.  Chronic lumbar spine pain at the lumbosacral junction and points lumbosacral junction PSIS.  This is been for the past 20 years worsening with time.  She does have L4-5 spondylolisthesis and slight at L5-S1 related to facet arthropathy.  This results in moderate to severe lateral recess stenosis bilaterally.  No claudication type symptoms but she does have back pain with prolonged standing which may be related to her lateral recess stenosis.    2.  Severe facet arthropathy L4-5 L5-S1.  Good response to initial medial branch blocks but confirmatory blocks only 40% relief.  Facet arthropathy may be contributing to her pain.    3.  Myofascial pain lumbar spine.    4.  Chronic left shoulder pain.  Currently doing well.  Negative plain films.    5.  Status post right total knee arthroplasty.    6.  Warfarin therapy.      Discussion:    1.  I discussed the diagnosis and treatment options.  She did have one visit of therapy in the past with no ongoing therapy for lumbar spine did not help her symptoms.  She also has failed SI joint injections for lasting benefit of the did give some temporary benefit.  Also facet medial branch blocks were not helpful.  We discussed the option of returning to therapy along with other injections.  Reports having  epidural done in the past which was helpful this was over 20 years ago.    2.  Start physical therapy for lumbar core strengthening stabilization pelvic tilt.  She was given a prescription by her orthopedic surgeon for total knee arthroplasty PT and can discuss with them about changing this to optimal rehab as well as at her closer to home.    3.  She does have at least moderate lateral recess stenosis and may have some claudication type symptoms with standing although this is just back pain.  We discussed the option of the lumbar epidural.  Given warfarin interlaminar epidural is not a great option for her.  Recommend bilateral L5-S1 transforaminal epidural steroid injection to target the L5 nerves which are being compressed in the lateral recess.    4.  Reassurance provided for the left shoulder.  MRI had been ordered in the past but she can hold off and not complete this given that her shoulder has improved.      5. Follow-up with me 2-4 weeks after injection.      It was our pleasure caring for your patient today, if there any questions or concerns please do not hesitate to contact us.      Subjective:   Patient ID: Nancy Oropeza is a 73 y.o. female.    History of Present Illness: Patient presents for an evaluation of her worsening low back pain.  This is at the lumbosacral junction and lower lumbar spine.  No pain down into the gluteal region.  She was last seen in September.  She was to have SI joint injections but the INR was too high.  She subsequently had these injections on October 1, 2018.  Tells me they did help but not for a significant period of time and her pain is worsened.  Pain is a 6/10 today 8/10 at worst.  Worse with any activity.  She also has increased pain at night with sleep and has a difficult time sleeping at all and has had an issue with her mattress.  She reports having a procedure done in the 1990s where she was sitting in flexed forward and had an injection which sounds like an  epidural.  Had some good relief for quite a while with that but her last injections including SI joint medial branch blocks did not offer lasting benefit.  She does use ice which helps as well.  She also notes improvement with using a shopping cart to ambulate.  There is no radiation down the legs numbness tingling or weakness.    Since her last visit however she has had a right total knee at least the therapy as scheduled.  Has been dealing with her other issues as well.  Her shoulder pain has improved since her last visit doing fairly well.      Imaging: I discussed the shoulder x-rays today with regards to report which were reviewed showing negative shoulder/normal shoulder.    I did personally review MRI of the lumbar spine along with the report images and report reviewed from June 2018.  Plastic model was used in the discussion.  She has severe facet arthropathy at all 4-5 with a spondylolisthesis grade 1 resulting in at least moderate lateral recess stenosis.    Review of Systems: No numbness, tingling or weakness.  No bowel or bladder incontinence.  No headaches, dizziness, nausea, vomiting, blurred vision or balance deficits.    Past Medical History:   Diagnosis Date     Depression      Disease of thyroid gland      HTN (hypertension)      Hypercholesteremia      Morbid obesity (H)      Pulmonary emboli (H) 12/14/2015     Yeast infection        The following portions of the patient's history were reviewed and updated as appropriate: allergies, current medications, past family history, past medical history, past social history, past surgical history and problem list.      Objective:   Physical Exam:    Vitals:    02/18/19 0959   BP: 171/78   Pulse: (!) 110       General: Alert and oriented with normal affect. Attention, knowledge, memory, and language are intact. No acute distress.   Eyes: Sclerae are clear.  Respirations: Unlabored. CV: No lower extremity edema.   Gait:  Nonantalgic  Full range of motion of  the hips internal/external rotation bilaterally from seated.  Tenderness over the lumbosacral junction L4-5 L5-S1 and PSIS.  Sensation is intact to light touch throughout the  lower extremities.  Reflexes are 0 patellar and Achilles     Manual muscle testing reveals:  Right /Left out of 5     5/5 hip flexors  5/5 knee flexors  5/5 knee extensors  5/5 ankle plantar flexors  5/5 ankle dorsiflexors  5/5  EHL

## 2021-06-24 NOTE — TELEPHONE ENCOUNTER
Patient is out of medication.  Please review.    Controlled Substance Refill Request  Medication Name:   Requested Prescriptions     Pending Prescriptions Disp Refills     HYDROcodone-acetaminophen (NORCO) 7.5-325 mg per tablet 60 tablet 0     Sig: Take 2 tablets by mouth every 4 (four) hours as needed for pain. Max 8/day.     Date Last Fill: 2/11/19  Pharmacy: [facility]      Submit electronically to pharmacy  Controlled Substance Agreement Date Scanned:   Encounter-Level CSA Scan Date:    There are no encounter-level csa scan date.       Last office visit with prescriber/PCP: 1/9/2019 Julien Garnica MD OR same dept: 1/9/2019 Julien Garnica MD OR same specialty: 1/9/2019 Julien Garnica MD  Last physical: 12/7/2018 Last MTM visit: Visit date not found

## 2021-06-24 NOTE — TELEPHONE ENCOUNTER
ANTICOAGULATION  MANAGEMENT    Assessment     Today's INR result of 2.1 is Therapeutic (goal INR of 2.0-3.0)        Warfarin taken as previously instructed    No new diet changes affecting INR    No new medication/supplements affecting INR    Continues to tolerate warfarin with no reported s/s of bleeding or thromboembolism     Previous INR was Therapeutic    Plan:     Spoke with Nancy regarding INR result and instructed:     Warfarin Dosing Instructions:  Continue current warfarin dose 3.75 mg daily on Mon; and 2.5 mg daily rest of week  (0 % change)    Instructed patient to follow up no later than: 4 weeks.     Education provided: importance of taking warfarin as instructed    Nancy verbalizes understanding and agrees to warfarin dosing plan.    Instructed to call the [facility] for any changes, questions or concerns. (#[phone])   ?   Vincent Baird RN    Subjective/Objective:      Nancy Symikki, a 73 y.o. female is on warfarin.     Nancy reports:     Home warfarin dose: verbally confirmed home dose with pt and updated on anticoagulation calendar     Missed doses: No     Medication changes:  No     S/S of bleeding or thromboembolism:  No     New Injury or illness:  No     Changes in diet or alcohol consumption:  No     Upcoming surgery, procedure or cardioversion:  No    Anticoagulation Episode Summary     Current INR goal:   2.0-3.0   TTR:   50.3 % (3.2 y)   Next INR check:   4/1/2019   INR from last check:   2.10 (3/4/2019)   Weekly max warfarin dose:      Target end date:   12/21/2016   INR check location:      Preferred lab:      Send INR reminders to:   ANTICOAGULATION POOL B (MPW,HUG,STW,RVL,OAK,RLN)    Indications    Pulmonary embolism (H) [I26.99]           Comments:            Anticoagulation Care Providers     Provider Role Specialty Phone number    Julien Garnica MD Referring Family Medicine [phone]

## 2021-06-24 NOTE — TELEPHONE ENCOUNTER
Refill Approved    Rx renewed per Medication Renewal Policy. Medication was last renewed on 2/22/18.    Sneha Crockett, [facility] Triage/Med Refill 2/27/2019     Requested Prescriptions   Pending Prescriptions Disp Refills     lisinopril-hydrochlorothiazide (PRINZIDE,ZESTORETIC) 20-12.5 mg per tablet 180 tablet 3     Sig: Take 2 tablets by mouth daily.    Diuretics/Combination Diuretics Refill Protocol  Passed - 2/27/2019 10:49 AM       Passed - Visit with PCP or prescribing provider visit in past 12 months    Last office visit with prescriber/PCP: 1/9/2019 Julien Garnica MD OR same dept: 1/9/2019 Julien Garnica MD OR same specialty: 1/9/2019 Julien Garnica MD  Last physical: 12/7/2018 Last MTM visit: Visit date not found   Next visit within 3 mo: Visit date not found  Next physical within 3 mo: Visit date not found  Prescriber OR PCP: Julien Garnica MD  Last diagnosis associated with med order: 1. HTN (hypertension)  - lisinopril-hydrochlorothiazide (PRINZIDE,ZESTORETIC) 20-12.5 mg per tablet; Take 2 tablets by mouth daily.  Dispense: 180 tablet; Refill: 3    If protocol passes may refill for 12 months if within 3 months of last provider visit (or a total of 15 months).            Passed - Serum Potassium in past 12 months     Lab Results   Component Value Date    Potassium 4.2 01/09/2019            Passed - Serum Sodium in past 12 months     Lab Results   Component Value Date    Sodium 142 01/09/2019            Passed - Blood pressure on file in past 12 months    BP Readings from Last 1 Encounters:   02/18/19 171/78            Passed - Serum Creatinine in past 12 months     Creatinine   Date Value Ref Range Status   01/09/2019 1.09 0.60 - 1.10 mg/dL Final

## 2021-06-24 NOTE — TELEPHONE ENCOUNTER
ANTICOAGULATION  MANAGEMENT    Assessment     Today's INR result of 2.1 is Therapeutic (goal INR of 2.0-3.0)        Warfarin taken as previously instructed    No new diet changes affecting INR    Interaction between Norco and warfarin may be affecting INR- has been taking since 2/11    Continues to tolerate warfarin with no reported s/s of bleeding or thromboembolism     Previous INR was Supratherapeutic    Plan:     Spoke with Nancy regarding INR result and instructed:     Warfarin Dosing Instructions:  Continue current warfarin dose    3.75 mg on Mon; 2.5 mg all other days         Instructed patient to follow up no later than: 2 weeks. Appt is made for 3/4.     Education provided: importance of taking warfarin as instructed and potential interaction between warfarin and Norco    Nancy verbalizes understanding and agrees to warfarin dosing plan.    Instructed to call the [facility] for any changes, questions or concerns. (#[phone])   ?   Vincent Baird RN    Subjective/Objective:      Nancy Oropeza, a 73 y.o. female is on warfarin.     Nancy reports:     Home warfarin dose: verbally confirmed home dose with pt and updated on anticoagulation calendar     Missed doses: No     Medication changes:  Yes: 2/11 started [place]     S/S of bleeding or thromboembolism:  No     New Injury or illness:  No     Changes in diet or alcohol consumption:  No     Upcoming surgery, procedure or cardioversion:  No    Anticoagulation Episode Summary     Current INR goal:   2.0-3.0   TTR:   49.6 % (3.1 y)   Next INR check:   3/4/2019   INR from last check:   2.10 (2/18/2019)   Weekly max warfarin dose:      Target end date:   12/21/2016   INR check location:      Preferred lab:      Send INR reminders to:   ANTICOAGULATION POOL B (MPW,HUG,STW,RVL,OAK,RLN)    Indications    Pulmonary embolism (H) [I26.99]           Comments:            Anticoagulation Care Providers     Provider Role Specialty Phone number    Nahun  Julien GARZON MD [facility] [phone]

## 2021-06-24 NOTE — TELEPHONE ENCOUNTER
Asking about refill narcotic after knee surgery.     Out shopping yesterday, tolerated well.  Discussed important to have some activity however not over do.     Plans to ice, elevate if need. Talk to office next week.Will call back if not improving.      Also discussed benefits of mindfulness and other relaxation techniques. She said these do help and will do them more.

## 2021-06-24 NOTE — TELEPHONE ENCOUNTER
Who is calling:  Pharmacy [facility]  Reason for Call:   change in pharmacy  Date of last appointment with primary care:  n/a  Has the patient been recently seen:  Yes  Okay to leave a detailed message: No

## 2021-06-24 NOTE — PATIENT INSTRUCTIONS - HE
Schedule Bilat L5-S1 TFESI      Pre-Injection Instruction and Education        Please be aware that you will need a  for your injection       If you are currently taking antibiotics please contact the [facility] Center Nurse Navigator at [phone] for specific instructions      Before your procedure please make [facility] aware if you are taking blood thinning (anticoagulant) medications for any reason. Depending on the procedure, these medications may need to be temporarily stopped in order to ensure a safe procedure. Wait for instructions BEFORE stopping your anticoagulation (blood thinning) medication   o Examples may include: Coumadin (Warfarin), Plavix (Clopidogrel), Effient (Prasugrel), Lovenox (Enoxaparin), Ticagrelor (Brilinta), Ciliostazol (Ticlid), Dipyridamole (Aggrenox) Dabigatran (pradaxa), Rivaroxaban (Xarelto), Apixaban (Eliquis), Edoxaban, or daily Aspirin       Please also make us aware if you have a history of a contrast dye allergy      You may drink and eat a light meal before having your procedure      You may take your usual medications, including pain medication  o EXCEPTION: if you are having a medial branch block (MBB) do NOT take pain medication      Flu shots should be given at least two weeks prior to or two weeks post steroid injections.  Steroids may decrease the effectiveness of a flu shot.      If you have questions please call the Spine Center at [phone]

## 2021-06-24 NOTE — PATIENT INSTRUCTIONS - HE
Follow-up visit with Dr. Perdomo in 2-4 weeks to discuss injection outcome and determine care plan going forward.       DISCHARGE INSTRUCTIONS    During office hours (8:00 a.m.- 4:30 p.m.) questions or concerns may be answered  by calling Spine Navigation Nurses at  [phone].     If you experience any problems after hours  please call [phone] and you will be connected to [facility].     All Patients:    ? You may experience an increase in your symptoms for the first 2 days (It may take anywhere between 2 days- 2 weeks for the steroid to have maximum effect).    ? You may use ice on the injection site, as frequently as 20 minutes each hour if needed.    ? You may take your pain medicine.    ? You may continue taking your regular medication after your injection. If you have had a Medial Branch Block you may resume pain medication once your pain diary is completed.    ? You may shower. No swimming, tub bath or hot tub for 48 hours.  You may remove your bandaid/bandage as soon as you are home.    ? You may resume light activities, as tolerated.    ? Resume your usual diet as tolerated.    ? It is strongly advised that you do not drive for 1-3 hours post injection.    ? If you have had oral sedation:  Do not drive for 8 hours post injection.      ? If you have had IV sedation:  Do not drive for 24 hours post injection.  Do not operate hazardous machinery or make important personal/business decisions for 24 hours.      POSSIBLE STEROID SIDE EFFECTS (If steroid/cortisone was used for your procedure)    -If you experience these symptoms, it should only last for a short period      Swelling of the legs                Skin redness (flushing)       Mouth (oral) irritation     Blood sugar (glucose) levels              Sweats                      Mood changes    Headache    Sleeplessness         POSSIBLE PROCEDURE SIDE EFFECTS  -Call the Spine Center if you are concerned    Increased Pain              Increased numbness/tingling        Nausea/Vomiting            Bruising/bleeding at site        Redness or swelling                                                Difficulty walking        Weakness             Fever greater than 100.5    *In the event of a severe headache after an epidural steroid injection that is relieved by lying down, please call the [facility] to speak with a clinical staff member*

## 2021-06-24 NOTE — TELEPHONE ENCOUNTER
Who is calling:   Patient   Reason for Call:   Checking on the status of the medication  Date of last appointment with primary care:   1/9/2109  Okay to leave a detailed message: Yes

## 2021-06-24 NOTE — PROGRESS NOTES
Email: [email or web address] It On  Marital Status:   Children: Yes 2 daughters  Born and Raised:   Occupation: Retired  Living Situation: Lives alone in an apartment in [place]  Smoking, Alcohol, other:  Services receiving: Medicare  [facility] Enrollment: 7/20/18  [facility] Follow up s: Every 2 months  Next follow up date: 4/26/19

## 2021-06-25 NOTE — TELEPHONE ENCOUNTER
INR result is   4.0    Will the patient be seen, or did they already see, MD or CNP today? No    Most Recent Warfarin dose day/week  Sunday Monday Tuesday Wednesday Thursday Friday Saturday        2.5 2.5     Sunday Monday Tuesday Wednesday Thursday Friday Saturday   2.5 2.5 2.5 2.5 2.5         Has the patient missed any doses of Coumadin, Warfarin, Jantoven in the past 7 days? No    Has the patients medications changed since the last visit? Yes, patient is now taking methylPREDNISolone (MEDROL DOSEPACK) 4 mg tablet 3 days ago and also cyclobenzaprine (FLEXERIL) 5 MG tablet 3 days ago    Has the patient experienced any bleeding recently? No    Has the patient experienced any injuries or illness recently? No    Has the patient experienced any 'new' shortness of breath, severe headaches, or changes in vision recently? No    Has the patient had any changes in their diet, or alcohol consumption? No    Is the patient here today to prepare for any type of upcoming surgery, procedure, or for a cardioversion procedure? No    What phone number can we reach the patient at today? home phone listed in demographics.

## 2021-06-25 NOTE — TELEPHONE ENCOUNTER
ANTICOAGULATION  MANAGEMENT PROGRAM    Nancy GARZON Kerriemikki is overdue for INR check.     Spoke with Lesly. She is out of testing supplies-- did call Monday morning and is expecting her supplies today. She will test as soon as she gets them.      Yumiko Kaufman, RN

## 2021-06-25 NOTE — TELEPHONE ENCOUNTER
Who is calling:  MD INR   Reason for Call:  Calling with Critical INR result from today,   INR result is 5.2 from 06/09  Date of last appointment with primary care:   Okay to leave a detailed message: No

## 2021-06-25 NOTE — TELEPHONE ENCOUNTER
Patient called stating that she needs to renew the forms for her lift chair and to be faxed to [facility] at [phone] attention Cora curtis.     Patient said that we have received the forms in the past was I was unable to find the form in her chart, can you assist with this?

## 2021-06-25 NOTE — TELEPHONE ENCOUNTER
Reason for Call:  Medication or medication refill:    Do you use a [facility]?  Name of the pharmacy and phone number for the current request: [facility] on file     Name of the medication requested: cyclobenzaprine 10mg, methylPREDNISolone 4mg     Other request: Patient was seen at an [facility] clinic and is requesting a refill on those prescriptions. Please refill if appropiate.     Can we leave a detailed message on this number? Yes    Phone number patient can be reached at: Home number on file [phone] (home)    Best Time: anytime     Call taken on 6/4/2021 at 11:34 AM by Etelvina Dozier

## 2021-06-25 NOTE — TELEPHONE ENCOUNTER
ANTICOAGULATION  MANAGEMENT PROGRAM    Nancy Oropeza is overdue for INR check.     Spoke with Lesly. Instructed to test INR with home meter and call results in to home monitoring company as soon as possible.       Yumiko Kaufman RN

## 2021-06-25 NOTE — TELEPHONE ENCOUNTER
Dr. Garnica,    Please see letter pended under the communication tab (this is the same letter that was sent on 9/17/2020) if appropriate.

## 2021-06-25 NOTE — TELEPHONE ENCOUNTER
INR result is 5.2  INR   Date Value Ref Range Status   05/25/2021 1.80 (!) 0.90 - 1.10 Final       Will the patient be seen, or did they already see, MD or CNP today? No    Most Recent Warfarin dose day/week  Sunday Monday Tuesday Wednesday Thursday Friday Saturday      2.5 2.5 2.5 2.5     Sunday Monday Tuesday Wednesday Thursday Friday Saturday   2.5 2.5 2.5           Has the patient missed any doses of Coumadin, Warfarin, Jantoven in the past 7 days? No    Has the patients medications changed since the last visit? Yes prednisone increased for her back    Has the patient experienced any bleeding recently? No    Has the patient experienced any injuries or illness recently? No    Has the patient experienced any 'new' shortness of breath, severe headaches, or changes in vision recently? No    Has the patient had any changes in their diet, or alcohol consumption? No    Is the patient here today to prepare for any type of upcoming surgery, procedure, or for a cardioversion procedure? No    What phone number can we reach the patient at today? home phone listed in demographics.

## 2021-06-25 NOTE — TELEPHONE ENCOUNTER
Recvd call from pt/Nancy, she is calling to check status regarding the refill request from Friday, June 4th, 2021.    Nancy is having a lot of sciatica pain and would like a refill on the cyclobenzaprine 10 MG

## 2021-06-26 NOTE — TELEPHONE ENCOUNTER
ANTICOAGULATION  MANAGEMENT    Assessment     Today's INR result of 3.0 is Therapeutic (goal INR of 2.0-3.0)        Warfarin taken as previously instructed    No new diet changes affecting INR    No new medication/supplements affecting INR     Has 2 doses of methylprednisolone left    Continues to tolerate warfarin with no reported s/s of bleeding or thromboembolism     Previous INR was Therapeutic    Plan:     Spoke on phone with Lesly regarding INR result and instructed:      Warfarin Dosing Instructions:  Continue current warfarin dose 2.5 mg daily (0 % change)    Instructed patient to follow up no later than: 1 week with home monitor    Education provided: target INR goal and significance of current INR result, monitoring for bleeding signs and symptoms, monitoring for clotting signs and symptoms and when to seek medical attention/emergency care    Lesly verbalizes understanding and agrees to warfarin dosing plan.    Instructed to call the [facility] for any changes, questions or concerns. (#[phone])   ?   Yumiko Kaufman RN    Subjective/Objective:      Nancy GARZON Sandip, a 76 y.o. female is on warfarin.        Lesly reports:     Home warfarin dose: verbally confirmed home dose with Lesly and updated on anticoagulation calendar     Missed doses: No     Medication changes:  No     S/S of bleeding or thromboembolism:  No     New Injury or illness:  No     Changes in diet or alcohol consumption:  No     Upcoming surgery, procedure or cardioversion:  No    Anticoagulation Episode Summary     Current INR goal:  2.0-3.0   TTR:  44.0 % (11.6 mo)   Next INR check:  6/24/2021   INR from last check:  3.00 (6/17/2021)   Weekly max warfarin dose:     Target end date:  Indefinite   INR check location:     Preferred lab:     Send INR reminders to:  XENIA HURTADO    Indications    Pulmonary embolism (H) [I26.99]           Comments:           Anticoagulation Care Providers     Provider Role Specialty Phone number     Julien Garnica MD [facility] [phone]

## 2021-06-26 NOTE — TELEPHONE ENCOUNTER
INR result is    3.0  INR   Date Value Ref Range Status   06/11/2021 4.00 (!) 0.90 - 1.10 Final       Will the patient be seen, or did they already see, MD or CNP today? No    Most Recent Warfarin dose day/week  Sunday Monday Tuesday Wednesday Thursday Friday Saturday         1.25 mg     Sunday Monday Tuesday Wednesday Thursday Friday Saturday   2.5 mg 2.5 mg 2.5 mg 2.5 mg          Has the patient missed any doses of Coumadin, Warfarin, Jantoven in the past 7 days? No    Has the patients medications changed since the last visit? No    Has the patient experienced any bleeding recently? No    Has the patient experienced any injuries or illness recently? No    Has the patient experienced any 'new' shortness of breath, severe headaches, or changes in vision recently? No    Has the patient had any changes in their diet, or alcohol consumption? No    Is the patient here today to prepare for any type of upcoming surgery, procedure, or for a cardioversion procedure? No    What phone number can we reach the patient at today? home phone listed in demographics.

## 2021-06-26 NOTE — TELEPHONE ENCOUNTER
Who is calling:  Lesly  Reason for Call:  Patient called to report that she checked her INR at home w/a result of 4.0. She wanted to make sure we got it as her company that she calls it into hasn't been sending us the results, per patient.  Date of last appointment with primary care: 4/23/2021  Okay to leave a detailed message: Yes

## 2021-06-27 NOTE — PROGRESS NOTES
Progress Notes by Martine Rick at 4/30/2019  8:01 AM     Author: Martine Rick Service: -- Author Type: Community Health Worker    Filed: 4/30/2019  8:10 AM Encounter Date: 4/30/2019 Status: Signed    : Martine Rick (Community Health Worker)       My Clinic Care Coordination Wellness Plan  This Maintenance Wellness Plan provides private information in regards to the work I have done with my Care Team from my Primary Care Clinic.  This document provides insight on the goals I have worked hard to achieve.  My Care Team congratulates me on my journey to become well.  With the assistance of my Clinic Care Guide and Primary Care Physician,  I have succeeded in improving areas of my health that I identified as barriers to becoming well.  I will continue to seek wellness and use the skills I have obtained to further my journey.  My Care Guide will follow up with me in 3 months.  In the meantime, if I should have any questions or concerns I will contact my Care Guide.     [facility]  [address], [address] [place] [address]  [phone]    My Preferred Method of Contact:  Phone: [phone]    My Primary/Preferred Language:  English    Preferred Learning Style:  Reading information and Pictures/Diagrams    Emergency Contact: Extended Emergency Contact Information  Primary Emergency Contact: Nancy Dacosta   [place]  Mobile Phone: [phone]  Relation: Child  Secondary Emergency Contact: July Barr   [facility]  Home Phone: [phone]  Relation: Child     PCP:  Julien Garnica MD  Specialists:    Care Team            Julien Garnica MD   [phone] PCP - General    Tigist Bedolla, PharmD Pharmacist, Pharmacist    Cathy Willams MBBS   [phone] Physician, Rheumatology    Cora Pearson RN Registered Nurse    Martine Rick [facility] Care Coordination Care Guide, Primary Care - [facility]. Phone: [phone]. Fax:  [phone]        Accomplishments:  Goals        Patient Stated    ? COMPLETED: I have accomplished finding an agencies that could help with housekeeping services. (pt-stated)      Personal Plan  I have been approved for a Waiver and have a house keeper now to help me clean my home. If I start having trouble with that I will call my Waiver worker.       ? COMPLETED: I have accomplished finding services to help me get a lift chair.   (pt-stated)      Personal Plan  I have been approved for a Waiver and my Waiver worker is assisting me in getting a Lift Chair through the Waiver.      ? COMPLETED: I have accomplished looking into [employer] with the help of my Care Guide. (pt-stated)      Personal Plan  I have decided not to sign up for [employer] but if I change my mind in the future I will call my Waiver worker to get signed up.       ? COMPLETED: I have accplished regining the strength in my legs and arms.  (pt-stated)      Personal Plan  If I feel like I am having trouble with not having enough strength in my arms and legs I will make an appointment with my PCP.             Advanced Directive/Living Will: The patient was given information regarding Adanced Directives/Living Will    Clinical Emergency Plan    High Blood Pressure (Hypertension)  You have been diagnosed with high blood pressure (also called hypertension). This means the force of blood against your artery walls is too strong. It also means your heart is working hard to move blood. High blood pressure usually has no symptoms, but over time, it can damage your heart, blood vessels, eyes, kidneys, and other organs. With help from your doctor, you can manage your blood pressure and protect your health.  Taking medications    Learn to take your own blood pressure. Keep a record of your results. Ask your doctor which readings mean that you need medical attention.    Take your blood pressure medication exactly as directed. Dont skip doses. Missing  doses can cause your blood pressure to get out of control.    Avoid medications that contain heart stimulants, including over-the-counter drugs. Check for warnings about high blood pressure on the label.    Check with your doctor before taking a decongestant. Some decongestants can worsen high blood pressure.  Lifestyle changes    Maintain a healthy weight. Get help to lose any extra pounds.    Cut back on salt.  ? Limit canned, dried, packaged, and fast foods.  ? Dont add salt to your food at the table.  ? Season foods with herbs instead of salt when you cook.    Follow the DASH (Dietary Approaches to Stop Hypertension) eating plan. This plan recommends vegetables, fruits, whole gains, and other heart healthy foods.    Begin an exercise program. Ask your doctor how to get started. The American Heart Association recommends aerobic exercise 3 to 4 times a week for an average of 40 minutes at a time, with your doctor's approval. Simple activities like walking or gardening can help.    Break the smoking habit. Enroll in a stop-smoking program to improve your chances of success. Ask your health care provider about programs and medications to help you stop smoking.    Limit drinks that contain caffeine (coffee, black or green tea, cola) to 2 per day.    Never take stimulants such as amphetamines or cocaine; these drugs can be deadly for someone with high blood pressure.    Control your stress. Learn stress-management techniques.    Limit alcohol to no more than 1 drink a day for women and 2 drinks a day for men.  Follow-up care  Make a follow-up appointment as directed by our staff.     When to seek medical care  Call your doctor immediately if you have any of the following:    Chest pain or shortness of breath (call 911)    Moderate to severe headache    Weakness in the muscles of your face, arms, or legs    Trouble speaking    Extreme drowsiness    Confusion    Fainting or dizziness    Pulsating or rushing sound in  your ears    Unexplained nosebleed    Weakness, tingling, or numbness of your face, arms, or legs    Change in vision    Blood pressure measured at home that is greater than 180/110          When you have Hypothyroidism  Hypothyroidism means your thyroid gland is not producing enough thyroid hormone to meet your body's needs. Overall, hypothyroidism slows your bodys normal rate of functioning, causing mental and physical sluggishness. Various symptoms may range from mild to severe. The most severe form is called myxedema.  Medicine  Take your medicine exactly as directed. You will take this medicine for the rest of your life.    Take your medicine the same time every day.    Keep your pills in a container that is labeled with the days of the week. This will help you remember if youve taken your medicine each day.    Take your medicine with a full glass of water. Take it at least 1 before you eat breakfast. Or at bedtime, at least 3 hours after eating.    Do not take calcium or iron within 4 hours of taking your thyroid medicine. And, ask your healthcare provider about taking other medicines with your thyroid pill.    Continue to take your medicine if you become pregnant. Many women need more thyroid medicine during pregnancy. Your doctor may increase your dose.    Your healthcare provider will regularly check your thyroid hormone levels with blood tests. If your dose is changed, you will usually have lab work in 4 to 6 weeks to be sure that the new dose is right for you.    Always alert your healthcare providers of changes in your other medicines (including estrogens, testosterone, and anti-seizure medicines) as these changes may affect your thyroid hormone levels.    Never stop treatment on your own. If you do, your symptoms will return.  Other home care    During your routine visits, tell your healthcare provider about any signs of hyperthyroidism (too much thyroid hormone), such as:  ? Restlessness  ? Rapid  weight loss  ? Sweating  ? Palpitations    Eat a high-fiber, low-calorie diet to relieve constipation and maintain a healthy weight.    Exercise. Start slow, with a 5 to 15 minute walk each day. Try to work up to 10,000 steps, or 3 20-minute walks each day.    Remember, hypothyroidism is associated with increased cholesterol and an increased risk of heart disease. Correcting hypothyroidism generally improves cholesterol levels. Talk to your healthcare provider about the elements of a healthy lifestyle.  To learn more  The resources below can help you learn more:    American Thyroid Association [phone] www.thyroid.org    [facility] [phone] www.hormone.org  Follow-up care  Follow-up with your healthcare provider, or as advised.  When to seek medical care  Call your healthcare provider right away if you have any of the following:    Extreme fatigue    Puffy hands, face, or feet    Irregular heartbeat    Confusion     6089-6232 The [employer]. [address] [place] [address]. All rights reserved. This information is not intended as a substitute for professional medical care. Always follow your healthcare professional's instructions.                Emergency Plan Recommendation:     When to Use the Emergency Department (ED)  An emergency means you could die if you dont get care quickly. Or you could be hurt permanently (disabled). Read below to know when to use -- and when not to use -- an emergency department (also called ED).     Dangers to your life  Here are examples of emergencies. These need immediate care:  A hard time breathing  Severe chest pain  Choking  Severe bleeding  Suddenly not able to move or speak  Blacking out (fainting)`  Poisoning     Dangers of permanent injuries  Here are other emergencies. These also need immediate care:  Deep cuts or severe burns  Broken bones, or sudden severe pain and swelling in a joint     When its an emergency  If you have an  emergency, follow these steps:     1. Go to the nearest emergency department  If you can, go to the hospital ED closest to you right away.  If you cannot get there right away, or if it is not safe to take yourself, call 911 or your police emergency number.  2. Call your primary care doctor  Tell your doctor about the emergency. Call within 24 hours of going to the ED.  If you cannot call, have someone call for you.  Go to your doctor (not the ED) for any follow-up care.     When its not an emergency  If a problem is not an emergency, follow these steps:     1. Call your primary care doctor  If you dont know the name of your doctor, call your health plan.  If you cannot call, have someone call for you.  2. Follow instructions  Your doctor will tell you what you should do.  You may be told to see your doctor right away. You may be told to go to the ED. Or you may be told to go to an urgent care center.  Follow your doctors advice.      All [facility] clinic patients have access to a Nurse 24 hours a day, 7 days a week.  If you have questions or want advice from a Nurse, please know [facility] is here for you.  You can call your clinic and they will connect you or you can call [facility] at [phone].  [facility] also has Walk In Care clinics in multiple locations.  Call the number listed above for more information about our Walk In Care clinics or visit the [facility] website at [email or web address].

## 2021-06-27 NOTE — PROGRESS NOTES
Progress Notes by Airam Faith RN at 4/26/2019  1:36 PM     Author: Airam Faith RN Service: -- Author Type: Registered Nurse    Filed: 4/26/2019  1:45 PM Encounter Date: 4/26/2019 Status: Signed    : Airam Faith RN (Registered Nurse)       Received a note from [facility] Care Guide asking for maintenance.      Patient attends [facility] and has had injections to her spine for pain management.  She comes to the clinic for her INR's due to a hx of PE.  Previously had [facility] for PT/OT and was discharged in January 2019.  Has assistance of 2 daughters if needed but lives alone.  Patient okayed for [facility] Maintenance.      All Goals Met.  No further goals identified.  Will notified PCP.    High Blood Pressure (Hypertension)  You have been diagnosed with high blood pressure (also called hypertension). This means the force of blood against your artery walls is too strong. It also means your heart is working hard to move blood. High blood pressure usually has no symptoms, but over time, it can damage your heart, blood vessels, eyes, kidneys, and other organs. With help from your doctor, you can manage your blood pressure and protect your health.  Taking medications    Learn to take your own blood pressure. Keep a record of your results. Ask your doctor which readings mean that you need medical attention.    Take your blood pressure medication exactly as directed. Dont skip doses. Missing doses can cause your blood pressure to get out of control.    Avoid medications that contain heart stimulants, including over-the-counter drugs. Check for warnings about high blood pressure on the label.    Check with your doctor before taking a decongestant. Some decongestants can worsen high blood pressure.  Lifestyle changes    Maintain a healthy weight. Get help to lose any extra pounds.    Cut back on salt.  o Limit canned, dried, packaged, and fast foods.  o Dont add salt to your food at the table.  o Season  foods with herbs instead of salt when you cook.    Follow the DASH (Dietary Approaches to Stop Hypertension) eating plan. This plan recommends vegetables, fruits, whole gains, and other heart healthy foods.    Begin an exercise program. Ask your doctor how to get started. The American Heart Association recommends aerobic exercise 3 to 4 times a week for an average of 40 minutes at a time, with your doctor's approval. Simple activities like walking or gardening can help.    Break the smoking habit. Enroll in a stop-smoking program to improve your chances of success. Ask your health care provider about programs and medications to help you stop smoking.    Limit drinks that contain caffeine (coffee, black or green tea, cola) to 2 per day.    Never take stimulants such as amphetamines or cocaine; these drugs can be deadly for someone with high blood pressure.    Control your stress. Learn stress-management techniques.    Limit alcohol to no more than 1 drink a day for women and 2 drinks a day for men.  Follow-up care  Make a follow-up appointment as directed by our staff.     When to seek medical care  Call your doctor immediately if you have any of the following:    Chest pain or shortness of breath (call 911)    Moderate to severe headache    Weakness in the muscles of your face, arms, or legs    Trouble speaking    Extreme drowsiness    Confusion    Fainting or dizziness    Pulsating or rushing sound in your ears    Unexplained nosebleed    Weakness, tingling, or numbness of your face, arms, or legs    Change in vision    Blood pressure measured at home that is greater than 180/110     When you have Hypothyroidism  Hypothyroidism means your thyroid gland is not producing enough thyroid hormone to meet your body's needs. Overall, hypothyroidism slows your bodys normal rate of functioning, causing mental and physical sluggishness. Various symptoms may range from mild to severe. The most severe form is called  myxedema.  Medicine  Take your medicine exactly as directed. You will take this medicine for the rest of your life.    Take your medicine the same time every day.    Keep your pills in a container that is labeled with the days of the week. This will help you remember if youve taken your medicine each day.    Take your medicine with a full glass of water. Take it at least 1 before you eat breakfast. Or at bedtime, at least 3 hours after eating.    Do not take calcium or iron within 4 hours of taking your thyroid medicine. And, ask your healthcare provider about taking other medicines with your thyroid pill.    Continue to take your medicine if you become pregnant. Many women need more thyroid medicine during pregnancy. Your doctor may increase your dose.    Your healthcare provider will regularly check your thyroid hormone levels with blood tests. If your dose is changed, you will usually have lab work in 4 to 6 weeks to be sure that the new dose is right for you.    Always alert your healthcare providers of changes in your other medicines (including estrogens, testosterone, and anti-seizure medicines) as these changes may affect your thyroid hormone levels.    Never stop treatment on your own. If you do, your symptoms will return.  Other home care    During your routine visits, tell your healthcare provider about any signs of hyperthyroidism (too much thyroid hormone), such as:  o Restlessness  o Rapid weight loss  o Sweating  o Palpitations    Eat a high-fiber, low-calorie diet to relieve constipation and maintain a healthy weight.    Exercise. Start slow, with a 5 to 15 minute walk each day. Try to work up to 10,000 steps, or 3 20-minute walks each day.    Remember, hypothyroidism is associated with increased cholesterol and an increased risk of heart disease. Correcting hypothyroidism generally improves cholesterol levels. Talk to your healthcare provider about the elements of a healthy lifestyle.  To learn  more  The resources below can help you learn more:    American Thyroid Association [phone] www.thyroid.org    [facility] [facility] [phone] www.hormone.org  Follow-up care  Follow-up with your healthcare provider, or as advised.  When to seek medical care  Call your healthcare provider right away if you have any of the following:    Extreme fatigue    Puffy hands, face, or feet    Irregular heartbeat    Confusion     1461-7312 [employer]. [address] [place] [address]. All rights reserved. This information is not intended as a substitute for professional medical care. Always follow your healthcare professional's instructions.             Emergency Plan Recommendation:    When to Use the Emergency Department (ED)  An emergency means you could die if you dont get care quickly. Or you could be hurt permanently (disabled). Read below to know when to use -- and when not to use -- an emergency department (also called ED).    Dangers to your life  Here are examples of emergencies. These need immediate care:  A hard time breathing  Severe chest pain  Choking  Severe bleeding  Suddenly not able to move or speak  Blacking out (fainting)`  Poisoning    Dangers of permanent injuries  Here are other emergencies. These also need immediate care:  Deep cuts or severe burns  Broken bones, or sudden severe pain and swelling in a joint    When its an emergency  If you have an emergency, follow these steps:    1. Go to the nearest emergency department  If you can, go to the hospital ED closest to you right away.  If you cannot get there right away, or if it is not safe to take yourself, call 911 or your police emergency number.  2. Call your primary care doctor  Tell your doctor about the emergency. Call within 24 hours of going to the ED.  If you cannot call, have someone call for you.  Go to your doctor (not the ED) for any follow-up care.    When its not an emergency  If a problem is not an  emergency, follow these steps:    1. Call your primary care doctor  If you dont know the name of your doctor, call your health plan.  If you cannot call, have someone call for you.  2. Follow instructions  Your doctor will tell you what you should do.  You may be told to see your doctor right away. You may be told to go to the ED. Or you may be told to go to an urgent care center.  Follow your doctors advice.

## 2021-06-29 NOTE — PROGRESS NOTES
Progress Notes by Madina Clark PA-C at 7/7/2020 12:30 PM     Author: Madina Clark PA-C Service: -- Author Type: Physician Assistant    Filed: 7/7/2020  2:49 PM Encounter Date: 7/7/2020 Status: Signed    : Madina Clark PA-C (Physician Assistant)       Chief Complaint   Patient presents with   ? Foot Pain     Left foot pain, swelling x3 days       HPI:  Nancy Oropeza is a 75 y.o. female with past medical history of pulmonary emboli and long-term anticoagulation therapy who presents today complaining of left foot pain and swelling x 3 days.  Patient's pain and swelling is mainly on the plantar surface of the distal foot along the pad.  She denies any known injury.  She states that she has been doing some more exercising with spreading her toes and pointing her toes and flexing her foot.  She does suffer from some chronic edema that seems to wax and wane when she is eating more salt, and wanes with elevating the legs.  She is tried elevating, but the swelling does not seem to improve.  She denies any calf pain.    History obtained from the patient.    Problem List:  2018-06: Chronic pain syndrome  2017-07: Acute bilateral knee pain  2017-03: Anticoagulant long-term use  2015-12: Pulmonary embolism (H)  2015-12: Multiple lung nodules on CT  2015-12: Angioedema  2015-12: Hypokalemia  2015-12: Pulmonary emboli (H)  2015-12: PE (pulmonary thromboembolism) (H)  2015-07: Knee pain  Seborrheic Dermatitis  Seroma complicating a procedure  Fatigue  Morbid Obesity  Osteoarthritis Of The Knee  Back pain  Major Depression, Recurrent  Obstructive Sleep Apnea  Essential hypertension with goal blood pressure less than 140/90  Edema  Plantar Fasciitis  Hypothyroidism  Hypercholesterolemia  Normochromic, Normocytic Anemia  Arthralgias In Multiple Sites  Cataract  Impaired Fasting Glucose  Skin Neoplasm Of Uncertain Behavior  Sore Mouth Due To Dentures      Past Medical History:   Diagnosis Date   ? Depression    ?  Disease of thyroid gland    ? HTN (hypertension)    ? Hypercholesteremia    ? Morbid obesity (H)    ? Pulmonary emboli (H) 12/14/2015   ? Yeast infection        Social History     Tobacco Use   ? Smoking status: Former Smoker   ? Smokeless tobacco: Never Used   ? Tobacco comment: quite 20 yrs ago   Substance Use Topics   ? Alcohol use: No     Comment: occasionally       Review of Systems   Cardiovascular: Positive for leg swelling (Bilateral chronic).   Musculoskeletal:        Left foot swelling and pain on the plantar surface.   Skin:        Has not looked at her skin.       Vitals:    07/07/20 1237   BP: 140/78   Pulse: (!) 104   Resp: 16   Temp: 98.2  F (36.8  C)   TempSrc: Oral   SpO2: 96%   Weight: (!) 322 lb 6.4 oz (146.2 kg)       Physical Exam  Vitals signs and nursing note reviewed.   Constitutional:       General: She is not in acute distress.     Appearance: She is well-developed. She is not diaphoretic.   HENT:      Head: Normocephalic and atraumatic.      Right Ear: External ear normal.      Left Ear: External ear normal.   Eyes:      General:         Right eye: No discharge.         Left eye: No discharge.      Conjunctiva/sclera: Conjunctivae normal.   Pulmonary:      Effort: Pulmonary effort is normal. No respiratory distress.   Musculoskeletal:        Feet:    Neurological:      Mental Status: She is alert.   Psychiatric:         Behavior: Behavior normal.         Thought Content: Thought content normal.         Judgment: Judgment normal.         Radiology:  Us Foot Non Vascular Left    Result Date: 7/7/2020  EXAM DATE:         07/07/2020 EXAM: US EXTREMITY LIMITED LEFT LOCATION: [facility] DATE/TIME: 7/7/2020 2:00 PM INDICATION: Swelling and pain over the pad of the left plantar surface. R/o mass or abscess. COMPARISON: None. TECHNIQUE: Routine. FINDINGS: No foreign material or abscess seen. There is nonmarginated diminished echogenicity within the superficial foot tissue which is  most suggestive of edema or phlegmon. IMPRESSION: 1.  Plantar foot edema or phlegmon in the area of pain. 2.  No foreign material seen. 3.  No abscess seen.     Us Venous Leg Left    Result Date: 7/7/2020  EXAM DATE:         07/07/2020 EXAM: US LOWER EXTREMITY VEINS LTD LEFT LOCATION: [facility] DATE/TIME: 7/7/2020 2:45 PM INDICATION: Foot swelling. History of DVT. On blood thinners. COMPARISON: 12/14/2015 TECHNIQUE: Venous Duplex ultrasound of the left lower extremity with and without compression, augmentation and duplex. Color flow and spectral Doppler with waveform analysis performed. FINDINGS: Exam includes the common femoral, femoral, popliteal, and contralateral common femoral veins as well as segmentally visualized deep calf veins and greater saphenous vein. LEFT: No deep vein thrombosis. No superficial thrombophlebitis. No popliteal cyst. IMPRESSION: 1.  No deep venous thrombosis in the left lower extremity.       Clinical Decision Making:  Differential diagnosis includes but is not limited to DVT, foreign body, cellulitis, and abscess.  Soft tissue ultrasound and venous ultrasound were ordered to rule out DVT, foreign body, and abscess.  Was negative for DVT, foreign body and abscess.  It did show skin inflammation, suspect foot cellulitis.  Patient was started on Augmentin today.  She is instructed to follow-up if symptoms persisted.  At the end of the encounter, I discussed results, diagnosis, medications. Discussed red flags for immediate return to clinic/ER, as well as indications for follow up if no improvement. Patient understood and agreed to plan. Patient was stable for discharge.    1. Phlegmon  amoxicillin-clavulanate (AUGMENTIN) 875-125 mg per tablet   2. Localized swelling of left foot  US Foot Non Vascular Left    US Venous Leg Left         Patient Instructions   -Complete antibiotics as ordered. Take with food to help prevent stomach upset.  -Elevate the affected limb as much  as possible. This will help keep the swelling down.  -Tylenol as needed for discomfort  -If develop a fever, increased redness, pain, drainage or swelling from the area, should contact primary care clinic/doctor.  -Should see improvement in next 3-5 days after starting antibiotics.  -Follow up if symptoms worsen or fail to improve.

## 2021-06-29 NOTE — PROGRESS NOTES
Progress Notes by Airam Faith RN at 8/13/2020 10:00 AM     Author: Airam Faith RN Service: -- Author Type: Registered Nurse    Filed: 8/13/2020 12:32 PM Encounter Date: 8/13/2020 Status: Signed    : Airam Faith RN (Registered Nurse)       Clinic Care Coordination Contact    Clinic Care Coordination Contact  OUTREACH    Referral Information:  Referral Source: IP Report    Primary Diagnosis: Cardiovascular - other    Chief Complaint   Patient presents with   ? Clinic Care Coordination - Initial        Clinic Utilization  Difficulty keeping appointments:: No  Compliance Concerns: No  No-Show Concerns: No  No PCP office visit in Past Year: No  Utilization    Last refreshed: 8/13/2020 12:20 PM:  Hospital Admissions 3           Last refreshed: 8/13/2020 12:20 PM:  ED Visits 3           Last refreshed: 8/13/2020 12:20 PM:  No Show Count (past year) 5              Current as of: 8/13/2020 12:20 PM              Clinical Concerns:  Current Medical Concerns:  Osteoarthritis, backpain, allie, htn, edema, hypothyroidism, polymyalgia,  Sirs, sepsis, pulmonary embolism, vickie, diarrhea, bilat knee pain,   Current Behavioral Concerns: mood disorder, depression    Education Provided to patient: yes   Pain  Pain (GOAL):: No  Health Maintenance Reviewed: Up to date  Clinical Pathway: None     Medication Management:  Completes MSU box independently on a weekly basis     Functional Status:  Dependent IADLs:: Cleaning, Transportation  Bed or wheelchair confined:: No  Fallen 2 or more times in the past year?: No  Any fall with injury in the past year?: No    Living Situation:  Current living arrangement:: I live alone    Lifestyle & Psychosocial Needs:        Diet:: Regular  Inadequate nutrition (GOAL):: No  Tube Feeding: No  Inadequate activity/exercise (GOAL):: No  Significant changes in sleep pattern (GOAL): No  Transportation means:: Family     Episcopal or spiritual beliefs that impact treatment:: No  Mental health  DX:: No  Mental health management concern (GOAL):: No  Informal Support system:: Children, Family   Socioeconomic History   ? Marital status:      Spouse name: Not on file   ? Number of children: Not on file   ? Years of education: Not on file   ? Highest education level: Not on file     Tobacco Use   ? Smoking status: Former Smoker   ? Smokeless tobacco: Never Used   ? Tobacco comment: quite 20 yrs ago   Substance and Sexual Activity   ? Alcohol use: No     Comment: occasionally   ? Drug use: No                Resources and Interventions:  Current Resources:      Community Resources: None  Supplies used at home:: None            Referrals Placed: None     75 yr F PMH:  Osteoarthritis, backpain, allie, htn, edema, hypothyroidism, polymyalgia,  Sirs, sepsis, pulmonary embolism, vickie, diarrhea, bilat knee pain, depression, mood disorder. At  ED 8/7 for peripheral edema.  Lesly lives alone in a private residence.  She stated her daughter Nancy and grandson check in on her on a daily basis.  She completes her MSU independently.  She continues to have 'swelling' to her 'right leg more than her left'.  She has been instructed to elevate her legs as able to tolerate throughout the day.  She has her daughter present in the home when she showers however is able to bathe herself.  She declined any needs in the home.  Per chart review from previous [facility] outreach she may also have PCA services.  She is aware how to manage all medications.  Verbalizes a knowledge of her upcoming appointment with her PCP.  Declined Enrollment to [facility].  Patient aware of [facility] and potential services that are available.  Patient aware to ask PCP if there is a need in the future.     Goals:   None    Patient/Caregiver understanding: Patient stated an understanding     Future Appointments              Today [facility], [facility]    In 3 weeks Julien Garnica MD [facility] Medicine/[facility]          Plan: DELEGATION:  NONE-Patient declined Enrollment to [facility].

## 2021-06-29 NOTE — PROGRESS NOTES
Progress Notes by Jenn Nuñez NP at 10/23/2020  9:20 AM     Author: Jenn Nuñez NP Service: -- Author Type: Nurse Practitioner    Filed: 10/23/2020 12:18 PM Encounter Date: 10/23/2020 Status: Signed    : Jenn Nuñez NP (Nurse Practitioner)               [facility] Consult Note    Date of Service:  10/23/2020    Requesting Provider: Dr. Julien Garnica    Chief Complaint: abdominal wound    History of Present Illness: Nancy Oropeza is being seen at [facility] today regarding abdominal wound. They arrive to the clinic today alone. The patient reports that this past July she had Covid and was hospitalized with this; they stopped her coumadin and started on lovenox injections; she developed bruising at each injection site however the site at the LLQ abdomen developed blister and then eventual ulceration; has been present now for several months. She was also recently diagnosed with polymyalgia Rheumatica and was initially started on 10mg Prednisone; she did not like the side effects so tried to stop this but then developed severe joint pain so was put on 5mg prednisone and tolerating much better with good pain relief; following up with rheumatology on this. Was currently/previously using topical antibiotics; bandaids; gauze; showering. Reports pain of 0; no feeling in that area; currently using nothing for pain. Denies history of cancer. Sleeps in a bed/recliner with legs elevated. Pt no longer has their uterus and ovaries, they deny any abnormal vaginal bleeding. Has history of hernia repair later on after her hysterectomy. Since then she has lost much of the sensation in her abdomen. Denies diabetes.       Review of Systems:   Constitutional:  negative  for fever, chills or night sweats  EENTM: negative for glasses;  negative Robinson  GI:  negative for nausea/vomiting;  negative for constipation  negative diarrhea;  negative for fecal incontinence negative weight  loss; she did lose 30 pounds this past July with Covid; has regained 20 pounds  :  negative dysuria, negative incontinence  MS: patient is ambulatory;  does not use assistive devices  Cardiac:  negative   Respiratory:  negative SOB  Endocrine:  negative diabetes  Psych:  negative depression/anxiety    Past Medical History:    Past Medical History:   Diagnosis Date   ? 2019 novel coronavirus disease (COVID-19) 7/16/2020   ? Acute bilateral knee pain 7/18/2017   ? TEOFILO (acute kidney injury) (H) 7/16/2020   ? Angioedema 12/17/2015   ? Anticoagulant long-term use 3/6/2017   ? Arthralgias In Multiple Sites     Created by Conversion    ? Back pain     Created by Conversion    ? Cataract     Created by Conversion    ? Chronic pain syndrome 6/1/2018   ? COVID-19 virus infection 7/17/2020   ? Depression    ? Diarrhea 7/16/2020   ? Disease of thyroid gland    ? Edema     Created by Conversion    ? Essential hypertension with goal blood pressure less than 140/90     Created by Conversion  Replacement Utility updated for latest IMO load   ? History of pulmonary embolism 1/1/2015    Overview:  bilateral with acute cor pulmonale    ? HTN (hypertension)    ? Hypercholesteremia    ? Hypercholesterolemia     Created by Conversion    ? Hypokalemia 12/17/2015   ? Hypothyroidism     Created by Conversion  Replacement Utility updated for latest IMO load   ? Hypoxia 7/16/2020   ? Impaired fasting glucose     Created by Conversion    ? 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   ? Major Depression, Recurrent     Created by Conversion  Replacement Utility updated for latest IMO load   ? Mood disorder (H) 7/16/2020   ? Morbid obesity (H)    ? Multiple lung nodules on CT 12/17/2015   ? Normochromic, Normocytic Anemia     Created by Conversion    ? Obstructive Sleep Apnea     Created by Conversion    ? Osteoarthritis Of The Knee     Created by Conversion  Replacement Utility updated for latest IMO load   ? Polymyalgia rheumatica (H) 7/16/2020   ?  Postoperative anemia due to acute blood loss 12/18/2018   ? Pulmonary emboli (H) 12/14/2015   ? Pulmonary embolism (H) 12/21/2015   ? Sepsis (H) 7/28/2020   ? SIRS (systemic inflammatory response syndrome) (H) 7/28/2020   ? Yeast infection         Surgical History:   Past Surgical History:   Procedure Laterality Date   ? BREAST BIOPSY Left 1970s   ? HERNIA REPAIR     ? HYSTERECTOMY  2010   ? OOPHORECTOMY  2010        Medications:    Current Outpatient Medications:   ?  amLODIPine (NORVASC) 5 MG tablet, TAKE 1 TABLET DAILY (NEW   DOSE 6/1/18), Disp: 90 tablet, Rfl: 3  ?  cholecalciferol, vitamin D3, 1,000 unit tablet, Take 2,000 Units by mouth daily. , Disp: , Rfl:   ?  citalopram (CELEXA) 40 MG tablet, Take 1 tablet (40 mg total) by mouth daily., Disp: 90 tablet, Rfl: 3  ?  furosemide (LASIX) 20 MG tablet, TAKE 1 TABLET DAILY, Disp: 90 tablet, Rfl: 3  ?  hydrOXYzine HCL (ATARAX) 25 MG tablet, Take 1-2 tablets by mouth every 6 hours as needed for anxiety or sleep., Disp: 30 tablet, Rfl: 1  ?  levothyroxine (SYNTHROID, LEVOTHROID) 137 MCG tablet, Take 1 tablet (137 mcg total) by mouth daily., Disp: 90 tablet, Rfl: 3  ?  lisinopriL-hydrochlorothiazide (PRINZIDE,ZESTORETIC) 20-12.5 mg per tablet, Take 2 tablets by mouth daily., Disp: 180 tablet, Rfl: 3  ?  pravastatin (PRAVACHOL) 80 MG tablet, Take 1 tablet (80 mg total) by mouth at bedtime., Disp: 90 tablet, Rfl: 3  ?  predniSONE (DELTASONE) 5 MG tablet, Take 5 mg by mouth daily., Disp: 30 tablet, Rfl: 2  ?  warfarin ANTICOAGULANT (COUMADIN/JANTOVEN) 2.5 MG tablet, Take 0.5-1 tablets (1.25-2.5 mg total) by mouth See Admin Instructions. Take half a tablet (1.25mg) on Monday, Wednesday and Saturday; Take 1 tablet (2.5mg) on all other days of the week. Adjust dose based on INR results as directed., Disp: 90 tablet, Rfl: 3    Allergies:   Allergies   Allergen Reactions   ? Atorvastatin Myalgia     Legs hurt/sore   ? Paroxetine Unknown   ? Simvastatin Myalgia     Legs hurt,  "12/2015 tolerated pravastatin at home   ? Sulfa (Sulfonamide Antibiotics) Rash   ? Sulfasalazine Rash       Family history:   Family History   Problem Relation Age of Onset   ? Breast cancer Sister 75   ? Stomach cancer Paternal Grandfather    ? Lung cancer Sister    ? COPD Mother    ? Heart disease Mother    ? Heart attack Father         Social History:   Social History     Socioeconomic History   ? Marital status:      Spouse name: Not on file   ? Number of children: Not on file   ? Years of education: Not on file   ? Highest education level: Not on file   Occupational History   ? Not on file   Social Needs   ? Financial resource strain: Not on file   ? Food insecurity     Worry: Not on file     Inability: Not on file   ? Transportation needs     Medical: Not on file     Non-medical: Not on file   Tobacco Use   ? Smoking status: Former Smoker   ? Smokeless tobacco: Never Used   ? Tobacco comment: quite 20 yrs ago   Substance and Sexual Activity   ? Alcohol use: No     Comment: occasionally   ? Drug use: No   ? Sexual activity: Not on file   Lifestyle   ? Physical activity     Days per week: Not on file     Minutes per session: Not on file   ? Stress: Not on file   Relationships   ? Social connections     Talks on phone: Not on file     Gets together: Not on file     Attends Sikh service: Not on file     Active member of club or organization: Not on file     Attends meetings of clubs or organizations: Not on file     Relationship status: Not on file   ? Intimate partner violence     Fear of current or ex partner: Not on file     Emotionally abused: Not on file     Physically abused: Not on file     Forced sexual activity: Not on file   Other Topics Concern   ? Not on file   Social History Narrative    Patient arrived in the ED with her sister Kassy 09/10/17        Physical Exam  Vitals: Blood pressure 122/64, pulse 96, temperature 97.8  F (36.6  C), resp. rate 18, height 5' 7\" (1.702 m), weight (!) " 314 lb (142.4 kg), not currently breastfeeding.  General: This is a 75 y.o. female who appears their reported age, not in acute distress  Head: normocephalic, Atraumatic;not  wearing glasses; non-icteric sclera; no exudate; mild hearing loss   Respiratory: Clear throughout all lung fields; unlabored breathing; no cough   Cardiac: Regular, Rate and Rhythm, no murmurs appreciated   Skin: Uniformly warm and dry  Psych: Alert and oriented x4; calm and cooperative throughout exam  Abdomen: Normal bowel sounds. Soft, symmetric, no guarding or rigidity, nontender with palpation.  No organomegaly or masses palpated. LLQ abdomen full thickness ulcer which measures 1x1x0.1cm; wound bed with fibrinous material; surrounding scar tissue; no induration; no crepitus; no ecchymoses; no pain with palpation or debridement  Lymphatics: [place] due to body habitus    Circumferential volume measures:    No flowsheet data found.    Ulceration(s)/Wound(s):     VASC Wound 10/23/20 ABD (Active)   Pre Size Length 1 10/23/20 0900   Pre Size Width 1 10/23/20 0900   Pre Size Depth 0.1 10/23/20 0900   Pre Total Sq cm 1 10/23/20 0900       Laboratory studies:   Lab Results   Component Value Date    SEDRATE 38 (H) 09/08/2020     Lab Results   Component Value Date    CREATININE 0.89 09/08/2020     Lab Results   Component Value Date    HGBA1C 5.7 (H) 09/08/2020     Lab Results   Component Value Date    BUN 25 09/08/2020     Lab Results   Component Value Date    ALBUMIN 3.3 (L) 07/27/2020     Vitamin D, Total (25-Hydroxy)   Date Value Ref Range Status   06/21/2017 30.0 30.0 - 80.0 ng/mL Final             Impression:   1. Open wound of abdominal wall, initial encounter     2. Current use of steroid medication     3. Obesity, Class III, BMI 40-49.9 (morbid obesity) (H)     4. Chronic anticoagulation     5. Injection site reaction, sequela         10/23/2020 LLQ abdomen          Assessment/Plan:  1. Debridement: After discussion of risk factors and verbal  consent was obtained 2% Lidocaine HCL jelly was applied, under clean conditions, the abdominal ulceration(s) were debrided using currette. Devitalized and nonviable tissue, along with any fibrin and slough, was removed to improve granulation tissue formation, stimulate wound healing, decrease overall bacteria load, disrupt biofilm formation and decrease edge senescence.  Total excisional debridement was 1 sq cm from the epidermis/dermis area and into the subcutaneous tissue with a depth of 0.1 cm.   Ulcers were improved afterwards and .  Measures were unchanged after debridement.    2. Treatment: wound treatment will include irrigation and dressings to promote autolytic debridement which will include: obtaining culture; will hold off on prescribing antibiotics until the sensitivities are back; will have her cleanse with saline; then bactroban; then endoform; then allevyn border; change every 3 days; pt felt she could use a mirror to do this; will order supplies; sent rx for the ointment; we discussed that the prednisone will delay the healing process.    3. Edema. na    4. Offloading: na    5. Nutrition: no diabetes; focus on protein    Patient to return to clinic in 4 week(s) for re-evaluation. They were instructed to call the clinic sooner with any further questions or concerns. Answered all questions.              Jenn Nuñez DNP, RN, CNP, CWOCN  [facility]  [phone]      This note was electronically signed by Jenn Nuñez

## 2021-06-30 ENCOUNTER — AMBULATORY - HEALTHEAST (OUTPATIENT)
Dept: FAMILY MEDICINE | Facility: CLINIC | Age: 76
End: 2021-06-30

## 2021-06-30 ENCOUNTER — COMMUNICATION - HEALTHEAST (OUTPATIENT)
Dept: ANTICOAGULATION | Facility: CLINIC | Age: 76
End: 2021-06-30

## 2021-06-30 DIAGNOSIS — I10 ESSENTIAL HYPERTENSION WITH GOAL BLOOD PRESSURE LESS THAN 140/90: ICD-10-CM

## 2021-06-30 NOTE — PROGRESS NOTES
Progress Notes by Jenn Nuñez NP at 11/23/2020  9:40 AM     Author: Jenn Nuñez NP Service: -- Author Type: Nurse Practitioner    Filed: 11/23/2020 10:00 AM Encounter Date: 11/23/2020 Status: Signed    : Jenn Nuñez NP (Nurse Practitioner)       Follow up Vascular Visit       Date of Service:11/23/2020    Date Last Seen: 11/16/2020; 11/16/2020    Chief Complaint: abdominal wound        Pt returns to [facility] with regards to their abdominal wound caused by lovenox injection; this visit is being conducted virtually (telephone or video visit) due to the Covid-19 pandemic.  They arrive/on the phone today alone. They are currently using bactroban; endofrom; allevyn border to the wounds. This is being done by the patient every 2-3 days. Reporting no drainage. They are using nothing for compression. They are feeling well today. Denies fevers, chills. No shortness of breath.     Allergies: Atorvastatin, Paroxetine, Simvastatin, Sulfa (sulfonamide antibiotics), and Sulfasalazine    Medications:   Current Outpatient Medications:   ?  amLODIPine (NORVASC) 5 MG tablet, TAKE 1 TABLET DAILY (NEW   DOSE 6/1/18), Disp: 90 tablet, Rfl: 3  ?  cholecalciferol, vitamin D3, 1,000 unit tablet, Take 2,000 Units by mouth daily. , Disp: , Rfl:   ?  citalopram (CELEXA) 40 MG tablet, Take 1 tablet (40 mg total) by mouth daily., Disp: 90 tablet, Rfl: 3  ?  furosemide (LASIX) 20 MG tablet, TAKE 1 TABLET DAILY, Disp: 90 tablet, Rfl: 3  ?  hydrOXYzine HCL (ATARAX) 25 MG tablet, Take 1-2 tablets by mouth every 6 hours as needed for anxiety or sleep., Disp: 30 tablet, Rfl: 1  ?  levothyroxine (SYNTHROID, LEVOTHROID) 137 MCG tablet, Take 1 tablet (137 mcg total) by mouth daily., Disp: 90 tablet, Rfl: 3  ?  lisinopriL-hydrochlorothiazide (PRINZIDE,ZESTORETIC) 20-12.5 mg per tablet, Take 2 tablets by mouth daily., Disp: 180 tablet, Rfl: 3  ?  pravastatin (PRAVACHOL) 80 MG tablet, Take 1 tablet (80 mg total) by  mouth at bedtime., Disp: 90 tablet, Rfl: 3  ?  predniSONE (DELTASONE) 5 MG tablet, Take 5 mg by mouth daily., Disp: 30 tablet, Rfl: 2  ?  warfarin ANTICOAGULANT (COUMADIN/JANTOVEN) 2.5 MG tablet, Take 0.5-1 tablets (1.25-2.5 mg total) by mouth See Admin Instructions. Take half a tablet (1.25mg) on Monday, Wednesday and Saturday; Take 1 tablet (2.5mg) on all other days of the week. Adjust dose based on INR results as directed., Disp: 90 tablet, Rfl: 3    History:   Past Medical History:   Diagnosis Date   ? 2019 novel coronavirus disease (COVID-19) 7/16/2020   ? Acute bilateral knee pain 7/18/2017   ? TEOFILO (acute kidney injury) (H) 7/16/2020   ? Angioedema 12/17/2015   ? Anticoagulant long-term use 3/6/2017   ? Arthralgias In Multiple Sites     Created by Conversion    ? Back pain     Created by Conversion    ? Cataract     Created by Conversion    ? Chronic pain syndrome 6/1/2018   ? COVID-19 virus infection 7/17/2020   ? Depression    ? Diarrhea 7/16/2020   ? Disease of thyroid gland    ? Edema     Created by Conversion    ? Essential hypertension with goal blood pressure less than 140/90     Created by Conversion  Replacement Utility updated for latest IMO load   ? History of pulmonary embolism 1/1/2015    Overview:  bilateral with acute cor pulmonale    ? HTN (hypertension)    ? Hypercholesteremia    ? Hypercholesterolemia     Created by Conversion    ? Hypokalemia 12/17/2015   ? Hypothyroidism     Created by Conversion  Replacement Utility updated for latest IMO load   ? Hypoxia 7/16/2020   ? Impaired fasting glucose     Created by Conversion    ? 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   ? Major Depression, Recurrent     Created by Conversion  Replacement Utility updated for latest IMO load   ? Mood disorder (H) 7/16/2020   ? Morbid obesity (H)    ? Multiple lung nodules on CT 12/17/2015   ? Normochromic, Normocytic Anemia     Created by Conversion    ? Obstructive Sleep Apnea     Created by Conversion    ? Osteoarthritis Of  The Knee     Created by Conversion  Replacement Utility updated for latest IMO load   ? Polymyalgia rheumatica (H) 7/16/2020   ? Postoperative anemia due to acute blood loss 12/18/2018   ? Pulmonary emboli (H) 12/14/2015   ? Pulmonary embolism (H) 12/21/2015   ? Sepsis (H) 7/28/2020   ? SIRS (systemic inflammatory response syndrome) (H) 7/28/2020   ? Yeast infection        Physical Exam:    There were no vitals taken for this visit.    General:  Patient presents in no apparent distress.  Psychiatric:  Alert and oriented x3.   Respiratory: unlabored breathing; no cough, able to speak in full sentences without becoming breathless  Integumentary:  Skin is uniformly warm, dry and pink.    Wound #1 Location: abdomen  Size: 0L x 0W x 0depth.  No sinus tract present, Wound base: scar tissue  No undermining present. Wound is healed. There is no  drainage. Periwound: no denudement, erythema, induration, maceration or warmth.  NOTE: wound assessment is done through either visual inspection from video call or through picture along with patient/HHC nurse description    Circumferential volume measures:    No flowsheet data found.    Ulceration(s)/Wound(s):     VASC Wound 10/23/20 ABD (Active)   Pre Size Length 1 10/23/20 0900   Pre Size Width 1 10/23/20 0900   Pre Size Depth 0.1 10/23/20 0900   Pre Total Sq cm 1 10/23/20 0900        Lab Values    Lab Results   Component Value Date    SEDRATE 38 (H) 09/08/2020     Lab Results   Component Value Date    CREATININE 0.89 09/08/2020     Lab Results   Component Value Date    HGBA1C 5.7 (H) 09/08/2020     Lab Results   Component Value Date    BUN 25 09/08/2020     Lab Results   Component Value Date    ALBUMIN 3.3 (L) 07/27/2020     Vitamin D, Total (25-Hydroxy)   Date Value Ref Range Status   06/21/2017 30.0 30.0 - 80.0 ng/mL Final             Impression:  No diagnosis found.       11/23/2020 abdomen           10/2020 abdomen          Are any of these wounds new today: No; Location:  na    Assessment/Plan:          1. Debridement: na     2.  Wound treatment: wound treatment will include irrigation and dressings to promote autolytic debridement which will include:wound is healed; we discussed that the scar tissue is more fragile than regular skin; may reopen; instructed to call us again if this occurs Improved healed           3. Edema: na.            4. Nutrition: continue to work ion her weight           5. Offloading: na     Patient will follow up with me PRN weeks for reevaluation. They were instructed to call the clinic sooner with any signs or symptoms of infection or any further questions/concerns. Answered all questions.          Time spent on the phone/video chat 5 minutes    Jenn Nuñez DNP, RN, CNP, CWOCN, CFCN, CLT  [facility]   [phone]    Type of service: Telephone    Video Start and End Time : [phone]    Originating Location (pt. Location): pt home    Distant Location (provider location): [facility] [place]     Mode of Communication:  Doximity    This note was electronically signed by Jenn Nuñez

## 2021-07-01 ENCOUNTER — COMMUNICATION - HEALTHEAST (OUTPATIENT)
Dept: FAMILY MEDICINE | Facility: CLINIC | Age: 76
End: 2021-07-01

## 2021-07-01 ENCOUNTER — RECORDS - HEALTHEAST (OUTPATIENT)
Dept: ADMINISTRATIVE | Facility: OTHER | Age: 76
End: 2021-07-01

## 2021-07-01 DIAGNOSIS — I26.99 PULMONARY EMBOLISM, UNSPECIFIED CHRONICITY, UNSPECIFIED PULMONARY EMBOLISM TYPE, UNSPECIFIED WHETHER ACUTE COR PULMONALE PRESENT (H): ICD-10-CM

## 2021-07-01 LAB — INR PPP: 5.7 (ref 0.9–1.1)

## 2021-07-02 ENCOUNTER — RECORDS - HEALTHEAST (OUTPATIENT)
Dept: ADMINISTRATIVE | Facility: OTHER | Age: 76
End: 2021-07-02

## 2021-07-02 ENCOUNTER — RECORDS - HEALTHEAST (OUTPATIENT)
Dept: FAMILY MEDICINE | Facility: CLINIC | Age: 76
End: 2021-07-02

## 2021-07-02 LAB — INR PPP: 4.8 (ref 0.9–1.1)

## 2021-07-03 NOTE — ADDENDUM NOTE
Addendum Note by Natasha Moss MD at 8/7/2020  5:04 PM     Author: Natasha Moss MD Service: -- Author Type: Physician    Filed: 8/7/2020  5:04 PM Encounter Date: 8/7/2020 Status: Signed    : Natasha Moss MD (Physician)    Addended by: NATASHA MOSS on: 8/7/2020 05:04 PM        Modules accepted: Orders

## 2021-07-03 NOTE — ADDENDUM NOTE
Addendum Note by Natasha Moss MD at 11/2/2018  4:45 PM     Author: Natasha Moss MD Service: -- Author Type: Physician    Filed: 11/2/2018  4:45 PM Encounter Date: 11/1/2018 Status: Signed    : Natasha Moss MD (Physician)    Addended by: NATASHA MOSS on: 11/2/2018 04:45 PM        Modules accepted: Orders

## 2021-07-03 NOTE — ADDENDUM NOTE
Addendum Note by Abbie Torrez CMA at 11/23/2020  9:40 AM     Author: Abbie Torrez CMA Service: -- Author Type: Certified Medical Assistant    Filed: 11/23/2020 10:34 AM Encounter Date: 11/23/2020 Status: Signed    : Abbie Torrez CMA (Certified Medical Assistant)    Addended by: ABBIE TORREZ on: 11/23/2020 10:34 AM        Modules accepted: Orders

## 2021-07-03 NOTE — ADDENDUM NOTE
Addendum Note by Natasha Moss MD at 4/9/2019  1:20 PM     Author: Natasha Moss MD Service: -- Author Type: Physician    Filed: 4/9/2019  1:52 PM Encounter Date: 4/9/2019 Status: Signed    : Natasha Moss MD (Physician)    Addended by: NATASHA MOSS on: 4/9/2019 01:52 PM        Modules accepted: Orders

## 2021-07-03 NOTE — ADDENDUM NOTE
Addendum Note by Natasha Moss MD at 9/1/2020  5:05 PM     Author: Natasha Moss MD Service: -- Author Type: Physician    Filed: 9/1/2020  5:05 PM Encounter Date: 9/1/2020 Status: Signed    : Natasha Moss MD (Physician)    Addended by: NATASHA MOSS on: 9/1/2020 05:05 PM        Modules accepted: Orders

## 2021-07-03 NOTE — ADDENDUM NOTE
Addendum Note by Natasha Moss MD at 8/24/2020 12:04 PM     Author: Natasha Moss MD Service: -- Author Type: Physician    Filed: 8/24/2020 12:04 PM Encounter Date: 8/23/2020 Status: Signed    : Natasha Moss MD (Physician)    Addended by: NATASHA MOSS on: 8/24/2020 12:04 PM        Modules accepted: Orders

## 2021-07-03 NOTE — ADDENDUM NOTE
Addendum Note by Kay Hernandez CMA at 1/24/2017 12:34 PM     Author: Kay Hernandez CMA Service: -- Author Type: Certified Medical Assistant    Filed: 1/24/2017 12:34 PM Encounter Date: 1/21/2017 Status: Signed    : Kay Hernandez CMA (Certified Medical Assistant)    Addended by: KAY HERNANDEZ on: 1/24/2017 12:34 PM        Modules accepted: Orders

## 2021-07-04 NOTE — TELEPHONE ENCOUNTER
Telephone Encounter by Consuelo Montoya RN at 6/22/2021  1:01 PM     Author: Consuelo Montoya RN Service: -- Author Type: Registered Nurse    Filed: 6/22/2021  1:08 PM Encounter Date: 6/22/2021 Status: Signed    : Consuelo Montoya RN (Registered Nurse)       ANTICOAGULATION MANAGEMENT     Nancy Oropeza 76 y.o., female is on warfarin with Supratherapeutic INR result (goal range 2.0-3.0)    Recent labs: (last 7 days)     06/22/21   INR 4.00*       ASSESSMENT     Source: Patient/Caregiver Call      Warfarin dosing taken: Warfarin taken as instructed    Diet: No new diet changes affecting INR    Illness, Injury or hospitalization: No    Medication changes: Methylprednisolone stopped on 6/19/21.  Potential interaction which maybe Increasing or decreasing INR     Signs or symptoms of bleeding or clotting: No    Previous INR: therapeutic last visit; previously outside of goal range    Additional findings: None     PLAN     Recommended plan for temporary change(s) affecting INR:     Dosing instructions: hold today, take 1.25 mg tomorrow  then continue your current warfarin dose 2.5 mg daily     Follow up no later than: 1 week     Telephone call with Lesly who agrees to plan and repeated back plan correctly    Patient to recheck with home meter    Education provided: target INR goal and significance of current INR result, importance of following up for INR monitoring at instructed interval and potential interaction between warfarin and methylprednisolone    Plan made per [facility] anticoagulation protocol    Consuelo Montoya  Anticoagulation Clinic   [phone]    Anticoagulation Episode Summary     Current INR goal:  2.0-3.0   TTR:  42.5 % (11.6 mo)   Next INR check:  6/29/2021   INR from last check:  4.00 (6/22/2021)   Weekly max warfarin dose:     Target end date:  Indefinite   INR check location:     Preferred lab:     Send INR reminders to:  XENIA HURTADO    Indications    Pulmonary embolism (H) [I26.99]            Comments:           Anticoagulation Care Providers     Provider Role Specialty Phone number    Julien Garnica MD Referring Family Medicine [phone]

## 2021-07-04 NOTE — TELEPHONE ENCOUNTER
Telephone Encounter by Yumiko Kaufman RN at 6/11/2021  4:53 PM     Author: Yumiko Kaufman RN Service: -- Author Type: Registered Nurse    Filed: 6/11/2021  4:57 PM Encounter Date: 6/11/2021 Status: Signed    : Yumiko Kaufman RN (Registered Nurse)       ANTICOAGULATION  MANAGEMENT    Assessment     Today's INR result of 4.0 is Supratherapeutic (goal INR of 2.0-3.0)        Warfarin recently held as instructed which may be affecting INR    No new diet changes affecting INR    Interaction between methylprednisolone and warfarin may be affecting INR    Continues to tolerate warfarin with no reported s/s of bleeding or thromboembolism     Previous INR was Supratherapeutic    Plan:     Spoke on phone with Lesly regarding INR result and instructed:      Warfarin Dosing Instructions:  hold today, take 1.25mg tomorrow then continue current warfarin dose 2.5 mg daily  (0 % change)    Instructed patient to follow up no later than: Monday with home monitor    This dosing plan and follow up date was discussed with [facility] pharmacist, Niya Stewart.    Education provided: target INR goal and significance of current INR result, monitoring for bleeding signs and symptoms and when to seek medical attention/emergency care    Lesly verbalizes understanding and agrees to warfarin dosing plan.    Instructed to call the [facility] for any changes, questions or concerns. (#[phone])   ?   Yumiko Kaufman RN    Subjective/Objective:      Nancy GARZON Sandip, a 76 y.o. female is on warfarin.       Lesly reports:     Home warfarin dose: template incorrect; verbally confirmed home dose with Lesly and updated on anticoagulation calendar     Missed doses: Yes: held Wed/Thu per directive of ACN     Medication changes:  Yes: started another medrol dose pack on 6/8     S/S of bleeding or thromboembolism:  No     New Injury or illness:  No     Changes in diet or alcohol consumption:  No     Upcoming surgery, procedure  or cardioversion:  No    Anticoagulation Episode Summary     Current INR goal:  2.0-3.0   TTR:  44.8 % (11.6 mo)   Next INR check:  6/14/2021   INR from last check:  4.00 (6/11/2021)   Weekly max warfarin dose:     Target end date:  Indefinite   INR check location:     Preferred lab:     Send INR reminders to:  XENIA HURTADO    Indications    Pulmonary embolism (H) [I26.99]           Comments:           Anticoagulation Care Providers     Provider Role Specialty Phone number    Julien Garnica MD Referring Family Medicine [phone]

## 2021-07-04 NOTE — TELEPHONE ENCOUNTER
"Telephone Encounter by Kay De Jesus CMA at 7/1/2021  7:46 AM     Author: Kay De Jesus CMA Service: -- Author Type: Certified Medical Assistant    Filed: 7/1/2021  7:46 AM Encounter Date: 7/1/2021 Status: Signed    : Kay De Jesus CMA (Certified Medical Assistant)       ----- Message from Julien Garnica MD sent at 7/1/2021  7:25 AM CDT -----  Please help patient to schedule a follow-up office visit with me in the next 1-2 weeks.  Please let patient know:    \"Your kidney function remains reduced.  It appears slightly worse.  Ensure adequate hydration.  Please ensure follow-up appointment with me in the next 1-2 weeks to ensure improvement in your back pain and leg pain as well as to recheck your labs, etc.\"    Thank you,    Julien Garnica MD          "

## 2021-07-04 NOTE — TELEPHONE ENCOUNTER
Telephone Encounter by Yumiko Kaufman RN at 6/9/2021  3:36 PM     Author: Yumiko Kaufman RN Service: -- Author Type: Registered Nurse    Filed: 6/9/2021  3:43 PM Encounter Date: 6/9/2021 Status: Signed    : Yumiko Kaufman RN (Registered Nurse)       ANTICOAGULATION  MANAGEMENT    Assessment     Today's INR result of 5.2 is Supratherapeutic (goal INR of 2.0-3.0)        Warfarin taken as previously instructed    No new diet changes affecting INR    Interaction between methylprednisolone and warfarin may be affecting INR    Potential interaction between second medrol dose pack and warfarin which may affect subsequent INRs     Started a second pack yesterday as pain hadn't resolved,  refilled the Rx    Continues to tolerate warfarin with no reported s/s of bleeding or thromboembolism     Previous INR was Subtherapeutic    Plan:     Spoke on phone with Lesly regarding INR result and instructed:      Warfarin Dosing Instructions:  hold today and tomorrow     Instructed patient to follow up no later than: Friday with home monitor    Education provided: target INR goal and significance of current INR result, potential interaction between warfarin and methylprednisolone, monitoring for bleeding signs and symptoms and when to seek medical attention/emergency care    Lesly verbalizes understanding and agrees to warfarin dosing plan.    Instructed to call the [facility] for any changes, questions or concerns. (#[phone])   ?   Yumiko Kaufman RN    Subjective/Objective:      Nancy GARZON Sandip, a 76 y.o. female is on warfarin.       Lesly reports:     Home warfarin dose: verbally confirmed home dose with Lesly and updated on anticoagulation calendar     Missed doses: No     Medication changes:  Yes: was on medrol dose pack 5/30-6/5, pain unresolved, so started another on on 6/8     S/S of bleeding or thromboembolism:  No     New Injury or illness:  Yes: sciatic pain flare     Changes  in diet or alcohol consumption:  No     Upcoming surgery, procedure or cardioversion:  No    Anticoagulation Episode Summary     Current INR goal:  2.0-3.0   TTR:  44.8 % (11.6 mo)   Next INR check:  6/11/2021   INR from last check:  5.20 (6/9/2021)   Weekly max warfarin dose:     Target end date:  Indefinite   INR check location:     Preferred lab:     Send INR reminders to:  XENIA HURTADO    Indications    Pulmonary embolism (H) [I26.99]           Comments:           Anticoagulation Care Providers     Provider Role Specialty Phone number    Julien Garnica MD Referring Family Medicine [phone]

## 2021-07-04 NOTE — TELEPHONE ENCOUNTER
Telephone Encounter by Roro Johnson RN at 5/26/2021 11:52 AM     Author: Roro Johnson RN Service: -- Author Type: Registered Nurse    Filed: 5/26/2021 12:00 PM Encounter Date: 5/26/2021 Status: Signed    : Roro Johnson RN (Registered Nurse)       ANTICOAGULATION  MANAGEMENT    Assessment     Yesterday's INR result of 1.8 is Subtherapeutic (goal INR of 2.0-3.0). This is a self-reported result, patient was called from [place] reminder list today and reported to ACN that she checked INR yesterday and called it in. Noted ongoing issues with incorrect fax number with MD INR. See previous encounters, this is a known issue and being worked on.       Warfarin taken as previously instructed    No new diet changes affecting INR    No new medication/supplements affecting INR    Continues to tolerate warfarin with no reported s/s of bleeding or thromboembolism     Previous INR was Supratherapeutic    Plan:     Spoke on phone with Lesly regarding INR result and instructed:      Warfarin Dosing Instructions:  Change warfarin dose to 2.5 mg daily  (7.7 % change)    Instructed patient to follow up no later than: 1 week with home monitor    Education provided: importance of consistent vitamin K intake, target INR goal and significance of current INR result, monitoring for bleeding signs and symptoms, monitoring for clotting signs and symptoms and Importance of contacting anticoagulation clinic if you have not received warfarin dosing instructions on day of INR testing    Lesly verbalizes understanding and agrees to warfarin dosing plan.    Instructed to call the [facility] for any changes, questions or concerns. (#[phone])   ?   Roro Johnson RN    Subjective/Objective:      Nancy Oropeza, a 76 y.o. female is on warfarin. Lesly Grace reports:     Home warfarin dose: verbally confirmed home dose with Lesly and updated on anticoagulation calendar     Missed doses: No     Medication  changes:  No     S/S of bleeding or thromboembolism:  No     New Injury or illness:  No     Changes in diet or alcohol consumption:  No     Upcoming surgery, procedure or cardioversion:  No    Anticoagulation Episode Summary     Current INR goal:  2.0-3.0   TTR:  43.6 % (11.6 mo)   Next INR check:  6/2/2021   INR from last check:  1.80 (5/25/2021)   Weekly max warfarin dose:     Target end date:  Indefinite   INR check location:     Preferred lab:     Send INR reminders to:  XENIA HURTADO    Indications    Pulmonary embolism (H) [I26.99]           Comments:           Anticoagulation Care Providers     Provider Role Specialty Phone number    Julien Garnica MD Referring Family Medicine [phone]

## 2021-07-04 NOTE — LETTER
Letter by Julien Garnica MD at      Author: Julien Garnica MD Service: -- Author Type: --    Filed:  Encounter Date: 5/28/2021 Status: (Other)         May 28, 2021     Patient: Nancy Oropeza   YOB: 1945           To Whom It May Concern:    I am the primary care provider for Nancy Oropeza. She has a history of polymyalgia rheumatica, osteoarthritis of the knee, arthralgias of multiple sites, and back pain. Due to these conditions she has difficulty getting in and out of chairs. It is in my medical opinion Nancy Oropeza would benefit from a lift chair given these chronic medical conditions.     If you have any questions or concerns, please don't hesitate to call.    Sincerely,        Electronically signed by Julien Garnica MD

## 2021-07-04 NOTE — TELEPHONE ENCOUNTER
Telephone Encounter by Cheryl Eaton at 7/2/2021  2:59 PM     Author: Cheryl Eaton Service: -- Author Type: Patient Access    Filed: 7/2/2021  3:01 PM Encounter Date: 7/2/2021 Status: Signed    : Cheryl Eaton (Patient Access)       Who is calling:  patient  Reason for Call:  Patient was trying to do her INR test but does not think she has the correct items to poke her finger- please call patient  Date of last appointment with primary care: na  Okay to leave a detailed message: Yes

## 2021-07-04 NOTE — TELEPHONE ENCOUNTER
Telephone Encounter by Yumiko Kaufman RN at 7/1/2021  2:42 PM     Author: Yumiko Kaufman RN Service: -- Author Type: Registered Nurse    Filed: 7/1/2021  2:46 PM Encounter Date: 7/1/2021 Status: Signed    : Yumiko Kaufman RN (Registered Nurse)       ANTICOAGULATION MANAGEMENT     Nancy Oropeza 76 y.o., female is on warfarin with Supratherapeutic INR result (goal range 2.0-3.0)    Recent labs: (last 7 days)     07/01/21   INR 5.70*       ASSESSMENT     Source: Patient/Caregiver Call      Warfarin dosing taken: Warfarin taken as instructed    Diet: No new diet changes affecting INR    Illness, Injury or hospitalization: No-- continued back/sciatic issues    Medication changes: prednisone 20mg BID: 5 day course (dates: 6/30-7/4).  Potential interaction which maybe Increasing INR-- previously was on Medrol dosepak x2    Signs or symptoms of bleeding or clotting: No    Previous INR: supratherapeutic    Additional findings: None     PLAN     Recommended plan for temporary change(s) affecting INR:     Dosing instructions: hold warfarin tonight    Follow up no later than: 1 day     Telephone call with Lesly who agrees to plan and repeated back plan correctly    Patient to recheck with home meter    Education provided: target INR goal and significance of current INR result, monitoring for bleeding signs and symptoms and when to seek medical attention/emergency care    Plan made per [facility] anticoagulation protocol    Yumiko Kaufman  Anticoagulation Clinic   [phone]    Anticoagulation Episode Summary     Current INR goal:  2.0-3.0   TTR:  40.0 % (11.6 mo)   Next INR check:  7/2/2021   INR from last check:  5.70 (7/1/2021)   Weekly max warfarin dose:     Target end date:  Indefinite   INR check location:     Preferred lab:     Send INR reminders to:  XENIA HURTADO    Indications    Pulmonary embolism (H) [I26.99]           Comments:           Anticoagulation Care Providers     Provider  Role Specialty Phone number    Julien Garnica MD Referring Family Medicine [phone]

## 2021-07-04 NOTE — TELEPHONE ENCOUNTER
Telephone Encounter by Yumiko Kaufman RN at 7/2/2021  3:38 PM     Author: Yumiko Kaufman RN Service: -- Author Type: Registered Nurse    Filed: 7/2/2021  3:45 PM Encounter Date: 7/2/2021 Status: Signed    : Yumiko Kaufman RN (Registered Nurse)       ANTICOAGULATION MANAGEMENT     Nancy Oropeza 76 y.o., female is on warfarin with Supratherapeutic INR result (goal range 2.0-3.0)    Recent labs: (last 7 days)     07/02/21   INR 4.80*       ASSESSMENT     Source: Patient/Caregiver Call      Warfarin dosing taken: Warfarin recently held for supratherapeutic INR which may be affecting INR    Diet: No new diet changes affecting INR    Illness, Injury or hospitalization: No    Medication changes: continues on prednisone 20mg two times a day-- last dose will be Monday morning    Signs or symptoms of bleeding or clotting: No    Previous INR: supratherapeutic    Additional findings: None     PLAN     Recommended plan for temporary change(s) affecting INR:     Dosing instructions: hold warfarin today, take 1.25mg Saturday and 2.5mg Sunday      Follow up no later than: 3 days     Telephone call with Lesly who agrees to plan and repeated back plan correctly    Patient to recheck with home meter    Education provided: target INR goal and significance of current INR result, monitoring for bleeding signs and symptoms and when to seek medical attention/emergency care    Plan made per [facility] anticoagulation protocol    Yumiko Kaufman  Anticoagulation Clinic   [phone]    Anticoagulation Episode Summary     Current INR goal:  2.0-3.0   TTR:  39.7 % (11.6 mo)   Next INR check:  7/5/2021   INR from last check:  4.80 (7/2/2021)   Weekly max warfarin dose:     Target end date:  Indefinite   INR check location:     Preferred lab:     Send INR reminders to:  XENIA HURTADO    Indications    Pulmonary embolism (H) [I26.99]           Comments:           Anticoagulation Care Providers     Provider Role  Specialty Phone number    Julien Garnica MD Referring Family Medicine [phone]

## 2021-07-04 NOTE — TELEPHONE ENCOUNTER
Telephone Encounter by Yumiko Kaufman RN at 6/30/2021 12:59 PM     Author: Yumiko Kaufman RN Service: -- Author Type: Registered Nurse    Filed: 6/30/2021  1:01 PM Encounter Date: 6/30/2021 Status: Signed    : Yumiko Kaufman RN (Registered Nurse)       ANTICOAGULATION  MANAGEMENT PROGRAM    Nancy Oropeza is overdue for INR check.     Left message to check INR with home meter and call results to home monitoring company as soon as possible.      Yumiko Kaufman RN

## 2021-07-04 NOTE — TELEPHONE ENCOUNTER
Telephone Encounter by Marta Bailon at 7/1/2021  1:12 PM     Author: Marta Bailon Service: -- Author Type: Patient Access    Filed: 7/1/2021  1:15 PM Encounter Date: 7/1/2021 Status: Signed    : Marta Bailon (Patient Access)       INR result is      5.7  INR   Date Value Ref Range Status   06/22/2021 4.00 (!) 0.90 - 1.10 Final       Will the patient be seen, or did they already see, MD or CNP today? No    Most Recent Warfarin dose day/week  Sunday Monday Tuesday Wednesday Thursday Friday Saturday       2.5 2.5 2.5     Sunday Monday Tuesday Wednesday Thursday Friday Saturday   2.5 2.5 2.5 2.5 Didn't take it today         Has the patient missed any doses of Coumadin, Warfarin, Jantoven in the past 7 days? No    Has the patients medications changed since the last visit? Yes started taking Prednisone 20 mg twice a day for 5 days    Has the patient experienced any bleeding recently? No    Has the patient experienced any injuries or illness recently? No    Has the patient experienced any 'new' shortness of breath, severe headaches, or changes in vision recently? No    Has the patient had any changes in their diet, or alcohol consumption? No    Is the patient here today to prepare for any type of upcoming surgery, procedure, or for a cardioversion procedure? No    What phone number can we reach the patient at today? home phone listed in demographics.

## 2021-07-04 NOTE — TELEPHONE ENCOUNTER
Telephone Encounter by Kay De Jesus CMA at 7/1/2021  7:47 AM     Author: Kay De Jesus CMA Service: -- Author Type: Certified Medical Assistant    Filed: 7/1/2021  7:48 AM Encounter Date: 7/1/2021 Status: Signed    : Kay De Jesus CMA (Certified Medical Assistant)       Reason contacted:  Results   Information relayed:  Below message relayed to patient. Helped arrange a follow-up visit.  Additional questions:  No  Further follow-up needed:  No  Okay to leave a detailed message:  No

## 2021-07-05 ENCOUNTER — RECORDS - HEALTHEAST (OUTPATIENT)
Dept: ADMINISTRATIVE | Facility: OTHER | Age: 76
End: 2021-07-05

## 2021-07-05 ENCOUNTER — COMMUNICATION - HEALTHEAST (OUTPATIENT)
Dept: FAMILY MEDICINE | Facility: CLINIC | Age: 76
End: 2021-07-05

## 2021-07-05 DIAGNOSIS — I26.99 PULMONARY EMBOLISM (H): ICD-10-CM

## 2021-07-05 LAB — INR PPP: 3.2 (ref 0.9–1.1)

## 2021-07-05 NOTE — TELEPHONE ENCOUNTER
Telephone Encounter by Yumiko Kaufman RN at 7/5/2021  5:21 PM     Author: Yumiko Kaufman RN Service: -- Author Type: Registered Nurse    Filed: 7/5/2021  5:23 PM Encounter Date: 7/5/2021 Status: Signed    : Yumiko Kaufman RN (Registered Nurse)       ANTICOAGULATION MANAGEMENT     Nancy Oropeza 76 y.o., female is on warfarin with Supratherapeutic INR result (goal range 2.0-3.0)    Recent labs: (last 7 days)     07/05/21   INR 3.20*       ASSESSMENT     Source: Patient/Caregiver Call      Warfarin dosing taken: Warfarin taken as instructed-- held 7/2, 1.25mg taken 7/3    Diet: No new diet changes affecting INR    Illness, Injury or hospitalization: No    Medication changes: last day of prednisone today    Signs or symptoms of bleeding or clotting: No    Previous INR: supratherapeutic    Additional findings: None     PLAN     Recommended plan for temporary change(s) affecting INR:     Dosing instructions: 1.25mg today then continue your current warfarin dose 2.5 mg daily (0% change)    Follow up no later than: 5-7 days     Telephone call with Lesly who agrees to plan and repeated back plan correctly    Check at provider office visit on 7/9    Education provided: target INR goal and significance of current INR result, monitoring for bleeding signs and symptoms and when to seek medical attention/emergency care    Plan made per [facility] anticoagulation protocol    Yumiko Kaufman  Anticoagulation Clinic   [phone]    Anticoagulation Episode Summary     Current INR goal:  2.0-3.0   TTR:  39.5 % (11.6 mo)   Next INR check:  7/9/2021   INR from last check:  3.20 (7/5/2021)   Weekly max warfarin dose:     Target end date:  Indefinite   INR check location:     Preferred lab:     Send INR reminders to:  XENIA HURTADO    Indications    Pulmonary embolism (H) [I26.99]           Comments:           Anticoagulation Care Providers     Provider Role Specialty Phone number    Julien Garnica MD  [facility] [phone]

## 2021-07-05 NOTE — TELEPHONE ENCOUNTER
Telephone Encounter by Michaela Dugan at 7/5/2021  4:55 PM     Author: Michaela Dugan Service: -- Author Type: Patient Access    Filed: 7/5/2021  4:59 PM Encounter Date: 7/5/2021 Status: Signed    : Michaela Dugan (Patient Access)       INR result is 3.2  INR   Date Value Ref Range Status   07/02/2021 4.80 (!) 0.90 - 1.10 Final       Will the patient be seen, or did they already see, MD or CNP today? No    Most Recent Warfarin dose day/week  Sunday Monday Tuesday Wednesday Thursday Friday Saturday    mg mg mg mg  mg 2.5 mg     Sunday Monday Tuesday Wednesday Thursday Friday Saturday   5 mg             Has the patient missed any doses of Coumadin, Warfarin, Jantoven in the past 7 days? No    Has the patients medications changed since the last visit? Yes last day today 7/5 on prednisone 20 mg    Has the patient experienced any bleeding recently? No    Has the patient experienced any injuries or illness recently? No    Has the patient experienced any 'new' shortness of breath, severe headaches, or changes in vision recently? No    Has the patient had any changes in their diet, or alcohol consumption? No    Is the patient here today to prepare for any type of upcoming surgery, procedure, or for a cardioversion procedure? No    What phone number can we reach the patient at today? [phone] [facility]TD[facility] .

## 2021-07-09 ENCOUNTER — COMMUNICATION - HEALTHEAST (OUTPATIENT)
Dept: FAMILY MEDICINE | Facility: CLINIC | Age: 76
End: 2021-07-09

## 2021-07-09 ENCOUNTER — COMMUNICATION - HEALTHEAST (OUTPATIENT)
Dept: ANTICOAGULATION | Facility: CLINIC | Age: 76
End: 2021-07-09

## 2021-07-09 DIAGNOSIS — I27.82 OTHER CHRONIC PULMONARY EMBOLISM, UNSPECIFIED WHETHER ACUTE COR PULMONALE PRESENT (H): ICD-10-CM

## 2021-07-09 NOTE — TELEPHONE ENCOUNTER
Telephone Encounter by Yumiko Kaufman RN at 7/9/2021  2:09 PM     Author: Yumiko Kaufman RN Service: -- Author Type: Registered Nurse    Filed: 7/9/2021  2:16 PM Encounter Date: 7/9/2021 Status: Signed    : Yumiko Kaufman RN (Registered Nurse)       ANTICOAGULATION MANAGEMENT     Nancy Oropeza 76 y.o., female is on warfarin with Therapeutic INR result (goal range 2.0-3.0)    Recent labs: (last 7 days)     07/09/21  1200   INR 2.60*       ASSESSMENT     Source: Patient/Caregiver Call and Template      Warfarin dosing taken: Warfarin taken as instructed    Diet: No new diet changes affecting INR    Illness, Injury or hospitalization: No-- back improving    Medication changes: None    Signs or symptoms of bleeding or clotting: No    Previous INR: supratherapeutic    Additional findings: None     PLAN     Recommended plan for no diet, medication or health factor changes affecting INR:     Dosing instructions: Continue your current warfarin dose 2.5 mg daily  (0% change)    Follow up no later than: 1 week     Telephone call with Lesly who agrees to plan and repeated back plan correctly    Patient to recheck with home meter    Education provided: target INR goal and significance of current INR result, monitoring for bleeding signs and symptoms, monitoring for clotting signs and symptoms and when to seek medical attention/emergency care    Plan made per [facility] anticoagulation protocol    Yumiko Kaufman  Anticoagulation Clinic   [phone]    Anticoagulation Episode Summary     Current INR goal:  2.0-3.0   TTR:  40.3 % (11.6 mo)   Next INR check:  7/16/2021   INR from last check:  2.60 (7/9/2021)   Weekly max warfarin dose:     Target end date:  Indefinite   INR check location:     Preferred lab:     Send INR reminders to:  XENIA HURTADO    Indications    Pulmonary embolism (H) [I26.99]           Comments:           Anticoagulation Care Providers     Provider Role Specialty Phone  Urology Progress Note Assessment/Plan:  
 
Patient Active Problem List  
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  
 Hypertension I10  
 Mild mental retardation F70  
 Small bowel obstruction ([address] K56.609  Cognitive developmental delay F81.9  
 Uncontrolled type 2 diabetes mellitus with hyperosmolar nonketotic hyperglycemia (HCC) E11.00  Hydronephrosis N13.30  Hypernatremia E87.0  Sepsis ([address] A41.9  Elevated troponin R79.89  
 Urinary retention R33.9  MATTIE (acute kidney injury) ([address] N17.9 Plan:  Urine is improved in clarity but output barely adequate at 50 cc/hr. Still on  
Pressor support creat improved to 0.58 from 1.5. Will check bedside renal ultrasound to see if there is any resolution of the bilateral hydronephrosis with holm in place. Sarai Shelby MD 
 
[phone] Subjective:  
 
Daily Progress Note: 2019 3:36 PM 
 
Geno Reza is doing fair. He reports pain is well controlled. He has no new complaints. He is tolerating clear liquids and unable to get out of bed. Indwelling catheter is draining well. Urine output at best adequate. Objective:  
 
Visit Vitals /57 Pulse 66 Temp 97.6 °F (36.4 °C) Resp 21 Ht 5' 4\" (1.626 m) Wt 117 lb 8.1 oz (53.3 kg) SpO2 100% BMI 20.17 kg/m² Temp (24hrs), Av.8 °F (36.6 °C), Min:97.4 °F (36.3 °C), Max:98.8 °F (37.1 °C) Intake and Output: 
 1901 -  0700 In: 83031.4 [I.V.:51015.4] Out: 1615 [JBVAR:4059]  0701 -  1900 In: 5.3 [I.V.:5.3] Out: 340 [Urine:340] PHYSICAL EXAMINATION:  
Visit Vitals /57 Pulse 66 Temp 97.6 °F (36.4 °C) Resp 21 Ht 5' 4\" (1.626 m) Wt 117 lb 8.1 oz (53.3 kg) SpO2 100% BMI 20.17 kg/m² Constitutional: Well developed, well nourished. No acute distress. HEENT: Normocephalic, Atraumatic, EOM's intact CV:  no edema Respiratory: No respiratory distress or difficulties breathing Abdomen:  Soft and non-tender  Male:   CVA tenderness: none SCROTUM:  Normal; PENIS: normal  [place]: indwelling not on traction and secured. Skin: No evidence of jaundice. Normal color Neuro/Psych:  Alert and oriented. Affect appropriate. Lymphatic:  No enlarged inguinal lymph nodes. Lab/Data Review: All lab results for the last 24 hours reviewed. Labs:  
 
Labs: Results:  
Chemistry Recent Labs 11/18/19 
1030 11/18/19 
0650 11/17/19 
2300  11/17/19 
1101 * 221* 121*   < > 985* * 163* 165*   < > 157* K 4.2 4.2 3.1*   < > 4.4 * 138* 140*   < > 122* CO2 19* 19* 20*   < > 22 BUN 27* 28* 35*   < > 67* CREA 0.58* 0.65 0.62   < > 1.53* CA 7.1* 7.2* 7.5*   < > 9.6 AGAP 5 6 5   < > 13 BUCR 47* 43* 56*   < > 44* AP  --   --   --   --  105 TP  --   --   --   --  6.9 ALB  --   --   --   --  3.1*  
GLOB  --   --   --   --  3.8 AGRAT  --   --   --   --  0.8  
 < > = values in this interval not displayed. CBC w/Diff Recent Labs 11/18/19 
0650 11/17/19 
1101 WBC 13.9* 19.3*  
RBC 4.28* 5.60* HGB 12.5* 16.4* HCT 38.9 54.6*  
 304 GRANS  --  80* LYMPH  --  13* EOS  --  0 Cultures Recent Labs 11/17/19 
1445 11/17/19 
1143 11/17/19 
1128 11/17/19 
1101 CULT NO GROWTH AFTER 14 HOURS NO GROWTH AFTER 20 HOURS NO GROWTH AFTER 20 HOURS NO GROWTH AFTER 20 HOURS All Micro Results Procedure Component Value Units Date/Time MRSA SCREEN - PCR (NASAL) [[id number]] Collected:  11/18/19 1431 Order Status:  Completed Specimen:  Nasal from Nares Updated:  11/18/19 1530 CULTURE, URINE [[id number]] Collected:  11/17/19 1128 Order Status:  Completed Specimen:  Cath Urine Updated:  11/18/19 1119 number    Julien Garnica MD [facility] [phone]              Special Requests: NO SPECIAL REQUESTS Culture result: NO GROWTH AFTER 20 HOURS     
 CULTURE, URINE [[id number]] Collected:  11/17/19 1445 Order Status:  Completed Specimen:  Cath Urine Updated:  11/18/19 1115 Special Requests: NO SPECIAL REQUESTS Culture result: NO GROWTH AFTER 14 HOURS     
 CULTURE, BLOOD [[id number]] Collected:  11/17/19 1101 Order Status:  Completed Specimen:  Blood Updated:  11/18/19 0756 Special Requests: PERIPHERAL Culture result: NO GROWTH AFTER 20 HOURS     
 CULTURE, BLOOD [[id number]] Collected:  11/17/19 1143 Order Status:  Completed Specimen:  Blood Updated:  11/18/19 0756 Special Requests: PERIPHERAL Culture result: NO GROWTH AFTER 20 HOURS     
 RESPIRATORY VIRAL PANEL, PCR [[id number]] Collected:  11/17/19 1837 Order Status:  Completed Specimen:  Sputum Updated:  11/17/19 1926 INFLUENZA A & B AG (RAPID TEST) [[id number]] Collected:  11/17/19 1143 Order Status:  Completed Specimen:  Nasopharyngeal from Nasal washing Updated:  11/17/19 1211 Influenza A Antigen NEGATIVE Comment: A negative result does not exclude influenza virus infection, seasonal or H1N1 due to suboptimal sensitivity. If influenza is circulating in your community, a diagnosis of influenza should be considered based on a patients clinical presentation and empiric antiviral treatment should be considered, if indicated. Influenza B Antigen NEGATIVE Urinalysis Color Date Value Ref Range Status 11/17/2019 YELLOW   Final  
 
Appearance Date Value Ref Range Status 11/17/2019 CLOUDY   Final  
 
Specific gravity Date Value Ref Range Status 11/17/2019 >1.030 (H) 1.005 - 1.030 Final  
 
pH (UA) Date Value Ref Range Status 11/17/2019 5.0 5.0 - 8.0   Final  
 
Protein Date Value Ref Range Status 11/17/2019 NEGATIVE  NEG mg/dL Final  
 
Ketone Date Value Ref Range Status 11/17/2019 TRACE (A) NEG mg/dL Final  
 
 Bilirubin Date Value Ref Range Status 11/17/2019 NEGATIVE  NEG   Final  
 
Blood Date Value Ref Range Status 11/17/2019 LARGE (A) NEG   Final  
 
Urobilinogen Date Value Ref Range Status 11/17/2019 0.2 0.2 - 1.0 EU/dL Final  
 
Nitrites Date Value Ref Range Status 11/17/2019 NEGATIVE  NEG   Final  
 
Leukocyte Esterase Date Value Ref Range Status 11/17/2019 MODERATE (A) NEG   Final  
 
Potassium Date Value Ref Range Status 11/18/2019 4.2 3.5 - 5.5 mmol/L Final  
 
Creatinine Date Value Ref Range Status 11/18/2019 0.58 (L) 0.6 - 1.3 MG/DL Final  
 
BUN Date Value Ref Range Status 11/18/2019 27 (H) 7.0 - 18 MG/DL Final  
  
PSA No results for input(s): PSA in the last 72 hours.   
Coagulation No results found for: PTP, INR, APTT, INREXT

## 2021-07-11 PROBLEM — I26.99 PULMONARY EMBOLISM (H): Status: ACTIVE | Noted: 2021-07-11

## 2021-07-12 ENCOUNTER — TELEPHONE (OUTPATIENT)
Dept: FAMILY MEDICINE | Facility: CLINIC | Age: 76
End: 2021-07-12

## 2021-07-12 NOTE — TELEPHONE ENCOUNTER
Patient is wondering what she should be taking in regard to her prednisone, should she continue 5mg? Please advise.

## 2021-07-12 NOTE — TELEPHONE ENCOUNTER
Please clarify prednisone dosing.   Per 7/9/2021 OV note:      1. Stage 3 chronic kidney disease, unspecified whether stage 3a or 3b CKD  Acute on chronic kidney disease with worsening CKD stage III.  Creatinine increased from 1.41 up to 1.64 with GFR 30 likely secondary to diuretic use.  Will decrease furosemide from 40 mg down to 20 mg daily while continuing current blood pressure medication lisinopril hydrochlorothiazide 20/12.5 using 2 tablets daily.  Will discontinue amlodipine however due to peripheral edema resulting in need for furosemide and reassess at follow-up no later than September physical.  - Basic Metabolic Panel     2. Stage 3b chronic kidney disease  As above.     3. Essential hypertension with goal blood pressure less than 140/90  Blood pressure 124/56 noted and will discontinue amlodipine 5 mg daily due to likely contributing peripheral edema.  Remains on lisinopril hydrochlorothiazide 20/12.5 using 2 tablets daily.  - Basic Metabolic Panel     4. Sciatica of right side  Right-sided sciatica.  Lumbar MRI to be completed at earliest convenience to further assess.  Patient resistant to going to spine care clinic until knowing results of this.  Recently completing prednisone 20 mg twice daily x5 days for noted right-sided sciatica.  - MR Lumbar Spine Without Contrast; Future     5. Other chronic pulmonary embolism, unspecified whether acute cor pulmonale present (H)  History of PE and is on chronic warfarin anticoagulation with recent supratherapeutic INR is and will repeat INR today in office to ensure desired improvement with decreased from 4.8 down to 3.2 earlier this week.     6. Polymyalgia rheumatica (H)     Myalgia rheumatica.  Will hold off on resuming prednisone 5 mg daily after recently completing prednisone 20 mg twice daily x5 days for noted right-sided sciatica.     7. Edema  Decreased furosemide from 40 mg down to 20 mg every morning.  Remain off amlodipine 5 mg daily due to likely  contributing factor to peripheral edema.  Reassess at follow-up as scheduled.  - furosemide (LASIX) 40 MG tablet; TAKE 1/2 TABLET (20 MG) BY MOUTH DAILY EACH AM  Dispense: 45 tablet; Refill: 3

## 2021-07-13 ENCOUNTER — RECORDS - HEALTHEAST (OUTPATIENT)
Dept: ADMINISTRATIVE | Facility: CLINIC | Age: 76
End: 2021-07-13

## 2021-07-15 DIAGNOSIS — R60.9 EDEMA, UNSPECIFIED TYPE: Primary | ICD-10-CM

## 2021-07-15 RX ORDER — FUROSEMIDE 20 MG
20 TABLET ORAL EVERY MORNING
Qty: 90 TABLET | Refills: 1 | Status: SHIPPED | OUTPATIENT
Start: 2021-07-15 | End: 2022-04-12

## 2021-07-15 NOTE — TELEPHONE ENCOUNTER
"[facility] is requesting clarification on Lasix 40 mg tablets. Prescriber notes states \"decreased dose from 40 mg down to 20 mg daily due to worsening renal insufficiency on higher dose.\" However our records indicate patient has a history of Lasix 20 mg tablets 1qd. This equals Lasix 40 mg tablets, 1/2 tab (20 mg) qam. Please verify patient's current dose.   "

## 2021-07-20 ENCOUNTER — DOCUMENTATION ONLY (OUTPATIENT)
Dept: FAMILY MEDICINE | Facility: CLINIC | Age: 76
End: 2021-07-20

## 2021-07-20 ENCOUNTER — ANTICOAGULATION THERAPY VISIT (OUTPATIENT)
Dept: FAMILY MEDICINE | Facility: CLINIC | Age: 76
End: 2021-07-20

## 2021-07-20 ENCOUNTER — TRANSFERRED RECORDS (OUTPATIENT)
Dept: HEALTH INFORMATION MANAGEMENT | Facility: CLINIC | Age: 76
End: 2021-07-20

## 2021-07-20 DIAGNOSIS — I26.99 PULMONARY EMBOLISM (H): Primary | ICD-10-CM

## 2021-07-20 LAB — INR PPP: 1.8

## 2021-07-20 NOTE — PROGRESS NOTES
ANTICOAGULATION MANAGEMENT     Nancy Oropeza 76 year old female is on warfarin with subtherapeutic INR result. (Goal INR 2.0-3.0)    Recent labs: (last 7 days)     07/20/21  0000   INR 1.8       ASSESSMENT     Source(s): Patient/Caregiver Call       Warfarin doses taken: Warfarin taken as instructed    Diet: Increased greens/vitamin K in diet; plans to resume previous intake    New illness, injury, or hospitalization: No    Medication/supplement changes: None noted    Signs or symptoms of bleeding or clotting: No    Previous INR: herapeutic    Additional findings: None     PLAN     Recommended plan for temporary change(s) affecting INR     Dosing Instructions: Continue your current warfarin dose with next INR in 1 week       Summary  As of 7/20/2021    Full warfarin instructions:  2.5 mg every day   Next INR check:  7/27/2021             Telephone call with Nancy who verbalizes understanding and agrees to plan    Patient to recheck with home meter    Education provided: Impact of vitamin K foods on INR and Target INR goal and significance of current INR result    Plan made per [facility] anticoagulation protocol    Torey Cárdenas RN  Anticoagulation Clinic  7/20/2021    _______________________________________________________________________     Anticoagulation Episode Summary     Current INR goal:  2.0-3.0   TTR:  41.4 % (1 y)   Target end date:  Indefinite   Send INR reminders to:  XENIA HURTADO    Indications    Pulmonary embolism (H) [I26.99]           Comments:           Anticoagulation Care Providers     Provider Role Specialty Phone number    Julien Garnica MD Referring Family Medicine [phone]

## 2021-07-21 ENCOUNTER — RECORDS - HEALTHEAST (OUTPATIENT)
Dept: ADMINISTRATIVE | Facility: CLINIC | Age: 76
End: 2021-07-21

## 2021-07-22 ENCOUNTER — RECORDS - HEALTHEAST (OUTPATIENT)
Dept: FAMILY MEDICINE | Facility: CLINIC | Age: 76
End: 2021-07-22

## 2021-07-22 DIAGNOSIS — Z12.31 OTHER SCREENING MAMMOGRAM: ICD-10-CM

## 2021-07-22 NOTE — LETTER
Letter by Julien Garnica MD at      Author: Julien Garnica MD Service: -- Author Type: --    Filed:  Encounter Date: 7/9/2021 Status: (Other)         Nancy Oropeza  [address]  [facility] [address]             July 9, 2021         Dear Ms. Oropeza,    Below are the results from your recent visit:    Resulted Orders   Basic Metabolic Panel   Result Value Ref Range    Sodium 140 136 - 145 mmol/L    Potassium 3.6 3.5 - 5.0 mmol/L    Chloride 96 (L) 98 - 107 mmol/L    CO2 29 22 - 31 mmol/L    Anion Gap, Calculation 15 5 - 18 mmol/L    Glucose 100 70 - 125 mg/dL    Calcium 10.7 (H) 8.5 - 10.5 mg/dL    BUN 31 (H) 8 - 28 mg/dL    Creatinine 1.02 0.60 - 1.10 mg/dL    GFR MDRD Af Amer >60 >60 mL/min/1.73m2    GFR MDRD Non Af Amer 52 (L) >60 mL/min/1.73m2    Narrative    Fasting Glucose reference range is 70-99 mg/dL per  American Diabetes Association (ADA) guidelines.       Your kidney function has improved and appears near baseline.  Continue your current medication as discussed.  We will continue to follow closely.     Please call with questions or contact us using [employer].    Sincerely,        Electronically signed by Julien Garnica MD

## 2021-07-26 ENCOUNTER — TELEPHONE (OUTPATIENT)
Dept: FAMILY MEDICINE | Facility: CLINIC | Age: 76
End: 2021-07-26
Payer: MEDICARE

## 2021-07-27 ENCOUNTER — TELEPHONE (OUTPATIENT)
Dept: FAMILY MEDICINE | Facility: CLINIC | Age: 76
End: 2021-07-27

## 2021-07-27 ENCOUNTER — VIRTUAL VISIT (OUTPATIENT)
Dept: FAMILY MEDICINE | Facility: CLINIC | Age: 76
End: 2021-07-27
Payer: COMMERCIAL

## 2021-07-27 ENCOUNTER — TRANSFERRED RECORDS (OUTPATIENT)
Dept: HEALTH INFORMATION MANAGEMENT | Facility: CLINIC | Age: 76
End: 2021-07-27

## 2021-07-27 DIAGNOSIS — E78.00 PURE HYPERCHOLESTEROLEMIA: ICD-10-CM

## 2021-07-27 DIAGNOSIS — N18.30 STAGE 3 CHRONIC KIDNEY DISEASE, UNSPECIFIED WHETHER STAGE 3A OR 3B CKD (H): ICD-10-CM

## 2021-07-27 DIAGNOSIS — I26.99 PULMONARY EMBOLISM (H): Primary | ICD-10-CM

## 2021-07-27 DIAGNOSIS — F33.0 MILD EPISODE OF RECURRENT MAJOR DEPRESSIVE DISORDER (H): ICD-10-CM

## 2021-07-27 DIAGNOSIS — M54.31 SCIATICA OF RIGHT SIDE: ICD-10-CM

## 2021-07-27 DIAGNOSIS — I10 ESSENTIAL HYPERTENSION WITH GOAL BLOOD PRESSURE LESS THAN 140/90: Primary | ICD-10-CM

## 2021-07-27 DIAGNOSIS — I26.99 PULMONARY EMBOLISM, UNSPECIFIED CHRONICITY, UNSPECIFIED PULMONARY EMBOLISM TYPE, UNSPECIFIED WHETHER ACUTE COR PULMONALE PRESENT (H): Primary | ICD-10-CM

## 2021-07-27 LAB — INR (EXTERNAL): 2.2 (ref 0.9–1.1)

## 2021-07-27 PROCEDURE — 99214 OFFICE O/P EST MOD 30 MIN: CPT | Mod: 95 | Performed by: NURSE PRACTITIONER

## 2021-07-27 RX ORDER — CITALOPRAM HYDROBROMIDE 40 MG/1
40 TABLET ORAL DAILY
COMMUNITY
End: 2021-10-04

## 2021-07-27 RX ORDER — LEVOTHYROXINE SODIUM 137 UG/1
137 TABLET ORAL DAILY
COMMUNITY
End: 2021-10-04

## 2021-07-27 RX ORDER — PRAVASTATIN SODIUM 80 MG/1
80 TABLET ORAL DAILY
Qty: 90 TABLET | Refills: 3
Start: 2021-07-27 | End: 2022-01-14

## 2021-07-27 RX ORDER — LISINOPRIL AND HYDROCHLOROTHIAZIDE 12.5; 2 MG/1; MG/1
2 TABLET ORAL
COMMUNITY
Start: 2021-01-06 | End: 2021-10-04

## 2021-07-27 RX ORDER — PREDNISONE 5 MG/1
TABLET ORAL
COMMUNITY
Start: 2021-07-02 | End: 2021-10-04

## 2021-07-27 RX ORDER — AMLODIPINE BESYLATE 5 MG/1
5 TABLET ORAL
COMMUNITY
End: 2021-07-27 | Stop reason: ALTCHOICE

## 2021-07-27 NOTE — PROGRESS NOTES
"Nancy is a 76 year old who is being evaluated via a billable telephone visit.      What phone number would you like to be contacted at? [phone]  How would you like to obtain your AVS? Nisha    Assessment & Plan     Essential hypertension with goal blood pressure less than 140/90  Continue lisinopril-hydrochlorothiazide 20-12.5 mg, 2 tablets daily.  Remain off of amlodipine due to lower extremity swelling.  Continue decreased dose of furosemide 20 mg daily.  She should follow-up in the next couple of weeks to recheck kidney function and blood pressure.    Stage 3 chronic kidney disease, unspecified whether stage 3a or 3b CKD  As above, continue decreased dose of furosemide in hopes to improve renal function.    Pure hypercholesterolemia  Doing well on pravastatin 80 mg daily.  Continue to work on healthy lifestyle modifications in regards to diet and exercise.  - pravastatin (PRAVACHOL) 80 MG tablet  Dispense: 90 tablet; Refill: 3    Mild episode of recurrent major depressive disorder (H)  Doing well on citalopram 40 mg daily.  She will continue    Sciatica of right side  We will place order for lumbar spine MRI per patient request.  - MR Lumbar Spine w/o Contrast    Hypothyroidism  Continue levothyroxine 137 MCG daily.    BMI:   Estimated body mass index is 49.18 kg/m  as calculated from the following:    Height as of 11/24/20: 1.702 m (5' 7\").    Weight as of 7/9/21: 142.4 kg (314 lb).   Weight management plan: Discussed healthy diet and exercise guidelines    Work on weight loss  Regular exercise    No follow-ups on file.    KATHY Sutherland CNP  [facility]   Nancy is a 76 year old who presents for the following health issues     HPI   Patient is here today for medication check and to review her current medication list.  She has a lot of confusion in regards to which medication she should be taking at which doses.  She recently was in for routine medication check " on 7/9/2021.  She would like to discuss recommendations given at that time to make sure she is taking medications correctly.    At the time, her creatinine had increased from 1.41 up to 1.64 thought to be secondary to diuretic use.  She was advised to decrease furosemide from 40 mg down to 20 mg which she has been doing.  She is advised to continue lisinopril-hydrochlorothiazide 20-12 0.5 mg, 2 tablets daily.  Her amlodipine was discontinued at this visit due to worsening peripheral edema.  She does continue to have slight edema currently.  Her blood pressure has been well controlled otherwise.  She is taking pravastatin 80 mg at night.  Continue citalopram 40 for depression.  Doing well on current dose and denies any side effects.  Continues levothyroxine 137 MCG daily.    At the time of her last visit she also had a lumbar spine MRI ordered.  Patient states that she received a phone call to set up physical therapy instead.  She is not sure why this happened and is requesting order for MRI to be placed again.  She continues to struggle with right-sided sciatica    Review of Systems   Review of Systems - pertinent positives noted in HPI, otherwise ROS is negative.        Objective    Vitals - Patient Reported  Weight (Patient Reported): 140.6 kg (310 lb)        Physical Exam   General:  Patientis pleasant and cooperative over the phone. No obvious sounds of distress. Answers questions appropriately.      Remainder of exam unable to be completed due to telephone visits            Phone call duration: 12 minutes

## 2021-07-27 NOTE — TELEPHONE ENCOUNTER
Reason for Call:  Other INR results    Detailed comments: Patient mayra Oropeza  attempted to call in INR results.    Phone Number Patient can be reached at: Cell number on file:    Telephone Information:   Mobile [phone]       Best Time: N/a    Can we leave a detailed message on this number? YES    Call taken on 7/27/2021 at 9:24 AM by Richard Newsome

## 2021-07-27 NOTE — TELEPHONE ENCOUNTER
ANTICOAGULATION MANAGEMENT     Nancy Oropeza 76 year old female is on warfarin with therapeutic INR result. (Goal INR )    Recent labs: (last 7 days)     07/27/21  0000   INR 2.2*       ASSESSMENT     Source(s): Patient/Caregiver Call       Warfarin doses taken: Warfarin taken as instructed    Diet: No new diet changes identified    New illness, injury, or hospitalization: No    Medication/supplement changes: None noted    Signs or symptoms of bleeding or clotting: No    Previous INR: Subtherapeutic    Additional findings: None     PLAN     Recommended plan for no diet, medication or health factor changes affecting INR     Dosing Instructions: Continue your current warfarin dose with next INR in 1 week       Summary  As of 7/27/2021    Full warfarin instructions:  2.5 mg every day   Next INR check:               Telephone call with Nancy who verbalizes understanding and agrees to plan    Patient to recheck with home meter    Education provided: Target INR goal and significance of current INR result and Contact [phone] with any changes, questions or concerns.     Plan made per [facility] anticoagulation protocol    Yumiko Kaufman RN  Anticoagulation Clinic  7/27/2021    _______________________________________________________________________     Anticoagulation Episode Summary     Current INR goal:  2.0-3.0   TTR:  41.7 % (1 y)   Target end date:  Indefinite   Send INR reminders to:  XENIA HURTADO    Indications    Pulmonary embolism (H) [I26.99]           Comments:           Anticoagulation Care Providers     Provider Role Specialty Phone number    Julien Garnica MD Referring Family Medicine [phone]

## 2021-08-03 LAB — INR (EXTERNAL): 3.4 (ref 0.9–1.1)

## 2021-08-04 ENCOUNTER — ANTICOAGULATION THERAPY VISIT (OUTPATIENT)
Dept: FAMILY MEDICINE | Facility: CLINIC | Age: 76
End: 2021-08-04

## 2021-08-04 DIAGNOSIS — I26.99 PULMONARY EMBOLISM (H): Primary | ICD-10-CM

## 2021-08-04 NOTE — PROGRESS NOTES
ANTICOAGULATION MANAGEMENT     Nancy Oropeza 76 year old female is on warfarin with supratherapeutic INR result. (Goal INR 2.0-3.0)    Recent labs: (last 7 days)     08/03/21  1800   INR 3.4*       ASSESSMENT     Source(s): Patient/Caregiver Call       Warfarin doses taken: Warfarin taken as instructed    Diet: No new diet changes identified    New illness, injury, or hospitalization: No    Medication/supplement changes: None noted    Signs or symptoms of bleeding or clotting: No    Previous INR: Therapeutic last visit; previously outside of goal range    Additional findings: Nancy reports that she did call results in to [facility] yesterday, they are still not sending fax to [facility]. She will continue to call for now until the issue is resolved.      PLAN     Recommended plan for no diet, medication or health factor changes affecting INR     Dosing Instructions:  Decrease your warfarin dose (7% change) with next INR in 1 week       Summary  As of 8/4/2021    Full warfarin instructions:  1.25 mg every Wed; 2.5 mg all other days   Next INR check:               Telephone call with Nancy who verbalizes understanding and agrees to plan    Patient to recheck with home meter    Education provided: Target INR goal and significance of current INR result    Plan made per [facility] anticoagulation protocol    Roro Johnson, RN  Anticoagulation Clinic  8/4/2021    _______________________________________________________________________     Anticoagulation Episode Summary     Current INR goal:  2.0-3.0   TTR:  43.1 % (1 y)   Target end date:  Indefinite   Send INR reminders to:  XENIA HURTADO    Indications    Pulmonary embolism (H) [I26.99]           Comments:           Anticoagulation Care Providers     Provider Role Specialty Phone number    Julien Garnica MD Referring Family Medicine [phone]

## 2021-08-10 ENCOUNTER — ANTICOAGULATION THERAPY VISIT (OUTPATIENT)
Dept: ANTICOAGULATION | Facility: CLINIC | Age: 76
End: 2021-08-10

## 2021-08-10 DIAGNOSIS — I26.99 PULMONARY EMBOLISM (H): Primary | ICD-10-CM

## 2021-08-10 LAB — INR (EXTERNAL): 2.7 (ref 0.9–1.1)

## 2021-08-10 NOTE — PROGRESS NOTES
8/10/21 - Tanesha Cruz called in INR result - 2.7   - she is reported to us, instead of Tanesha due to problems and issues with Tanesha that has not been resolved yet.   - will inform Cortney VAZQUEZ to check into this.     Current dose if 2.5mg daily.    Denied any s/sx of abnormal bleeding.

## 2021-08-10 NOTE — PROGRESS NOTES
ANTICOAGULATION MANAGEMENT     Nancy Navarroabhay 76 year old female is on warfarin with therapeutic INR result. (Goal INR 2.0-3.0)    Recent labs: (last 7 days)     08/10/21  0000   INR 2.7*       ASSESSMENT     Source(s): Patient/Caregiver Call       Warfarin doses taken: Warfarin taken as instructed    Diet: No new diet changes identified    New illness, injury, or hospitalization: No    Medication/supplement changes: None noted    Signs or symptoms of bleeding or clotting: No    Previous INR: Supratherapeutic    Additional findings: None     PLAN     Recommended plan for no diet, medication or health factor changes affecting INR     Dosing Instructions: Continue your current warfarin dose with next INR in 1 week       Summary  As of 8/10/2021    Full warfarin instructions:  1.25 mg every Wed; 2.5 mg all other days   Next INR check:  8/17/2021             Telephone call with Nancy who verbalizes understanding and agrees to plan    Patient to recheck with home meter    Education provided: Target INR goal and significance of current INR result and Contact [phone] with any changes, questions or concerns.     Plan made per [facility]agulation protocol    Yumiko Kaufman RN  Anticoagulation Clinic  8/10/2021    _______________________________________________________________________     Anticoagulation Episode Summary     Current INR goal:  2.0-3.0   TTR:  43.9 % (1 y)   Target end date:  Indefinite   Send INR reminders to:  XENIA HURTADO    Indications    Pulmonary embolism (H) [I26.99]           Comments:           Anticoagulation Care Providers     Provider Role Specialty Phone number    Julien Garnica MD Referring Family Medicine [phone]

## 2021-08-16 ENCOUNTER — TRANSFERRED RECORDS (OUTPATIENT)
Dept: HEALTH INFORMATION MANAGEMENT | Facility: CLINIC | Age: 76
End: 2021-08-16

## 2021-08-17 ENCOUNTER — TELEPHONE (OUTPATIENT)
Dept: FAMILY MEDICINE | Facility: CLINIC | Age: 76
End: 2021-08-17

## 2021-08-17 DIAGNOSIS — I26.99 PULMONARY EMBOLISM (H): Primary | ICD-10-CM

## 2021-08-17 LAB — INR (EXTERNAL): 2.6 (ref 0.9–1.1)

## 2021-08-17 NOTE — TELEPHONE ENCOUNTER
ANTICOAGULATION MANAGEMENT     Nancy Navarrorimikki 76 year old female is on warfarin with therapeutic INR result. (Goal INR )    Recent labs: (last 7 days)     08/17/21  0000   INR 2.6*       ASSESSMENT     Source(s): Patient/Caregiver Call       Warfarin doses taken: Less warfarin taken than planned which may be affecting INR    Diet: No new diet changes identified    New illness, injury, or hospitalization: No    Medication/supplement changes: None noted    Signs or symptoms of bleeding or clotting: No    Previous INR: Therapeutic last visit; previously outside of goal range    Additional findings: None     PLAN     Recommended plan for no diet, medication or health factor changes affecting INR     Dosing Instructions: Continue your current warfarin dose as you have been taking it with next INR in 1 week       Summary  As of 8/17/2021    Full warfarin instructions:  2.5 mg every day   Next INR check:  8/24/2021             Telephone call with Nancy who verbalizes understanding and agrees to plan    Patient to recheck with home meter    Education provided: Target INR goal and significance of current INR result and Contact [phone] with any changes, questions or concerns.     Plan made per [facility] anticoagulation protocol    Yumiko Kaufman RN  Anticoagulation Clinic  8/17/2021    _______________________________________________________________________     Anticoagulation Episode Summary     Current INR goal:  2.0-3.0   TTR:  45.3 % (1 y)   Target end date:  Indefinite   Send INR reminders to:  XENIA HURTADO    Indications    Pulmonary embolism (H) [I26.99]           Comments:           Anticoagulation Care Providers     Provider Role Specialty Phone number    Julien Garnica MD Referring Family Medicine [phone]

## 2021-08-17 NOTE — TELEPHONE ENCOUNTER
Reason for Call:  INR    Detailed comments: Please call patient for INR readings, thank you.    Phone Number Patient can be reached at: Home number on file [phone] (home)    Best Time: any    Can we leave a detailed message on this number? YES    Call taken on 8/17/2021 at 10:34 AM by Uum Garcia

## 2021-08-24 LAB — INR (EXTERNAL): 2.8 (ref 0.9–1.1)

## 2021-08-26 ENCOUNTER — ANTICOAGULATION THERAPY VISIT (OUTPATIENT)
Dept: ANTICOAGULATION | Facility: CLINIC | Age: 76
End: 2021-08-26

## 2021-08-26 DIAGNOSIS — I26.99 PULMONARY EMBOLISM (H): Primary | ICD-10-CM

## 2021-08-26 NOTE — PROGRESS NOTES
ANTICOAGULATION MANAGEMENT     Nancy RYANN Satabhay 76 year old female is on warfarin with therapeutic INR result. (Goal INR 2.0-3.0)    Recent labs: (last 7 days)     08/24/21  0000   INR 2.8*       ASSESSMENT     Source(s): Patient/Caregiver Call       Warfarin doses taken: Warfarin taken as instructed    Diet: No new diet changes identified    New illness, injury, or hospitalization: No    Medication/supplement changes: None noted    Signs or symptoms of bleeding or clotting: No    Previous INR: Therapeutic last 2(+) visits    Additional findings: None     PLAN     Recommended plan for no diet, medication or health factor changes affecting INR     Dosing Instructions: Continue your current warfarin dose with next INR in 1 week       Summary  As of 8/26/2021    Full warfarin instructions:  2.5 mg every day   Next INR check:  9/2/2021             Telephone call with Nancy who verbalizes understanding and agrees to plan    Patient to recheck with home meter    Education provided: Goal range and significance of current result and Contact [phone] with any changes, questions or concerns.     Plan made per [facility] anticoagulation protocol    Yumiko Kaufman RN  Anticoagulation Clinic  8/26/2021    _______________________________________________________________________     Anticoagulation Episode Summary     Current INR goal:  2.0-3.0   TTR:  47.5 % (1 y)   Target end date:  Indefinite   Send INR reminders to:  XENIA HURTADO    Indications    Pulmonary embolism (H) [I26.99]           Comments:           Anticoagulation Care Providers     Provider Role Specialty Phone number    Julien Garnica MD Referring Family Medicine [phone]

## 2021-08-30 ENCOUNTER — TRANSFERRED RECORDS (OUTPATIENT)
Dept: HEALTH INFORMATION MANAGEMENT | Facility: CLINIC | Age: 76
End: 2021-08-30

## 2021-09-01 ENCOUNTER — ANTICOAGULATION THERAPY VISIT (OUTPATIENT)
Dept: ANTICOAGULATION | Facility: CLINIC | Age: 76
End: 2021-09-01

## 2021-09-01 ENCOUNTER — VIRTUAL VISIT (OUTPATIENT)
Dept: FAMILY MEDICINE | Facility: CLINIC | Age: 76
End: 2021-09-01
Payer: COMMERCIAL

## 2021-09-01 DIAGNOSIS — G89.29 CHRONIC BILATERAL LOW BACK PAIN WITH RIGHT-SIDED SCIATICA: Primary | ICD-10-CM

## 2021-09-01 DIAGNOSIS — M54.41 CHRONIC BILATERAL LOW BACK PAIN WITH RIGHT-SIDED SCIATICA: Primary | ICD-10-CM

## 2021-09-01 DIAGNOSIS — I26.99 PULMONARY EMBOLISM (H): Primary | ICD-10-CM

## 2021-09-01 LAB — INR (EXTERNAL): 2.2 (ref 0.9–1.18)

## 2021-09-01 PROCEDURE — 99442 PR PHYSICIAN TELEPHONE EVALUATION 11-20 MIN: CPT | Performed by: NURSE PRACTITIONER

## 2021-09-01 RX ORDER — TRAMADOL HYDROCHLORIDE 50 MG/1
50 TABLET ORAL EVERY 6 HOURS PRN
Qty: 10 TABLET | Refills: 0 | Status: SHIPPED | OUTPATIENT
Start: 2021-09-01 | End: 2021-09-04

## 2021-09-01 NOTE — PROGRESS NOTES
ANTICOAGULATION MANAGEMENT     Nancy Oropeza 76 year old female is on warfarin with therapeutic INR result. (Goal INR 2.0-3.0)    Recent labs: (last 7 days)     09/01/21  1600   INR 2.2*       ASSESSMENT     Source(s): Patient/Caregiver Call       Warfarin doses taken: Warfarin taken as instructed    Diet: No new diet changes identified    New illness, injury, or hospitalization: Yes: Sciatica    Medication/supplement changes: Tramadol  started on 9/1/21 which has potential for interaction; increasing INR    Signs or symptoms of bleeding or clotting: No    Previous INR: Therapeutic last 2(+) visits    Additional findings: None     PLAN     Recommended plan for no diet, medication or health factor changes affecting INR     Dosing Instructions: Continue your current warfarin dose with next INR in 1 week       Summary  As of 9/1/2021    Full warfarin instructions:  2.5 mg every day   Next INR check:               Telephone call with Nancy who verbalizes understanding and agrees to plan    Patient to recheck with home meter    Education provided: Please call back if any changes to your diet, medications or how you've been taking warfarin, Potential interaction between warfarin and Tramadol, Monitoring for bleeding signs and symptoms, When to seek medical attention/emergency care and Contact [phone]  with any changes, questions or concerns.     Plan made per [facility] anticoagulation protocol    Fito Meza RN  Anticoagulation Clinic  9/1/2021    _______________________________________________________________________     Anticoagulation Episode Summary     Current INR goal:  2.0-3.0   TTR:  49.5 % (1 y)   Target end date:  Indefinite   Send INR reminders to:  XENIA HURTADO    Indications    Pulmonary embolism (H) [I26.99]           Comments:           Anticoagulation Care Providers     Provider Role Specialty Phone number    Julien Garnica MD Referring Family Medicine [phone]

## 2021-09-01 NOTE — PROGRESS NOTES
Nancy is a 76 year old who is being evaluated via a billable telephone visit.      What phone number would you like to be contacted at? [phone]  How would you like to obtain your AVS? Nisha    Assessment & Plan     Chronic bilateral low back pain with right-sided sciatica  Ongoing issues with sciatica, awaiting results of recent spine MRI.  We discussed likely referral to spine care versus follow-up with orthopedics to discuss further treatment options.  She is requesting something for pain today.  I discussed my hesitancies with this but feel that it would be appropriate to provide short course of tramadol to use only for severe pain.  She will continue x-ray strength Tylenol as well and follow-up with us after MRI results return.  - traMADol (ULTRAM) 50 MG tablet  Dispense: 10 tablet; Refill: 0      No follow-ups on file.    KATHY Sutherland [facility]   Nancy is a 76 year old who presents for the following health issues: Sciatica    HPI   Patient is here today with concerns of ongoing sciatic pain going down the right buttock and leg for the last couple of months.  She has had issues on and off.  Was treated with prednisone taper which initially helped.  She now has had return of symptoms for the last several weeks.  Symptoms are worse this time around, she has numbness in her right foot.  She describes a dull ache that is constant and making it difficult for her to ambulate.  She just finished another prednisone taper and did not feel that this was managing pain as well as it did previously.  This was prescribed by orthopedics who she saw last week.  She had MRI completed 2 days ago and is still waiting to hear about the results of this.  She has requested that results get sent to our clinic for review.  She is requesting something for pain today.  She has been doing the prednisone and Tylenol with minimal relief.  She has tried physical therapy in the past  and this has never worked for her.  She states that she does not believe in physical therapy and is not interested in referral.      Review of Systems   Review of Systems - pertinent positives noted in HPI, otherwise ROS is negative.        Objective    Vitals - Patient Reported  Weight (Patient Reported): 136.1 kg (300 lb)        Physical Exam   healthy, alert and no distress  PSYCH: Alert and oriented times 3; coherent speech, normal   rate and volume, able to articulate logical thoughts, able   to abstract reason, no tangential thoughts, no hallucinations   or delusions  Her affect is normal  RESP: No cough, no audible wheezing, able to talk in full sentences  Remainder of exam unable to be completed due to telephone visits            Phone call duration: 12  minutes

## 2021-09-01 NOTE — PROGRESS NOTES
"Nancy is a 76 year old who is being evaluated via a billable video visit.      How would you like to obtain your AVS? MyChart  If the video visit is dropped, the invitation should be resent by: Text to cell phone: [phone]  Will anyone else be joining your video visit? No  {If patient encounters technical issues they should call [phone] :861958}    Video Start Time: {video visit start/end time for provider to select:152948}    {PROVIDER CHARTING PREFERENCE:316426}    Subjective   Nancy is a 76 year old who presents for the following health issues {ACCOMPANIED BY STATEMENT (Optional):821055}    HPI     {SUPERLIST (Optional):518172}  {additonal problems for provider to add (Optional):632083}    Review of Systems   {ROS COMP (Optional):179816}      Objective           Vitals:  No vitals were obtained today due to virtual visit.    Physical Exam   {video visit exam brief selected:707655::\"GENERAL: Healthy, alert and no distress\",\"EYES: Eyes grossly normal to inspection.  No discharge or erythema, or obvious scleral/conjunctival abnormalities.\",\"RESP: No audible wheeze, cough, or visible cyanosis.  No visible retractions or increased work of breathing.  \",\"SKIN: Visible skin clear. No significant rash, abnormal pigmentation or lesions.\",\"NEURO: Cranial nerves grossly intact.  Mentation and speech appropriate for age.\",\"PSYCH: Mentation appears normal, affect normal/bright, judgement and insight intact, normal speech and appearance well-groomed.\"}    {Diagnostic Test Results (Optional):161749}    {AMBULATORY ATTESTATION (Optional):324079}        Video-Visit Details    Type of service:  Video Visit    Video End Time:{video visit start/end time for provider to select:152948}    Originating Location (pt. Location): {video visit patient location:897087::\"Home\"}    Distant Location (provider location):  [facility]     Platform used for Video Visit: {Virtual Visit Platforms:604646::\"[employer]\"}  "

## 2021-09-11 ENCOUNTER — TRANSFERRED RECORDS (OUTPATIENT)
Dept: HEALTH INFORMATION MANAGEMENT | Facility: CLINIC | Age: 76
End: 2021-09-11

## 2021-09-11 LAB — INR (EXTERNAL): 3.7 (ref 0.9–1.1)

## 2021-09-13 ENCOUNTER — ANTICOAGULATION THERAPY VISIT (OUTPATIENT)
Dept: FAMILY MEDICINE | Facility: CLINIC | Age: 76
End: 2021-09-13

## 2021-09-13 DIAGNOSIS — I26.99 PULMONARY EMBOLISM (H): Primary | ICD-10-CM

## 2021-09-13 NOTE — PROGRESS NOTES
ANTICOAGULATION MANAGEMENT     Nancy Oropeza 76 year old female is on warfarin with supratherapeutic INR result. (Goal INR 2.0-3.0)    Recent labs: (last 7 days)     09/13/21  1552   INR 3.7*       ASSESSMENT     Source(s): Patient/Caregiver Call       Warfarin doses taken: Warfarin taken as instructed    Diet: No new diet changes identified    New illness, injury, or hospitalization: No    Medication/supplement changes: tramadol started on 9/1, taken daily until ran out on 9/11 which has potential for interaction; increasing INR    Signs or symptoms of bleeding or clotting: No    Previous INR: Therapeutic last 2(+) visits    Additional findings: None     PLAN     Recommended plan for temporary change(s) affecting INR     Dosing Instructions: Hold dose then continue your current warfarin dose with next INR in 1 week       Summary  As of 9/13/2021    Full warfarin instructions:  9/14: Hold; Otherwise 2.5 mg every day   Next INR check:  9/21/2021             Telephone call with Nancy who verbalizes understanding and agrees to plan    Patient to recheck with home meter    Education provided: Goal range and significance of current result and Contact [phone] with any changes, questions or concerns.     Plan made per [facility] anticoagulation protocol    Yumiko Kaufman RN  Anticoagulation Clinic  9/13/2021    _______________________________________________________________________     Anticoagulation Episode Summary     Current INR goal:  2.0-3.0   TTR:  48.2 % (1 y)   Target end date:  Indefinite   Send INR reminders to:  XENIA HURTADO    Indications    Pulmonary embolism (H) [I26.99]           Comments:           Anticoagulation Care Providers     Provider Role Specialty Phone number    Julien Garnica MD Referring Family Medicine [phone]

## 2021-09-14 ENCOUNTER — TRANSFERRED RECORDS (OUTPATIENT)
Dept: HEALTH INFORMATION MANAGEMENT | Facility: CLINIC | Age: 76
End: 2021-09-14

## 2021-09-14 ENCOUNTER — TELEPHONE (OUTPATIENT)
Dept: FAMILY MEDICINE | Facility: CLINIC | Age: 76
End: 2021-09-14

## 2021-09-14 ENCOUNTER — MEDICAL CORRESPONDENCE (OUTPATIENT)
Dept: HEALTH INFORMATION MANAGEMENT | Facility: CLINIC | Age: 76
End: 2021-09-14

## 2021-09-14 NOTE — TELEPHONE ENCOUNTER
Patient is calling with a question about a drug interaction. Patient had an MRI done, got the results back and was prescribed gabapentin 100mg. She wants to make sure that it is safe for her to take while she is on 2.5mg of warfarin?    She is going to wait to take it until she gets confirmation from Dr. Garnica    Please advise patient

## 2021-09-14 NOTE — TELEPHONE ENCOUNTER
Talked to Dr. Garnica and he said that it would be okay for Pt to taken Gabapentin with her warfarin as there is no known interactions between the two.

## 2021-09-19 ENCOUNTER — HEALTH MAINTENANCE LETTER (OUTPATIENT)
Age: 76
End: 2021-09-19

## 2021-09-21 ENCOUNTER — ANTICOAGULATION THERAPY VISIT (OUTPATIENT)
Dept: ANTICOAGULATION | Facility: CLINIC | Age: 76
End: 2021-09-21

## 2021-09-21 ENCOUNTER — TELEPHONE (OUTPATIENT)
Dept: FAMILY MEDICINE | Facility: CLINIC | Age: 76
End: 2021-09-21

## 2021-09-21 DIAGNOSIS — I26.99 PULMONARY EMBOLISM (H): Primary | ICD-10-CM

## 2021-09-21 LAB — INR (EXTERNAL): 2.1 (ref 0.9–1.1)

## 2021-09-21 NOTE — PROGRESS NOTES
ANTICOAGULATION MANAGEMENT     Nancy Navarroabhay 76 year old female is on warfarin with therapeutic INR result. (Goal INR 2.0-3.0)    Recent labs: (last 7 days)     09/21/21  0000   INR 2.1*       ASSESSMENT     Source(s): Chart Review and Patient/Caregiver Call       Warfarin doses taken: Warfarin taken as instructed    Diet: No new diet changes identified    New illness, injury, or hospitalization: No    Medication/supplement changes: None noted    Signs or symptoms of bleeding or clotting: No    Previous INR: Supratherapeutic    Additional findings: None     PLAN     Recommended plan for no diet, medication or health factor changes affecting INR     Dosing Instructions: Continue your current warfarin dose with next INR in 1 week       Summary  As of 9/21/2021    Full warfarin instructions:  2.5 mg every day   Next INR check:               Telephone call with Nancy who verbalizes understanding and agrees to plan. [employer] message sent to patient with dosing instructions and for smoother follow-up on future INR results, as we are still not receiving the faxed result from [facility]    Patient to recheck with home meter    Education provided: Goal range and significance of current result and Contact [phone] with any changes, questions or concerns.     Plan made per [facility] anticoagulation protocol    Yumiko Kaufman RN  Anticoagulation Clinic  9/21/2021    _______________________________________________________________________     Anticoagulation Episode Summary     Current INR goal:  2.0-3.0   TTR:  49.4 % (1 y)   Target end date:  Indefinite   Send INR reminders to:  XENIA HURTADO    Indications    Pulmonary embolism (H) [I26.99]           Comments:           Anticoagulation Care Providers     Provider Role Specialty Phone number    Julien Garnica MD Referring Family Medicine [phone]

## 2021-09-21 NOTE — TELEPHONE ENCOUNTER
Reason for Call:  INR    Who is calling?  patient    Phone number:  [phone]    Name of caller: Nancy    INR Value:  2.1    Are there any other concerns:  No    Can we leave a detailed message on this number? YES    Phone number patient can be reached at: Home number on file [phone] (home)      Call taken on 9/21/2021 at 9:02 AM by Madina Lindquist

## 2021-09-21 NOTE — TELEPHONE ENCOUNTER
See anticoagulation visit encounter for details.    Yumiko Kaufman RN  [facility] Anticoagulation  [phone]

## 2021-09-24 ENCOUNTER — TELEPHONE (OUTPATIENT)
Dept: FAMILY MEDICINE | Facility: CLINIC | Age: 76
End: 2021-09-24

## 2021-09-24 NOTE — CONFIDENTIAL NOTE
Patient calling to make sure that we got her MRI results from [facility]. Patient was advised that she would need injections. They are currently waiting to hear back from her insurance about her Prior authorization until anything further.     Nancy wanted to make sure that Dr. Garnica got the results and see what he recommended for further treatment regarding MRI.     Please review and advise.

## 2021-09-28 ENCOUNTER — TRANSFERRED RECORDS (OUTPATIENT)
Dept: HEALTH INFORMATION MANAGEMENT | Facility: CLINIC | Age: 76
End: 2021-09-28

## 2021-09-28 ENCOUNTER — TELEPHONE (OUTPATIENT)
Dept: FAMILY MEDICINE | Facility: CLINIC | Age: 76
End: 2021-09-28

## 2021-09-28 DIAGNOSIS — I26.99 PULMONARY EMBOLISM (H): Primary | ICD-10-CM

## 2021-09-28 LAB — INR (EXTERNAL): 2.2 (ref 2–3)

## 2021-09-28 NOTE — TELEPHONE ENCOUNTER
Reason for Call:  INR    Who is calling?  Home Care: Patient     Phone number:  [phone]    Fax number:  N/A     Name of caller: Nancy     INR Value:  2.2    Are there any other concerns:  No    Can we leave a detailed message on this number? YES    Phone number patient can be reached at: Home number on file [phone] (home)      Call taken on 9/28/2021 at 10:20 AM by Cherry Orozco

## 2021-09-28 NOTE — TELEPHONE ENCOUNTER
ANTICOAGULATION MANAGEMENT     Nancy Oropeza 76 year old female is on warfarin with therapeutic INR result. (Goal INR 2.0-3.0 )    Recent labs: (last 7 days)     09/28/21  0000   INR 2.2*       ASSESSMENT     Source(s): Chart Review and Patient/Caregiver Call       Warfarin doses taken: Warfarin taken as instructed    Diet: No new diet changes identified    New illness, injury, or hospitalization: No    Medication/supplement changes: None noted    Signs or symptoms of bleeding or clotting: No    Previous INR: Therapeutic last visit; previously outside of goal range    Additional findings: Still not receiving faxes results from [facility]. Pt has been reporting her own results to [facility].      PLAN     Recommended plan for no diet, medication or health factor changes affecting INR     Dosing Instructions: Continue your current warfarin dose with next INR in 1 week       Summary  As of 9/28/2021    Full warfarin instructions:  2.5 mg every day   Next INR check:               Telephone call with Nancy who verbalizes understanding and agrees to plan    Patient to recheck with home meter    Education provided: Goal range and significance of current result    Plan made per [facility] anticoagulation protocol    Consuelo Montoya RN  Anticoagulation Clinic  9/28/2021    _______________________________________________________________________     Anticoagulation Episode Summary     Current INR goal:  2.0-3.0   TTR:  51.3 % (1 y)   Target end date:  Indefinite   Send INR reminders to:  XENIA HURTADO    Indications    Pulmonary embolism (H) [I26.99]           Comments:           Anticoagulation Care Providers     Provider Role Specialty Phone number    Julien Garnica MD Referring Family Medicine [phone]

## 2021-10-01 DIAGNOSIS — E78.00 PURE HYPERCHOLESTEROLEMIA: ICD-10-CM

## 2021-10-01 DIAGNOSIS — I10 ESSENTIAL HYPERTENSION WITH GOAL BLOOD PRESSURE LESS THAN 140/90: Primary | ICD-10-CM

## 2021-10-01 DIAGNOSIS — E03.9 HYPOTHYROIDISM, UNSPECIFIED TYPE: ICD-10-CM

## 2021-10-01 DIAGNOSIS — F33.0 MILD EPISODE OF RECURRENT MAJOR DEPRESSIVE DISORDER (H): ICD-10-CM

## 2021-10-01 DIAGNOSIS — M35.3 POLYMYALGIA RHEUMATICA (H): ICD-10-CM

## 2021-10-01 DIAGNOSIS — I26.99 PULMONARY EMBOLISM, UNSPECIFIED CHRONICITY, UNSPECIFIED PULMONARY EMBOLISM TYPE, UNSPECIFIED WHETHER ACUTE COR PULMONALE PRESENT (H): ICD-10-CM

## 2021-10-04 RX ORDER — WARFARIN SODIUM 5 MG/1
2.5 TABLET ORAL DAILY
Qty: 90 TABLET | Refills: 3 | Status: SHIPPED | OUTPATIENT
Start: 2021-10-04 | End: 2021-12-21

## 2021-10-04 RX ORDER — LISINOPRIL AND HYDROCHLOROTHIAZIDE 12.5; 2 MG/1; MG/1
2 TABLET ORAL DAILY
Qty: 180 TABLET | Refills: 1 | Status: SHIPPED | OUTPATIENT
Start: 2021-10-04 | End: 2022-04-12

## 2021-10-04 RX ORDER — AMLODIPINE BESYLATE 5 MG/1
5 TABLET ORAL DAILY
COMMUNITY
Start: 2021-09-17 | End: 2021-10-04

## 2021-10-04 RX ORDER — LEVOTHYROXINE SODIUM 137 UG/1
137 TABLET ORAL DAILY
Qty: 90 TABLET | Refills: 3 | Status: SHIPPED | OUTPATIENT
Start: 2021-10-04 | End: 2022-04-12

## 2021-10-04 RX ORDER — PREDNISONE 5 MG/1
5 TABLET ORAL DAILY
Qty: 90 TABLET | Refills: 1 | Status: SHIPPED | OUTPATIENT
Start: 2021-10-04 | End: 2022-02-04

## 2021-10-04 RX ORDER — CITALOPRAM HYDROBROMIDE 40 MG/1
40 TABLET ORAL DAILY
Qty: 90 TABLET | Refills: 1 | Status: SHIPPED | OUTPATIENT
Start: 2021-10-04 | End: 2022-04-12

## 2021-10-04 RX ORDER — AMLODIPINE BESYLATE 5 MG/1
5 TABLET ORAL DAILY
Qty: 90 TABLET | Refills: 3 | Status: SHIPPED | OUTPATIENT
Start: 2021-10-04 | End: 2022-02-04

## 2021-10-05 ENCOUNTER — TELEPHONE (OUTPATIENT)
Dept: FAMILY MEDICINE | Facility: CLINIC | Age: 76
End: 2021-10-05

## 2021-10-05 NOTE — TELEPHONE ENCOUNTER
Patient is calling back again to check on the status of this form. Patient is wanting to schedule her injection and cannot until this is completed.     Please advise

## 2021-10-05 NOTE — TELEPHONE ENCOUNTER
Reason for Call:  Other call back    Detailed comments: form from 09.14.2021 (that has been scanned into sylvia/Nancy's chart) need a yes or no to hold .    Nury contact Cherry at [facility] with response    Phone Number Patient can be reached at: Other phone number:  [phone]    Best Time: anytime    Can we leave a detailed message on this number? YES    Call taken on 10/5/2021 at 10:19 AM by Marina Washington

## 2021-10-05 NOTE — TELEPHONE ENCOUNTER
Recvd call from pt/Nancy, she was calling to get the status of the form that was submitted per [facility] for her  hold in order for her to receive her injection with [facility].    Pls call Nancy once Dr Garnica has this completed so she can schedule her injection

## 2021-10-06 ENCOUNTER — ANTICOAGULATION THERAPY VISIT (OUTPATIENT)
Dept: FAMILY MEDICINE | Facility: CLINIC | Age: 76
End: 2021-10-06

## 2021-10-06 DIAGNOSIS — I26.99 PULMONARY EMBOLISM (H): Primary | ICD-10-CM

## 2021-10-06 LAB — INR (EXTERNAL): 2.4 (ref 2–3)

## 2021-10-06 NOTE — PROGRESS NOTES
Marlene Mcmillan [facility],     Patient starting Prednisone 5 mg tab BID for 5 days starting tomorrow    INR 2.4 today. Should patient recheck INR sooner on Friday? Or should patient reduce coumadin in anticipation?    Please advise      Thanks! Richa Clark RN

## 2021-10-06 NOTE — PROGRESS NOTES
Chart reviewed with [facility] RN at time of encounter.    Generally no need to preemptively lower warfarin dose with prednisone, as INR may rise from interaction or lower with decreased inflammation from use.    Advise continue maintenance dose warfarin, recheck INR early next week, 1 week at latest..    Marlene Mcmillan, PharmD BCACP  Anticoagulation Clinical Pharmacist

## 2021-10-06 NOTE — PROGRESS NOTES
ANTICOAGULATION MANAGEMENT     Nancy Navarrorimikki 76 year old female is on warfarin with therapeutic INR result. (Goal INR 2.0-3.0)    Recent labs: (last 7 days)     10/06/21  1507   INR 2.4       ASSESSMENT     Source(s): Chart Review and Patient/Caregiver Call       Warfarin doses taken: Warfarin taken as instructed    Diet: No new diet changes identified    New illness, injury, or hospitalization: No    Medication/supplement changes: starting 5 mg of Prednisone on 1-/7 BID for 5 days.     Signs or symptoms of bleeding or clotting: No    Previous INR: Therapeutic last 2(+) visits    Additional findings: None     PLAN     Recommended plan for no diet, medication or health factor changes affecting INR     Dosing Instructions: Continue your current warfarin dose with next INR in 1 week       Summary  As of 10/6/2021    Full warfarin instructions:  2.5 mg every day   Next INR check:               Telephone call with Nancy who verbalizes understanding and agrees to plan and who agrees to plan and repeated back plan correctly    Patient to recheck with home meter    Education provided: Please call back if any changes to your diet, medications or how you've been taking warfarin and Potential interaction between warfarin and Prednisone    Plan made with [facility] Pharmacist Richa Dickens, RN  Anticoagulation Clinic  10/6/2021    _______________________________________________________________________     Anticoagulation Episode Summary     Current INR goal:  2.0-3.0   TTR:  51.8 % (1 y)   Target end date:  Indefinite   Send INR reminders to:  XENIA HURTADO    Indications    Pulmonary embolism (H) [I26.99]           Comments:           Anticoagulation Care Providers     Provider Role Specialty Phone number    Julien Garnica MD Referring Family Medicine [phone]

## 2021-10-07 ENCOUNTER — OFFICE VISIT (OUTPATIENT)
Dept: FAMILY MEDICINE | Facility: CLINIC | Age: 76
End: 2021-10-07
Payer: COMMERCIAL

## 2021-10-07 DIAGNOSIS — E78.00 PURE HYPERCHOLESTEROLEMIA: ICD-10-CM

## 2021-10-07 DIAGNOSIS — Z00.01 ENCOUNTER FOR GENERAL ADULT MEDICAL EXAMINATION WITH ABNORMAL FINDINGS: Primary | ICD-10-CM

## 2021-10-07 DIAGNOSIS — I26.99 PULMONARY EMBOLISM, UNSPECIFIED CHRONICITY, UNSPECIFIED PULMONARY EMBOLISM TYPE, UNSPECIFIED WHETHER ACUTE COR PULMONALE PRESENT (H): ICD-10-CM

## 2021-10-07 DIAGNOSIS — M54.41 CHRONIC BILATERAL LOW BACK PAIN WITH RIGHT-SIDED SCIATICA: ICD-10-CM

## 2021-10-07 DIAGNOSIS — G89.29 CHRONIC BILATERAL LOW BACK PAIN WITH RIGHT-SIDED SCIATICA: ICD-10-CM

## 2021-10-07 DIAGNOSIS — R73.01 IMPAIRED FASTING GLUCOSE: ICD-10-CM

## 2021-10-07 DIAGNOSIS — Z23 HIGH PRIORITY FOR 2019-NCOV VACCINE: ICD-10-CM

## 2021-10-07 DIAGNOSIS — N18.31 STAGE 3A CHRONIC KIDNEY DISEASE (H): ICD-10-CM

## 2021-10-07 DIAGNOSIS — I10 ESSENTIAL HYPERTENSION WITH GOAL BLOOD PRESSURE LESS THAN 140/90: ICD-10-CM

## 2021-10-07 DIAGNOSIS — M35.3 POLYMYALGIA RHEUMATICA (H): ICD-10-CM

## 2021-10-07 DIAGNOSIS — E03.9 HYPOTHYROIDISM, UNSPECIFIED TYPE: ICD-10-CM

## 2021-10-07 DIAGNOSIS — Z23 ENCOUNTER FOR IMMUNIZATION: ICD-10-CM

## 2021-10-07 DIAGNOSIS — F33.0 MILD EPISODE OF RECURRENT MAJOR DEPRESSIVE DISORDER (H): ICD-10-CM

## 2021-10-07 PROBLEM — N18.30 CHRONIC KIDNEY DISEASE, STAGE 3 (H): Status: ACTIVE | Noted: 2021-10-07

## 2021-10-07 LAB
ANION GAP SERPL CALCULATED.3IONS-SCNC: 14 MMOL/L (ref 5–18)
BUN SERPL-MCNC: 22 MG/DL (ref 8–28)
CALCIUM SERPL-MCNC: 11.1 MG/DL (ref 8.5–10.5)
CHLORIDE BLD-SCNC: 100 MMOL/L (ref 98–107)
CHOLEST SERPL-MCNC: 173 MG/DL
CO2 SERPL-SCNC: 27 MMOL/L (ref 22–31)
CREAT SERPL-MCNC: 1.03 MG/DL (ref 0.6–1.1)
ERYTHROCYTE [DISTWIDTH] IN BLOOD BY AUTOMATED COUNT: 14.2 % (ref 10–15)
FASTING STATUS PATIENT QL REPORTED: YES
GFR SERPL CREATININE-BSD FRML MDRD: 52 ML/MIN/1.73M2
GLUCOSE BLD-MCNC: 100 MG/DL (ref 70–125)
HBA1C MFR BLD: 5.7 % (ref 0–5.6)
HCT VFR BLD AUTO: 30.8 % (ref 35–47)
HDLC SERPL-MCNC: 54 MG/DL
HGB BLD-MCNC: 9.8 G/DL (ref 11.7–15.7)
LDLC SERPL CALC-MCNC: 86 MG/DL
MCH RBC QN AUTO: 27.8 PG (ref 26.5–33)
MCHC RBC AUTO-ENTMCNC: 31.8 G/DL (ref 31.5–36.5)
MCV RBC AUTO: 87 FL (ref 78–100)
PLATELET # BLD AUTO: 400 10E3/UL (ref 150–450)
POTASSIUM BLD-SCNC: 3.9 MMOL/L (ref 3.5–5)
RBC # BLD AUTO: 3.53 10E6/UL (ref 3.8–5.2)
SODIUM SERPL-SCNC: 141 MMOL/L (ref 136–145)
TRIGL SERPL-MCNC: 165 MG/DL
TSH SERPL DL<=0.005 MIU/L-ACNC: 0.23 UIU/ML (ref 0.3–5)
WBC # BLD AUTO: 13.4 10E3/UL (ref 4–11)

## 2021-10-07 PROCEDURE — 0003A COVID-19,PF,PFIZER (12+ YRS): CPT | Performed by: FAMILY MEDICINE

## 2021-10-07 PROCEDURE — 96127 BRIEF EMOTIONAL/BEHAV ASSMT: CPT | Performed by: FAMILY MEDICINE

## 2021-10-07 PROCEDURE — 99214 OFFICE O/P EST MOD 30 MIN: CPT | Mod: 25 | Performed by: FAMILY MEDICINE

## 2021-10-07 PROCEDURE — 99397 PER PM REEVAL EST PAT 65+ YR: CPT | Mod: 25 | Performed by: FAMILY MEDICINE

## 2021-10-07 PROCEDURE — 85027 COMPLETE CBC AUTOMATED: CPT | Performed by: FAMILY MEDICINE

## 2021-10-07 PROCEDURE — 36415 COLL VENOUS BLD VENIPUNCTURE: CPT | Performed by: FAMILY MEDICINE

## 2021-10-07 PROCEDURE — 80061 LIPID PANEL: CPT | Performed by: FAMILY MEDICINE

## 2021-10-07 PROCEDURE — 83036 HEMOGLOBIN GLYCOSYLATED A1C: CPT | Performed by: FAMILY MEDICINE

## 2021-10-07 PROCEDURE — 90662 IIV NO PRSV INCREASED AG IM: CPT | Performed by: FAMILY MEDICINE

## 2021-10-07 PROCEDURE — G0008 ADMIN INFLUENZA VIRUS VAC: HCPCS | Performed by: FAMILY MEDICINE

## 2021-10-07 PROCEDURE — 80048 BASIC METABOLIC PNL TOTAL CA: CPT | Performed by: FAMILY MEDICINE

## 2021-10-07 PROCEDURE — 91300 COVID-19,PF,PFIZER (12+ YRS): CPT | Performed by: FAMILY MEDICINE

## 2021-10-07 PROCEDURE — 84443 ASSAY THYROID STIM HORMONE: CPT | Performed by: FAMILY MEDICINE

## 2021-10-07 ASSESSMENT — ANXIETY QUESTIONNAIRES: 7. FEELING AFRAID AS IF SOMETHING AWFUL MIGHT HAPPEN: SEVERAL DAYS

## 2021-10-07 ASSESSMENT — PATIENT HEALTH QUESTIONNAIRE - PHQ9
SUM OF ALL RESPONSES TO PHQ QUESTIONS 1-9: 4
10. IF YOU CHECKED OFF ANY PROBLEMS, HOW DIFFICULT HAVE THESE PROBLEMS MADE IT FOR YOU TO DO YOUR WORK, TAKE CARE OF THINGS AT HOME, OR GET ALONG WITH OTHER PEOPLE: SOMEWHAT DIFFICULT
SUM OF ALL RESPONSES TO PHQ QUESTIONS 1-9: 4

## 2021-10-07 NOTE — LETTER
October 7, 2021      Nancy Oropeza  [address]  [place] [address]        Dear ,    We are writing to inform you of your test results.    Mildly elevated white blood count along with low hemoglobin level which appears stable.  We will continue to follow closely to ensure desired improvement.    No evidence for diabetes noted.     Your thyroid screening test (i.e. TSH) was slightly low.  This suggests that you are getting TOO MUCH thyroid hormone.  Okay to monitor currently.  We will recheck in the next 6-12 weeks to ensure that your level returns to normal.    Your cholesterol results were near goal.  Ensure regular exercise, healthy diet, and weight loss modifications in order to further improve.   We will plan to recheck your labs while fasting in the next 12 months to ensure desired improvement.       Mild elevation of your calcium level too.  Ensure adequate hydration.  Avoid calcium supplements currently.  We will continue to follow closely.     Recent Results (from the past 240 hour(s))   INR (External Result)    Collection Time: 10/06/21  3:07 PM   Result Value Ref Range    INR (External) 2.4 2 - 3   Basic metabolic panel    Collection Time: 10/07/21 12:33 PM   Result Value Ref Range    Sodium 141 136 - 145 mmol/L    Potassium 3.9 3.5 - 5.0 mmol/L    Chloride 100 98 - 107 mmol/L    Carbon Dioxide (CO2) 27 22 - 31 mmol/L    Anion Gap 14 5 - 18 mmol/L    Urea Nitrogen 22 8 - 28 mg/dL    Creatinine 1.03 0.60 - 1.10 mg/dL    Calcium 11.1 (H) 8.5 - 10.5 mg/dL    Glucose 100 70 - 125 mg/dL    GFR Estimate 52 (L) >60 mL/min/1.73m2   Lipid panel reflex to direct LDL Fasting    Collection Time: 10/07/21 12:33 PM   Result Value Ref Range    Cholesterol 173 <=199 mg/dL    Triglycerides 165 (H) <=149 mg/dL    Direct Measure HDL 54 >=50 mg/dL    LDL Cholesterol Calculated 86 <=129 mg/dL    Patient Fasting > 8hrs? Yes    CBC with platelets    Collection Time: 10/07/21 12:33 PM   Result Value Ref  Range    WBC Count 13.4 (H) 4.0 - 11.0 10e3/uL    RBC Count 3.53 (L) 3.80 - 5.20 10e6/uL    Hemoglobin 9.8 (L) 11.7 - 15.7 g/dL    Hematocrit 30.8 (L) 35.0 - 47.0 %    MCV 87 78 - 100 fL    MCH 27.8 26.5 - 33.0 pg    MCHC 31.8 31.5 - 36.5 g/dL    RDW 14.2 10.0 - 15.0 %    Platelet Count 400 150 - 450 10e3/uL   TSH    Collection Time: 10/07/21 12:33 PM   Result Value Ref Range    TSH 0.23 (L) 0.30 - 5.00 uIU/mL   Hemoglobin A1c    Collection Time: 10/07/21 12:33 PM   Result Value Ref Range    Hemoglobin A1C 5.7 (H) 0.0 - 5.6 %          If you have any questions or concerns, please call the clinic at the number listed above.       Sincerely,      Julien Garnica MD

## 2021-10-07 NOTE — PATIENT INSTRUCTIONS
Patient Education   Personalized Prevention Plan  You are due for the preventive services outlined below.  Your care team is available to assist you in scheduling these services.  If you have already completed any of these items, please share that information with your care team to update in your medical record.  Health Maintenance Due   Topic Date Due     URINE DRUG SCREEN  Never done     ANNUAL REVIEW OF HM ORDERS  Never done     Depression Action Plan  Never done     Hepatitis C Screening  Never done     Hepatitis B Vaccine (1 of 3 - Risk 3-dose series) Never done     Zoster (Shingles) Vaccine (2 of 3) 07/21/2009     Flu Vaccine (1) 09/01/2021     Depression Assessment  10/23/2021

## 2021-10-07 NOTE — PROGRESS NOTES
SUBJECTIVE:   Nancy Oropeza is a 76 year old female who presents for Preventive Visit.      Annual wellness visit completed today.  Patient is fasting.  Needs Pfizer COVID-19 third shot booster.  Needs high-dose flu shot.  Chronic lower back pain and right sided sciatica.  In the process of scheduling for lumbar epidural steroid injection through [facility].  Date has not been set.  Request to hold warfarin x5 days.  Prior history of PE.  Has remained on warfarin 2.5 mg daily with most recent INR of 2.4 in 2021.  Prednisone 5 mg daily for PMR history.  Was to complete twice daily dosing x5 days per parent sciatica management as well.  Impaired fasting glucose with A1c as high as 6.2%.  Depression stable with citalopram 40 mg daily.  Levothyroxine 137 mcg daily for thyroid replacement while on pravastatin 80 mg at bedtime for lipid management.  Not getting consistent exercise.  Comprehensive review of systems as above otherwise all negative.         5 children (Nancy, Lakeisha, etc.) - son with Factor V abnormality   14 grandchildren   9 great-grandchildren   Grew up in [facility] [place] 12...   No smoke (quit 16 years ago after 1 ppd x 30+ years)   EtOH: occ wine   Mom -  88 COPD   Dad -  61 massive MI   1 bro -  67 blood clot (lung)   2 sis - one of which is  age 62 small cell lung CA (h/o smoker)   Surgeries: ventral hernia repair with muscle flap 10/4/12 with post-op complication of seroma with J.P. drain in place followed by interventional radiology q 2 weeks);  age 25; groin hernia age 3; CAPRICE-BSO  by Dr. Herbert Carmen (due to benign cyst x 2); right TKA 18 (Dr. Small)   Hospitalizations: as above   Work: retired  ([employer] as )       12/18/15 FYI: Patient was admitted for dyspnea, secondary to bilateral multiple pulmonary emboli. She overall did well and was discharged home on Lovenox, relatively high  dose given her weight as well as Warfarin. I would like her to be seen today at your clinic. I believe she has an appointment for INR, CBC, and BMP check as well as re-dosing of her Warfarin. I suspect she will need an additional day of Lovenox as she is not quite therapeutic today. You can refer to my discharge summary for the INR's and the Warfarin dosing. Thank you. Dr. Garza       Patient has been advised of split billing requirements and indicates understanding: Yes   Are you in the first 12 months of your Medicare coverage?  No    HPI  Do you feel safe in your environment? Yes    Have you ever done Advance Care Planning? (For example, a Health Directive, POLST, or a discussion with a medical provider or your loved ones about your wishes): Yes, advance care planning is on file.       Fall risk  Fallen 2 or more times in the past year?: No  Any fall with injury in the past year?: No    Cognitive Screening   1) Repeat 3 items (Leader, Season, Table)    2) Clock draw: Answers for HPI/ROS submitted by the patient on 10/7/2021  If you checked off any problems, how difficult have these problems made it for you to do your work, take care of things at home, or get along with other people?: Somewhat difficult  PHQ9 TOTAL SCORE: 4    ABNORMAL - difficulty numbering clock initially however then later able to place appropriate hands at 11:10.  3) 3 item recall: Recalls 2 objects   Results: ABNORMAL clock, 1-2 items recalled: PROBABLE COGNITIVE IMPAIRMENT, **INFORM PROVIDER**    Mini-CogTM Copyright SANDRA Diaz. Licensed by the author for use in [facility]; reprinted with permission ([email or web address]). All rights reserved.      Do you have sleep apnea, excessive snoring or daytime drowsiness?: no    Reviewed and updated as needed this visit by clinical staff   Allergies  Meds  Problems             Reviewed and updated as needed this visit by Provider   Allergies  Meds  Problems            Social History      Tobacco Use     Smoking status: Former Smoker     Smokeless tobacco: Never Used     Tobacco comment: quite 20 yrs ago   Substance Use Topics     Alcohol use: No     Comment: Alcoholic Drinks/day: occasionally         No flowsheet data found.            Current providers sharing in care for this patient include:   Patient Care Team:  Julien Garnica MD as PCP - General (Family Medicine)  Tigist Bedolla PharmD as Pharmacist (Pharmacist)  Julien Garnica MD as Assigned PCP  Luiz Watson DPM as Assigned Musculoskeletal Provider  Jenn Nuñez NP as Assigned Surgical Provider  Cathy Willams MBBS as Assigned Rheumatology Provider    The following health maintenance items are reviewed in Epic and correct as of today:  Health Maintenance Due   Topic Date Due     URINE DRUG SCREEN  Never done     ANNUAL REVIEW OF HM ORDERS  Never done     DEPRESSION ACTION PLAN  Never done     HEPATITIS C SCREENING  Never done     HEPATITIS B IMMUNIZATION (1 of 3 - Risk 3-dose series) Never done     ZOSTER IMMUNIZATION (2 of 3) 07/21/2009     PHQ-9  10/23/2021     Lab work is in process  Labs reviewed in EPIC  BP Readings from Last 3 Encounters:   07/09/21 124/66   06/30/21 132/68   06/23/21 122/62    Wt Readings from Last 3 Encounters:   07/09/21 142.4 kg (314 lb)   06/30/21 142.9 kg (315 lb)   06/23/21 142.2 kg (313 lb 6.4 oz)                  Patient Active Problem List   Diagnosis     Health Care Home     Acquired spondylolisthesis     Anemia     Anxiety state     Essential hypertension with goal blood pressure less than 140/90     Benign paroxysmal positional vertigo     Benign neoplasm of colon     Carpal tunnel syndrome     Diverticulosis of large intestine     Constipation     Coronary atherosclerosis     Contact dermatitis and other eczema, due to unspecified cause     Obesity     Other chronic nonalcoholic liver disease     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 type of procedure L and R separate procedures.[       GYN SURGERY       HERNIA REPAIR       Hernia Repair Inguinal unilateral[       HYSTERECTOMY       HYSTERECTOMY  2010     IR ABSCESS TUBE CHANGE  11/9/2012     IR ABSCESS TUBE CHANGE  11/12/2012     IR ABSCESS TUBE CHANGE  12/4/2012     IR ABSCESS TUBE CHANGE  2/22/2013     IR ABSCESS TUBE CHANGE  3/1/2013     IR ABSCESS TUBE CHANGE  3/13/2013     OOPHORECTOMY  2010     ORTHOPEDIC SURGERY         Social History     Tobacco Use     Smoking status: Former Smoker     Smokeless tobacco: Never Used     Tobacco comment: quite 20 yrs ago   Substance Use Topics     Alcohol use: No     Comment: Alcoholic Drinks/day: occasionally     Family History   Problem Relation Age of Onset     Breast Cancer Sister 75.00     Stomach Cancer Paternal Grandfather      Lung Cancer Sister      Chronic Obstructive Pulmonary Disease Mother      Heart Disease Mother      Coronary Artery Disease Father          Current Outpatient Medications   Medication Sig Dispense Refill     acetaminophen 500 MG CAPS Take 2 capsules by mouth 3 times daily as needed.       amLODIPine (NORVASC) 5 MG tablet Take 1 tablet (5 mg) by mouth daily 90 tablet 3     BD ULTRA-FINE 33 LANCETS Use as directed       Cholecalciferol (VITAMIN D) 400 UNITS capsule Take 2 capsules by mouth daily.       citalopram (CELEXA) 40 MG tablet Take 1 tablet (40 mg) by mouth daily 90 tablet 1     furosemide (LASIX) 20 MG tablet Take 1 tablet (20 mg)  "by mouth every morning 90 tablet 1     Glucose Blood (ONE TOUCH ULTRA TEST) strip by In Vitro route daily. Use as directed       levothyroxine (SYNTHROID/LEVOTHROID) 137 MCG tablet Take 1 tablet (137 mcg) by mouth daily 90 tablet 3     lisinopril-hydrochlorothiazide (ZESTORETIC) 20-12.5 MG tablet Take 2 tablets by mouth daily 180 tablet 1     pravastatin (PRAVACHOL) 80 MG tablet Take 1 tablet (80 mg) by mouth daily 90 tablet 3     predniSONE (DELTASONE) 5 MG tablet Take 1 tablet (5 mg) by mouth daily 90 tablet 1     warfarin ANTICOAGULANT (COUMADIN) 5 MG tablet Take 0.5 tablets (2.5 mg) by mouth daily 90 tablet 3     Allergies   Allergen Reactions     Atorvastatin      Simvastatin      Sulfa Drugs      Needs high-dose flu shot and COVID-19 Pfizer third shot booster today.    Mammogram Screening - Patient over age 75, has elected to discontinue screenings.  Pertinent mammograms are reviewed under the imaging tab.    Review of Systems  Constitutional, HEENT, cardiovascular, pulmonary, GI, , musculoskeletal, neuro, skin, endocrine and psych systems are negative, except as otherwise noted.    OBJECTIVE:   There were no vitals taken for this visit. Estimated body mass index is 49.18 kg/m  as calculated from the following:    Height as of 11/24/20: 1.702 m (5' 7\").    Weight as of 7/9/21: 142.4 kg (314 lb).  Physical Exam  GENERAL: healthy, alert and no distress.  Transfer slowly from seated to standing position with some difficulty with positioning on exam table.  EYES: Eyes grossly normal to inspection, PERRL and conjunctivae and sclerae normal  HENT: ear canals and TM's normal, nose and mouth without ulcers or lesions  NECK: no adenopathy, no asymmetry, masses, or scars and thyroid normal to palpation  RESP: lungs clear to auscultation - no rales, rhonchi or wheezes  BREAST: deferred  CV: regular rate and rhythm, normal S1 S2, no S3 or S4, no murmur, click or rub, no peripheral edema and peripheral pulses " strong  ABDOMEN: soft, nontender, no hepatosplenomegaly, no masses and bowel sounds normal  MS: no gross musculoskeletal defects noted, no edema  SKIN: no suspicious lesions or rashes  NEURO: Normal strength and tone, mentation intact and speech normal  PSYCH: mentation appears normal, affect normal/bright    Diagnostic Test Results:  Labs reviewed in Epic    ASSESSMENT / PLAN:   Encounter for general adult medical examination with abnormal findings  Annual wellness visit completed.  Risks associated with lack of consistent exercise, suboptimal diet etc.  Risk questionnaire and recommendations reviewed for falls prevention etc.  Annual wellness visits to continue.    Essential hypertension with goal blood pressure less than 140/90  Hypertension appears stable on amlodipine 5 mg daily and lisinopril hydrochlorothiazide 20/12.5 using 2 tablets daily.    Pulmonary embolism, unspecified chronicity, unspecified pulmonary embolism type, unspecified whether acute cor pulmonale present (H)  Continues warfarin anticoagulation 2.5 mg daily and will hold x5 days prior to upcoming lumbar epidural steroid injection.    Pure hypercholesterolemia  Continues pravastatin 80 mg at bedtime with lipid cascade to be updated.  - Lipid panel reflex to direct LDL Fasting    Hypothyroidism, unspecified type  Continues use of levothyroxine 137 mcg daily.  Check TSH to ensure adequate replacement.  - TSH    Polymyalgia rheumatica (H)  Has utilized prednisone 5 mg daily historically.    Chronic bilateral low back pain with right-sided sciatica  Scheduling lumbar epidural steroid injection through [facility] at earliest convenience.    Stage 3a chronic kidney disease (H)  CKD stage III a history.  Ensure stable renal function.  Ensure adequate hydration.  - Basic metabolic panel  - CBC with platelets    Mild episode of recurrent major depressive disorder (H)  Continue citalopram 40 mg daily.  No side effects.    Encounter for  "immunization  High-dose flu shot provided.  - INFLUENZA, QUAD, HIGH DOSE, PF, 65YR + (FLUZONE HD)    High priority for 2019-nCoV vaccine  COVID-19 Pfizer third shot booster provided today.  - COVID-19,PF,PFIZER    Impaired fasting glucose  A1c and fasting glucose obtained with therapeutic lifestyle changes reviewed.  - Basic metabolic panel  - Hemoglobin A1c     Patient has been advised of split billing requirements and indicates understanding: No  COUNSELING:  Reviewed preventive health counseling, as reflected in patient instructions       Regular exercise       Healthy diet/nutrition       Vision screening       Hearing screening       Dental care       Bladder control       Fall risk prevention       (Lawanda)menopause management    Estimated body mass index is 49.18 kg/m  as calculated from the following:    Height as of 11/24/20: 1.702 m (5' 7\").    Weight as of 7/9/21: 142.4 kg (314 lb).    Weight management plan: Discussed healthy diet and exercise guidelines    She reports that she has quit smoking. She has never used smokeless tobacco.      Appropriate preventive services were discussed with this patient, including applicable screening as appropriate for cardiovascular disease, diabetes, osteopenia/osteoporosis, and glaucoma.  As appropriate for age/gender, discussed screening for colorectal cancer, prostate cancer, breast cancer, and cervical cancer. Checklist reviewing preventive services available has been given to the patient.    Reviewed patients plan of care and provided an AVS. The Complex Care Plan (for patients with higher acuity and needing more deliberate coordination of services) for Nancy meets the Care Plan requirement. This Care Plan has been established and reviewed with the Patient.    Counseling Resources:  ATP IV Guidelines  Pooled Cohorts Equation Calculator  Breast Cancer Risk Calculator  Breast Cancer: Medication to Reduce Risk  FRAX Risk Assessment  ICSI Preventive Guidelines  Dietary " Guidelines for Americans, 2010  USDA's MyPlate  ASA Prophylaxis  Lung CA Screening    Julien Garnica MD  [facility]    Identified Health Risks:

## 2021-10-08 ASSESSMENT — PATIENT HEALTH QUESTIONNAIRE - PHQ9: SUM OF ALL RESPONSES TO PHQ QUESTIONS 1-9: 4

## 2021-10-13 ENCOUNTER — TELEPHONE (OUTPATIENT)
Dept: FAMILY MEDICINE | Facility: CLINIC | Age: 76
End: 2021-10-13

## 2021-10-13 ENCOUNTER — TRANSFERRED RECORDS (OUTPATIENT)
Dept: HEALTH INFORMATION MANAGEMENT | Facility: CLINIC | Age: 76
End: 2021-10-13

## 2021-10-13 ENCOUNTER — ANTICOAGULATION THERAPY VISIT (OUTPATIENT)
Dept: FAMILY MEDICINE | Facility: CLINIC | Age: 76
End: 2021-10-13

## 2021-10-13 DIAGNOSIS — I26.99 PULMONARY EMBOLISM (H): Primary | ICD-10-CM

## 2021-10-13 LAB — INR (EXTERNAL): 4.1 (ref 0.9–1.1)

## 2021-10-13 NOTE — TELEPHONE ENCOUNTER
See anticoag encounter.    Yumiko Kaufman RN  [facility]  [phone]

## 2021-10-13 NOTE — PROGRESS NOTES
ANTICOAGULATION MANAGEMENT     Nancy Oropeza 76 year old female is on warfarin with supratherapeutic INR result. (Goal INR 2.0-3.0)    Recent labs: (last 7 days)     10/13/21  0000   INR 4.1*       ASSESSMENT     Source(s): Chart Review and Patient/Caregiver Call       Warfarin doses taken: Warfarin taken as instructed    Diet: No new diet changes identified    New illness, injury, or hospitalization: No    Medication/supplement changes: 5 day course of prednisone 5mg for sciatica flare    Signs or symptoms of bleeding or clotting: No    Previous INR: Therapeutic last 2(+) visits    Additional findings: Upcoming surgery/procedure will be having epidural steroid injection-- not yet scheduled     PLAN     Recommended plan for temporary change(s) affecting INR     Dosing Instructions: Hold dose then continue your current warfarin dose with next INR in 1 week       Summary  As of 10/13/2021    Full warfarin instructions:  10/13: Hold; Otherwise 2.5 mg every day   Next INR check:               Telephone call with Nancy who agrees to plan and repeated back plan correctly    Patient to recheck with home meter    Education provided: Goal range and significance of current result, Potential interaction between warfarin and prednisone, Monitoring for bleeding signs and symptoms and Contact [phone] with any changes, questions or concerns.     Plan made per [facility] anticoagulation protocol    Yumiko Kaufman RN  Anticoagulation Clinic  10/13/2021    _______________________________________________________________________     Anticoagulation Episode Summary     Current INR goal:  2.0-3.0   TTR:  52.5 % (1 y)   Target end date:  Indefinite   Send INR reminders to:  XENIA HURTADO    Indications    Pulmonary embolism (H) [I26.99]           Comments:           Anticoagulation Care Providers     Provider Role Specialty Phone number    Julien Garnica MD Referring Family Medicine [phone]

## 2021-10-13 NOTE — TELEPHONE ENCOUNTER
Reason for call:  INR   Who is calling? Patient    Phone number: [phone]    Fax number: n/a    Name of caller: Patient    INR Value: 4.0    Are there any other concerns: No  Route this INR message to [facility] # [id number]    Phone number to reach patient:  Home number on file [phone] (home)    Best Time:  Anytime    Can we leave a detailed message on this number?  YES    Travel screening: Not Applicable

## 2021-10-20 ENCOUNTER — TELEPHONE (OUTPATIENT)
Dept: FAMILY MEDICINE | Facility: CLINIC | Age: 76
End: 2021-10-20

## 2021-10-20 ENCOUNTER — ANTICOAGULATION THERAPY VISIT (OUTPATIENT)
Dept: FAMILY MEDICINE | Facility: CLINIC | Age: 76
End: 2021-10-20

## 2021-10-20 ENCOUNTER — TRANSFERRED RECORDS (OUTPATIENT)
Dept: HEALTH INFORMATION MANAGEMENT | Facility: CLINIC | Age: 76
End: 2021-10-20
Payer: MEDICARE

## 2021-10-20 DIAGNOSIS — I26.99 PULMONARY EMBOLISM (H): Primary | ICD-10-CM

## 2021-10-20 LAB — INR (EXTERNAL): 3.1 (ref 0.9–1.1)

## 2021-10-20 NOTE — PROGRESS NOTES
ANTICOAGULATION MANAGEMENT     Nancy Oropeza 76 year old female is on warfarin with supratherapeutic INR result. (Goal INR 2.0-3.0)    Recent labs: (last 7 days)     10/20/21  0000   INR 3.1*       ASSESSMENT     Source(s): Chart Review and Patient/Caregiver Call       Warfarin doses taken: Warfarin taken as instructed    Diet: No new diet changes identified    New illness, injury, or hospitalization: No-- continues with back pain    Medication/supplement changes: None noted    Signs or symptoms of bleeding or clotting: No    Previous INR: Supratherapeutic    Additional findings: Upcoming surgery/procedure spinal injection 11/1-- stop warfarin on 10/27 per [place]     PLAN     Recommended plan for ongoing change(s) affecting INR     Dosing Instructions:  Decrease your warfarin dose (7% change) with next INR in 1 week       Summary  As of 10/20/2021    Full warfarin instructions:  1.25 mg every Wed; 2.5 mg all other days   Next INR check:  10/27/2021             Telephone call with Nancy who agrees to plan and repeated back plan correctly    Patient to recheck with home meter    Education provided: Goal range and significance of current result and Contact [phone] with any changes, questions or concerns.     Plan made per [facility] anticoagulation protocol    Yumiko Kaufman RN  Anticoagulation Clinic  10/20/2021    _______________________________________________________________________     Anticoagulation Episode Summary     Current INR goal:  2.0-3.0   TTR:  51.7 % (1 y)   Target end date:  Indefinite   Send INR reminders to:  XENIA HURTADO    Indications    Pulmonary embolism (H) [I26.99]           Comments:           Anticoagulation Care Providers     Provider Role Specialty Phone number    Julien Garnica MD Referring Family Medicine [phone]

## 2021-10-20 NOTE — TELEPHONE ENCOUNTER
Reason for Call:  Other returning call    Detailed comments: Patient is returning a call to [facility] regarding INR.  See below.    Phone Number Patient can be reached at: Home number on file [phone] (home)    Best Time: today    Can we leave a detailed message on this number? YES    Call taken on 10/20/2021 at 1:57 PM by Michaela Dugan

## 2021-10-20 NOTE — TELEPHONE ENCOUNTER
See anticoagulation encounter from today.    Yumiko Kaufman RN  [facility] Anticoagulation  [phone]

## 2021-10-20 NOTE — TELEPHONE ENCOUNTER
Reason for call:  Other   Patient called regarding (reason for call): call back  Additional comments: Patient requesting a call back concerning her dosage for her warfarin medication.    Phone number to reach patient:  Home number on file [phone] (home)    Best Time:  Any time    Can we leave a detailed message on this number?  YES    Travel screening: Not Applicable

## 2021-10-24 ENCOUNTER — TRANSFERRED RECORDS (OUTPATIENT)
Dept: HEALTH INFORMATION MANAGEMENT | Facility: CLINIC | Age: 76
End: 2021-10-24
Payer: MEDICARE

## 2021-10-27 LAB — INR (EXTERNAL): 3.3 (ref 0.9–1.1)

## 2021-10-28 ENCOUNTER — ANTICOAGULATION THERAPY VISIT (OUTPATIENT)
Dept: ANTICOAGULATION | Facility: CLINIC | Age: 76
End: 2021-10-28

## 2021-10-28 DIAGNOSIS — I26.99 PULMONARY EMBOLISM (H): Primary | ICD-10-CM

## 2021-10-28 NOTE — PROGRESS NOTES
ANTICOAGULATION MANAGEMENT     Nancy Oropeza 76 year old female is on warfarin with supratherapeutic INR result. (Goal INR 2.0-3.0)    Recent labs: (last 7 days)     10/27/21  0000   INR 3.3*       ASSESSMENT     Source(s): Chart Review and Patient/Caregiver Call       Warfarin doses taken: Warfarin taken as instructed    Diet: No new diet changes identified    New illness, injury, or hospitalization: No-- continued bad back/sciatic pain    Medication/supplement changes: None noted    Signs or symptoms of bleeding or clotting: No    Previous INR: Supratherapeutic    Additional findings: Upcoming surgery/procedure spinal steroid injection on 11/1     PLAN     Recommended plan for temporary change(s) affecting INR     Dosing Instructions: hold for upcoming procedure, then continue your current warfarin dose with next INR in 1 week. Be sure to check with provider on when it is ok to resume your warfarin.    Summary  As of 10/28/2021    Full warfarin instructions:  10/28: Hold; 10/29: Hold; 10/30: Hold; 10/31: Hold; Otherwise 1.25 mg every Wed; 2.5 mg all other days   Next INR check:  11/3/2021             Telephone call with Nancy who verbalizes understanding and agrees to plan    Patient to recheck with home meter    Education provided: Goal range and significance of current result, Monitoring for clotting signs and symptoms, When to seek medical attention/emergency care and Contact [phone] with any changes, questions or concerns.     Plan made per [facility] anticoagulation protocol    Yumiko Kaufman RN  Anticoagulation Clinic  10/28/2021    _______________________________________________________________________     Anticoagulation Episode Summary     Current INR goal:  2.0-3.0   TTR:  51.3 % (1 y)   Target end date:  Indefinite   Send INR reminders to:  XENIA HURTADO    Indications    Pulmonary embolism (H) [I26.99]           Comments:           Anticoagulation Care Providers     Provider Role Specialty  Phone number    Julien Garnica MD Referring [facility] Medicine [phone]

## 2021-11-04 ENCOUNTER — TRANSFERRED RECORDS (OUTPATIENT)
Dept: HEALTH INFORMATION MANAGEMENT | Facility: CLINIC | Age: 76
End: 2021-11-04
Payer: MEDICARE

## 2021-11-04 ENCOUNTER — TELEPHONE (OUTPATIENT)
Dept: FAMILY MEDICINE | Facility: CLINIC | Age: 76
End: 2021-11-04

## 2021-11-04 ENCOUNTER — ANTICOAGULATION THERAPY VISIT (OUTPATIENT)
Dept: FAMILY MEDICINE | Facility: CLINIC | Age: 76
End: 2021-11-04

## 2021-11-04 DIAGNOSIS — I26.99 PULMONARY EMBOLISM (H): Primary | ICD-10-CM

## 2021-11-04 LAB — INR (EXTERNAL): 1.8 (ref 0.9–1.1)

## 2021-11-04 NOTE — PROGRESS NOTES
ANTICOAGULATION MANAGEMENT     Nancy RYANN Oropeza 76 year old female is on warfarin with subtherapeutic INR result. (Goal INR 2.0-3.0)    Recent labs: (last 7 days)     11/04/21  0000   INR 1.8*       ASSESSMENT     Source(s): Chart Review and Patient/Caregiver Call       Warfarin doses taken: Warfarin recently held for spinal injection which may be affecting INR    Diet: No new diet changes identified    New illness, injury, or hospitalization: No    Medication/supplement changes: None noted    Signs or symptoms of bleeding or clotting: No    Previous INR: Supratherapeutic    Additional findings: None     PLAN     Recommended plan for temporary change(s) affecting INR     Dosing Instructions: Continue your current warfarin dose with next INR in 1 week       Summary  As of 11/4/2021    Full warfarin instructions:  1.25 mg every Wed; 2.5 mg all other days   Next INR check:  11/11/2021             Telephone call with Nancy who agrees to plan and repeated back plan correctly    Patient to recheck with home meter    Education provided: Goal range and significance of current result and Contact [phone] with any changes, questions or concerns.     Plan made per [facility] anticoagulation protocol    Yumiko Kaufman RN  Anticoagulation Clinic  11/4/2021    _______________________________________________________________________     Anticoagulation Episode Summary     Current INR goal:  2.0-3.0   TTR:  52.4 % (1 y)   Target end date:  Indefinite   Send INR reminders to:  XENIA HURTADO    Indications    Pulmonary embolism (H) [I26.99]           Comments:           Anticoagulation Care Providers     Provider Role Specialty Phone number    Julien Garnica MD Referring Family Medicine [phone]

## 2021-11-04 NOTE — TELEPHONE ENCOUNTER
Reason for Call:  INR follow up    Detailed comments: Patient is returning call, please call again. Thank you    Phone Number Patient can be reached at: Home number on file [phone] (home)    Best Time: any    Can we leave a detailed message on this number? YES    Call taken on 11/4/2021 at 3:32 PM by Umu Garcia

## 2021-11-04 NOTE — TELEPHONE ENCOUNTER
See anticoagulation encounter from today.    Yumiko Kaufman RN  [facility] Anticoagulation  [phone]

## 2021-11-12 ENCOUNTER — TRANSFERRED RECORDS (OUTPATIENT)
Dept: HEALTH INFORMATION MANAGEMENT | Facility: CLINIC | Age: 76
End: 2021-11-12
Payer: MEDICARE

## 2021-11-17 DIAGNOSIS — Z53.9 DIAGNOSIS NOT YET DEFINED: Primary | ICD-10-CM

## 2021-11-24 ENCOUNTER — ANTICOAGULATION THERAPY VISIT (OUTPATIENT)
Dept: FAMILY MEDICINE | Facility: CLINIC | Age: 76
End: 2021-11-24
Payer: MEDICARE

## 2021-11-24 ENCOUNTER — NURSE TRIAGE (OUTPATIENT)
Dept: NURSING | Facility: CLINIC | Age: 76
End: 2021-11-24
Payer: MEDICARE

## 2021-11-24 ENCOUNTER — TRANSFERRED RECORDS (OUTPATIENT)
Dept: HEALTH INFORMATION MANAGEMENT | Facility: CLINIC | Age: 76
End: 2021-11-24
Payer: MEDICARE

## 2021-11-24 DIAGNOSIS — I26.99 PULMONARY EMBOLISM (H): Primary | ICD-10-CM

## 2021-11-24 LAB — INR (EXTERNAL): 3.2 (ref 0.9–1.1)

## 2021-11-24 NOTE — TELEPHONE ENCOUNTER
Patient calling    She is reporting  MRI yesterday.  Roto cuff repair or whole new shoulder.    Patient is getting injections started, and is not getting  Surgery for this.    She is asking for pain medication ;  She is only taking tylenol, and has been taking   5mg of Prednisone.    Please advise  Patient phone number is [phone]  Elaine Lizama RN  [facility] Triage/refill nurse    Reason for Disposition    Caller has urgent medication question about med that PCP prescribed and triager unable to answer question    Additional Information    Negative: Pharmacy calling with prescription questions and triager unable to answer question    Negative: Prescription not at pharmacy and was prescribed today by PCP    Negative: Request for URGENT new prescription or refill of 'essential' medication (i.e., likelihood of harm to patient if not taken) and triager unable to fill per department policy    Negative: Caller has medication question about med not prescribed by PCP and triager unable to answer question (e.g., compatibility with other med, storage)    Negative: DOUBLE DOSE (an extra dose or lesser amount) of prescription drug and NO symptoms (Exception: a double dose of antibiotics)    Negative: Diabetes drug error or overdose (e.g., took wrong type of insulin or took extra dose)    Negative: MORE THAN A DOUBLE DOSE of a prescription or over-the-counter (OTC) drug    Negative: DOUBLE DOSE (an extra dose or lesser amount) of over-the-counter (OTC) drug and any symptoms (e.g., dizziness, nausea, pain, sleepiness)    Negative: DOUBLE DOSE (an extra dose or lesser amount) of prescription drug and any symptoms (e.g., dizziness, nausea, pain, sleepiness)    Negative: Took another person's prescription drug    Protocols used: MEDICATION QUESTION CALL-A-OH

## 2021-11-24 NOTE — PROGRESS NOTES
Patient left VM asking for a call back with instruction on INR for today - patient is confused as if anyone will call or if the machine is supposed to tell her how much warfarin to take.     Please call when able.     Daja uHrst, RN, BSN, PHN  Anticoagulation Nurse  [phone]

## 2021-11-24 NOTE — PROGRESS NOTES
ANTICOAGULATION MANAGEMENT     Nancy Oropeza 76 year old female is on warfarin with supratherapeutic INR result. (Goal INR 2.0-3.0)    Recent labs: (last 7 days)     11/24/21  0000   INR 3.2*       ASSESSMENT     Source(s): Chart Review and Patient/Caregiver Call       Warfarin doses taken: Warfarin taken as instructed    Diet: No new diet changes identified    New illness, injury, or hospitalization: Yes: torn rotator cuff recently (recommended surgical repair but Lesly is choosing to forgo surgery and have injections/therapy to manage pain)    Medication/supplement changes:     Tylenol: as needed use, taking ~4x per day, which has potential for interaction; increasing INR in large amounts 3+ consecutive days    Prednisone: started on 11/22 which has potential for interaction; increasing INR    Signs or symptoms of bleeding or clotting: No    Previous INR: Subtherapeutic    Additional findings: None     PLAN     Recommended plan for ongoing change(s) affecting INR     Dosing Instructions:  Decrease your warfarin dose (7% change) with next INR in 1 week       Summary  As of 11/24/2021    Full warfarin instructions:  1.25 mg every Wed, Sat; 2.5 mg all other days   Next INR check:  11/26/2021             Telephone call with Nancy who agrees to plan and repeated back plan correctly    Patient to recheck with home meter    Education provided: Goal range and significance of current result, Potential interaction between warfarin and tylenol/prednisone, Monitoring for bleeding signs and symptoms, When to seek medical attention/emergency care and Contact [phone] with any changes, questions or concerns.     Plan made per [facility] anticoagulation protocol    Yumiko Kaufman RN  Anticoagulation Clinic  11/24/2021    _______________________________________________________________________     Anticoagulation Episode Summary     Current INR goal:  2.0-3.0   TTR:  50.9 % (1 y)   Target end date:  Indefinite   Send INR  reminders to:  XENIA HURTADO    Indications    Pulmonary embolism (H) [I26.99]           Comments:           Anticoagulation Care Providers     Provider Role Specialty Phone number    Julien Garnica MD Referring Family Medicine [phone]

## 2021-11-26 NOTE — TELEPHONE ENCOUNTER
Right shoulder.  3 months.  Has not been able to schedule for shoulder injection.  Can be seen through [facility] located in [place] between 10 AM and 8 PM daily for further assessment and consideration for corticosteroid injection per patient request.

## 2021-11-29 ENCOUNTER — TRANSFERRED RECORDS (OUTPATIENT)
Dept: HEALTH INFORMATION MANAGEMENT | Facility: CLINIC | Age: 76
End: 2021-11-29
Payer: MEDICARE

## 2021-12-01 ENCOUNTER — ANTICOAGULATION THERAPY VISIT (OUTPATIENT)
Dept: FAMILY MEDICINE | Facility: CLINIC | Age: 76
End: 2021-12-01
Payer: MEDICARE

## 2021-12-01 ENCOUNTER — TRANSFERRED RECORDS (OUTPATIENT)
Dept: HEALTH INFORMATION MANAGEMENT | Facility: CLINIC | Age: 76
End: 2021-12-01
Payer: MEDICARE

## 2021-12-01 DIAGNOSIS — I26.99 PULMONARY EMBOLISM (H): Primary | ICD-10-CM

## 2021-12-01 LAB — INR (EXTERNAL): 1.8 (ref 2–3)

## 2021-12-01 NOTE — PROGRESS NOTES
ANTICOAGULATION MANAGEMENT     Nancy Oropeza 76 year old female is on warfarin with subtherapeutic INR result. (Goal INR 2.0-3.0)    Recent labs: (last 7 days)     12/01/21  1626   INR 1.8*       ASSESSMENT     Source(s): Chart Review and Patient/Caregiver Call       Warfarin doses taken: Warfarin taken as instructednot 100% that she did not miss a dose    Diet: No new diet changes identified    New illness, injury, or hospitalization: No    Medication/supplement changes: None noted    Signs or symptoms of bleeding or clotting: No    Previous INR: Supratherapeutic    Additional findings: None     PLAN     Recommended plan for temporary change(s) affecting INR     Dosing Instructions: Booster dose then continue your current warfarin dose with next INR in 1 week       Summary  As of 12/1/2021    Full warfarin instructions:  12/1: 2.5 mg; Otherwise 1.25 mg every Wed, Sat; 2.5 mg all other days   Next INR check:               Telephone call with aNncy who verbalizes understanding and agrees to plan and who agrees to plan and repeated back plan correctly    Patient to recheck with home meter    Education provided: Please call back if any changes to your diet, medications or how you've been taking warfarin    Plan made per [facility] anticoagulation protocol    Richa Clark, RN  Anticoagulation Clinic  12/1/2021    _______________________________________________________________________     Anticoagulation Episode Summary     Current INR goal:  2.0-3.0   TTR:  50.3 % (1 y)   Target end date:  Indefinite   Send INR reminders to:  XENIA HURTADO    Indications    Pulmonary embolism (H) [I26.99]           Comments:           Anticoagulation Care Providers     Provider Role Specialty Phone number    Julien Garnica MD Referring Family Medicine [phone]

## 2021-12-07 ENCOUNTER — TRANSFERRED RECORDS (OUTPATIENT)
Dept: HEALTH INFORMATION MANAGEMENT | Facility: CLINIC | Age: 76
End: 2021-12-07
Payer: MEDICARE

## 2021-12-15 ENCOUNTER — TELEPHONE (OUTPATIENT)
Dept: FAMILY MEDICINE | Facility: CLINIC | Age: 76
End: 2021-12-15
Payer: MEDICARE

## 2021-12-15 ENCOUNTER — TELEPHONE (OUTPATIENT)
Dept: ANTICOAGULATION | Facility: CLINIC | Age: 76
End: 2021-12-15
Payer: MEDICARE

## 2021-12-15 DIAGNOSIS — I26.99 PULMONARY EMBOLISM (H): Primary | ICD-10-CM

## 2021-12-15 LAB — INR (EXTERNAL): 2.7 (ref 0.9–1.1)

## 2021-12-15 NOTE — TELEPHONE ENCOUNTER
ANTICOAGULATION     Nancy Oropeza is overdue for INR check.      Left message  reminding patient to check INR with their home meter and call results to the home monitoring company as soon as possible.     Roro Johnson RN

## 2021-12-15 NOTE — TELEPHONE ENCOUNTER
Reason for Call:  Other call back    Detailed comments: PT would like a call back, No number found on Chart to conference in    Phone Number Patient can be reached at: Home number on file [phone] (home)    Best Time: any    Can we leave a detailed message on this number? YES    Call taken on 12/15/2021 at 11:20 AM by Da Santo

## 2021-12-15 NOTE — TELEPHONE ENCOUNTER
Pt had back injections and is having back pain and wondering if there is any pain medication she can be prescribed because tylenol is not working.   Call back number [phone]   Jomar monroe.

## 2021-12-15 NOTE — TELEPHONE ENCOUNTER
ANTICOAGULATION MANAGEMENT     Nancy Oropeza 76 year old female is on warfarin with therapeutic INR result. (Goal INR 2.0-3.0)    Recent labs: (last 7 days)     12/15/21  0900   INR 2.7*       ASSESSMENT     Source(s): Chart Review and Patient/Caregiver Call       Warfarin doses taken: Warfarin taken as instructed    Diet: No new diet changes identified    New illness, injury, or hospitalization: Yes: back issues    Medication/supplement changes: None noted    Signs or symptoms of bleeding or clotting: No    Previous INR: Subtherapeutic    Additional findings: she has started a series of spinal injections. She had one today and they checked her INR prior to injection.  She will call results into [facility].      PLAN     Recommended plan for no diet, medication or health factor changes affecting INR     Dosing Instructions: Continue your current warfarin dose with next INR in 1 week       Summary  As of 12/15/2021    Full warfarin instructions:  1.25 mg every Wed, Sat; 2.5 mg all other days   Next INR check:  12/22/2021             Telephone call with Nancy who verbalizes understanding and agrees to plan    Patient to recheck with home meter    Education provided: Importance of therapeutic range    Plan made per [facility] anticoagulation protocol    Mari Marin RN  Anticoagulation Clinic  12/15/2021    _______________________________________________________________________     Anticoagulation Episode Summary     Current INR goal:  2.0-3.0   TTR:  53.2 % (1 y)   Target end date:  Indefinite   Send INR reminders to:  XENIA HURTADO    Indications    Pulmonary embolism (H) [I26.99]           Comments:           Anticoagulation Care Providers     Provider Role Specialty Phone number    Julien Garnica MD Referring Family Medicine [phone]

## 2021-12-16 NOTE — TELEPHONE ENCOUNTER
Calling back regarding her call yesterday.    Is wanting some pain medication    Using only tylenol ( on warfarin).    Can be reached at     Would like to speak with Dr. Garnica.

## 2021-12-16 NOTE — TELEPHONE ENCOUNTER
Discussed with patient.  Will increase prednisone from 5 mg up to 10 mg daily x5 days then resume 5 mg daily.  Will notify me next week if not improving.

## 2021-12-21 ENCOUNTER — ANTICOAGULATION THERAPY VISIT (OUTPATIENT)
Dept: ANTICOAGULATION | Facility: CLINIC | Age: 76
End: 2021-12-21
Payer: MEDICARE

## 2021-12-21 ENCOUNTER — TELEPHONE (OUTPATIENT)
Dept: FAMILY MEDICINE | Facility: CLINIC | Age: 76
End: 2021-12-21
Payer: MEDICARE

## 2021-12-21 ENCOUNTER — TRANSFERRED RECORDS (OUTPATIENT)
Dept: HEALTH INFORMATION MANAGEMENT | Facility: CLINIC | Age: 76
End: 2021-12-21
Payer: MEDICARE

## 2021-12-21 DIAGNOSIS — I26.99 PULMONARY EMBOLISM, UNSPECIFIED CHRONICITY, UNSPECIFIED PULMONARY EMBOLISM TYPE, UNSPECIFIED WHETHER ACUTE COR PULMONALE PRESENT (H): ICD-10-CM

## 2021-12-21 DIAGNOSIS — I26.99 PULMONARY EMBOLISM (H): Primary | ICD-10-CM

## 2021-12-21 LAB — INR (EXTERNAL): 7.4 (ref 0.9–1.1)

## 2021-12-21 RX ORDER — WARFARIN SODIUM 2.5 MG/1
1.25-2.5 TABLET ORAL DAILY
Qty: 90 TABLET | Refills: 1 | Status: SHIPPED | OUTPATIENT
Start: 2021-12-21 | End: 2021-12-29

## 2021-12-21 NOTE — TELEPHONE ENCOUNTER
See anticoagulation encounter from today.    Yumiko Kaufman RN  [facility] Anticoagulation  [phone]

## 2021-12-21 NOTE — TELEPHONE ENCOUNTER
Reason for Call:  Other call back    Detailed comments: patient is wanting to report their results and are concerned with their current level.     Phone Number Patient can be reached at: Home number on file [phone] (home) or Cell number on file:    Telephone Information:   Mobile [phone]       Best Time: N/a    Can we leave a detailed message on this number? YES    Call taken on 12/21/2021 at 1:46 PM by Richard Newsome

## 2021-12-21 NOTE — PROGRESS NOTES
ANTICOAGULATION MANAGEMENT     Nancy Oropeza 76 year old female is on warfarin with supratherapeutic INR result. (Goal INR 2.0-3.0)    Recent labs: (last 7 days)     12/21/21  0000   INR 7.4*       ASSESSMENT     Source(s): Chart Review and Patient/Caregiver Call       Warfarin doses taken: More warfarin taken than planned which may be affecting INR-- received new Rx last week and has been using 5mg tabs instead for the past week, (doubling her dose). Correct Rx was sent to pharmacy for use once INR is 3.0 or less.    Diet: No new diet changes identified    New illness, injury, or hospitalization: No    Medication/supplement changes: None noted    Signs or symptoms of bleeding or clotting: No    Previous INR: Therapeutic last visit; previously outside of goal range    Additional findings: None     PLAN     Recommended plan for temporary change(s) affecting INR     Dosing Instructions: Hold dose 12/22 and 12/23 (already took dose today) with next INR in 1-2 days, preferably a venous draw.      Summary  As of 12/21/2021    Full warfarin instructions:  12/22: Hold; 12/23: Hold; Otherwise 1.25 mg every Wed, Sat; 2.5 mg all other days   Next INR check:  12/23/2021             Telephone call with Nancy who repeated back plan correctly but will not be able to get to the clinic for a venous draw and states she will check her INR Thursday at home-- did discuss rationale for wanting a venous draw, but patient will check with home meter on Thursday. Advised Lesly to have a salad tonight as she took her dose of warfarin today already- she will have a large salad tonight and one tomorrow as well.    Education provided: Goal range and significance of current result, Monitoring for bleeding signs and symptoms, When to seek medical attention/emergency care and Contact [phone] with any changes, questions or concerns.     Plan made with [facility] Pharmacist Niya Kaufman, SALONI  Anticoagulation  Clinic  12/21/2021    _______________________________________________________________________     Anticoagulation Episode Summary     Current INR goal:  2.0-3.0   TTR:  53.4 % (1 y)   Target end date:  Indefinite   Send INR reminders to:  XENIA HURTADO    Indications    Pulmonary embolism (H) [I26.99]           Comments:           Anticoagulation Care Providers     Provider Role Specialty Phone number    Julien Garnica MD Referring Family Medicine [phone]

## 2021-12-23 ENCOUNTER — TELEPHONE (OUTPATIENT)
Dept: ANTICOAGULATION | Facility: CLINIC | Age: 76
End: 2021-12-23
Payer: MEDICARE

## 2021-12-23 ENCOUNTER — ANTICOAGULATION THERAPY VISIT (OUTPATIENT)
Dept: ANTICOAGULATION | Facility: CLINIC | Age: 76
End: 2021-12-23
Payer: MEDICARE

## 2021-12-23 ENCOUNTER — TRANSFERRED RECORDS (OUTPATIENT)
Dept: HEALTH INFORMATION MANAGEMENT | Facility: CLINIC | Age: 76
End: 2021-12-23
Payer: MEDICARE

## 2021-12-23 DIAGNOSIS — I26.99 PULMONARY EMBOLISM (H): Primary | ICD-10-CM

## 2021-12-23 LAB — INR (EXTERNAL): 7 (ref 0.9–1.1)

## 2021-12-23 NOTE — TELEPHONE ENCOUNTER
Received call from Lesly.   See anticoagulation encounter from today from today.     Yumiko Kaufman RN  [facility]ticoagulation  [phone]

## 2021-12-23 NOTE — TELEPHONE ENCOUNTER
Called patient to remind to check INR.  Last INR was 7.4 on Tuesday and it is essential that INR is checked right away.  Lesly stated that she would check INR right then and call result to company.   As of 4:19 PM there is still no INR result in [facility] faxes for patient.     Yumiko Kaufman RN  [facility] Anticoagulation  [phone]

## 2021-12-23 NOTE — PROGRESS NOTES
ANTICOAGULATION MANAGEMENT     Nancy Oropeza 76 year old female is on warfarin with supratherapeutic INR result. (Goal INR 2.0-3.0)    Recent labs: (last 7 days)     12/23/21  1635   INR 7.0*       ASSESSMENT     Source(s): Chart Review and Patient/Caregiver Call       Warfarin doses taken: held 12/22 as directed-- prior to that was taking double her dose accidentally due to receiving the wrong strength warfarin    Diet: No new diet changes identified    New illness, injury, or hospitalization: No    Medication/supplement changes: None noted    Signs or symptoms of bleeding or clotting: No-- denied any s/sx of bleeding    Previous INR: Supratherapeutic    Additional findings: None     PLAN     Recommended plan for temporary change(s) affecting INR     Dosing Instructions: hold warfarin today with next INR tomorrow       Summary  As of 12/23/2021    Full warfarin instructions:  12/23: Hold; Otherwise 1.25 mg every Wed, Sat; 2.5 mg all other days   Next INR check:  12/24/2021           Advised to have a large salad again.  Telephone call with Nancy who verbalizes understanding and agrees to plan    Patient to recheck with home meter    Education provided: Goal range and significance of current result, Monitoring for bleeding signs and symptoms, When to seek medical attention/emergency care and Contact [phone] with any changes, questions or concerns.     Plan made with [facility] Pharmacist Niya Kaufman RN  Anticoagulation Clinic  12/23/2021    _______________________________________________________________________     Anticoagulation Episode Summary     Current INR goal:  2.0-3.0   TTR:  53.3 % (1 y)   Target end date:  Indefinite   Send INR reminders to:  XEN[facility] HURTADO    Indications    Pulmonary embolism (H) [I26.99]           Comments:           Anticoagulation Care Providers     Provider Role Specialty Phone number    Julien Garnica MD Referring Family Medicine [phone]

## 2021-12-24 ENCOUNTER — TELEPHONE (OUTPATIENT)
Dept: ANTICOAGULATION | Facility: CLINIC | Age: 76
End: 2021-12-24
Payer: MEDICARE

## 2021-12-24 ENCOUNTER — TELEPHONE (OUTPATIENT)
Dept: FAMILY MEDICINE | Facility: CLINIC | Age: 76
End: 2021-12-24
Payer: MEDICARE

## 2021-12-24 ENCOUNTER — TELEPHONE (OUTPATIENT)
Dept: NURSING | Facility: CLINIC | Age: 76
End: 2021-12-24
Payer: MEDICARE

## 2021-12-24 DIAGNOSIS — I26.99 PULMONARY EMBOLISM (H): Primary | ICD-10-CM

## 2021-12-24 LAB — INR (EXTERNAL): 5.9 (ref 0.9–1.1)

## 2021-12-24 NOTE — TELEPHONE ENCOUNTER
ANTICOAGULATION MANAGEMENT     Nancy Oropeza 76 year old female is on warfarin with supratherapeutic INR result. (2.0-3.0 )    Recent labs: (last 7 days)     12/24/21  1234   INR 5.9*       ASSESSMENT     Source(s): Chart Review and Patient/Caregiver Call       Warfarin doses taken: warfarin held as instructed    Diet: No new diet changes identified    New illness, injury, or hospitalization: No    Medication/supplement changes: None noted    Signs or symptoms of bleeding or clotting: No    Previous INR: Supratherapeutic, finally trending down    Additional findings: Nancy states she received a refill today again of 5mg tablets from [facility]. ACN discussed with her that I don't think this is from the RX that was sent on 12/21 for 2.5mg tablets, that one may still be on the way. We will need to call [facility] next week to determine what is going on with the RX     PLAN     Recommended plan for no diet, medication or health factor changes affecting INR     Dosing Instructions: Hold x 2, 12/24 and 12/25. Take 2.5mg (1/2 of a 5mg tablet, she is willing to split it) on Sun 12/26 with next INR in 3 days       Summary  As of 12/24/2021    Full warfarin instructions:  12/24: Hold; 12/25: Hold; Otherwise 1.25 mg every Wed, Sat; 2.5 mg all other days   Next INR check:  12/27/2021             Telephone call with Nancy who verbalizes understanding and agrees to plan    Patient to recheck with home meter    Education provided: Goal range and significance of current result, Monitoring for bleeding signs and symptoms, When to seek medical attention/emergency care and Contact [phone] with any changes, questions or concerns.     Plan made with [facility] Pharmacist Marlene Johnson, RN  Anticoagulation Clinic  12/24/2021    _______________________________________________________________________     Anticoagulation Episode Summary     Current INR goal:  2.0-3.0   TTR:  53.3 % (1 y)   Target end date:  Indefinite    Send INR reminders to:  [facility] HURTADO    Indications    Pulmonary embolism (H) [I26.99]           Comments:           Anticoagulation Care Providers     Provider Role Specialty Phone number    Julien Garnica MD Referring Family Medicine [phone]

## 2021-12-24 NOTE — TELEPHONE ENCOUNTER
Patient called very confused and very frustrated with her Warfarin dosing.     The mail pharmacy sent her 5mg tablets and she is not wanting to cut pills in half. Please send 2.5 mg tablets to her instead of 5mg tablets.     Additionally, patient's INR was 7.4 and she was informed to discontinue use of Warfarin until her INR is lower. Patient seemed very confused on her dosing schedule - which I relayed to her again (found in her recent anticoagulation note from 12/23/2021.)     Please reach out to patient to re discuss her therapy.

## 2021-12-24 NOTE — TELEPHONE ENCOUNTER
Placed call to patient as a reminder to check INR today. Pt stated that she will do it now. ACN to call pt back when result received.     Of note - pt states she received her new Warfarin prescription today and states they sent 5 mg tablets again.     Consuelo Montoya RN

## 2021-12-24 NOTE — TELEPHONE ENCOUNTER
"Telephone call:    Per pt, she is on prednisone for chronic back pain which was ordered by  and her MD from [facility] ordered gabapentin for her shoulder.  She was wandering about taking them together.  Per pt Dr. Garnica wanted her to talk with her orthopedic MD and pt directed to call the orthopedic clinic directly to speak with physician regarding above.  Pt decline triage.    Haritha Gama RN  12/24/21 11:35 AM  [facility] Nurse Advisor    COVID 19 Nurse Triage Plan/Patient Instructions    Please be aware that novel coronavirus (COVID-19) may be circulating in the community. If you develop symptoms such as fever, cough, or SOB or if you have concerns about the presence of another infection including coronavirus (COVID-19), please contact your health care provider or visit [email or web address].     Disposition/Instructions    Additional COVID19 information to add for patients.   How can I protect others?  If you have symptoms (fever, cough, body aches or trouble breathing): Stay home and away from others (self-isolate) until:    At least 10 days have passed since your symptoms started, And     You ve had no fever--and no medicine that reduces fever--for 1 full day (24 hours), And      Your other symptoms have resolved (gotten better).     If you don t have symptoms, but a test showed that you have COVID-19 (you tested positive):    Stay home and away from others (self-isolate). Follow the tips under \"How do I self-isolate?\" below for 10 days (20 days if you have a weak immune system).    You don't need to be retested for COVID-19 before going back to school or work. As long as you're fever-free and feeling better, you can go back to school, work and other activities after waiting the 10 or 20 days.     How do I self-isolate?    Stay in your own room, even for meals. Use your own bathroom if you can.     Stay away from others in your home. No hugging, kissing or shaking hands. No " visitors.    Don t go to work, school or anywhere else.     Clean  high touch  surfaces often (doorknobs, counters, handles, etc.). Use a household cleaning spray or wipes. You ll find a full list on the EPA website:  www.epa.gov/pesticide-registration/list-n-disinfectants-use-against-sars-cov-2.    Cover your mouth and nose with a mask, tissue or washcloth to avoid spreading germs.    Wash your hands and face often. Use soap and water.    Caregivers in these groups are at risk for severe illness due to COVID-19:  o People 65 years and older  o People who live in a nursing home or long-term care facility  o People with chronic disease (lung, heart, cancer, diabetes, kidney, liver, immunologic)  o People who have a weakened immune system, including those who:  - Are in cancer treatment  - Take medicine that weakens the immune system, such as corticosteroids  - Had a bone marrow or organ transplant  - Have an immune deficiency  - Have poorly controlled HIV or AIDS  - Are obese (body mass index of 40 or higher)  - Smoke regularly    Caregivers should wear gloves while washing dishes, handling laundry and cleaning bedrooms and bathrooms.    Use caution when washing and drying laundry: Don t shake dirty laundry, and use the warmest water setting that you can.    For more tips, go to www.cdc.gov/coronavirus/2019-ncov/downloads/10Things.pdf.    How can I take care of myself?  1. Get lots of rest. Drink extra fluids (unless a doctor has told you not to).     2. Take Tylenol (acetaminophen) for fever or pain. If you have liver or kidney problems, ask your family doctor if it s okay to take Tylenol.     Adults can take either:     650 mg (two 325 mg pills) every 4 to 6 hours, or     1,000 mg (two 500 mg pills) every 8 hours as needed.     Note: Don t take more than 3,000 mg in one day.   Acetaminophen is found in many medicines (both prescribed and over-the-counter medicines). Read all labels to be sure you don t take too  much.     For children, check the Tylenol bottle for the right dose. The dose is based on the child s age or weight.    3. If you have other health problems (like cancer, heart failure, an organ transplant or severe kidney disease): Call your specialty clinic if you don t feel better in the next 2 days.    4. Know when to call 911: Emergency warning signs include:    Trouble breathing or shortness of breath    Pain or pressure in the chest that doesn t go away    Feeling confused like you haven t felt before, or not being able to wake up    Bluish-colored lips or face    What are the symptoms of COVID-19?     The most common symptoms are cough, fever and trouble breathing.     Less common symptoms include body aches, chills, diarrhea (loose, watery poops), fatigue (feeling very tired), headache, runny nose, sore throat and loss of smell.    COVID-19 can cause severe coughing (bronchitis) and lung infection (pneumonia).    How does it spread?     The virus may spread when a person coughs or sneezes into the air. The virus can travel about 6 feet this way, and it can live on surfaces.      Common  (household disinfectants) will kill the virus.    Who is at risk?  Anyone can catch COVID-19 if they re around someone who has the virus.    How can others protect themselves?     Stay away from people who have COVID-19 (or symptoms of COVID-19).    Wash hands often with soap and water. Or, use hand  with at least 60% alcohol.    Avoid touching the eyes, nose or mouth.     Wear a face mask when you go out in public, when sick or when caring for a sick person.    Where can I get more information?     [employer]: About COVID-19: [email or web address]    CDC: What to Do If You re Sick: www.cdc.gov/coronavirus/2019-ncov/about/steps-when-sick.html    CDC: Ending Home Isolation: www.cdc.gov/coronavirus/2019-ncov/hcp/disposition-in-home-patients.html     CDC: Caring for Someone:  www.cdc.gov/coronavirus/2019-ncov/if-you-are-sick/care-for-someone.html     [facility]: Interim Guidance for Hospital Discharge to Home: www.[facility]/diseases/coronavirus/hcp/hospdischarge.pdf    [facility] clinical trials (COVID-19 research studies): clinicalaffairs.[facility]-clinical-trials     Below are the COVID-19 hotlines at the [place] Department of [facility] ([facility]). Interpreters are available.   o For health questions: Call 451-607-6375 or 1-559.834.8288 (7 a.m. to 7 p.m.)  o For questions about schools and childcare: Call 472-575-8974 or 1-110.741.4060 (7 a.m. to 7 p.m.)          Thank you for taking steps to prevent the spread of this virus.  o Limit your contact with others.  o Wear a simple mask to cover your cough.  o Wash your hands well and often.    Resources    [facility]: About COVID-19: [email or web address]    CDC: What to Do If You're Sick: www.cdc.gov/coronavirus/2019-ncov/about/steps-when-sick.html    CDC: Ending Home Isolation: www.cdc.gov/coronavirus/2019-ncov/hcp/disposition-in-home-patients.html     CDC: Caring for Someone: www.cdc.gov/coronavirus/2019-ncov/if-you-are-sick/care-for-someone.html     [facility]: Interim Guidance for Hospital Discharge to Home: www.[facility]./diseases/coronavirus/hcp/hospdischarge.pdf    [facility] clinical trials (COVID-19 research studies): clinicalaffairs.[facility]-clinical-trials     Below are the COVID-19 hotlines at the [place] Department of [facility] ([facility]). Interpreters are available.   o For health questions: Call 073-253-5035 or 1-658.110.2868 (7 a.m. to 7 p.m.)  o For questions about schools and childcare: Call 115-572-1279 or 1-492.399.8534 (7 a.m. to 7 p.m.)

## 2021-12-27 ENCOUNTER — ANTICOAGULATION THERAPY VISIT (OUTPATIENT)
Dept: ANTICOAGULATION | Facility: CLINIC | Age: 76
End: 2021-12-27
Payer: MEDICARE

## 2021-12-27 DIAGNOSIS — I26.99 PULMONARY EMBOLISM (H): Primary | ICD-10-CM

## 2021-12-27 LAB — INR (EXTERNAL): 1.9 (ref 0.9–1.1)

## 2021-12-27 NOTE — PROGRESS NOTES
"ANTICOAGULATION MANAGEMENT     Nancy Oropeza 76 year old female is on warfarin with subtherapeutic INR result. (Goal INR 2.0-3.0)    Recent labs: (last 7 days)     12/27/21  0000   INR 1.9*     INR result called to [facility] by patient-- states she called [facility] to report her result and their outgoing message stated they were closed today, so she figured she should call us to give result.    ASSESSMENT     Source(s): Chart Review and Patient/Caregiver Call       Warfarin doses taken: Warfarin recently held for supratherapeutic result x3 which may be affecting INR    Diet: No new diet changes identified    New illness, injury, or hospitalization: No    Medication/supplement changes: None noted    Signs or symptoms of bleeding or clotting: No    Previous INR: Supratherapeutic    Additional findings: still has not received 2.5mg tablets from [facility]. Did not take 2.5mg dose on Sunday 12/26 because she \"didn't know if it was safe to cut that in half\". Did instruct to take the half tab of her 5mg strength that she has. Called and spoke with Monica with [facility] pharmacy-- she states that the 2.5mg tablets were shipped on 12/22 and are still in transit (tracking number: [id number])     PLAN     Recommended plan for temporary change(s) affecting INR     Dosing Instructions: Continue your current warfarin dose with next INR on Friday       Summary  As of 12/27/2021    Full warfarin instructions:  1.25 mg every Wed, Sat; 2.5 mg all other days   Next INR check:  12/31/2021             Telephone call with Nancy who verbalizes understanding and agrees to plan. Lesly will use 5mg tablets for dose today (splitting in half for a 2.5mg dose) and if the warfarin 2.5mg tabs do not arrive by tomorrow, she will call [facility] for a small supply to be sent to local pharmacy so correct dosing can be taken on Weds/Sat.    Patient to recheck with home meter    Education provided: Goal range and significance of current result and " Contact [phone] with any changes, questions or concerns.     Plan made per [facility] anticoagulation protocol    Yumiko Kaufman, RN  Anticoagulation Clinic  12/27/2021    _______________________________________________________________________     Anticoagulation Episode Summary     Current INR goal:  2.0-3.0   TTR:  53.6 % (1 y)   Target end date:  Indefinite   Send INR reminders to:  [facility]    Indications    Pulmonary embolism (H) [I26.99]           Comments:           Anticoagulation Care Providers     Provider Role Specialty Phone number    Julien Garnica MD Referring Family Medicine [phone]

## 2021-12-27 NOTE — TELEPHONE ENCOUNTER
This encounter was created prior to [facility] contacting patient regarding dosing.   No current action needed.      Yumiko Kaufman RN  [facility]  [phone]

## 2021-12-29 ENCOUNTER — TELEPHONE (OUTPATIENT)
Dept: FAMILY MEDICINE | Facility: CLINIC | Age: 76
End: 2021-12-29
Payer: MEDICARE

## 2021-12-29 DIAGNOSIS — I26.99 PULMONARY EMBOLISM, UNSPECIFIED CHRONICITY, UNSPECIFIED PULMONARY EMBOLISM TYPE, UNSPECIFIED WHETHER ACUTE COR PULMONALE PRESENT (H): ICD-10-CM

## 2021-12-29 RX ORDER — WARFARIN SODIUM 2.5 MG/1
1.25-2.5 TABLET ORAL DAILY
Qty: 5 TABLET | Refills: 0 | Status: SHIPPED | OUTPATIENT
Start: 2021-12-29 | End: 2022-01-05

## 2021-12-29 NOTE — TELEPHONE ENCOUNTER
Patient is calling again, she needs a new Rx for Warfarin to be called into [facility]. Patient states that Yumiko new this and was going to call patient yesterday to discuss.  Sofia Messina   [facility]  Central Scheduler

## 2021-12-29 NOTE — TELEPHONE ENCOUNTER
Rx for warfarin 2.5mg tabs #5 sent to [facility] to fill-- called and informed Lesly. Instructed her to call [facility] if the pharmacy calls her with issues to fill the Rx. Lesly verbalized understanding and agreed to this plan.     Yumiko Kaufman, RN  [facility] Anticoagulation  [phone]

## 2021-12-30 ENCOUNTER — TELEPHONE (OUTPATIENT)
Dept: FAMILY MEDICINE | Facility: CLINIC | Age: 76
End: 2021-12-30
Payer: MEDICARE

## 2021-12-30 ENCOUNTER — ANTICOAGULATION THERAPY VISIT (OUTPATIENT)
Dept: ANTICOAGULATION | Facility: CLINIC | Age: 76
End: 2021-12-30
Payer: MEDICARE

## 2021-12-30 DIAGNOSIS — I26.99 PULMONARY EMBOLISM (H): Primary | ICD-10-CM

## 2021-12-30 LAB — INR (EXTERNAL): 1.7 (ref 0.9–1.1)

## 2021-12-30 NOTE — TELEPHONE ENCOUNTER
Reason for Call:  Other call back    Detailed comments: Patient calling to give inr results. It is at 1.7 would like a call back asap to know what to do with medications    Phone Number Patient can be reached at: Home number on file [phone] (home)    Best Time: Anytime    Can we leave a detailed message on this number? YES    Call taken on 12/30/2021 at 2:07 PM by Alona Pinedo

## 2021-12-30 NOTE — PROGRESS NOTES
Anticoagulation Management    Patient called result to [facility] around 2pm today.    Unable to reach Lesly today.    Today's INR result of 1.7 is subtherapeutic (goal INR of 2.0-3.0).  Result received from: Home Monitor    Follow up required to confirm warfarin dose taken and assess for changes    Left message to take a booster dose of warfarin,  3.75 mg tonight.      Anticoagulation clinic to follow up    Yumiko Kaufman RN

## 2021-12-30 NOTE — TELEPHONE ENCOUNTER
See anticoagulation encounter from today.    Yumiko Kaufman RN  [facility]  [phone]

## 2021-12-31 ENCOUNTER — TRANSFERRED RECORDS (OUTPATIENT)
Dept: HEALTH INFORMATION MANAGEMENT | Facility: CLINIC | Age: 76
End: 2021-12-31
Payer: MEDICARE

## 2021-12-31 NOTE — PROGRESS NOTES
ANTICOAGULATION MANAGEMENT     Nancy Oropeza 76 year old female is on warfarin with subtherapeutic INR result. (Goal INR 2.0-3.0)    Recent labs: (last 7 days)     12/30/21  0000   INR 1.7*       ASSESSMENT     Source(s): Chart Review and Patient/Caregiver Call       Warfarin doses taken: Warfarin taken as instructed-- didn't have the 2.5mg tabs yesterday so just took 5mg    Diet: No new diet changes identified    New illness, injury, or hospitalization: No    Medication/supplement changes: None noted    Signs or symptoms of bleeding or clotting: No    Previous INR: Subtherapeutic    Additional findings: did  the 2.5mg tablets today-- she still has not received the ones from [facility]. Advised her to call [facility] on Monday if she still has not received the Rx so more can be sent to local pharmacy.      PLAN     Recommended plan for no diet, medication or health factor changes affecting INR     Dosing Instructions: Continue your current warfarin dose with next INR in 1 week       Summary  As of 12/30/2021    Full warfarin instructions:  12/30: 5 mg; Otherwise 1.25 mg every Wed, Sat; 2.5 mg all other days   Next INR check:  1/6/2022             Telephone call with Nancy who agrees to plan and repeated back plan correctly    Patient to recheck with home meter    Education provided: Goal range and significance of current result and Contact [phone] with any changes, questions or concerns.     Plan made per [facility] anticoagulation protocol    Yumiko Kaufman RN  Anticoagulation Clinic  12/31/2021    _______________________________________________________________________     Anticoagulation Episode Summary     Current INR goal:  2.0-3.0   TTR:  53.7 % (1 y)   Target end date:  Indefinite   Send INR reminders to:  [facility]    Indications    Pulmonary embolism (H) [I26.99]           Comments:           Anticoagulation Care Providers     Provider Role Specialty Phone number    Julien Garnica MD Referring  Family Medicine [phone]

## 2022-01-01 ENCOUNTER — HOSPITAL ENCOUNTER (OUTPATIENT)
Dept: BONE DENSITY | Facility: HOSPITAL | Age: 77
Discharge: HOME | End: 2022-12-21
Attending: FAMILY MEDICINE | Admitting: FAMILY MEDICINE
Payer: COMMERCIAL

## 2022-01-01 ENCOUNTER — TRANSFERRED RECORDS (OUTPATIENT)
Dept: HEALTH INFORMATION MANAGEMENT | Facility: CLINIC | Age: 77
End: 2022-01-01

## 2022-01-01 ENCOUNTER — TELEPHONE (OUTPATIENT)
Dept: FAMILY MEDICINE | Facility: CLINIC | Age: 77
End: 2022-01-01

## 2022-01-01 ENCOUNTER — ANTICOAGULATION THERAPY VISIT (OUTPATIENT)
Dept: ANTICOAGULATION | Facility: CLINIC | Age: 77
End: 2022-01-01

## 2022-01-01 ENCOUNTER — DOCUMENTATION ONLY (OUTPATIENT)
Dept: ANTICOAGULATION | Facility: CLINIC | Age: 77
End: 2022-01-01

## 2022-01-01 ENCOUNTER — MYC MEDICAL ADVICE (OUTPATIENT)
Dept: NURSING | Facility: CLINIC | Age: 77
End: 2022-01-01

## 2022-01-01 ENCOUNTER — OFFICE VISIT (OUTPATIENT)
Dept: FAMILY MEDICINE | Facility: CLINIC | Age: 77
End: 2022-01-01
Payer: COMMERCIAL

## 2022-01-01 VITALS
BODY MASS INDEX: 44.55 KG/M2 | DIASTOLIC BLOOD PRESSURE: 60 MMHG | HEART RATE: 91 BPM | OXYGEN SATURATION: 95 % | SYSTOLIC BLOOD PRESSURE: 120 MMHG | RESPIRATION RATE: 21 BRPM | WEIGHT: 276 LBS

## 2022-01-01 DIAGNOSIS — I26.99 PULMONARY EMBOLISM (H): Primary | ICD-10-CM

## 2022-01-01 DIAGNOSIS — M25.511 CHRONIC RIGHT SHOULDER PAIN: ICD-10-CM

## 2022-01-01 DIAGNOSIS — I26.99 PULMONARY EMBOLISM, UNSPECIFIED CHRONICITY, UNSPECIFIED PULMONARY EMBOLISM TYPE, UNSPECIFIED WHETHER ACUTE COR PULMONALE PRESENT (H): ICD-10-CM

## 2022-01-01 DIAGNOSIS — M35.3 POLYMYALGIA RHEUMATICA (H): ICD-10-CM

## 2022-01-01 DIAGNOSIS — G89.29 CHRONIC RIGHT SHOULDER PAIN: ICD-10-CM

## 2022-01-01 DIAGNOSIS — N18.31 STAGE 3A CHRONIC KIDNEY DISEASE (H): ICD-10-CM

## 2022-01-01 DIAGNOSIS — Z23 ENCOUNTER FOR IMMUNIZATION: ICD-10-CM

## 2022-01-01 DIAGNOSIS — E03.9 HYPOTHYROIDISM, UNSPECIFIED TYPE: ICD-10-CM

## 2022-01-01 DIAGNOSIS — Z79.899 HIGH RISK MEDICATION USE: Primary | ICD-10-CM

## 2022-01-01 DIAGNOSIS — I26.99 PULMONARY EMBOLISM, UNSPECIFIED CHRONICITY, UNSPECIFIED PULMONARY EMBOLISM TYPE, UNSPECIFIED WHETHER ACUTE COR PULMONALE PRESENT (H): Primary | ICD-10-CM

## 2022-01-01 DIAGNOSIS — D64.9 NORMOCHROMIC NORMOCYTIC ANEMIA: ICD-10-CM

## 2022-01-01 DIAGNOSIS — Z79.52 LONG TERM (CURRENT) USE OF SYSTEMIC STEROIDS: ICD-10-CM

## 2022-01-01 DIAGNOSIS — M54.50 CHRONIC MIDLINE LOW BACK PAIN WITHOUT SCIATICA: Primary | ICD-10-CM

## 2022-01-01 DIAGNOSIS — Z79.899 HIGH RISK MEDICATION USE: ICD-10-CM

## 2022-01-01 DIAGNOSIS — G89.29 CHRONIC MIDLINE LOW BACK PAIN WITHOUT SCIATICA: Primary | ICD-10-CM

## 2022-01-01 LAB
ANION GAP SERPL CALCULATED.3IONS-SCNC: 18 MMOL/L (ref 7–15)
BUN SERPL-MCNC: 32.1 MG/DL (ref 8–23)
CALCIUM SERPL-MCNC: 10.9 MG/DL (ref 8.8–10.2)
CHLORIDE SERPL-SCNC: 100 MMOL/L (ref 98–107)
CREAT SERPL-MCNC: 1.49 MG/DL (ref 0.51–0.95)
DEPRECATED HCO3 PLAS-SCNC: 26 MMOL/L (ref 22–29)
ERYTHROCYTE [DISTWIDTH] IN BLOOD BY AUTOMATED COUNT: 14.7 % (ref 10–15)
ERYTHROCYTE [SEDIMENTATION RATE] IN BLOOD BY WESTERGREN METHOD: 61 MM/HR (ref 0–30)
GFR SERPL CREATININE-BSD FRML MDRD: 35 ML/MIN/1.73M2
GLUCOSE SERPL-MCNC: 106 MG/DL (ref 70–99)
HCT VFR BLD AUTO: 29.8 % (ref 35–47)
HGB BLD-MCNC: 9.3 G/DL (ref 11.7–15.7)
INR (EXTERNAL): 1.2 (ref 0.9–1.1)
INR (EXTERNAL): 1.8 (ref 0.9–1.1)
INR (EXTERNAL): 2 (ref 0.9–1.1)
INR (EXTERNAL): 3.5 (ref 2–3)
MCH RBC QN AUTO: 27.6 PG (ref 26.5–33)
MCHC RBC AUTO-ENTMCNC: 31.2 G/DL (ref 31.5–36.5)
MCV RBC AUTO: 88 FL (ref 78–100)
PLATELET # BLD AUTO: 386 10E3/UL (ref 150–450)
POTASSIUM SERPL-SCNC: 3.7 MMOL/L (ref 3.4–5.3)
RBC # BLD AUTO: 3.37 10E6/UL (ref 3.8–5.2)
SODIUM SERPL-SCNC: 144 MMOL/L (ref 136–145)
T4 FREE SERPL-MCNC: 1.39 NG/DL (ref 0.9–1.7)
TSH SERPL DL<=0.005 MIU/L-ACNC: 0.38 UIU/ML (ref 0.3–4.2)
WBC # BLD AUTO: 12.3 10E3/UL (ref 4–11)

## 2022-01-01 PROCEDURE — 36415 COLL VENOUS BLD VENIPUNCTURE: CPT | Performed by: FAMILY MEDICINE

## 2022-01-01 PROCEDURE — 77080 DXA BONE DENSITY AXIAL: CPT

## 2022-01-01 PROCEDURE — 90471 IMMUNIZATION ADMIN: CPT | Performed by: FAMILY MEDICINE

## 2022-01-01 PROCEDURE — 80048 BASIC METABOLIC PNL TOTAL CA: CPT | Performed by: FAMILY MEDICINE

## 2022-01-01 PROCEDURE — 90715 TDAP VACCINE 7 YRS/> IM: CPT | Performed by: FAMILY MEDICINE

## 2022-01-01 PROCEDURE — 99214 OFFICE O/P EST MOD 30 MIN: CPT | Mod: 25 | Performed by: FAMILY MEDICINE

## 2022-01-01 PROCEDURE — 85027 COMPLETE CBC AUTOMATED: CPT | Performed by: FAMILY MEDICINE

## 2022-01-01 PROCEDURE — 84443 ASSAY THYROID STIM HORMONE: CPT | Performed by: FAMILY MEDICINE

## 2022-01-01 PROCEDURE — 84439 ASSAY OF FREE THYROXINE: CPT | Performed by: FAMILY MEDICINE

## 2022-01-01 PROCEDURE — 85652 RBC SED RATE AUTOMATED: CPT | Performed by: FAMILY MEDICINE

## 2022-01-01 RX ORDER — TRAMADOL HYDROCHLORIDE 50 MG/1
TABLET ORAL
Qty: 60 TABLET | Refills: 0 | Status: SHIPPED | OUTPATIENT
Start: 2022-01-01 | End: 2023-01-01

## 2022-01-01 RX ORDER — PREDNISONE 5 MG/1
TABLET ORAL
Qty: 90 TABLET | Refills: 3 | Status: SHIPPED | OUTPATIENT
Start: 2022-01-01 | End: 2022-01-01

## 2022-01-01 RX ORDER — TRAMADOL HYDROCHLORIDE 50 MG/1
TABLET ORAL
Qty: 60 TABLET | Refills: 0 | Status: SHIPPED | OUTPATIENT
Start: 2022-01-01 | End: 2022-01-01

## 2022-01-01 RX ORDER — PREDNISONE 5 MG/1
TABLET ORAL
Qty: 90 TABLET | Refills: 3 | Status: SHIPPED | OUTPATIENT
Start: 2022-01-01 | End: 2023-01-01

## 2022-01-04 ENCOUNTER — TELEPHONE (OUTPATIENT)
Dept: FAMILY MEDICINE | Facility: CLINIC | Age: 77
End: 2022-01-04
Payer: MEDICARE

## 2022-01-04 DIAGNOSIS — J01.90 ACUTE SINUSITIS WITH SYMPTOMS > 10 DAYS: Primary | ICD-10-CM

## 2022-01-04 NOTE — TELEPHONE ENCOUNTER
Pt called stating she took a covid test 3 days ago that was negative and wondering if Dr. Garnica will prescribe her a medication for sinus infection. She is constantly blowing her nose  And has a runny nose.   No known exposure to covid.     I told her that Dr. Garnica may want to do a visit with her but she is wondering if she can just get medication .

## 2022-01-04 NOTE — TELEPHONE ENCOUNTER
Pt called again wondering about the status of her request from her call from this morning. Wondering if she can get prescribed anything to help with her symtoms

## 2022-01-05 ENCOUNTER — ANTICOAGULATION THERAPY VISIT (OUTPATIENT)
Dept: FAMILY MEDICINE | Facility: CLINIC | Age: 77
End: 2022-01-05
Payer: MEDICARE

## 2022-01-05 ENCOUNTER — TRANSFERRED RECORDS (OUTPATIENT)
Dept: HEALTH INFORMATION MANAGEMENT | Facility: CLINIC | Age: 77
End: 2022-01-05
Payer: MEDICARE

## 2022-01-05 DIAGNOSIS — I26.99 PULMONARY EMBOLISM, UNSPECIFIED CHRONICITY, UNSPECIFIED PULMONARY EMBOLISM TYPE, UNSPECIFIED WHETHER ACUTE COR PULMONALE PRESENT (H): ICD-10-CM

## 2022-01-05 DIAGNOSIS — I26.99 PULMONARY EMBOLISM (H): Primary | ICD-10-CM

## 2022-01-05 DIAGNOSIS — J01.90 ACUTE SINUSITIS WITH SYMPTOMS > 10 DAYS: ICD-10-CM

## 2022-01-05 LAB — INR (EXTERNAL): 1.8 (ref 0.9–1.1)

## 2022-01-05 RX ORDER — WARFARIN SODIUM 2.5 MG/1
TABLET ORAL
Qty: 30 TABLET | Refills: 0 | Status: SHIPPED | OUTPATIENT
Start: 2022-01-05 | End: 2022-02-14

## 2022-01-05 NOTE — TELEPHONE ENCOUNTER
Notify patient that I sent a rx for Augmentin 875/125 to her pharmacy ([facility]).  Use one tablet twice daily x 10 days for sinus infection concern.  Please ensure follow-up with me (virtual or in office) if ongoing or recurrent symptoms noted.

## 2022-01-05 NOTE — PROGRESS NOTES
Received a fax INR result for Keira from [facility]    INR result dated 1/5/2022 is 1.8    Kati Sierra RN, BSN  Anticoagulation Clinic

## 2022-01-07 ENCOUNTER — TELEPHONE (OUTPATIENT)
Dept: NURSING | Facility: CLINIC | Age: 77
End: 2022-01-07
Payer: MEDICARE

## 2022-01-07 NOTE — TELEPHONE ENCOUNTER
Telephone call:    Patient calling to report symptoms of cough, nasal congestion, pain in the back and stomach from coughing. Dr. Garnica prescribed Augmentin on 1/5/2022. She has had some nausea due to the antibiotic, but is taking and tolerating. She feels like she is improving but has two questions for Dr. Garnica.    She reports that she is having trouble clearing mucus, despite drinking a lot of fluids-water and orange juice. Appetite is diminished but is eating soup and crackers and fruit. O2 sats are 91-94% and heart rate 88-91.     Reports that she is taking Neurontin 100 mg, three times a day for shoulder pain prescribed by Dr. Bennett from [facility].     Patient would like Dr. Garnica to let her know:  1)  is it okay to take the Neurontin with her regular medications?  2) is there a medication he could recommend to help her clear the mucus (like an expectorant)?    Please advise. Okay to call patient back at [phone].    Sofia Mcbride RN   01/07/22 12:47 PM  [facility] Nurse Advisor

## 2022-01-08 ENCOUNTER — HOSPITAL ENCOUNTER (EMERGENCY)
Facility: HOSPITAL | Age: 77
Discharge: HOME | End: 2022-01-08
Attending: EMERGENCY MEDICINE | Admitting: EMERGENCY MEDICINE
Payer: COMMERCIAL

## 2022-01-08 ENCOUNTER — OFFICE VISIT (OUTPATIENT)
Dept: FAMILY MEDICINE | Facility: CLINIC | Age: 77
End: 2022-01-08
Payer: COMMERCIAL

## 2022-01-08 ENCOUNTER — APPOINTMENT (OUTPATIENT)
Dept: CT IMAGING | Facility: HOSPITAL | Age: 77
End: 2022-01-08
Attending: EMERGENCY MEDICINE
Payer: COMMERCIAL

## 2022-01-08 VITALS
BODY MASS INDEX: 46.66 KG/M2 | DIASTOLIC BLOOD PRESSURE: 83 MMHG | WEIGHT: 293 LBS | RESPIRATION RATE: 21 BRPM | HEART RATE: 86 BPM | TEMPERATURE: 97.8 F | SYSTOLIC BLOOD PRESSURE: 151 MMHG | OXYGEN SATURATION: 92 %

## 2022-01-08 VITALS
RESPIRATION RATE: 20 BRPM | WEIGHT: 293 LBS | TEMPERATURE: 98.5 F | DIASTOLIC BLOOD PRESSURE: 56 MMHG | OXYGEN SATURATION: 88 % | HEART RATE: 83 BPM | BODY MASS INDEX: 49.18 KG/M2 | SYSTOLIC BLOOD PRESSURE: 121 MMHG

## 2022-01-08 DIAGNOSIS — B34.9 VIRAL INFECTION: ICD-10-CM

## 2022-01-08 DIAGNOSIS — R05.9 COUGH: Primary | ICD-10-CM

## 2022-01-08 DIAGNOSIS — R06.02 SHORTNESS OF BREATH: ICD-10-CM

## 2022-01-08 LAB
ALBUMIN SERPL-MCNC: 3.6 G/DL (ref 3.5–5)
ALP SERPL-CCNC: 77 U/L (ref 45–120)
ALT SERPL W P-5'-P-CCNC: 13 U/L (ref 0–45)
ANION GAP SERPL CALCULATED.3IONS-SCNC: 11 MMOL/L (ref 5–18)
AST SERPL W P-5'-P-CCNC: 22 U/L (ref 0–40)
BASOPHILS # BLD AUTO: 0.1 10E3/UL (ref 0–0.2)
BASOPHILS NFR BLD AUTO: 1 %
BILIRUB SERPL-MCNC: 0.5 MG/DL (ref 0–1)
BNP SERPL-MCNC: <10 PG/ML (ref 0–147)
BUN SERPL-MCNC: 33 MG/DL (ref 8–28)
CALCIUM SERPL-MCNC: 9.8 MG/DL (ref 8.5–10.5)
CHLORIDE BLD-SCNC: 95 MMOL/L (ref 98–107)
CO2 SERPL-SCNC: 31 MMOL/L (ref 22–31)
CREAT SERPL-MCNC: 1.32 MG/DL (ref 0.6–1.1)
EOSINOPHIL # BLD AUTO: 0.4 10E3/UL (ref 0–0.7)
EOSINOPHIL NFR BLD AUTO: 4 %
ERYTHROCYTE [DISTWIDTH] IN BLOOD BY AUTOMATED COUNT: 15.2 % (ref 10–15)
FLUAV RNA SPEC QL NAA+PROBE: NEGATIVE
FLUBV RNA RESP QL NAA+PROBE: NEGATIVE
GFR SERPL CREATININE-BSD FRML MDRD: 41 ML/MIN/1.73M2
GLUCOSE BLD-MCNC: 95 MG/DL (ref 70–125)
HCT VFR BLD AUTO: 31.1 % (ref 35–47)
HGB BLD-MCNC: 9.8 G/DL (ref 11.7–15.7)
HOLD SPECIMEN: NORMAL
HOLD SPECIMEN: NORMAL
IMM GRANULOCYTES # BLD: 0.1 10E3/UL
IMM GRANULOCYTES NFR BLD: 1 %
INR PPP: 2.5 (ref 0.85–1.15)
LYMPHOCYTES # BLD AUTO: 3.6 10E3/UL (ref 0.8–5.3)
LYMPHOCYTES NFR BLD AUTO: 34 %
MCH RBC QN AUTO: 27.4 PG (ref 26.5–33)
MCHC RBC AUTO-ENTMCNC: 31.5 G/DL (ref 31.5–36.5)
MCV RBC AUTO: 87 FL (ref 78–100)
MONOCYTES # BLD AUTO: 0.9 10E3/UL (ref 0–1.3)
MONOCYTES NFR BLD AUTO: 8 %
NEUTROPHILS # BLD AUTO: 5.5 10E3/UL (ref 1.6–8.3)
NEUTROPHILS NFR BLD AUTO: 52 %
NRBC # BLD AUTO: 0 10E3/UL
NRBC BLD AUTO-RTO: 0 /100
PLATELET # BLD AUTO: 415 10E3/UL (ref 150–450)
POTASSIUM BLD-SCNC: 3.2 MMOL/L (ref 3.5–5)
PROT SERPL-MCNC: 6.8 G/DL (ref 6–8)
RBC # BLD AUTO: 3.58 10E6/UL (ref 3.8–5.2)
SARS-COV-2 RNA RESP QL NAA+PROBE: NEGATIVE
SODIUM SERPL-SCNC: 137 MMOL/L (ref 136–145)
TROPONIN I SERPL-MCNC: 0.01 NG/ML (ref 0–0.29)
WBC # BLD AUTO: 10.4 10E3/UL (ref 4–11)

## 2022-01-08 PROCEDURE — 71275 CT ANGIOGRAPHY CHEST: CPT

## 2022-01-08 PROCEDURE — 87040 BLOOD CULTURE FOR BACTERIA: CPT | Performed by: EMERGENCY MEDICINE

## 2022-01-08 PROCEDURE — U0003 INFECTIOUS AGENT DETECTION BY NUCLEIC ACID (DNA OR RNA); SEVERE ACUTE RESPIRATORY SYNDROME CORONAVIRUS 2 (SARS-COV-2) (CORONAVIRUS DISEASE [COVID-19]), AMPLIFIED PROBE TECHNIQUE, MAKING USE OF HIGH THROUGHPUT TECHNOLOGIES AS DESCRIBED BY CMS-2020-01-R: HCPCS | Performed by: FAMILY MEDICINE

## 2022-01-08 PROCEDURE — 80053 COMPREHEN METABOLIC PANEL: CPT | Performed by: EMERGENCY MEDICINE

## 2022-01-08 PROCEDURE — 36415 COLL VENOUS BLD VENIPUNCTURE: CPT | Performed by: EMERGENCY MEDICINE

## 2022-01-08 PROCEDURE — 250N000013 HC RX MED GY IP 250 OP 250 PS 637: Performed by: EMERGENCY MEDICINE

## 2022-01-08 PROCEDURE — 84484 ASSAY OF TROPONIN QUANT: CPT | Performed by: EMERGENCY MEDICINE

## 2022-01-08 PROCEDURE — 250N000011 HC RX IP 250 OP 636: Performed by: EMERGENCY MEDICINE

## 2022-01-08 PROCEDURE — 83880 ASSAY OF NATRIURETIC PEPTIDE: CPT | Performed by: EMERGENCY MEDICINE

## 2022-01-08 PROCEDURE — 93005 ELECTROCARDIOGRAM TRACING: CPT | Performed by: EMERGENCY MEDICINE

## 2022-01-08 PROCEDURE — 87636 SARSCOV2 & INF A&B AMP PRB: CPT | Performed by: EMERGENCY MEDICINE

## 2022-01-08 PROCEDURE — 85025 COMPLETE CBC W/AUTO DIFF WBC: CPT | Performed by: EMERGENCY MEDICINE

## 2022-01-08 PROCEDURE — 99207 PR NON-BILLABLE SERV PER CHARTING: CPT | Performed by: FAMILY MEDICINE

## 2022-01-08 PROCEDURE — U0005 INFEC AGEN DETEC AMPLI PROBE: HCPCS | Performed by: FAMILY MEDICINE

## 2022-01-08 PROCEDURE — 99285 EMERGENCY DEPT VISIT HI MDM: CPT | Mod: 25

## 2022-01-08 PROCEDURE — 85610 PROTHROMBIN TIME: CPT | Performed by: EMERGENCY MEDICINE

## 2022-01-08 PROCEDURE — C9803 HOPD COVID-19 SPEC COLLECT: HCPCS

## 2022-01-08 PROCEDURE — 94640 AIRWAY INHALATION TREATMENT: CPT

## 2022-01-08 RX ORDER — ALBUTEROL SULFATE 90 UG/1
2 AEROSOL, METERED RESPIRATORY (INHALATION) EVERY 6 HOURS PRN
Status: DISCONTINUED | OUTPATIENT
Start: 2022-01-08 | End: 2022-01-08 | Stop reason: HOSPADM

## 2022-01-08 RX ORDER — GABAPENTIN 100 MG/1
100 CAPSULE ORAL 3 TIMES DAILY
COMMUNITY
End: 2022-02-15

## 2022-01-08 RX ORDER — IOPAMIDOL 755 MG/ML
75 INJECTION, SOLUTION INTRAVASCULAR ONCE
Status: COMPLETED | OUTPATIENT
Start: 2022-01-08 | End: 2022-01-08

## 2022-01-08 RX ORDER — ALBUTEROL SULFATE 90 UG/1
2 AEROSOL, METERED RESPIRATORY (INHALATION) EVERY 6 HOURS PRN
Qty: 18 G | Refills: 0 | Status: SHIPPED | OUTPATIENT
Start: 2022-01-08 | End: 2022-05-27

## 2022-01-08 RX ADMIN — ALBUTEROL SULFATE 2 PUFF: 90 AEROSOL, METERED RESPIRATORY (INHALATION) at 14:34

## 2022-01-08 RX ADMIN — IOPAMIDOL 75 ML: 755 INJECTION, SOLUTION INTRAVENOUS at 15:15

## 2022-01-08 NOTE — PROGRESS NOTES
Assessment & Plan     Shortness of breath  Cough    - Symptomatic; Unknown COVID-19 Virus (Coronavirus) by PCR Nose    Sent to ED to rule out PE being subtherapeutic on warfarin x 2 weeks with hx of PE as cause for chronic anticoagulation.  Suspect pneumonia but needs to have PE ruled out-- sent to higher level of care.    No charge for visit today.    Fide Barber MD  [facility] SUZY Cruz is a 76 year old who presents for the following health issues     HPI     COVID vaccinated and boosted.    Subtherapeutic INR x 12 days.  Hx of PE- last 4 years ago.  Started with cold 7 days-- daughter living with her had pneumonia.  Started on Augmentin bid on 1-5-2022 for sinusitis by PCP.    Now worsening SOB with exertion and at rest.  Feels pain in RLL when coughs.  No calf pain or swelling.    On prednisone 5 mg daily for PMR.    Here with another daughter.    No fever.    Former smoker.  States normal O2 sat for patient is 95%.      Review of Systems   Constitutional, HEENT, cardiovascular, pulmonary, GI, , musculoskeletal, neuro, skin, endocrine and psych systems are negative, except as otherwise noted.      Objective    BP (!) 151/83 (BP Location: Left arm, Patient Position: Sitting, Cuff Size: Adult Large)   Pulse 86   Temp 97.8  F (36.6  C) (Oral)   Resp 21   Wt 135.1 kg (297 lb 14.4 oz)   SpO2 92%   BMI 46.66 kg/m    Body mass index is 46.66 kg/m .  Physical Exam   GENERAL: healthy, alert and no distress  EYES: Eyes grossly normal to inspection, PERRL and conjunctivae and sclerae normal  NECK: no adenopathy, no asymmetry, masses, or scars and thyroid normal to palpation  RESP: decreased breath sounds at B lung bases.    CV: regular rate and rhythm, normal S1 S2, no S3 or S4, no murmur, click or rub, no peripheral edema and peripheral pulses strong  ABDOMEN: soft, nontender, no hepatosplenomegaly, no masses and bowel sounds normal  MS: no gross musculoskeletal  defects noted, no edema  PSYCH: mentation appears normal, affect normal/bright

## 2022-01-08 NOTE — ED PROVIDER NOTES
EMERGENCY DEPARTMENT ENCOUNTER      NAME: Nancy Oropeza  AGE: 76 year old female  YOB: 1945  MRN: [id number]  EVALUATION DATE & TIME: 1/8/2022  1:54 PM    PCP: Julien Garnica    ED PROVIDER: Mary Mena M.D.      Chief Complaint   Patient presents with     Shortness of Breath     FINAL IMPRESSION:  1. Shortness of breath    2. Viral infection      ED COURSE & MEDICAL DECISION MAKING:    Pertinent Labs & Imaging studies reviewed. (See chart for details)  ED Course as of 01/08/22 1716   Sat Jan 08, 2022   1423 Patient is a 76-year-old female who comes in today with shortness of breath.  She started off with little bit of sore throat and then a cough.  She is currently on Augmentin for presumed pneumonia.  There was some concern about possible PE since she has a history of PE.  She is on Coumadin.  Her INR came back therapeutic at 2.5.  White count is normal at 10.4.  Lab work x-ray looks pretty good.  Her COVID and flu test came back negative.  We will plan to get CTA imaging on the patient to further evaluate.  I discussed this with her and she is in agreement with the plan.   1426 Patient dropped her sats to 83%.  We had to put her on oxygen.  She will likely need admission to the hospital.   1606 Patient CTA is unremarkable.  Given her need for oxygen I think she likely needs to come into the hospital for further evaluation   1659 I just went back and checked on the patient.  She is satting 90 to 95% most the time.  I took her off her oxygen and she is doing fine.  We will send her home with an inhaler because she felt like it was helpful for her.  I told her she could take a little Mucinex.  She does not have pneumonia or PE on her CTA.  She has a pulse oximeter at home so she can monitor her own oxygen.  I told her if she is dropping into the 80s she needs to come back and she agrees to do so.  She is very happy about going home.        2:13 PM I met with the patient for the initial  interview and physical examination. Discussed plan for treatment and workup in the ED.      4:31 PM I rechecked and updated the patient. I discussed the plan for discharge with the patient, and patient is agreeable. We discussed supportive cares at home and reasons for return to the ER including new or worsening symptoms - all questions and concerns addressed. Patient to be discharged by RN.     At the conclusion of the encounter I discussed  the results of all of the tests and the disposition with patient.   All questions were answered.  The patient acknowledged understanding and was involved in the decision making regarding the overall care plan.     PPE: Provider wore gloves, N95 mask, & surgical cap.      I discussed with patient the utility, limitations and findings of the exam/interventions/studies done during this visit as well as the list of differential diagnosis and symptoms to monitor/return to ER for.  Additional verbal discharge instructions were provided.     MEDICATIONS GIVEN IN THE EMERGENCY:  Medications   albuterol (PROVENTIL HFA/VENTOLIN HFA) inhaler (2 puffs Inhalation Given 1/8/22 1434)   iopamidol (ISOVUE-370) solution 75 mL (75 mLs Intravenous Given 1/8/22 1515)       NEW PRESCRIPTIONS STARTED AT TODAY'S ER VISIT  New Prescriptions    ALBUTEROL (PROAIR HFA/PROVENTIL HFA/VENTOLIN HFA) 108 (90 BASE) MCG/ACT INHALER    Inhale 2 puffs into the lungs every 6 hours as needed for shortness of breath / dyspnea or wheezing          =================================================================    HPI    Triage Note: Pt reports cough, sob x 3 days. O2 sat 86% after amb to triage. 91% at rest. Was seen at  and sent to ED for further eval.      Patient information was obtained from: patient     Use of : N/A         Nancy Oropeza is a 76 year old female with a pertinent medical history of hypertension, hyperlipidemia, PE, stage 3 chronic kidney disease, polymyalgia rheumatica,  angioedema, who presents to the ED via walk-in for evaluation of shortness of breath.      Patient reports development of a sore throat with a cough on 01/02 (6 days ago). She took at home COVID tests which were negative. Over the past 3 days she has developed worsening shortness of breath with any exertion as well as loss of appetite. She states that her PCP started her on antibiotics for a possible lung infection which she has been taking with nausea and without any relief to her symptoms. She states that she presented to Urgent Care where her oxygen saturation was at 91% so they sent her to the ED due to her history of previous blood clots. Patient is on warfarin. Denies any cardiac history or COPD. Patient denies any leg swelling, leg pain, vomiting, urinary symptoms, and any other symptoms or complaints at this time.     Of note, patient endorses a history of polymyalgia with recent worse shoulder pain. She states that she takes 5 mg prednisone daily.       REVIEW OF SYSTEMS   Except as stated in the HPI all other systems reviewed and are negative.    PAST MEDICAL HISTORY:  Past Medical History:   Diagnosis Date     2019 novel coronavirus disease (COVID-19) 7/16/2020     Acute bilateral knee pain 7/18/2017     TEOFILO (acute kidney injury) (H) 7/16/2020     Anemia, unspecified     Created by Conversion      Angioedema 12/17/2015     Anticoagulant long-term use 3/6/2017     Back pain     Created by Conversion      Chronic pain syndrome 6/1/2018     COVID-19 virus infection 7/17/2020     Depression      Diarrhea 7/16/2020     Disease of thyroid gland      Edema     Created by Conversion      Essential hypertension with goal blood pressure less than 140/90     Created by Conversion  Replacement Utility updated for latest IMO load     History of pulmonary embolism 1/1/2015    Overview:  bilateral with acute cor pulmonale      HTN (hypertension)      Hypercholesteremia      Hypokalemia 12/17/2015     Hypothyroidism      Created by Conversion  Replacement Utility updated for latest IMO load     Hypoxia 7/16/2020     Impaired fasting glucose     Created by Conversion      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     Major depressive disorder, recurrent episode (H)     Created by Conversion  Replacement Utility updated for latest IMO load     Mood disorder (H) 7/16/2020     Morbid obesity (H)      Multiple lung nodules on CT 12/17/2015     Obstructive sleep apnea (adult) (pediatric)     Created by Conversion      Osteoarthrosis involving lower leg     Created by Conversion  Replacement Utility updated for latest IMO load     Pain in joint, multiple sites     Created by Conversion      Polymyalgia rheumatica (H) 7/16/2020     Postoperative anemia due to acute blood loss 12/18/2018     Pulmonary emboli (H) 12/14/2015     Pulmonary embolism (H) 12/21/2015     Pure hypercholesterolemia     Created by Conversion      Sepsis (H) 7/28/2020     SIRS (systemic inflammatory response syndrome) (H) 7/28/2020     Unspecified cataract     Created by Conversion      Yeast infection        PAST SURGICAL HISTORY:  Past Surgical History:   Procedure Laterality Date     arthroplasty for hammertoe-2nd toe R 2000 Biebl[       BIOPSY BREAST Left 1970s     Bunion correction by Cheilectomy-had bunionectomy ? type of procedure L and R separate procedures.[       GYN SURGERY       HERNIA REPAIR       Hernia Repair Inguinal unilateral[       HYSTERECTOMY       HYSTERECTOMY  2010     IR ABSCESS TUBE CHANGE  11/9/2012     IR ABSCESS TUBE CHANGE  11/12/2012     IR ABSCESS TUBE CHANGE  12/4/2012     IR ABSCESS TUBE CHANGE  2/22/2013     IR ABSCESS TUBE CHANGE  3/1/2013     IR ABSCESS TUBE CHANGE  3/13/2013     OOPHORECTOMY  2010     ORTHOPEDIC SURGERY         CURRENT MEDICATIONS:      Current Facility-Administered Medications:      albuterol (PROVENTIL HFA/VENTOLIN HFA) inhaler, 2 puff, Inhalation, Q6H PRN, Yen Natarajan MD, 2 puff at 01/08/22 1434    Current Outpatient  Medications:      albuterol (PROAIR HFA/PROVENTIL HFA/VENTOLIN HFA) 108 (90 Base) MCG/ACT inhaler, Inhale 2 puffs into the lungs every 6 hours as needed for shortness of breath / dyspnea or wheezing, Disp: 18 g, Rfl: 0     acetaminophen 500 MG CAPS, Take 2 capsules by mouth 3 times daily as needed., Disp: , Rfl:      amLODIPine (NORVASC) 5 MG tablet, Take 1 tablet (5 mg) by mouth daily, Disp: 90 tablet, Rfl: 3     amoxicillin-clavulanate (AUGMENTIN) 875-125 MG tablet, Take 1 tablet by mouth 2 times daily for 10 days, Disp: 20 tablet, Rfl: 0     BD ULTRA-FINE 33 LANCETS, Use as directed, Disp: , Rfl:      Cholecalciferol (VITAMIN D) 400 UNITS capsule, Take 2 capsules by mouth daily., Disp: , Rfl:      citalopram (CELEXA) 40 MG tablet, Take 1 tablet (40 mg) by mouth daily, Disp: 90 tablet, Rfl: 1     furosemide (LASIX) 20 MG tablet, Take 1 tablet (20 mg) by mouth every morning, Disp: 90 tablet, Rfl: 1     gabapentin (NEURONTIN) 100 MG capsule, Take 100 mg by mouth 3 times daily, Disp: , Rfl:      Glucose Blood (ONE TOUCH ULTRA TEST) strip, by In Vitro route daily. Use as directed, Disp: , Rfl:      levothyroxine (SYNTHROID/LEVOTHROID) 137 MCG tablet, Take 1 tablet (137 mcg) by mouth daily, Disp: 90 tablet, Rfl: 3     lisinopril-hydrochlorothiazide (ZESTORETIC) 20-12.5 MG tablet, Take 2 tablets by mouth daily, Disp: 180 tablet, Rfl: 1     pravastatin (PRAVACHOL) 80 MG tablet, Take 1 tablet (80 mg) by mouth daily, Disp: 90 tablet, Rfl: 3     predniSONE (DELTASONE) 5 MG tablet, Take 1 tablet (5 mg) by mouth daily, Disp: 90 tablet, Rfl: 1     warfarin ANTICOAGULANT (COUMADIN) 2.5 MG tablet, Take 1.25 mg (half-tablet) on saturdays and 2.5 mg (one tablet) all other days or as directed by INR clinic, Disp: 30 tablet, Rfl: 0    ALLERGIES:  Allergies   Allergen Reactions     Sulfasalazine Rash     Pt reports she has never taken this med, so is not sure why it's on her allergy list       Atorvastatin      Paroxetine Unknown      Simvastatin      Sulfa Drugs        FAMILY HISTORY:  Family History   Problem Relation Age of Onset     Breast Cancer Sister 75.00     Stomach Cancer Paternal Grandfather      Lung Cancer Sister      Chronic Obstructive Pulmonary Disease Mother      Heart Disease Mother      Coronary Artery Disease Father        SOCIAL HISTORY:   Social History     Socioeconomic History     Marital status:      Spouse name: Not on file     Number of children: Not on file     Years of education: Not on file     Highest education level: Not on file   Occupational History     Not on file   Tobacco Use     Smoking status: Former Smoker     Smokeless tobacco: Never Used     Tobacco comment: quite 20 yrs ago   Substance and Sexual Activity     Alcohol use: No     Comment: Alcoholic Drinks/day: occasionally     Drug use: No     Sexual activity: Not on file   Other Topics Concern     Not on file   Social History Narrative    Patient arrived in the ED with her sister Kassy 09/10/17       Social Determinants of Health     Financial Resource Strain: Not on file   Food Insecurity: Not on file   Transportation Needs: Not on file   Physical Activity: Not on file   Stress: Not on file   Social Connections: Not on file   Intimate Partner Violence: Not on file   Housing Stability: Not on file       PHYSICAL EXAM    VITAL SIGNS: /56   Pulse 83   Temp 98.5  F (36.9  C)   Resp 20   Wt 142.4 kg (314 lb)   SpO2 (!) 88%   BMI 49.18 kg/m     GENERAL: Awake, Alert, answering questions, No acute distress, Well nourished  HEENT: Normal cephalic, Atraumatic, bilateral external ears normal, No scleral icterus, mask in place  NECK: No obvious swelling or abnormality, No stridor  PULMONARY:Normal and symmetric breath sounds, Lungs clear to auscultation bilaterally. No wheezing. Mildly short of breath.   CARDIOVASCULAR: Regular rate and rhythm, Distal pulses present and normal.  ABDOMINAL: Soft, Nondistended, Nontender, No flank tenderness,  No palpable masses  BACK: No bruising or tenderness.  EXTREMITIES: Moves all extremities spontaneously, warm, no edema, No major deformities  NEURO: No facial droop, normal motor function, Normal speech   PSYCH: Normal mood and affect  SKIN: No rashes on visualized skin, dry, warm     LAB:  All pertinent labs reviewed and interpreted.  Results for orders placed or performed during the hospital encounter of 01/08/22   CT Chest Pulmonary Embolism w Contrast    Impression    IMPRESSION:  1.  No pulmonary embolus.  2.  Clear lungs.  3.  Cholelithiasis.     Comprehensive metabolic panel   Result Value Ref Range    Sodium 137 136 - 145 mmol/L    Potassium 3.2 (L) 3.5 - 5.0 mmol/L    Chloride 95 (L) 98 - 107 mmol/L    Carbon Dioxide (CO2) 31 22 - 31 mmol/L    Anion Gap 11 5 - 18 mmol/L    Urea Nitrogen 33 (H) 8 - 28 mg/dL    Creatinine 1.32 (H) 0.60 - 1.10 mg/dL    Calcium 9.8 8.5 - 10.5 mg/dL    Glucose 95 70 - 125 mg/dL    Alkaline Phosphatase 77 45 - 120 U/L    AST 22 0 - 40 U/L    ALT 13 0 - 45 U/L    Protein Total 6.8 6.0 - 8.0 g/dL    Albumin 3.6 3.5 - 5.0 g/dL    Bilirubin Total 0.5 0.0 - 1.0 mg/dL    GFR Estimate 41 (L) >60 mL/min/1.73m2   Result Value Ref Range    INR 2.50 (H) 0.85 - 1.15   Symptomatic; Yes; 1/3/2022 Influenza A/B & SARS-CoV2 (COVID-19) Virus PCR Multiplex Nasopharyngeal    Specimen: Nasopharyngeal; Swab   Result Value Ref Range    Influenza A PCR Negative Negative    Influenza B PCR Negative Negative    SARS CoV2 PCR Negative Negative   B-Type Natriuretic Peptide ([facility] Only)   Result Value Ref Range    BNP <10 0 - 147 pg/mL   Result Value Ref Range    Troponin I 0.01 0.00 - 0.29 ng/mL   CBC with platelets and differential   Result Value Ref Range    WBC Count 10.4 4.0 - 11.0 10e3/uL    RBC Count 3.58 (L) 3.80 - 5.20 10e6/uL    Hemoglobin 9.8 (L) 11.7 - 15.7 g/dL    Hematocrit 31.1 (L) 35.0 - 47.0 %    MCV 87 78 - 100 fL    MCH 27.4 26.5 - 33.0 pg    MCHC 31.5 31.5 - 36.5 g/dL    RDW 15.2 (H)  10.0 - 15.0 %    Platelet Count 415 150 - 450 10e3/uL    % Neutrophils 52 %    % Lymphocytes 34 %    % Monocytes 8 %    % Eosinophils 4 %    % Basophils 1 %    % Immature Granulocytes 1 %    NRBCs per 100 WBC 0 <1 /100    Absolute Neutrophils 5.5 1.6 - 8.3 10e3/uL    Absolute Lymphocytes 3.6 0.8 - 5.3 10e3/uL    Absolute Monocytes 0.9 0.0 - 1.3 10e3/uL    Absolute Eosinophils 0.4 0.0 - 0.7 10e3/uL    Absolute Basophils 0.1 0.0 - 0.2 10e3/uL    Absolute Immature Granulocytes 0.1 <=0.4 10e3/uL    Absolute NRBCs 0.0 10e3/uL   Extra Red Top Tube   Result Value Ref Range    Hold Specimen JIC    Extra Purple Top Tube   Result Value Ref Range    Hold Specimen JIC        RADIOLOGY:  CT Chest Pulmonary Embolism w Contrast   Final Result   IMPRESSION:   1.  No pulmonary embolus.   2.  Clear lungs.   3.  Cholelithiasis.             EKG:    Date and time: January 8, 2022 at 1232  Rate: 83 bpm  Rhythm: Sinus rhythm with sinus arrhythmia  MD interval: 158 ms  QRS interval: 94 ms  QT/QTc: 402/472 ms  ST changes or T wave changes: No acute ST or T wave abnormalities  Change from prior ECG:   No significant change from priorI have independently reviewed and interpreted this EKG.     I, Valerie Mcmillan, am serving as a scribe to document services personally performed by Dr. Mena based on my observation and the provider's statements to me. I, Mary Mena MD attest that Valerie Mcmillan is acting in a scribe capacity, has observed my performance of the services and has documented them in accordance with my direction.    Mary Mena M.D.  Emergency Medicine  [facility] EMERGENCY DEPARTMENT  [address] [id number] [phone]  Dept: [phone]      Mary Mena MD  01/08/22 5626

## 2022-01-08 NOTE — ED PROVIDER NOTES
Expected Patient Referral to ED  11:49 AM    Referring Clinic/Provider:  [facility]  Reason for referral/Clinical facts:  Sob 92% RA  History of PE on warfarin    Recommendations provided:  Send to ED for further evaluation    Caller was informed that this institution does possess the capabilities and/or resources to provide for patient and should be transferred to our facility.    Discussed that if direct admit is sought and any hurdles are encountered, this ED would be happy to see the patient and evaluate.    Informed caller that recommendations provided are recommendations based only on the facts provided and that they responsible to accept or reject the advice, or to seek a formal in person consultation as needed and that this ED will see/treat patient should they arrive.      Zohreh West MD  Emergency Medicine  [facility] EMERGENCY DEPARTMENT  [address] [place] [id number] [phone]       Zohreh West MD  01/08/22 3551

## 2022-01-08 NOTE — ED TRIAGE NOTES
Pt reports cough, sob x 3 days. O2 sat 86% after amb to triage. 91% at rest. Was seen at  and sent to ED for further eval.

## 2022-01-08 NOTE — DISCHARGE INSTRUCTIONS
You were seen in the Emergency Department today for evaluation of shortness of breath.  Your lab work showed no cause of your symptoms. Your imaging studies showed no significant cause of your symptoms. You were prescribed an albuterol inhaler to use as needed. You should take all medications as prescribed.  Follow up with your primary care physician to ensure resolution of symptoms. Return if you have new or worsening symptoms.

## 2022-01-08 NOTE — ED PROVIDER NOTES
ED Triage Provider Note  [facility]  Encounter Date: Jan 8, 2022      The patient was seen in triage to expedite ED workup.     History:  Chief Complaint   Patient presents with     Shortness of Breath     Nancy Oropeza is a 76 year old female who presents to the ED, sent over from the clinic. They recommended her to present to the ED to rule out PE being subtherapeutic on warfarin X 2 weeks with hx of PE as cause for chronic anticoagulation. She endorses worsening shortness of breath with exertion. Denies use of albuterol. When she coughs, she endorses RLL. She has never had a blood clot. The patient developed a cough 6 days ago (1/2). She is vaccinated. Her daughter . Denies fever, vomiting, or diarrhea today.    She is on Augmentin 3 days ago for sinusitis.  After ambulating to triage, O2 sats recorded at 86%, 91% at rest.    Review of Systems:  Respiratory: Positive for shortness of breath, cough    Vitals:  BP (!) 189/84   Pulse 109   Temp 98.5  F (36.9  C)   Resp 20   Wt 142.4 kg (314 lb)   SpO2 91%   BMI 49.18 kg/m      Brief Exam:  PUL: Mild bi basilar wheezing, good air entry and no retractions.    Medical Decision Making:  Patient arriving to the ED with problem as above. A medical screening exam was performed. Orders initiated from triage  to expedite ED workup. Patient given albuterol MDI with wheeze, [id number] PCR pending and CTA r/o PE with prior PE and hypoxia with  and sat 91% RA, BNP pending      The patient is appropriate to wait in triage until ED room available.      Yen Natarajan MD  01/08/22  Emergency Medicine  [facility] EMERGENCY DEPARTMENT  [facility] [facility] [address]  [phone]  Dept: [phone]       Yen Natarajan MD  01/08/22 2700

## 2022-01-09 LAB
ATRIAL RATE - MUSE: 83 BPM
DIASTOLIC BLOOD PRESSURE - MUSE: NORMAL MMHG
INR (EXTERNAL): 1.8 (ref 0.9–1.1)
INTERPRETATION ECG - MUSE: NORMAL
P AXIS - MUSE: NORMAL DEGREES
PR INTERVAL - MUSE: 158 MS
QRS DURATION - MUSE: 94 MS
QT - MUSE: 402 MS
QTC - MUSE: 472 MS
R AXIS - MUSE: 45 DEGREES
SARS-COV-2 RNA RESP QL NAA+PROBE: NEGATIVE
SYSTOLIC BLOOD PRESSURE - MUSE: NORMAL MMHG
T AXIS - MUSE: 113 DEGREES
VENTRICULAR RATE- MUSE: 83 BPM

## 2022-01-10 ENCOUNTER — ANTICOAGULATION THERAPY VISIT (OUTPATIENT)
Dept: ANTICOAGULATION | Facility: CLINIC | Age: 77
End: 2022-01-10
Payer: COMMERCIAL

## 2022-01-10 NOTE — TELEPHONE ENCOUNTER
Okay to use gabapentin with her other medication.  She may use Mucinex OTC per routine instruction.

## 2022-01-10 NOTE — PROGRESS NOTES
ANTICOAGULATION MANAGEMENT     Nancy Oropeza 76 year old female is on warfarin with subtherapeutic INR result. (Goal INR 2.0-3.0)    Recent labs: (last 7 days)     01/09/22  0000   INR 1.8*       ASSESSMENT     Source(s): Chart Review and Patient/Caregiver Call     Warfarin doses taken: Warfarin taken as instructed  Diet: No new diet changes identified  New illness, injury, or hospitalization: Yes: Patient seen in ED 1/8   for shortness of breath and Viral infection. INR 2.5. Negative for pneumonia, flu and COVID.  Medication/supplement changes:  Patient has been taking Augmentin since 1/5, 10 day course for Sinusitis. Negative for pneumonia, flu and COVID. Started on Albuterol at discharge.  Signs or symptoms of bleeding or clotting: No  Previous INR: Therapeutic last visit; previously outside of goal range  Additional findings: Per Micromedex: Warfarin and Augmentin interaction: Concurrent use of AMOXICILLIN and WARFARIN may result in an increased risk of bleeding. Discussed with patient. Requested patient test again this week due to illness and medications.     PLAN     Recommended plan for temporary change(s) affecting INR     Dosing Instructions: Take booster dose of 1.25 mgs today, then resume maintenance dose with next INR in 4 days       Summary  As of 1/10/2022    Full warfarin instructions:  1.25 mg every Sat; 2.5 mg all other days   Next INR check:  1/14/2022             Telephone call with Nancy who agrees to plan and repeated back plan correctly    Patient to recheck with home meter    Education provided: Please call back if any changes to your diet, medications or how you've been taking warfarin and Potential interaction between warfarin and Augmentin    Plan made per [facility] anticoagulation protocol    Vicky Mcclain RN  Anticoagulation Clinic  1/10/2022    _______________________________________________________________________     Anticoagulation Episode Summary     Current INR goal:  2.0-3.0    TTR:  54.4 % (1 y)   Target end date:  Indefinite   Send INR reminders to:  XENIA JEANOTA    Indications    Pulmonary embolism (H) [I26.99]           Comments:           Anticoagulation Care Providers     Provider Role Specialty Phone number    Julien Garnica MD Referring Family Medicine [phone]

## 2022-01-10 NOTE — TELEPHONE ENCOUNTER
Patient was notified of 's recommendations. Patient agrees to the plan of care. I transferred patient to the scheduling line.

## 2022-01-10 NOTE — PROGRESS NOTES
Anticoagulation Management    Unable to reach Nancy today.    1/8/2022 ED visit  INR result of 2.5 is therapeutic (goal INR of 2.0-3.0).  Result received from: ED, [facility]    Follow up required to confirm warfarin dose taken and assess for changes and discuss dosing instructions and confirm understanding of instructions    Left voice message requesting return call.    Patient seen in ED 1/8 for viral symptoms. Discharged to home.     Anticoagulation clinic to follow up    Vicky Mcclain RN

## 2022-01-12 DIAGNOSIS — E78.00 PURE HYPERCHOLESTEROLEMIA: ICD-10-CM

## 2022-01-13 LAB
BACTERIA BLD CULT: NO GROWTH
BACTERIA BLD CULT: NO GROWTH

## 2022-01-14 RX ORDER — PRAVASTATIN SODIUM 80 MG/1
TABLET ORAL
Qty: 90 TABLET | Refills: 3 | Status: SHIPPED | OUTPATIENT
Start: 2022-01-14 | End: 2022-04-12

## 2022-01-16 ENCOUNTER — TRANSFERRED RECORDS (OUTPATIENT)
Dept: HEALTH INFORMATION MANAGEMENT | Facility: CLINIC | Age: 77
End: 2022-01-16
Payer: COMMERCIAL

## 2022-01-16 LAB — INR (EXTERNAL): 4.3 (ref 0.9–1.1)

## 2022-01-17 ENCOUNTER — ANTICOAGULATION THERAPY VISIT (OUTPATIENT)
Dept: FAMILY MEDICINE | Facility: CLINIC | Age: 77
End: 2022-01-17
Payer: COMMERCIAL

## 2022-01-17 DIAGNOSIS — I26.99 PULMONARY EMBOLISM (H): Primary | ICD-10-CM

## 2022-01-17 NOTE — PROGRESS NOTES
ANTICOAGULATION MANAGEMENT     Nancy Oropeza 76 year old female is on warfarin with supratherapeutic INR result. (Goal INR 2.0-3.0)    Recent labs: (last 7 days)     01/16/22  0000   INR 4.3*       ASSESSMENT     Source(s): Chart Review and Patient/Caregiver Call     Warfarin doses taken: More warfarin taken than planned which may be affecting INR  Diet: patient just started regular diet, was only able to eat soup  New illness, injury, or hospitalization: No, patient is feeling better than last week but is still not at baseline.  Medication/supplement changes: None noted  Signs or symptoms of bleeding or clotting: No  Previous INR: Subtherapeutic  Additional findings: None     PLAN     Recommended plan for temporary change(s) affecting INR     Dosing Instructions: Continue your current warfarin dose with next INR in 1 week.  Patient held dose yesterday after seeing INR result of 4.3.     Summary  As of 1/17/2022    Full warfarin instructions:  1.25 mg every Sat; 2.5 mg all other days   Next INR check:  1/24/2022             Telephone call with Nancy who verbalizes understanding and agrees to plan    Patient to recheck with home meter    Education provided: Goal range and significance of current result, Importance of therapeutic range, Importance of following up at instructed interval, Importance of taking warfarin as instructed, Monitoring for bleeding signs and symptoms and When to seek medical attention/emergency care    Plan made per [facility] anticoagulation protocol    Evie Lagos RN  Anticoagulation Clinic  1/17/2022    _______________________________________________________________________     Anticoagulation Episode Summary     Current INR goal:  2.0-3.0   TTR:  53.2 % (1 y)   Target end date:  Indefinite   Send INR reminders to:  XENIA MURILLO    Indications    Pulmonary embolism (H) [I26.99]           Comments:           Anticoagulation Care Providers     Provider Role Specialty Phone number     Julien Garnica MD [facility] [phone]

## 2022-01-23 ENCOUNTER — TRANSFERRED RECORDS (OUTPATIENT)
Dept: HEALTH INFORMATION MANAGEMENT | Facility: CLINIC | Age: 77
End: 2022-01-23
Payer: COMMERCIAL

## 2022-01-23 LAB — INR (EXTERNAL): 4.6 (ref 0.9–1.1)

## 2022-01-24 ENCOUNTER — ANTICOAGULATION THERAPY VISIT (OUTPATIENT)
Dept: ANTICOAGULATION | Facility: CLINIC | Age: 77
End: 2022-01-24
Payer: COMMERCIAL

## 2022-01-24 ENCOUNTER — TELEPHONE (OUTPATIENT)
Dept: FAMILY MEDICINE | Facility: CLINIC | Age: 77
End: 2022-01-24
Payer: COMMERCIAL

## 2022-01-24 DIAGNOSIS — I26.99 PULMONARY EMBOLISM (H): Primary | ICD-10-CM

## 2022-01-24 NOTE — TELEPHONE ENCOUNTER
See [facility] encounter for details.  Melida Bergman, RN  Anticoagulation Nurse - [facility], [place]

## 2022-01-24 NOTE — PROGRESS NOTES
ANTICOAGULATION MANAGEMENT     Nancy Oropeza 76 year old female is on warfarin with supratherapeutic INR result. (Goal INR 2.0-3.0)    Recent labs: (last 7 days)     01/23/22  1049   INR 4.6*       ASSESSMENT     Source(s): Chart Review and Patient/Caregiver Call     Warfarin doses taken: Warfarin taken as instructed  Diet: No new diet changes identified  New illness, injury, or hospitalization: No  Medication/supplement changes: Gabapentin and 5 mg prednisone  Signs or symptoms of bleeding or clotting: increased brusing on arms when bumped.   Previous INR: Supratherapeutic  Additional findings: Pain     PLAN     Recommended plan for temporary change(s) affecting INR     Dosing Instructions: Hold dose then Decrease your warfarin dose (8% change) with next INR in 1 week       Summary  As of 1/24/2022    Full warfarin instructions:  1/24: Hold; Otherwise 1.25 mg every Thu, Sat; 2.5 mg all other days   Next INR check:  1/31/2022             Telephone call with Nancy who verbalizes understanding and agrees to plan    Patient to recheck with home meter    Education provided: Please call back if any changes to your diet, medications or how you've been taking warfarin, Monitoring for bleeding signs and symptoms and Monitoring for clotting signs and symptoms    Plan made per [facility] anticoagulation protocol    Melida Bergman, RN  Anticoagulation Clinic  1/24/2022    _______________________________________________________________________     Anticoagulation Episode Summary     Current INR goal:  2.0-3.0   TTR:  53.0 % (1 y)   Target end date:  Indefinite   Send INR reminders to:  XENIA MURILLO    Indications    Pulmonary embolism (H) [I26.99]           Comments:           Anticoagulation Care Providers     Provider Role Specialty Phone number    Julien Garnica MD Referring Family Medicine [phone]

## 2022-01-24 NOTE — PROGRESS NOTES
Anticoagulation Management    Unable to reach Nancy today.    Today's INR result of 4.6 is supratherapeutic (goal INR of 2.0-3.0).  Result received from: Home Monitor    Follow up required to confirm warfarin dose taken and assess for changes    Left message to give us a call back as INR is from Yesterday. Did she do anything different with coumadin dosing?      Anticoagulation clinic to follow up    Melida Bergman RN

## 2022-02-01 ENCOUNTER — TRANSFERRED RECORDS (OUTPATIENT)
Dept: HEALTH INFORMATION MANAGEMENT | Facility: CLINIC | Age: 77
End: 2022-02-01
Payer: COMMERCIAL

## 2022-02-02 ENCOUNTER — DOCUMENTATION ONLY (OUTPATIENT)
Dept: FAMILY MEDICINE | Facility: CLINIC | Age: 77
End: 2022-02-02
Payer: COMMERCIAL

## 2022-02-02 DIAGNOSIS — M35.3 POLYMYALGIA RHEUMATICA (H): ICD-10-CM

## 2022-02-02 DIAGNOSIS — I10 ESSENTIAL HYPERTENSION WITH GOAL BLOOD PRESSURE LESS THAN 140/90: ICD-10-CM

## 2022-02-02 NOTE — PROGRESS NOTES
ANTICOAGULATION     Nancy Oropeza is overdue for INR check.      Mychart sent    Iram Alexander RN

## 2022-02-03 ENCOUNTER — VIRTUAL VISIT (OUTPATIENT)
Dept: FAMILY MEDICINE | Facility: CLINIC | Age: 77
End: 2022-02-03
Payer: COMMERCIAL

## 2022-02-03 ENCOUNTER — TRANSFERRED RECORDS (OUTPATIENT)
Dept: HEALTH INFORMATION MANAGEMENT | Facility: CLINIC | Age: 77
End: 2022-02-03

## 2022-02-03 ENCOUNTER — ANTICOAGULATION THERAPY VISIT (OUTPATIENT)
Dept: FAMILY MEDICINE | Facility: CLINIC | Age: 77
End: 2022-02-03

## 2022-02-03 DIAGNOSIS — R10.84 ABDOMINAL PAIN, GENERALIZED: ICD-10-CM

## 2022-02-03 DIAGNOSIS — R19.7 DIARRHEA, UNSPECIFIED TYPE: Primary | ICD-10-CM

## 2022-02-03 LAB — INR (EXTERNAL): 4.5 (ref 0.9–1.1)

## 2022-02-03 PROCEDURE — 99213 OFFICE O/P EST LOW 20 MIN: CPT | Mod: 95 | Performed by: FAMILY MEDICINE

## 2022-02-03 NOTE — PROGRESS NOTES
Nancy is a 76 year old who is being evaluated via a billable telephone visit.      What phone number would you like to be contacted at? [phone]  Pt would like for daughter to join in with the telephone visit today.     How would you like to obtain your AVS? Nisha    Assessment & Plan   76-year-old female with GI symptoms including abdominal pain, intermittent diarrhea and constipation, and vomiting.  She is a challenging historian and it was helpful for her daughter to interject with the conversations today.  It does not appear that she is constipated as initially thought because she is having stools about every other day, they are just looser and not normal for her.  She does have a history of diverticulosis and diverticulitis remains in the differential.  Is also concerned that she had some episodes of vomiting.  For this reason, I like to do a CT scan of the abdomen and pelvis.  I recommend she start fiber to bulk her stools and to help with any existing diverticulosis.  While we are awaiting results of the CT scan, if she develops abrupt onset of pain, fevers blood in her stools or vomiting she should be seen in the emergency room.    Diarrhea, unspecified type  - CT Abdomen Pelvis w Contrast    Abdominal pain, generalized  - CT Abdomen Pelvis w Contrast      Anna Watters MD  [facility]   Nancy is a 76 year old who presents for the following health issues.  Her daughter was on the phone call with us today.  HPI     -Stomach has been hurting. She is feeling constipated.  Stomach hurt so bad, radiated around back to back and vomited.  She is wondering if it was the nuts she ate.  No blood or mucous. No fevers.  Hx of diverticulosis.  Ultimately she was a difficult historian.  While she stated she was constipated, she also states that today she had initially smaller pellets of stool but then copious amounts of loose or thinner stool after.  She had stated she  really had not had a good bowel movement since last Friday although she had at least 2 or 3 of these as described above this week.  She is experiencing some abdominal pain.  No fevers no blood or mucus in her stools.  She is experiencing some back pain.  Last week she had 2 episodes of abrupt onset vomiting that were associated with diarrhea.    Review of Systems   Constitutional, HEENT, cardiovascular, pulmonary, gi and gu systems are negative, except as otherwise noted.      Objective           Vitals:  No vitals were obtained today due to virtual visit.    Physical Exam   healthy, alert and no distress  PSYCH: Alert and oriented times 3; coherent speech, normal   rate and volume, able to articulate logical thoughts, able   to abstract reason, no tangential thoughts, no hallucinations   or delusions  Her affect is normal  RESP: No cough, no audible wheezing, able to talk in full sentences  Remainder of exam unable to be completed due to telephone visits          Phone call duration: 20 minutes

## 2022-02-03 NOTE — PROGRESS NOTES
ANTICOAGULATION MANAGEMENT     Nancy Oropeza 76 year old female is on warfarin with supratherapeutic INR result. (Goal INR 2.0-3.0)    Recent labs: (last 7 days)     02/03/22  1440   INR 4.5*       ASSESSMENT     Source(s): Chart Review and Patient/Caregiver Call     Warfarin doses taken: Warfarin taken as instructed  Diet: Nausea  New illness, injury, or hospitalization: No  Medication/supplement changes: Stopping Gabapentin  Signs or symptoms of bleeding or clotting: No  Previous INR: Supratherapeutic  Additional findings: Patient is having Nausea/Constipation/Stomach issues/Occasstional Loose stool. She will be having a CT for further investigation. Gapapentin stopped as it was not helping shoulder pain. Patient is attending PT of Shoulder     PLAN     Recommended plan for temporary change(s) affecting INR     Dosing Instructions: Hold dose then Decrease your warfarin dose (16% change) with next INR in 1 week   (Patient already took coumadin dosing today, she will hold dose tomorrow).    Summary  As of 2/3/2022    Full warfarin instructions:  2/4: Hold; Otherwise 2.5 mg every Mon, Wed, Fri; 1.25 mg all other days   Next INR check:  2/10/2022             Telephone call with Nancy and Daughter Maisha who verbalizes understanding and agrees to plan    Patient to recheck with home meter    Education provided: Please call back if any changes to your diet, medications or how you've been taking warfarin, Monitoring for bleeding signs and symptoms and Monitoring for clotting signs and symptoms    Plan made per [facility] anticoagulation protocol    Melida Bergman, RN  Anticoagulation Clinic  2/3/2022    _______________________________________________________________________     Anticoagulation Episode Summary     Current INR goal:  2.0-3.0   TTR:  52.5 % (1 y)   Target end date:  Indefinite   Send INR reminders to:  XENIA MURILLO    Indications    Pulmonary embolism (H) [I26.99]           Comments:            Anticoagulation Care Providers     Provider Role Specialty Phone number    Julien Garnica MD Referring Family Medicine [phone]

## 2022-02-04 RX ORDER — PREDNISONE 5 MG/1
5 TABLET ORAL DAILY
Qty: 90 TABLET | Refills: 1 | Status: SHIPPED | OUTPATIENT
Start: 2022-02-04 | End: 2022-02-15

## 2022-02-04 RX ORDER — AMLODIPINE BESYLATE 5 MG/1
TABLET ORAL
Qty: 90 TABLET | Refills: 3 | Status: SHIPPED | OUTPATIENT
Start: 2022-02-04 | End: 2023-01-01

## 2022-02-04 NOTE — TELEPHONE ENCOUNTER
"Routing refill request to provider for review/approval because:  Drug not on the [facility] refill protocol   Labs out of range:  CR  Change in pharmacy for both medications.     Last Written Prescription Date:  10/4/21  Last Fill Quantity: 90,  # refills: 3   Last office visit provider:       Last Written Prescription Date:  10/4/21  Last Fill Quantity: 90,  # refills: 1     Requested Prescriptions   Pending Prescriptions Disp Refills     amLODIPine (NORVASC) 5 MG tablet [Pharmacy Med Name: AMLODIPINE BESYLATE TABS 5MG] 90 tablet 3     Sig: TAKE 1 TABLET DAILY (NEW   DOSE 6/1/18)       Calcium Channel Blockers Protocol  Failed - 2/2/2022 12:43 PM        Failed - Normal serum creatinine on file in past 12 months     Recent Labs   Lab Test 01/08/22  1252   CR 1.32*       Ok to refill medication if creatinine is low          Passed - Blood pressure under 140/90 in past 12 months     BP Readings from Last 3 Encounters:   01/08/22 121/56   01/08/22 (!) 151/83   07/09/21 124/66                 Passed - Recent (12 mo) or future (30 days) visit within the authorizing provider's specialty     Patient has had an office visit with the authorizing provider or a provider within the authorizing providers department within the previous 12 mos or has a future within next 30 days. See \"Patient Info\" tab in inbasket, or \"Choose Columns\" in Meds & Orders section of the refill encounter.              Passed - Medication is active on med list        Passed - Patient is age 18 or older        Passed - No active pregnancy on record        Passed - No positive pregnancy test in past 12 months           predniSONE (DELTASONE) 5 MG tablet 90 tablet 1     Sig: Take 1 tablet (5 mg) by mouth daily       There is no refill protocol information for this order          Aretha Gutierrez RN 02/04/22 11:25 AM  "

## 2022-02-07 ENCOUNTER — HOSPITAL ENCOUNTER (OUTPATIENT)
Dept: CT IMAGING | Facility: HOSPITAL | Age: 77
End: 2022-02-07
Attending: FAMILY MEDICINE
Payer: COMMERCIAL

## 2022-02-07 PROCEDURE — 250N000011 HC RX IP 250 OP 636: Performed by: FAMILY MEDICINE

## 2022-02-07 PROCEDURE — 74177 CT ABD & PELVIS W/CONTRAST: CPT

## 2022-02-07 RX ORDER — IOPAMIDOL 755 MG/ML
75 INJECTION, SOLUTION INTRAVASCULAR ONCE
Status: COMPLETED | OUTPATIENT
Start: 2022-02-07 | End: 2022-02-07

## 2022-02-07 RX ADMIN — IOPAMIDOL 75 ML: 755 INJECTION, SOLUTION INTRAVENOUS at 21:08

## 2022-02-08 ENCOUNTER — TELEPHONE (OUTPATIENT)
Dept: FAMILY MEDICINE | Facility: CLINIC | Age: 77
End: 2022-02-08
Payer: COMMERCIAL

## 2022-02-08 ENCOUNTER — ANTICOAGULATION THERAPY VISIT (OUTPATIENT)
Dept: ANTICOAGULATION | Facility: CLINIC | Age: 77
End: 2022-02-08
Payer: COMMERCIAL

## 2022-02-08 LAB — INR (EXTERNAL): 2.4 (ref 0.9–1.1)

## 2022-02-08 NOTE — PROGRESS NOTES
ANTICOAGULATION MANAGEMENT     Nancy Oropeza 76 year old female is on warfarin with therapeutic INR result. (Goal INR 2.0-3.0)    Recent labs: (last 7 days)     02/08/22  1638   INR 2.4*       ASSESSMENT     Source(s): Chart Review and Patient/Caregiver Call     Warfarin doses taken: Warfarin taken as instructed  Diet: No new diet changes identified  New illness, injury, or hospitalization: No  Medication/supplement changes: None noted  Signs or symptoms of bleeding or clotting: No  Previous INR: Supratherapeutic  Additional findings: None     PLAN     Recommended plan for no diet, medication or health factor changes affecting INR     Dosing Instructions: Continue your current warfarin dose with next INR in 1 week       Summary  As of 2/8/2022    Full warfarin instructions:  2.5 mg every Mon, Wed, Fri; 1.25 mg all other days   Next INR check:  2/15/2022             Detailed voice message left for Nancy with dosing instructions and follow up date.     Patient to recheck with home meter    Education provided: Please call back if any changes to your diet, medications or how you've been taking warfarin, Monitoring for bleeding signs and symptoms and Monitoring for clotting signs and symptoms    Plan made per [facility] anticoagulation protocol    Melida Bergman, RN  Anticoagulation Clinic  2/8/2022    _______________________________________________________________________     Anticoagulation Episode Summary     Current INR goal:  2.0-3.0   TTR:  51.5 % (1 y)   Target end date:  Indefinite   Send INR reminders to:  XENIA MURILLO    Indications    Pulmonary embolism (H) [I26.99]           Comments:           Anticoagulation Care Providers     Provider Role Specialty Phone number    Julien Garnica MD Referring Family Medicine [phone]

## 2022-02-08 NOTE — TELEPHONE ENCOUNTER
Patient is calling wanting Dr. Watters to review her CT results and contact her as soon as she can as she is really worried about them. Please review and advise

## 2022-02-08 NOTE — TELEPHONE ENCOUNTER
Call patient with results.  Her symptoms have not completely resolved.  Would recommend monitoring if symptoms return, consider consultation with surgery for discussions about possible seroma.

## 2022-02-15 ENCOUNTER — ANTICOAGULATION THERAPY VISIT (OUTPATIENT)
Dept: ANTICOAGULATION | Facility: CLINIC | Age: 77
End: 2022-02-15

## 2022-02-15 ENCOUNTER — OFFICE VISIT (OUTPATIENT)
Dept: FAMILY MEDICINE | Facility: CLINIC | Age: 77
End: 2022-02-15
Payer: COMMERCIAL

## 2022-02-15 ENCOUNTER — TRANSFERRED RECORDS (OUTPATIENT)
Dept: HEALTH INFORMATION MANAGEMENT | Facility: CLINIC | Age: 77
End: 2022-02-15

## 2022-02-15 VITALS
OXYGEN SATURATION: 95 % | BODY MASS INDEX: 47.77 KG/M2 | DIASTOLIC BLOOD PRESSURE: 60 MMHG | WEIGHT: 293 LBS | HEART RATE: 115 BPM | SYSTOLIC BLOOD PRESSURE: 110 MMHG

## 2022-02-15 DIAGNOSIS — M19.011 PRIMARY OSTEOARTHRITIS OF RIGHT SHOULDER: Primary | ICD-10-CM

## 2022-02-15 DIAGNOSIS — I26.99 PULMONARY EMBOLISM, UNSPECIFIED CHRONICITY, UNSPECIFIED PULMONARY EMBOLISM TYPE, UNSPECIFIED WHETHER ACUTE COR PULMONALE PRESENT (H): ICD-10-CM

## 2022-02-15 DIAGNOSIS — G89.29 CHRONIC BILATERAL LOW BACK PAIN WITH RIGHT-SIDED SCIATICA: ICD-10-CM

## 2022-02-15 DIAGNOSIS — R06.02 SHORTNESS OF BREATH: ICD-10-CM

## 2022-02-15 DIAGNOSIS — M35.3 POLYMYALGIA RHEUMATICA (H): ICD-10-CM

## 2022-02-15 DIAGNOSIS — E66.01 MORBID OBESITY (H): ICD-10-CM

## 2022-02-15 DIAGNOSIS — F33.0 MILD EPISODE OF RECURRENT MAJOR DEPRESSIVE DISORDER (H): ICD-10-CM

## 2022-02-15 DIAGNOSIS — M54.41 CHRONIC BILATERAL LOW BACK PAIN WITH RIGHT-SIDED SCIATICA: ICD-10-CM

## 2022-02-15 LAB — INR BLD: 3.1 (ref 0.9–1.1)

## 2022-02-15 PROCEDURE — 99215 OFFICE O/P EST HI 40 MIN: CPT | Performed by: FAMILY MEDICINE

## 2022-02-15 PROCEDURE — 96127 BRIEF EMOTIONAL/BEHAV ASSMT: CPT | Performed by: FAMILY MEDICINE

## 2022-02-15 PROCEDURE — 36416 COLLJ CAPILLARY BLOOD SPEC: CPT | Performed by: FAMILY MEDICINE

## 2022-02-15 PROCEDURE — 85610 PROTHROMBIN TIME: CPT | Performed by: FAMILY MEDICINE

## 2022-02-15 ASSESSMENT — PATIENT HEALTH QUESTIONNAIRE - PHQ9
SUM OF ALL RESPONSES TO PHQ QUESTIONS 1-9: 13
10. IF YOU CHECKED OFF ANY PROBLEMS, HOW DIFFICULT HAVE THESE PROBLEMS MADE IT FOR YOU TO DO YOUR WORK, TAKE CARE OF THINGS AT HOME, OR GET ALONG WITH OTHER PEOPLE: NOT DIFFICULT AT ALL
SUM OF ALL RESPONSES TO PHQ QUESTIONS 1-9: 13

## 2022-02-15 ASSESSMENT — ANXIETY QUESTIONNAIRES
5. BEING SO RESTLESS THAT IT IS HARD TO SIT STILL: SEVERAL DAYS
GAD7 TOTAL SCORE: 13
2. NOT BEING ABLE TO STOP OR CONTROL WORRYING: MORE THAN HALF THE DAYS
3. WORRYING TOO MUCH ABOUT DIFFERENT THINGS: SEVERAL DAYS
GAD7 TOTAL SCORE: 13
7. FEELING AFRAID AS IF SOMETHING AWFUL MIGHT HAPPEN: MORE THAN HALF THE DAYS
1. FEELING NERVOUS, ANXIOUS, OR ON EDGE: NEARLY EVERY DAY
4. TROUBLE RELAXING: MORE THAN HALF THE DAYS
GAD7 TOTAL SCORE: 13
6. BECOMING EASILY ANNOYED OR IRRITABLE: MORE THAN HALF THE DAYS
7. FEELING AFRAID AS IF SOMETHING AWFUL MIGHT HAPPEN: MORE THAN HALF THE DAYS

## 2022-02-15 NOTE — PROGRESS NOTES
Assessment/Plan:    Primary osteoarthritis of right shoulder  Advanced osteoarthritis right shoulder. Likely need for total shoulder replacement surgery. Working with Dr. Bennett with [facility] and will determine schedule for future shoulder replacement procedure. Long discussion regarding postsurgical rehab and recovery anticipated.    Morbid obesity (H)  Morbid obesity history. Has lost 9 pounds since October 7, 2021 and continues attempts at dietary modification. Weight goal remains less than 300 pounds ideally.    Polymyalgia rheumatica (H)  Polymyalgia rheumatica history. Remains off prednisone. Current symptoms of right shoulder pain consistent with osteoarthritis and not recurrence of PMR issues.    Shortness of breath  Shortness of breath concerns for which she had been seen through emergency department January 8, 2022. Had completed course of Augmentin for question of sinusitis versus pneumonia. Testing for COVID-19 and influenza were negative. Initial O2 sats with hypoxia subsequent improvement before ER discharge and had to utilize albuterol MDI on short period of time with benefits described.    Pulmonary embolism, unspecified chronicity, unspecified pulmonary embolism type, unspecified whether acute cor pulmonale present (H)  Continues warfarin anticoagulation 2.5 mg using half tablet Wednesdays and Saturdays otherwise 1 tablet daily.  - INR point of care    Mild episode of recurrent major depressive disorder (H)  PHQ-9 questionnaire 13 out of 27 with TORIE-7 questionnaire 13 out of 21, stable and will continue to follow closely.    Chronic bilateral low back pain with right-sided sciatica  Due to chronic low back pain with concerns for mobility safety did recommend use of Standard H200 UP Walker.  Order provided for durable medical equipment.    The following high BMI interventions were performed this visit: encouragement to exercise, weight monitoring, weight loss from baseline weight and  lifestyle education regarding diet .  Ensure ongoing efforts to achieve weight goal < 200 pounds initially, < 280 pounds ideally.     Answers for HPI/ROS submitted by the patient on 2/15/2022  If you checked off any problems, how difficult have these problems made it for you to do your work, take care of things at home, or get along with other people?: Not difficult at all  PHQ9 TOTAL SCORE: 13  TORIE 7 TOTAL SCORE: 13    40 minutes total time spent on the date of encounter including patient chart review, counseling and coordination of cares, and documentation.           Subjective:    Nancy Oropeza is seen today for evaluation of ongoing right shoulder pain. Also low back pain. Trial gabapentin previously by Dr. [facility]. Use 100 mg 3 times daily without significant described benefit. Is considering shoulder replacement surgery. Quality of life is poor due to shoulder pain. Also has lower back issues for which she might receive additional injections or rhizotomy procedure etc. in the future. Waiting insurance coverage for this. Continues chronic warfarin anticoagulation with PE history. Prednisone historically for polymyalgia rheumatica which seems to have resolved and no longer on prednisone. Underlying depression remained stable as well. Patient's daughter Airam is with today to assist. Reviewed ER assessment January 8, 2022 through [facility] emergency department with initial hypoxia which seemed to improve during ER visit and then subsequently O2 sats of now remained closer to 95 to 96%. Not requiring albuterol MDI any longer. Comprehensive review of systems as above otherwise all negative. Is right-hand dominant.  Chronic lower back pain with right-sided sciatica historically.  Falls risk and would benefit as well from upright walker to assist with transfers and ambulation.       5 children (Airam, Nancy, Lakeisha, etc.) - son with Factor V abnormality  14 grandchildren   9  great-grandchildren   Grew up in [place] [facility] [place] 12...   No smoke (quit 16 years ago after 1 ppd x 30+ years)   EtOH: occ wine   Mom -  88 COPD   Dad -  61 massive MI   1 bro -  67 blood clot (lung)   2 sis - one of which is  age 62 small cell lung CA (h/o smoker)  Surgeries: ventral hernia repair with muscle flap 10/4/12 with post-op complication of seroma with J.P. drain in place followed by interventional radiology q 2 weeks);  age 25; groin hernia age 3; CAPRICE-BSO  by Dr. Herbert Carmen (due to benign cyst x 2); right TKA 18 (Dr. Small)  Hospitalizations: as above   Work: retired  ([employer] as )     12/18/15 FYI: Patient was admitted for dyspnea, secondary to bilateral multiple pulmonary emboli. She overall did well and was discharged home on Lovenox, relatively high dose given her weight as well as Warfarin. I would like her to be seen today at your clinic. I believe she has an appointment for INR, CBC, and BMP check as well as re-dosing of her Warfarin. I suspect she will need an additional day of Lovenox as she is not quite therapeutic today. You can refer to my discharge summary for the INR's and the Warfarin dosing. Thank you. Dr. Garza      Past Surgical History:   Procedure Laterality Date     arthroplasty for cariertogerardo-2nd toe R  Biebl[       BIOPSY BREAST Left 1970s     Bunion correction by Cheilectomy-had bunionectomy ? type of procedure L and R separate procedures.[       GYN SURGERY       HERNIA REPAIR       Hernia Repair Inguinal unilateral[       HYSTERECTOMY       HYSTERECTOMY  2010     IR ABSCESS TUBE CHANGE  2012     IR ABSCESS TUBE CHANGE  2012     IR ABSCESS TUBE CHANGE  2012     IR ABSCESS TUBE CHANGE  2013     IR ABSCESS TUBE CHANGE  3/1/2013     IR ABSCESS TUBE CHANGE  3/13/2013     OOPHORECTOMY  2010     ORTHOPEDIC SURGERY          Family History   Problem Relation Age of Onset     Breast  Cancer Sister 75.00     Stomach Cancer Paternal Grandfather      Lung Cancer Sister      Chronic Obstructive Pulmonary Disease Mother      Heart Disease Mother      Coronary Artery Disease Father         Past Medical History:   Diagnosis Date     2019 novel coronavirus disease (COVID-19) 7/16/2020     Acute bilateral knee pain 7/18/2017     TEOFILO (acute kidney injury) (H) 7/16/2020     Anemia, unspecified     Created by Conversion      Angioedema 12/17/2015     Anticoagulant long-term use 3/6/2017     Back pain     Created by Conversion      Chronic pain syndrome 6/1/2018     COVID-19 virus infection 7/17/2020     Depression      Diarrhea 7/16/2020     Disease of thyroid gland      Edema     Created by Conversion      Essential hypertension with goal blood pressure less than 140/90     Created by Conversion  Replacement Utility updated for latest IMO load     History of pulmonary embolism 1/1/2015    Overview:  bilateral with acute cor pulmonale      HTN (hypertension)      Hypercholesteremia      Hypokalemia 12/17/2015     Hypothyroidism     Created by Conversion  Replacement Utility updated for latest IMO load     Hypoxia 7/16/2020     Impaired fasting glucose     Created by Conversion      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     Major depressive disorder, recurrent episode (H)     Created by Conversion  Replacement Utility updated for latest IMO load     Mood disorder (H) 7/16/2020     Morbid obesity (H)      Multiple lung nodules on CT 12/17/2015     Obstructive sleep apnea (adult) (pediatric)     Created by Conversion      Osteoarthrosis involving lower leg     Created by Conversion  Replacement Utility updated for latest IMO load     Pain in joint, multiple sites     Created by Conversion      Polymyalgia rheumatica (H) 7/16/2020     Postoperative anemia due to acute blood loss 12/18/2018     Pulmonary emboli (H) 12/14/2015     Pulmonary embolism (H) 12/21/2015     Pure hypercholesterolemia     Created by Conversion       Sepsis (H) 7/28/2020     SIRS (systemic inflammatory response syndrome) (H) 7/28/2020     Unspecified cataract     Created by Conversion      Yeast infection         Social History     Tobacco Use     Smoking status: Former Smoker     Smokeless tobacco: Never Used     Tobacco comment: quite 20 yrs ago   Substance Use Topics     Alcohol use: No     Comment: Alcoholic Drinks/day: occasionally     Drug use: No        Current Outpatient Medications   Medication Sig Dispense Refill     acetaminophen 500 MG CAPS Take 2 capsules by mouth 3 times daily as needed.       albuterol (PROAIR HFA/PROVENTIL HFA/VENTOLIN HFA) 108 (90 Base) MCG/ACT inhaler Inhale 2 puffs into the lungs every 6 hours as needed for shortness of breath / dyspnea or wheezing 18 g 0     amLODIPine (NORVASC) 5 MG tablet TAKE 1 TABLET DAILY (NEW   DOSE 6/1/18) 90 tablet 3     BD ULTRA-FINE 33 LANCETS Use as directed       Cholecalciferol (VITAMIN D) 400 UNITS capsule Take 2 capsules by mouth daily.       citalopram (CELEXA) 40 MG tablet Take 1 tablet (40 mg) by mouth daily 90 tablet 1     furosemide (LASIX) 20 MG tablet Take 1 tablet (20 mg) by mouth every morning 90 tablet 1     Glucose Blood (ONE TOUCH ULTRA TEST) strip by In Vitro route daily. Use as directed       levothyroxine (SYNTHROID/LEVOTHROID) 137 MCG tablet Take 1 tablet (137 mcg) by mouth daily 90 tablet 3     lisinopril-hydrochlorothiazide (ZESTORETIC) 20-12.5 MG tablet Take 2 tablets by mouth daily 180 tablet 1     pravastatin (PRAVACHOL) 80 MG tablet TAKE 1 TABLET BY MOUTH AT  BEDTIME 90 tablet 3     warfarin ANTICOAGULANT (COUMADIN) 2.5 MG tablet 2.5 mg every Mon, Wed, Fri; 1.25 mg all other days 60 tablet 1          Objective:    Vitals:    02/15/22 1213   BP: 110/60   Pulse: 115   SpO2: 95%   Weight: 138.3 kg (305 lb)      Body mass index is 47.77 kg/m .    Alert. No apparent distress. Right shoulder crepitus noted. Left shoulder without significant crepitus. Hesitancy with full shoulder  flexion or abduction. Distal CMS appears intact however. Right-hand-dominant. Transfers slowly but independently with chronic low back pain noted. BMI 47.77 with 9 pound weight loss since prior visit. Extremities warm and dry.      This note has been dictated using voice recognition software and as a result may contain minor grammatical errors and unintended word substitutions.

## 2022-02-15 NOTE — PROGRESS NOTES
ANTICOAGULATION MANAGEMENT     Nancy Oropeza 76 year old female is on warfarin with supratherapeutic INR result. (Goal INR 2.0-3.0)    Recent labs: (last 7 days)     02/15/22  1306   INR 3.1*       ASSESSMENT     Source(s): Chart Review and Patient/Caregiver Call     Warfarin doses taken: Less warfarin taken than planned which may be affecting INR  Diet: No new diet changes identified  New illness, injury, or hospitalization: No  Medication/supplement changes: Patient will be restarting Gabapentin. Taking ES Tylenol OTC up to 7 tabs daily for shoulder pain  Signs or symptoms of bleeding or clotting: No  Previous INR: Therapeutic last visit; previously outside of goal range  Additional findings: Patient is considering shoulder surgery. Discussed at todays PCP visit.     PLAN     Recommended plan for ongoing change(s) affecting INR, Tylenol, shoulder pain    Dosing Instructions: Hold todays dose of 1.25 mgs, then resume current maintenance dose with next INR in 1 week       Summary  As of 2/15/2022    Full warfarin instructions:  2/15: Hold; Otherwise 2.5 mg every Mon, Wed, Fri; 1.25 mg all other days   Next INR check:  2/22/2022             Telephone call with Nancy who verbalizes understanding and agrees to plan    Patient to recheck with home meter    Education provided: Please call back if any changes to your diet, medications or how you've been taking warfarin, Goal range and significance of current result and Importance of notifying clinic for changes in medications; a sooner lab recheck maybe needed.    Plan made per [facility] anticoagulation protocol    Vicky Mcclain RN  Anticoagulation Clinic  2/15/2022    _______________________________________________________________________     Anticoagulation Episode Summary     Current INR goal:  2.0-3.0   TTR:  51.2 % (1 y)   Target end date:  Indefinite   Send INR reminders to:  XENIA MURILLO    Indications    Pulmonary embolism (H) [I26.99]           Comments:            Anticoagulation Care Providers     Provider Role Specialty Phone number    Julien Garnica MD Referring Family Medicine [phone]

## 2022-02-15 NOTE — PROGRESS NOTES
Answers for HPI/ROS submitted by the patient on 2/15/2022  If you checked off any problems, how difficult have these problems made it for you to do your work, take care of things at home, or get along with other people?: Not difficult at all  PHQ9 TOTAL SCORE: 13

## 2022-02-16 ENCOUNTER — TELEPHONE (OUTPATIENT)
Dept: FAMILY MEDICINE | Facility: CLINIC | Age: 77
End: 2022-02-16
Payer: COMMERCIAL

## 2022-02-16 ASSESSMENT — ANXIETY QUESTIONNAIRES: GAD7 TOTAL SCORE: 13

## 2022-02-16 ASSESSMENT — PATIENT HEALTH QUESTIONNAIRE - PHQ9: SUM OF ALL RESPONSES TO PHQ QUESTIONS 1-9: 13

## 2022-02-16 NOTE — TELEPHONE ENCOUNTER
Reason for Call: Request for an order     Order or referral being requested: Standard H200 UP Walker (make and model)    Date needed: at your convenience    Has the patient been seen by the PCP for this problem? YES    Additional comments: Please fax order to [facility] along with chart notes stating why patient needs an upright walker. Please fax to [phone]    Phone number Patient can be reached at:  Home number on file [phone] (home)    Best Time:  any    Can we leave a detailed message on this number?  YES    Call taken on 2/16/2022 at 12:12 PM by Jennifer East

## 2022-02-17 NOTE — TELEPHONE ENCOUNTER
Order and ov notes faxed.  Pt informed and given number to [facility] [facility].      Letter pended for md to review and sign. Once that is done we will fax over information to [facility] - fax number [phone].

## 2022-02-18 ENCOUNTER — TELEPHONE (OUTPATIENT)
Dept: FAMILY MEDICINE | Facility: CLINIC | Age: 77
End: 2022-02-18
Payer: COMMERCIAL

## 2022-02-18 ENCOUNTER — ANTICOAGULATION THERAPY VISIT (OUTPATIENT)
Dept: FAMILY MEDICINE | Facility: CLINIC | Age: 77
End: 2022-02-18
Payer: COMMERCIAL

## 2022-02-18 DIAGNOSIS — Z53.9 ERRONEOUS ENCOUNTER--DISREGARD: ICD-10-CM

## 2022-02-18 DIAGNOSIS — I26.99 PULMONARY EMBOLISM (H): Primary | ICD-10-CM

## 2022-02-18 NOTE — TELEPHONE ENCOUNTER
Reason for Call:  Other call back and prescription    Detailed comments: Nancy called she needs a prescription faxed to [facility] at [phone] for a walker. They also said she needs a face to face. Please call and discuss if you have questions.    Phone Number Patient can be reached at: Home number on file [phone] (home)    Best Time: Anytime    Can we leave a detailed message on this number? YES    Call taken on 2/18/2022 at 4:00 PM by Pamela Martínez

## 2022-02-18 NOTE — TELEPHONE ENCOUNTER
Pt calling us back wanting to know the phone number for [facility]. Gave pt phone number: [phone].

## 2022-02-19 ENCOUNTER — NURSE TRIAGE (OUTPATIENT)
Dept: NURSING | Facility: CLINIC | Age: 77
End: 2022-02-19
Payer: COMMERCIAL

## 2022-02-19 NOTE — TELEPHONE ENCOUNTER
Pt calling for her Warfarin dosing, states she lost the sheet with her instructions on it.      RN gave pt this information:     Dosing Instructions: Hold todays dose of 1.25 mgs, then resume current maintenance dose with next INR in 1 week             Summary  As of 2/15/2022     Full warfarin instructions:  2/15: Hold; Otherwise 2.5 mg every Mon, Wed, Fri; 1.25 mg all other days   Next INR check:  2/22/2022            Patient verbalized understanding and had no further questions.      Kika Basurto RN  [facility] Nurse Advisor      Reason for Disposition    Health Information question, no triage required and triager able to answer question    Protocols used: INFORMATION ONLY CALL - NO TRIAGE-A-

## 2022-02-21 NOTE — TELEPHONE ENCOUNTER
I spoke to Nancy to confirm where she wants the walker order to be sent. First it was [facility], then [facility] and now patient requests the order be sent to [facility].     I advised Nancy that we need to stick with one DME provider. She confirms that she now wants [facility].     Order w/office notes faxed to [facility] at [phone]. Patient advised that the process to get the walker can take some time and to reach out to [facility] in the future regarding the status of this order. Patient verbalizes understanding.

## 2022-02-21 NOTE — TELEPHONE ENCOUNTER
I spoke to Nancy to confirm where she wants the walker order to be sent. First it was [facility], then [facility] and now patient requests the order be sent to [facility].     I advised Nancy that we need to stick with one DME provider. She confirms that she now wants [facility]. Please see encounter 2/18/2022 for more details. Closing this encounter.

## 2022-02-21 NOTE — TELEPHONE ENCOUNTER
Patient is now requesting the UP chair get sent to [facility]. Please see encounter.    85.3

## 2022-02-23 ENCOUNTER — TRANSFERRED RECORDS (OUTPATIENT)
Dept: HEALTH INFORMATION MANAGEMENT | Facility: CLINIC | Age: 77
End: 2022-02-23
Payer: COMMERCIAL

## 2022-02-24 ENCOUNTER — ANTICOAGULATION THERAPY VISIT (OUTPATIENT)
Dept: FAMILY MEDICINE | Facility: CLINIC | Age: 77
End: 2022-02-24
Payer: COMMERCIAL

## 2022-02-24 ENCOUNTER — TELEPHONE (OUTPATIENT)
Dept: FAMILY MEDICINE | Facility: CLINIC | Age: 77
End: 2022-02-24
Payer: COMMERCIAL

## 2022-02-24 DIAGNOSIS — I26.99 PULMONARY EMBOLISM (H): Primary | ICD-10-CM

## 2022-02-24 LAB — INR (EXTERNAL): 2.4 (ref 0.9–1.1)

## 2022-02-24 NOTE — TELEPHONE ENCOUNTER
Reason for call:  Other   Patient called regarding (reason for call): call back  Additional comments: Patient would like to speak with an anticoagulation nurse. Please call the patient back, thank you.    Phone number to reach patient:  Home number on file [phone] (home)    Best Time:  asap    Can we leave a detailed message on this number?  YES    Travel screening: Not Applicable

## 2022-02-24 NOTE — PROGRESS NOTES
ANTICOAGULATION MANAGEMENT     Nancy Oropeza 76 year old female is on warfarin with therapeutic INR result. (Goal INR 2.0-3.0)    Recent labs: (last 7 days)     02/23/22  1448   INR 2.4*       ASSESSMENT     Source(s): Chart Review and Patient/Caregiver Call     Warfarin doses taken: Warfarin taken as instructed  Diet: No new diet changes identified  New illness, injury, or hospitalization: No  Medication/supplement changes: Patient is on longterm prednisone  Signs or symptoms of bleeding or clotting: No  Previous INR: Supratherapeutic  Additional findings: None     PLAN     Recommended plan for no diet, medication or health factor changes affecting INR     Dosing Instructions: Continue your current warfarin dose with next INR in 1 week       Summary  As of 2/24/2022    Full warfarin instructions:  2.5 mg every Mon, Wed, Fri; 1.25 mg all other days   Next INR check:  3/3/2022             Telephone call with Nancy who verbalizes understanding and agrees to plan    Patient to recheck with home meter    Education provided: Please call back if any changes to your diet, medications or how you've been taking warfarin    Plan made per [facility] anticoagulation protocol    Iram Alexander, RN  Anticoagulation Clinic  2/24/2022    _______________________________________________________________________     Anticoagulation Episode Summary     Current INR goal:  2.0-3.0   TTR:  50.8 % (1 y)   Target end date:  Indefinite   Send INR reminders to:  XENIA MURILLO    Indications    Pulmonary embolism (H) [I26.99]           Comments:           Anticoagulation Care Providers     Provider Role Specialty Phone number    Julien Garnica MD Referring Family Medicine [phone]        Anticoagulation Management    Unable to reach Nancy today.    Today's INR result of 2.4 is therapeutic (goal INR of 2.0-3.0).  Result received from: Home Monitor    Follow up required to confirm warfarin dose taken and assess for  changes    TCB      Anticoagulation clinic to follow up    Iram Alexander RN

## 2022-03-02 ENCOUNTER — TRANSFERRED RECORDS (OUTPATIENT)
Dept: HEALTH INFORMATION MANAGEMENT | Facility: CLINIC | Age: 77
End: 2022-03-02
Payer: COMMERCIAL

## 2022-03-02 ENCOUNTER — ANTICOAGULATION THERAPY VISIT (OUTPATIENT)
Dept: ANTICOAGULATION | Facility: CLINIC | Age: 77
End: 2022-03-02
Payer: COMMERCIAL

## 2022-03-02 DIAGNOSIS — I26.99 PULMONARY EMBOLISM (H): Primary | ICD-10-CM

## 2022-03-02 LAB — INR (EXTERNAL): 1.6 (ref 0.9–1.1)

## 2022-03-02 NOTE — PROGRESS NOTES
ANTICOAGULATION MANAGEMENT     Nancy Navarrorimikik 76 year old female is on warfarin with subtherapeutic INR result. (Goal INR 2.0-3.0)    Recent labs: (last 7 days)     03/02/22  0000   INR 1.6*       ASSESSMENT     Source(s): Chart Review and Patient/Caregiver Call     Warfarin doses taken: Warfarin taken as instructed  Diet: Increased greens/vitamin K in diet; plans to resume previous intake patient has been eating a lot of sugar snap peas  New illness, injury, or hospitalization: No  Medication/supplement changes: None noted  Signs or symptoms of bleeding or clotting: No  Previous INR: Therapeutic last visit; previously outside of goal range  Additional findings: None       PLAN     Recommended plan for temporary change(s) affecting INR     Dosing Instructions: Booster dose then continue your current warfarin dose with next INR in 1 week       Summary  As of 3/2/2022    Full warfarin instructions:  3/2: 5 mg; Otherwise 2.5 mg every Mon, Wed, Fri; 1.25 mg all other days   Next INR check:  3/9/2022             Telephone call with Nancy who agrees to plan and repeated back plan correctly    Patient to recheck with home meter    Education provided: Importance of consistent vitamin K intake, Impact of vitamin K foods on INR and Vitamin K content of foods    Plan made per [facility] anticoagulation protocol    Evie Lagos, RN  Anticoagulation Clinic  3/2/2022    _______________________________________________________________________     Anticoagulation Episode Summary     Current INR goal:  2.0-3.0   TTR:  50.0 % (1 y)   Target end date:  Indefinite   Send INR reminders to:  XENIA MURILLO    Indications    Pulmonary embolism (H) [I26.99]           Comments:           Anticoagulation Care Providers     Provider Role Specialty Phone number    Julien Garnica MD Referring Family Medicine [phone]

## 2022-03-03 ENCOUNTER — TELEPHONE (OUTPATIENT)
Dept: FAMILY MEDICINE | Facility: CLINIC | Age: 77
End: 2022-03-03
Payer: COMMERCIAL

## 2022-03-03 NOTE — TELEPHONE ENCOUNTER
Reason for Call:  Other call back and prescription    Detailed comments: Aidan talked with Nurse yesterday about how to take her Warfrin and she has lost the instructions. Pt would like a call back to discuss what to do. She waiting to hear back for today's dose to take     Phone Number Patient can be reached at: Home number on file [phone] (home)    Best Time: anytime today     Can we leave a detailed message on this number? YES    Call taken on 3/3/2022 at 1:20 PM by Wendy Jaramillo

## 2022-03-03 NOTE — TELEPHONE ENCOUNTER
Returned call to patient and reviewed warfarin dosing.  Patient repeated back dosing instructions and will recheck INR on Wednesday 3/9/22.    GERTRUDE FreitasN, RN  Anticoagulation Clinic

## 2022-03-04 ENCOUNTER — TRANSFERRED RECORDS (OUTPATIENT)
Dept: HEALTH INFORMATION MANAGEMENT | Facility: CLINIC | Age: 77
End: 2022-03-04
Payer: COMMERCIAL

## 2022-03-09 NOTE — TELEPHONE ENCOUNTER
Patient calls back and states she call [facility] and they do not have the orders. Writer review Danita's note below and inform patient order/note was fax to [facility]. Patient verbalizes understanding and will call [facility] to follow-up.

## 2022-03-09 NOTE — TELEPHONE ENCOUNTER
Pt called wondering where her DME order was sent to. TC informed her of msg below and gave her # to [employer] She will call them to f/u. Thanks.

## 2022-03-10 ENCOUNTER — DOCUMENTATION ONLY (OUTPATIENT)
Dept: ANTICOAGULATION | Facility: CLINIC | Age: 77
End: 2022-03-10
Payer: COMMERCIAL

## 2022-03-10 DIAGNOSIS — I26.99 PULMONARY EMBOLISM (H): Primary | ICD-10-CM

## 2022-03-10 LAB — INR (EXTERNAL): 2.2 (ref 0.9–1.1)

## 2022-03-10 NOTE — PROGRESS NOTES
ANTICOAGULATION MANAGEMENT     Nancy RYANN Oropeza 76 year old female is on warfarin with therapeutic INR result. (Goal INR 2.0-3.0)    Recent labs: (last 7 days)     03/09/22  1003   INR 2.2*       ASSESSMENT     Source(s): Chart Review and Patient/Caregiver Call     Warfarin doses taken: Warfarin taken as instructed  Diet: No new diet changes identified  New illness, injury, or hospitalization: No  Medication/supplement changes: None noted  Signs or symptoms of bleeding or clotting: No  Previous INR: Subtherapeutic  Additional findings: None       PLAN     Recommended plan for no diet, medication or health factor changes affecting INR     Dosing Instructions: Continue your current warfarin dose with next INR in 1 week       Summary  As of 3/10/2022    Full warfarin instructions:  2.5 mg every Mon, Wed, Fri; 1.25 mg all other days   Next INR check:  3/17/2022             Telephone call with Nancy who verbalizes understanding and agrees to plan    Patient to recheck with home meter    Education provided: Please call back if any changes to your diet, medications or how you've been taking warfarin    Plan made per [facility] anticoagulation protocol    Iram Alexander, RN  Anticoagulation Clinic  3/10/2022    _______________________________________________________________________     Anticoagulation Episode Summary     Current INR goal:  2.0-3.0   TTR:  48.5 % (1 y)   Target end date:  Indefinite   Send INR reminders to:  XENIA MURILLO    Indications    Pulmonary embolism (H) [I26.99]           Comments:           Anticoagulation Care Providers     Provider Role Specialty Phone number    Julien Garnica MD Referring Family Medicine [phone]

## 2022-03-16 ENCOUNTER — TRANSFERRED RECORDS (OUTPATIENT)
Dept: HEALTH INFORMATION MANAGEMENT | Facility: CLINIC | Age: 77
End: 2022-03-16
Payer: COMMERCIAL

## 2022-03-16 ENCOUNTER — TELEPHONE (OUTPATIENT)
Dept: FAMILY MEDICINE | Facility: CLINIC | Age: 77
End: 2022-03-16
Payer: COMMERCIAL

## 2022-03-16 LAB — INR (EXTERNAL): 1.8 (ref 0.9–1.1)

## 2022-03-16 NOTE — TELEPHONE ENCOUNTER
ANTICOAGULATION MANAGEMENT     Nancy RYANN Oropeza 76 year old female is on warfarin with subtherapeutic INR result. (Goal INR )    Recent labs: (last 7 days)     03/16/22  1623   INR 1.8*       ASSESSMENT       Source(s): Chart Review and Patient/Caregiver Call       Warfarin doses taken: Warfarin taken as instructed    Diet: No new diet changes identified    New illness, injury, or hospitalization: No    Medication/supplement changes: None noted    Signs or symptoms of bleeding or clotting: No    Previous INR: Therapeutic last visit; previously outside of goal range    Additional findings: None       PLAN     Recommended plan for no diet, medication or health factor changes affecting INR     Dosing Instructions:  Increase your warfarin dose (10% change) with next INR in 1 week       Summary  As of 3/16/2022    Full warfarin instructions:  1.25 mg every Sun, Tue, Thu; 2.5 mg all other days   Next INR check:  3/23/2022             Telephone call with Nancy who verbalizes understanding and agrees to plan and repeats plan correctly    Patient to recheck with home meter    Education provided: Please call back if any changes to your diet, medications or how you've been taking warfarin    Plan made per [facility] anticoagulation protocol    Melida Bergman, RN  Anticoagulation Clinic  3/16/2022    _______________________________________________________________________     Anticoagulation Episode Summary     Current INR goal:  2.0-3.0   TTR:  47.7 % (1 y)   Target end date:  Indefinite   Send INR reminders to:  XENIA MURILLO    Indications    Pulmonary embolism (H) [I26.99]           Comments:  [facility] Home Meter         Anticoagulation Care Providers     Provider Role Specialty Phone number    Julien Garnica MD Referring Family Medicine [phone]

## 2022-03-16 NOTE — TELEPHONE ENCOUNTER
..Reason for Call:  INR    Who is calling?  patient    Phone number:  [phone]    Fax number:  n/a    Name of caller: Patient - Nancy    INR Value:  1.8    Are there any other concerns:  Not that she mentioned,   Looking for her dosing    Can we leave a detailed message on this number? YES    Phone number patient can be reached at: Home number on file [phone] (home)      Call taken on 3/16/2022 at 4:11 PM by Giovanna Ayers

## 2022-03-16 NOTE — TELEPHONE ENCOUNTER
Left message for patient to give us a call back.  Melida Bergman, RN  Anticoagulation Nurse - [facility] [place]

## 2022-03-22 ENCOUNTER — TRANSFERRED RECORDS (OUTPATIENT)
Dept: HEALTH INFORMATION MANAGEMENT | Facility: CLINIC | Age: 77
End: 2022-03-22
Payer: COMMERCIAL

## 2022-03-23 ENCOUNTER — TELEPHONE (OUTPATIENT)
Dept: FAMILY MEDICINE | Facility: CLINIC | Age: 77
End: 2022-03-23
Payer: COMMERCIAL

## 2022-03-23 DIAGNOSIS — I26.99 PULMONARY EMBOLISM (H): Primary | ICD-10-CM

## 2022-03-23 LAB — INR (EXTERNAL): 2.9 (ref 0.9–1.1)

## 2022-03-23 NOTE — TELEPHONE ENCOUNTER
Reason for Call:  INR    Who is calling?  patient    Phone number:  [phone]    Fax number:      Name of caller: Nancy    INR Value:  2.9    Are there any other concerns:  No    Can we leave a detailed message on this number? YES    Phone number patient can be reached at: Home number on file [phone] (home)      Call taken on 3/23/2022 at 1:11 PM by Ivy Guzman

## 2022-03-23 NOTE — TELEPHONE ENCOUNTER
ANTICOAGULATION MANAGEMENT     Nancy Oropeza 76 year old female is on warfarin with therapeutic INR result. (Goal INR )    Recent labs: (last 7 days)     03/23/22  1358   INR 2.9*       ASSESSMENT       Source(s): Chart Review and Patient/Caregiver Call       Warfarin doses taken: Warfarin taken as instructed    Diet: No new diet changes identified    New illness, injury, or hospitalization: No    Medication/supplement changes: None noted    Signs or symptoms of bleeding or clotting: No    Previous INR: Subtherapeutic    Additional findings: None       PLAN     Recommended plan for no diet, medication or health factor changes affecting INR     Dosing Instructions: Continue your current warfarin dose with next INR in 1 week       Summary  As of 3/23/2022    Full warfarin instructions:  1.25 mg every Sun, Tue, Thu; 2.5 mg all other days   Next INR check:  3/30/2022             Telephone call with Nancy who verbalizes understanding and agrees to plan    Patient to recheck with home meter    Education provided: Contact [phone]  with any changes, questions or concerns.     Plan made per [facility] anticoagulation protocol    Daja Lux RN  Anticoagulation Clinic  3/23/2022    _______________________________________________________________________     Anticoagulation Episode Summary     Current INR goal:  2.0-3.0   TTR:  47.3 % (1 y)   Target end date:  Indefinite   Send INR reminders to:  XENIA MURILLO    Indications    Pulmonary embolism (H) [I26.99]           Comments:  [facility] Home Meter         Anticoagulation Care Providers     Provider Role Specialty Phone number    Julien Garnica MD Referring Family Medicine [phone]            Daja Hurst RN, BSN, PHN  Anticoagulation Nurse  [phone]

## 2022-03-29 ENCOUNTER — TRANSFERRED RECORDS (OUTPATIENT)
Dept: HEALTH INFORMATION MANAGEMENT | Facility: CLINIC | Age: 77
End: 2022-03-29
Payer: COMMERCIAL

## 2022-03-29 LAB — INR (EXTERNAL): 2.2 (ref 0.9–1.1)

## 2022-03-30 ENCOUNTER — TELEPHONE (OUTPATIENT)
Dept: FAMILY MEDICINE | Facility: CLINIC | Age: 77
End: 2022-03-30
Payer: COMMERCIAL

## 2022-03-30 DIAGNOSIS — I26.99 PULMONARY EMBOLISM, UNSPECIFIED CHRONICITY, UNSPECIFIED PULMONARY EMBOLISM TYPE, UNSPECIFIED WHETHER ACUTE COR PULMONALE PRESENT (H): Primary | ICD-10-CM

## 2022-03-30 NOTE — TELEPHONE ENCOUNTER
ANTICOAGULATION MANAGEMENT      Nancy Oropeza due for annual renewal of referral to anticoagulation monitoring. Order pended for your review and signature.      ANTICOAGULATION SUMMARY      Warfarin indication(s)     PE    Heart valve present?  NO       Current goal range   INR: 2.0-3.0     Goal appropriate for indication? Yes, INR 2-3 appropriate for hx of DVT, PE, hypercoagulable state, Afib, LVAD, or bileaflet AVR without risk factors     Current duration of therapy Indefinite/long term therapy   Time in Therapeutic Range (TTR)  (Goal > 60%) 48.5%       Office visit with referring provider's group within last year yes on 02/15/2022       Kera Crespo, RN

## 2022-03-31 ENCOUNTER — TRANSFERRED RECORDS (OUTPATIENT)
Dept: HEALTH INFORMATION MANAGEMENT | Facility: CLINIC | Age: 77
End: 2022-03-31
Payer: COMMERCIAL

## 2022-04-01 ENCOUNTER — TELEPHONE (OUTPATIENT)
Dept: FAMILY MEDICINE | Facility: CLINIC | Age: 77
End: 2022-04-01
Payer: COMMERCIAL

## 2022-04-01 ENCOUNTER — TRANSFERRED RECORDS (OUTPATIENT)
Dept: HEALTH INFORMATION MANAGEMENT | Facility: CLINIC | Age: 77
End: 2022-04-01
Payer: COMMERCIAL

## 2022-04-01 DIAGNOSIS — I26.99 PULMONARY EMBOLISM (H): Primary | ICD-10-CM

## 2022-04-01 DIAGNOSIS — I26.99 PULMONARY EMBOLISM, UNSPECIFIED CHRONICITY, UNSPECIFIED PULMONARY EMBOLISM TYPE, UNSPECIFIED WHETHER ACUTE COR PULMONALE PRESENT (H): ICD-10-CM

## 2022-04-01 NOTE — TELEPHONE ENCOUNTER
Reason for Call:  Other call back    Detailed comments: NOT SURE IF ANYONE IS RECEIVING HER RESULTS EQUIPMENT CO SHE IS CALLING IN WITH RESULTS FROM TUESDAY 2.2 TOOK REGULAR DOSE    Phone Number Patient can be reached at: Cell number on file:    Telephone Information:   Mobile [phone]       Best Time: ANYTIME    Can we leave a detailed message on this number? YES    Call taken on 4/1/2022 at 12:45 PM by Deepali Navarrete

## 2022-04-01 NOTE — TELEPHONE ENCOUNTER
ANTICOAGULATION MANAGEMENT     Nancy Oropeza 76 year old female is on warfarin with therapeutic INR result. (Goal INR )    Recent labs: (last 7 days)     03/29/22  0200   INR 2.2*       ASSESSMENT       Source(s): Chart Review and Patient/Caregiver Call       Warfarin doses taken: Warfarin taken as instructed    Diet: No new diet changes identified    New illness, injury, or hospitalization: No    Medication/supplement changes: None noted    Signs or symptoms of bleeding or clotting: No    Previous INR: Therapeutic last visit; previously outside of goal range    Additional findings: None       PLAN     Recommended plan for no diet, medication or health factor changes affecting INR     Dosing Instructions: continue your current warfarin dose with next INR in 1 week       Summary  As of 4/1/2022    Full warfarin instructions:  1.25 mg every Sun, Tue, Thu; 2.5 mg all other days   Next INR check:  4/5/2022             Telephone call with Nancy who verbalizes understanding and agrees to plan    Patient to recheck with home meter    Education provided: Goal range and significance of current result    Plan made per [facility] anticoagulation protocol    Roro Johnson RN  Anticoagulation Clinic  4/1/2022    _______________________________________________________________________     Anticoagulation Episode Summary     Current INR goal:  2.0-3.0   TTR:  46.8 % (1 y)   Target end date:  Indefinite   Send INR reminders to:  XENIA MURILLO    Indications    Pulmonary embolism (H) [I26.99]  Pulmonary embolism  unspecified chronicity  unspecified pulmonary embolism type  unspecified whether acute cor pulmonale present (H) [I26.99]           Comments:  [facility] Home Meter         Anticoagulation Care Providers     Provider Role Specialty Phone number    Julien Garnica MD Referring Family Medicine [phone]

## 2022-04-06 ENCOUNTER — TELEPHONE (OUTPATIENT)
Dept: FAMILY MEDICINE | Facility: CLINIC | Age: 77
End: 2022-04-06
Payer: COMMERCIAL

## 2022-04-06 DIAGNOSIS — I26.99 PULMONARY EMBOLISM (H): Primary | ICD-10-CM

## 2022-04-06 DIAGNOSIS — I26.99 PULMONARY EMBOLISM, UNSPECIFIED CHRONICITY, UNSPECIFIED PULMONARY EMBOLISM TYPE, UNSPECIFIED WHETHER ACUTE COR PULMONALE PRESENT (H): ICD-10-CM

## 2022-04-06 LAB — INR (EXTERNAL): 3 (ref 0.9–1.1)

## 2022-04-06 NOTE — TELEPHONE ENCOUNTER
ANTICOAGULATION MANAGEMENT     Nancy Oropeza 76 year old female is on warfarin with therapeutic INR result. (Goal INR )    Recent labs: (last 7 days)     04/06/22  1454   INR 3.0*       ASSESSMENT       Source(s): Chart Review and Patient/Caregiver Call       Warfarin doses taken: Warfarin taken as instructed    Diet: No new diet changes identified    New illness, injury, or hospitalization: No    Medication/supplement changes: None noted    Signs or symptoms of bleeding or clotting: No    Previous INR: Therapeutic last 2(+) visits    Additional findings: None       PLAN     Recommended plan for no diet, medication or health factor changes affecting INR     Dosing Instructions: continue your current warfarin dose with next INR in 1 week       Summary  As of 4/6/2022    Full warfarin instructions:  1.25 mg every Sun, Tue, Thu; 2.5 mg all other days   Next INR check:  4/13/2022             Telephone call with Nancy who verbalizes understanding and agrees to plan    Patient to recheck with home meter    Education provided: Please call back if any changes to your diet, medications or how you've been taking warfarin    Plan made per [facility] anticoagulation protocol    Melida Bergman, RN  Anticoagulation Clinic  4/6/2022    _______________________________________________________________________     Anticoagulation Episode Summary     Current INR goal:  2.0-3.0   TTR:  47.4 % (1 y)   Target end date:  Indefinite   Send INR reminders to:  XENIA MURILLO    Indications    Pulmonary embolism (H) [I26.99]  Pulmonary embolism  unspecified chronicity  unspecified pulmonary embolism type  unspecified whether acute cor pulmonale present (H) [I26.99]           Comments:  [facility] Home Meter         Anticoagulation Care Providers     Provider Role Specialty Phone number    Julien Garnica MD Referring Family Medicine [phone]

## 2022-04-06 NOTE — TELEPHONE ENCOUNTER
ANTICOAGULATION MANAGEMENT     Nancy GARZON Satabhay 76 year old female is on warfarin with therapeutic INR result. (Goal INR )    Recent labs: (last 7 days)     04/06/22  1454   INR 3.0*       ASSESSMENT        PLAN     Unable to reach Lesly today.    Left message to give us a call back    Follow up required to confirm warfarin dose taken and assess for changes    Melida Bergman, RN  Anticoagulation Clinic  4/6/2022

## 2022-04-07 ENCOUNTER — TELEPHONE (OUTPATIENT)
Dept: NURSING | Facility: CLINIC | Age: 77
End: 2022-04-07
Payer: COMMERCIAL

## 2022-04-07 DIAGNOSIS — M25.511 CHRONIC RIGHT SHOULDER PAIN: Primary | ICD-10-CM

## 2022-04-07 DIAGNOSIS — G89.29 CHRONIC RIGHT SHOULDER PAIN: Primary | ICD-10-CM

## 2022-04-07 NOTE — TELEPHONE ENCOUNTER
Pt calling with medication question;    Right Shoulder injection of last week - has not offered any relief as yet.    What else is available to her for shoulder pain that is stronger than the tylenol?  The tylenol is not helping at all anymore  She's tried heating pads, patches, rubs, and she's just looking for something stronger to help her be able to function through her days.    Message routed to PCP and care team    Rae Caldera RN, Nurse Advisor 1:42 PM 4/7/2022  COVID 19 Nurse Triage Plan/Patient Instructions    Please be aware that novel coronavirus (COVID-19) may be circulating in the community. If you develop symptoms such as fever, cough, or SOB or if you have concerns about the presence of another infection including coronavirus (COVID-19), please contact your health care provider or visit [email or web address].     Disposition/Instructions    Home care recommended. Follow home care protocol based instructions.    Thank you for taking steps to prevent the spread of this virus.  o Limit your contact with others.  o Wear a simple mask to cover your cough.  o Wash your hands well and often.    Resources    [facility]: About COVID-19: [email or web address]    CDC: What to Do If You're Sick: www.cdc.gov/coronavirus/2019-ncov/about/steps-when-sick.html    CDC: Ending Home Isolation: www.cdc.gov/coronavirus/2019-ncov/hcp/disposition-in-home-patients.html     CDC: Caring for Someone: www.cdc.gov/coronavirus/2019-ncov/if-you-are-sick/care-for-someone.html     [facility]: Interim Guidance for Hospital Discharge to Home: www.health.[facility].[place]/diseases/coronavirus/hcp/hospdischarge.pdf    [facility] clinical trials (COVID-19 research studies): [email or web address].[email or web address]/umn-clinical-trials     Below are the COVID-19 hotlines at the [place] Department of [facility] ([facility]). Interpreters are available.   o For health questions: Call 265-546-1597 or 1-470.351.3300 (7 a.m. to 7 p.m.)  o For  questions about schools and childcare: Call 342-148-6078 or 1-722.661.9201 (7 a.m. to 7 p.m.)

## 2022-04-08 DIAGNOSIS — G89.29 CHRONIC LEFT SHOULDER PAIN: Primary | ICD-10-CM

## 2022-04-08 DIAGNOSIS — M25.512 CHRONIC LEFT SHOULDER PAIN: Primary | ICD-10-CM

## 2022-04-08 RX ORDER — TRAMADOL HYDROCHLORIDE 50 MG/1
50 TABLET ORAL EVERY 6 HOURS PRN
Qty: 60 TABLET | Refills: 0 | Status: SHIPPED | OUTPATIENT
Start: 2022-04-08 | End: 2022-04-11

## 2022-04-08 NOTE — TELEPHONE ENCOUNTER
Discussed with patient.  We will continue acetaminophen 1000 mg 3 times daily scheduled with tramadol 50 mg every 6 hours as needed for breakthrough pain #60 without refill.

## 2022-04-08 NOTE — TELEPHONE ENCOUNTER
Recommend ortho referral in order to further evaluate and determine additional treatment options in order to achieve desired pain relief.  Ortho referral placed.

## 2022-04-13 ENCOUNTER — ANTICOAGULATION THERAPY VISIT (OUTPATIENT)
Dept: ANTICOAGULATION | Facility: CLINIC | Age: 77
End: 2022-04-13
Payer: COMMERCIAL

## 2022-04-13 ENCOUNTER — TRANSFERRED RECORDS (OUTPATIENT)
Dept: HEALTH INFORMATION MANAGEMENT | Facility: CLINIC | Age: 77
End: 2022-04-13
Payer: COMMERCIAL

## 2022-04-13 DIAGNOSIS — I26.99 PULMONARY EMBOLISM, UNSPECIFIED CHRONICITY, UNSPECIFIED PULMONARY EMBOLISM TYPE, UNSPECIFIED WHETHER ACUTE COR PULMONALE PRESENT (H): ICD-10-CM

## 2022-04-13 DIAGNOSIS — I26.99 PULMONARY EMBOLISM (H): Primary | ICD-10-CM

## 2022-04-13 LAB — INR (EXTERNAL): 3.1 (ref 0.9–1.1)

## 2022-04-13 NOTE — PROGRESS NOTES
ANTICOAGULATION MANAGEMENT     Nancy Oropeza 76 year old female is on warfarin with supratherapeutic INR result. (Goal INR 2.0-3.0)    Recent labs: (last 7 days)     04/13/22  0000   INR 3.1*       ASSESSMENT     Source(s): Chart Review and Patient/Caregiver Call     Warfarin doses taken: Warfarin taken as instructed  Diet: No new diet changes identified  New illness, injury, or hospitalization: No  Medication/supplement changes: Tramadol started on 4/7/2022 which has potential for interaction; increasing INR  Signs or symptoms of bleeding or clotting: No  Previous INR: Therapeutic last 2(+) visits  Additional findings: Patient wanted to attempt to add more veggies into diet.  Informed patient if INR is still elevated next week a dose adjustment will be needed, patient stated understanding and was agreeable with plan.       PLAN     Recommended plan for ongoing change(s) affecting INR     Dosing Instructions: continue your current warfarin dose with next INR in 1 week       Summary  As of 4/13/2022    Full warfarin instructions:  1.25 mg every Sun, Tue, Thu; 2.5 mg all other days   Next INR check:  4/20/2022             Telephone call with Nancy who verbalizes understanding and agrees to plan    Patient to recheck with home meter    Education provided: Importance of consistent vitamin K intake, Impact of vitamin K foods on INR and Vitamin K content of foods    Plan made per [facility] anticoagulation protocol    Evie Lagos, RN  Anticoagulation Clinic  4/13/2022    _______________________________________________________________________     Anticoagulation Episode Summary     Current INR goal:  2.0-3.0   TTR:  46.4 % (1 y)   Target end date:  Indefinite   Send INR reminders to:  XENIA MURILLO    Indications    Pulmonary embolism (H) [I26.99]  Pulmonary embolism  unspecified chronicity  unspecified pulmonary embolism type  unspecified whether acute cor pulmonale present (H) [I26.99]           Comments:  [employer]  Home [employer]         Anticoagulation Care Providers     Provider Role Specialty Phone number    Julien Garnica MD Referring Family Medicine [phone]

## 2022-04-13 NOTE — PROGRESS NOTES
Received a fax INR result for Nancy from [employer]    INR result dated 4/13/2022 is 3.1    Kati Sierra RN, BSN  Anticoagulation Clinic

## 2022-04-19 ENCOUNTER — MEDICAL CORRESPONDENCE (OUTPATIENT)
Dept: HEALTH INFORMATION MANAGEMENT | Facility: CLINIC | Age: 77
End: 2022-04-19
Payer: COMMERCIAL

## 2022-04-19 ENCOUNTER — TRANSFERRED RECORDS (OUTPATIENT)
Dept: HEALTH INFORMATION MANAGEMENT | Facility: CLINIC | Age: 77
End: 2022-04-19
Payer: COMMERCIAL

## 2022-04-21 ENCOUNTER — NURSE TRIAGE (OUTPATIENT)
Dept: NURSING | Facility: CLINIC | Age: 77
End: 2022-04-21
Payer: COMMERCIAL

## 2022-04-21 ENCOUNTER — ANTICOAGULATION THERAPY VISIT (OUTPATIENT)
Dept: FAMILY MEDICINE | Facility: CLINIC | Age: 77
End: 2022-04-21
Payer: COMMERCIAL

## 2022-04-21 ENCOUNTER — TRANSFERRED RECORDS (OUTPATIENT)
Dept: HEALTH INFORMATION MANAGEMENT | Facility: CLINIC | Age: 77
End: 2022-04-21
Payer: COMMERCIAL

## 2022-04-21 DIAGNOSIS — I26.99 PULMONARY EMBOLISM (H): ICD-10-CM

## 2022-04-21 DIAGNOSIS — I26.99 PULMONARY EMBOLISM, UNSPECIFIED CHRONICITY, UNSPECIFIED PULMONARY EMBOLISM TYPE, UNSPECIFIED WHETHER ACUTE COR PULMONALE PRESENT (H): Primary | ICD-10-CM

## 2022-04-21 LAB — INR (EXTERNAL): 4.3 (ref 0.9–1.1)

## 2022-04-21 NOTE — TELEPHONE ENCOUNTER
Triage call:     Patient calling to report INR of 4.3.   She called to report this to the equipment company as well.   Routed high priority to [facility] to advise.  Patient can be reached at [phone].     Alisha Mandel RN   04/21/22 11:50 AM  [employer] Nurse Advisor

## 2022-04-21 NOTE — TELEPHONE ENCOUNTER
Please see the April 21, 2022 Anticoagulation encounter for further information.     Kera Crespo RN    [facility] Anticoagulation Clinic

## 2022-04-21 NOTE — PROGRESS NOTES
ANTICOAGULATION MANAGEMENT     Nancy Navarroabhay 76 year old female is on warfarin with supratherapeutic INR result. (Goal INR 2.0-3.0)    Recent labs: (last 7 days)     04/21/22  0000   INR 4.3*       ASSESSMENT     Source(s): Chart Review and Patient/Caregiver Call     Warfarin doses taken: Warfarin taken as instructed  Diet: No new diet changes identified  New illness, injury, or hospitalization: No  Medication/supplement changes: Tramadol started 4/7 one-two pills per day  Signs or symptoms of bleeding or clotting: No  Previous INR: Supratherapeutic  Additional findings: Upcoming surgery/procedure unscheduled cortisone shot, already took warfarin today       PLAN     Recommended plan for ongoing change(s) affecting INR     Dosing Instructions: hold dose then decrease your warfarin dose (9.1% change) with next INR in 1 week       Summary  As of 4/21/2022    Full warfarin instructions:  4/22: Hold; Otherwise 2.5 mg every Mon, Wed, Fri; 1.25 mg all other days   Next INR check:  4/27/2022             Telephone call with Nancy who verbalizes understanding and agrees to plan    Patient to recheck with home meter    Education provided: Monitoring for bleeding signs and symptoms    Plan made per [facility] anticoagulation protocol    Kera Crespo, RN  Anticoagulation Clinic  4/21/2022    _______________________________________________________________________     Anticoagulation Episode Summary     Current INR goal:  2.0-3.0   TTR:  46.4 % (1 y)   Target end date:  Indefinite   Send INR reminders to:  XENIA MURILLO    Indications    Pulmonary embolism (H) [I26.99]  Pulmonary embolism  unspecified chronicity  unspecified pulmonary embolism type  unspecified whether acute cor pulmonale present (H) [I26.99]           Comments:  [employer] Home [employer]         Anticoagulation Care Providers     Provider Role Specialty Phone number    Julien Garnica MD Referring Family Medicine [phone]

## 2022-04-27 ENCOUNTER — TRANSFERRED RECORDS (OUTPATIENT)
Dept: HEALTH INFORMATION MANAGEMENT | Facility: CLINIC | Age: 77
End: 2022-04-27
Payer: COMMERCIAL

## 2022-04-27 ENCOUNTER — ANTICOAGULATION THERAPY VISIT (OUTPATIENT)
Dept: ANTICOAGULATION | Facility: CLINIC | Age: 77
End: 2022-04-27
Payer: COMMERCIAL

## 2022-04-27 DIAGNOSIS — I26.99 PULMONARY EMBOLISM (H): Primary | ICD-10-CM

## 2022-04-27 DIAGNOSIS — I26.99 PULMONARY EMBOLISM, UNSPECIFIED CHRONICITY, UNSPECIFIED PULMONARY EMBOLISM TYPE, UNSPECIFIED WHETHER ACUTE COR PULMONALE PRESENT (H): ICD-10-CM

## 2022-04-27 DIAGNOSIS — M25.512 CHRONIC LEFT SHOULDER PAIN: Primary | ICD-10-CM

## 2022-04-27 DIAGNOSIS — G89.29 CHRONIC LEFT SHOULDER PAIN: Primary | ICD-10-CM

## 2022-04-27 DIAGNOSIS — M35.3 POLYMYALGIA RHEUMATICA (H): ICD-10-CM

## 2022-04-27 LAB — INR (EXTERNAL): 3.3 (ref 0.9–1.1)

## 2022-04-27 RX ORDER — TRAMADOL HYDROCHLORIDE 50 MG/1
50 TABLET ORAL EVERY 6 HOURS PRN
Qty: 60 TABLET | Refills: 0 | Status: SHIPPED | OUTPATIENT
Start: 2022-04-27 | End: 2022-05-25

## 2022-04-27 RX ORDER — PREDNISONE 5 MG/1
5 TABLET ORAL DAILY
Qty: 90 TABLET | Refills: 0 | Status: SHIPPED | OUTPATIENT
Start: 2022-04-27 | End: 2022-09-26

## 2022-04-27 NOTE — PROGRESS NOTES
ANTICOAGULATION MANAGEMENT     Nancy Oropeza 76 year old female is on warfarin with supratherapeutic INR result. (Goal INR 2.0-3.0)    Recent labs: (last 7 days)     04/27/22  0924   INR 3.3*       ASSESSMENT     Source(s): Chart Review     Warfarin doses taken: Warfarin taken as instructed  Diet: No new diet changes identified  New illness, injury, or hospitalization: No  Medication/supplement changes: Will be starting prednisone for pain, patient reports she has not picked that up yet. patient is also taking tramadol and tyelnol to help with pain as well.  Signs or symptoms of bleeding or clotting: No  Previous INR: Supratherapeutic  Additional findings: None       PLAN     Recommended plan for temporary change(s) and ongoing change(s) affecting INR     Dosing Instructions: decrease your warfarin dose (10% change) with next INR in 1 week       Summary  As of 4/27/2022    Full warfarin instructions:  2.5 mg every Mon, Fri; 1.25 mg all other days   Next INR check:               Telephone call with Nancy who verbalizes understanding and agrees to plan    Patient to recheck with home meter    Education provided: Please call back if any changes to your diet, medications or how you've been taking warfarin    Plan made per [facility] anticoagulation protocol    Iram Alexander RN  Anticoagulation Clinic  4/27/2022    _______________________________________________________________________     Anticoagulation Episode Summary     Current INR goal:  2.0-3.0   TTR:  46.4 % (1 y)   Target end date:  Indefinite   Send INR reminders to:  XENIA MURILLO    Indications    Pulmonary embolism (H) [I26.99]  Pulmonary embolism  unspecified chronicity  unspecified pulmonary embolism type  unspecified whether acute cor pulmonale present (H) [I26.99]           Comments:  [employer] Home [employer]eter         Anticoagulation Care Providers     Provider Role Specialty Phone number    Julien Garnica MD Referring Family Medicine [phone]         Incoming fax from [employer] home [employer]    Date of result 4/27    INR result 3.3

## 2022-04-27 NOTE — TELEPHONE ENCOUNTER
Last clinic visit: 2/15/2022    Clinic visit frequency required: Q 3 months    Next clinic visit: N/A    Controlled substance agreement on file: No.    Urine Drug Screen on file:  No    Prednisone was discontinued on 2/15/2022.     Prednisone historically for polymyalgia rheumatica which seems to have resolved and no longer on prednisone.     Pending Prescriptions:                       Disp   Refills    predniSONE (DELTASONE) 5 MG tablet        90 tab*0            Sig: Take 1 tablet (5 mg) by mouth daily    traMADol (ULTRAM) 50 MG tablet            60 tab*0            Sig: Take 1 tablet (50 mg) by mouth every 6 hours as           needed for severe pain

## 2022-05-04 ENCOUNTER — TELEPHONE (OUTPATIENT)
Dept: FAMILY MEDICINE | Facility: CLINIC | Age: 77
End: 2022-05-04
Payer: COMMERCIAL

## 2022-05-04 ENCOUNTER — ANTICOAGULATION THERAPY VISIT (OUTPATIENT)
Dept: ANTICOAGULATION | Facility: CLINIC | Age: 77
End: 2022-05-04
Payer: COMMERCIAL

## 2022-05-04 ENCOUNTER — TRANSFERRED RECORDS (OUTPATIENT)
Dept: HEALTH INFORMATION MANAGEMENT | Facility: CLINIC | Age: 77
End: 2022-05-04
Payer: COMMERCIAL

## 2022-05-04 DIAGNOSIS — I26.99 PULMONARY EMBOLISM, UNSPECIFIED CHRONICITY, UNSPECIFIED PULMONARY EMBOLISM TYPE, UNSPECIFIED WHETHER ACUTE COR PULMONALE PRESENT (H): ICD-10-CM

## 2022-05-04 DIAGNOSIS — I26.99 PULMONARY EMBOLISM (H): Primary | ICD-10-CM

## 2022-05-04 LAB — INR (EXTERNAL): 3.4 (ref 0.9–1.1)

## 2022-05-04 NOTE — TELEPHONE ENCOUNTER
"[facility]-PROCEDURAL ANTICOAGULATION  MANAGEMENT    ASSESSMENT     Warfarin interruption plan for spinal injection on 2022.    Indication for Anticoagulation: PE      PE 2015     hematology work up negative      Lawanda-Procedure Risk stratification for thromboembolism: low (2018 American Society of Hematology guideline)    VTE: 2018 American Society of Hematology recommends against bridging in low and moderate risk VTE patients holding warfarin     This is a low VTE risk procedure     RECOMMENDATION       Pre-Procedure:  o Hold warfarin for 5 days, until after procedure startin2022   o No Bridge      Post-Procedure:  o Resume warfarin dose if okay with provider doing procedure on night of procedure, 2022 PM: 3.75mg  o Recheck INR ~ 7 days after resuming warfarin       Plan routed to referring provider for approval  ?   Marlene Mcmillan [facility]    SUBJECTIVE/OBJECTIVE     Nancy RYANN Oropeza, a 76 year old female    Goal INR Range: 2.0-3.0     Patient bridged in past: Yes: 2020 with COVID    Pertinent History: N/A    Wt Readings from Last 3 Encounters:   02/15/22 138.3 kg (305 lb)   22 142.4 kg (314 lb)   22 135.1 kg (297 lb 14.4 oz)      Patient height not recorded     Estimated body mass index is 47.77 kg/m  as calculated from the following:    Height as of 20: 1.702 m (5' 7\").    Weight as of 2/15/22: 138.3 kg (305 lb).    Lab Results   Component Value Date    INR 3.4 (A) 2022    INR 3.3 (A) 2022    INR 4.3 (A) 2022     Lab Results   Component Value Date    HGB 9.8 2022    HGB 11.2 10/29/2012    HCT 31.1 2022    HCT 35.1 10/29/2012     2022     Lab Results   Component Value Date    CR 1.32 (H) 2022    CR 1.03 10/07/2021    CR 1.02 2021     CrCl cannot be calculated (Patient's most recent lab result is older than the maximum 30 days allowed.).  "

## 2022-05-04 NOTE — PROGRESS NOTES
ANTICOAGULATION MANAGEMENT     Nancy Oropeza 76 year old female is on warfarin with supratherapeutic INR result. (Goal INR 2.0-3.0)    Recent labs: (last 7 days)     05/04/22  0000   INR 3.4*       ASSESSMENT     Source(s): Chart Review and Patient/Caregiver Call     Warfarin doses taken: Warfarin taken as instructed  Diet: Decreased greens/vitamin K in diet; plans to resume previous intake  New illness, injury, or hospitalization: No  Medication/supplement changes: prednisone started about a week ago.Tramadol and tylenol for pain.  Signs or symptoms of bleeding or clotting: No  Previous INR: Supratherapeutic  Additional findings: Upcoming surgery/procedure 5/13 going in for an injection, needs stop warfarin 5 days prior. notes she was told to stop on 5/8. Dr [facility].   Sent for bridging and hold verification through [facility]       PLAN     Recommended plan for no diet, medication or health factor changes affecting INR     Dosing Instructions: decrease your warfarin dose (11.1% change) with next INR in 2 weeks. Patient has an upcoming hold an INR will need to be checked after.    Summary  As of 5/4/2022    Full warfarin instructions:  2.5 mg every Mon; 1.25 mg all other days   Next INR check:  5/20/2022             Telephone call with Nancy who verbalizes understanding and agrees to plan    Patient to recheck with home meter    Education provided: None required    Plan made per [facility] anticoagulation protocol    Kera Crespo, RN  Anticoagulation Clinic  5/4/2022    _______________________________________________________________________     Anticoagulation Episode Summary     Current INR goal:  2.0-3.0   TTR:  44.7 % (1 y)   Target end date:  Indefinite   Send INR reminders to:  XENIA MURILLO    Indications    Pulmonary embolism (H) [I26.99]  Pulmonary embolism  unspecified chronicity  unspecified pulmonary embolism type  unspecified whether acute cor pulmonale present (H) [I26.99]            Comments:  [employer] Home [employer]         Anticoagulation Care Providers     Provider Role Specialty Phone number    Julien Garnica MD Referring Family Medicine [phone]

## 2022-05-04 NOTE — TELEPHONE ENCOUNTER
Upcoming surgery/procedure 5/13 going in for an injection, needs stop warfarin 5 days prior. Dr Mena at [facility] is doing the spinal injection.    She was given hold instructions by them.    Kera Crespo RN    [facility] Anticoagulation Clinic

## 2022-05-05 NOTE — TELEPHONE ENCOUNTER
Julien Garnica MD Johnson, Michelle A, [facility]  Caller: Unspecified (Yesterday,  4:06 PM)  Agree with hold orders, etc.  Thank you for helping to manage :)     Julien Garnica MD

## 2022-05-05 NOTE — TELEPHONE ENCOUNTER
Called and informed the patient of the hold as written by the [facility] below. Updated the calendar to reflect this.    Kera Crespo RN    [facility] Anticoagulation [facility]

## 2022-05-11 ENCOUNTER — TRANSFERRED RECORDS (OUTPATIENT)
Dept: HEALTH INFORMATION MANAGEMENT | Facility: CLINIC | Age: 77
End: 2022-05-11
Payer: COMMERCIAL

## 2022-05-11 ENCOUNTER — TELEPHONE (OUTPATIENT)
Dept: FAMILY MEDICINE | Facility: CLINIC | Age: 77
End: 2022-05-11
Payer: COMMERCIAL

## 2022-05-11 DIAGNOSIS — I26.99 PULMONARY EMBOLISM, UNSPECIFIED CHRONICITY, UNSPECIFIED PULMONARY EMBOLISM TYPE, UNSPECIFIED WHETHER ACUTE COR PULMONALE PRESENT (H): Primary | ICD-10-CM

## 2022-05-11 LAB — INR (EXTERNAL): 1.3 (ref 0.9–1.1)

## 2022-05-11 NOTE — TELEPHONE ENCOUNTER
Patient called in to state that [facility] called her and said that she messed up on her 5 day hold and can not have procedure this Friday. Patient took her last dose of warfarin on 5/7/2022.    Called [facility] and they noted that the patient states she took her warfarin on Sunday and they can not do the procedure.     Called and informed the patient. She notes she did her INR today. It was 1.3. She was instructed to take 1.5 tablets tonight and resume maintenance and recheck INR Tuesday.     Patient agrees with the plan.    Kera Crespo RN    [facility] Anticoagulation Clinic

## 2022-05-11 NOTE — TELEPHONE ENCOUNTER
Incoming fax from [employer].    Result date: 05/11/2022 1541 EST  INR: 1.3    See note below for dosing.

## 2022-05-20 ENCOUNTER — TELEPHONE (OUTPATIENT)
Dept: FAMILY MEDICINE | Facility: CLINIC | Age: 77
End: 2022-05-20
Payer: COMMERCIAL

## 2022-05-20 ENCOUNTER — TRANSFERRED RECORDS (OUTPATIENT)
Dept: HEALTH INFORMATION MANAGEMENT | Facility: CLINIC | Age: 77
End: 2022-05-20
Payer: COMMERCIAL

## 2022-05-20 ENCOUNTER — ANTICOAGULATION THERAPY VISIT (OUTPATIENT)
Dept: ANTICOAGULATION | Facility: CLINIC | Age: 77
End: 2022-05-20
Payer: COMMERCIAL

## 2022-05-20 DIAGNOSIS — I26.99 PULMONARY EMBOLISM (H): Primary | ICD-10-CM

## 2022-05-20 DIAGNOSIS — I26.99 PULMONARY EMBOLISM, UNSPECIFIED CHRONICITY, UNSPECIFIED PULMONARY EMBOLISM TYPE, UNSPECIFIED WHETHER ACUTE COR PULMONALE PRESENT (H): ICD-10-CM

## 2022-05-20 LAB — INR (EXTERNAL): 1.4 (ref 0.9–1.1)

## 2022-05-20 NOTE — PROGRESS NOTES
ANTICOAGULATION MANAGEMENT     Nancy Oropeza 77 year old female is on warfarin with subtherapeutic INR result. (Goal INR 2.0-3.0)    Recent labs: (last 7 days)     22  0000   INR 1.4*       ASSESSMENT     Source(s): Chart Review and Patient/Caregiver Call     Warfarin doses taken: Warfarin taken as instructed Patient was holding last week for procedure/injection  Diet: No new diet changes identified  New illness, injury, or hospitalization: No  Medication/supplement changes: None noted  Signs or symptoms of bleeding or clotting: No  Previous INR: Subtherapeutic  Additional findings: Upcoming surgery/procedure 2022 injection-Date changed/rescheduled, see TE from 22  Pre-Procedure:  Hold warfarin for 5 days, until after procedure startin2022   No Bridge     Post-Procedure:  Resume warfarin dose if okay with provider doing procedure on night of procedure, 2022 PM: 3.75mg  Recheck INR ~ 7 days after resuming warfarin           PLAN     Recommended plan for no diet, medication or health factor changes affecting INR     Dosing Instructions: Begin hold tomorrow until procedure on , then take 3.75 mg on the night of the procedure if provider giving injection gives approval. with next INR in 2 weeks       Summary  As of 2022    Full warfarin instructions:  : Hold; : Hold; : Hold; : Hold; : Hold; : 3.75 mg; Otherwise 2.5 mg every Mon; 1.25 mg all other days   Next INR check:  2022             Telephone call with Nancy who agrees to plan and repeated back plan correctly    Patient to recheck with home meter    Education provided: Importance of notifying clinic of upcoming surgeries and procedures 2 weeks in advance    Plan made per [facility] anticoagulation protocol    Evie Lagos, RN  Anticoagulation Clinic  2022    _______________________________________________________________________     Anticoagulation Episode Summary     Current INR goal:   2.0-3.0   TTR:  45.6 % (1 y)   Target end date:  Indefinite   Send INR reminders to:  XENIA MURILLO    Indications    Pulmonary embolism (H) [I26.99]  Pulmonary embolism  unspecified chronicity  unspecified pulmonary embolism type  unspecified whether acute cor pulmonale present (H) [I26.99]           Comments:  [employer] Home [employer]         Anticoagulation Care Providers     Provider Role Specialty Phone number    Julien Garnica MD Referring Family Medicine [phone]

## 2022-05-20 NOTE — TELEPHONE ENCOUNTER
Returned call to patient in anticoagulation encounter.    GERTRUDE FreitasN, RN  Anticoagulation Clinic

## 2022-05-20 NOTE — PROGRESS NOTES
Received a fax INR result for Nancy from [employer]    INR result dated 5/20/2022 is 1.4    Kati Sierra RN, BSN  Anticoagulation Clinic

## 2022-05-20 NOTE — TELEPHONE ENCOUNTER
Reason for Call:  Other call back    Detailed comments: Pt is calling in regards to her INR results they at 1.4 today is her last pill and she not sure what she should do with the results and she is having surgery on Mon and is not suppose to be taking her meds for 5 days.  she needs a call back to be instructed on what she should do with her result being so low    Phone Number Patient can be reached at: Home number on file [phone] (home)    Best Time: ANYTIME     Can we leave a detailed message on this number? YES    Call taken on 5/20/2022 at 12:05 PM by Wendy Jaramillo

## 2022-05-23 ENCOUNTER — APPOINTMENT (OUTPATIENT)
Dept: CT IMAGING | Facility: HOSPITAL | Age: 77
End: 2022-05-23
Attending: EMERGENCY MEDICINE
Payer: COMMERCIAL

## 2022-05-23 ENCOUNTER — HOSPITAL ENCOUNTER (EMERGENCY)
Facility: HOSPITAL | Age: 77
Discharge: HOME | End: 2022-05-23
Attending: EMERGENCY MEDICINE | Admitting: EMERGENCY MEDICINE
Payer: COMMERCIAL

## 2022-05-23 ENCOUNTER — NURSE TRIAGE (OUTPATIENT)
Dept: NURSING | Facility: CLINIC | Age: 77
End: 2022-05-23

## 2022-05-23 VITALS
OXYGEN SATURATION: 96 % | BODY MASS INDEX: 45.52 KG/M2 | RESPIRATION RATE: 16 BRPM | DIASTOLIC BLOOD PRESSURE: 65 MMHG | SYSTOLIC BLOOD PRESSURE: 137 MMHG | WEIGHT: 290 LBS | HEART RATE: 88 BPM | HEIGHT: 67 IN | TEMPERATURE: 97.8 F

## 2022-05-23 DIAGNOSIS — R10.13 ABDOMINAL PAIN, EPIGASTRIC: ICD-10-CM

## 2022-05-23 LAB
ALBUMIN SERPL-MCNC: 3.8 G/DL (ref 3.5–5)
ALBUMIN UR-MCNC: NEGATIVE MG/DL
ALP SERPL-CCNC: 83 U/L (ref 45–120)
ALT SERPL W P-5'-P-CCNC: 12 U/L (ref 0–45)
ANION GAP SERPL CALCULATED.3IONS-SCNC: 13 MMOL/L (ref 5–18)
APPEARANCE UR: CLEAR
AST SERPL W P-5'-P-CCNC: 14 U/L (ref 0–40)
BILIRUB DIRECT SERPL-MCNC: 0.2 MG/DL
BILIRUB SERPL-MCNC: 0.7 MG/DL (ref 0–1)
BILIRUB UR QL STRIP: NEGATIVE
BUN SERPL-MCNC: 25 MG/DL (ref 8–28)
CALCIUM SERPL-MCNC: 11 MG/DL (ref 8.5–10.5)
CHLORIDE BLD-SCNC: 97 MMOL/L (ref 98–107)
CO2 SERPL-SCNC: 29 MMOL/L (ref 22–31)
COLOR UR AUTO: ABNORMAL
CREAT SERPL-MCNC: 1.3 MG/DL (ref 0.6–1.1)
ERYTHROCYTE [DISTWIDTH] IN BLOOD BY AUTOMATED COUNT: 14.7 % (ref 10–15)
GFR SERPL CREATININE-BSD FRML MDRD: 42 ML/MIN/1.73M2
GLUCOSE BLD-MCNC: 109 MG/DL (ref 70–125)
GLUCOSE UR STRIP-MCNC: NEGATIVE MG/DL
HCT VFR BLD AUTO: 32.9 % (ref 35–47)
HGB BLD-MCNC: 10.3 G/DL (ref 11.7–15.7)
HGB UR QL STRIP: NEGATIVE
HYALINE CASTS: 13 /LPF
KETONES UR STRIP-MCNC: NEGATIVE MG/DL
LEUKOCYTE ESTERASE UR QL STRIP: NEGATIVE
LIPASE SERPL-CCNC: 52 U/L (ref 0–52)
MCH RBC QN AUTO: 27 PG (ref 26.5–33)
MCHC RBC AUTO-ENTMCNC: 31.3 G/DL (ref 31.5–36.5)
MCV RBC AUTO: 86 FL (ref 78–100)
MUCOUS THREADS #/AREA URNS LPF: PRESENT /LPF
NITRATE UR QL: NEGATIVE
PH UR STRIP: 5.5 [PH] (ref 5–7)
PLATELET # BLD AUTO: 431 10E3/UL (ref 150–450)
POTASSIUM BLD-SCNC: 3.6 MMOL/L (ref 3.5–5)
PROT SERPL-MCNC: 7.4 G/DL (ref 6–8)
RBC # BLD AUTO: 3.81 10E6/UL (ref 3.8–5.2)
RBC URINE: <1 /HPF
SODIUM SERPL-SCNC: 139 MMOL/L (ref 136–145)
SP GR UR STRIP: 1.01 (ref 1–1.03)
SQUAMOUS EPITHELIAL: 1 /HPF
UROBILINOGEN UR STRIP-MCNC: <2 MG/DL
WBC # BLD AUTO: 14.9 10E3/UL (ref 4–11)
WBC URINE: 2 /HPF

## 2022-05-23 PROCEDURE — 250N000011 HC RX IP 250 OP 636: Performed by: EMERGENCY MEDICINE

## 2022-05-23 PROCEDURE — 250N000009 HC RX 250: Performed by: EMERGENCY MEDICINE

## 2022-05-23 PROCEDURE — 74177 CT ABD & PELVIS W/CONTRAST: CPT

## 2022-05-23 PROCEDURE — 93005 ELECTROCARDIOGRAM TRACING: CPT | Performed by: EMERGENCY MEDICINE

## 2022-05-23 PROCEDURE — 99285 EMERGENCY DEPT VISIT HI MDM: CPT | Mod: 25

## 2022-05-23 PROCEDURE — 82310 ASSAY OF CALCIUM: CPT | Performed by: EMERGENCY MEDICINE

## 2022-05-23 PROCEDURE — 82248 BILIRUBIN DIRECT: CPT | Performed by: EMERGENCY MEDICINE

## 2022-05-23 PROCEDURE — 96374 THER/PROPH/DIAG INJ IV PUSH: CPT

## 2022-05-23 PROCEDURE — 83690 ASSAY OF LIPASE: CPT | Performed by: EMERGENCY MEDICINE

## 2022-05-23 PROCEDURE — 81001 URINALYSIS AUTO W/SCOPE: CPT | Performed by: EMERGENCY MEDICINE

## 2022-05-23 PROCEDURE — 36415 COLL VENOUS BLD VENIPUNCTURE: CPT | Performed by: EMERGENCY MEDICINE

## 2022-05-23 PROCEDURE — 85014 HEMATOCRIT: CPT | Performed by: EMERGENCY MEDICINE

## 2022-05-23 PROCEDURE — 250N000013 HC RX MED GY IP 250 OP 250 PS 637: Performed by: EMERGENCY MEDICINE

## 2022-05-23 RX ORDER — IOPAMIDOL 755 MG/ML
75 INJECTION, SOLUTION INTRAVASCULAR ONCE
Status: COMPLETED | OUTPATIENT
Start: 2022-05-23 | End: 2022-05-23

## 2022-05-23 RX ADMIN — IOPAMIDOL 75 ML: 755 INJECTION, SOLUTION INTRAVENOUS at 22:31

## 2022-05-23 RX ADMIN — LIDOCAINE HYDROCHLORIDE 30 ML: 20 SOLUTION ORAL; TOPICAL at 21:45

## 2022-05-23 RX ADMIN — FAMOTIDINE 20 MG: 10 INJECTION INTRAVENOUS at 21:42

## 2022-05-23 ASSESSMENT — ENCOUNTER SYMPTOMS
DIARRHEA: 0
FEVER: 0
ABDOMINAL PAIN: 1
VOMITING: 0
ROS GI COMMENTS: POSITIVE FOR MELENA.
CHILLS: 0
CONSTIPATION: 1
NAUSEA: 0
SHORTNESS OF BREATH: 0

## 2022-05-23 NOTE — TELEPHONE ENCOUNTER
Received call from Triage RN checking on status of message as it has not been addressed yet. Please review and advise.

## 2022-05-23 NOTE — ED PROVIDER NOTES
ED Provider In Triage Note  [facility]  Encounter Date: May 23, 2022    No chief complaint on file.      Brief HPI:   Nancy Oropeza is a 77 year old female presenting to the Emergency Department with a chief complaint of nausea and vomiting Saturday morning. Reports associated abdominal pain, upper abdomen, that has been ongoing since symptoms started. No longer has nausea, but is afraid to eat. Reports black stools, but has taken Pepto-Bismol.     Brief Physical Exam:  There were no vitals taken for this visit.  General: Non-toxic appearing  HEENT: Atraumatic  Resp: No respiratory distress; clear lungs  Cardiac: RRR  Abdomen: soft, moderate tenderness to palpation RLQ, LUQ and epigastrium; no peritoneal signs   Neuro: Alert, oriented, answers questions appropriately; cranial nerves grossly intact, no focal motor deficits  Psych: Behavior appropriate      Plan Initiated in Triage:  EKG  Labs  CT abd / pelvis    PIT Dispo:   Room when able    Piper Martin MD on 5/23/2022 at 6:09 PM    Patient was evaluated by the Physician in Triage due to a limitation of available rooms in the Emergency Department. A plan of care was discussed based on the information obtained on the initial evaluation and patient was consuled to return back to the Emergency Department lobby after this initial evalutaiton until results were obtained or a room became available in the Emergency Department. Patient was counseled not to leave prior to receiving the results of their workup.        Piper Martin MD  05/23/22 [id number]

## 2022-05-23 NOTE — ED TRIAGE NOTES
PT COMES IN WITH ABDOMINAL PAIN SINCE Saturday 0400.  PT HAD NAUSEA AND VOMITING Saturday MORNING, NO LONGER NAUSEATED. PT FEELS CONSTIPATED, NOTES DARK STOOL, BUT HAS BEEN TAKING PEPTO.

## 2022-05-23 NOTE — TELEPHONE ENCOUNTER
She needs to be seen this evening to have this concern evaluated.  Walk-in clinic or urgent care would be appropriate if available.

## 2022-05-23 NOTE — TELEPHONE ENCOUNTER
Spoke with mayra.  She will be seen tonight at either walk in care or ED or urgency room.    Told her to keep her appt on Friday for now.    To pcp as fyi only.

## 2022-05-23 NOTE — TELEPHONE ENCOUNTER
"Nurse Triage SBAR    Is this a 2nd Level Triage? YES, LICENSED PRACTITIONER REVIEW IS REQUIRED    Situation: Pt calling with intermittent abdominal pain.    Background: Sx began Saturday night when she woke up around 0400 vomiting multiple times. No blood or coffee ground substance noted in vomit. States that she was sweating profusely while she was vomiting, but has not had that happen since.  Has not been able to have a BM since Saturday and constipation is a chronic problem for her. Is taking metamucil \"but it doesn't seem to help.\"      Assessment: Rates discomfort as moderate and the pain is mostly in her upper abdomen above her belly button. Has not vomited today, but says her abdomen is distended. She does report a decreased appetite, but is able to tolerate some fluids.    Protocol Recommended Disposition:   See Today In Office, See More Appropriate Protocol    Recommendation: Will route to provider for SLT recommendation.    Routed to provider    Does the patient meet one of the following criteria for ADS visit consideration? 16+ years old, with an [facility] PCP     TIP  Providers, please consider if this condition is appropriate for management at one of our Acute and Diagnostic Services sites.     If patient is a good candidate, please use dotphrase <dot>triageresponse and select Refer to ADS to document.      Flaquita Jackson RN, BSN  [facility]   Triage Nurse Advisor      COVID 19 Nurse Triage Plan/Patient Instructions    Please be aware that novel coronavirus (COVID-19) may be circulating in the community. If you develop symptoms such as fever, cough, or SOB or if you have concerns about the presence of another infection including coronavirus (COVID-19), please contact your health care provider or visit [email or web address].     Disposition/Instructions    In-Person Visit with provider recommended. Reference Visit Selection Guide.    Thank you for taking steps to prevent the spread of this " virus.  o Limit your contact with others.  o Wear a simple mask to cover your cough.  o Wash your hands well and often.    Resources    [facility]: About COVID-19: [email or web address]    CDC: What to Do If You're Sick: www.cdc.gov/coronavirus/2019-ncov/about/steps-when-sick.html    CDC: Ending Home Isolation: www.cdc.gov/coronavirus/2019-ncov/hcp/disposition-in-home-patients.html     CDC: Caring for Someone: www.cdc.gov/coronavirus/2019-ncov/if-you-are-sick/care-for-someone.html     [facility]: Interim Guidance for Hospital Discharge to Home: [email or web address]    [facility] clinical trials (COVID-19 research studies): [email or web address]-[email or web address]-trials     Below are the COVID-19 hotlines at the [place] [facility] of [facility] ([facility]). Interpreters are available.   o For health questions: Call 486-223-3228 or 1-704.630.7819 (7 a.m. to 7 p.m.)  o For questions about schools and childcare: Call 076-425-8340 or 1-756.315.9566 (7 a.m. to 7 p.m.)                              Reason for Disposition    Pain is mainly in upper abdomen (if needed ask: 'is it mainly above the belly button?')    Age > 60 years    Additional Information    Negative: Chest pain    Negative: Passed out (i.e., fainted, collapsed and was not responding)    Negative: Shock suspected (e.g., cold/pale/clammy skin, too weak to stand, low BP, rapid pulse)    Negative: Visible sweat on face or sweat is dripping down    Negative: SEVERE abdominal pain (e.g., excruciating)    Negative: Pain lasting > 10 minutes and over 50 years old    Negative: Pain lasting > 10 minutes and over 40 years old and associated chest, arm, neck, upper back, or jaw pain    Negative: Pain lasting > 10 minutes and over 35 years old and at least one cardiac risk factor    Negative: Pain lasting > 10 minutes and history of heart disease (i.e., heart attack, bypass surgery, angina, angioplasty, CHF)     Negative: Recent injury to the abdomen    Negative: Vomiting red blood or black (coffee ground) material    Negative: Bloody, black, or tarry bowel movements (Exception: chronic-unchanged  black-grey bowel movements and is taking iron pills or Pepto-bismol)    Negative: Pregnant > 24 weeks and hand or face swelling    Negative: Constant abdominal pain lasting > 2 hours    Negative: Vomiting bile (green color)    Negative: Patient sounds very sick or weak to the triager    Negative: Vomiting and abdomen looks much more swollen than usual    Negative: White of the eyes have turned yellow (i.e.,  jaundice)    Negative: Fever > 103 F (39.4 C)    Negative: Fever > 101 F (38.3 C) and over 60 years of age    Negative: Fever > 100.0 F (37.8 C) and has diabetes mellitus or a weak immune system (e.g., HIV positive, cancer chemotherapy, organ transplant, splenectomy, chronic steroids)    Negative: Fever > 100.0 F (37.8 C) and bedridden (e.g., nursing home patient, stroke, chronic illness, recovering from surgery)    Protocols used: ABDOMINAL PAIN - FEMALE-A-OH, ABDOMINAL PAIN - UPPER-A-OH

## 2022-05-23 NOTE — TELEPHONE ENCOUNTER
3:49 Called Fabricio PERKINS line. Informed of urgent note.    Veronica Bautista RN/[facility]

## 2022-05-24 ENCOUNTER — PATIENT OUTREACH (OUTPATIENT)
Dept: CARE COORDINATION | Facility: CLINIC | Age: 77
End: 2022-05-24
Payer: COMMERCIAL

## 2022-05-24 ENCOUNTER — TRANSFERRED RECORDS (OUTPATIENT)
Dept: HEALTH INFORMATION MANAGEMENT | Facility: CLINIC | Age: 77
End: 2022-05-24
Payer: COMMERCIAL

## 2022-05-24 ENCOUNTER — ANTICOAGULATION THERAPY VISIT (OUTPATIENT)
Dept: ANTICOAGULATION | Facility: CLINIC | Age: 77
End: 2022-05-24
Payer: COMMERCIAL

## 2022-05-24 DIAGNOSIS — Z71.89 OTHER SPECIFIED COUNSELING: ICD-10-CM

## 2022-05-24 DIAGNOSIS — I26.99 PULMONARY EMBOLISM, UNSPECIFIED CHRONICITY, UNSPECIFIED PULMONARY EMBOLISM TYPE, UNSPECIFIED WHETHER ACUTE COR PULMONALE PRESENT (H): ICD-10-CM

## 2022-05-24 DIAGNOSIS — I26.99 PULMONARY EMBOLISM (H): Primary | ICD-10-CM

## 2022-05-24 DIAGNOSIS — M25.512 CHRONIC LEFT SHOULDER PAIN: ICD-10-CM

## 2022-05-24 DIAGNOSIS — G89.29 CHRONIC LEFT SHOULDER PAIN: ICD-10-CM

## 2022-05-24 LAB
ATRIAL RATE - MUSE: 70 BPM
DIASTOLIC BLOOD PRESSURE - MUSE: NORMAL MMHG
INR (EXTERNAL): 1.3 (ref 0.9–1.1)
INTERPRETATION ECG - MUSE: NORMAL
P AXIS - MUSE: 56 DEGREES
PR INTERVAL - MUSE: 158 MS
QRS DURATION - MUSE: 86 MS
QT - MUSE: 412 MS
QTC - MUSE: 444 MS
R AXIS - MUSE: 48 DEGREES
SYSTOLIC BLOOD PRESSURE - MUSE: NORMAL MMHG
T AXIS - MUSE: 68 DEGREES
VENTRICULAR RATE- MUSE: 70 BPM

## 2022-05-24 NOTE — PROGRESS NOTES
Clinic Care Coordination Contact  [facility]/Voicemail    Clinical Data: Care Coordinator Outreach  Outreach attempted x 1. Missing or Invalid Number - call was dropped. CHW was unable to leave a message on patient's voicemail with call back information and a request for a return call.     Plan:Care Coordinator will try to reach patient again in 1-2 business days.    RO Colvin  [phone]  [facility]

## 2022-05-24 NOTE — ED PROVIDER NOTES
EMERGENCY DEPARTMENT ENCOUNTER      NAME: Nancy Oropeza  AGE: 77 year old female  YOB: 1945  MRN: [id number]  EVALUATION DATE & TIME: 2022  8:40 PM    PCP: Julien Garnica    ED PROVIDER: James Bruno DO      Chief Complaint   Patient presents with     Abdominal Pain         FINAL IMPRESSION:  1. Abdominal pain, epigastric          ED COURSE & MEDICAL DECISION MAKIN-year-old female presented to the ED for evaluation of nausea vomiting and abdominal pain.  The symptoms started with nausea and vomiting 2 days ago.  This was followed by the epigastric abdominal pain.  The nausea vomiting had resolved but the abdominal pain has persisted.  The patient also reported dark stools but she states that this occurred after she started taking Pepto-Bismol.  The patient states that she has been constipated for the last few weeks as well.  Here in the ED the patient was hemodynamically stable.  She did not appear to be in any obvious distress or discomfort at the time for initial evaluation.  On exam the patient was noted to have mild epigastric and left upper quadrant tenderness palpation.  The patient did not have evidence of acute abdomen, however.  The remainder of her physical exam was unremarkable.  Following her initial evaluation the patient was given a GI cocktail and IV famotidine.    Laboratory test showed elevated white blood cell count of 14.9.  The remainder of the CBC was unremarkable.  BMP, hepatic panel, lipase, and UA were all reassuring.    CT scan of abdomen shows cholelithiasis without evidence of cholecystitis.  No other abnormalities were noted.    The patient was reevaluated and informed of the lab and abdominal CT scan results.  The patient stated that her pain had improved with the IV famotidine and GI cocktail.  The patient was informed that her symptoms are like related to a viral gastroenteritis with residual gastritis.  However, she was also informed about her  symptoms being related to biliary colic.  The patient was sent home with omeprazole to treat suspected GERD for the next 2 weeks.  She was instructed to follow-up with her primary care provider for reevaluation.  The patient was informed that she could also follow-up as an outpatient with general surgery if she continues to experience similar episodes of abdominal discomfort and vomiting.  She was instructed return back to the ED sooner for any worsening abdominal pain, vomiting, fevers, or any other new or concerning symptoms.    Pertinent Labs & Imaging studies reviewed. (See chart for details)  9:37 PM I met with the patient to gather history and to perform my initial exam. We discussed plans for the ED course, including diagnostic testing and treatment.   11:38 PM Patient ready for discharge.     At the conclusion of the encounter I discussed the results of all of the tests and the disposition. The questions were answered. The patient or family acknowledged understanding and was agreeable with the care plan.       PPE worn: n95 mask, goggles    MEDICATIONS GIVEN IN THE EMERGENCY:  Medications   lidocaine (viscous) (XYLOCAINE) 2 % 15 mL, alum & mag hydroxide-simethicone (MAALOX) 15 mL GI Cocktail (30 mLs Oral Given 5/23/22 2145)   famotidine (PEPCID) injection 20 mg (20 mg Intravenous Given 5/23/22 2142)   iopamidol (ISOVUE-370) solution 75 mL (75 mLs Intravenous Given 5/23/22 2231)       NEW PRESCRIPTIONS STARTED AT TODAY'S ER VISIT  Discharge Medication List as of 5/23/2022 11:30 PM      START taking these medications    Details   omeprazole (PRILOSEC) 20 MG DR capsule Take 1 capsule (20 mg) by mouth daily for 14 days, Disp-14 capsule, R-0, Local Print                =================================================================    HPI    Patient information was obtained from: Patient    Use of : N/A       Nancy Oropeza is a 77 year old female with a pertinent history of PE, morbid obesity,  "CKDIII, liver disease, constipation, and benign neoplasm of colon who presents to this ED via walk in for evaluation of abdominal pain.     Patient reports she had nausea, vomiting, diaphoresis, chills and abdominal pain for 3 hours on Saturday (~2 days ago). States all her symptoms besides abdominal pain resolved the next day. Reports she has only been eating soups and crackers because she is \"afraid\" to eat anything else. Patient endorses constipation that has persisted for the past few years. Patient also endorses melena. States she recently started a new pain medication a few weeks ago. States she drinks occasionally and denies smoking. Denies any past abdominal surgeries. Denies chest pain, shortness of breath or any other medical complaint at this time.     REVIEW OF SYSTEMS   Review of Systems   Constitutional: Negative for chills and fever.   Respiratory: Negative for shortness of breath.    Cardiovascular: Negative for chest pain.   Gastrointestinal: Positive for abdominal pain and constipation. Negative for diarrhea, nausea and vomiting.        Positive for melena.    All other systems reviewed and are negative.      PAST MEDICAL HISTORY:  Past Medical History:   Diagnosis Date     2019 novel coronavirus disease (COVID-19) 7/16/2020     Acute bilateral knee pain 7/18/2017     TEOFILO (acute kidney injury) (H) 7/16/2020     Anemia, unspecified     Created by Conversion      Angioedema 12/17/2015     Anticoagulant long-term use 3/6/2017     Back pain     Created by Conversion      Chronic pain syndrome 6/1/2018     COVID-19 virus infection 7/17/2020     Depression      Diarrhea 7/16/2020     Disease of thyroid gland      Edema     Created by Conversion      Essential hypertension with goal blood pressure less than 140/90     Created by Conversion  Replacement Utility updated for latest IMO load     History of pulmonary embolism 1/1/2015    Overview:  bilateral with acute cor pulmonale      HTN (hypertension)  "     Hypercholesteremia      Hypokalemia 12/17/2015     Hypothyroidism     Created by Conversion  Replacement Utility updated for latest IMO load     Hypoxia 7/16/2020     Impaired fasting glucose     Created by Conversion      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     Major depressive disorder, recurrent episode (H)     Created by Conversion  Replacement Utility updated for latest IMO load     Mood disorder (H) 7/16/2020     Morbid obesity (H)      Multiple lung nodules on CT 12/17/2015     Obstructive sleep apnea (adult) (pediatric)     Created by Conversion      Osteoarthrosis involving lower leg     Created by Conversion  Replacement Utility updated for latest IMO load     Pain in joint, multiple sites     Created by Conversion      Polymyalgia rheumatica (H) 7/16/2020     Postoperative anemia due to acute blood loss 12/18/2018     Pulmonary emboli (H) 12/14/2015     Pulmonary embolism (H) 12/21/2015     Pure hypercholesterolemia     Created by Conversion      Sepsis (H) 7/28/2020     SIRS (systemic inflammatory response syndrome) (H) 7/28/2020     Unspecified cataract     Created by Conversion      Yeast infection        PAST SURGICAL HISTORY:  Past Surgical History:   Procedure Laterality Date     arthroplasty for hammertoe-2nd toe R 2000 Biebl[       BIOPSY BREAST Left 1970s     Bunion correction by Cheilectomy-had bunionectomy ? type of procedure L and R separate procedures.[       GYN SURGERY       HERNIA REPAIR       Hernia Repair Inguinal unilateral[       HYSTERECTOMY       HYSTERECTOMY  2010     IR ABSCESS TUBE CHANGE  11/9/2012     IR ABSCESS TUBE CHANGE  11/12/2012     IR ABSCESS TUBE CHANGE  12/4/2012     IR ABSCESS TUBE CHANGE  2/22/2013     IR ABSCESS TUBE CHANGE  3/1/2013     IR ABSCESS TUBE CHANGE  3/13/2013     OOPHORECTOMY  2010     ORTHOPEDIC SURGERY             CURRENT MEDICATIONS:    omeprazole (PRILOSEC) 20 MG DR capsule  acetaminophen 500 MG CAPS  albuterol (PROAIR HFA/PROVENTIL HFA/VENTOLIN HFA) 108 (90  "Base) MCG/ACT inhaler  amLODIPine (NORVASC) 5 MG tablet  BD ULTRA-FINE 33 LANCETS  Cholecalciferol (VITAMIN D) 400 UNITS capsule  citalopram (CELEXA) 40 MG tablet  furosemide (LASIX) 20 MG tablet  Glucose Blood (ONE TOUCH ULTRA TEST) strip  levothyroxine (SYNTHROID/LEVOTHROID) 137 MCG tablet  lisinopril-hydrochlorothiazide (ZESTORETIC) 20-12.5 MG tablet  pravastatin (PRAVACHOL) 80 MG tablet  predniSONE (DELTASONE) 5 MG tablet  traMADol (ULTRAM) 50 MG tablet  warfarin ANTICOAGULANT (COUMADIN) 2.5 MG tablet        ALLERGIES:  Allergies   Allergen Reactions     Sulfasalazine Rash     Pt reports she has never taken this med, so is not sure why it's on her allergy list       Atorvastatin      Paroxetine Unknown     Simvastatin      Sulfa Drugs        FAMILY HISTORY:  Family History   Problem Relation Age of Onset     Breast Cancer Sister 75.00     Stomach Cancer Paternal Grandfather      Lung Cancer Sister      Chronic Obstructive Pulmonary Disease Mother      Heart Disease Mother      Coronary Artery Disease Father        SOCIAL HISTORY:   Social History     Socioeconomic History     Marital status:      Spouse name: None     Number of children: None     Years of education: None     Highest education level: None   Tobacco Use     Smoking status: Former Smoker     Smokeless tobacco: Never Used     Tobacco comment: quite 20 yrs ago   Substance and Sexual Activity     Alcohol use: No     Comment: Alcoholic Drinks/day: occasionally     Drug use: No   Social History Narrative    Patient arrived in the ED with her sister Kassy 09/10/17         VITALS:  /65 (BP Location: Left arm, Patient Position: Left side)   Pulse 88   Temp 97.8  F (36.6  C) (Oral)   Resp 16   Ht 1.702 m (5' 7\")   Wt 131.5 kg (290 lb)   SpO2 96%   BMI 45.42 kg/m      PHYSICAL EXAM    General presentation: Alert, Vital signs reviewed. NAD  HENT: ENT inspection is normal. Oropharynx is moist and clear.   Eye: Pupils are equal and " reactive to light. EOMI  Neck: The neck is supple, with full ROM, with no evidence of meningismus.  Pulmonary: Currently in no acute respiratory distress. Normal, non labored respirations, the lung sounds are normal with good equal air movement. Clear to auscultation bilaterally.   Circulatory: Regular rate and rhythm. Peripheral pulses are strong and equal. No murmurs, rubs, or gallops.   Abdominal: The abdomen is soft.No rigidity, guarding, or rebound. Bowel sounds normal. Mild epigastric and ULQ tenderness to palpation.  Neurologic: Alert, oriented to person, place, and time. No motor deficit. No sensory deficit. Cranial nerves II through XII are intact.  Musculoskeletal: No extremity tenderness. Full range of motion in all extremities. No extremity edema.   Skin: Skin color is normal. No rash. Warm. Dry to touch.      LAB:  All pertinent labs reviewed and interpreted.  Results for orders placed or performed during the hospital encounter of 05/23/22   CT Abdomen Pelvis w Contrast    Impression    IMPRESSION:   1.  No acute findings or inflammatory changes in the abdomen or pelvis.    2.  Cholelithiasis. No acute cholecystitis.    3.  Extensive diverticulosis. No acute diverticulitis.    4.  Unchanged thick-walled fluid collection in the lower ventral abdominal wall, which may represent postsurgical seroma.   UA with Microscopic reflex to Culture    Specimen: Urine, Midstream   Result Value Ref Range    Color Urine Light Yellow Colorless, Straw, Light Yellow, Yellow    Appearance Urine Clear Clear    Glucose Urine Negative Negative mg/dL    Bilirubin Urine Negative Negative    Ketones Urine Negative Negative mg/dL    Specific Gravity Urine 1.015 1.001 - 1.030    Blood Urine Negative Negative    pH Urine 5.5 5.0 - 7.0    Protein Albumin Urine Negative Negative mg/dL    Urobilinogen Urine <2.0 <2.0 mg/dL    Nitrite Urine Negative Negative    Leukocyte Esterase Urine Negative Negative    Mucus Urine Present (A) None  Seen /LPF    RBC Urine <1 <=2 /HPF    WBC Urine 2 <=5 /HPF    Squamous Epithelials Urine 1 <=1 /HPF    Hyaline Casts Urine 13 (H) <=2 /LPF   CBC (+ platelets, no diff)   Result Value Ref Range    WBC Count 14.9 (H) 4.0 - 11.0 10e3/uL    RBC Count 3.81 3.80 - 5.20 10e6/uL    Hemoglobin 10.3 (L) 11.7 - 15.7 g/dL    Hematocrit 32.9 (L) 35.0 - 47.0 %    MCV 86 78 - 100 fL    MCH 27.0 26.5 - 33.0 pg    MCHC 31.3 (L) 31.5 - 36.5 g/dL    RDW 14.7 10.0 - 15.0 %    Platelet Count 431 150 - 450 10e3/uL   Basic metabolic panel   Result Value Ref Range    Sodium 139 136 - 145 mmol/L    Potassium 3.6 3.5 - 5.0 mmol/L    Chloride 97 (L) 98 - 107 mmol/L    Carbon Dioxide (CO2) 29 22 - 31 mmol/L    Anion Gap 13 5 - 18 mmol/L    Urea Nitrogen 25 8 - 28 mg/dL    Creatinine 1.30 (H) 0.60 - 1.10 mg/dL    Calcium 11.0 (H) 8.5 - 10.5 mg/dL    Glucose 109 70 - 125 mg/dL    GFR Estimate 42 (L) >60 mL/min/1.73m2   Hepatic function panel   Result Value Ref Range    Bilirubin Total 0.7 0.0 - 1.0 mg/dL    Bilirubin Direct 0.2 <=0.5 mg/dL    Protein Total 7.4 6.0 - 8.0 g/dL    Albumin 3.8 3.5 - 5.0 g/dL    Alkaline Phosphatase 83 45 - 120 U/L    AST 14 0 - 40 U/L    ALT 12 0 - 45 U/L   Result Value Ref Range    Lipase 52 0 - 52 U/L       RADIOLOGY:  Reviewed all pertinent imaging. Please see official radiology report.  CT Abdomen Pelvis w Contrast   Final Result   IMPRESSION:    1.  No acute findings or inflammatory changes in the abdomen or pelvis.      2.  Cholelithiasis. No acute cholecystitis.      3.  Extensive diverticulosis. No acute diverticulitis.      4.  Unchanged thick-walled fluid collection in the lower ventral abdominal wall, which may represent postsurgical seroma.          EKG:    Normal sinus rhythm.  Rate of 70.  Normal QRS.  Normal QT.  No ST or T wave changes.  Compared to EKG on 01/8/22 the nonspecific T wave changes are no longer evident.    I have independently reviewed and interpreted the EKG(s) documented  above.      I, Jared Rowe , am serving as a scribe to document services personally performed by James Bruno DO based on my observation and the provider's statements to me. I, James Bruno, attest that Jared Rowe is acting in a scribe capacity, has observed my performance of the services and has documented them in accordance with my direction.    James Bruno DO  Emergency Medicine  [facility] EMERGENCY DEPARTMENT  [address] [place] [id number] [phone]       James Bruno DO  05/24/22 0131

## 2022-05-24 NOTE — PROGRESS NOTES
ANTICOAGULATION MANAGEMENT     Nancy Oropeza 77 year old female is on warfarin with subtherapeutic INR result. (Goal INR 2.0-3.0)    Recent labs: (last 7 days)     05/24/22  1305   INR 1.3*       ASSESSMENT     Source(s): Chart Review and Patient/Caregiver Call     Warfarin doses taken: Warfarin on hold for upcoming procedure  Diet: No new diet changes identified  New illness, injury, or hospitalization: Yes: Pt was in ED 05/23 for abdominal pain. Feeling much better.       PLAN     Recommended plan for temporary change(s) affecting INR     Dosing Instructions: Continue to hold warfarin.  Restart warfarin the evening of 05/26 if OK with provider doing the injection. Take 3.75mg 05/26, then resume normal warfarin dose with next INR in 1 week       Summary  As of 5/24/2022    Full warfarin instructions:  5/24: Hold; 5/25: Hold; 5/26: 3.75 mg; Otherwise 2.5 mg every Mon; 1.25 mg all other days   Next INR check:  5/31/2022             Telephone call with Nancy who verbalizes understanding and agrees to plan    Patient to recheck with home meter    Education provided: Goal range and significance of current result    Plan made per [facility] anticoagulation protocol    Torey Cárdenas RN  Anticoagulation Clinic  5/24/2022    _______________________________________________________________________     Anticoagulation Episode Summary     Current INR goal:  2.0-3.0   TTR:  44.5 % (1 y)   Target end date:  Indefinite   Send INR reminders to:  XENIA MURILLO    Indications    Pulmonary embolism (H) [I26.99]  Pulmonary embolism  unspecified chronicity  unspecified pulmonary embolism type  unspecified whether acute cor pulmonale present (H) [I26.99]           Comments:  MDINR Home Meter         Anticoagulation Care Providers     Provider Role Specialty Phone number    Julien Garnica MD Referring Family Medicine [phone]

## 2022-05-24 NOTE — DISCHARGE INSTRUCTIONS
The suspected cause of your abdominal pain is gastroesophageal reflux disease/gastritis secondary to your vomiting episodes.  Take the omeprazole daily as directed for the next 2 weeks to treat your symptoms.    The CT scan shows that you have gallstones.  It is possible that your abdominal pain could also be related to these gallstones.  If you continue to experience similar episodes of discomfort it is recommended that you follow-up with a general surgeon to discuss elective gallbladder removal surgery.      Follow-up with your primary care provider for reevaluation or return back to ED sooner for any worsening abdominal pain, vomiting, fevers, or any other new or concerning symptoms.

## 2022-05-25 RX ORDER — TRAMADOL HYDROCHLORIDE 50 MG/1
50 TABLET ORAL EVERY 6 HOURS PRN
Qty: 60 TABLET | Refills: 0 | Status: SHIPPED | OUTPATIENT
Start: 2022-05-25 | End: 2022-06-28

## 2022-05-25 NOTE — PROGRESS NOTES
Clinic Care Coordination Contact  [facility]/Voicemail    Clinical Data: Care Coordinator Outreach  Outreach attempted x 2. Left message on patient's voicemail with call back information and requested return call.    Plan:Care Coordinator will do no further outreaches at this time.    RO Colvin  [phone]  [facility] Resource [facility]

## 2022-05-25 NOTE — TELEPHONE ENCOUNTER
Routing refill request to provider for review/approval because:  Drug not on the [facility] [place] protocol     Last Written Prescription Date:  4/27/22  Last Fill Quantity: 60,  # refills: 0   Last office visit provider:  2/15/22     Requested Prescriptions   Pending Prescriptions Disp Refills     traMADol (ULTRAM) 50 MG tablet [Pharmacy Med Name: traMADol HCl Oral Tablet 50 MG] 60 tablet 0     Sig: Take 1 tablet (50 mg) by mouth every 6 hours as needed for severe pain.       There is no refill protocol information for this order          Sneha Crockett, RN 05/25/22 10:58 AM

## 2022-05-26 ENCOUNTER — TELEPHONE (OUTPATIENT)
Dept: FAMILY MEDICINE | Facility: CLINIC | Age: 77
End: 2022-05-26
Payer: COMMERCIAL

## 2022-05-26 NOTE — TELEPHONE ENCOUNTER
Went over current dosing with patient and recheck date. No other questions at this time.    Thanks! Richa Clark RN

## 2022-05-26 NOTE — TELEPHONE ENCOUNTER
Reason for Call:  Other call back    Detailed comments: Patient called and needs dosing instructions.    Phone Number Patient can be reached at: Home number on file [phone] (home)    Best Time: Anytime     Can we leave a detailed message on this number? YES    Call taken on 5/26/2022 at 4:47 PM by Alona Pinedo

## 2022-05-26 NOTE — TELEPHONE ENCOUNTER
Reason for Call: patient's daughter is calling to discuss her anticoag medication, was off coumadin prior to her procedure/back injection of steroids.  Was not getting any bridge of lovanox.    INR today is 1.1.    Was told to call clinic as soon as possible.    Detailed comments: PLEASE CALL BACK TO DISCUSS.[phone] buddy Alegria      Best Time: any    Can we leave a detailed message on this number? Yes      Call taken on 5/26/2022 at 9:11 AM by Rama Bhatia

## 2022-05-27 ENCOUNTER — VIRTUAL VISIT (OUTPATIENT)
Dept: FAMILY MEDICINE | Facility: CLINIC | Age: 77
End: 2022-05-27
Payer: COMMERCIAL

## 2022-05-27 DIAGNOSIS — K59.00 CONSTIPATION, UNSPECIFIED CONSTIPATION TYPE: ICD-10-CM

## 2022-05-27 DIAGNOSIS — K80.21 CALCULUS OF GALLBLADDER WITH BILIARY OBSTRUCTION BUT WITHOUT CHOLECYSTITIS: Primary | ICD-10-CM

## 2022-05-27 DIAGNOSIS — G89.29 CHRONIC BILATERAL LOW BACK PAIN WITH RIGHT-SIDED SCIATICA: ICD-10-CM

## 2022-05-27 DIAGNOSIS — M54.41 CHRONIC BILATERAL LOW BACK PAIN WITH RIGHT-SIDED SCIATICA: ICD-10-CM

## 2022-05-27 PROCEDURE — 99213 OFFICE O/P EST LOW 20 MIN: CPT | Mod: 95 | Performed by: FAMILY MEDICINE

## 2022-05-27 NOTE — PROGRESS NOTES
Nancy is a 77 year old who is being evaluated via a billable telephone visit.      What phone number would you like to be contacted at? [phone]  How would you like to obtain your AVS? MyChart    Calculus of gallbladder with biliary obstruction but without cholecystitis  Recent concerns with episodes of vomiting with subsequent ER assessment.  Repeat CT showing cholelithiasis without acute cholecystitis.  Symptoms have resolved.  Trying to eat low-fat diet.  Is interested in consideration for gallbladder removal.  Referral was placed to see Dr. Sid Nguyen to assess regarding recommendations for laparoscopic cholecystectomy etc.  - Adult General Surg Referral    Chronic bilateral low back pain with right-sided sciatica  Chronic bilateral lower back pain with history of right-sided sciatica improved following recent epidural steroid injection apparently.    Constipation, unspecified constipation type  Constipation concerns with improvement 2 days ago with described good bowel movement with resolved abdominal complaints.  Continue dietary sources of fiber and ensure adequate hydration.         Subjective      Nancy is a 77 year old who presents for the following health issues     HPI       Virtual visit completed.  Patient with episode of abdominal discomfort with vomiting.  Seen through ER May 23, 2022 with CT scan as noted below showing evidence for cholelithiasis without acute cholecystitis.  Was told that she may need to have gallbladder removed in order to prevent future episodes.  Patient is interested in meeting with surgeon.  Has not had further episodes of vomiting.  Trying to consume a low-fat diet.  Had constipation concerns that have improved as of 2 days ago and no abdominal complaints since having good bowel movement.  Bilateral lower back pain works with spine specialist and did receive apparent injection recently with described benefit and lower back pain.  Using tramadol for right shoulder  osteoarthritis with significant benefits on as-needed basis.         5 children (Airam, Nancy, Lakeisha, etc.) - son with Factor V abnormality   14 grandchildren   9 great-grandchildren   Grew up in [place], [place] til age 12...   No smoke (quit 16 years ago after 1 ppd x 30+ years)   EtOH: occ wine   Mom -  88 COPD   Dad -  61 massive MI   1 bro -  67 blood clot (lung)   2 sis - one of which is  age 62 small cell lung CA (h/o smoker)   Surgeries: ventral hernia repair with muscle flap 10/4/12 with post-op complication of seroma with J.P. drain in place followed by interventional radiology q 2 weeks);  age 25; groin hernia age 3; CAPRICE-BSO  by Dr. Herbert Carmen (due to benign cyst x 2); right TKA 18 (Dr. Small)   Hospitalizations: as above   Work: retired  ([employer] as )             Review of Systems   Constitutional, HEENT, cardiovascular, pulmonary, GI, , musculoskeletal, neuro, skin, endocrine and psych systems are negative, except as otherwise noted.      Objective    Vitals - Patient Reported  Pain Score: No Pain (0)        Physical Exam   healthy, alert and no distress  PSYCH: Alert and oriented times 3; coherent speech, normal   rate and volume, able to articulate logical thoughts, able   to abstract reason, no tangential thoughts, no hallucinations   or delusions  Her affect is normal  RESP: No cough, no audible wheezing, able to talk in full sentences  Remainder of exam unable to be completed due to telephone visits                  EXAM: CT ABDOMEN PELVIS W CONTRAST  LOCATION: [facility]  DATE/TIME: 2022 10:26 PM     INDICATION: Abdominal pain with nausea and vomiting starting 2 days ago.  COMPARISON: 2022  TECHNIQUE: CT scan of the abdomen and pelvis was performed following injection of IV contrast. Multiplanar reformats were obtained. Dose reduction techniques were used.  CONTRAST: ISOVUE  370 75ML     FINDINGS:   LOWER CHEST: Normal.     HEPATOBILIARY: Liver appears unremarkable. 2 gallstones in the gallbladder. No gallbladder wall thickening or pericholecystic fluid. No biliary dilatation.     PANCREAS: Normal.     SPLEEN: Normal.     ADRENAL GLANDS: Normal.     KIDNEYS/BLADDER: Few subcentimeter low-attenuation lesions in both kidneys, too small to characterize, but unchanged from prior. No hydronephrosis. Urinary bladder appears unremarkable.     BOWEL: Extensive colonic diverticulosis. No obstruction or inflammatory change. Normal appendix. No evidence of acute appendicitis.     LYMPH NODES: No lymphadenopathy.     VASCULATURE: Moderate to severe atherosclerotic calcifications. No abdominal aortic aneurysm.     PELVIC ORGANS: Status post hysterectomy.     MUSCULOSKELETAL: Unchanged thick-walled fluid collection in the lower ventral abdominal wall, measuring 8.9 x 2.4 cm. Multilevel degenerative changes of the spine.                                                                       IMPRESSION:   1.  No acute findings or inflammatory changes in the abdomen or pelvis.     2.  Cholelithiasis. No acute cholecystitis.     3.  Extensive diverticulosis. No acute diverticulitis.     4.  Unchanged thick-walled fluid collection in the lower ventral abdominal wall, which may represent postsurgical seroma.        Phone call duration: 14 minutes

## 2022-06-01 ENCOUNTER — TRANSFERRED RECORDS (OUTPATIENT)
Dept: HEALTH INFORMATION MANAGEMENT | Facility: CLINIC | Age: 77
End: 2022-06-01
Payer: COMMERCIAL

## 2022-06-01 ENCOUNTER — ANTICOAGULATION THERAPY VISIT (OUTPATIENT)
Dept: ANTICOAGULATION | Facility: CLINIC | Age: 77
End: 2022-06-01
Payer: COMMERCIAL

## 2022-06-01 DIAGNOSIS — I26.99 PULMONARY EMBOLISM, UNSPECIFIED CHRONICITY, UNSPECIFIED PULMONARY EMBOLISM TYPE, UNSPECIFIED WHETHER ACUTE COR PULMONALE PRESENT (H): ICD-10-CM

## 2022-06-01 DIAGNOSIS — I26.99 PULMONARY EMBOLISM (H): Primary | ICD-10-CM

## 2022-06-01 LAB — INR (EXTERNAL): 1.5 (ref 0.9–1.1)

## 2022-06-01 NOTE — PROGRESS NOTES
ANTICOAGULATION MANAGEMENT     Nancy Oropeza 77 year old female is on warfarin with subtherapeutic INR result. (Goal INR 2.0-3.0)    Recent labs: (last 7 days)     06/01/22  0000   INR 1.5*       ASSESSMENT     Source(s): Chart Review and Patient/Caregiver Call     Warfarin doses taken: Warfarin taken as instructed  Diet: Increased greens/vitamin K in diet; ongoing change  Patient has had a couple of salads this past week and will continue  New illness, injury, or hospitalization: Yes: Patient had a spinal injection last week  Medication/supplement changes: None noted  Signs or symptoms of bleeding or clotting: No  Previous INR: Subtherapeutic  Additional findings: Daughter states she was told by [facility] last week the patient was supposed to bridge for her procedure.  Reviewed the chart and explained the hold orders from 5/4/22.  The patient did not require a bridge   Patient has a surgical consult on 7/11/22 to discuss having her gallbladder removed.       PLAN     Recommended plan for temporary change(s) affecting INR     Dosing Instructions: booster dose then continue your current warfarin dose with next INR in 1 week       Summary  As of 6/1/2022    Full warfarin instructions:  6/1: 2.5 mg; Otherwise 2.5 mg every Mon; 1.25 mg all other days   Next INR check:  6/8/2022             Telephone call with  daughterAiram who verbalizes understanding and agrees to plan    Patient to recheck with home meter    Education provided: Goal range and significance of current result, Importance of therapeutic range, Importance of following up at instructed interval, Importance of taking warfarin as instructed, Monitoring for clotting signs and symptoms and When to seek medical attention/emergency care    Plan made per [facility] anticoagulation protocol    Evie Lagos RN  Anticoagulation Clinic  6/1/2022    _______________________________________________________________________     Anticoagulation Episode Summary      Current INR goal:  2.0-3.0   TTR:  44.1 % (1 y)   Target end date:  Indefinite   Send INR reminders to:  XENIA MURILLO    Indications    Pulmonary embolism (H) [I26.99]  Pulmonary embolism  unspecified chronicity  unspecified pulmonary embolism type  unspecified whether acute cor pulmonale present (H) [I26.99]           Comments:  [facility] Home Meter         Anticoagulation Care Providers     Provider Role Specialty Phone number    Julien Garnica MD Referring Family Medicine [phone]

## 2022-06-08 ENCOUNTER — TELEPHONE (OUTPATIENT)
Dept: FAMILY MEDICINE | Facility: CLINIC | Age: 77
End: 2022-06-08
Payer: COMMERCIAL

## 2022-06-08 ENCOUNTER — TRANSFERRED RECORDS (OUTPATIENT)
Dept: HEALTH INFORMATION MANAGEMENT | Facility: CLINIC | Age: 77
End: 2022-06-08

## 2022-06-08 DIAGNOSIS — I26.99 PULMONARY EMBOLISM (H): Primary | ICD-10-CM

## 2022-06-08 DIAGNOSIS — I26.99 PULMONARY EMBOLISM, UNSPECIFIED CHRONICITY, UNSPECIFIED PULMONARY EMBOLISM TYPE, UNSPECIFIED WHETHER ACUTE COR PULMONALE PRESENT (H): ICD-10-CM

## 2022-06-08 LAB — INR (EXTERNAL): 1.9 (ref 0.9–1.1)

## 2022-06-08 NOTE — TELEPHONE ENCOUNTER
ANTICOAGULATION MANAGEMENT     Nancy Navarroabhay 77 year old female is on warfarin with subtherapeutic INR result. (Goal INR )    Recent labs: (last 7 days)     06/08/22  1135   INR 1.9*       ASSESSMENT       Source(s): Chart Review and Patient/Caregiver Call       Warfarin doses taken: Warfarin taken as instructed    Diet: No new diet changes identified    New illness, injury, or hospitalization: No    Medication/supplement changes: None noted    Signs or symptoms of bleeding or clotting: No    Previous INR: Subtherapeutic    Additional findings: None       PLAN     Recommended plan for no diet, medication or health factor changes affecting INR     Dosing Instructions: continue your current warfarin dose with next INR in 1 week       Summary  As of 6/8/2022    Full warfarin instructions:  2.5 mg every Mon, Wed; 1.25 mg all other days   Next INR check:  6/15/2022             Telephone call with Nancy who verbalizes understanding and agrees to plan    Patient to recheck with home meter    Education provided: Goal range and significance of current result    Plan made per [facility] anticoagulation protocol    Roro Johnson RN  Anticoagulation Clinic  6/8/2022    _______________________________________________________________________     Anticoagulation Episode Summary     Current INR goal:  2.0-3.0   TTR:  44.1 % (1 y)   Target end date:  Indefinite   Send INR reminders to:  XENIA MURILLO    Indications    Pulmonary embolism (H) [I26.99]  Pulmonary embolism  unspecified chronicity  unspecified pulmonary embolism type  unspecified whether acute cor pulmonale present (H) [I26.99]           Comments:  [facility] Home Meter         Anticoagulation Care Providers     Provider Role Specialty Phone number    Julien Garnica MD Referring Family Medicine [phone]

## 2022-06-08 NOTE — TELEPHONE ENCOUNTER
Reason for Call:  INR    Who is calling?  Patient    Phone number:  [phone]    Fax number:      Name of caller: Nancy    INR Value:  1.9    Are there any other concerns:  No    Can we leave a detailed message on this number? YES    Phone number patient can be reached at: Home number on file [phone] (home)      Call taken on 6/8/2022 at 10:06 AM by Ivy Guzman

## 2022-06-13 ENCOUNTER — TELEPHONE (OUTPATIENT)
Dept: FAMILY MEDICINE | Facility: CLINIC | Age: 77
End: 2022-06-13
Payer: COMMERCIAL

## 2022-06-13 ENCOUNTER — TRANSFERRED RECORDS (OUTPATIENT)
Dept: HEALTH INFORMATION MANAGEMENT | Facility: CLINIC | Age: 77
End: 2022-06-13

## 2022-06-13 DIAGNOSIS — I26.99 PULMONARY EMBOLISM (H): Primary | ICD-10-CM

## 2022-06-13 DIAGNOSIS — I26.99 PULMONARY EMBOLISM, UNSPECIFIED CHRONICITY, UNSPECIFIED PULMONARY EMBOLISM TYPE, UNSPECIFIED WHETHER ACUTE COR PULMONALE PRESENT (H): ICD-10-CM

## 2022-06-13 LAB — INR (EXTERNAL): 2.1 (ref 0.9–1.1)

## 2022-06-13 NOTE — TELEPHONE ENCOUNTER
INR was 2.1 today taken at home.  Has not heard back from INR nurse.  Please call her at [phone].

## 2022-06-13 NOTE — TELEPHONE ENCOUNTER
"ANTICOAGULATION MANAGEMENT     Nancy GARZON Sandpi 77 year old female is on warfarin with therapeutic INR result. (Goal INR )    Recent labs: (last 7 days)     06/13/22  0000   INR 2.1*       ASSESSMENT       Source(s): Chart Review and Patient/Caregiver Call       Warfarin doses taken: Warfarin taken as instructed    Diet: patient is not eating like she should.  patient is waking up at 0800 and not eating till 1400    New illness, injury, or hospitalization: Yes: patient had a fall yesterday after getting up too quickly.  Patient states she did bump her head but her barrette \"broke\" her fall.  Patient fell on her butt and then backwards.  The spot is tender but no bleeding, changes to vision or headache.    Medication/supplement changes: None noted    Signs or symptoms of bleeding or clotting: No    Previous INR: Subtherapeutic    Additional findings: None       PLAN     Recommended plan for no diet, medication or health factor changes affecting INR     Dosing Instructions: continue your current warfarin dose with next INR in 1 week       Summary  As of 6/13/2022    Full warfarin instructions:  2.5 mg every Mon, Wed; 1.25 mg all other days   Next INR check:  6/20/2022             Telephone call with Nancy who verbalizes understanding and agrees to plan    Patient to recheck with home meter    Education provided: Please call back if any changes to your diet, medications or how you've been taking warfarin    Plan made per [facility] anticoagulation protocol    Evie Lagos RN  Anticoagulation Clinic  6/13/2022    _______________________________________________________________________     Anticoagulation Episode Summary     Current INR goal:  2.0-3.0   TTR:  44.9 % (1 y)   Target end date:  Indefinite   Send INR reminders to:  XENIA MURILLO    Indications    Pulmonary embolism (H) [I26.99]  Pulmonary embolism  unspecified chronicity  unspecified pulmonary embolism type  unspecified whether acute cor pulmonale " present (H) [I26.99]           Comments:  MDIN[facility] Home Meter         Anticoagulation Care Providers     Provider Role Specialty Phone number    Julien Garnica MD Referring Family Medicine [phone]

## 2022-06-15 ENCOUNTER — OFFICE VISIT (OUTPATIENT)
Dept: FAMILY MEDICINE | Facility: CLINIC | Age: 77
End: 2022-06-15
Payer: COMMERCIAL

## 2022-06-15 ENCOUNTER — HOSPITAL ENCOUNTER (OUTPATIENT)
Dept: CT IMAGING | Facility: HOSPITAL | Age: 77
Discharge: HOME | End: 2022-06-15
Attending: FAMILY MEDICINE | Admitting: FAMILY MEDICINE
Payer: COMMERCIAL

## 2022-06-15 VITALS
BODY MASS INDEX: 45.73 KG/M2 | DIASTOLIC BLOOD PRESSURE: 70 MMHG | SYSTOLIC BLOOD PRESSURE: 136 MMHG | OXYGEN SATURATION: 96 % | HEART RATE: 93 BPM | WEIGHT: 292 LBS

## 2022-06-15 DIAGNOSIS — N18.31 STAGE 3A CHRONIC KIDNEY DISEASE (H): ICD-10-CM

## 2022-06-15 DIAGNOSIS — S09.90XA CLOSED HEAD INJURY, INITIAL ENCOUNTER: Primary | ICD-10-CM

## 2022-06-15 DIAGNOSIS — R42 DIZZINESS: ICD-10-CM

## 2022-06-15 DIAGNOSIS — F41.1 ANXIETY STATE: ICD-10-CM

## 2022-06-15 DIAGNOSIS — I10 ESSENTIAL HYPERTENSION WITH GOAL BLOOD PRESSURE LESS THAN 140/90: ICD-10-CM

## 2022-06-15 DIAGNOSIS — F33.0 MILD EPISODE OF RECURRENT MAJOR DEPRESSIVE DISORDER (H): ICD-10-CM

## 2022-06-15 DIAGNOSIS — S09.90XA CLOSED HEAD INJURY, INITIAL ENCOUNTER: ICD-10-CM

## 2022-06-15 PROCEDURE — 70450 CT HEAD/BRAIN W/O DYE: CPT

## 2022-06-15 PROCEDURE — 99215 OFFICE O/P EST HI 40 MIN: CPT | Performed by: FAMILY MEDICINE

## 2022-06-15 RX ORDER — CITALOPRAM HYDROBROMIDE 20 MG/1
20 TABLET ORAL DAILY
Qty: 30 TABLET | Refills: 0 | Status: SHIPPED | OUTPATIENT
Start: 2022-06-15 | End: 2022-07-12

## 2022-06-15 RX ORDER — BUSPIRONE HYDROCHLORIDE 7.5 MG/1
7.5 TABLET ORAL 2 TIMES DAILY
Qty: 60 TABLET | Refills: 3 | Status: SHIPPED | OUTPATIENT
Start: 2022-06-15 | End: 2022-06-28

## 2022-06-15 ASSESSMENT — ANXIETY QUESTIONNAIRES
8. IF YOU CHECKED OFF ANY PROBLEMS, HOW DIFFICULT HAVE THESE MADE IT FOR YOU TO DO YOUR WORK, TAKE CARE OF THINGS AT HOME, OR GET ALONG WITH OTHER PEOPLE?: NOT DIFFICULT AT ALL
GAD7 TOTAL SCORE: 7
5. BEING SO RESTLESS THAT IT IS HARD TO SIT STILL: SEVERAL DAYS
GAD7 TOTAL SCORE: 7
3. WORRYING TOO MUCH ABOUT DIFFERENT THINGS: SEVERAL DAYS
2. NOT BEING ABLE TO STOP OR CONTROL WORRYING: SEVERAL DAYS
4. TROUBLE RELAXING: SEVERAL DAYS
6. BECOMING EASILY ANNOYED OR IRRITABLE: NOT AT ALL
7. FEELING AFRAID AS IF SOMETHING AWFUL MIGHT HAPPEN: SEVERAL DAYS
GAD7 TOTAL SCORE: 7
1. FEELING NERVOUS, ANXIOUS, OR ON EDGE: MORE THAN HALF THE DAYS
7. FEELING AFRAID AS IF SOMETHING AWFUL MIGHT HAPPEN: SEVERAL DAYS

## 2022-06-15 ASSESSMENT — PATIENT HEALTH QUESTIONNAIRE - PHQ9
SUM OF ALL RESPONSES TO PHQ QUESTIONS 1-9: 6
10. IF YOU CHECKED OFF ANY PROBLEMS, HOW DIFFICULT HAVE THESE PROBLEMS MADE IT FOR YOU TO DO YOUR WORK, TAKE CARE OF THINGS AT HOME, OR GET ALONG WITH OTHER PEOPLE: NOT DIFFICULT AT ALL
SUM OF ALL RESPONSES TO PHQ QUESTIONS 1-9: 6

## 2022-06-15 NOTE — PROGRESS NOTES
Assessment/Plan:    Closed head injury, initial encounter  Closed head injury Sunday, June 12, 2022 left posterior head without loss of consciousness.  Is on warfarin anticoagulation.  Will complete CT head ensure no evidence for subdural hematoma etc.  Discussed care today with patient's daughter Lakeisha by phone.  - CT Head w/o Contrast    Dizziness  Dizziness as above with orthostatic component of dizziness leading to fall while on antihypertensive treatment as noted.  Blood pressure 136/70.  Ensure slow transition from seated to standing or laying to seated etc.  Reassess at follow-up in 4 weeks.    Anxiety state  Will wean from citalopram 40 mg daily due to persistent concerns for anxiety.  Decrease to 20 mg daily x7 days then 10 mg daily x7 days then may discontinue.  Then initiate buspirone 7.5 mg twice daily.  Reassess at follow-up no later than 4 weeks.  - busPIRone (BUSPAR) 7.5 MG tablet  Dispense: 60 tablet; Refill: 3    Mild episode of recurrent major depressive disorder (H)  Reassess at scheduled follow-up in 4 weeks.  Weaning from citalopram 40 mg daily while initiating buspirone.  - citalopram (CELEXA) 20 MG tablet  Dispense: 30 tablet; Refill: 0    Stage 3a chronic kidney disease (H)  CKD stage IIIb with recent creatinine 1.3 and GFR 42.    Essential hypertension with blood pressure goal less than 140/90  Continues use of amlodipine 5 mg daily and lisinopril hydrochlorothiazide 20/12.5 using 2 tablets daily.  Blood pressure recheck at follow-up in 4 weeks.  Avoidance of sudden transition resulting in orthostatic hypotension risk.    40 minutes total time spent on the date of encounter including patient chart review, counseling and coordination of cares, and documentation.         Subjective:    Nancy GARZON Sandip is seen today for evaluation for recent head injury.  Happened Sunday.  Got up from seated position suddenly.  Walking across room felt lightheaded and fell hitting backside of head on desk  apparently.  No loss of consciousness.  Has had symptoms of dizziness however this also preceded head injury.  No vision change.  Increasing anxiety about world events.  Her son also with diagnosis of rectal cancer with metastatic disease apparently.  Patient continues amlodipine 5 mg daily and lisinopril hydrochlorothiazide 20/12.5 using 2 tablets daily for hypertension.  Levothyroxine 137 mcg daily for thyroid replacement.  Continues chronic warfarin anticoagulation 2.5 mg on  and Friday otherwise half tablet daily with history of pulmonary emboli.  No other bleeding concerns currently.  Known history of depression.  Had been prescribed tramadol for arm pain.  Doing well without further intervention at this time.  Comprehensive review of systems as above otherwise all negative.       5 children (Airam, Nancy, Lakeisha, etc.) - son with Factor V abnormality   14 grandchildren   9 great-grandchildren   Grew up in [place], [place] til age 12...   No smoke (quit 16 years ago after 1 ppd x 30+ years)   EtOH: occ wine   Mom -  88 COPD   Dad -  61 massive MI   1 bro -  67 blood clot (lung)   2 sis - one of which is  age 62 small cell lung CA (h/o smoker)   Surgeries: ventral hernia repair with muscle flap 10/4/12 with post-op complication of seroma with J.P. drain in place followed by interventional radiology q 2 weeks);  age 25; groin hernia age 3; CAPRICE-BSO  by Dr. Herbert Carmen (due to benign cyst x 2); right TKA 18 (Dr. Small)   Hospitalizations: as above   Work: retired  ([employer] as )     Past Surgical History:   Procedure Laterality Date     arthroplasty for hammertoe-2nd toe R  Biebl[       BIOPSY BREAST Left 1970s     Bunion correction by Cheilectomy-had bunionectomy ? type of procedure L and R separate procedures.[       GYN SURGERY       HERNIA REPAIR       Hernia Repair Inguinal unilateral[       HYSTERECTOMY        HYSTERECTOMY  2010     IR ABSCESS TUBE CHANGE  11/9/2012     IR ABSCESS TUBE CHANGE  11/12/2012     IR ABSCESS TUBE CHANGE  12/4/2012     IR ABSCESS TUBE CHANGE  2/22/2013     IR ABSCESS TUBE CHANGE  3/1/2013     IR ABSCESS TUBE CHANGE  3/13/2013     OOPHORECTOMY  2010     ORTHOPEDIC SURGERY          Family History   Problem Relation Age of Onset     Breast Cancer Sister 75.00     Stomach Cancer Paternal Grandfather      Lung Cancer Sister      Chronic Obstructive Pulmonary Disease Mother      Heart Disease Mother      Coronary Artery Disease Father         Past Medical History:   Diagnosis Date     2019 novel coronavirus disease (COVID-19) 7/16/2020     Acute bilateral knee pain 7/18/2017     TEOFILO (acute kidney injury) (H) 7/16/2020     Anemia, unspecified     Created by Conversion      Angioedema 12/17/2015     Anticoagulant long-term use 3/6/2017     Back pain     Created by Conversion      Chronic pain syndrome 6/1/2018     COVID-19 virus infection 7/17/2020     Depression      Diarrhea 7/16/2020     Disease of thyroid gland      Edema     Created by Conversion      Essential hypertension with goal blood pressure less than 140/90     Created by Conversion  Replacement Utility updated for latest IMO load     History of pulmonary embolism 1/1/2015    Overview:  bilateral with acute cor pulmonale      HTN (hypertension)      Hypercholesteremia      Hypokalemia 12/17/2015     Hypothyroidism     Created by Conversion  Replacement Utility updated for latest IMO load     Hypoxia 7/16/2020     Impaired fasting glucose     Created by Conversion      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     Major depressive disorder, recurrent episode (H)     Created by Conversion  Replacement Utility updated for latest IMO load     Mood disorder (H) 7/16/2020     Morbid obesity (H)      Multiple lung nodules on CT 12/17/2015     Obstructive sleep apnea (adult) (pediatric)     Created by Conversion      Osteoarthrosis involving lower leg      Created by Conversion  Replacement Utility updated for latest IMO load     Pain in joint, multiple sites     Created by Conversion      Polymyalgia rheumatica (H) 7/16/2020     Postoperative anemia due to acute blood loss 12/18/2018     Pulmonary emboli (H) 12/14/2015     Pulmonary embolism (H) 12/21/2015     Pure hypercholesterolemia     Created by Conversion      Sepsis (H) 7/28/2020     SIRS (systemic inflammatory response syndrome) (H) 7/28/2020     Unspecified cataract     Created by Conversion      Yeast infection         Social History     Tobacco Use     Smoking status: Former Smoker     Smokeless tobacco: Never Used     Tobacco comment: quite 20 yrs ago   Substance Use Topics     Alcohol use: No     Comment: Alcoholic Drinks/day: occasionally     Drug use: No        Current Outpatient Medications   Medication Sig Dispense Refill     acetaminophen 500 MG CAPS Take 2 capsules by mouth 3 times daily as needed.       amLODIPine (NORVASC) 5 MG tablet TAKE 1 TABLET DAILY (NEW   DOSE 6/1/18) 90 tablet 3     BD ULTRA-FINE 33 LANCETS Use as directed       busPIRone (BUSPAR) 7.5 MG tablet Take 1 tablet (7.5 mg) by mouth 2 times daily 60 tablet 3     Cholecalciferol (VITAMIN D) 400 UNITS capsule Take 2 capsules by mouth daily.       citalopram (CELEXA) 20 MG tablet Take 1 tablet (20 mg) by mouth daily X 7 days, then 1/2 tablet (10 mg) daily x 7 days, then okay to stop 30 tablet 0     furosemide (LASIX) 20 MG tablet Take 1 tablet (20 mg) by mouth every morning 90 tablet 1     Glucose Blood (ONE TOUCH ULTRA TEST) strip by In Vitro route daily Use as directed       levothyroxine (SYNTHROID/LEVOTHROID) 137 MCG tablet Take 1 tablet (137 mcg) by mouth daily 90 tablet 3     lisinopril-hydrochlorothiazide (ZESTORETIC) 20-12.5 MG tablet Take 2 tablets by mouth daily 180 tablet 1     pravastatin (PRAVACHOL) 80 MG tablet Take 1 tablet (80 mg) by mouth At Bedtime 90 tablet 3     predniSONE (DELTASONE) 5 MG tablet Take 1 tablet (5  mg) by mouth daily 90 tablet 0     traMADol (ULTRAM) 50 MG tablet Take 1 tablet (50 mg) by mouth every 6 hours as needed for severe pain. 60 tablet 0     warfarin ANTICOAGULANT (COUMADIN) 2.5 MG tablet 2.5 mg every Mon, Wed, Fri; 1.25 mg all other days 60 tablet 1          Objective:    Vitals:    06/15/22 1059   BP: 136/70   Pulse: 93   SpO2: 96%   Weight: 132.5 kg (292 lb)      Body mass index is 45.73 kg/m .    Alert.  No apparent distress at rest.  HEENT exam with extraocular eye muscles intact.  Pupils equal round reactive to light.  Cranial nerves appear grossly intact.  Chest clear.  Cardiac exam regular.  Blood pressure 136/70 seated.  Abdomen benign, obese with BMI 45.73.  Extremities warm and dry.  No tremor.  Nonfocal neurologic exam currently.      This note has been dictated using voice recognition software and as a result may contain minor grammatical errors and unintended word substitutions.   Answers for HPI/ROS submitted by the patient on 6/15/2022  If you checked off any problems, how difficult have these problems made it for you to do your work, take care of things at home, or get along with other people?: Not difficult at all  PHQ9 TOTAL SCORE: 6  TORIE 7 TOTAL SCORE: 7

## 2022-06-16 ENCOUNTER — TRANSFERRED RECORDS (OUTPATIENT)
Dept: HEALTH INFORMATION MANAGEMENT | Facility: CLINIC | Age: 77
End: 2022-06-16
Payer: COMMERCIAL

## 2022-06-17 ENCOUNTER — TELEPHONE (OUTPATIENT)
Dept: FAMILY MEDICINE | Facility: CLINIC | Age: 77
End: 2022-06-17

## 2022-06-17 NOTE — TELEPHONE ENCOUNTER
Reason for call:  Other     Patient called regarding (reason for call): call back    Additional comments: Pt would like a call back to discuss AVS from 6/16/22. Pt is particularly concerned about the following diagnosis:   Stage 3a chronic kidney disease (H)  CKD stage IIIb with recent creatinine 1.3 and GFR 42.    Phone number to reach patient:  Cell number on file:    Telephone Information:   Mobile [phone]       Best Time:  Any    Can we leave a detailed message on this number?  YES

## 2022-06-21 ENCOUNTER — MYC MEDICAL ADVICE (OUTPATIENT)
Dept: ANTICOAGULATION | Facility: CLINIC | Age: 77
End: 2022-06-21
Payer: COMMERCIAL

## 2022-06-21 ENCOUNTER — TRANSFERRED RECORDS (OUTPATIENT)
Dept: HEALTH INFORMATION MANAGEMENT | Facility: CLINIC | Age: 77
End: 2022-06-21

## 2022-06-21 ENCOUNTER — ANTICOAGULATION THERAPY VISIT (OUTPATIENT)
Dept: ANTICOAGULATION | Facility: CLINIC | Age: 77
End: 2022-06-21
Payer: COMMERCIAL

## 2022-06-21 DIAGNOSIS — I26.99 PULMONARY EMBOLISM (H): Primary | ICD-10-CM

## 2022-06-21 DIAGNOSIS — I26.99 PULMONARY EMBOLISM, UNSPECIFIED CHRONICITY, UNSPECIFIED PULMONARY EMBOLISM TYPE, UNSPECIFIED WHETHER ACUTE COR PULMONALE PRESENT (H): ICD-10-CM

## 2022-06-21 LAB — INR (EXTERNAL): 2.8 (ref 0.9–1.1)

## 2022-06-21 NOTE — PROGRESS NOTES
ANTICOAGULATION MANAGEMENT     Nancy Oropeza 77 year old female is on warfarin with therapeutic INR result. (Goal INR 2.0-3.0)    Recent labs: (last 7 days)     06/21/22  0000   INR 2.8*       ASSESSMENT     Source(s): Chart Review  Previous INR was Therapeutic last visit; previously outside of goal range  Medication, diet, health changes since last INR chart reviewed; none identified           PLAN     Recommended plan for no diet, medication or health factor changes affecting INR     Dosing Instructions: continue your current warfarin dose with next INR in 1 week       Summary  As of 6/21/2022    Full warfarin instructions:  2.5 mg every Mon, Wed; 1.25 mg all other days   Next INR check:  6/28/2022             Detailed voice message left for  July with dosing instructions and follow up date.   Sent Mob[employer] message with dosing and follow up instructions    Patient to recheck with home meter    Education provided: Please call back if any changes to your diet, medications or how you've been taking warfarin    Plan made per [facility] anticoagulation protocol    Evie FLORES RN  Anticoagulation Clinic  6/21/2022    _______________________________________________________________________     Anticoagulation Episode Summary     Current INR goal:  2.0-3.0   TTR:  47.1 % (1 y)   Target end date:  Indefinite   Send INR reminders to:  XENIA MURILLO    Indications    Pulmonary embolism (H) [I26.99]  Pulmonary embolism  unspecified chronicity  unspecified pulmonary embolism type  unspecified whether acute cor pulmonale present (H) [I26.99]           Comments:  [facility] Home Meter         Anticoagulation Care Providers     Provider Role Specialty Phone number    Julien Garnica MD Referring Family Medicine [phone]

## 2022-06-21 NOTE — TELEPHONE ENCOUNTER
ANTICOAGULATION MANAGEMENT     Nancy Oropeza 77 year old female is on warfarin with therapeutic INR result. (Goal INR )    Recent labs: (last 7 days)     06/21/22  0000   INR 2.8*       ASSESSMENT       Source(s): Chart Review and Patient/Caregiver Call       Warfarin doses taken: Warfarin taken as instructed    Diet: No new diet changes identified    New illness, injury, or hospitalization: No    Medication/supplement changes: None noted    Signs or symptoms of bleeding or clotting: No    Previous INR: Therapeutic last visit; previously outside of goal range    Additional findings: 6/24 cortisone injection        PLAN     Recommended plan for no diet, medication or health factor changes affecting INR     Dosing Instructions: continue your current warfarin dose with next INR in 1 week       Summary  As of 6/21/2022    Full warfarin instructions:  2.5 mg every Mon, Wed; 1.25 mg all other days   Next INR check:  6/29/2022             Telephone call with Nancy who verbalizes understanding and agrees to plan    Patient to recheck with home meter    Education provided: educated on possible interaction of warfarin with cortisone injection     Plan made per [facility] anticoagulation protocol    Daja Lux RN  Anticoagulation Clinic  6/21/2022    _______________________________________________________________________     Anticoagulation Episode Summary     Current INR goal:  2.0-3.0   TTR:  47.1 % (1 y)   Target end date:  Indefinite   Send INR reminders to:  XENIA MURILLO    Indications    Pulmonary embolism (H) [I26.99]  Pulmonary embolism  unspecified chronicity  unspecified pulmonary embolism type  unspecified whether acute cor pulmonale present (H) [I26.99]           Comments:  [facility] Home Meter         Anticoagulation Care Providers     Provider Role Specialty Phone number    Julien Garnica MD Referring Family Medicine [phone]

## 2022-06-21 NOTE — PROGRESS NOTES
Incoming fax from [facility] 6/21/22  INR result: 2.8    Incoming fax from [facility] 6/13/22  INr result: 2.1

## 2022-06-21 NOTE — TELEPHONE ENCOUNTER
Reason for Call:  Other call back    Detailed comments: Patient returned INR nurse call to discuss dosage.    Phone Number Patient can be reached at: Home number on file [phone] (home)    Best Time: Any    Can we leave a detailed message on this number? YES    Call taken on 6/21/2022 at 4:31 PM by Chasity Whitney

## 2022-06-24 DIAGNOSIS — M25.512 CHRONIC LEFT SHOULDER PAIN: ICD-10-CM

## 2022-06-24 DIAGNOSIS — F41.1 ANXIETY STATE: ICD-10-CM

## 2022-06-24 DIAGNOSIS — G89.29 CHRONIC LEFT SHOULDER PAIN: ICD-10-CM

## 2022-06-28 ENCOUNTER — TELEPHONE (OUTPATIENT)
Dept: FAMILY MEDICINE | Facility: CLINIC | Age: 77
End: 2022-06-28

## 2022-06-28 ENCOUNTER — TRANSFERRED RECORDS (OUTPATIENT)
Dept: HEALTH INFORMATION MANAGEMENT | Facility: CLINIC | Age: 77
End: 2022-06-28

## 2022-06-28 DIAGNOSIS — I26.99 PULMONARY EMBOLISM, UNSPECIFIED CHRONICITY, UNSPECIFIED PULMONARY EMBOLISM TYPE, UNSPECIFIED WHETHER ACUTE COR PULMONALE PRESENT (H): ICD-10-CM

## 2022-06-28 DIAGNOSIS — I26.99 PULMONARY EMBOLISM (H): Primary | ICD-10-CM

## 2022-06-28 LAB — INR (EXTERNAL): 2.7 (ref 0.9–1.1)

## 2022-06-28 RX ORDER — BUSPIRONE HYDROCHLORIDE 7.5 MG/1
7.5 TABLET ORAL 2 TIMES DAILY
Qty: 60 TABLET | Refills: 3 | Status: SHIPPED | OUTPATIENT
Start: 2022-06-28 | End: 2022-10-14

## 2022-06-28 RX ORDER — TRAMADOL HYDROCHLORIDE 50 MG/1
50 TABLET ORAL EVERY 6 HOURS PRN
Qty: 60 TABLET | Refills: 0 | Status: SHIPPED | OUTPATIENT
Start: 2022-06-28 | End: 2022-08-16

## 2022-06-28 NOTE — TELEPHONE ENCOUNTER
Reason for Call:  Other call back    Detailed comments: Patient would like call back from inr nurse about  Medication. Patient stated inr was 2.7.     Phone Number Patient can be reached at: Cell number on file:    Telephone Information:   Mobile [phone]       Best Time: Anytime    Can we leave a detailed message on this number? YES    Call taken on 6/28/2022 at 3:33 PM by Alona Pinedo

## 2022-06-28 NOTE — TELEPHONE ENCOUNTER
ANTICOAGULATION MANAGEMENT     Nancy Navarroabhay 77 year old female is on warfarin with therapeutic INR result. (Goal INR )    Recent labs: (last 7 days)     06/28/22  1716   INR 2.7*       ASSESSMENT       Source(s): Chart Review and Patient/Caregiver Call       Warfarin doses taken: Warfarin taken as instructed    Diet: No new diet changes identified    New illness, injury, or hospitalization: No    Medication/supplement changes: None noted    Signs or symptoms of bleeding or clotting: No    Previous INR: Therapeutic last 2(+) visits    Additional findings: None       PLAN     Recommended plan for no diet, medication or health factor changes affecting INR     Dosing Instructions: continue your current warfarin dose with next INR in 1 week       Summary  As of 6/28/2022    Full warfarin instructions:  2.5 mg every Mon, Wed; 1.25 mg all other days   Next INR check:  7/5/2022             Telephone call with Nancy who verbalizes understanding and agrees to plan    Patient to recheck with home meter    Education provided: Goal range and significance of current result    Plan made per [facility] anticoagulation protocol    Torey Cárdenas RN  Anticoagulation Clinic  6/28/2022    _______________________________________________________________________     Anticoagulation Episode Summary     Current INR goal:  2.0-3.0   TTR:  48.9 % (1 y)   Target end date:  Indefinite   Send INR reminders to:  XENIA MURILLO    Indications    Pulmonary embolism (H) [I26.99]  Pulmonary embolism  unspecified chronicity  unspecified pulmonary embolism type  unspecified whether acute cor pulmonale present (H) [I26.99]           Comments:  [facility] Home Meter         Anticoagulation Care Providers     Provider Role Specialty Phone number    Julien Garnica MD Referring Family Medicine [phone]

## 2022-06-29 DIAGNOSIS — I26.99 PULMONARY EMBOLISM (H): Primary | ICD-10-CM

## 2022-07-05 ENCOUNTER — TRANSFERRED RECORDS (OUTPATIENT)
Dept: HEALTH INFORMATION MANAGEMENT | Facility: CLINIC | Age: 77
End: 2022-07-05

## 2022-07-05 ENCOUNTER — ANTICOAGULATION THERAPY VISIT (OUTPATIENT)
Dept: ANTICOAGULATION | Facility: CLINIC | Age: 77
End: 2022-07-05

## 2022-07-05 DIAGNOSIS — I26.99 PULMONARY EMBOLISM, UNSPECIFIED CHRONICITY, UNSPECIFIED PULMONARY EMBOLISM TYPE, UNSPECIFIED WHETHER ACUTE COR PULMONALE PRESENT (H): ICD-10-CM

## 2022-07-05 DIAGNOSIS — I26.99 PULMONARY EMBOLISM (H): Primary | ICD-10-CM

## 2022-07-05 LAB — INR (EXTERNAL): 2.6 (ref 0.9–1.1)

## 2022-07-05 NOTE — PROGRESS NOTES
ANTICOAGULATION MANAGEMENT     Nancy Oropeza 77 year old female is on warfarin with therapeutic INR result. (Goal INR 2.0-3.0)    Recent labs: (last 7 days)     07/05/22  1053   INR 2.6*       ASSESSMENT     Source(s): Chart Review and Patient/Caregiver Call     Warfarin doses taken: Warfarin taken as instructed  Diet: No new diet changes identified  New illness, injury, or hospitalization: No  Medication/supplement changes: None noted  Signs or symptoms of bleeding or clotting: No  Previous INR: Therapeutic last 2(+) visits  Additional findings: None       PLAN     Recommended plan for no diet, medication or health factor changes affecting INR     Dosing Instructions: continue your current warfarin dose with next INR in 1 week       Summary  As of 7/5/2022    Full warfarin instructions:  2.5 mg every Mon, Wed; 1.25 mg all other days   Next INR check:  7/12/2022             Telephone call with Nancy who verbalizes understanding and agrees to plan    Patient to recheck with home meter    Education provided: Please call back if any changes to your diet, medications or how you've been taking warfarin and Importance of notifying clinic for changes in medications; a sooner lab recheck maybe needed.    Plan made per [facility] anticoagulation protocol    Cora Jean-Baptiste RN  Anticoagulation Clinic  7/5/2022    _______________________________________________________________________     Anticoagulation Episode Summary     Current INR goal:  2.0-3.0   TTR:  50.8 % (1 y)   Target end date:  Indefinite   Send INR reminders to:  XENIA MURILLO    Indications    Pulmonary embolism (H) [I26.99]  Pulmonary embolism  unspecified chronicity  unspecified pulmonary embolism type  unspecified whether acute cor pulmonale present (H) [I26.99]           Comments:  [facility] Home Meter         Anticoagulation Care Providers     Provider Role Specialty Phone number    Julien Garnica MD Referring Family Medicine [phone]

## 2022-07-06 ENCOUNTER — NURSE TRIAGE (OUTPATIENT)
Dept: NURSING | Facility: CLINIC | Age: 77
End: 2022-07-06

## 2022-07-06 NOTE — TELEPHONE ENCOUNTER
Provider Response to 2nd Level Triage Request    I have reviewed the RN documentation. My recommendation is:  Okay to discontinue buspirone 7.5 mg twice daily until symptoms resolve then she may resume medication to determine if symptoms recur.  I do not feel that medication should be cause for the symptoms and would prefer her to continue to try to stay on current dosing until symptoms resolve.

## 2022-07-06 NOTE — TELEPHONE ENCOUNTER
"Nurse Triage SBAR    Is this a 2nd Level Triage? NO    Situation: Patient calling with New onset of Dizziness.  Consent: not needed    Background: Pt calling and states Dr Garnica has her on an anxiety pills for many years.  Pt states that a week and a half ago he switched her to buspirone hcl 7.5mg and pt states \"at first I was ok but in the last 405 days I get really dizzy when walkign, light headed.  I check my blood pressure and oxygen and I seem fine.  It makes me feel real nervous and anxious though.      Assessment:   * Pt has felt dizzy quite often since taking this medication.    * Pt feeling light headed, anxious, etc.  States she is afraid her legs will give out when this happens.    * Pt states she gets nervous and sweats when standing in the kitchen - then has to sit down and that makes her okay.    * Pt states she is not noting any increase or decrease in her heart rate.      Protocol Recommended Disposition:   Be seen today.     Recommendation: Advised patient to make an appointment. Transferred to scheduling. . Reviewed concerning symptoms and when to call back.   Pt states she only wants to see her provider, Dr Garnica.  Pt states she is okay waiting until he has an open appointment.   Writer encouraged pt to be seen today if possible as this was the recommendatioon.      Routed to provider Dr Garnica.     Does the patient meet one of the following criteria for ADS visit consideration? No         Sharon Suarez RN [facility] 7/6/2022 12:48 PM          COVID 19 Nurse Triage Plan/Patient Instructions    Please be aware that novel coronavirus (COVID-19) may be circulating in the community. If you develop symptoms such as fever, cough, or SOB or if you have concerns about the presence of another infection including coronavirus (COVID-19), please contact your health care provider or visit [email or web address].     Disposition/Instructions    In-Person Visit with provider " recommended. Reference Visit Selection Guide.    Thank you for taking steps to prevent the spread of this virus.  o Limit your contact with others.  o Wear a simple mask to cover your cough.  o Wash your hands well and often.    Resources    [facility]: About COVID-19: [email or web address]    CDC: What to Do If You're Sick: www.cdc.gov/coronavirus/2019-ncov/about/steps-when-sick.html    CDC: Ending Home Isolation: www.cdc.gov/coronavirus/2019-ncov/hcp/disposition-in-home-patients.html     CDC: Caring for Someone: www.cdc.gov/coronavirus/2019-ncov/if-you-are-sick/care-for-someone.html     [facility]: Interim Guidance for Hospital Discharge to Home: [email or web address]/[email or web address]    [facility] clinical trials (COVID-19 research studies): [email or web address]-clinical-trials     Below are the COVID-19 hotlines at the [place] [facility] ([facility]). Interpreters are available.   o For health questions: Call 812-093-5905 or 1-596.769.9162 (7 a.m. to 7 p.m.)  o For questions about schools and childcare: Call 125-484-7317 or 1-588.464.9009 (7 a.m. to 7 p.m.)                         Reason for Disposition    Patient wants to be seen    Additional Information    Negative: Shock suspected (e.g., cold/pale/clammy skin, too weak to stand, low BP, rapid pulse)    Negative: Difficult to awaken or acting confused (e.g., disoriented, slurred speech)    Negative: Fainted, and still feels dizzy afterwards    Negative: Severe difficulty breathing (e.g., struggling for each breath, speaks in single words)    Negative: Overdose (accidental or intentional) of medications    Negative: New neurologic deficit that is present now: * Weakness of the face, arm, or leg on one side of the body * Numbness of the face, arm, or leg on one side of the body * Loss of speech or garbled speech    Negative: Heart beating < 50 beats per minute OR > 140 beats per minute    Negative:  Sounds like a life-threatening emergency to the triager    Negative: Chest pain    Negative: Rectal bleeding, bloody stool, or tarry-black stool    Negative: Vomiting is the main symptom    Negative: Diarrhea is the main symptom    Negative: Headache is the main symptom    Negative: Heat exhaustion suspected (i.e., dehydration from heat exposure)    Negative: Patient states that he/she is having an anxiety/panic attack    Negative: SEVERE dizziness (e.g., unable to stand, requires support to walk, feels like passing out now)    Negative: SEVERE headache or neck pain    Negative: Spinning or tilting sensation (vertigo) present now and one or more stroke risk factors (i.e., hypertension, diabetes, prior stroke/TIA, heart attack, age over 60) (Exception: prior physician evaluation for this AND no different/worse than usual)    Negative: Loss of vision or double vision    Negative: Extra heart beats OR irregular heart beating (i.e., 'palpitations')    Negative: Difficulty breathing    Negative: Drinking very little and has signs of dehydration (e.g., no urine > 12 hours, very dry mouth, very lightheaded)    Negative: Follows bleeding (e.g., stomach, rectum, vagina) (Exception: became dizzy from sight of small amount blood)    Negative: Patient sounds very sick or weak to the triager    Negative: Lightheadedness (dizziness) present now, after 2 hours of rest and fluids    Negative: Spinning or tilting sensation (vertigo) present now    Negative: Fever > 103 F (39.4 C)    Negative: Fever > 100.0 F (37.8 C) and has diabetes mellitus or a weak immune system (e.g., HIV positive, cancer chemotherapy, organ transplant, splenectomy, chronic steroids)    Negative: Vomiting occurs with dizziness    Protocols used: DIZZINESS-A-[place]

## 2022-07-12 ENCOUNTER — ANTICOAGULATION THERAPY VISIT (OUTPATIENT)
Dept: ANTICOAGULATION | Facility: CLINIC | Age: 77
End: 2022-07-12

## 2022-07-12 ENCOUNTER — OFFICE VISIT (OUTPATIENT)
Dept: FAMILY MEDICINE | Facility: CLINIC | Age: 77
End: 2022-07-12
Payer: COMMERCIAL

## 2022-07-12 VITALS
OXYGEN SATURATION: 96 % | BODY MASS INDEX: 45.58 KG/M2 | DIASTOLIC BLOOD PRESSURE: 70 MMHG | SYSTOLIC BLOOD PRESSURE: 120 MMHG | HEART RATE: 118 BPM | WEIGHT: 291 LBS

## 2022-07-12 DIAGNOSIS — F41.1 ANXIETY STATE: ICD-10-CM

## 2022-07-12 DIAGNOSIS — Z79.899 ENCOUNTER FOR LONG-TERM (CURRENT) USE OF MEDICATIONS: ICD-10-CM

## 2022-07-12 DIAGNOSIS — Z11.59 NEED FOR HEPATITIS C SCREENING TEST: ICD-10-CM

## 2022-07-12 DIAGNOSIS — F40.01 PANIC DISORDER WITH AGORAPHOBIA: Primary | ICD-10-CM

## 2022-07-12 DIAGNOSIS — N18.31 STAGE 3A CHRONIC KIDNEY DISEASE (H): ICD-10-CM

## 2022-07-12 DIAGNOSIS — I26.99 PULMONARY EMBOLISM, UNSPECIFIED CHRONICITY, UNSPECIFIED PULMONARY EMBOLISM TYPE, UNSPECIFIED WHETHER ACUTE COR PULMONALE PRESENT (H): ICD-10-CM

## 2022-07-12 DIAGNOSIS — F33.0 MILD EPISODE OF RECURRENT MAJOR DEPRESSIVE DISORDER (H): ICD-10-CM

## 2022-07-12 DIAGNOSIS — I26.99 PULMONARY EMBOLISM, UNSPECIFIED CHRONICITY, UNSPECIFIED PULMONARY EMBOLISM TYPE, UNSPECIFIED WHETHER ACUTE COR PULMONALE PRESENT (H): Primary | ICD-10-CM

## 2022-07-12 DIAGNOSIS — R73.01 IMPAIRED FASTING GLUCOSE: ICD-10-CM

## 2022-07-12 LAB
ANION GAP SERPL CALCULATED.3IONS-SCNC: 12 MMOL/L (ref 7–15)
BUN SERPL-MCNC: 22.9 MG/DL (ref 8–23)
CALCIUM SERPL-MCNC: 10.7 MG/DL (ref 8.8–10.2)
CHLORIDE SERPL-SCNC: 95 MMOL/L (ref 98–107)
CREAT SERPL-MCNC: 1.17 MG/DL (ref 0.51–0.95)
DEPRECATED HCO3 PLAS-SCNC: 29 MMOL/L (ref 22–29)
GFR SERPL CREATININE-BSD FRML MDRD: 47 ML/MIN/1.73M2
GLUCOSE SERPL-MCNC: 110 MG/DL (ref 70–99)
HBA1C MFR BLD: 5.7 % (ref 0–5.6)
INR BLD: 2.5 (ref 0.9–1.1)
POTASSIUM SERPL-SCNC: 3.8 MMOL/L (ref 3.4–5.3)
SODIUM SERPL-SCNC: 136 MMOL/L (ref 136–145)

## 2022-07-12 PROCEDURE — 36415 COLL VENOUS BLD VENIPUNCTURE: CPT | Performed by: FAMILY MEDICINE

## 2022-07-12 PROCEDURE — 83036 HEMOGLOBIN GLYCOSYLATED A1C: CPT | Performed by: FAMILY MEDICINE

## 2022-07-12 PROCEDURE — 85610 PROTHROMBIN TIME: CPT | Performed by: FAMILY MEDICINE

## 2022-07-12 PROCEDURE — 80048 BASIC METABOLIC PNL TOTAL CA: CPT | Performed by: FAMILY MEDICINE

## 2022-07-12 PROCEDURE — 99215 OFFICE O/P EST HI 40 MIN: CPT | Performed by: FAMILY MEDICINE

## 2022-07-12 PROCEDURE — 36416 COLLJ CAPILLARY BLOOD SPEC: CPT | Performed by: FAMILY MEDICINE

## 2022-07-12 RX ORDER — CITALOPRAM HYDROBROMIDE 40 MG/1
40 TABLET ORAL DAILY
Qty: 90 TABLET | Refills: 3 | Status: SHIPPED | OUTPATIENT
Start: 2022-07-12 | End: 2022-08-08

## 2022-07-12 ASSESSMENT — ANXIETY QUESTIONNAIRES
1. FEELING NERVOUS, ANXIOUS, OR ON EDGE: NEARLY EVERY DAY
2. NOT BEING ABLE TO STOP OR CONTROL WORRYING: SEVERAL DAYS
5. BEING SO RESTLESS THAT IT IS HARD TO SIT STILL: MORE THAN HALF THE DAYS
6. BECOMING EASILY ANNOYED OR IRRITABLE: NOT AT ALL
GAD7 TOTAL SCORE: 13
4. TROUBLE RELAXING: NEARLY EVERY DAY
GAD7 TOTAL SCORE: 13
3. WORRYING TOO MUCH ABOUT DIFFERENT THINGS: MORE THAN HALF THE DAYS
GAD7 TOTAL SCORE: 13
7. FEELING AFRAID AS IF SOMETHING AWFUL MIGHT HAPPEN: MORE THAN HALF THE DAYS
7. FEELING AFRAID AS IF SOMETHING AWFUL MIGHT HAPPEN: MORE THAN HALF THE DAYS
8. IF YOU CHECKED OFF ANY PROBLEMS, HOW DIFFICULT HAVE THESE MADE IT FOR YOU TO DO YOUR WORK, TAKE CARE OF THINGS AT HOME, OR GET ALONG WITH OTHER PEOPLE?: NOT DIFFICULT AT ALL

## 2022-07-12 ASSESSMENT — PATIENT HEALTH QUESTIONNAIRE - PHQ9
SUM OF ALL RESPONSES TO PHQ QUESTIONS 1-9: 10
SUM OF ALL RESPONSES TO PHQ QUESTIONS 1-9: 10
10. IF YOU CHECKED OFF ANY PROBLEMS, HOW DIFFICULT HAVE THESE PROBLEMS MADE IT FOR YOU TO DO YOUR WORK, TAKE CARE OF THINGS AT HOME, OR GET ALONG WITH OTHER PEOPLE: NOT DIFFICULT AT ALL

## 2022-07-12 NOTE — PROGRESS NOTES
ANTICOAGULATION MANAGEMENT     Nancy Oropeza 77 year old female is on warfarin with therapeutic INR result. (Goal INR 2.0-3.0)    Recent labs: (last 7 days)     07/12/22  1351   INR 2.5*       ASSESSMENT     Source(s): Chart Review and Patient/Caregiver Call     Warfarin doses taken: Warfarin taken as instructed  Diet: No new diet changes identified  New illness, injury, or hospitalization: No  Medication/supplement changes: None noted  Signs or symptoms of bleeding or clotting: No  Previous INR: Therapeutic last 2(+) visits  Additional findings: None       PLAN     Recommended plan for no diet, medication or health factor changes affecting INR     Dosing Instructions: continue your current warfarin dose with next INR in 1 week       Summary  As of 7/12/2022    Full warfarin instructions:  2.5 mg every Mon, Wed; 1.25 mg all other days   Next INR check:  7/19/2022             Telephone call with Nancy who verbalizes understanding and agrees to plan    Patient to recheck with home meter    Education provided: Please call back if any changes to your diet, medications or how you've been taking warfarin    Plan made per [facility] anticoagulation protocol    Melida Bergman, RN  Anticoagulation Clinic  7/12/2022    _______________________________________________________________________     Anticoagulation Episode Summary     Current INR goal:  2.0-3.0   TTR:  51.3 % (1 y)   Target end date:  Indefinite   Send INR reminders to:  XENIA MURILLO    Indications    Pulmonary embolism (H) [I26.99]  Pulmonary embolism  unspecified chronicity  unspecified pulmonary embolism type  unspecified whether acute cor pulmonale present (H) [I26.99]           Comments:  [facility] Home Meter         Anticoagulation Care Providers     Provider Role Specialty Phone number    Julien Garnica MD Referring Family Medicine [phone]

## 2022-07-12 NOTE — PROGRESS NOTES
"Assessment/Plan:    Panic disorder with agoraphobia  Panic disorder with agoraphobia.  We will resume citalopram and titrate to 40 mg daily dose which she had been on for years and has tolerated well.  PHQ-9 questionnaire 10 out of 27 with TORIE-7 questionnaire 13 out of 21 today.  Anticipate reassessment in next 4 to 6 weeks to ensure desired improvement.    Anxiety state  As above.    Mild episode of recurrent major depressive disorder (H)  Resuming citalopram titrating to 40 mg daily with PHQ-9 questionnaire 10 out of 27.  - citalopram (CELEXA) 40 MG tablet  Dispense: 90 tablet; Refill: 3    Encounter for long-term (current) use of medications  INR updated today.    Stage 3a chronic kidney disease (H)  CKD stage IIIa and will ensure stable renal function  - Basic metabolic panel    Impaired fasting glucose  Paired fasting glucose and check fasting glucose and A1c today.  - Basic metabolic panel  - Hemoglobin A1c    40 minutes total time spent on the date of encounter including patient chart review, counseling and coordination of cares, and documentation.           Subjective:    Nancy Oropeza is seen today for follow-up assessment.  Worsening dizziness and anxiety concerns.  Panic attacks.  Had issues with this years ago especially when at work and 1 5:30 or 6/day at times.  Now he was recently weaned off Celexa because she felt that this was not helping as much with anxiety but now is having more anxiety attacks.  Feels dizzy and sweaty at times.  Can have heart racing.  Not consistent however.  Not with exertion.  Has read a lot about anxiety and understands to \"let it happen\" consider trying to fight it however still having concerns.  Does have history of CKD stage IIIa historically.  Uses amlodipine 5 mg daily for hypertension along with lisinopril hydrochlorothiazide 20/12.5 using 2 tablets daily.  Comprehensive review of systems as above otherwise all negative.       5 children (Nancy Alegria, " Akash Suazo, Thee) - Akash had coronary stent while Thee with Factor V abnormality and rectal cancer   14 grandchildren   16 great-grandchildren   Grew up in [place], [place] til age 12...   No smoke (quit 16 years ago after 1 ppd x 30+ years)   EtOH: occ wine   Mom -  88 COPD   Dad -  61 massive MI   1 bro -  67 blood clot (lung)   2 sis - one of which is  age 62 small cell lung CA (h/o smoker)   Surgeries: ventral hernia repair with muscle flap 10/4/12 with post-op complication of seroma with J.P. drain in place followed by interventional radiology q 2 weeks);  age 25; groin hernia age 3; CAPRICE-BSO  by Dr. Herbert Carmen (due to benign cyst x 2); right TKA 18 (Dr. Small)   Hospitalizations: as above   Work: retired  ([employer] as )     Past Surgical History:   Procedure Laterality Date     arthroplasty for hammertoe-2nd toe R  Biebl[       BIOPSY BREAST Left 1970s     Bunion correction by Cheilectomy-had bunionectomy ? type of procedure L and R separate procedures.[       GYN SURGERY       HERNIA REPAIR       Hernia Repair Inguinal unilateral[       HYSTERECTOMY       HYSTERECTOMY       IR ABSCESS TUBE CHANGE  2012     IR ABSCESS TUBE CHANGE  2012     IR ABSCESS TUBE CHANGE  2012     IR ABSCESS TUBE CHANGE  2013     IR ABSCESS TUBE CHANGE  3/1/2013     IR ABSCESS TUBE CHANGE  3/13/2013     OOPHORECTOMY  2010     ORTHOPEDIC SURGERY          Family History   Problem Relation Age of Onset     Breast Cancer Sister 75.00     Stomach Cancer Paternal Grandfather      Lung Cancer Sister      Chronic Obstructive Pulmonary Disease Mother      Heart Disease Mother      Coronary Artery Disease Father         Past Medical History:   Diagnosis Date      novel coronavirus disease (COVID-19) 2020     Acute bilateral knee pain 2017     TEOFILO (acute kidney injury) (H) 2020     Anemia, unspecified     Created by  Conversion      Angioedema 12/17/2015     Anticoagulant long-term use 3/6/2017     Back pain     Created by Conversion      Chronic pain syndrome 6/1/2018     COVID-19 virus infection 7/17/2020     Depression      Diarrhea 7/16/2020     Disease of thyroid gland      Edema     Created by Conversion      Essential hypertension with goal blood pressure less than 140/90     Created by Conversion  Replacement Utility updated for latest IMO load     History of pulmonary embolism 1/1/2015    Overview:  bilateral with acute cor pulmonale      HTN (hypertension)      Hypercholesteremia      Hypokalemia 12/17/2015     Hypothyroidism     Created by Conversion  Replacement Utility updated for latest IMO load     Hypoxia 7/16/2020     Impaired fasting glucose     Created by Conversion      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     Major depressive disorder, recurrent episode (H)     Created by Conversion  Replacement Utility updated for latest IMO load     Mood disorder (H) 7/16/2020     Morbid obesity (H)      Multiple lung nodules on CT 12/17/2015     Obstructive sleep apnea (adult) (pediatric)     Created by Conversion      Osteoarthrosis involving lower leg     Created by Conversion  Replacement Utility updated for latest IMO load     Pain in joint, multiple sites     Created by Conversion      Polymyalgia rheumatica (H) 7/16/2020     Postoperative anemia due to acute blood loss 12/18/2018     Pulmonary emboli (H) 12/14/2015     Pulmonary embolism (H) 12/21/2015     Pure hypercholesterolemia     Created by Conversion      Sepsis (H) 7/28/2020     SIRS (systemic inflammatory response syndrome) (H) 7/28/2020     Unspecified cataract     Created by Conversion      Yeast infection         Social History     Tobacco Use     Smoking status: Former Smoker     Smokeless tobacco: Never Used     Tobacco comment: quite 20 yrs ago   Substance Use Topics     Alcohol use: No     Comment: Alcoholic Drinks/day: occasionally     Drug use: No         Current Outpatient Medications   Medication Sig Dispense Refill     acetaminophen 500 MG CAPS Take 2 capsules by mouth 3 times daily as needed.       amLODIPine (NORVASC) 5 MG tablet TAKE 1 TABLET DAILY (NEW   DOSE 6/1/18) 90 tablet 3     BD ULTRA-FINE 33 LANCETS Use as directed       busPIRone (BUSPAR) 7.5 MG tablet Take 1 tablet (7.5 mg) by mouth 2 times daily 60 tablet 3     Cholecalciferol (VITAMIN D) 400 UNITS capsule Take 2 capsules by mouth daily.       citalopram (CELEXA) 40 MG tablet Take 1 tablet (40 mg) by mouth daily 90 tablet 3     furosemide (LASIX) 20 MG tablet Take 1 tablet (20 mg) by mouth every morning 90 tablet 1     Glucose Blood (ONE TOUCH ULTRA TEST) strip by In Vitro route daily Use as directed       levothyroxine (SYNTHROID/LEVOTHROID) 137 MCG tablet Take 1 tablet (137 mcg) by mouth daily 90 tablet 3     lisinopril-hydrochlorothiazide (ZESTORETIC) 20-12.5 MG tablet Take 2 tablets by mouth daily 180 tablet 1     pravastatin (PRAVACHOL) 80 MG tablet Take 1 tablet (80 mg) by mouth At Bedtime 90 tablet 3     predniSONE (DELTASONE) 5 MG tablet Take 1 tablet (5 mg) by mouth daily 90 tablet 0     traMADol (ULTRAM) 50 MG tablet Take 1 tablet (50 mg) by mouth every 6 hours as needed for severe pain 60 tablet 0     warfarin ANTICOAGULANT (COUMADIN) 2.5 MG tablet take 1 tablet (2.5 mg) by mouth every Mon, Wed, Fri; take one-half tablet (1.25 mg) by mouth all other days of the week 60 tablet 5          Objective:    Vitals:    07/12/22 1242   BP: 120/70   Pulse: 118   SpO2: 96%   Weight: 132 kg (291 lb)      Body mass index is 45.58 kg/m .    Alert.  No apparent distress.  Cooperative and forthcoming.  Cardiac exam regular without cardiac ectopy.  Recent EKG as noted below May 23, 2022 appears normal sinus rhythm without significant arrhythmia.        EXAM: CT HEAD W/O CONTRAST  LOCATION: [facility]  DATE/TIME: 6/15/2022 3:23 PM     INDICATION: Dizziness  COMPARISON: Head MRI  06/30/2017  TECHNIQUE: Routine CT Head without IV contrast. Multiplanar reformats. Dose reduction techniques were used.     FINDINGS:  INTRACRANIAL CONTENTS: No intracranial hemorrhage, extraaxial collection, or mass effect.  No CT evidence of acute infarct. Mild presumed chronic small vessel ischemic changes. Mild generalized volume loss. No hydrocephalus.      VISUALIZED ORBITS/SINUSES/MASTOIDS: No intraorbital abnormality. No paranasal sinus mucosal disease. No middle ear or mastoid effusion.     BONES/SOFT TISSUES: No acute abnormality.                                                                      IMPRESSION:  1.  No CT evidence for acute intracranial process.  2.  Brain atrophy and presumed chronic microvascular ischemic changes as above.        EKG 5/23/22:  Sinus rhythm with sinus arrhythmia   Normal ECG   When compared with ECG of 08-JAN-2022 12:32,   Nonspecific T wave abnormality no longer evident in Lateral leads   Confirmed by SEE ED PROVIDER NOTE FOR, ECG INTERPRETATION ([id number]),  CORY DIMAS ([id number]) on 5/24/2022 5:50:38 AM       This note has been dictated using voice recognition software and as a result may contain minor grammatical errors and unintended word substitutions.       Answers for HPI/ROS submitted by the patient on 7/12/2022  If you checked off any problems, how difficult have these problems made it for you to do your work, take care of things at home, or get along with other people?: Not difficult at all  PHQ9 TOTAL SCORE: 10  TORIE 7 TOTAL SCORE: 13  Depression/Anxiety: Depression & Anxiety  Status since last visit:: worse  Anxiety since last: : worse  Other associated symptoms of depression:: Yes  Other associated symotome: : Yes  Significant life event: : financial concerns, grief or loss, other  Anxious:: Yes  Current substance use:: No  Your back pain is: chronic  Where is your back pain located? : right lower back, left lower back  How would you describe your back pain?  : dull ache  Where does your back pain spread? : right buttocks, left buttocks  Since you noticed your back pain, how has it changed? : gradually improving  Does your back pain interfere with your job?: Not applicable  If yes, which:: cold, heat, opioids, steroid injection  What is the reason for your visit today? : Feeling light headed l also fell on balanced  How many servings of fruits and vegetables do you eat daily?: 0-1  On average, how many sweetened beverages do you drink each day (Examples: soda, juice, sweet tea, etc.  Do NOT count diet or artificially sweetened beverages)?: 2  How many minutes a day do you exercise enough to make your heart beat faster?: 9 or less  How many days a week do you exercise enough to make your heart beat faster?: 3 or less  How many days per week do you miss taking your medication?: 1

## 2022-07-14 ENCOUNTER — TRANSFERRED RECORDS (OUTPATIENT)
Dept: HEALTH INFORMATION MANAGEMENT | Facility: CLINIC | Age: 77
End: 2022-07-14

## 2022-07-16 ENCOUNTER — TRANSFERRED RECORDS (OUTPATIENT)
Dept: HEALTH INFORMATION MANAGEMENT | Facility: CLINIC | Age: 77
End: 2022-07-16

## 2022-07-18 ENCOUNTER — ANTICOAGULATION THERAPY VISIT (OUTPATIENT)
Dept: ANTICOAGULATION | Facility: CLINIC | Age: 77
End: 2022-07-18

## 2022-07-18 DIAGNOSIS — I26.99 PULMONARY EMBOLISM, UNSPECIFIED CHRONICITY, UNSPECIFIED PULMONARY EMBOLISM TYPE, UNSPECIFIED WHETHER ACUTE COR PULMONALE PRESENT (H): Primary | ICD-10-CM

## 2022-07-18 DIAGNOSIS — I26.99 PULMONARY EMBOLISM (H): ICD-10-CM

## 2022-07-18 LAB — INR (EXTERNAL): 2.5 (ref 0.9–1.1)

## 2022-07-18 NOTE — PROGRESS NOTES
ANTICOAGULATION MANAGEMENT     Nancy Oropeza 77 year old female is on warfarin with therapeutic INR result. (Goal INR 2.0-3.0)    Recent labs: (last 7 days)     07/16/22  0910   INR 2.5*       ASSESSMENT     Source(s): Chart Review  Previous INR was Therapeutic last 2(+) visits  Medication, diet, health changes since last INR chart reviewed; none identified           PLAN     Recommended plan for no diet, medication or health factor changes affecting INR     Dosing Instructions: continue your current warfarin dose with next INR in 1 week       Summary  As of 7/18/2022    Full warfarin instructions:  2.5 mg every Mon, Wed; 1.25 mg all other days   Next INR check:  7/25/2022             Detailed voice message left for Nancy with dosing instructions and follow up date.     Patient to recheck with home meter    Education provided: Please call back if any changes to your diet, medications or how you've been taking warfarin    Plan made per [facility] anticoagulation protocol    Cora Jean-Baptiste RN  Anticoagulation Clinic  7/18/2022    _______________________________________________________________________     Anticoagulation Episode Summary     Current INR goal:  2.0-3.0   TTR:  51.4 % (1 y)   Target end date:  Indefinite   Send INR reminders to:  XENIA MURILLO    Indications    Pulmonary embolism (H) [I26.99]  Pulmonary embolism  unspecified chronicity  unspecified pulmonary embolism type  unspecified whether acute cor pulmonale present (H) [I26.99]           Comments:  MDINR Home Meter         Anticoagulation Care Providers     Provider Role Specialty Phone number    Julien Garnica MD Referring Family Medicine [phone]

## 2022-07-19 ENCOUNTER — ANTICOAGULATION THERAPY VISIT (OUTPATIENT)
Dept: ANTICOAGULATION | Facility: CLINIC | Age: 77
End: 2022-07-19

## 2022-07-19 ENCOUNTER — TRANSFERRED RECORDS (OUTPATIENT)
Dept: HEALTH INFORMATION MANAGEMENT | Facility: CLINIC | Age: 77
End: 2022-07-19

## 2022-07-19 DIAGNOSIS — I26.99 PULMONARY EMBOLISM, UNSPECIFIED CHRONICITY, UNSPECIFIED PULMONARY EMBOLISM TYPE, UNSPECIFIED WHETHER ACUTE COR PULMONALE PRESENT (H): Primary | ICD-10-CM

## 2022-07-19 LAB — INR (EXTERNAL): 2.9 (ref 0.9–1.1)

## 2022-07-19 NOTE — PROGRESS NOTES
ANTICOAGULATION MANAGEMENT     Nancy Oropeza 77 year old female is on warfarin with therapeutic INR result. (Goal INR 2.0-3.0)    Recent labs: (last 7 days)     07/19/22  1410   INR 2.9*       ASSESSMENT     Source(s): Chart Review and Patient/Caregiver Call     Warfarin doses taken: Warfarin taken as instructed  Diet: No new diet changes identified  New illness, injury, or hospitalization: No  Medication/supplement changes: None noted  Signs or symptoms of bleeding or clotting: No  Previous INR: Therapeutic last 2(+) visits  Additional findings: None       PLAN     Recommended plan for no diet, medication or health factor changes affecting INR     Dosing Instructions: continue your current warfarin dose with next INR in 1 week       Summary  As of 7/19/2022    Full warfarin instructions:  2.5 mg every Mon, Wed; 1.25 mg all other days   Next INR check:  7/26/2022             Detailed voice message left for Nancy with dosing instructions and follow up date.     Patient to recheck with home meter    Education provided: Please call back if any changes to your diet, medications or how you've been taking warfarin    Plan made per [facility] anticoagulation protocol    Melida Bergman, RN  Anticoagulation Clinic  7/19/2022    _______________________________________________________________________     Anticoagulation Episode Summary     Current INR goal:  2.0-3.0   TTR:  51.9 % (1 y)   Target end date:  Indefinite   Send INR reminders to:  XENIA MURILLO    Indications    Pulmonary embolism (H) [I26.99]  Pulmonary embolism  unspecified chronicity  unspecified pulmonary embolism type  unspecified whether acute cor pulmonale present (H) [I26.99]           Comments:  MDINR Home Meter         Anticoagulation Care Providers     Provider Role Specialty Phone number    Julien Garnica MD Referring Family Medicine [phone]

## 2022-07-21 ENCOUNTER — TELEPHONE (OUTPATIENT)
Dept: FAMILY MEDICINE | Facility: CLINIC | Age: 77
End: 2022-07-21

## 2022-07-21 NOTE — TELEPHONE ENCOUNTER
Pt has appt scheduled for tomorrow at 2:20 but is certain she didn't schedule it as she has another appt at 1:30 with general surgery. She is asking that Haritha give her a call.

## 2022-07-22 ENCOUNTER — OFFICE VISIT (OUTPATIENT)
Dept: SURGERY | Facility: CLINIC | Age: 77
End: 2022-07-22
Attending: FAMILY MEDICINE
Payer: COMMERCIAL

## 2022-07-22 ENCOUNTER — TELEPHONE (OUTPATIENT)
Dept: SURGERY | Facility: CLINIC | Age: 77
End: 2022-07-22

## 2022-07-22 VITALS
BODY MASS INDEX: 45.42 KG/M2 | WEIGHT: 289.4 LBS | HEIGHT: 67 IN | DIASTOLIC BLOOD PRESSURE: 70 MMHG | SYSTOLIC BLOOD PRESSURE: 140 MMHG

## 2022-07-22 DIAGNOSIS — K80.21 CALCULUS OF GALLBLADDER WITH BILIARY OBSTRUCTION BUT WITHOUT CHOLECYSTITIS: ICD-10-CM

## 2022-07-22 PROCEDURE — 99204 OFFICE O/P NEW MOD 45 MIN: CPT | Performed by: SPECIALIST

## 2022-07-22 RX ORDER — ACETAMINOPHEN 325 MG/1
975 TABLET ORAL ONCE
Status: CANCELLED | OUTPATIENT
Start: 2022-07-22 | End: 2022-07-22

## 2022-07-22 NOTE — LETTER
7/22/2022         RE: Nancy Oropeza  [address]  [place] [address]        Dear Colleague,    Thank you for referring your patient, Nancy Oropeza, to the [facility] [place]. Please see a copy of my visit note below.    Office Gallbladder Consult    HPI: I am consulted in this 77 year old female who has been experiencing some problems with abdominal pain. she has noted this for about few monhts. It has been occurring about 1-2 times per week. It is moderate.  It is precipitated by diet. It is associated with nausea or vomiting.   It is not associated with chills or fevers. Here for consult about options for treatment.    Allergies:Sulfasalazine, Atorvastatin, Paroxetine, Simvastatin, and Sulfa drugs    Past Medical History:   Diagnosis Date     2019 novel coronavirus disease (COVID-19) 7/16/2020     Acute bilateral knee pain 7/18/2017     TEOFILO (acute kidney injury) (H) 7/16/2020     Anemia, unspecified     Created by Conversion      Angioedema 12/17/2015     Anticoagulant long-term use 3/6/2017     Back pain     Created by Conversion      Chronic pain syndrome 6/1/2018     COVID-19 virus infection 7/17/2020     Depression      Diarrhea 7/16/2020     Disease of thyroid gland      Edema     Created by Conversion      Essential hypertension with goal blood pressure less than 140/90     Created by Conversion  Replacement Utility updated for latest IMO load     History of pulmonary embolism 1/1/2015    Overview:  bilateral with acute cor pulmonale      HTN (hypertension)      Hypercholesteremia      Hypokalemia 12/17/2015     Hypothyroidism     Created by Conversion  Replacement Utility updated for latest IMO load     Hypoxia 7/16/2020     Impaired fasting glucose     Created by Conversion      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     Major depressive disorder, recurrent episode (H)     Created by Conversion  Replacement Utility updated for latest IMO load     Mood  "disorder (H) 7/16/2020     Morbid obesity (H)      Multiple lung nodules on CT 12/17/2015     Obstructive sleep apnea (adult) (pediatric)     Created by Conversion      Osteoarthrosis involving lower leg     Created by Conversion  Replacement Utility updated for latest IMO load     Pain in joint, multiple sites     Created by Conversion      Polymyalgia rheumatica (H) 7/16/2020     Postoperative anemia due to acute blood loss 12/18/2018     Pulmonary emboli (H) 12/14/2015     Pulmonary embolism (H) 12/21/2015     Pure hypercholesterolemia     Created by Conversion      Sepsis (H) 7/28/2020     SIRS (systemic inflammatory response syndrome) (H) 7/28/2020     Unspecified cataract     Created by Conversion      Yeast infection        Past Surgical History:   Procedure Laterality Date     arthroplasty for hammertoe-2nd toe R 2000 Biebl[       BIOPSY BREAST Left 1970s     Bunion correction by Cheilectomy-had bunionectomy ? type of procedure L and R separate procedures.[       GYN SURGERY       HERNIA REPAIR       Hernia Repair Inguinal unilateral[       HYSTERECTOMY       HYSTERECTOMY  2010     IR ABSCESS TUBE CHANGE  11/9/2012     IR ABSCESS TUBE CHANGE  11/12/2012     IR ABSCESS TUBE CHANGE  12/4/2012     IR ABSCESS TUBE CHANGE  2/22/2013     IR ABSCESS TUBE CHANGE  3/1/2013     IR ABSCESS TUBE CHANGE  3/13/2013     OOPHORECTOMY  2010     ORTHOPEDIC SURGERY         CURRENT MEDS:      Family History   Problem Relation Age of Onset     Breast Cancer Sister 75.00     Stomach Cancer Paternal Grandfather      Lung Cancer Sister      Chronic Obstructive Pulmonary Disease Mother      Heart Disease Mother      Coronary Artery Disease Father         reports that she has quit smoking. She has never used smokeless tobacco. She reports that she does not drink alcohol and does not use drugs.    Review of Systems - Pertinent items are noted in HPI.    BP (!) 140/70   Ht 1.702 m (5' 7\")   Wt 131.3 kg (289 lb 6.4 oz)   LMP  (LMP " Unknown)   BMI 45.33 kg/m        EXAM:  GENERAL: This is a obese female in no distress.  MOUTH: Mucus membranes moist  SKIN: Grossly intact  EYES: No icterus  CARDIAC: RRR w/out murmur  CHEST/LUNG: Clear  ABDOMEN: Soft, nontender RUQ non-tender, B/S present, Matute's sign negative  BACK: No costovertebral tenderness  NEURO: Alert and oriented, nonfocal  PSYCHIATRIC: normal affect      LABS:  Lab Results   Component Value Date    WBC 14.9 05/23/2022    HGB 10.3 05/23/2022    HGB 11.2 10/29/2012    HCT 32.9 05/23/2022    HCT 35.1 10/29/2012    MCV 86 05/23/2022    MCV 91.5 10/29/2012     Lab Results   Component Value Date    ALT 12 05/23/2022    AST 14 05/23/2022    ALKPHOS 83 05/23/2022       IMAGES:     Reviewed the images demonstrating the gallstones    PACS Images     Show images for CT Abdomen Pelvis w Contrast         Study Result    Narrative & Impression   EXAM: CT ABDOMEN PELVIS W CONTRAST  LOCATION: [facility]  DATE/TIME: 5/23/2022 10:26 PM     INDICATION: Abdominal pain with nausea and vomiting starting 2 days ago.  COMPARISON: 02/07/2022  TECHNIQUE: CT scan of the abdomen and pelvis was performed following injection of IV contrast. Multiplanar reformats were obtained. Dose reduction techniques were used.  CONTRAST: ISOVUE 370 75ML     FINDINGS:   LOWER CHEST: Normal.     HEPATOBILIARY: Liver appears unremarkable. 2 gallstones in the gallbladder. No gallbladder wall thickening or pericholecystic fluid. No biliary dilatation.     PANCREAS: Normal.     SPLEEN: Normal.     ADRENAL GLANDS: Normal.     KIDNEYS/BLADDER: Few subcentimeter low-attenuation lesions in both kidneys, too small to characterize, but unchanged from prior. No hydronephrosis. Urinary bladder appears unremarkable.     BOWEL: Extensive colonic diverticulosis. No obstruction or inflammatory change. Normal appendix. No evidence of acute appendicitis.     LYMPH NODES: No lymphadenopathy.     VASCULATURE: Moderate to  severe atherosclerotic calcifications. No abdominal aortic aneurysm.     PELVIC ORGANS: Status post hysterectomy.     MUSCULOSKELETAL: Unchanged thick-walled fluid collection in the lower ventral abdominal wall, measuring 8.9 x 2.4 cm. Multilevel degenerative changes of the spine.                                                                       IMPRESSION:   1.  No acute findings or inflammatory changes in the abdomen or pelvis.     2.  Cholelithiasis. No acute cholecystitis.     3.  Extensive diverticulosis. No acute diverticulitis.     4.  Unchanged thick-walled fluid collection in the lower ventral abdominal wall, which may represent postsurgical seroma.         Assessment/Plan:     Pt with signs and symptoms consistent with symptomatic cholelithiasis. I have recommended a laparoscopic cholecystecomy. I discussed with her that we might not be able to do this laparoscopically with low risk of going open. I went over some of the risks of surgery including but not limited to bleeding, infection and bile duct injury and anesthesia.     Sid Nguyen MD ,MD  [facility] Department of Surgery      Again, thank you for allowing me to participate in the care of your patient.        Sincerely,        Sid Nguyen MD

## 2022-08-01 ENCOUNTER — ANTICOAGULATION THERAPY VISIT (OUTPATIENT)
Dept: ANTICOAGULATION | Facility: CLINIC | Age: 77
End: 2022-08-01

## 2022-08-01 ENCOUNTER — TRANSFERRED RECORDS (OUTPATIENT)
Dept: HEALTH INFORMATION MANAGEMENT | Facility: CLINIC | Age: 77
End: 2022-08-01

## 2022-08-01 DIAGNOSIS — I26.99 PULMONARY EMBOLISM (H): Primary | ICD-10-CM

## 2022-08-01 DIAGNOSIS — I26.99 PULMONARY EMBOLISM, UNSPECIFIED CHRONICITY, UNSPECIFIED PULMONARY EMBOLISM TYPE, UNSPECIFIED WHETHER ACUTE COR PULMONALE PRESENT (H): ICD-10-CM

## 2022-08-01 LAB — INR (EXTERNAL): 2.3 (ref 0.8–1.1)

## 2022-08-01 NOTE — PROGRESS NOTES
ANTICOAGULATION MANAGEMENT     Nancy Oropeza 77 year old female is on warfarin with therapeutic INR result. (Goal INR 2.0-3.0)    Recent labs: (last 7 days)     08/01/22  0000   INR 2.3*       ASSESSMENT     Source(s): Chart Review and Patient/Caregiver Call     Warfarin doses taken: Warfarin taken as instructed  Diet: No new diet changes identified  New illness, injury, or hospitalization: No  Medication/supplement changes: None noted  Signs or symptoms of bleeding or clotting: No  Previous INR: Therapeutic last 2(+) visits  Additional findings: None       PLAN     Recommended plan for no diet, medication or health factor changes affecting INR     Dosing Instructions: Continue your current warfarin dose with next INR in 1 week       Summary  As of 8/1/2022    Full warfarin instructions:  2.5 mg every Mon, Wed; 1.25 mg all other days   Next INR check:  8/8/2022             Telephone call with Nancy who verbalizes understanding and agrees to plan    Patient to recheck with home meter. She got new supply of lancets today.    Education provided: Contact [phone]  with any changes, questions or concerns.     Plan made per [facility] anticoagulation protocol    Kavitha Sousa RN  Anticoagulation Clinic  8/1/2022    _______________________________________________________________________     Anticoagulation Episode Summary     Current INR goal:  2.0-3.0   TTR:  53.0 % (1 y)   Target end date:  Indefinite   Send INR reminders to:  XENIA MURILLO    Indications    Pulmonary embolism (H) [I26.99]  Pulmonary embolism  unspecified chronicity  unspecified pulmonary embolism type  unspecified whether acute cor pulmonale present (H) [I26.99]           Comments:  MDINR Home Meter         Anticoagulation Care Providers     Provider Role Specialty Phone number    Julien Garnica MD Referring Family Medicine [phone]

## 2022-08-08 DIAGNOSIS — F33.0 MILD EPISODE OF RECURRENT MAJOR DEPRESSIVE DISORDER (H): ICD-10-CM

## 2022-08-08 DIAGNOSIS — F40.01 PANIC DISORDER WITH AGORAPHOBIA: ICD-10-CM

## 2022-08-08 DIAGNOSIS — I26.99 PULMONARY EMBOLISM, UNSPECIFIED CHRONICITY, UNSPECIFIED PULMONARY EMBOLISM TYPE, UNSPECIFIED WHETHER ACUTE COR PULMONALE PRESENT (H): ICD-10-CM

## 2022-08-08 RX ORDER — CITALOPRAM HYDROBROMIDE 40 MG/1
40 TABLET ORAL DAILY
Qty: 90 TABLET | Refills: 3 | Status: SHIPPED | OUTPATIENT
Start: 2022-08-08 | End: 2023-01-01

## 2022-08-08 RX ORDER — WARFARIN SODIUM 2.5 MG/1
TABLET ORAL
Qty: 60 TABLET | Refills: 5 | Status: SHIPPED | OUTPATIENT
Start: 2022-08-08 | End: 2023-01-01

## 2022-08-08 NOTE — PROGRESS NOTES
Office Gallbladder Consult    HPI: I am consulted in this 77 year old female who has been experiencing some problems with abdominal pain. she has noted this for about few monhts. It has been occurring about 1-2 times per week. It is moderate.  It is precipitated by diet. It is associated with nausea or vomiting.   It is not associated with chills or fevers. Here for consult about options for treatment.    Allergies:Sulfasalazine, Atorvastatin, Paroxetine, Simvastatin, and Sulfa drugs    Past Medical History:   Diagnosis Date     2019 novel coronavirus disease (COVID-19) 7/16/2020     Acute bilateral knee pain 7/18/2017     TEOFILO (acute kidney injury) (H) 7/16/2020     Anemia, unspecified     Created by Conversion      Angioedema 12/17/2015     Anticoagulant long-term use 3/6/2017     Back pain     Created by Conversion      Chronic pain syndrome 6/1/2018     COVID-19 virus infection 7/17/2020     Depression      Diarrhea 7/16/2020     Disease of thyroid gland      Edema     Created by Conversion      Essential hypertension with goal blood pressure less than 140/90     Created by Conversion  Replacement Utility updated for latest IMO load     History of pulmonary embolism 1/1/2015    Overview:  bilateral with acute cor pulmonale      HTN (hypertension)      Hypercholesteremia      Hypokalemia 12/17/2015     Hypothyroidism     Created by Conversion  Replacement Utility updated for latest IMO load     Hypoxia 7/16/2020     Impaired fasting glucose     Created by Conversion      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     Major depressive disorder, recurrent episode (H)     Created by Conversion  Replacement Utility updated for latest IMO load     Mood disorder (H) 7/16/2020     Morbid obesity (H)      Multiple lung nodules on CT 12/17/2015     Obstructive sleep apnea (adult) (pediatric)     Created by Conversion      Osteoarthrosis involving lower leg     Created by Conversion  Replacement Utility updated for latest IMO load     Pain in  "joint, multiple sites     Created by Conversion      Polymyalgia rheumatica (H) 7/16/2020     Postoperative anemia due to acute blood loss 12/18/2018     Pulmonary emboli (H) 12/14/2015     Pulmonary embolism (H) 12/21/2015     Pure hypercholesterolemia     Created by Conversion      Sepsis (H) 7/28/2020     SIRS (systemic inflammatory response syndrome) (H) 7/28/2020     Unspecified cataract     Created by Conversion      Yeast infection        Past Surgical History:   Procedure Laterality Date     arthroplasty for hammertoe-2nd toe R 2000 Biebl[       BIOPSY BREAST Left 1970s     Bunion correction by Cheilectomy-had bunionectomy ? type of procedure L and R separate procedures.[       GYN SURGERY       HERNIA REPAIR       Hernia Repair Inguinal unilateral[       HYSTERECTOMY       HYSTERECTOMY  2010     IR ABSCESS TUBE CHANGE  11/9/2012     IR ABSCESS TUBE CHANGE  11/12/2012     IR ABSCESS TUBE CHANGE  12/4/2012     IR ABSCESS TUBE CHANGE  2/22/2013     IR ABSCESS TUBE CHANGE  3/1/2013     IR ABSCESS TUBE CHANGE  3/13/2013     OOPHORECTOMY  2010     ORTHOPEDIC SURGERY         CURRENT MEDS:      Family History   Problem Relation Age of Onset     Breast Cancer Sister 75.00     Stomach Cancer Paternal Grandfather      Lung Cancer Sister      Chronic Obstructive Pulmonary Disease Mother      Heart Disease Mother      Coronary Artery Disease Father         reports that she has quit smoking. She has never used smokeless tobacco. She reports that she does not drink alcohol and does not use drugs.    Review of Systems - Pertinent items are noted in HPI.    BP (!) 140/70   Ht 1.702 m (5' 7\")   Wt 131.3 kg (289 lb 6.4 oz)   LMP  (LMP Unknown)   BMI 45.33 kg/m        EXAM:  GENERAL: This is a obese female in no distress.  MOUTH: Mucus membranes moist  SKIN: Grossly intact  EYES: No icterus  CARDIAC: RRR w/out murmur  CHEST/LUNG: Clear  ABDOMEN: Soft, nontender RUQ non-tender, B/S present, Matute's sign negative  BACK: No " costovertebral tenderness  NEURO: Alert and oriented, nonfocal  PSYCHIATRIC: normal affect      LABS:  Lab Results   Component Value Date    WBC 14.9 05/23/2022    HGB 10.3 05/23/2022    HGB 11.2 10/29/2012    HCT 32.9 05/23/2022    HCT 35.1 10/29/2012    MCV 86 05/23/2022    MCV 91.5 10/29/2012     Lab Results   Component Value Date    ALT 12 05/23/2022    AST 14 05/23/2022    ALKPHOS 83 05/23/2022       IMAGES:     Reviewed the images demonstrating the gallstones    PACS Images     Show images for CT Abdomen Pelvis w Contrast         Study Result    Narrative & Impression   EXAM: CT ABDOMEN PELVIS W CONTRAST  LOCATION: [facility]  DATE/TIME: 5/23/2022 10:26 PM     INDICATION: Abdominal pain with nausea and vomiting starting 2 days ago.  COMPARISON: 02/07/2022  TECHNIQUE: CT scan of the abdomen and pelvis was performed following injection of IV contrast. Multiplanar reformats were obtained. Dose reduction techniques were used.  CONTRAST: ISOVUE 370 75ML     FINDINGS:   LOWER CHEST: Normal.     HEPATOBILIARY: Liver appears unremarkable. 2 gallstones in the gallbladder. No gallbladder wall thickening or pericholecystic fluid. No biliary dilatation.     PANCREAS: Normal.     SPLEEN: Normal.     ADRENAL GLANDS: Normal.     KIDNEYS/BLADDER: Few subcentimeter low-attenuation lesions in both kidneys, too small to characterize, but unchanged from prior. No hydronephrosis. Urinary bladder appears unremarkable.     BOWEL: Extensive colonic diverticulosis. No obstruction or inflammatory change. Normal appendix. No evidence of acute appendicitis.     LYMPH NODES: No lymphadenopathy.     VASCULATURE: Moderate to severe atherosclerotic calcifications. No abdominal aortic aneurysm.     PELVIC ORGANS: Status post hysterectomy.     MUSCULOSKELETAL: Unchanged thick-walled fluid collection in the lower ventral abdominal wall, measuring 8.9 x 2.4 cm. Multilevel degenerative changes of the spine.                                                                        IMPRESSION:   1.  No acute findings or inflammatory changes in the abdomen or pelvis.     2.  Cholelithiasis. No acute cholecystitis.     3.  Extensive diverticulosis. No acute diverticulitis.     4.  Unchanged thick-walled fluid collection in the lower ventral abdominal wall, which may represent postsurgical seroma.         Assessment/Plan:     Pt with signs and symptoms consistent with symptomatic cholelithiasis. I have recommended a laparoscopic cholecystecomy. I discussed with her that we might not be able to do this laparoscopically with low risk of going open. I went over some of the risks of surgery including but not limited to bleeding, infection and bile duct injury and anesthesia.     Sid Nguyen MD ,MD  [facility] Department of Surgery

## 2022-08-08 NOTE — TELEPHONE ENCOUNTER
Pending Prescriptions:                       Disp   Refills    warfarin ANTICOAGULANT (COUMADIN) 2.5 MG *60 tab*5            Sig: take 1 tablet (2.5 mg) by mouth every Mon, Wed,           Fri; take one-half tablet (1.25 mg) by mouth all           other days of the week    citalopram (CELEXA) 40 MG tablet          90 tab*3            Sig: Take 1 tablet (40 mg) by mouth daily

## 2022-08-08 NOTE — TELEPHONE ENCOUNTER
Pt is calling back to follow up and make sure the pharmacy called her prescriptions in. Confirmed with patient that the medications are pending with the provider.

## 2022-08-11 ENCOUNTER — TELEPHONE (OUTPATIENT)
Dept: NURSING | Facility: CLINIC | Age: 77
End: 2022-08-11

## 2022-08-11 DIAGNOSIS — I26.99 PULMONARY EMBOLISM (H): Primary | ICD-10-CM

## 2022-08-11 DIAGNOSIS — I26.99 PULMONARY EMBOLISM, UNSPECIFIED CHRONICITY, UNSPECIFIED PULMONARY EMBOLISM TYPE, UNSPECIFIED WHETHER ACUTE COR PULMONALE PRESENT (H): ICD-10-CM

## 2022-08-11 LAB — INR (EXTERNAL): 2.3 (ref 0.9–1.1)

## 2022-08-11 NOTE — TELEPHONE ENCOUNTER
Reason for Call:  INR    Who is calling?  Patient    Phone number:  [phone]    Fax number:      Name of caller: Nancy    INR Value:  2.3    Are there any other concerns:  No    Can we leave a detailed message on this number? YES    Phone number patient can be reached at: Home number on file [phone] (home)      Call taken on 8/11/2022 at 2:18 PM by Cherry Orozco

## 2022-08-11 NOTE — TELEPHONE ENCOUNTER
ANTICOAGULATION MANAGEMENT     Nancy RYANN Oropeza 77 year old female is on warfarin with therapeutic INR result. (Goal INR )    Recent labs: (last 7 days)     08/11/22  1508   INR 2.3*       ASSESSMENT       Source(s): Chart Review and Patient/Caregiver Call       Warfarin doses taken: Warfarin taken as instructed    Diet: No new diet changes identified    New illness, injury, or hospitalization: No    Medication/supplement changes: None noted    Signs or symptoms of bleeding or clotting: No    Previous INR: Therapeutic last 2(+) visits    Additional findings: None       PLAN     Recommended plan for no diet, medication or health factor changes affecting INR     Dosing Instructions: Continue your current warfarin dose with next INR in 1 week       Summary  As of 8/11/2022    Full warfarin instructions:  2.5 mg every Mon, Wed; 1.25 mg all other days   Next INR check:  8/18/2022             Telephone call with Nancy who verbalizes understanding and agrees to plan    Patient to recheck with home meter    Education provided: Please call back if any changes to your diet, medications or how you've been taking warfarin    Plan made per [facility] anticoagulation protocol    Cora Jean-Baptiste RN  Anticoagulation Clinic  8/11/2022    _______________________________________________________________________     Anticoagulation Episode Summary     Current INR goal:  2.0-3.0   TTR:  54.7 % (1 y)   Target end date:  Indefinite   Send INR reminders to:  XENIA MURILLO    Indications    Pulmonary embolism (H) [I26.99]  Pulmonary embolism  unspecified chronicity  unspecified pulmonary embolism type  unspecified whether acute cor pulmonale present (H) [I26.99]           Comments:  [facility] Home Meter         Anticoagulation Care Providers     Provider Role Specialty Phone number    Julien Garnica MD Referring Family Medicine [phone]

## 2022-08-16 DIAGNOSIS — G89.29 CHRONIC LEFT SHOULDER PAIN: ICD-10-CM

## 2022-08-16 DIAGNOSIS — M25.512 CHRONIC LEFT SHOULDER PAIN: ICD-10-CM

## 2022-08-16 RX ORDER — TRAMADOL HYDROCHLORIDE 50 MG/1
50 TABLET ORAL EVERY 6 HOURS PRN
Qty: 60 TABLET | Refills: 0 | Status: SHIPPED | OUTPATIENT
Start: 2022-08-16 | End: 2022-09-20

## 2022-08-16 NOTE — TELEPHONE ENCOUNTER
Requested Medication:   Pending Prescriptions:                       Disp   Refills    traMADol (ULTRAM) 50 MG tablet            60 tab*0            Sig: Take 1 tablet (50 mg) by mouth every 6 hours as           needed for severe pain       Last Refill:  6/28/2022    Last Office Visit:  7/12/2022     Next Appointment with Provider:  8/18/2022    Clinic visit frequency required: Q 6  months    Controlled substance agreement on file: No.    Urine Drug Screen on file:  No

## 2022-08-18 ENCOUNTER — TRANSFERRED RECORDS (OUTPATIENT)
Dept: HEALTH INFORMATION MANAGEMENT | Facility: CLINIC | Age: 77
End: 2022-08-18

## 2022-08-18 ENCOUNTER — VIRTUAL VISIT (OUTPATIENT)
Dept: FAMILY MEDICINE | Facility: CLINIC | Age: 77
End: 2022-08-18
Payer: COMMERCIAL

## 2022-08-18 ENCOUNTER — ANTICOAGULATION THERAPY VISIT (OUTPATIENT)
Dept: ANTICOAGULATION | Facility: CLINIC | Age: 77
End: 2022-08-18

## 2022-08-18 DIAGNOSIS — I26.99 PULMONARY EMBOLISM, UNSPECIFIED CHRONICITY, UNSPECIFIED PULMONARY EMBOLISM TYPE, UNSPECIFIED WHETHER ACUTE COR PULMONALE PRESENT (H): Primary | ICD-10-CM

## 2022-08-18 DIAGNOSIS — I26.99 PULMONARY EMBOLISM (H): ICD-10-CM

## 2022-08-18 DIAGNOSIS — F41.1 ANXIETY STATE: Primary | ICD-10-CM

## 2022-08-18 DIAGNOSIS — G89.29 CHRONIC LEFT SHOULDER PAIN: ICD-10-CM

## 2022-08-18 DIAGNOSIS — F33.0 MILD EPISODE OF RECURRENT MAJOR DEPRESSIVE DISORDER (H): ICD-10-CM

## 2022-08-18 DIAGNOSIS — M25.512 CHRONIC LEFT SHOULDER PAIN: ICD-10-CM

## 2022-08-18 LAB — INR (EXTERNAL): 2.9 (ref 0.9–1.1)

## 2022-08-18 PROCEDURE — 99214 OFFICE O/P EST MOD 30 MIN: CPT | Mod: 95 | Performed by: FAMILY MEDICINE

## 2022-08-18 ASSESSMENT — ANXIETY QUESTIONNAIRES
5. BEING SO RESTLESS THAT IT IS HARD TO SIT STILL: NOT AT ALL
3. WORRYING TOO MUCH ABOUT DIFFERENT THINGS: NOT AT ALL
GAD7 TOTAL SCORE: 4
7. FEELING AFRAID AS IF SOMETHING AWFUL MIGHT HAPPEN: MORE THAN HALF THE DAYS
1. FEELING NERVOUS, ANXIOUS, OR ON EDGE: NOT AT ALL
IF YOU CHECKED OFF ANY PROBLEMS ON THIS QUESTIONNAIRE, HOW DIFFICULT HAVE THESE PROBLEMS MADE IT FOR YOU TO DO YOUR WORK, TAKE CARE OF THINGS AT HOME, OR GET ALONG WITH OTHER PEOPLE: SOMEWHAT DIFFICULT
GAD7 TOTAL SCORE: 4
6. BECOMING EASILY ANNOYED OR IRRITABLE: NOT AT ALL
2. NOT BEING ABLE TO STOP OR CONTROL WORRYING: MORE THAN HALF THE DAYS

## 2022-08-18 ASSESSMENT — PATIENT HEALTH QUESTIONNAIRE - PHQ9
SUM OF ALL RESPONSES TO PHQ QUESTIONS 1-9: 6
5. POOR APPETITE OR OVEREATING: NOT AT ALL

## 2022-08-18 NOTE — PROGRESS NOTES
Nancy is a 77 year old who is being evaluated via a billable telephone visit.      What phone number would you like to be contacted at? [phone]  How would you like to obtain your AVS? MyChart    Anxiety state  Anxiety state improved.  Patient had weaned from citalopram 40 mg in order to utilize buspirone 7.5 mg twice daily.  Did not like how she felt with this and therefore has elected to resume citalopram 40 mg daily.  TORIE-7 questionnaire 4 out of 21.  Anticipate reassessment at annual wellness visit in approximately 2 months.    Mild episode of recurrent major depressive disorder (H)  PHQ-9 questionnaire 6 out of 27.  Tolerating citalopram 40 mg daily currently.    Chronic left shoulder pain  Chronic left shoulder pain.  States tramadol works very well.  Uses a single 50 mg tablet daily for management of chronic left shoulder pain.       Subjective   Nancy is a 77 year old, presenting for the following health issues:  Recheck Medication      HPI     Virtual visit completed.  Telephone visit in order to discuss underlying anxiety disorder and depression.  Patient felt that she was having breakthrough symptoms previously on citalopram 40 mg daily which she had been on for a long time.  Elected to wean from citalopram and initiate buspirone 7.5 mg twice daily to see if further improvement in anxiety and panic disorder.  Symptomatic worsening noted therefore has resumed citalopram 40 mg daily which she is doing well with at this point.  Left shoulder pain chronic with osteoarthritis and does utilize tramadol 50 mg using 1 tablet daily typically with excellent results.  Needs to schedule annual wellness visit for follow-up otherwise does not require refills at this time.          Review of Systems   Constitutional, HEENT, cardiovascular, pulmonary, GI, , musculoskeletal, neuro, skin, endocrine and psych systems are negative, except as otherwise noted.      Objective    Vitals - Patient Reported  Weight  (Patient Reported): 128.8 kg (284 lb)      Vitals:  No vitals were obtained today due to virtual visit.    Physical Exam   healthy, alert and no distress  PSYCH: Alert and oriented times 3; coherent speech, normal   rate and volume, able to articulate logical thoughts, able   to abstract reason, no tangential thoughts, no hallucinations   or delusions  Her affect is normal  RESP: No cough, no audible wheezing, able to talk in full sentences  Remainder of exam unable to be completed due to telephone visits                Phone call duration: 12 minutes    .  ..

## 2022-08-18 NOTE — PROGRESS NOTES
ANTICOAGULATION MANAGEMENT     Nancy RYANN Oropeza 77 year old female is on warfarin with therapeutic INR result. (Goal INR 2.0-3.0)    Recent labs: (last 7 days)     08/11/22  1508   INR 2.3*       ASSESSMENT     Source(s): Chart Review and Patient/Caregiver Call     Warfarin doses taken: Warfarin taken as instructed  Diet: No new diet changes identified  New illness, injury, or hospitalization: No  Medication/supplement changes: None noted  Signs or symptoms of bleeding or clotting: No  Previous INR: Therapeutic last 2(+) visits  Additional findings: None       PLAN     Recommended plan for no diet, medication or health factor changes affecting INR     Dosing Instructions: Continue your current warfarin dose with next INR in 2 weeks       Summary  As of 8/18/2022    Full warfarin instructions:  2.5 mg every Mon, Wed; 1.25 mg all other days   Next INR check:  8/25/2022             Telephone call with Nancy who verbalizes understanding and agrees to plan    Patient to recheck with home meter    Education provided: Goal range and significance of current result and Importance of therapeutic range    Plan made per [facility] anticoagulation protocol    Mann Byrd RN  Anticoagulation Clinic  8/18/2022    _______________________________________________________________________     Anticoagulation Episode Summary     Current INR goal:  2.0-3.0   TTR:  54.7 % (1 y)   Target end date:  Indefinite   Send INR reminders to:  XENIA MURILLO    Indications    Pulmonary embolism (H) [I26.99]  Pulmonary embolism  unspecified chronicity  unspecified pulmonary embolism type  unspecified whether acute cor pulmonale present (H) [I26.99]           Comments:  [facility] Home Meter         Anticoagulation Care Providers     Provider Role Specialty Phone number    Julien Garnica MD Referring Family Medicine [phone]

## 2022-08-24 ENCOUNTER — TRANSFERRED RECORDS (OUTPATIENT)
Dept: HEALTH INFORMATION MANAGEMENT | Facility: CLINIC | Age: 77
End: 2022-08-24

## 2022-08-24 ENCOUNTER — ANTICOAGULATION THERAPY VISIT (OUTPATIENT)
Dept: ANTICOAGULATION | Facility: CLINIC | Age: 77
End: 2022-08-24

## 2022-08-24 DIAGNOSIS — I26.99 PULMONARY EMBOLISM (H): Primary | ICD-10-CM

## 2022-08-24 DIAGNOSIS — I26.99 PULMONARY EMBOLISM, UNSPECIFIED CHRONICITY, UNSPECIFIED PULMONARY EMBOLISM TYPE, UNSPECIFIED WHETHER ACUTE COR PULMONALE PRESENT (H): ICD-10-CM

## 2022-08-24 LAB — INR (EXTERNAL): 2.9 (ref 0.9–1.1)

## 2022-08-24 NOTE — PROGRESS NOTES
Received a fax INR result for Nancy from [facility]    INR result dated 8/24/2022 is 2.9    ANTICOAGULATION MANAGEMENT     Nancy Oropeza 77 year old female is on warfarin with therapeutic INR result. (Goal INR 2.0-3.0)    Recent labs: (last 7 days)     08/24/22  0000   INR 2.9*       ASSESSMENT     Source(s): Chart Review and Patient/Caregiver Call     Warfarin doses taken: Warfarin taken as instructed  Diet: No new diet changes identified  New illness, injury, or hospitalization: No  Medication/supplement changes: None noted  Signs or symptoms of bleeding or clotting: No  Previous INR: Therapeutic last 2(+) visits  Additional findings: None       PLAN     Recommended plan for no diet, medication or health factor changes affecting INR     Dosing Instructions: Continue your current warfarin dose with next INR in 1 week       Summary  As of 8/24/2022    Full warfarin instructions:  2.5 mg every Mon, Wed; 1.25 mg all other days   Next INR check:  8/31/2022             Telephone call with Nancy who verbalizes understanding and agrees to plan    Patient to recheck with home meter    Education provided: Please call back if any changes to your diet, medications or how you've been taking warfarin    Plan made per [facility] anticoagulation protocol    Evie Lagos, RN  Anticoagulation Clinic  8/24/2022    _______________________________________________________________________     Anticoagulation Episode Summary     Current INR goal:  2.0-3.0   TTR:  54.8 % (1 y)   Target end date:  Indefinite   Send INR reminders to:  XENIA MURILLO    Indications    Pulmonary embolism (H) [I26.99]  Pulmonary embolism  unspecified chronicity  unspecified pulmonary embolism type  unspecified whether acute cor pulmonale present (H) [I26.99]           Comments:  [facility] Home Meter         Anticoagulation Care Providers     Provider Role Specialty Phone number    Julien Garnica MD Referring Family Medicine [phone]

## 2022-09-01 ENCOUNTER — TRANSFERRED RECORDS (OUTPATIENT)
Dept: HEALTH INFORMATION MANAGEMENT | Facility: CLINIC | Age: 77
End: 2022-09-01

## 2022-09-01 ENCOUNTER — ANTICOAGULATION THERAPY VISIT (OUTPATIENT)
Dept: ANTICOAGULATION | Facility: CLINIC | Age: 77
End: 2022-09-01

## 2022-09-01 DIAGNOSIS — I26.99 PULMONARY EMBOLISM, UNSPECIFIED CHRONICITY, UNSPECIFIED PULMONARY EMBOLISM TYPE, UNSPECIFIED WHETHER ACUTE COR PULMONALE PRESENT (H): ICD-10-CM

## 2022-09-01 DIAGNOSIS — I26.99 PULMONARY EMBOLISM (H): Primary | ICD-10-CM

## 2022-09-01 LAB — INR (EXTERNAL): 2.5 (ref 0.9–1.1)

## 2022-09-01 NOTE — PROGRESS NOTES
ANTICOAGULATION MANAGEMENT     Nancy RYANN Satrimikki 77 year old female is on warfarin with therapeutic INR result. (Goal INR 2.0-3.0)    Recent labs: (last 7 days)     09/01/22  1114   INR 2.5*       ASSESSMENT     Source(s): Chart Review and Patient/Caregiver Call     Warfarin doses taken: Missed dose(s) may be affecting INR  Diet: No new diet changes identified  New illness, injury, or hospitalization: No  Medication/supplement changes: None noted  Signs or symptoms of bleeding or clotting: No  Previous INR: Therapeutic last 2(+) visits  Additional findings: None       PLAN     Recommended plan for no diet, medication or health factor changes affecting INR     Dosing Instructions: Continue your current warfarin dose with next INR in 1 week       Summary  As of 9/1/2022    Full warfarin instructions:  9/1: 2.5 mg; Otherwise 2.5 mg every Mon, Wed; 1.25 mg all other days   Next INR check:  9/8/2022             Telephone call with Nancy who verbalizes understanding and agrees to plan    Patient to recheck with home meter    Education provided: Please call back if any changes to your diet, medications or how you've been taking warfarin    Plan made per [facility] anticoagulation protocol    Iram Alexander, RN  Anticoagulation Clinic  9/1/2022    _______________________________________________________________________     Anticoagulation Episode Summary     Current INR goal:  2.0-3.0   TTR:  54.7 % (1 y)   Target end date:  Indefinite   Send INR reminders to:  XENIA MURILLO    Indications    Pulmonary embolism (H) [I26.99]  Pulmonary embolism  unspecified chronicity  unspecified pulmonary embolism type  unspecified whether acute cor pulmonale present (H) [I26.99]           Comments:  [facility] Home Meter         Anticoagulation Care Providers     Provider Role Specialty Phone number    Julien Garnica MD Referring Family Medicine [phone]

## 2022-09-07 ENCOUNTER — TRANSFERRED RECORDS (OUTPATIENT)
Dept: HEALTH INFORMATION MANAGEMENT | Facility: CLINIC | Age: 77
End: 2022-09-07

## 2022-09-07 LAB — INR (EXTERNAL): 2.3 (ref 0.9–1.1)

## 2022-09-08 ENCOUNTER — ANTICOAGULATION THERAPY VISIT (OUTPATIENT)
Dept: ANTICOAGULATION | Facility: CLINIC | Age: 77
End: 2022-09-08

## 2022-09-08 DIAGNOSIS — I26.99 PULMONARY EMBOLISM (H): Primary | ICD-10-CM

## 2022-09-08 DIAGNOSIS — I26.99 PULMONARY EMBOLISM, UNSPECIFIED CHRONICITY, UNSPECIFIED PULMONARY EMBOLISM TYPE, UNSPECIFIED WHETHER ACUTE COR PULMONALE PRESENT (H): ICD-10-CM

## 2022-09-08 NOTE — PROGRESS NOTES
ANTICOAGULATION MANAGEMENT     Nancy Oropeza 77 year old female is on warfarin with therapeutic INR result. (Goal INR 2.0-3.0)    Recent labs: (last 7 days)     09/07/22  1950   INR 2.3*       ASSESSMENT     Source(s): Chart Review and Patient/Caregiver Call     Warfarin doses taken: Warfarin taken as instructed  Diet: No new diet changes identified  New illness, injury, or hospitalization: No  Medication/supplement changes: None noted  Signs or symptoms of bleeding or clotting: No  Previous INR: Therapeutic last 2(+) visits  Additional findings: None       PLAN     Recommended plan for no diet, medication or health factor changes affecting INR     Dosing Instructions: Continue your current warfarin dose with next INR in 1 week       Summary  As of 9/8/2022    Full warfarin instructions:  2.5 mg every Mon, Wed; 1.25 mg all other days   Next INR check:  9/14/2022             Telephone call with Nancy who verbalizes understanding and agrees to plan    Patient to recheck with home meter    Education provided: None required    Plan made per [facility] anticoagulation protocol    Kera Crespo RN  Anticoagulation Clinic  9/8/2022    _______________________________________________________________________     Anticoagulation Episode Summary     Current INR goal:  2.0-3.0   TTR:  55.0 % (1 y)   Target end date:  Indefinite   Send INR reminders to:  XENIA MURILLO    Indications    Pulmonary embolism (H) [I26.99]  Pulmonary embolism  unspecified chronicity  unspecified pulmonary embolism type  unspecified whether acute cor pulmonale present (H) [I26.99]           Comments:  [facility] Home Meter         Anticoagulation Care Providers     Provider Role Specialty Phone number    Julien Garnica MD Referring Family Medicine [phone]

## 2022-09-14 ENCOUNTER — ANTICOAGULATION THERAPY VISIT (OUTPATIENT)
Dept: ANTICOAGULATION | Facility: CLINIC | Age: 77
End: 2022-09-14

## 2022-09-14 DIAGNOSIS — I26.99 PULMONARY EMBOLISM, UNSPECIFIED CHRONICITY, UNSPECIFIED PULMONARY EMBOLISM TYPE, UNSPECIFIED WHETHER ACUTE COR PULMONALE PRESENT (H): ICD-10-CM

## 2022-09-14 DIAGNOSIS — I26.99 PULMONARY EMBOLISM (H): Primary | ICD-10-CM

## 2022-09-14 LAB — INR (EXTERNAL): 2.8 (ref 0.9–1.1)

## 2022-09-14 NOTE — PROGRESS NOTES
ANTICOAGULATION MANAGEMENT     Nancy Oropeza 77 year old female is on warfarin with therapeutic INR result. (Goal INR 2.0-3.0)    Recent labs: (last 7 days)     09/14/22  0000   INR 2.8*       ASSESSMENT     Source(s): Chart Review and Patient/Caregiver Call     Warfarin doses taken: Warfarin taken as instructed  Diet: No new diet changes identified  New illness, injury, or hospitalization: No  Medication/supplement changes: None noted  Signs or symptoms of bleeding or clotting: No  Previous INR: Therapeutic last 2(+) visits  Additional findings: None       PLAN     Recommended plan for no diet, medication or health factor changes affecting INR     Dosing Instructions: Continue your current warfarin dose with next INR in 1 week       Summary  As of 9/14/2022    Full warfarin instructions:  2.5 mg every Mon, Wed; 1.25 mg all other days   Next INR check:  9/21/2022             Telephone call with Nancy who verbalizes understanding and agrees to plan    Patient to recheck with home meter    Education provided: None required    Plan made per [facility] anticoagulation protocol    Kera Crespo RN  Anticoagulation Clinic  9/14/2022    _______________________________________________________________________     Anticoagulation Episode Summary     Current INR goal:  2.0-3.0   TTR:  56.8 % (1 y)   Target end date:  Indefinite   Send INR reminders to:  XENIA MURILLO    Indications    Pulmonary embolism (H) [I26.99]  Pulmonary embolism  unspecified chronicity  unspecified pulmonary embolism type  unspecified whether acute cor pulmonale present (H) [I26.99]           Comments:  [facility] Home Meter         Anticoagulation Care Providers     Provider Role Specialty Phone number    Julien Garnica MD Referring Family Medicine [phone]

## 2022-09-19 ENCOUNTER — TELEPHONE (OUTPATIENT)
Dept: FAMILY MEDICINE | Facility: CLINIC | Age: 77
End: 2022-09-19

## 2022-09-19 NOTE — TELEPHONE ENCOUNTER
Reason for call:  Other     Patient called regarding (reason for call): call back    Additional comments: Pt would like to discuss Cymbalta. Was told that it might be able to help with back nerve pain. Please advise.    Phone number to reach patient:  Home number on file [phone] (home)    Best Time:  Any    Can we leave a detailed message on this number?  YES

## 2022-09-20 DIAGNOSIS — G89.29 CHRONIC LEFT SHOULDER PAIN: ICD-10-CM

## 2022-09-20 DIAGNOSIS — M25.512 CHRONIC LEFT SHOULDER PAIN: ICD-10-CM

## 2022-09-20 RX ORDER — TRAMADOL HYDROCHLORIDE 50 MG/1
TABLET ORAL
Qty: 60 TABLET | Refills: 0 | Status: SHIPPED | OUTPATIENT
Start: 2022-09-20 | End: 2022-01-01

## 2022-09-23 ENCOUNTER — VIRTUAL VISIT (OUTPATIENT)
Dept: SURGERY | Facility: CLINIC | Age: 77
End: 2022-09-23
Payer: COMMERCIAL

## 2022-09-23 DIAGNOSIS — M35.3 POLYMYALGIA RHEUMATICA (H): ICD-10-CM

## 2022-09-23 DIAGNOSIS — K80.21 CALCULUS OF GALLBLADDER WITH BILIARY OBSTRUCTION BUT WITHOUT CHOLECYSTITIS: Primary | ICD-10-CM

## 2022-09-23 PROCEDURE — 99213 OFFICE O/P EST LOW 20 MIN: CPT | Mod: 95 | Performed by: SPECIALIST

## 2022-09-23 NOTE — PROGRESS NOTES
"  The patient has been notified of following:     \"This telephone visit will be conducted via a call between you and your physician/provider. We have found that certain health care needs can be provided without the need for a physical exam.  This service lets us provide the care you need with a short phone conversation.  If a prescription is necessary we can send it directly to your pharmacy.  If lab work is needed we can place an order for that and you can then stop by our lab to have the test done at a later time.    Telephone visits are billed at different rates depending on your insurance coverage. During this emergency period, for some insurers they may be billed the same as an in-person visit.  Please reach out to your insurance provider with any questions.    If during the course of the call the physician/provider feels a telephone visit is not appropriate, you will not be charged for this service.\"    Patient has given verbal consent to a Telephone visit? Yes    What phone number would you like to be contacted at? [phone]     Patient would like to receive their AVS by [facility]    Are there any specific questions or needs that you would like addressed at your visit today? Wants to discuss surgery        "

## 2022-09-23 NOTE — LETTER
9/23/2022         RE: Nancy Oropeza  [address]  [facility] [address]        Dear Colleague,    Thank you for referring your patient, Nancy Oropeza, to the [facility] [place]. Please see a copy of my visit note below.        Again, thank you for allowing me to participate in the care of your patient.        Sincerely,        Sid Nguyen MD

## 2022-09-23 NOTE — TELEPHONE ENCOUNTER
Reason for Call:  Medication or medication refill:    Do you use a [facility]?  Name of the pharmacy and phone number for the current request:  [facility]     Name of the medication requested: Prednisone. Patient received only one medication in the mail today and that is called Citalopram 40 mgs. Her other medication she has not received and needs refills including the Prednisone.  Can someone please help her with her refills?  Thank you.  She said she has about a weeks left of her medications. Thank you.     Other request: Needs refills on all her medications including the Prednisone, but not the Citalopram because she just received that today in the mail 9/23      Can we leave a detailed message on this number? Yes    Phone number patient can be reached at: [phone]      Best Time: Any    Call taken on 9/23/2022 at 5:29 PM by Swathi Mattson

## 2022-09-26 RX ORDER — PREDNISONE 5 MG/1
5 TABLET ORAL DAILY
Qty: 90 TABLET | Refills: 0 | Status: SHIPPED | OUTPATIENT
Start: 2022-09-26 | End: 2022-01-01

## 2022-09-29 ENCOUNTER — ANTICOAGULATION THERAPY VISIT (OUTPATIENT)
Dept: ANTICOAGULATION | Facility: CLINIC | Age: 77
End: 2022-09-29

## 2022-09-29 ENCOUNTER — TELEPHONE (OUTPATIENT)
Dept: SURGERY | Facility: CLINIC | Age: 77
End: 2022-09-29

## 2022-09-29 ENCOUNTER — TELEPHONE (OUTPATIENT)
Dept: FAMILY MEDICINE | Facility: CLINIC | Age: 77
End: 2022-09-29

## 2022-09-29 ENCOUNTER — TRANSFERRED RECORDS (OUTPATIENT)
Dept: HEALTH INFORMATION MANAGEMENT | Facility: CLINIC | Age: 77
End: 2022-09-29

## 2022-09-29 ENCOUNTER — HOSPITAL ENCOUNTER (OUTPATIENT)
Facility: AMBULATORY SURGERY CENTER | Age: 77
End: 2022-09-29
Attending: SPECIALIST
Payer: COMMERCIAL

## 2022-09-29 DIAGNOSIS — I26.99 PULMONARY EMBOLISM, UNSPECIFIED CHRONICITY, UNSPECIFIED PULMONARY EMBOLISM TYPE, UNSPECIFIED WHETHER ACUTE COR PULMONALE PRESENT (H): Primary | ICD-10-CM

## 2022-09-29 DIAGNOSIS — K80.21 CALCULUS OF GALLBLADDER WITH BILIARY OBSTRUCTION BUT WITHOUT CHOLECYSTITIS: ICD-10-CM

## 2022-09-29 LAB — INR (EXTERNAL): 2.1 (ref 0.9–1.1)

## 2022-09-29 NOTE — TELEPHONE ENCOUNTER
Spoke with Nancy this morning and scheduled her surgery with Dr. Nguyen for 11.10.22. We went over pre and post op appointment information as well as Covid testing requirements. I let her know I will send a confirmation letter to [facility]. Patient in agreement with the plan.

## 2022-09-29 NOTE — LETTER
We've received instruction to get you scheduled for surgery with Dr Nguyen. We have that arranged as follows:     Pre-op Physical: 10/26/2022 at 3:20 p.m., with Dr. Garnica at [facility]. Arrive at 3:00 p.m.    Surgery Date: 11/10/2022     Location: [place] [address]. Check in on 3rd floor; Left off the elevator    Approximate Arrival Time: 6:00 a.m.  (Unless instructed differently by the pre-op call nurse)     Prep Tasks and Info:     1. Failure to complete pre-op physical will result in cancellation of your surgery. This is required by anesthesia and if not done, your surgery will be cancelled.     2. Review your medications with your primary care or prescribing physician; they will advise you which meds to stop and when, and when you can resume taking.  Certain medications like blood thinners, need to be stopped in advance of surgery to proceed safely.    3. You must get tested for COVID-19, even if you are vaccinated.    Outpatient Surgery:  If you are going home the same day of surgery, a home rapid antigen Covid-19 test is required 1-2 days before surgery- regardless of your vaccination status.  Take a photo of the negative result and show to the nurse on the day of surgery. If you test positive, contact our office right away to reschedule surgery. You can buy a home Covid-19 Rapid Antigen test at many local pharmacies, or you can order for free at covid.gov/tests.    4. Please shower the evening before and morning of surgery with Hibiclens or Exidine soap.  This can be found at your local pharmacy.    5. Fasting instructions will be provided by the pre-op nurse who will call you 1-3 days before surgery.  Typically we advise normal food up to 8 hours before surgery then clear liquids only up to 2 hours before surgery then nothing at all by mouth for 2 hours including no gum or candy.  The nurse will review your specific  instructions with you at the call.      6. Smoking impacts your body's ability to heal properly.  If you are a smoker, we strongly urge you to stop smoking 4-6 weeks before surgery.    7. You will need an adult to drive you home and stay with you 24 hours after surgery. Public transportation or [facility] Services are not permitted.    8. You may have one family member wait in the lobby at the surgery center during your surgery. Visitor restrictions are subject to change, please verify with the pre-op nurse when they call.    9. If the community sees a new COVID19 surge, your procedure may need to be postponed. We will contact you if this happens. You will be screened for high-risk exposure to Covid-19 during the pre-op call.  We encourage you to quarantine yourself away from any Covid-19 people for 10 days before surgery to avoid possible last minute cancellations.   When you arrive to the surgery center, you will again be screened for COVID19 symptoms. If you screen positive, your surgery will need to be postponed.    10. We always encourage you to notify your insurance any time you have medical tests or procedures scheduled including surgery. The number is usually right on the back of your insurance card. Please call [facility] Cost of Care at [phone] if you'd like a surgery quote.       Call our office if you have any questions! Thank you!

## 2022-09-29 NOTE — TELEPHONE ENCOUNTER
PROCEDURE PLAN REQUEST/COUMADIN HOLD    Procedure Date: 11/10/22  Procedure: Laparoscopic Cholecystectomy  Provider: Dr. Nguyen/celio

## 2022-09-29 NOTE — PROGRESS NOTES
ANTICOAGULATION MANAGEMENT     Nancy Oropeza 77 year old female is on warfarin with therapeutic INR result. (Goal INR 2.0-3.0)    Recent labs: (last 7 days)     09/29/22  1325   INR 2.1*       ASSESSMENT     Source(s): Chart Review and Patient/Caregiver Call     Warfarin doses taken: Warfarin taken as instructed  Diet: No new diet changes identified  New illness, injury, or hospitalization: cholecystitis.  Medication/supplement changes: increased fiber intake  Signs or symptoms of bleeding or clotting: No  Previous INR: Therapeutic last 2(+) visits  Additional findings: Upcoming surgery/procedure 11/10/22 Cholecystectomy: Procedure plan sent to [facility] under separate encounter. Patient is having more constipation at this time.        PLAN     Recommended plan for ongoing change(s) affecting INR     Dosing Instructions: Continue your current warfarin dose with next INR in 1 week       Summary  As of 9/29/2022    Full warfarin instructions:  2.5 mg every Mon, Wed; 1.25 mg all other days   Next INR check:  10/6/2022             Telephone call with Nancy who verbalizes understanding and agrees to plan    Patient to recheck with home meter    Education provided: Please call back if any changes to your diet, medications or how you've been taking warfarin, Monitoring for bleeding signs and symptoms and Monitoring for clotting signs and symptoms    Plan made per [facility] anticoagulation protocol    Melida Bergman, RN  Anticoagulation Clinic  9/29/2022    _______________________________________________________________________     Anticoagulation Episode Summary     Current INR goal:  2.0-3.0   TTR:  57.2 % (1 y)   Target end date:  Indefinite   Send INR reminders to:  XENIA MURILLO    Indications    Pulmonary embolism (H) [I26.99]  Pulmonary embolism  unspecified chronicity  unspecified pulmonary embolism type  unspecified whether acute cor pulmonale present (H) [I26.99]           Comments:  MDINDIANNE Home Meter          Anticoagulation Care Providers     Provider Role Specialty Phone number    Julien Garnica MD Referring Family Medicine [phone]

## 2022-10-07 ENCOUNTER — TRANSFERRED RECORDS (OUTPATIENT)
Dept: HEALTH INFORMATION MANAGEMENT | Facility: CLINIC | Age: 77
End: 2022-10-07

## 2022-10-07 ENCOUNTER — ANTICOAGULATION THERAPY VISIT (OUTPATIENT)
Dept: ANTICOAGULATION | Facility: CLINIC | Age: 77
End: 2022-10-07

## 2022-10-07 DIAGNOSIS — I26.99 PULMONARY EMBOLISM, UNSPECIFIED CHRONICITY, UNSPECIFIED PULMONARY EMBOLISM TYPE, UNSPECIFIED WHETHER ACUTE COR PULMONALE PRESENT (H): ICD-10-CM

## 2022-10-07 DIAGNOSIS — I26.99 PULMONARY EMBOLISM (H): Primary | ICD-10-CM

## 2022-10-07 LAB — INR (EXTERNAL): 2.7 (ref 0.9–1.1)

## 2022-10-07 NOTE — PROGRESS NOTES
ANTICOAGULATION MANAGEMENT     Nancy Oropeza 77 year old female is on warfarin with therapeutic INR result. (Goal INR 2.0-3.0)    Recent labs: (last 7 days)     10/07/22  0000   INR 2.7*       ASSESSMENT     Source(s): Chart Review  Previous INR was Therapeutic last 2(+) visits  Medication, diet, health changes since last INR chart reviewed; none identified           PLAN     Recommended plan for no diet, medication or health factor changes affecting INR     Dosing Instructions: Continue your current warfarin dose with next INR in 1 week       Summary  As of 10/7/2022    Full warfarin instructions:  2.5 mg every Mon, Wed; 1.25 mg all other days   Next INR check:  10/14/2022             Detailed voice message left for Nancy with dosing instructions and follow up date.   Sent [employer] message with dosing and follow up instructions    Patient to recheck with home meter    Education provided: Please call back if any changes to your diet, medications or how you've been taking warfarin    Plan made per [facility] anticoagulation protocol    Evie FLORES RN  Anticoagulation Clinic  10/7/2022    _______________________________________________________________________     Anticoagulation Episode Summary     Current INR goal:  2.0-3.0   TTR:  56.4 % (1 y)   Target end date:  Indefinite   Send INR reminders to:  XENIA MURILLO    Indications    Pulmonary embolism (H) [I26.99]  Pulmonary embolism  unspecified chronicity  unspecified pulmonary embolism type  unspecified whether acute cor pulmonale present (H) [I26.99]           Comments:  BETHANY Home Meter         Anticoagulation Care Providers     Provider Role Specialty Phone number    Julien Garnica MD Referring Family Medicine [phone]

## 2022-10-08 ENCOUNTER — ANCILLARY PROCEDURE (OUTPATIENT)
Dept: MAMMOGRAPHY | Facility: CLINIC | Age: 77
End: 2022-10-08
Attending: FAMILY MEDICINE
Payer: COMMERCIAL

## 2022-10-08 DIAGNOSIS — Z12.31 VISIT FOR SCREENING MAMMOGRAM: ICD-10-CM

## 2022-10-08 PROCEDURE — 77067 SCR MAMMO BI INCL CAD: CPT

## 2022-10-10 NOTE — PROGRESS NOTES
Office Gallbladder Consult  Video visit  Start 3:50 PM  End 4:10 PM      HPI: I am consulted in this 77 year old female who has been experiencing some problems with abdominal pain. she has noted this for about FEW MONTHS. It has been occurring about 1-2 times per week. It is moderte.  It is precipitated by diet. It is associated with nausea or vomiting.   It is not associated with chills or fevers. Here for consult about options for treatment.    Allergies:Sulfasalazine, Atorvastatin, Paroxetine, Simvastatin, and Sulfa drugs    Past Medical History:   Diagnosis Date     2019 novel coronavirus disease (COVID-19) 7/16/2020     Acute bilateral knee pain 7/18/2017     TEOFILO (acute kidney injury) (H) 7/16/2020     Anemia, unspecified     Created by Conversion      Angioedema 12/17/2015     Anticoagulant long-term use 3/6/2017     Back pain     Created by Conversion      Chronic pain syndrome 6/1/2018     COVID-19 virus infection 7/17/2020     Depression      Diarrhea 7/16/2020     Disease of thyroid gland      Edema     Created by Conversion      Essential hypertension with goal blood pressure less than 140/90     Created by Conversion  Replacement Utility updated for latest IMO load     History of pulmonary embolism 1/1/2015    Overview:  bilateral with acute cor pulmonale      HTN (hypertension)      Hypercholesteremia      Hypokalemia 12/17/2015     Hypothyroidism     Created by Conversion  Replacement Utility updated for latest IMO load     Hypoxia 7/16/2020     Impaired fasting glucose     Created by Conversion      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     Major depressive disorder, recurrent episode (H)     Created by Conversion  Replacement Utility updated for latest IMO load     Mood disorder (H) 7/16/2020     Morbid obesity (H)      Multiple lung nodules on CT 12/17/2015     Obstructive sleep apnea (adult) (pediatric)     Created by Conversion      Osteoarthrosis involving lower leg     Created by Conversion  Replacement  Utility updated for latest IMO load     Pain in joint, multiple sites     Created by Conversion      Polymyalgia rheumatica (H) 7/16/2020     Postoperative anemia due to acute blood loss 12/18/2018     Pulmonary emboli (H) 12/14/2015     Pulmonary embolism (H) 12/21/2015     Pure hypercholesterolemia     Created by Conversion      Sepsis (H) 7/28/2020     SIRS (systemic inflammatory response syndrome) (H) 7/28/2020     Unspecified cataract     Created by Conversion      Yeast infection        Past Surgical History:   Procedure Laterality Date     arthroplasty for hammertoe-2nd toe R 2000 [facility]iebl[       BIOPSY BREAST Left 1970s     Bunion correction by Cheilectomy-had bunionectomy ? type of procedure L and R separate procedures.[       GYN SURGERY       HERNIA REPAIR       Hernia Repair Inguinal unilateral[       HYSTERECTOMY       HYSTERECTOMY  2010     IR ABSCESS TUBE CHANGE  11/9/2012     IR ABSCESS TUBE CHANGE  11/12/2012     IR ABSCESS TUBE CHANGE  12/4/2012     IR ABSCESS TUBE CHANGE  2/22/2013     IR ABSCESS TUBE CHANGE  3/1/2013     IR ABSCESS TUBE CHANGE  3/13/2013     OOPHORECTOMY  2010     ORTHOPEDIC SURGERY         CURRENT MEDS:      Family History   Problem Relation Age of Onset     Breast Cancer Sister 75.00     Stomach Cancer Paternal Grandfather      Lung Cancer Sister      Chronic Obstructive Pulmonary Disease Mother      Heart Disease Mother      Coronary Artery Disease Father         reports that she has quit smoking. She has never used smokeless tobacco. She reports that she does not drink alcohol and does not use drugs.    Review of Systems - Pertinent items are noted in HPI.    LMP  (LMP Unknown)       EXAM:  Unable to do secondary to telephone visit.      LABS:  Lab Results   Component Value Date    WBC 14.9 05/23/2022    HGB 10.3 05/23/2022    HGB 11.2 10/29/2012    HCT 32.9 05/23/2022    HCT 35.1 10/29/2012    MCV 86 05/23/2022    MCV 91.5 10/29/2012     05/23/2022     Lab Results    Component Value Date    ALT 12 05/23/2022    AST 14 05/23/2022    ALKPHOS 83 05/23/2022       IMAGES:     Exam Information    Exam Date Exam Time Exam Date Exam Time Accession # Performing Department Results    5/23/22 10:26 PM 5/23/22 10:30 PM [id number] [facility] CT      PACS Images     Show images for CT Abdomen Pelvis w Contrast     Study Result    Narrative & Impression   EXAM: CT ABDOMEN PELVIS W CONTRAST  LOCATION: [facility]  DATE/TIME: 5/23/2022 10:26 PM     INDICATION: Abdominal pain with nausea and vomiting starting 2 days ago.  COMPARISON: 02/07/2022  TECHNIQUE: CT scan of the abdomen and pelvis was performed following injection of IV contrast. Multiplanar reformats were obtained. Dose reduction techniques were used.  CONTRAST: ISOVUE 370 75ML     FINDINGS:   LOWER CHEST: Normal.     HEPATOBILIARY: Liver appears unremarkable. 2 gallstones in the gallbladder. No gallbladder wall thickening or pericholecystic fluid. No biliary dilatation.     PANCREAS: Normal.     SPLEEN: Normal.     ADRENAL GLANDS: Normal.     KIDNEYS/BLADDER: Few subcentimeter low-attenuation lesions in both kidneys, too small to characterize, but unchanged from prior. No hydronephrosis. Urinary bladder appears unremarkable.     BOWEL: Extensive colonic diverticulosis. No obstruction or inflammatory change. Normal appendix. No evidence of acute appendicitis.     LYMPH NODES: No lymphadenopathy.     VASCULATURE: Moderate to severe atherosclerotic calcifications. No abdominal aortic aneurysm.     PELVIC ORGANS: Status post hysterectomy.     MUSCULOSKELETAL: Unchanged thick-walled fluid collection in the lower ventral abdominal wall, measuring 8.9 x 2.4 cm. Multilevel degenerative changes of the spine.                                                                       IMPRESSION:   1.  No acute findings or inflammatory changes in the abdomen or pelvis.     2.   Cholelithiasis. No acute cholecystitis.     3.  Extensive diverticulosis. No acute diverticulitis.     4.  Unchanged thick-walled fluid collection in the lower ventral abdominal wall, which may represent postsurgical seroma.         Assessment/Plan:     Pt with signs and symptoms consistent with symptomatic cholelithiasis. I have recommended a laparoscopic cholecystecomy. I discussed with her that we might not be able to do this laparoscopically with low risk of going open. I went over some of the risks of surgery including but not limited to bleeding, infection and bile duct injury and anesthesia.     Sid Nguyen MD ,MD  [facility] Department of Surgery

## 2022-10-12 ENCOUNTER — TELEPHONE (OUTPATIENT)
Dept: FAMILY MEDICINE | Facility: CLINIC | Age: 77
End: 2022-10-12

## 2022-10-12 DIAGNOSIS — I26.99 PULMONARY EMBOLISM, UNSPECIFIED CHRONICITY, UNSPECIFIED PULMONARY EMBOLISM TYPE, UNSPECIFIED WHETHER ACUTE COR PULMONALE PRESENT (H): Primary | ICD-10-CM

## 2022-10-12 NOTE — TELEPHONE ENCOUNTER
Called and confirmed dosing and result with patient.  Melida Bergman, RN  Anticoagulation Nurse - [facility], [place]

## 2022-10-13 ENCOUNTER — TRANSFERRED RECORDS (OUTPATIENT)
Dept: HEALTH INFORMATION MANAGEMENT | Facility: CLINIC | Age: 77
End: 2022-10-13

## 2022-10-14 ENCOUNTER — ANTICOAGULATION THERAPY VISIT (OUTPATIENT)
Dept: ANTICOAGULATION | Facility: CLINIC | Age: 77
End: 2022-10-14

## 2022-10-14 ENCOUNTER — OFFICE VISIT (OUTPATIENT)
Dept: FAMILY MEDICINE | Facility: CLINIC | Age: 77
End: 2022-10-14
Payer: COMMERCIAL

## 2022-10-14 VITALS
OXYGEN SATURATION: 96 % | DIASTOLIC BLOOD PRESSURE: 70 MMHG | SYSTOLIC BLOOD PRESSURE: 138 MMHG | HEIGHT: 66 IN | BODY MASS INDEX: 44.84 KG/M2 | WEIGHT: 279 LBS | HEART RATE: 81 BPM

## 2022-10-14 DIAGNOSIS — Z00.00 ENCOUNTER FOR MEDICARE ANNUAL WELLNESS EXAM: Primary | ICD-10-CM

## 2022-10-14 DIAGNOSIS — Z11.59 NEED FOR HEPATITIS C SCREENING TEST: ICD-10-CM

## 2022-10-14 DIAGNOSIS — E78.00 PURE HYPERCHOLESTEROLEMIA: ICD-10-CM

## 2022-10-14 DIAGNOSIS — Z79.899 ENCOUNTER FOR LONG-TERM (CURRENT) USE OF MEDICATIONS: ICD-10-CM

## 2022-10-14 DIAGNOSIS — E03.9 HYPOTHYROIDISM, UNSPECIFIED TYPE: ICD-10-CM

## 2022-10-14 DIAGNOSIS — K80.21 CALCULUS OF GALLBLADDER WITH BILIARY OBSTRUCTION BUT WITHOUT CHOLECYSTITIS: ICD-10-CM

## 2022-10-14 DIAGNOSIS — I26.99 PULMONARY EMBOLISM, UNSPECIFIED CHRONICITY, UNSPECIFIED PULMONARY EMBOLISM TYPE, UNSPECIFIED WHETHER ACUTE COR PULMONALE PRESENT (H): Primary | ICD-10-CM

## 2022-10-14 DIAGNOSIS — I26.99 PULMONARY EMBOLISM (H): ICD-10-CM

## 2022-10-14 DIAGNOSIS — G89.29 CHRONIC BILATERAL LOW BACK PAIN WITH RIGHT-SIDED SCIATICA: ICD-10-CM

## 2022-10-14 DIAGNOSIS — K59.00 CONSTIPATION, UNSPECIFIED CONSTIPATION TYPE: ICD-10-CM

## 2022-10-14 DIAGNOSIS — L81.9 ATYPICAL PIGMENTED SKIN LESION: ICD-10-CM

## 2022-10-14 DIAGNOSIS — F33.0 MILD EPISODE OF RECURRENT MAJOR DEPRESSIVE DISORDER (H): ICD-10-CM

## 2022-10-14 DIAGNOSIS — M54.41 CHRONIC BILATERAL LOW BACK PAIN WITH RIGHT-SIDED SCIATICA: ICD-10-CM

## 2022-10-14 DIAGNOSIS — I10 ESSENTIAL HYPERTENSION WITH GOAL BLOOD PRESSURE LESS THAN 140/90: ICD-10-CM

## 2022-10-14 DIAGNOSIS — M35.3 POLYMYALGIA RHEUMATICA (H): ICD-10-CM

## 2022-10-14 DIAGNOSIS — R73.01 IMPAIRED FASTING GLUCOSE: ICD-10-CM

## 2022-10-14 DIAGNOSIS — I26.99 PULMONARY EMBOLISM, UNSPECIFIED CHRONICITY, UNSPECIFIED PULMONARY EMBOLISM TYPE, UNSPECIFIED WHETHER ACUTE COR PULMONALE PRESENT (H): ICD-10-CM

## 2022-10-14 DIAGNOSIS — N18.31 STAGE 3A CHRONIC KIDNEY DISEASE (H): ICD-10-CM

## 2022-10-14 DIAGNOSIS — R41.3 MEMORY LOSS: ICD-10-CM

## 2022-10-14 LAB
ANION GAP SERPL CALCULATED.3IONS-SCNC: 14 MMOL/L (ref 7–15)
BUN SERPL-MCNC: 23.4 MG/DL (ref 8–23)
CALCIUM SERPL-MCNC: 10.1 MG/DL (ref 8.8–10.2)
CHLORIDE SERPL-SCNC: 101 MMOL/L (ref 98–107)
CHOLEST SERPL-MCNC: 162 MG/DL
CREAT SERPL-MCNC: 1.15 MG/DL (ref 0.51–0.95)
CREAT UR-MCNC: 81.5 MG/DL
CREAT UR-MCNC: 82 MG/DL
DEPRECATED HCO3 PLAS-SCNC: 25 MMOL/L (ref 22–29)
ERYTHROCYTE [DISTWIDTH] IN BLOOD BY AUTOMATED COUNT: 14.3 % (ref 10–15)
GFR SERPL CREATININE-BSD FRML MDRD: 48 ML/MIN/1.73M2
GLUCOSE SERPL-MCNC: 100 MG/DL (ref 70–99)
HBA1C MFR BLD: 5.7 % (ref 0–5.6)
HCT VFR BLD AUTO: 29 % (ref 35–47)
HDLC SERPL-MCNC: 48 MG/DL
HGB BLD-MCNC: 9.2 G/DL (ref 11.7–15.7)
HOLD SPECIMEN: NORMAL
HOLD SPECIMEN: NORMAL
INR (EXTERNAL): 2.4 (ref 0.9–1.1)
LDLC SERPL CALC-MCNC: 75 MG/DL
MCH RBC QN AUTO: 27.8 PG (ref 26.5–33)
MCHC RBC AUTO-ENTMCNC: 31.7 G/DL (ref 31.5–36.5)
MCV RBC AUTO: 88 FL (ref 78–100)
MICROALBUMIN UR-MCNC: <12 MG/L
MICROALBUMIN/CREAT UR: NORMAL MG/G{CREAT}
NONHDLC SERPL-MCNC: 114 MG/DL
PLATELET # BLD AUTO: 383 10E3/UL (ref 150–450)
POTASSIUM SERPL-SCNC: 3.3 MMOL/L (ref 3.4–5.3)
RBC # BLD AUTO: 3.31 10E6/UL (ref 3.8–5.2)
SODIUM SERPL-SCNC: 140 MMOL/L (ref 136–145)
TRIGL SERPL-MCNC: 194 MG/DL
TSH SERPL DL<=0.005 MIU/L-ACNC: 0.16 UIU/ML (ref 0.3–4.2)
WBC # BLD AUTO: 11.7 10E3/UL (ref 4–11)

## 2022-10-14 PROCEDURE — G0008 ADMIN INFLUENZA VIRUS VAC: HCPCS | Mod: 59 | Performed by: FAMILY MEDICINE

## 2022-10-14 PROCEDURE — 36415 COLL VENOUS BLD VENIPUNCTURE: CPT | Performed by: FAMILY MEDICINE

## 2022-10-14 PROCEDURE — 80061 LIPID PANEL: CPT | Performed by: FAMILY MEDICINE

## 2022-10-14 PROCEDURE — 80307 DRUG TEST PRSMV CHEM ANLYZR: CPT | Performed by: FAMILY MEDICINE

## 2022-10-14 PROCEDURE — 80048 BASIC METABOLIC PNL TOTAL CA: CPT | Performed by: FAMILY MEDICINE

## 2022-10-14 PROCEDURE — 85027 COMPLETE CBC AUTOMATED: CPT | Performed by: FAMILY MEDICINE

## 2022-10-14 PROCEDURE — 91312 COVID-19,PF,PFIZER BOOSTER BIVALENT: CPT | Performed by: FAMILY MEDICINE

## 2022-10-14 PROCEDURE — 86803 HEPATITIS C AB TEST: CPT | Performed by: FAMILY MEDICINE

## 2022-10-14 PROCEDURE — G0439 PPPS, SUBSEQ VISIT: HCPCS | Performed by: FAMILY MEDICINE

## 2022-10-14 PROCEDURE — 0124A COVID-19,PF,PFIZER BOOSTER BIVALENT: CPT | Performed by: FAMILY MEDICINE

## 2022-10-14 PROCEDURE — 83036 HEMOGLOBIN GLYCOSYLATED A1C: CPT | Performed by: FAMILY MEDICINE

## 2022-10-14 PROCEDURE — 82043 UR ALBUMIN QUANTITATIVE: CPT | Performed by: FAMILY MEDICINE

## 2022-10-14 PROCEDURE — 99214 OFFICE O/P EST MOD 30 MIN: CPT | Mod: 25 | Performed by: FAMILY MEDICINE

## 2022-10-14 PROCEDURE — 90662 IIV NO PRSV INCREASED AG IM: CPT | Performed by: FAMILY MEDICINE

## 2022-10-14 PROCEDURE — 84443 ASSAY THYROID STIM HORMONE: CPT | Performed by: FAMILY MEDICINE

## 2022-10-14 PROCEDURE — 82306 VITAMIN D 25 HYDROXY: CPT | Performed by: FAMILY MEDICINE

## 2022-10-14 ASSESSMENT — ANXIETY QUESTIONNAIRES
GAD7 TOTAL SCORE: 9
IF YOU CHECKED OFF ANY PROBLEMS ON THIS QUESTIONNAIRE, HOW DIFFICULT HAVE THESE PROBLEMS MADE IT FOR YOU TO DO YOUR WORK, TAKE CARE OF THINGS AT HOME, OR GET ALONG WITH OTHER PEOPLE: SOMEWHAT DIFFICULT
3. WORRYING TOO MUCH ABOUT DIFFERENT THINGS: NEARLY EVERY DAY
2. NOT BEING ABLE TO STOP OR CONTROL WORRYING: MORE THAN HALF THE DAYS
6. BECOMING EASILY ANNOYED OR IRRITABLE: NOT AT ALL
7. FEELING AFRAID AS IF SOMETHING AWFUL MIGHT HAPPEN: SEVERAL DAYS
8. IF YOU CHECKED OFF ANY PROBLEMS, HOW DIFFICULT HAVE THESE MADE IT FOR YOU TO DO YOUR WORK, TAKE CARE OF THINGS AT HOME, OR GET ALONG WITH OTHER PEOPLE?: SOMEWHAT DIFFICULT
GAD7 TOTAL SCORE: 9
1. FEELING NERVOUS, ANXIOUS, OR ON EDGE: MORE THAN HALF THE DAYS
GAD7 TOTAL SCORE: 9
4. TROUBLE RELAXING: SEVERAL DAYS
5. BEING SO RESTLESS THAT IT IS HARD TO SIT STILL: NOT AT ALL
7. FEELING AFRAID AS IF SOMETHING AWFUL MIGHT HAPPEN: SEVERAL DAYS

## 2022-10-14 ASSESSMENT — ENCOUNTER SYMPTOMS
NAUSEA: 0
DYSURIA: 0
NERVOUS/ANXIOUS: 1
HEMATURIA: 0
BREAST MASS: 0
PARESTHESIAS: 0
DIARRHEA: 0
SORE THROAT: 0
JOINT SWELLING: 1
HEADACHES: 0
FREQUENCY: 0
SHORTNESS OF BREATH: 0
HEARTBURN: 0
WEAKNESS: 1
MYALGIAS: 1
EYE PAIN: 0
COUGH: 0
ABDOMINAL PAIN: 1
CHILLS: 0
PALPITATIONS: 0
CONSTIPATION: 1
ARTHRALGIAS: 1
DIZZINESS: 1
FEVER: 0

## 2022-10-14 ASSESSMENT — PATIENT HEALTH QUESTIONNAIRE - PHQ9
SUM OF ALL RESPONSES TO PHQ QUESTIONS 1-9: 4
10. IF YOU CHECKED OFF ANY PROBLEMS, HOW DIFFICULT HAVE THESE PROBLEMS MADE IT FOR YOU TO DO YOUR WORK, TAKE CARE OF THINGS AT HOME, OR GET ALONG WITH OTHER PEOPLE: NOT DIFFICULT AT ALL
SUM OF ALL RESPONSES TO PHQ QUESTIONS 1-9: 4

## 2022-10-14 ASSESSMENT — ACTIVITIES OF DAILY LIVING (ADL): CURRENT_FUNCTION: NO ASSISTANCE NEEDED

## 2022-10-14 NOTE — PROGRESS NOTES
ANTICOAGULATION MANAGEMENT     Nancy Oropeza 77 year old female is on warfarin with therapeutic INR result. (Goal INR 2.0-3.0)    Recent labs: (last 7 days)     10/13/22  0943   INR 2.4*       ASSESSMENT     Source(s): Chart Review and Patient/Caregiver Call     Warfarin doses taken: Warfarin taken as instructed  Diet: No new diet changes identified  New illness, injury, or hospitalization: No  Medication/supplement changes: None noted  Signs or symptoms of bleeding or clotting: No  Previous INR: Therapeutic last 2(+) visits  Additional findings: None       PLAN     Recommended plan for no diet, medication or health factor changes affecting INR     Dosing Instructions: Continue your current warfarin dose with next INR in 1 week       Summary  As of 10/14/2022    Full warfarin instructions:  2.5 mg every Mon, Wed; 1.25 mg all other days   Next INR check:  10/20/2022             Telephone call with Nancy who verbalizes understanding and agrees to plan    Patient to recheck with home meter    Education provided: Please call back if any changes to your diet, medications or how you've been taking warfarin and Contact [phone]  with any changes, questions or concerns.     Plan made per [facility] anticoagulation protocol    Cora Jean-Baptiste RN  Anticoagulation Clinic  10/14/2022    _______________________________________________________________________     Anticoagulation Episode Summary     Current INR goal:  2.0-3.0   TTR:  57.8 % (1 y)   Target end date:  Indefinite   Send INR reminders to:  [facility]ITOR[facility]    Indications    Pulmonary embolism (H) [I26.99]  Pulmonary embolism  unspecified chronicity  unspecified pulmonary embolism type  unspecified whether acute cor pulmonale present (H) [I26.99]           Comments:  MDINR Home Meter         Anticoagulation Care Providers     Provider Role Specialty Phone number    Julien Garnica MD Referring Family Medicine [phone]

## 2022-10-14 NOTE — LETTER
"October 14, 2022      Nancy Oropeza  [address]  [place] [address]        To Whom It May Concern,     Please provide an [employer] 4-wheel walker with 8\" rear wheels and 10\" front wheels with support up to 300 pounds with adjustment to 44-1/5 in up to 50-1/5 in in height.    Will fax to Cora Archer (fax: [phone]) for fulfillment.      Sincerely,        Julien Garnica MD          "

## 2022-10-14 NOTE — PATIENT INSTRUCTIONS
Patient Education   Personalized Prevention Plan  You are due for the preventive services outlined below.  Your care team is available to assist you in scheduling these services.  If you have already completed any of these items, please share that information with your care team to update in your medical record.  Health Maintenance Due   Topic Date Due     Kidney Microalbumin Urine Test  Never done     URINE DRUG SCREEN  Never done     ANNUAL REVIEW OF HM ORDERS  Never done     Depression Action Plan  Never done     Hepatitis C Screening  Never done     Hepatitis B Vaccine (1 of 3 - Risk 3-dose series) Never done     Zoster (Shingles) Vaccine (2 of 3) 07/21/2009     Diptheria Tetanus Pertussis (DTAP/TDAP/TD) Vaccine (3 - Td or Tdap) 02/03/2022     Cholesterol Lab  10/07/2022       Preventing Falls at Home  A person can fall for many reasons. Older adults may fall because reaction time slows down as we age. Your muscles and joints may get stiff, weak, or less flexible because of illness, medicines, or a physical condition.   Other health problems that make falls more likely include:     Arthritis    Dizziness or lightheadedness when you stand up (orthostatic hypotension)    History of a stroke    Dizziness    Anemia    Certain medicines taken for mental illness or to control blood pressure.    Problems with balance or gait    Bladder or urinary problems    History of falling    Changes in vision (vision impairment)    Changes in thinking skills and memory (cognitive impairment)  Injuries from a fall can include serious injuries such as broken bones, dislocated joints, internal bleeding and cuts. Injuries like these can limit your independence.   Prevention tips  To help prevent falls and fall-related injuries, follow the tips below.    Floors  To make floors safer:     Put nonskid pads under area rugs.    Remove small rugs.    Replace worn floor coverings.    Tack carpets firmly to each step on carpeted stairs. Put  nonskid strips on the edges of uncarpeted stairs.    Keep floors and stairs free of clutter and cords.    Arrange furniture so there are clear pathways.    Clean up any spills right away.  Bathrooms    To make bathrooms safer:     Install grab bars in the tub or shower.    Apply nonskid strips or put a nonskid rubber mat in the tub or shower.    Sit on a bath chair to bathe.    Use bathmats with nonskid backing.  Lighting  To improve visibility in your home:      Keep a flashlight in each room. Or put a lamp next to the bed within easy reach.    Put nightlights in the bedrooms, hallways, kitchen, and bathrooms.    Make sure all stairways have good lighting.    Take your time when going up and down stairs.    Put handrails on both sides of stairs and in walkways for more support. To prevent injury to your wrist or arm, don t use handrails to pull yourself up.    Install grab bars to pull yourself up.    Move or rearrange items that you use often. This will make them easier to find or reach.    Look at your home to find any safety hazards. Especially look at doorways, walkways, and the driveway. Remove or repair any safety problems that you find.  Other changes to make    Look around to find any safety hazards. Look closely at doorways, walkways, and the driveway. Remove or repair any safety problems that you find.    Wear shoes that fit well.    Take your time when going up and down stairs.    Put handrails on both sides of stairs and in walkways for more support. To prevent injury to your wrist or arm, don t use handrails to pull yourself up.    Install grab bars wherever needed to pull yourself up.    Arrange items that you use often. This will make them easier to find or reach.    [employer] last reviewed this educational content on 3/1/2020    9247-1182 [employer]. All rights reserved. This information is not intended as a substitute for professional medical care. Always follow your healthcare  professional's instructions.

## 2022-10-14 NOTE — PROGRESS NOTES
SUBJECTIVE:     Nancy is a 77 year old who presents for Preventive Visit.      Annual wellness visit completed.  Risk questionnaire reviewed in detail.  Falls risk prevention reviewed.  Noted cognitive decline with abnormal clock and word recall 1 word out of 3 words today.  Patient's daughter is with today and we did discuss monitoring closely as far as patient's ability to continue appropriate cognitive functioning etc.  Does have history of polymyalgia rheumatica.  Prednisone 5 mg daily is continued.  Cholesterol elevation historically with impaired fasting glucose.  CKD stage IIIa.  Ensure adequate renal function with appropriate hydration.  Has had gallstones and is scheduled for preop exam October 26 as well as likely laparoscopic cholecystectomy in November 10, 2022 with Dr. Nguyen.  Remains on levothyroxine 137 mcg daily for thyroid replacement.  Prior pulmonary emboli and remains on warfarin anticoagulation as well today.  Has had multiple skin lesions and would like full body skin check by dermatologist preferably female.  Chronic bilateral low back pain as well with right-sided sciatica.  Wants upright walker that adjust to higher height and I did provide a letter today for her  in order to fulfill request.  Underlying hypertension.  Utilizes furosemide with refill requested for edema management as well.  Depression better with citalopram 40 mg daily.  Tried buspirone in the past for anxiety with limited benefit therefore discontinued.  Some constipation concerns and does use OTC fiber and laxative treatment.  Comprehensive review of systems as above otherwise all negative         5 children (Airam, Nancy, Lakeisha, Akash, Thee) - Akash had coronary stent while Thee with Factor V abnormality and rectal cancer   14 grandchildren   16 great-grandchildren   Grew up in [place], [place] til age 12...   No smoke (quit 16 years ago after 1 ppd x 30+ years)   EtOH: occ wine   Mom -  " 88 COPD   Dad -  61 massive MI   1 bro -  67 blood clot (lung)   2 sis - one of which is  age 62 small cell lung CA (h/o smoker)   Surgeries: ventral hernia repair with muscle flap 10/4/12 with post-op complication of seroma with J.P. drain in place followed by interventional radiology q 2 weeks);  age 25; groin hernia age 3; CAPRICE-BSO  by Dr. Herbert Carmen (due to benign cyst x 2); right TKA 18 (Dr. Small)   Hospitalizations: as above   Work: retired  ([employer] as )       {Patient has been advised of split billing requirements and indicates understanding: Yes  Are you in the first 12 months of your Medicare coverage?  No    Healthy Habits:     In general, how would you rate your overall health?  Fair    Frequency of exercise:  None    Do you usually eat at least 4 servings of fruit and vegetables a day, include whole grains    & fiber and avoid regularly eating high fat or \"junk\" foods?  No    Taking medications regularly:  Yes    Ability to successfully perform activities of daily living:  No assistance needed    Home Safety:  Throw rugs in the hallway    Hearing Impairment:  No hearing concerns    In the past 6 months, have you been bothered by leaking of urine?  No    In general, how would you rate your overall mental or emotional health?  Fair      PHQ-2 Total Score: 0    Additional concerns today:  Yes    Do you feel safe in your environment? Yes    Have you ever done Advance Care Planning? (For example, a Health Directive, POLST, or a discussion with a medical provider or your loved ones about your wishes): Yes, advance care planning is on file.      Fall risk  Fallen 2 or more times in the past year?: Yes  Any fall with injury in the past year?: No  click delete button to remove this line now  Cognitive Screening   1) Repeat 3 items (Leader, Season, Table)    2) Clock draw: ABNORMAL numbers and hands abnormal placement  3) 3 item " recall: Recalls 1 object   Results: ABNORMAL clock, 1-2 items recalled: PROBABLE COGNITIVE IMPAIRMENT, **INFORM PROVIDER**    Mini-CogTM Copyright SANDRA Diaz. Licensed by the author for use in [facility]; reprinted with permission ([email or web address]). All rights reserved.      Do you have sleep apnea, excessive snoring or daytime drowsiness?: uncertain    Reviewed and updated as needed this visit by clinical staff   Tobacco  Allergies  Meds  Problems  Med Hx  Surg Hx  Fam Hx          Reviewed and updated as needed this visit by Provider                 Social History     Tobacco Use     Smoking status: Former     Smokeless tobacco: Never     Tobacco comments:     quite 20 yrs ago   Substance Use Topics     Alcohol use: No     Comment: Alcoholic Drinks/day: occasionally         Current providers sharing in care for this patient include:   Patient Care Team:  Julien Garnica MD as PCP - General (Family Medicine)  Tigist Bedolla, PharmD as Pharmacist (Pharmacist)  Julien Garnica MD as Assigned PCP  Sid Nguyen MD as Assigned Surgical Provider    The following health maintenance items are reviewed in Epic and correct as of today:  Health Maintenance   Topic Date Due     MICROALBUMIN  Never done     URINE DRUG SCREEN  Never done     DEPRESSION ACTION PLAN  Never done     HEPATITIS C SCREENING  Never done     HEPATITIS B IMMUNIZATION (1 of 3 - Risk 3-dose series) Never done     ZOSTER IMMUNIZATION (2 of 3) 07/21/2009     DTAP/TDAP/TD IMMUNIZATION (3 - Td or Tdap) 02/03/2022     LIPID  10/07/2022     LUNG CANCER SCREENING  01/08/2023     PHQ-9  04/14/2023     BMP  07/12/2023     MEDICARE ANNUAL WELLNESS VISIT  10/14/2023     ANNUAL REVIEW OF HM ORDERS  10/14/2023     FALL RISK ASSESSMENT  10/14/2023     HEMOGLOBIN  10/14/2023     ADVANCE CARE PLANNING  10/14/2027     DEXA  10/05/2035     INFLUENZA VACCINE  Completed     Pneumococcal Vaccine: 65+ Years  Completed     URINALYSIS  Completed      COVID-19 Vaccine  Completed     IPV IMMUNIZATION  Aged Out     MENINGITIS IMMUNIZATION  Aged Out     COLORECTAL CANCER SCREENING  Discontinued     Lab work is in process  Labs reviewed in EPIC  BP Readings from Last 3 Encounters:   10/14/22 138/70   07/22/22 (!) 140/70   07/12/22 120/70    Wt Readings from Last 3 Encounters:   10/14/22 126.6 kg (279 lb)   07/22/22 131.3 kg (289 lb 6.4 oz)   07/12/22 132 kg (291 lb)                  Patient Active Problem List   Diagnosis     Health Care Home     Acquired spondylolisthesis     Anemia     Anxiety state     Essential hypertension with goal blood pressure less than 140/90     Benign paroxysmal positional vertigo     Benign neoplasm of colon     Carpal tunnel syndrome     Diverticulosis of large intestine     Constipation     Coronary atherosclerosis     Contact dermatitis and other eczema, due to unspecified cause     Obesity     Other chronic nonalcoholic liver disease     Hyperlipidemia     Hypothyroidism     Lipoma     Benign neoplasm of skin     Sleep apnea     Tinnitus     Pulmonary embolism (H)     Mild episode of recurrent major depressive disorder (H)     Chronic kidney disease, stage 3     Polymyalgia rheumatica (H)     Chronic bilateral low back pain with right-sided sciatica     Morbid obesity (H)     Pulmonary embolism, unspecified chronicity, unspecified pulmonary embolism type, unspecified whether acute cor pulmonale present (H)     Calculus of gallbladder with biliary obstruction but without cholecystitis     Past Surgical History:   Procedure Laterality Date     arthroplasty for hammertoe-2nd toe R 2000 Biebl[       BIOPSY BREAST Left 1970s     Bunion correction by Cheilectomy-had bunionectomy ? type of procedure L and R separate procedures.[       GYN SURGERY       HERNIA REPAIR       Hernia Repair Inguinal unilateral[       HYSTERECTOMY       HYSTERECTOMY  2010     IR ABSCESS TUBE CHANGE  11/9/2012     IR ABSCESS TUBE CHANGE  11/12/2012     IR  ABSCESS TUBE CHANGE  12/4/2012     IR ABSCESS TUBE CHANGE  2/22/2013     IR ABSCESS TUBE CHANGE  3/1/2013     IR ABSCESS TUBE CHANGE  3/13/2013     OOPHORECTOMY  2010     ORTHOPEDIC SURGERY         Social History     Tobacco Use     Smoking status: Former     Smokeless tobacco: Never     Tobacco comments:     quite 20 yrs ago   Substance Use Topics     Alcohol use: No     Comment: Alcoholic Drinks/day: occasionally     Family History   Problem Relation Age of Onset     Breast Cancer Sister 75.00     Stomach Cancer Paternal Grandfather      Lung Cancer Sister      Chronic Obstructive Pulmonary Disease Mother      Heart Disease Mother      Coronary Artery Disease Father          Current Outpatient Medications   Medication Sig Dispense Refill     acetaminophen 500 MG CAPS Take 2 capsules by mouth 3 times daily as needed.       amLODIPine (NORVASC) 5 MG tablet TAKE 1 TABLET DAILY (NEW   DOSE 6/1/18) 90 tablet 3     BD ULTRA-FINE 33 LANCETS Use as directed       Cholecalciferol (VITAMIN D) 400 UNITS capsule Take 2 capsules by mouth daily.       citalopram (CELEXA) 40 MG tablet Take 1 tablet (40 mg) by mouth daily 90 tablet 3     furosemide (LASIX) 20 MG tablet Take 1 tablet (20 mg) by mouth every morning 90 tablet 1     Glucose Blood (ONE TOUCH ULTRA TEST) strip by In Vitro route daily Use as directed       levothyroxine (SYNTHROID/LEVOTHROID) 137 MCG tablet Take 1 tablet (137 mcg) by mouth daily 90 tablet 3     lisinopril-hydrochlorothiazide (ZESTORETIC) 20-12.5 MG tablet Take 2 tablets by mouth daily 180 tablet 1     pravastatin (PRAVACHOL) 80 MG tablet Take 1 tablet (80 mg) by mouth At Bedtime 90 tablet 3     predniSONE (DELTASONE) 5 MG tablet Take 1 tablet (5 mg) by mouth daily 90 tablet 0     traMADol (ULTRAM) 50 MG tablet TAKE 1 TABLET EVERY 6 HOURS AS NEEDED FOR SEVERE PAIN 60 tablet 0     warfarin ANTICOAGULANT (COUMADIN) 2.5 MG tablet take 1 tablet (2.5 mg) by mouth every Mon, Wed, Fri; take one-half tablet  "(1.25 mg) by mouth all other days of the week 60 tablet 5     Allergies   Allergen Reactions     Sulfasalazine Rash     Pt reports she has never taken this med, so is not sure why it's on her allergy list       Atorvastatin      Paroxetine Unknown     Simvastatin      Sulfa Drugs      Will receive high-dose flu shot and COVID-19 bivalent booster today.  Will receive Adacel booster at preop exam likely on October 26, 2022.    Mammogram Screening - Patient over age 75, has elected to continue with screening.  Pertinent mammograms are reviewed under the imaging tab.    Review of Systems   Constitutional: Negative for chills and fever.   HENT: Negative for congestion, ear pain, hearing loss and sore throat.    Eyes: Negative for pain and visual disturbance.   Respiratory: Negative for cough and shortness of breath.    Cardiovascular: Positive for peripheral edema. Negative for chest pain and palpitations.   Gastrointestinal: Positive for abdominal pain and constipation. Negative for diarrhea, heartburn and nausea.   Breasts:  Negative for tenderness, breast mass and discharge.   Genitourinary: Negative for dysuria, frequency, genital sores, hematuria, pelvic pain, urgency, vaginal bleeding and vaginal discharge.   Musculoskeletal: Positive for arthralgias, joint swelling and myalgias.   Skin: Negative for rash.   Neurological: Positive for dizziness and weakness. Negative for headaches and paresthesias.   Psychiatric/Behavioral: Negative for mood changes. The patient is nervous/anxious.      Constitutional, HEENT, cardiovascular, pulmonary, GI, , musculoskeletal, neuro, skin, endocrine and psych systems are negative, except as otherwise noted.    OBJECTIVE:   /70   Pulse 81   Ht 1.676 m (5' 6\")   Wt 126.6 kg (279 lb)   LMP  (LMP Unknown)   SpO2 96%   BMI 45.03 kg/m   Estimated body mass index is 45.03 kg/m  as calculated from the following:    Height as of this encounter: 1.676 m (5' 6\").    Weight as of " this encounter: 126.6 kg (279 lb).     Physical Exam  GENERAL APPEARANCE: healthy, alert and no distress  EYES: Eyes grossly normal to inspection, PERRL and conjunctivae and sclerae normal  HENT: ear canals and TM's normal, nose and mouth without ulcers or lesions, oropharynx clear and oral mucous membranes moist  NECK: no adenopathy, no asymmetry, masses, or scars and thyroid normal to palpation  RESP: lungs clear to auscultation - no rales, rhonchi or wheezes  CV: regular rate and rhythm, normal S1 S2, no S3 or S4, no murmur, click or rub, no peripheral edema and peripheral pulses strong  ABDOMEN: soft, nontender, no hepatosplenomegaly, no masses and bowel sounds normal  MS: no musculoskeletal defects are noted and gait is age appropriate without ataxia  SKIN: no suspicious lesions or rashes  NEURO: Normal strength and tone, sensory exam grossly normal, mentation intact and speech normal  PSYCH: mentation appears normal and affect normal/bright    Diagnostic Test Results:  Labs reviewed in Epic  Results for orders placed or performed in visit on 10/14/22 (from the past 24 hour(s))   Extra Tube *Canceled*    Narrative    The following orders were created for panel order Extra Tube.  Procedure                               Abnormality         Status                     ---------                               -----------         ------                     Extra Green Top (Lithium...[[id number]]                                                   Please view results for these tests on the individual orders.   Extra Tube    Narrative    The following orders were created for panel order Extra Tube.  Procedure                               Abnormality         Status                     ---------                               -----------         ------                     Extra Red Top Tube[[id number]]                               Final result               Extra Purple Top Tube[[id number]]                            Final  result                 Please view results for these tests on the individual orders.   Extra Red Top Tube   Result Value Ref Range    Hold Specimen JIC    Extra Purple Top Tube   Result Value Ref Range    Hold Specimen JIC    Hemoglobin A1c   Result Value Ref Range    Hemoglobin A1C 5.7 (H) 0.0 - 5.6 %   CBC with platelets   Result Value Ref Range    WBC Count 11.7 (H) 4.0 - 11.0 10e3/uL    RBC Count 3.31 (L) 3.80 - 5.20 10e6/uL    Hemoglobin 9.2 (L) 11.7 - 15.7 g/dL    Hematocrit 29.0 (L) 35.0 - 47.0 %    MCV 88 78 - 100 fL    MCH 27.8 26.5 - 33.0 pg    MCHC 31.7 31.5 - 36.5 g/dL    RDW 14.3 10.0 - 15.0 %    Platelet Count 383 150 - 450 10e3/uL   [id number] - Urine Drug Confirmation Panel (Comprehensive)    Narrative    The following orders were created for panel order [id number] - Urine Drug Confirmation Panel (Comprehensive).  Procedure                               Abnormality         Status                     ---------                               -----------         ------                     Urine Drug Confirmation ...[[id number]]                      In process                 Urine Creatinine for Michael...[[id number]]                      In process                   Please view results for these tests on the individual orders.     *Note: Due to a large number of results and/or encounters for the requested time period, some results have not been displayed. A complete set of results can be found in Results Review.       Answers for HPI/ROS submitted by the patient on 10/14/2022  If you checked off any problems, how difficult have these problems made it for you to do your work, take care of things at home, or get along with other people?: Not difficult at all  PHQ9 TOTAL SCORE: 4  TORIE 7 TOTAL SCORE: 9    ASSESSMENT / PLAN:     Encounter for Medicare annual wellness exam  Annual wellness visit completed.  Risk questionnaire reviewed in detail.  Falls risk prevention.  Discussed memory difficulties at length today as  well with cognitive testing abnormal.  Annual wellness visits to continue.  Patient's daughter Airam is with today.    Memory loss  Memory difficulties as noted with abnormal clock and 1 out of 3 word recall noted.  Will monitor closely to ensure adequate cognitive functioning ongoing and reassess at scheduled preoperative exam October 26, 2022.    Polymyalgia rheumatica (H)  PMR history.  Prednisone 5 mg daily.  Has been unable to wean from a 5 mg dose without recurrent symptoms.  - CBC with platelets  - CBC with platelets    Pure hypercholesterolemia  Cholesterol elevation historically and will update lipid cascade today while fasting.  - Lipid panel reflex to direct LDL Fasting  - Lipid panel reflex to direct LDL Fasting    Impaired fasting glucose  Impaired fasting glucose historically.  A1c today 5.7% and will continue dietary and exercise modifications with recent 12 pound weight loss since July 12, 2022 noted.    Stage 3a chronic kidney disease (H)  History of CKD stage IIIa.  Microalbumin screen updated as well as basic metabolic panel and vitamin D level.  - Albumin Random Urine Quantitative with Creat Ratio  - Basic metabolic panel  - Vitamin D Deficiency  - Vitamin D Deficiency  - Basic metabolic panel  - Albumin Random Urine Quantitative with Creat Ratio    Calculus of gallbladder with biliary obstruction but without cholecystitis  Gallstones noted.  Schedule laparoscopic cholecystectomy November 10, 2022 with Dr. Sid Nguyen.  Preoperative clearance October 26, 2022 scheduled.    Hypothyroidism, unspecified type  Continues use of levothyroxine 137 mcg daily.  Update TSH to ensure adequate replacement.  - TSH  - TSH    Pulmonary embolism, unspecified chronicity, unspecified pulmonary embolism type, unspecified whether acute cor pulmonale present (H)  Warfarin anticoagulation continuing.  Asymptomatic otherwise.  O2 sat 96% room air.    Atypical pigmented skin lesion  Skin check through dermatology  "referral.  - Adult Dermatology Referral    Chronic bilateral low back pain with right-sided sciatica  Chronic lower back pain with history of right-sided sciatica.  Multiple injections in the past without ongoing benefit    Need for hepatitis C screening test  Routine hepatitis C screen based on age criteria.  - Hepatitis C Screen Reflex to HCV RNA Quant and Genotype  - Extra Tube  - Extra Tube  - Extra Tube  - Hemoglobin A1c  - Hemoglobin A1c  - Hepatitis C Screen Reflex to HCV RNA Quant and Genotype    Encounter for long-term (current) use of medications  Urine drug screen while on tramadol typically 50 mg once daily for right shoulder pain.  - [id number] - Urine Drug Confirmation Panel (Comprehensive)  - Extra Tube  - Extra Tube  - Extra Tube  - Hemoglobin A1c  - Hemoglobin A1c  - [id number] - Urine Drug Confirmation Panel (Comprehensive)    Essential hypertension with goal blood pressure less than 140/90  Hypertension appears adequately controlled.  Continues lisinopril hydrochlorothiazide 20/12.5 using 2 tablets daily and amlodipine 5 mg daily.    Mild episode of recurrent major depressive disorder (H)  Citalopram 40 mg daily continuing    Constipation, unspecified constipation type  MiraLAX plus Lawanda-Colace as directed.  Ensure adequate dietary fiber supplementation.  Ensure adequate hydration.       Patient has been advised of split billing requirements and indicates understanding: No    COUNSELING:  Reviewed preventive health counseling, as reflected in patient instructions       Regular exercise       Healthy diet/nutrition       Vision screening       Hearing screening       Dental care       Bladder control       Fall risk prevention       Osteoporosis prevention/bone health       (Lawanda)menopause management       Advanced Planning     Estimated body mass index is 45.03 kg/m  as calculated from the following:    Height as of this encounter: 1.676 m (5' 6\").    Weight as of this encounter: 126.6 kg (279 " lb).    Weight management plan: Discussed healthy diet and exercise guidelines    She reports that she has quit smoking. She has never used smokeless tobacco.      Appropriate preventive services were discussed with this patient, including applicable screening as appropriate for cardiovascular disease, diabetes, osteopenia/osteoporosis, and glaucoma.  As appropriate for age/gender, discussed screening for colorectal cancer, prostate cancer, breast cancer, and cervical cancer. Checklist reviewing preventive services available has been given to the patient.    Reviewed patients plan of care and provided an AVS. The Complex Care Plan (for patients with higher acuity and needing more deliberate coordination of services) for Nancy meets the Care Plan requirement. This Care Plan has been established and reviewed with the Patient and daughter.    Counseling Resources:  ATP IV Guidelines  Pooled Cohorts Equation Calculator  Breast Cancer Risk Calculator  Breast Cancer: Medication to Reduce Risk  FRAX Risk Assessment  ICSI Preventive Guidelines  Dietary Guidelines for Americans, 2010  USDA's MyPlate  ASA Prophylaxis  Lung CA Screening    Julien Garnica MD  [facility]    Identified Health Risks:    She is at risk for falling and has been provided with information to reduce the risk of falling at home.

## 2022-10-15 DIAGNOSIS — E03.9 HYPOTHYROIDISM, UNSPECIFIED TYPE: ICD-10-CM

## 2022-10-15 LAB
DEPRECATED CALCIDIOL+CALCIFEROL SERPL-MC: 53 UG/L (ref 20–75)
HCV AB SERPL QL IA: NONREACTIVE

## 2022-10-15 RX ORDER — LEVOTHYROXINE SODIUM 125 UG/1
125 TABLET ORAL DAILY
Qty: 90 TABLET | Refills: 3 | Status: SHIPPED | OUTPATIENT
Start: 2022-10-15 | End: 2023-01-01

## 2022-10-18 LAB
N-NORTRAMADOL/CREAT UR CFM: 8366 NG/MG {CREAT}
O-NORTRAMADOL UR CFM-MCNC: 6860 NG/ML
TRAMADOL CTO UR CFM-MCNC: 8600 NG/ML
TRAMADOL/CREAT UR: ABNORMAL NG/MG {CREAT}

## 2022-10-19 ENCOUNTER — TELEPHONE (OUTPATIENT)
Dept: FAMILY MEDICINE | Facility: CLINIC | Age: 77
End: 2022-10-19

## 2022-10-20 NOTE — TELEPHONE ENCOUNTER
Pt called with correct spelling of medication for her back. Duloxetine. She would like a call back to discuss this med.

## 2022-10-20 NOTE — TELEPHONE ENCOUNTER
Pt returned call, message relayed. She can't remember the name of the med. She's going to call back when she speaks to someone else who should know the name.

## 2022-10-20 NOTE — TELEPHONE ENCOUNTER
"Please clarify which medication (\"doiloxind\") that this is referring to so that I can answer her question.     Julien Garnica MD    "

## 2022-10-20 NOTE — TELEPHONE ENCOUNTER
Tried to call patient but no voicemail. After ringing a couple of times it just disconnected caller. Please try to reach patient again at a later and clarify which medication she is referring to.    Thanks,   Jos Vaughn RN on 10/20/2022 at 3:32 PM

## 2022-10-21 ENCOUNTER — TELEPHONE (OUTPATIENT)
Dept: FAMILY MEDICINE | Facility: CLINIC | Age: 77
End: 2022-10-21

## 2022-10-21 DIAGNOSIS — I26.99 PULMONARY EMBOLISM (H): Primary | ICD-10-CM

## 2022-10-21 DIAGNOSIS — I26.99 PULMONARY EMBOLISM, UNSPECIFIED CHRONICITY, UNSPECIFIED PULMONARY EMBOLISM TYPE, UNSPECIFIED WHETHER ACUTE COR PULMONALE PRESENT (H): ICD-10-CM

## 2022-10-21 LAB — INR (EXTERNAL): 2.4 (ref 0.9–1.1)

## 2022-10-21 NOTE — TELEPHONE ENCOUNTER
ANTICOAGULATION MANAGEMENT     Nancy Oropeza 77 year old female is on warfarin with therapeutic INR result. (Goal INR )    Recent labs: (last 7 days)     10/20/22  1351   INR 2.4*       ASSESSMENT       Source(s): Chart Review and Patient/Caregiver Call       Warfarin doses taken: Warfarin taken as instructed    Diet: No new diet changes identified    New illness, injury, or hospitalization:Patient received had COVID booster and flu vaccine last Friday on 10/14/22    Medication/supplement changes: None noted    Signs or symptoms of bleeding or clotting: No    Previous INR: Therapeutic last 2(+) visits    Additional findings: None       PLAN     Recommended plan for no diet, medication or health factor changes affecting INR     Dosing Instructions: Continue your current warfarin dose with next INR in 1 week       Summary  As of 10/21/2022    Full warfarin instructions:  2.5 mg every Mon, Wed; 1.25 mg all other days; Starting 10/21/2022   Next INR check:  10/28/2022             Telephone call with Nancy who verbalizes understanding and agrees to plan    Patient to recheck with home meter    Education provided:     Please call back if any changes to your diet, medications or how you've been taking warfarin    Plan made per [facility] anticoagulation protocol    Iram Alexander RN  Antico[facility] Clinic  10/21/2022    _______________________________________________________________________     Anticoagulation Episode Summary     Current INR goal:  2.0-3.0   TTR:  59.7 % (1 y)   Target end date:  Indefinite   Send INR reminders to:  [facility]ICOAG HOME MONITORING    Indications    Pulmonary embolism (H) [I26.99]  Pulmonary embolism  unspecified chronicity  unspecified pulmonary embolism type  unspecified whether acute cor pulmonale present (H) [I26.99]           Comments:  MDINR Home Meter         Anticoagulation Care Providers     Provider Role Specialty Phone number    Julien Garnica MD Referring Family Medicine  [phone]

## 2022-10-21 NOTE — TELEPHONE ENCOUNTER
TAVIA-PROCEDURAL ANTICOAGULATION  MANAGEMENT    ASSESSMENT     Warfarin interruption plan for laparoscopic cholecystectomy on 11/10/2022.    Indication for Anticoagulation: PE      PE 2015     Negative hematology testing 2018    Caprini score = 8/9 (female, BMI >25, HX PE, age, +/-if lap surgery >45 minutes)    Had       Tavia-Procedure Risk stratification for thromboembolism: low ( Chest guidelines)    HIGH RISK:  CHEST Perioperative Management guidelines suggests bridging patients at high risk for thromboembolism when interrupting warfarin for an elective surgery/procedure  VTE Prophylaxis, Post-Surgical: 2012 Chest guidelines recommend use of Caprini Score to guide post-surgical VTE prophylaxis. For Caprini Score 5-8: enoxaparin or heparin prophylaxis recommended for 7-10 days (or until therapeutic on warfarin).    With COVID 2020 hospitalization had bruising at Lovenox injection sites leading to chronic wound on abdomen needing vascular referral and intervention   RECOMMENDATION       Pre-Procedure:  o Hold warfarin for 5 days, until after procedure startin2022   o No Bridge      Post-Procedure:  o Resume warfarin dose if okay with provider doing procedure on night of procedure, 11/10/2022 PM: 2.5mg  o Post procedure Lovenox 40mg subcutaneous Q24H   o Recheck INR ~ 5 days after resuming warfarin       Plan routed to referring provider for approval  ?   Marlene Mcmillan [facility]    SUBJECTIVE/OBJECTIVE     Nancy Oropeza, a 77 year old female    Goal INR Range: 2.0-3.0     Patient bridged in past: Yes: last 2020 with COVID    Pertinent History: chronic wound secondary to Lovenox bruising , tolerated hold & no bridge 2022 for procedure    Wt Readings from Last 3 Encounters:   10/14/22 126.6 kg (279 lb)   22 131.3 kg (289 lb 6.4 oz)   22 132 kg (291 lb)      Ideal body weight: 59.3 kg (130 lb 11.7 oz)  Adjusted ideal body weight: 86.2 kg (190 lb 0.6 oz)     Estimated  "body mass index is 45.03 kg/m  as calculated from the following:    Height as of 10/14/22: 1.676 m (5' 6\").    Weight as of 10/14/22: 126.6 kg (279 lb).    Lab Results   Component Value Date    INR 2.4 (A) 10/20/2022    INR 2.4 (A) 10/13/2022    INR 2.7 (A) 10/07/2022     Lab Results   Component Value Date    HGB 9.2 10/14/2022    HGB 11.2 10/29/2012    HCT 29.0 10/14/2022    HCT 35.1 10/29/2012     10/14/2022     Lab Results   Component Value Date    CR 1.15 (H) 10/14/2022    CR 1.17 (H) 07/12/2022    CR 1.30 (H) 05/23/2022     Estimated Creatinine Clearance: 54 mL/min (A) (based on SCr of 1.15 mg/dL (H)).  "

## 2022-10-21 NOTE — TELEPHONE ENCOUNTER
Reason for Call:  INR follow up    Detailed comments: Patient called in INR results yesterday and has not heard back yet on her Warfarin dosage for today. Please call.     Phone Number Patient can be reached at: Other phone number:  [phone]    Best Time: any    Can we leave a detailed message on this number? YES    Call taken on 10/21/2022 at 1:22 PM by Umu Garcia

## 2022-10-24 NOTE — TELEPHONE ENCOUNTER
However 11/10/2022 surgery has been cancelled.    Marlene Mcmillan, PharmD BCACP  Anticoagulation Clinical Pharmacist

## 2022-10-26 ENCOUNTER — TELEPHONE (OUTPATIENT)
Dept: FAMILY MEDICINE | Facility: CLINIC | Age: 77
End: 2022-10-26

## 2022-10-26 ENCOUNTER — TRANSFERRED RECORDS (OUTPATIENT)
Dept: HEALTH INFORMATION MANAGEMENT | Facility: CLINIC | Age: 77
End: 2022-10-26

## 2022-10-26 DIAGNOSIS — I26.99 PULMONARY EMBOLISM (H): Primary | ICD-10-CM

## 2022-10-26 DIAGNOSIS — I26.99 PULMONARY EMBOLISM, UNSPECIFIED CHRONICITY, UNSPECIFIED PULMONARY EMBOLISM TYPE, UNSPECIFIED WHETHER ACUTE COR PULMONALE PRESENT (H): ICD-10-CM

## 2022-10-26 LAB — INR (EXTERNAL): 2.5 (ref 0.9–1.1)

## 2022-10-26 NOTE — TELEPHONE ENCOUNTER
Test Results        Who ordered the test:  Dr. Julien Garnica    Type of test: INR    Date of test: 10/26    Where was the test performed:  At home the result was 2.5    What are your questions/concerns?:  Needs a callback for new dosing instructions    Could we send this information to you in [facility] or would you prefer to receive a phone call?:   Patient would prefer a phone call   Okay to leave a detailed message?: Yes at Home number on file [phone] (home)    Sofia Messina   [facility]  Central Scheduler

## 2022-10-26 NOTE — TELEPHONE ENCOUNTER
Spoke with patient and she is wondering if it is ok for her to take Pamabrom 25 mg/acetaminophen 500 mg capsules with her other medications. She picked this up at the pharmacy to be used for her back pain. Patient has used this twice in the last couple of days with some relief. She also has been using 2 tabs of extra strength tylenol twice a day.   Advised patient max dose of tylenol is 3000 mg per 24 hours and for her not to exceed this.     She would like to know if she can take Pamabrom with all her other meds instead of taking the tylenol extra strength.     Please advise on Pamabrom medication.     Thanks,  Jos Vaughn RN on 10/26/2022 at 11:38 AM

## 2022-10-26 NOTE — TELEPHONE ENCOUNTER
Pt is calling wanting to talk with someone about OTC pain reliever. Pt states she bought Pamabrom 25 mg with Acetaminophen 500 mg maxmium strength for her back pain.     Pt states that she took a couple of those with tylenol and wanted to make sure that it was okay.     Please review and advise

## 2022-10-26 NOTE — TELEPHONE ENCOUNTER
ANTICOAGULATION MANAGEMENT     Nancy Oropeza 77 year old female is on warfarin with therapeutic INR result. (Goal INR )    Recent labs: (last 7 days)     10/26/22  1238   INR 2.5*       ASSESSMENT       Source(s): Chart Review and Patient/Caregiver Call       Warfarin doses taken: Warfarin taken as instructed    Diet: No new diet changes identified    New illness, injury, or hospitalization: No    Medication/supplement changes: None noted    Signs or symptoms of bleeding or clotting: No    Previous INR: Therapeutic last 2(+) visits    Additional findings: None       PLAN     Recommended plan for no diet, medication or health factor changes affecting INR     Dosing Instructions: Continue your current warfarin dose with next INR in 1 week       Summary  As of 10/26/2022    Full warfarin instructions:  2.5 mg every Sun, Wed; 1.25 mg all other days; Starting 10/26/2022   Next INR check:  11/2/2022           Rearranged days of 2.5mg to be more spread out.    Telephone call with Nancy who verbalizes understanding and agrees to plan    Patient to recheck with home meter    Education provided:     Goal range and lab monitoring: goal range and significance of current result    Plan made per [facility] anticoagulation protocol    Torey Cárdenas RN  Anticoagulation Clinic  10/26/2022    _______________________________________________________________________     Anticoagulation Episode Summary     Current INR goal:  2.0-3.0   TTR:  61.2 % (1 y)   Target end date:  Indefinite   Send INR reminders to:  [facility]ITOR[facility]    Indications    Pulmonary embolism (H) [I26.99]  Pulmonary embolism  unspecified chronicity  unspecified pulmonary embolism type  unspecified whether acute cor pulmonale present (H) [I26.99]           Comments:  [facility] Home Meter         Anticoagulation Care Providers     Provider Role Specialty Phone number    Julien Garnica MD Referring Family Medicine [phone]

## 2022-10-26 NOTE — TELEPHONE ENCOUNTER
Pt is calling back wanting to know if there is a message for her from Dr. Garnica yet expressing frustration that she hasn't gotten an answer yet.     Informed patient that message is still with Dr. Garnica for him to address and the care team will call her back to relay message.     Please call patient back with MD recommendation.

## 2022-10-27 NOTE — TELEPHONE ENCOUNTER
Discussed with patient.  Patient describes medicine as extra strength Tylenol.  May utilize up to 3000 mg in 24 hours.  Will utilize 2 tablets 3 times daily of current OTC analgesic as noted.

## 2022-11-04 ENCOUNTER — ANTICOAGULATION THERAPY VISIT (OUTPATIENT)
Dept: ANTICOAGULATION | Facility: CLINIC | Age: 77
End: 2022-11-04

## 2022-11-04 ENCOUNTER — TRANSFERRED RECORDS (OUTPATIENT)
Dept: HEALTH INFORMATION MANAGEMENT | Facility: CLINIC | Age: 77
End: 2022-11-04

## 2022-11-04 DIAGNOSIS — I26.99 PULMONARY EMBOLISM (H): Primary | ICD-10-CM

## 2022-11-04 DIAGNOSIS — I26.99 PULMONARY EMBOLISM, UNSPECIFIED CHRONICITY, UNSPECIFIED PULMONARY EMBOLISM TYPE, UNSPECIFIED WHETHER ACUTE COR PULMONALE PRESENT (H): ICD-10-CM

## 2022-11-04 LAB — INR (EXTERNAL): 2.2 (ref 0.9–1.1)

## 2022-11-04 NOTE — PROGRESS NOTES
ANTICOAGULATION MANAGEMENT     Nancy RYANN Oropeza 77 year old female is on warfarin with therapeutic INR result. (Goal INR 2.0-3.0)    Recent labs: (last 7 days)     11/04/22  1715   INR 2.2*       ASSESSMENT     Source(s): Chart Review and Patient/Caregiver Call     Warfarin doses taken: Warfarin taken as instructed  Diet: No new diet changes identified  New illness, injury, or hospitalization: No  Medication/supplement changes: None noted  Signs or symptoms of bleeding or clotting: No  Previous INR: Therapeutic last 2(+) visits  Additional findings: None       PLAN     Recommended plan for no diet, medication or health factor changes affecting INR     Dosing Instructions: Continue your current warfarin dose with next INR in 1 week       Summary  As of 11/4/2022    Full warfarin instructions:  2.5 mg every Sun, Wed; 1.25 mg all other days; Starting 11/4/2022   Next INR check:  11/11/2022             Telephone call with Nancy who verbalizes understanding and agrees to plan    Patient to recheck with home meter    Education provided:   Goal range and lab monitoring: goal range and significance of current result    Plan made per [facility] anticoagulation protocol    Torey Cárdenas RN  Anticoagulation Clinic  11/4/2022    _______________________________________________________________________     Anticoagulation Episode Summary     Current INR goal:  2.0-3.0   TTR:  62.2 % (1 y)   Target end date:  Indefinite   Send INR reminders to:  [facility]    Indications    Pulmonary embolism (H) [I26.99]  Pulmonary embolism  unspecified chronicity  unspecified pulmonary embolism type  unspecified whether acute cor pulmonale present (H) [I26.99]           Comments:  [facility] Home Meter         Anticoagulation Care Providers     Provider Role Specialty Phone number    Julien Garnica MD Referring Family Medicine [phone]

## 2022-11-11 ENCOUNTER — TELEPHONE (OUTPATIENT)
Dept: FAMILY MEDICINE | Facility: CLINIC | Age: 77
End: 2022-11-11

## 2022-11-11 ENCOUNTER — ANTICOAGULATION THERAPY VISIT (OUTPATIENT)
Dept: ANTICOAGULATION | Facility: CLINIC | Age: 77
End: 2022-11-11

## 2022-11-11 ENCOUNTER — TRANSFERRED RECORDS (OUTPATIENT)
Dept: HEALTH INFORMATION MANAGEMENT | Facility: CLINIC | Age: 77
End: 2022-11-11

## 2022-11-11 DIAGNOSIS — I26.99 PULMONARY EMBOLISM (H): Primary | ICD-10-CM

## 2022-11-11 DIAGNOSIS — I26.99 PULMONARY EMBOLISM, UNSPECIFIED CHRONICITY, UNSPECIFIED PULMONARY EMBOLISM TYPE, UNSPECIFIED WHETHER ACUTE COR PULMONALE PRESENT (H): ICD-10-CM

## 2022-11-11 LAB — INR (EXTERNAL): 2.3 (ref 0.8–1.1)

## 2022-11-11 NOTE — TELEPHONE ENCOUNTER
Pt would like to know when she should follow up in clinic to check new dose of levothyroxine. Please advise.

## 2022-11-11 NOTE — PROGRESS NOTES
ANTICOAGULATION MANAGEMENT     Nancy Oropeza 77 year old female is on warfarin with therapeutic INR result. (Goal INR 2.0-3.0)    Recent labs: (last 7 days)     11/11/22  0000   INR 2.3*       ASSESSMENT     Source(s): Chart Review and Patient/Caregiver Call     Warfarin doses taken: Warfarin taken as instructed  Diet: No new diet changes identified  New illness, injury, or hospitalization: No. No Cholecystectomy performed  Medication/supplement changes: None noted  Signs or symptoms of bleeding or clotting: No  Previous INR: Therapeutic last 2(+) visits  Additional findings: None       PLAN     Recommended plan for no diet, medication or health factor changes affecting INR     Dosing Instructions: Continue your current warfarin dose with next INR in 1 week (is going to plan on testing Wednesdays again)      Summary  As of 11/11/2022    Full warfarin instructions:  2.5 mg every Sun, Wed; 1.25 mg all other days; Starting 11/11/2022   Next INR check:  11/16/2022             Telephone call with Nancy who verbalizes understanding and agrees to plan    Patient to recheck with home meter    Education provided:   Contact [phone]  with any changes, questions or concerns.     Plan made per [facility] anticoagulation protocol    Kavitha Sousa RN  Anticoagulation Clinic  11/11/2022    _______________________________________________________________________     Anticoagulation Episode Summary     Current INR goal:  2.0-3.0   TTR:  63.7 % (1 y)   Target end date:  Indefinite   Send INR reminders to:  [facility] MONIT[facility]    Indications    Pulmonary embolism (H) [I26.99]  Pulmonary embolism  unspecified chronicity  unspecified pulmonary embolism type  unspecified whether acute cor pulmonale present (H) [I26.99]           Comments:  [facility] Home Meter         Anticoagulation Care Providers     Provider Role Specialty Phone number    Julien Garnica MD Referring Family Medicine [phone]

## 2022-11-18 ENCOUNTER — TELEPHONE (OUTPATIENT)
Dept: FAMILY MEDICINE | Facility: CLINIC | Age: 77
End: 2022-11-18

## 2022-11-18 NOTE — TELEPHONE ENCOUNTER
Pt called again very angry that this hadn't been addressed. Stated it's unacceptable that she should have to call three times. She was rude when I couldn't answer her questions to her satisfaction and hung up.

## 2022-11-18 NOTE — TELEPHONE ENCOUNTER
Reason for call:  Other     Patient called regarding (reason for call): prescription    Additional comments: Pt feels that's she's injured her low back/R buttock trying to pass stools over the past four days. She's been taking OTC stool softeners with no relief. Requesting something stronger to help and also something for pain relief. She's asking that she gets a call back today.    Phone number to reach patient:  Cell number on file:    Telephone Information:   Mobile [phone]       Best Time:  Any    Can we leave a detailed message on this number?  YES

## 2022-11-21 NOTE — TELEPHONE ENCOUNTER
Spoke with patient.  May increase Tramadol from 2x/day instead up to 3x/day and use with APAP 500 mg TID for lower back pain.  Discussed constipation management and treatment options.  May use Miralax up to twice daily.  Fleets enema PRN.  High fiber diet, etc.

## 2022-11-21 NOTE — TELEPHONE ENCOUNTER
Pt is calling stating that she is wondering why she has not gotten a call back. Pt had to call 3 times on Friday and again today. Pt was not happy that she has not heard back yet.     Informed her that I would get another message to provider and we will have to wait for him to address this further.     Pt expressed understanding and will wait for Dr. Garnica

## 2022-11-25 NOTE — PROGRESS NOTES
ANTICOAGULATION MANAGEMENT     Nancy RYANN Oropeza 77 year old female is on warfarin with therapeutic INR result. (Goal INR 2.0-3.0)    Recent labs: (last 7 days)     11/23/22  0940   INR 2.0*       ASSESSMENT     Source(s): Chart Review and Patient/Caregiver Call     Warfarin doses taken: Warfarin taken as instructed  Diet: No new diet changes identified  New illness, injury, or hospitalization: No  Medication/supplement changes: None noted  Signs or symptoms of bleeding or clotting: No  Previous INR: Therapeutic last 2(+) visits  Additional findings: None       PLAN     Recommended plan for no diet, medication or health factor changes affecting INR     Dosing Instructions: Continue your current warfarin dose with next INR in 12 days       Summary  As of 11/25/2022    Full warfarin instructions:  2.5 mg every Sun, Wed; 1.25 mg all other days; Starting 11/25/2022   Next INR check:  11/30/2022             Telephone call with Nancy who verbalizes understanding and agrees to plan    Orders given to  Homecare nurse/facility to recheck    Education provided:   Goal range and lab monitoring: goal range and significance of current result  Resume manage by exception with home monitor. Continue to submit INR results to home monitor company.You will only be called when your result is out of range. Please call and notify [facility] if new medication started, dose missed, signs or symptoms of bleeding or clotting, or a surgery/procedure is scheduled.    Plan made per [facility] anticoagulation protocol    Torey Cárdenas RN  Anticoagulation Clinic  11/25/2022    _______________________________________________________________________     Anticoagulation Episode Summary     Current INR goal:  2.0-3.0   TTR:  64.6 % (1 y)   Target end date:  Indefinite   Send INR reminders to:  [facility] MONITOR[facility]    Indications    Pulmonary embolism (H) [I26.99]  Pulmonary embolism  unspecified chronicity  unspecified pulmonary embolism type  unspecified  whether acute cor pulmonale present (H) [I26.99]           Comments:  [facility] Home Meter// Manage by exception         Anticoagulation Care Providers     Provider Role Specialty Phone number    Julien Garnica MD Referring Family Medicine [phone]

## 2022-11-29 NOTE — TELEPHONE ENCOUNTER
"Pt calling with constant, unrelieved pain localized in fingers, arms and back.Trouble sleeping, difficulty doing regular daily activities and chores.   H/o polymyalgia, \"bad back\" and \"bad arm\".  Normally contrrolled on ES Tylenol and Ultram.   Currently maxing these out without relief.     Asking about Thyroid, that she had a change in dosing recently and wondering if she should have the level checked again? She said that was the only thing different lately.      Will route to PCP and face to face. Nicol Clay RN on 11/29/2022 at 2:13 PM    "

## 2022-12-02 PROBLEM — M25.511 CHRONIC RIGHT SHOULDER PAIN: Status: ACTIVE | Noted: 2022-01-01

## 2022-12-02 PROBLEM — G89.29 CHRONIC RIGHT SHOULDER PAIN: Status: ACTIVE | Noted: 2022-01-01

## 2022-12-02 NOTE — PROGRESS NOTES
ANTICOAGULATION MANAGEMENT     Nancy Navarrorimikki 77 year old female is on warfarin with subtherapeutic INR result. (Goal INR 2.0-3.0)    Recent labs: (last 7 days)     12/02/22  0000   INR 1.2*       ASSESSMENT     Source(s): Chart Review and Patient/Caregiver Call     Warfarin doses taken: Missed dose(s) may be affecting INR  Diet: No new diet changes identified  New illness, injury, or hospitalization: Yes: increased back pain  Medication/supplement changes: None noted  Signs or symptoms of bleeding or clotting: No  Previous INR: Therapeutic last 2(+) visits  Additional findings: None       PLAN     Recommended plan for no diet, medication or health factor changes affecting INR     Dosing Instructions: booster dose then continue your current warfarin dose with next INR in 1 week       Summary  As of 12/2/2022    Full warfarin instructions:  12/2: 2.5 mg; Otherwise 2.5 mg every Sun, Wed; 1.25 mg all other days; Starting 12/2/2022   Next INR check:  12/9/2022             Telephone call with Nancy who agrees to plan and repeated back plan correctly    Patient to recheck with home meter    Education provided:   Taking warfarin: Importance of taking warfarin as instructed  Symptom monitoring: monitoring for clotting signs and symptoms, monitoring for stroke signs and symptoms and when to seek medical attention/emergency care    Plan made per [facility] anticoagulation protocol    Evie Lagos RN  Anticoagulation Clinic  12/2/2022    _______________________________________________________________________     Anticoagulation Episode Summary     Current INR goal:  2.0-3.0   TTR:  62.8 % (1 y)   Target end date:  Indefinite   Send INR reminders to:  [facility]    Indications    Pulmonary embolism (H) [I26.99]  Pulmonary embolism  unspecified chronicity  unspecified pulmonary embolism type  unspecified whether acute cor pulmonale present (H) [I26.99]           Comments:  [facility] Home Meter// Manage by  exception         Anticoagulation Care Providers     Provider Role Specialty Phone number    Julien Garnica MD Referring Family Medicine [phone]

## 2022-12-02 NOTE — PROGRESS NOTES
Assessment/Plan:    Chronic midline low back pain without sciatica  Chronic lower back pain.  Patient with questions regarding potential PMR etiology as below.  Utilizing tramadol 50 mg every 6 hours as needed.  Refill provided.  Will reassess at follow-up January 17, 2023 to ensure desired improvement.    Polymyalgia rheumatica (H)  Check sedimentation rate.  Prednisone 15 mg daily x2 weeks then 12.5 mg daily x2 weeks then 10 mg daily until follow-up office visit January 17, 2023.  - predniSONE (DELTASONE) 5 MG tablet  Dispense: 90 tablet; Refill: 3  - Erythrocyte sedimentation rate auto    Chronic right shoulder pain  Chronic right shoulder pain.  Tramadol as needed.  - traMADol (ULTRAM) 50 MG tablet  Dispense: 60 tablet; Refill: 0    Hypothyroidism, unspecified type  Recent suppressed TSH noted at 0.16 and will update TSH and free T4 today.  Continues use of levothyroxine 125 mcg daily.  - TSH  - T4 free    Stage 3a chronic kidney disease (H)  CKD stage IIIa.  Base metabolic panel to be completed to ensure stable findings.  - Basic metabolic panel    Normochromic normocytic anemia  Normochromic normocytic anemia with prior hemoglobin 9.2 and MCV 88 with chronic kidney disease contributing factor.  - CBC with platelets    Encounter for immunization  Adacel booster to be provided.  - TDAP VACCINE (Adacel, Boostrix)  [[id number]]            Subjective:    Nancy Oropeza is seen today for evaluation of ongoing back pain.  Lower back.  Also right shoulder.  Tramadol utilized.  Wondering about potential polymyalgia rheumatica flare.  Using prednisone 5 mg daily.  Would be interested in increasing dose.  CKD stage IIIa with normochromic normocytic anemia historically.  Has had suppressed TSH while on levothyroxine 125 mcg daily.  Most recently October 14, 2022 labs noted TSH 0.16 and MCV 88 with hemoglobin 9.2.  Comprehensive review of systems as above otherwise all negative.       5 children (Nancy Alegria,  Akash Suazo, Thee) - Akash had coronary stent while Thee with Factor V abnormality and rectal cancer   14 grandchildren   16 great-grandchildren   Grew up in [place], [place] til age 12...   No smoke (quit 16 years ago after 1 ppd x 30+ years)   EtOH: occ wine   Mom -  88 COPD   Dad -  61 massive MI   1 bro -  67 blood clot (lung)   2 sis - one of which is  age 62 small cell lung CA (h/o smoker)   Surgeries: ventral hernia repair with muscle flap 10/4/12 with post-op complication of seroma with J.P. drain in place followed by interventional radiology q 2 weeks);  age 25; groin hernia age 3; CAPRICE-BSO  by Dr. Herbert Carmen (due to benign cyst x 2); right TKA 18 (Dr. Small)   Hospitalizations: as above   Work: retired  ([employer] as )     Past Surgical History:   Procedure Laterality Date     arthroplasty for hammertoe-2nd toe R  Biebl[       BIOPSY BREAST Left 1970s     Bunion correction by Cheilectomy-had bunionectomy ? type of procedure L and R separate procedures.[       GYN SURGERY       HERNIA REPAIR       Hernia Repair Inguinal unilateral[       HYSTERECTOMY       HYSTERECTOMY       IR ABSCESS TUBE CHANGE  2012     IR ABSCESS TUBE CHANGE  2012     IR ABSCESS TUBE CHANGE  2012     IR ABSCESS TUBE CHANGE  2013     IR ABSCESS TUBE CHANGE  3/1/2013     IR ABSCESS TUBE CHANGE  3/13/2013     OOPHORECTOMY  2010     ORTHOPEDIC SURGERY          Family History   Problem Relation Age of Onset     Breast Cancer Sister 75.00     Stomach Cancer Paternal Grandfather      Lung Cancer Sister      Chronic Obstructive Pulmonary Disease Mother      Heart Disease Mother      Coronary Artery Disease Father         Past Medical History:   Diagnosis Date      novel coronavirus disease (COVID-19) 2020     Acute bilateral knee pain 2017     TEOFILO (acute kidney injury) (H) 2020     Anemia, unspecified     Created by  Conversion      Angioedema 12/17/2015     Anticoagulant long-term use 3/6/2017     Back pain     Created by Conversion      Chronic pain syndrome 6/1/2018     COVID-19 virus infection 7/17/2020     Depression      Diarrhea 7/16/2020     Disease of thyroid gland      Edema     Created by Conversion      Essential hypertension with goal blood pressure less than 140/90     Created by Conversion  Replacement Utility updated for latest IMO load     History of pulmonary embolism 1/1/2015    Overview:  bilateral with acute cor pulmonale      HTN (hypertension)      Hypercholesteremia      Hypokalemia 12/17/2015     Hypothyroidism     Created by Conversion  Replacement Utility updated for latest IMO load     Hypoxia 7/16/2020     Impaired fasting glucose     Created by Conversion      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     Major depressive disorder, recurrent episode (H)     Created by Conversion  Replacement Utility updated for latest IMO load     Mood disorder (H) 7/16/2020     Morbid obesity (H)      Multiple lung nodules on CT 12/17/2015     Obstructive sleep apnea (adult) (pediatric)     Created by Conversion      Osteoarthrosis involving lower leg     Created by Conversion  Replacement Utility updated for latest IMO load     Pain in joint, multiple sites     Created by Conversion      Polymyalgia rheumatica (H) 7/16/2020     Postoperative anemia due to acute blood loss 12/18/2018     Pulmonary emboli (H) 12/14/2015     Pulmonary embolism (H) 12/21/2015     Pure hypercholesterolemia     Created by Conversion      Sepsis (H) 7/28/2020     SIRS (systemic inflammatory response syndrome) (H) 7/28/2020     Unspecified cataract     Created by Conversion      Yeast infection         Social History     Tobacco Use     Smoking status: Former     Smokeless tobacco: Never     Tobacco comments:     quite 20 yrs ago   Substance Use Topics     Alcohol use: No     Comment: Alcoholic Drinks/day: occasionally     Drug use: No        Current  Outpatient Medications   Medication Sig Dispense Refill     acetaminophen 500 MG CAPS Take 2 capsules by mouth 3 times daily as needed.       amLODIPine (NORVASC) 5 MG tablet TAKE 1 TABLET DAILY (NEW   DOSE 6/1/18) 90 tablet 3     BD ULTRA-FINE 33 LANCETS Use as directed       Cholecalciferol (VITAMIN D) 400 UNITS capsule Take 2 capsules by mouth daily.       citalopram (CELEXA) 40 MG tablet Take 1 tablet (40 mg) by mouth daily 90 tablet 3     furosemide (LASIX) 20 MG tablet Take 1 tablet (20 mg) by mouth every morning 90 tablet 3     Glucose Blood (ONE TOUCH ULTRA TEST) strip by In Vitro route daily Use as directed       levothyroxine (SYNTHROID/LEVOTHROID) 125 MCG tablet Take 1 tablet (125 mcg) by mouth daily 90 tablet 3     lisinopril-hydrochlorothiazide (ZESTORETIC) 20-12.5 MG tablet Take 2 tablets by mouth daily 180 tablet 3     pravastatin (PRAVACHOL) 80 MG tablet Take 1 tablet (80 mg) by mouth At Bedtime 90 tablet 3     predniSONE (DELTASONE) 5 MG tablet Take 3 tablets (15 mg) by mouth daily x 2 weeks, then 2.5 tablets (12.5 mg) by mouth daily x 2 weeks, then take 2 tablets (10 mg) by mouth daily 90 tablet 3     traMADol (ULTRAM) 50 MG tablet TAKE 1 TABLET EVERY 6 HOURS AS NEEDED FOR SEVERE PAIN 60 tablet 0     warfarin ANTICOAGULANT (COUMADIN) 2.5 MG tablet take 1 tablet (2.5 mg) by mouth every Mon, Wed, Fri; take one-half tablet (1.25 mg) by mouth all other days of the week 60 tablet 5          Objective:    Vitals:    12/02/22 1353   BP: 120/60   Pulse: 91   Resp: 21   SpO2: 95%   Weight: 125.2 kg (276 lb)      Body mass index is 44.55 kg/m .    Alert.  Pleasant.  No apparent distress.  Transfer slowly.  Extremities are warm and dry.      This note has been dictated using voice recognition software and as a result may contain minor grammatical errors and unintended word substitutions.

## 2022-12-05 NOTE — TELEPHONE ENCOUNTER
Pt asked if she could take ES Tylenol while she is on the Prednisone, and I advised her that she could. Generally, she states she is feeling much better since the increase in Prednisone for Polymyalgia. Nicol Clay RN on 12/5/2022 at 1:53 PM

## 2022-12-05 NOTE — TELEPHONE ENCOUNTER
Pt is calling wanting to schedule her bone density scan as she is due for her 2 year follow up. Please place orders if appropriate.

## 2022-12-07 NOTE — PROGRESS NOTES
ANTICOAGULATION MANAGEMENT     Nancy Oropeza 77 year old female is on warfarin with subtherapeutic INR result. (Goal INR 2.0-3.0)    Recent labs: (last 7 days)     12/07/22  0000   INR 1.8*       ASSESSMENT     Source(s): Chart Review and Patient/Caregiver Call     Warfarin doses taken: Warfarin taken as instructed  Diet: No new diet changes identified  New illness, injury, or hospitalization: No  Medication/supplement changes: Prednisone started on 12/2/22 which may be increasing INR today-patient is on an extended taper of prednisone  Signs or symptoms of bleeding or clotting: No  Previous INR: Subtherapeutic  Additional findings: will monitor INR this next week and see if it increases with the prednisone       PLAN     Recommended plan for temporary change(s) affecting INR     Dosing Instructions: Continue your current warfarin dose with next INR in 1 week       Summary  As of 12/7/2022    Full warfarin instructions:  2.5 mg every Sun, Wed; 1.25 mg all other days; Starting 12/7/2022   Next INR check:  12/14/2022             Telephone call with Nancy who verbalizes understanding and agrees to plan  Sent [employer] message with dosing and follow up instructions    Patient to recheck with home meter    Education provided:   Importance of notifying anticoagulation clinic for: changes in medications; a sooner lab recheck maybe needed    Plan made with [facility] Pharmacist Marlene Lagos, RN  Anticoagulation Clinic  12/7/2022    _______________________________________________________________________     Anticoagulation Episode Summary     Current INR goal:  2.0-3.0   TTR:  62.1 % (1 y)   Target end date:  Indefinite   Send INR reminders to:  [facility]    Indications    Pulmonary embolism (H) [I26.99]  Pulmonary embolism  unspecified chronicity  unspecified pulmonary embolism type  unspecified whether acute cor pulmonale present (H) [I26.99]           Comments:  [facility] M[facility]//  Manage by exception         Anticoagulation Care Providers     Provider Role Specialty Phone number    Julien Garnica MD Referring Family Medicine [phone]

## 2022-12-14 NOTE — PROGRESS NOTES
ANTICOAGULATION MANAGEMENT     Nancy RYANN Oropeza 77 year old female is on warfarin with subtherapeutic INR result. (Goal INR 2.0-3.0)    Recent labs: (last 7 days)     12/14/22  0000   INR 1.8*       ASSESSMENT     Source(s): Chart Review and Patient/Caregiver Call     Warfarin doses taken: More warfarin taken than planned which may be affecting INR but did not increase INR  Diet: No new diet changes identified  New illness, injury, or hospitalization: No  Medication/supplement changes: Prednisone dose decreased to 10 mg daily for the next two weeks  Signs or symptoms of bleeding or clotting: No  Previous INR: Subtherapeutic  Additional findings: None          PLAN     Recommended plan for no diet, medication or health factor changes affecting INR     Dosing Instructions: Increase your warfarin dose (22.2% change) with next INR in 1 week       Summary  As of 12/14/2022    Full warfarin instructions:  1.25 mg every Tue, Thu, Sat; 2.5 mg all other days; Starting 12/14/2022   Next INR check:  12/21/2022             Telephone call with Nancy who agrees to plan and repeated back plan correctly  Sent [employer] message with dosing and follow up instructions    Patient to recheck with home meter    Education provided:   Interaction IS anticipated between warfarin and prednisone but so far it has not increased it    Plan made with [facility] Pharmacist Marlene Lagos, RN  Anticoagulation Clinic  12/14/2022    _______________________________________________________________________     Anticoagulation Episode Summary     Current INR goal:  2.0-3.0   TTR:  60.2 % (1 y)   Target end date:  Indefinite   Send INR reminders to:  [facility]    Indications    Pulmonary embolism (H) [I26.99]  Pulmonary embolism  unspecified chronicity  unspecified pulmonary embolism type  unspecified whether acute cor pulmonale present (H) [I26.99]           Comments:  [facility] Home Meter// Manage by exception          Anticoagulation Care Providers     Provider Role Specialty Phone number    Julien Garnica MD Referring Family Medicine [phone]

## 2022-12-22 NOTE — TELEPHONE ENCOUNTER
Reason for Call:  Other call back    Detailed comments: pt would like call back from inr nurse. Pt stated home inr was done today and is 1.8. Please call pt back to discuss.    Phone Number Patient can be reached at: Home number on file [phone] (home)    Best Time: na    Can we leave a detailed message on this number? Not Applicable    Call taken on 12/22/2022 at 10:42 AM by Didi Nava

## 2022-12-22 NOTE — PROGRESS NOTES
ANTICOAGULATION MANAGEMENT     Nancy Navarroabhay 77 year old female is on warfarin with subtherapeutic INR result. (Goal INR 2.0-3.0)    Recent labs: (last 7 days)     12/22/22  1106   INR 1.8*       ASSESSMENT     Source(s): Chart Review and Patient/Caregiver Call     Warfarin doses taken: Warfarin taken as instructed  Diet: Feels like she is overall eating less, not as hungry.  New illness, injury, or hospitalization: No  Medication/supplement changes: Tapering down prednisone.  She just finished 2 weeks of 15 mg.  She will now be on 12.5 mg for 2 weeks.  Prednisone can increase INR so as prednisone is tapered down, may need to increase warfarin dose.  Signs or symptoms of bleeding or clotting: No  Previous INR: Subtherapeutic  Additional findings: None       PLAN     Recommended plan for ongoing change(s) affecting INR     Dosing Instructions: Increase your warfarin dose (9% change) with next INR in 6 days       Summary  As of 12/22/2022    Full warfarin instructions:  1.25 mg every Tue, Sat; 2.5 mg all other days   Next INR check:  12/28/2022             Telephone call with Nancy who verbalizes understanding and agrees to plan    Patient to recheck with home meter    Education provided:   Goal range and lab monitoring: goal range and significance of current result    Plan made per [facility] anticoagulation protocol    Torey Cádrenas RN  Anticoagulation Clinic  12/22/2022    _______________________________________________________________________     Anticoagulation Episode Summary     Current INR goal:  2.0-3.0   TTR:  59.8 % (1 y)   Target end date:  Indefinite   Send INR reminders to:  [facility]    Indications    Pulmonary embolism (H) [I26.99]  Pulmonary embolism  unspecified chronicity  unspecified pulmonary embolism type  unspecified whether acute cor pulmonale present (H) [I26.99]           Comments:  [facility]// Manage by exception         Anticoagulation Care Providers     Provider Role  Specialty Phone number    Julien Garnica MD Referring Family Medicine [phone]

## 2022-12-29 NOTE — PROGRESS NOTES
ANTICOAGULATION     Nancy Oropeza is overdue for INR check.      myChart sent    Torey Cárdenas RN

## 2022-12-30 NOTE — TELEPHONE ENCOUNTER
Reason for call:  Other   Patient called regarding (reason for call): call back  Additional comments: Patient called requesting to speak with a nurse. Her most recent INR results were 3.5 and she would like to discuss. Please advise.     Phone number to reach patient:  Home number on file [phone] (home)    Best Time:  Asap    Can we leave a detailed message on this number?  YES    Travel screening: Not Applicable

## 2022-12-30 NOTE — TELEPHONE ENCOUNTER
ANTICOAGULATION MANAGEMENT     Nancy Oropeza 77 year old female is on warfarin with supratherapeutic INR result. (Goal INR )    Recent labs: (last 7 days)     12/30/22  1153   INR 3.5*       ASSESSMENT       Source(s): Chart Review and Patient/Caregiver Call       Warfarin doses taken: Warfarin taken as instructed    Diet: Decreased greens/vitamin K in diet; ongoing change    New illness, injury, or hospitalization: No    Medication/supplement changes: Prednisone being tapered; 12 mg until Monday 1/2 >>10 mg for two weeks then 5 mg ongoing    Signs or symptoms of bleeding or clotting: No    Previous INR: Subtherapeutic    Additional findings: None       PLAN     Recommended plan for temporary change(s) and ongoing change(s) affecting INR     Dosing Instructions: partial hold then decrease your warfarin dose (8.3% change) with next INR in 1 week       Summary  As of 12/30/2022    Full warfarin instructions:  12/30: 1.25 mg; Otherwise 1.25 mg every Mon, Wed, Sat; 2.5 mg all other days   Next INR check:  1/6/2023             Telephone call with Nancy who verbalizes understanding and agrees to plan and who agrees to plan and repeated back plan correctly    Patient to recheck with home meter    Education provided:     Please call back if any changes to your diet, medications or how you've been taking warfarin    Dietary considerations: importance of consistent vitamin K intake    Plan made per [facility] anticoagulation protocol    Richa Clark, RN  Anticoagulation Clinic  12/30/2022    _______________________________________________________________________     Anticoagulation Episode Summary     Current INR goal:  2.0-3.0   TTR:  60.8 % (1 y)   Target end date:  Indefinite   Send INR reminders to:  [facility]    Indications    Pulmonary embolism (H) [I26.99]  Pulmonary embolism  unspecified chronicity  unspecified pulmonary embolism type  unspecified whether acute cor pulmonale present (H) [I26.99]            Comments:  [facility] Home Meter// Manage by exception         Anticoagulation Care Providers     Provider Role Specialty Phone number    Julien Garnica MD Referring Family Medicine [phone]

## 2022-12-30 NOTE — TELEPHONE ENCOUNTER
Received a fax of 3.5 INR from [facility]. Date of test is 12/30/2022.    Kera Crespo RN    [facility] Anticoagulation [facility]

## 2023-01-01 ENCOUNTER — TRANSFERRED RECORDS (OUTPATIENT)
Dept: HEALTH INFORMATION MANAGEMENT | Facility: CLINIC | Age: 78
End: 2023-01-01
Payer: COMMERCIAL

## 2023-01-01 ENCOUNTER — ANTICOAGULATION THERAPY VISIT (OUTPATIENT)
Dept: ANTICOAGULATION | Facility: CLINIC | Age: 78
End: 2023-01-01

## 2023-01-01 ENCOUNTER — TELEPHONE (OUTPATIENT)
Dept: FAMILY MEDICINE | Facility: CLINIC | Age: 78
End: 2023-01-01
Payer: COMMERCIAL

## 2023-01-01 ENCOUNTER — HOSPITAL ENCOUNTER (OUTPATIENT)
Dept: GENERAL RADIOLOGY | Facility: HOSPITAL | Age: 78
Discharge: HOME | End: 2023-10-16
Attending: NURSE PRACTITIONER | Admitting: NURSE PRACTITIONER
Payer: COMMERCIAL

## 2023-01-01 ENCOUNTER — TELEPHONE (OUTPATIENT)
Dept: FAMILY MEDICINE | Facility: CLINIC | Age: 78
End: 2023-01-01

## 2023-01-01 ENCOUNTER — OFFICE VISIT (OUTPATIENT)
Dept: FAMILY MEDICINE | Facility: CLINIC | Age: 78
End: 2023-01-01
Payer: COMMERCIAL

## 2023-01-01 ENCOUNTER — NURSE TRIAGE (OUTPATIENT)
Dept: FAMILY MEDICINE | Facility: CLINIC | Age: 78
End: 2023-01-01
Payer: COMMERCIAL

## 2023-01-01 ENCOUNTER — TRANSFERRED RECORDS (OUTPATIENT)
Dept: HEALTH INFORMATION MANAGEMENT | Facility: CLINIC | Age: 78
End: 2023-01-01

## 2023-01-01 ENCOUNTER — HOSPITAL ENCOUNTER (OUTPATIENT)
Dept: CARDIOLOGY | Facility: HOSPITAL | Age: 78
Discharge: HOME | End: 2023-05-11
Attending: INTERNAL MEDICINE
Payer: COMMERCIAL

## 2023-01-01 ENCOUNTER — ANTICOAGULATION THERAPY VISIT (OUTPATIENT)
Dept: ANTICOAGULATION | Facility: CLINIC | Age: 78
End: 2023-01-01
Payer: COMMERCIAL

## 2023-01-01 ENCOUNTER — OFFICE VISIT (OUTPATIENT)
Dept: PHYSICAL MEDICINE AND REHAB | Facility: CLINIC | Age: 78
End: 2023-01-01
Attending: NURSE PRACTITIONER
Payer: COMMERCIAL

## 2023-01-01 ENCOUNTER — DOCUMENTATION ONLY (OUTPATIENT)
Dept: ANTICOAGULATION | Facility: CLINIC | Age: 78
End: 2023-01-01
Payer: COMMERCIAL

## 2023-01-01 ENCOUNTER — HOSPITAL ENCOUNTER (OUTPATIENT)
Dept: NUCLEAR MEDICINE | Facility: HOSPITAL | Age: 78
Discharge: HOME | End: 2023-05-11
Attending: INTERNAL MEDICINE
Payer: COMMERCIAL

## 2023-01-01 ENCOUNTER — VIRTUAL VISIT (OUTPATIENT)
Dept: FAMILY MEDICINE | Facility: CLINIC | Age: 78
End: 2023-01-01
Payer: COMMERCIAL

## 2023-01-01 ENCOUNTER — DOCUMENTATION ONLY (OUTPATIENT)
Dept: ANTICOAGULATION | Facility: CLINIC | Age: 78
End: 2023-01-01

## 2023-01-01 ENCOUNTER — MEDICAL CORRESPONDENCE (OUTPATIENT)
Dept: HEALTH INFORMATION MANAGEMENT | Facility: CLINIC | Age: 78
End: 2023-01-01
Payer: COMMERCIAL

## 2023-01-01 ENCOUNTER — OFFICE VISIT (OUTPATIENT)
Dept: CARDIOLOGY | Facility: CLINIC | Age: 78
End: 2023-01-01
Attending: FAMILY MEDICINE
Payer: COMMERCIAL

## 2023-01-01 ENCOUNTER — TELEPHONE (OUTPATIENT)
Dept: DERMATOLOGY | Facility: CLINIC | Age: 78
End: 2023-01-01
Payer: COMMERCIAL

## 2023-01-01 ENCOUNTER — MYC MEDICAL ADVICE (OUTPATIENT)
Dept: FAMILY MEDICINE | Facility: CLINIC | Age: 78
End: 2023-01-01
Payer: COMMERCIAL

## 2023-01-01 ENCOUNTER — LAB (OUTPATIENT)
Dept: LAB | Facility: CLINIC | Age: 78
End: 2023-01-01
Payer: COMMERCIAL

## 2023-01-01 ENCOUNTER — NURSE TRIAGE (OUTPATIENT)
Dept: NURSING | Facility: CLINIC | Age: 78
End: 2023-01-01
Payer: COMMERCIAL

## 2023-01-01 ENCOUNTER — HOSPITAL ENCOUNTER (OUTPATIENT)
Dept: ULTRASOUND IMAGING | Facility: HOSPITAL | Age: 78
Discharge: HOME | End: 2023-05-11
Attending: INTERNAL MEDICINE
Payer: COMMERCIAL

## 2023-01-01 ENCOUNTER — HOSPITAL ENCOUNTER (OUTPATIENT)
Facility: HOSPITAL | Age: 78
Discharge: HOME | End: 2023-10-16
Attending: INTERNAL MEDICINE | Admitting: INTERNAL MEDICINE
Payer: COMMERCIAL

## 2023-01-01 ENCOUNTER — HOSPITAL ENCOUNTER (EMERGENCY)
Facility: HOSPITAL | Age: 78
Discharge: HOME | End: 2023-08-16
Attending: EMERGENCY MEDICINE | Admitting: EMERGENCY MEDICINE
Payer: COMMERCIAL

## 2023-01-01 ENCOUNTER — TELEPHONE (OUTPATIENT)
Dept: PHYSICAL MEDICINE AND REHAB | Facility: CLINIC | Age: 78
End: 2023-01-01
Payer: COMMERCIAL

## 2023-01-01 VITALS
RESPIRATION RATE: 21 BRPM | SYSTOLIC BLOOD PRESSURE: 140 MMHG | BODY MASS INDEX: 44.71 KG/M2 | WEIGHT: 277 LBS | HEART RATE: 115 BPM | OXYGEN SATURATION: 96 % | DIASTOLIC BLOOD PRESSURE: 78 MMHG

## 2023-01-01 VITALS
OXYGEN SATURATION: 93 % | HEART RATE: 88 BPM | DIASTOLIC BLOOD PRESSURE: 66 MMHG | BODY MASS INDEX: 41.12 KG/M2 | RESPIRATION RATE: 18 BRPM | SYSTOLIC BLOOD PRESSURE: 131 MMHG | HEIGHT: 67 IN | TEMPERATURE: 97.8 F | WEIGHT: 262 LBS

## 2023-01-01 VITALS
RESPIRATION RATE: 19 BRPM | HEART RATE: 105 BPM | DIASTOLIC BLOOD PRESSURE: 64 MMHG | WEIGHT: 265 LBS | OXYGEN SATURATION: 96 % | SYSTOLIC BLOOD PRESSURE: 120 MMHG | BODY MASS INDEX: 41.84 KG/M2 | TEMPERATURE: 96.3 F

## 2023-01-01 VITALS
TEMPERATURE: 98.4 F | DIASTOLIC BLOOD PRESSURE: 58 MMHG | BODY MASS INDEX: 41.84 KG/M2 | RESPIRATION RATE: 18 BRPM | SYSTOLIC BLOOD PRESSURE: 117 MMHG | HEART RATE: 66 BPM | OXYGEN SATURATION: 94 % | WEIGHT: 265 LBS

## 2023-01-01 VITALS
WEIGHT: 265.8 LBS | TEMPERATURE: 97.8 F | RESPIRATION RATE: 16 BRPM | HEART RATE: 95 BPM | OXYGEN SATURATION: 95 % | HEIGHT: 66 IN | DIASTOLIC BLOOD PRESSURE: 64 MMHG | SYSTOLIC BLOOD PRESSURE: 122 MMHG | BODY MASS INDEX: 42.72 KG/M2

## 2023-01-01 VITALS
HEART RATE: 96 BPM | BODY MASS INDEX: 42.77 KG/M2 | RESPIRATION RATE: 20 BRPM | WEIGHT: 272.5 LBS | DIASTOLIC BLOOD PRESSURE: 76 MMHG | OXYGEN SATURATION: 96 % | TEMPERATURE: 98 F | HEIGHT: 67 IN | SYSTOLIC BLOOD PRESSURE: 118 MMHG

## 2023-01-01 VITALS
DIASTOLIC BLOOD PRESSURE: 59 MMHG | HEART RATE: 107 BPM | BODY MASS INDEX: 41.36 KG/M2 | SYSTOLIC BLOOD PRESSURE: 129 MMHG | WEIGHT: 263.5 LBS | HEIGHT: 67 IN

## 2023-01-01 VITALS
WEIGHT: 272 LBS | OXYGEN SATURATION: 94 % | SYSTOLIC BLOOD PRESSURE: 110 MMHG | DIASTOLIC BLOOD PRESSURE: 60 MMHG | TEMPERATURE: 97.2 F | BODY MASS INDEX: 42.94 KG/M2 | RESPIRATION RATE: 22 BRPM | HEART RATE: 105 BPM

## 2023-01-01 VITALS
SYSTOLIC BLOOD PRESSURE: 136 MMHG | DIASTOLIC BLOOD PRESSURE: 70 MMHG | WEIGHT: 269.7 LBS | OXYGEN SATURATION: 96 % | TEMPERATURE: 96.3 F | HEART RATE: 89 BPM | BODY MASS INDEX: 42.58 KG/M2 | RESPIRATION RATE: 18 BRPM

## 2023-01-01 VITALS
HEART RATE: 93 BPM | OXYGEN SATURATION: 97 % | WEIGHT: 265 LBS | TEMPERATURE: 96.8 F | RESPIRATION RATE: 15 BRPM | SYSTOLIC BLOOD PRESSURE: 110 MMHG | BODY MASS INDEX: 41.84 KG/M2 | DIASTOLIC BLOOD PRESSURE: 60 MMHG

## 2023-01-01 VITALS
HEART RATE: 99 BPM | RESPIRATION RATE: 16 BRPM | WEIGHT: 272 LBS | SYSTOLIC BLOOD PRESSURE: 134 MMHG | DIASTOLIC BLOOD PRESSURE: 81 MMHG | BODY MASS INDEX: 42.94 KG/M2 | OXYGEN SATURATION: 97 %

## 2023-01-01 VITALS
HEART RATE: 98 BPM | SYSTOLIC BLOOD PRESSURE: 120 MMHG | TEMPERATURE: 98 F | OXYGEN SATURATION: 95 % | DIASTOLIC BLOOD PRESSURE: 82 MMHG | RESPIRATION RATE: 16 BRPM

## 2023-01-01 DIAGNOSIS — R09.89 RIGHT CAROTID BRUIT: ICD-10-CM

## 2023-01-01 DIAGNOSIS — G89.29 CHRONIC RIGHT SHOULDER PAIN: ICD-10-CM

## 2023-01-01 DIAGNOSIS — I25.10 CORONARY ARTERY CALCIFICATION SEEN ON CT SCAN: Primary | ICD-10-CM

## 2023-01-01 DIAGNOSIS — F33.0 MILD EPISODE OF RECURRENT MAJOR DEPRESSIVE DISORDER (H): ICD-10-CM

## 2023-01-01 DIAGNOSIS — D64.9 NORMOCHROMIC NORMOCYTIC ANEMIA: ICD-10-CM

## 2023-01-01 DIAGNOSIS — M54.42 ACUTE LEFT-SIDED LOW BACK PAIN WITH LEFT-SIDED SCIATICA: ICD-10-CM

## 2023-01-01 DIAGNOSIS — I25.10 CORONARY ARTERY DISEASE INVOLVING NATIVE CORONARY ARTERY OF NATIVE HEART WITHOUT ANGINA PECTORIS: Primary | ICD-10-CM

## 2023-01-01 DIAGNOSIS — N18.32 STAGE 3B CHRONIC KIDNEY DISEASE (H): ICD-10-CM

## 2023-01-01 DIAGNOSIS — D50.9 IRON DEFICIENCY ANEMIA, UNSPECIFIED IRON DEFICIENCY ANEMIA TYPE: Primary | ICD-10-CM

## 2023-01-01 DIAGNOSIS — R73.01 IMPAIRED FASTING GLUCOSE: ICD-10-CM

## 2023-01-01 DIAGNOSIS — I26.99 PULMONARY EMBOLISM (H): Primary | ICD-10-CM

## 2023-01-01 DIAGNOSIS — E66.01 MORBID OBESITY (H): ICD-10-CM

## 2023-01-01 DIAGNOSIS — L02.92 FURUNCLE OF SKIN OR SUBCUTANEOUS TISSUE: Primary | ICD-10-CM

## 2023-01-01 DIAGNOSIS — M54.42 ACUTE LEFT-SIDED LOW BACK PAIN WITH LEFT-SIDED SCIATICA: Primary | ICD-10-CM

## 2023-01-01 DIAGNOSIS — F41.1 ANXIETY STATE: Primary | ICD-10-CM

## 2023-01-01 DIAGNOSIS — E66.01 CLASS 3 SEVERE OBESITY WITH SERIOUS COMORBIDITY AND BODY MASS INDEX (BMI) OF 40.0 TO 44.9 IN ADULT, UNSPECIFIED OBESITY TYPE (H): ICD-10-CM

## 2023-01-01 DIAGNOSIS — E78.00 PURE HYPERCHOLESTEROLEMIA: ICD-10-CM

## 2023-01-01 DIAGNOSIS — I26.99 PULMONARY EMBOLISM, UNSPECIFIED CHRONICITY, UNSPECIFIED PULMONARY EMBOLISM TYPE, UNSPECIFIED WHETHER ACUTE COR PULMONALE PRESENT (H): ICD-10-CM

## 2023-01-01 DIAGNOSIS — M43.16 SPONDYLOLISTHESIS OF LUMBAR REGION: ICD-10-CM

## 2023-01-01 DIAGNOSIS — F41.9 ANXIETY: Primary | ICD-10-CM

## 2023-01-01 DIAGNOSIS — I25.10 CORONARY ARTERY DISEASE INVOLVING NATIVE CORONARY ARTERY OF NATIVE HEART, UNSPECIFIED WHETHER ANGINA PRESENT: ICD-10-CM

## 2023-01-01 DIAGNOSIS — Z23 ENCOUNTER FOR IMMUNIZATION: ICD-10-CM

## 2023-01-01 DIAGNOSIS — I95.1 ORTHOSTATIC HYPOTENSION: Primary | ICD-10-CM

## 2023-01-01 DIAGNOSIS — F41.9 ANXIETY: ICD-10-CM

## 2023-01-01 DIAGNOSIS — M25.511 CHRONIC RIGHT SHOULDER PAIN: ICD-10-CM

## 2023-01-01 DIAGNOSIS — D50.0 IRON DEFICIENCY ANEMIA DUE TO CHRONIC BLOOD LOSS: Primary | ICD-10-CM

## 2023-01-01 DIAGNOSIS — R60.9 EDEMA, UNSPECIFIED TYPE: ICD-10-CM

## 2023-01-01 DIAGNOSIS — I26.99 PULMONARY EMBOLISM, UNSPECIFIED CHRONICITY, UNSPECIFIED PULMONARY EMBOLISM TYPE, UNSPECIFIED WHETHER ACUTE COR PULMONALE PRESENT (H): Primary | ICD-10-CM

## 2023-01-01 DIAGNOSIS — M35.3 POLYMYALGIA RHEUMATICA (H): ICD-10-CM

## 2023-01-01 DIAGNOSIS — G89.4 CHRONIC PAIN DISORDER: ICD-10-CM

## 2023-01-01 DIAGNOSIS — Z53.9 DIAGNOSIS NOT YET DEFINED: Primary | ICD-10-CM

## 2023-01-01 DIAGNOSIS — D64.9 NORMOCHROMIC NORMOCYTIC ANEMIA: Primary | ICD-10-CM

## 2023-01-01 DIAGNOSIS — E03.9 HYPOTHYROIDISM, UNSPECIFIED TYPE: ICD-10-CM

## 2023-01-01 DIAGNOSIS — S09.90XD CLOSED HEAD INJURY, SUBSEQUENT ENCOUNTER: ICD-10-CM

## 2023-01-01 DIAGNOSIS — I10 ESSENTIAL HYPERTENSION WITH GOAL BLOOD PRESSURE LESS THAN 140/90: ICD-10-CM

## 2023-01-01 DIAGNOSIS — M48.062 SPINAL STENOSIS OF LUMBAR REGION WITH NEUROGENIC CLAUDICATION: Primary | ICD-10-CM

## 2023-01-01 DIAGNOSIS — D50.9 IRON DEFICIENCY ANEMIA, UNSPECIFIED IRON DEFICIENCY ANEMIA TYPE: ICD-10-CM

## 2023-01-01 DIAGNOSIS — I26.99 PULMONARY EMBOLISM (H): ICD-10-CM

## 2023-01-01 DIAGNOSIS — G89.29 CHRONIC LEFT-SIDED LOW BACK PAIN WITH LEFT-SIDED SCIATICA: ICD-10-CM

## 2023-01-01 DIAGNOSIS — K59.00 CONSTIPATION, UNSPECIFIED CONSTIPATION TYPE: ICD-10-CM

## 2023-01-01 DIAGNOSIS — I25.10 CORONARY ARTERY DISEASE INVOLVING NATIVE CORONARY ARTERY OF NATIVE HEART WITHOUT ANGINA PECTORIS: ICD-10-CM

## 2023-01-01 DIAGNOSIS — N18.31 STAGE 3A CHRONIC KIDNEY DISEASE (H): ICD-10-CM

## 2023-01-01 DIAGNOSIS — R42 LIGHTHEADEDNESS: ICD-10-CM

## 2023-01-01 DIAGNOSIS — I95.1 ORTHOSTATIC HYPOTENSION: ICD-10-CM

## 2023-01-01 DIAGNOSIS — Z00.00 ENCOUNTER FOR MEDICARE ANNUAL WELLNESS EXAM: Primary | ICD-10-CM

## 2023-01-01 DIAGNOSIS — M54.42 CHRONIC LEFT-SIDED LOW BACK PAIN WITH LEFT-SIDED SCIATICA: ICD-10-CM

## 2023-01-01 DIAGNOSIS — F40.01 PANIC DISORDER WITH AGORAPHOBIA: ICD-10-CM

## 2023-01-01 DIAGNOSIS — E66.813 CLASS 3 SEVERE OBESITY WITH SERIOUS COMORBIDITY AND BODY MASS INDEX (BMI) OF 40.0 TO 44.9 IN ADULT, UNSPECIFIED OBESITY TYPE (H): ICD-10-CM

## 2023-01-01 DIAGNOSIS — R10.84 ABDOMINAL PAIN, GENERALIZED: Primary | ICD-10-CM

## 2023-01-01 DIAGNOSIS — F41.1 ANXIETY STATE: ICD-10-CM

## 2023-01-01 LAB
ABO/RH(D): NORMAL
ALBUMIN SERPL BCG-MCNC: 3.9 G/DL (ref 3.5–5.2)
ALBUMIN SERPL BCG-MCNC: 4.1 G/DL (ref 3.5–5.2)
ALBUMIN SERPL BCG-MCNC: 4.2 G/DL (ref 3.5–5.2)
ALBUMIN UR-MCNC: NEGATIVE MG/DL
ALBUMIN UR-MCNC: NEGATIVE MG/DL
ALP SERPL-CCNC: 81 U/L (ref 35–104)
ALP SERPL-CCNC: 82 U/L (ref 35–104)
ALP SERPL-CCNC: 88 U/L (ref 35–104)
ALT SERPL W P-5'-P-CCNC: 11 U/L (ref 0–50)
ALT SERPL W P-5'-P-CCNC: 12 U/L (ref 10–35)
ALT SERPL W P-5'-P-CCNC: 9 U/L (ref 0–50)
ANION GAP SERPL CALCULATED.3IONS-SCNC: 10 MMOL/L (ref 7–15)
ANION GAP SERPL CALCULATED.3IONS-SCNC: 11 MMOL/L (ref 7–15)
ANION GAP SERPL CALCULATED.3IONS-SCNC: 12 MMOL/L (ref 7–15)
ANION GAP SERPL CALCULATED.3IONS-SCNC: 12 MMOL/L (ref 7–15)
ANION GAP SERPL CALCULATED.3IONS-SCNC: 14 MMOL/L (ref 7–15)
ANION GAP SERPL CALCULATED.3IONS-SCNC: 15 MMOL/L (ref 7–15)
ANION GAP SERPL CALCULATED.3IONS-SCNC: 15 MMOL/L (ref 7–15)
ANTIBODY SCREEN: NEGATIVE
APPEARANCE UR: ABNORMAL
APPEARANCE UR: CLEAR
AST SERPL W P-5'-P-CCNC: 20 U/L (ref 0–45)
AST SERPL W P-5'-P-CCNC: 22 U/L (ref 0–45)
AST SERPL W P-5'-P-CCNC: 23 U/L (ref 10–35)
BACTERIA #/AREA URNS HPF: ABNORMAL /HPF
BASO+EOS+MONOS # BLD AUTO: ABNORMAL 10*3/UL
BASO+EOS+MONOS NFR BLD AUTO: ABNORMAL %
BASOPHILS # BLD AUTO: 0.1 10E3/UL (ref 0–0.2)
BASOPHILS # BLD AUTO: 0.1 10E3/UL (ref 0–0.2)
BASOPHILS NFR BLD AUTO: 0 %
BASOPHILS NFR BLD AUTO: 1 %
BILIRUB DIRECT SERPL-MCNC: <0.2 MG/DL (ref 0–0.3)
BILIRUB SERPL-MCNC: 0.3 MG/DL
BILIRUB SERPL-MCNC: 0.4 MG/DL
BILIRUB SERPL-MCNC: 0.5 MG/DL
BILIRUB UR QL STRIP: NEGATIVE
BILIRUB UR QL STRIP: NEGATIVE
BUN SERPL-MCNC: 22.7 MG/DL (ref 8–23)
BUN SERPL-MCNC: 24.2 MG/DL (ref 8–23)
BUN SERPL-MCNC: 28.1 MG/DL (ref 8–23)
BUN SERPL-MCNC: 28.4 MG/DL (ref 8–23)
BUN SERPL-MCNC: 29.1 MG/DL (ref 8–23)
BUN SERPL-MCNC: 29.6 MG/DL (ref 8–23)
BUN SERPL-MCNC: 34.5 MG/DL (ref 8–23)
CALCIUM SERPL-MCNC: 10.5 MG/DL (ref 8.8–10.2)
CALCIUM SERPL-MCNC: 10.6 MG/DL (ref 8.8–10.2)
CALCIUM SERPL-MCNC: 10.6 MG/DL (ref 8.8–10.2)
CALCIUM SERPL-MCNC: 10.8 MG/DL (ref 8.8–10.2)
CALCIUM SERPL-MCNC: 11 MG/DL (ref 8.8–10.2)
CALCIUM SERPL-MCNC: 11 MG/DL (ref 8.8–10.2)
CALCIUM SERPL-MCNC: 11.2 MG/DL (ref 8.8–10.2)
CHLORIDE SERPL-SCNC: 100 MMOL/L (ref 98–107)
CHLORIDE SERPL-SCNC: 100 MMOL/L (ref 98–107)
CHLORIDE SERPL-SCNC: 98 MMOL/L (ref 98–107)
CHLORIDE SERPL-SCNC: 99 MMOL/L (ref 98–107)
CHOLEST SERPL-MCNC: 147 MG/DL
CHOLEST SERPL-MCNC: 161 MG/DL
COLONOSCOPY: NORMAL
COLOR UR AUTO: ABNORMAL
COLOR UR AUTO: YELLOW
CREAT SERPL-MCNC: 1.22 MG/DL (ref 0.51–0.95)
CREAT SERPL-MCNC: 1.23 MG/DL (ref 0.51–0.95)
CREAT SERPL-MCNC: 1.29 MG/DL (ref 0.51–0.95)
CREAT SERPL-MCNC: 1.33 MG/DL (ref 0.51–0.95)
CREAT SERPL-MCNC: 1.57 MG/DL (ref 0.51–0.95)
CREAT SERPL-MCNC: 1.59 MG/DL (ref 0.51–0.95)
CREAT SERPL-MCNC: 1.61 MG/DL (ref 0.51–0.95)
CREAT UR-MCNC: 47 MG/DL
CREAT UR-MCNC: 47.1 MG/DL
CV STRESS CURRENT BP HE: NORMAL
CV STRESS CURRENT HR HE: 100
CV STRESS CURRENT HR HE: 101
CV STRESS CURRENT HR HE: 81
CV STRESS CURRENT HR HE: 83
CV STRESS CURRENT HR HE: 84
CV STRESS CURRENT HR HE: 90
CV STRESS CURRENT HR HE: 91
CV STRESS CURRENT HR HE: 91
CV STRESS CURRENT HR HE: 94
CV STRESS CURRENT HR HE: 94
CV STRESS CURRENT HR HE: 95
CV STRESS CURRENT HR HE: 96
CV STRESS CURRENT HR HE: 96
CV STRESS CURRENT HR HE: 99
CV STRESS CURRENT HR HE: 99
CV STRESS DEVIATION TIME HE: NORMAL
CV STRESS ECHO PERCENT HR HE: NORMAL
CV STRESS EXERCISE STAGE HE: NORMAL
CV STRESS FINAL RESTING BP HE: NORMAL
CV STRESS FINAL RESTING HR HE: 90
CV STRESS MAX HR HE: 116
CV STRESS MAX TREADMILL GRADE HE: 0
CV STRESS MAX TREADMILL SPEED HE: 0
CV STRESS PEAK DIA BP HE: NORMAL
CV STRESS PEAK SYS BP HE: NORMAL
CV STRESS PHASE HE: NORMAL
CV STRESS PROTOCOL HE: NORMAL
CV STRESS RESTING PT POSITION HE: NORMAL
CV STRESS ST DEVIATION AMOUNT HE: NORMAL
CV STRESS ST DEVIATION ELEVATION HE: NORMAL
CV STRESS ST EVELATION AMOUNT HE: NORMAL
CV STRESS TEST TYPE HE: NORMAL
CV STRESS TOTAL STAGE TIME MIN 1 HE: NORMAL
DEPRECATED HCO3 PLAS-SCNC: 26 MMOL/L (ref 22–29)
DEPRECATED HCO3 PLAS-SCNC: 27 MMOL/L (ref 22–29)
DEPRECATED HCO3 PLAS-SCNC: 27 MMOL/L (ref 22–29)
DEPRECATED HCO3 PLAS-SCNC: 28 MMOL/L (ref 22–29)
DEPRECATED HCO3 PLAS-SCNC: 28 MMOL/L (ref 22–29)
DEPRECATED HCO3 PLAS-SCNC: 30 MMOL/L (ref 22–29)
DEPRECATED HCO3 PLAS-SCNC: 30 MMOL/L (ref 22–29)
EGFRCR SERPLBLD CKD-EPI 2021: 40 ML/MIN/1.73M2
EGFRCR SERPLBLD CKD-EPI 2021: 45 ML/MIN/1.73M2
EOSINOPHIL # BLD AUTO: 0.1 10E3/UL (ref 0–0.7)
EOSINOPHIL # BLD AUTO: 0.1 10E3/UL (ref 0–0.7)
EOSINOPHIL NFR BLD AUTO: 1 %
EOSINOPHIL NFR BLD AUTO: 1 %
ERYTHROCYTE [DISTWIDTH] IN BLOOD BY AUTOMATED COUNT: 14 % (ref 10–15)
ERYTHROCYTE [DISTWIDTH] IN BLOOD BY AUTOMATED COUNT: 14.2 % (ref 10–15)
ERYTHROCYTE [DISTWIDTH] IN BLOOD BY AUTOMATED COUNT: 14.3 % (ref 10–15)
ERYTHROCYTE [DISTWIDTH] IN BLOOD BY AUTOMATED COUNT: 14.4 % (ref 10–15)
ERYTHROCYTE [DISTWIDTH] IN BLOOD BY AUTOMATED COUNT: 14.6 % (ref 10–15)
ERYTHROCYTE [DISTWIDTH] IN BLOOD BY AUTOMATED COUNT: 14.6 % (ref 10–15)
ERYTHROCYTE [DISTWIDTH] IN BLOOD BY AUTOMATED COUNT: 14.9 % (ref 10–15)
ERYTHROCYTE [SEDIMENTATION RATE] IN BLOOD BY WESTERGREN METHOD: 49 MM/HR (ref 0–30)
FERRITIN SERPL-MCNC: 21 NG/ML (ref 11–328)
FERRITIN SERPL-MCNC: 40 NG/ML (ref 11–328)
FERRITIN SERPL-MCNC: 52 NG/ML (ref 11–328)
GFR SERPL CREATININE-BSD FRML MDRD: 32 ML/MIN/1.73M2
GFR SERPL CREATININE-BSD FRML MDRD: 32 ML/MIN/1.73M2
GFR SERPL CREATININE-BSD FRML MDRD: 33 ML/MIN/1.73M2
GFR SERPL CREATININE-BSD FRML MDRD: 42 ML/MIN/1.73M2
GFR SERPL CREATININE-BSD FRML MDRD: 44 ML/MIN/1.73M2
GLUCOSE SERPL-MCNC: 102 MG/DL (ref 70–99)
GLUCOSE SERPL-MCNC: 104 MG/DL (ref 70–99)
GLUCOSE SERPL-MCNC: 105 MG/DL (ref 70–99)
GLUCOSE SERPL-MCNC: 108 MG/DL (ref 70–99)
GLUCOSE SERPL-MCNC: 111 MG/DL (ref 70–99)
GLUCOSE SERPL-MCNC: 96 MG/DL (ref 70–99)
GLUCOSE SERPL-MCNC: 99 MG/DL (ref 70–99)
GLUCOSE UR STRIP-MCNC: NEGATIVE MG/DL
GLUCOSE UR STRIP-MCNC: NEGATIVE MG/DL
HBA1C MFR BLD: 5.3 % (ref 0–5.6)
HCT VFR BLD AUTO: 25.1 % (ref 35–47)
HCT VFR BLD AUTO: 25.6 % (ref 35–47)
HCT VFR BLD AUTO: 25.7 % (ref 35–47)
HCT VFR BLD AUTO: 26.5 % (ref 35–47)
HCT VFR BLD AUTO: 28.5 % (ref 35–47)
HCT VFR BLD AUTO: 28.9 % (ref 35–47)
HCT VFR BLD AUTO: 29.9 % (ref 35–47)
HDLC SERPL-MCNC: 51 MG/DL
HDLC SERPL-MCNC: 59 MG/DL
HEMOCCULT STL QL: NEGATIVE
HGB BLD-MCNC: 7.8 G/DL (ref 11.7–15.7)
HGB BLD-MCNC: 7.9 G/DL (ref 11.7–15.7)
HGB BLD-MCNC: 7.9 G/DL (ref 11.7–15.7)
HGB BLD-MCNC: 8 G/DL (ref 11.7–15.7)
HGB BLD-MCNC: 9 G/DL (ref 11.7–15.7)
HGB BLD-MCNC: 9.1 G/DL (ref 11.7–15.7)
HGB BLD-MCNC: 9.5 G/DL (ref 11.7–15.7)
HGB UR QL STRIP: NEGATIVE
HGB UR QL STRIP: NEGATIVE
HOLD SPECIMEN: NORMAL
HYALINE CASTS: 24 /LPF
IMM GRANULOCYTES # BLD: 0.1 10E3/UL
IMM GRANULOCYTES # BLD: 0.1 10E3/UL
IMM GRANULOCYTES NFR BLD: 0 %
IMM GRANULOCYTES NFR BLD: 1 %
INR (EXTERNAL): 1.6 (ref 0.9–1.1)
INR (EXTERNAL): 1.6 (ref 2–3)
INR (EXTERNAL): 1.9 (ref 0.9–1.1)
INR (EXTERNAL): 2 (ref 2–3)
INR (EXTERNAL): 2.1 (ref 0.9–1.1)
INR (EXTERNAL): 2.2 (ref 0.9–1.1)
INR (EXTERNAL): 2.2 (ref 0.9–1.1)
INR (EXTERNAL): 2.3 (ref 0.9–1.1)
INR (EXTERNAL): 2.4 (ref 0.9–1.1)
INR (EXTERNAL): 2.5 (ref 0.9–1.1)
INR (EXTERNAL): 2.6 (ref 0.9–1.1)
INR (EXTERNAL): 2.8 (ref 2–3)
INR (EXTERNAL): 3 (ref 2–3)
INR (EXTERNAL): 3.1 (ref 0.9–1.1)
INR (EXTERNAL): 4 (ref 0.9–1.1)
INR (EXTERNAL): 4.1 (ref 0.9–1.1)
INR (EXTERNAL): 4.1 (ref 0.9–1.1)
INR BLD: 3 (ref 0.9–1.1)
INR PPP: 1.04 (ref 0.85–1.15)
IRON BINDING CAPACITY (ROCHE): 299 UG/DL (ref 240–430)
IRON BINDING CAPACITY (ROCHE): 310 UG/DL (ref 240–430)
IRON BINDING CAPACITY (ROCHE): 349 UG/DL (ref 240–430)
IRON SATN MFR SERPL: 10 % (ref 15–46)
IRON SATN MFR SERPL: 15 % (ref 15–46)
IRON SATN MFR SERPL: 24 % (ref 15–46)
IRON SERPL-MCNC: 35 UG/DL (ref 37–145)
IRON SERPL-MCNC: 46 UG/DL (ref 37–145)
IRON SERPL-MCNC: 75 UG/DL (ref 37–145)
KETONES UR STRIP-MCNC: NEGATIVE MG/DL
KETONES UR STRIP-MCNC: NEGATIVE MG/DL
LDLC SERPL CALC-MCNC: 64 MG/DL
LDLC SERPL CALC-MCNC: 79 MG/DL
LEUKOCYTE ESTERASE UR QL STRIP: NEGATIVE
LEUKOCYTE ESTERASE UR QL STRIP: NEGATIVE
LIPASE SERPL-CCNC: 46 U/L (ref 13–60)
LYMPHOCYTES # BLD AUTO: 2.7 10E3/UL (ref 0.8–5.3)
LYMPHOCYTES # BLD AUTO: 2.8 10E3/UL (ref 0.8–5.3)
LYMPHOCYTES NFR BLD AUTO: 23 %
LYMPHOCYTES NFR BLD AUTO: 24 %
MCH RBC QN AUTO: 26.2 PG (ref 26.5–33)
MCH RBC QN AUTO: 27 PG (ref 26.5–33)
MCH RBC QN AUTO: 27.3 PG (ref 26.5–33)
MCH RBC QN AUTO: 27.4 PG (ref 26.5–33)
MCH RBC QN AUTO: 27.4 PG (ref 26.5–33)
MCH RBC QN AUTO: 27.5 PG (ref 26.5–33)
MCH RBC QN AUTO: 28.3 PG (ref 26.5–33)
MCHC RBC AUTO-ENTMCNC: 29.4 G/DL (ref 31.5–36.5)
MCHC RBC AUTO-ENTMCNC: 30.9 G/DL (ref 31.5–36.5)
MCHC RBC AUTO-ENTMCNC: 31.1 G/DL (ref 31.5–36.5)
MCHC RBC AUTO-ENTMCNC: 31.1 G/DL (ref 31.5–36.5)
MCHC RBC AUTO-ENTMCNC: 31.5 G/DL (ref 31.5–36.5)
MCHC RBC AUTO-ENTMCNC: 31.8 G/DL (ref 31.5–36.5)
MCHC RBC AUTO-ENTMCNC: 31.9 G/DL (ref 31.5–36.5)
MCV RBC AUTO: 87 FL (ref 78–100)
MCV RBC AUTO: 88 FL (ref 78–100)
MCV RBC AUTO: 88 FL (ref 78–100)
MCV RBC AUTO: 89 FL (ref 78–100)
MCV RBC AUTO: 89 FL (ref 78–100)
MICROALBUMIN UR-MCNC: <12 MG/L
MICROALBUMIN/CREAT UR: NORMAL MG/G{CREAT}
MONOCYTES # BLD AUTO: 0.7 10E3/UL (ref 0–1.3)
MONOCYTES # BLD AUTO: 0.9 10E3/UL (ref 0–1.3)
MONOCYTES NFR BLD AUTO: 6 %
MONOCYTES NFR BLD AUTO: 8 %
MUCOUS THREADS #/AREA URNS LPF: PRESENT /LPF
N-NORTRAMADOL/CREAT UR CFM: ABNORMAL NG/MG {CREAT}
NEUTROPHILS # BLD AUTO: 7.7 10E3/UL (ref 1.6–8.3)
NEUTROPHILS # BLD AUTO: 8.4 10E3/UL (ref 1.6–8.3)
NEUTROPHILS NFR BLD AUTO: 67 %
NEUTROPHILS NFR BLD AUTO: 69 %
NITRATE UR QL: NEGATIVE
NITRATE UR QL: NEGATIVE
NONHDLC SERPL-MCNC: 102 MG/DL
NONHDLC SERPL-MCNC: 96 MG/DL
NRBC # BLD AUTO: 0 10E3/UL
NRBC BLD AUTO-RTO: 0 /100
NUC STRESS EJECTION FRACTION: 69 %
O-NORTRAMADOL UR CFM-MCNC: 8960 NG/ML
PATH REPORT.COMMENTS IMP SPEC: NORMAL
PATH REPORT.COMMENTS IMP SPEC: NORMAL
PATH REPORT.FINAL DX SPEC: NORMAL
PATH REPORT.GROSS SPEC: NORMAL
PATH REPORT.MICROSCOPIC SPEC OTHER STN: NORMAL
PATH REPORT.RELEVANT HX SPEC: NORMAL
PH UR STRIP: 5.5 [PH] (ref 5–7)
PH UR STRIP: 6.5 [PH] (ref 5–8)
PHOTO IMAGE: NORMAL
PLATELET # BLD AUTO: 321 10E3/UL (ref 150–450)
PLATELET # BLD AUTO: 321 10E3/UL (ref 150–450)
PLATELET # BLD AUTO: 330 10E3/UL (ref 150–450)
PLATELET # BLD AUTO: 376 10E3/UL (ref 150–450)
PLATELET # BLD AUTO: 388 10E3/UL (ref 150–450)
PLATELET # BLD AUTO: 431 10E3/UL (ref 150–450)
PLATELET # BLD AUTO: 450 10E3/UL (ref 150–450)
POTASSIUM SERPL-SCNC: 3.8 MMOL/L (ref 3.4–5.3)
POTASSIUM SERPL-SCNC: 3.8 MMOL/L (ref 3.4–5.3)
POTASSIUM SERPL-SCNC: 3.9 MMOL/L (ref 3.4–5.3)
POTASSIUM SERPL-SCNC: 3.9 MMOL/L (ref 3.4–5.3)
POTASSIUM SERPL-SCNC: 4 MMOL/L (ref 3.4–5.3)
POTASSIUM SERPL-SCNC: 4.3 MMOL/L (ref 3.4–5.3)
POTASSIUM SERPL-SCNC: 4.3 MMOL/L (ref 3.4–5.3)
PROT SERPL-MCNC: 6.6 G/DL (ref 6.4–8.3)
PROT SERPL-MCNC: 6.9 G/DL (ref 6.4–8.3)
PROT SERPL-MCNC: 7 G/DL (ref 6.4–8.3)
RATE PRESSURE PRODUCT: NORMAL
RBC # BLD AUTO: 2.88 10E6/UL (ref 3.8–5.2)
RBC # BLD AUTO: 2.93 10E6/UL (ref 3.8–5.2)
RBC # BLD AUTO: 2.93 10E6/UL (ref 3.8–5.2)
RBC # BLD AUTO: 2.98 10E6/UL (ref 3.8–5.2)
RBC # BLD AUTO: 3.22 10E6/UL (ref 3.8–5.2)
RBC # BLD AUTO: 3.28 10E6/UL (ref 3.8–5.2)
RBC # BLD AUTO: 3.45 10E6/UL (ref 3.8–5.2)
RBC URINE: 6 /HPF
SODIUM SERPL-SCNC: 139 MMOL/L (ref 135–145)
SODIUM SERPL-SCNC: 139 MMOL/L (ref 135–145)
SODIUM SERPL-SCNC: 139 MMOL/L (ref 136–145)
SODIUM SERPL-SCNC: 140 MMOL/L (ref 136–145)
SODIUM SERPL-SCNC: 140 MMOL/L (ref 136–145)
SODIUM SERPL-SCNC: 141 MMOL/L (ref 136–145)
SODIUM SERPL-SCNC: 141 MMOL/L (ref 136–145)
SP GR UR STRIP: 1.01 (ref 1–1.03)
SP GR UR STRIP: 1.01 (ref 1–1.03)
SPECIMEN EXPIRATION DATE: NORMAL
SQUAMOUS EPITHELIAL: 1 /HPF
STRESS ECHO BASELINE DIASTOLIC HE: 78
STRESS ECHO BASELINE HR: 81
STRESS ECHO BASELINE SYSTOLIC BP: 150
STRESS ECHO CALCULATED PERCENT HR: 82 %
STRESS ECHO LAST STRESS DIASTOLIC BP: 83
STRESS ECHO LAST STRESS HR: 101
STRESS ECHO LAST STRESS SYSTOLIC BP: 177
STRESS ECHO TARGET HR: 141
TRAMADOL CTO UR CFM-MCNC: ABNORMAL NG/ML
TRAMADOL/CREAT UR: ABNORMAL
TRIGL SERPL-MCNC: 114 MG/DL
TRIGL SERPL-MCNC: 158 MG/DL
TSH SERPL DL<=0.005 MIU/L-ACNC: 3.38 UIU/ML (ref 0.3–4.2)
UPPER GI ENDOSCOPY: NORMAL
UROBILINOGEN UR STRIP-ACNC: 0.2 E.U./DL
UROBILINOGEN UR STRIP-MCNC: <2 MG/DL
WBC # BLD AUTO: 10.3 10E3/UL (ref 4–11)
WBC # BLD AUTO: 11.3 10E3/UL (ref 4–11)
WBC # BLD AUTO: 11.5 10E3/UL (ref 4–11)
WBC # BLD AUTO: 12.1 10E3/UL (ref 4–11)
WBC # BLD AUTO: 12.3 10E3/UL (ref 4–11)
WBC # BLD AUTO: 13.6 10E3/UL (ref 4–11)
WBC # BLD AUTO: 14.5 10E3/UL (ref 4–11)
WBC URINE: 0 /HPF

## 2023-01-01 PROCEDURE — G0105 COLORECTAL SCRN; HI RISK IND: HCPCS | Performed by: INTERNAL MEDICINE

## 2023-01-01 PROCEDURE — 82043 UR ALBUMIN QUANTITATIVE: CPT | Performed by: FAMILY MEDICINE

## 2023-01-01 PROCEDURE — 85025 COMPLETE CBC W/AUTO DIFF WBC: CPT | Performed by: FAMILY MEDICINE

## 2023-01-01 PROCEDURE — 82728 ASSAY OF FERRITIN: CPT | Performed by: FAMILY MEDICINE

## 2023-01-01 PROCEDURE — 85027 COMPLETE CBC AUTOMATED: CPT | Performed by: FAMILY MEDICINE

## 2023-01-01 PROCEDURE — 80053 COMPREHEN METABOLIC PANEL: CPT | Performed by: STUDENT IN AN ORGANIZED HEALTH CARE EDUCATION/TRAINING PROGRAM

## 2023-01-01 PROCEDURE — 80076 HEPATIC FUNCTION PANEL: CPT | Performed by: NURSE PRACTITIONER

## 2023-01-01 PROCEDURE — 85652 RBC SED RATE AUTOMATED: CPT | Performed by: FAMILY MEDICINE

## 2023-01-01 PROCEDURE — 45378 DIAGNOSTIC COLONOSCOPY: CPT | Performed by: INTERNAL MEDICINE

## 2023-01-01 PROCEDURE — 83690 ASSAY OF LIPASE: CPT | Performed by: NURSE PRACTITIONER

## 2023-01-01 PROCEDURE — 81003 URINALYSIS AUTO W/O SCOPE: CPT | Performed by: NURSE PRACTITIONER

## 2023-01-01 PROCEDURE — 72100 X-RAY EXAM L-S SPINE 2/3 VWS: CPT

## 2023-01-01 PROCEDURE — G0500 MOD SEDAT ENDO SERVICE >5YRS: HCPCS | Performed by: INTERNAL MEDICINE

## 2023-01-01 PROCEDURE — 78452 HT MUSCLE IMAGE SPECT MULT: CPT

## 2023-01-01 PROCEDURE — G0179 MD RECERTIFICATION HHA PT: HCPCS | Performed by: FAMILY MEDICINE

## 2023-01-01 PROCEDURE — G0480 DRUG TEST DEF 1-7 CLASSES: HCPCS | Performed by: FAMILY MEDICINE

## 2023-01-01 PROCEDURE — 82570 ASSAY OF URINE CREATININE: CPT | Performed by: FAMILY MEDICINE

## 2023-01-01 PROCEDURE — 99284 EMERGENCY DEPT VISIT MOD MDM: CPT | Mod: 25

## 2023-01-01 PROCEDURE — 250N000011 HC RX IP 250 OP 636: Performed by: INTERNAL MEDICINE

## 2023-01-01 PROCEDURE — 99214 OFFICE O/P EST MOD 30 MIN: CPT | Mod: VID | Performed by: FAMILY MEDICINE

## 2023-01-01 PROCEDURE — 258N000003 HC RX IP 258 OP 636: Performed by: STUDENT IN AN ORGANIZED HEALTH CARE EDUCATION/TRAINING PROGRAM

## 2023-01-01 PROCEDURE — 78452 HT MUSCLE IMAGE SPECT MULT: CPT | Mod: 26 | Performed by: INTERNAL MEDICINE

## 2023-01-01 PROCEDURE — 83550 IRON BINDING TEST: CPT | Performed by: FAMILY MEDICINE

## 2023-01-01 PROCEDURE — 93880 EXTRACRANIAL BILAT STUDY: CPT

## 2023-01-01 PROCEDURE — 80053 COMPREHEN METABOLIC PANEL: CPT | Performed by: FAMILY MEDICINE

## 2023-01-01 PROCEDURE — 83540 ASSAY OF IRON: CPT | Performed by: FAMILY MEDICINE

## 2023-01-01 PROCEDURE — 99214 OFFICE O/P EST MOD 30 MIN: CPT | Mod: 25 | Performed by: FAMILY MEDICINE

## 2023-01-01 PROCEDURE — 80048 BASIC METABOLIC PNL TOTAL CA: CPT | Performed by: FAMILY MEDICINE

## 2023-01-01 PROCEDURE — 88342 IMHCHEM/IMCYTCHM 1ST ANTB: CPT | Mod: 26 | Performed by: PATHOLOGY

## 2023-01-01 PROCEDURE — 85610 PROTHROMBIN TIME: CPT | Performed by: STUDENT IN AN ORGANIZED HEALTH CARE EDUCATION/TRAINING PROGRAM

## 2023-01-01 PROCEDURE — 88342 IMHCHEM/IMCYTCHM 1ST ANTB: CPT | Mod: TC | Performed by: INTERNAL MEDICINE

## 2023-01-01 PROCEDURE — 85610 PROTHROMBIN TIME: CPT | Performed by: NURSE PRACTITIONER

## 2023-01-01 PROCEDURE — A9500 TC99M SESTAMIBI: HCPCS | Performed by: INTERNAL MEDICINE

## 2023-01-01 PROCEDURE — 93018 CV STRESS TEST I&R ONLY: CPT | Performed by: INTERNAL MEDICINE

## 2023-01-01 PROCEDURE — 99214 OFFICE O/P EST MOD 30 MIN: CPT | Performed by: FAMILY MEDICINE

## 2023-01-01 PROCEDURE — 36415 COLL VENOUS BLD VENIPUNCTURE: CPT | Performed by: STUDENT IN AN ORGANIZED HEALTH CARE EDUCATION/TRAINING PROGRAM

## 2023-01-01 PROCEDURE — 36415 COLL VENOUS BLD VENIPUNCTURE: CPT | Performed by: FAMILY MEDICINE

## 2023-01-01 PROCEDURE — 36415 COLL VENOUS BLD VENIPUNCTURE: CPT | Performed by: NURSE PRACTITIONER

## 2023-01-01 PROCEDURE — 86850 RBC ANTIBODY SCREEN: CPT | Performed by: STUDENT IN AN ORGANIZED HEALTH CARE EDUCATION/TRAINING PROGRAM

## 2023-01-01 PROCEDURE — 99215 OFFICE O/P EST HI 40 MIN: CPT | Performed by: FAMILY MEDICINE

## 2023-01-01 PROCEDURE — 343N000001 HC RX 343: Performed by: INTERNAL MEDICINE

## 2023-01-01 PROCEDURE — 81003 URINALYSIS AUTO W/O SCOPE: CPT | Performed by: STUDENT IN AN ORGANIZED HEALTH CARE EDUCATION/TRAINING PROGRAM

## 2023-01-01 PROCEDURE — 84443 ASSAY THYROID STIM HORMONE: CPT | Performed by: FAMILY MEDICINE

## 2023-01-01 PROCEDURE — 91320 SARSCV2 VAC 30MCG TRS-SUC IM: CPT | Performed by: FAMILY MEDICINE

## 2023-01-01 PROCEDURE — G0008 ADMIN INFLUENZA VIRUS VAC: HCPCS | Performed by: FAMILY MEDICINE

## 2023-01-01 PROCEDURE — 99214 OFFICE O/P EST MOD 30 MIN: CPT | Performed by: NURSE PRACTITIONER

## 2023-01-01 PROCEDURE — 36416 COLLJ CAPILLARY BLOOD SPEC: CPT | Performed by: NURSE PRACTITIONER

## 2023-01-01 PROCEDURE — 90480 ADMN SARSCOV2 VAC 1/ONLY CMP: CPT | Performed by: FAMILY MEDICINE

## 2023-01-01 PROCEDURE — G0439 PPPS, SUBSEQ VISIT: HCPCS | Performed by: FAMILY MEDICINE

## 2023-01-01 PROCEDURE — 43239 EGD BIOPSY SINGLE/MULTIPLE: CPT | Performed by: INTERNAL MEDICINE

## 2023-01-01 PROCEDURE — 99204 OFFICE O/P NEW MOD 45 MIN: CPT | Performed by: PHYSICIAN ASSISTANT

## 2023-01-01 PROCEDURE — 80061 LIPID PANEL: CPT | Performed by: FAMILY MEDICINE

## 2023-01-01 PROCEDURE — 82272 OCCULT BLD FECES 1-3 TESTS: CPT | Performed by: STUDENT IN AN ORGANIZED HEALTH CARE EDUCATION/TRAINING PROGRAM

## 2023-01-01 PROCEDURE — 88305 TISSUE EXAM BY PATHOLOGIST: CPT | Mod: 26 | Performed by: PATHOLOGY

## 2023-01-01 PROCEDURE — 93016 CV STRESS TEST SUPVJ ONLY: CPT | Performed by: INTERNAL MEDICINE

## 2023-01-01 PROCEDURE — 36415 COLL VENOUS BLD VENIPUNCTURE: CPT

## 2023-01-01 PROCEDURE — 99153 MOD SED SAME PHYS/QHP EA: CPT | Performed by: INTERNAL MEDICINE

## 2023-01-01 PROCEDURE — 250N000011 HC RX IP 250 OP 636: Mod: JZ | Performed by: STUDENT IN AN ORGANIZED HEALTH CARE EDUCATION/TRAINING PROGRAM

## 2023-01-01 PROCEDURE — 83036 HEMOGLOBIN GLYCOSYLATED A1C: CPT | Performed by: FAMILY MEDICINE

## 2023-01-01 PROCEDURE — 96361 HYDRATE IV INFUSION ADD-ON: CPT

## 2023-01-01 PROCEDURE — 96127 BRIEF EMOTIONAL/BEHAV ASSMT: CPT | Performed by: FAMILY MEDICINE

## 2023-01-01 PROCEDURE — 85027 COMPLETE CBC AUTOMATED: CPT

## 2023-01-01 PROCEDURE — 90662 IIV NO PRSV INCREASED AG IM: CPT | Performed by: FAMILY MEDICINE

## 2023-01-01 PROCEDURE — 96365 THER/PROPH/DIAG IV INF INIT: CPT

## 2023-01-01 PROCEDURE — 99204 OFFICE O/P NEW MOD 45 MIN: CPT | Performed by: INTERNAL MEDICINE

## 2023-01-01 PROCEDURE — 85027 COMPLETE CBC AUTOMATED: CPT | Performed by: STUDENT IN AN ORGANIZED HEALTH CARE EDUCATION/TRAINING PROGRAM

## 2023-01-01 PROCEDURE — 93017 CV STRESS TEST TRACING ONLY: CPT

## 2023-01-01 RX ORDER — ONDANSETRON 2 MG/ML
4 INJECTION INTRAMUSCULAR; INTRAVENOUS EVERY 6 HOURS PRN
Status: CANCELLED | OUTPATIENT
Start: 2023-01-01

## 2023-01-01 RX ORDER — FLUMAZENIL 0.1 MG/ML
0.2 INJECTION, SOLUTION INTRAVENOUS
Status: DISCONTINUED | OUTPATIENT
Start: 2023-01-01 | End: 2023-01-01 | Stop reason: HOSPADM

## 2023-01-01 RX ORDER — LEVOTHYROXINE SODIUM 125 UG/1
TABLET ORAL
Qty: 90 TABLET | Refills: 3 | Status: SHIPPED | OUTPATIENT
Start: 2023-01-01

## 2023-01-01 RX ORDER — TRAMADOL HYDROCHLORIDE 50 MG/1
50 TABLET ORAL EVERY 6 HOURS PRN
Qty: 60 TABLET | Refills: 0 | Status: SHIPPED | OUTPATIENT
Start: 2023-01-01 | End: 2023-01-01

## 2023-01-01 RX ORDER — TRAMADOL HYDROCHLORIDE 50 MG/1
TABLET ORAL
Qty: 60 TABLET | Refills: 0 | OUTPATIENT
Start: 2023-01-01

## 2023-01-01 RX ORDER — LEVOTHYROXINE SODIUM 125 UG/1
TABLET ORAL
Qty: 90 TABLET | Refills: 0 | Status: SHIPPED | OUTPATIENT
Start: 2023-01-01 | End: 2023-01-01

## 2023-01-01 RX ORDER — AMLODIPINE BESYLATE 5 MG/1
TABLET ORAL
Qty: 90 TABLET | Refills: 3 | Status: SHIPPED | OUTPATIENT
Start: 2023-01-01 | End: 2023-01-01

## 2023-01-01 RX ORDER — ATROPINE SULFATE 0.1 MG/ML
1 INJECTION INTRAVENOUS
Status: DISCONTINUED | OUTPATIENT
Start: 2023-01-01 | End: 2023-01-01 | Stop reason: HOSPADM

## 2023-01-01 RX ORDER — RESPIRATORY SYNCYTIAL VIRUS VACCINE 120MCG/0.5
0.5 KIT INTRAMUSCULAR ONCE
Qty: 1 EACH | Refills: 0 | Status: CANCELLED | OUTPATIENT
Start: 2023-01-01 | End: 2023-01-01

## 2023-01-01 RX ORDER — DIPHENHYDRAMINE HYDROCHLORIDE 50 MG/ML
25-50 INJECTION INTRAMUSCULAR; INTRAVENOUS
Status: DISCONTINUED | OUTPATIENT
Start: 2023-01-01 | End: 2023-01-01 | Stop reason: HOSPADM

## 2023-01-01 RX ORDER — ONDANSETRON 4 MG/1
4 TABLET, ORALLY DISINTEGRATING ORAL EVERY 6 HOURS PRN
Status: CANCELLED | OUTPATIENT
Start: 2023-01-01

## 2023-01-01 RX ORDER — LIDOCAINE 40 MG/G
CREAM TOPICAL
Status: DISCONTINUED | OUTPATIENT
Start: 2023-01-01 | End: 2023-01-01 | Stop reason: HOSPADM

## 2023-01-01 RX ORDER — METHYLPREDNISOLONE 4 MG
TABLET, DOSE PACK ORAL
Qty: 21 TABLET | Refills: 0 | Status: SHIPPED | OUTPATIENT
Start: 2023-01-01 | End: 2023-01-01

## 2023-01-01 RX ORDER — LIDOCAINE 4 G/G
1 PATCH TOPICAL EVERY 24 HOURS
Qty: 15 PATCH | Refills: 0 | Status: SHIPPED | OUTPATIENT
Start: 2023-01-01 | End: 2023-01-01

## 2023-01-01 RX ORDER — FLUMAZENIL 0.1 MG/ML
0.2 INJECTION, SOLUTION INTRAVENOUS
Status: CANCELLED | OUTPATIENT
Start: 2023-01-01 | End: 2023-01-01

## 2023-01-01 RX ORDER — SIMETHICONE 40MG/0.6ML
133 SUSPENSION, DROPS(FINAL DOSAGE FORM)(ML) ORAL
Status: DISCONTINUED | OUTPATIENT
Start: 2023-01-01 | End: 2023-01-01 | Stop reason: HOSPADM

## 2023-01-01 RX ORDER — PRAVASTATIN SODIUM 80 MG/1
TABLET ORAL
Qty: 90 TABLET | Refills: 3 | Status: SHIPPED | OUTPATIENT
Start: 2023-01-01

## 2023-01-01 RX ORDER — REGADENOSON 0.08 MG/ML
0.4 INJECTION, SOLUTION INTRAVENOUS ONCE
Status: COMPLETED | OUTPATIENT
Start: 2023-01-01 | End: 2023-01-01

## 2023-01-01 RX ORDER — METHYLPREDNISOLONE 4 MG
TABLET, DOSE PACK ORAL
Qty: 21 TABLET | Refills: 0 | OUTPATIENT
Start: 2023-01-01

## 2023-01-01 RX ORDER — NALOXONE HYDROCHLORIDE 0.4 MG/ML
0.2 INJECTION, SOLUTION INTRAMUSCULAR; INTRAVENOUS; SUBCUTANEOUS
Status: DISCONTINUED | OUTPATIENT
Start: 2023-01-01 | End: 2023-01-01 | Stop reason: HOSPADM

## 2023-01-01 RX ORDER — TRAMADOL HYDROCHLORIDE 50 MG/1
50 TABLET ORAL EVERY 6 HOURS PRN
Qty: 120 TABLET | Refills: 2 | Status: SHIPPED | OUTPATIENT
Start: 2023-01-01

## 2023-01-01 RX ORDER — FENTANYL CITRATE 50 UG/ML
50-100 INJECTION, SOLUTION INTRAMUSCULAR; INTRAVENOUS EVERY 5 MIN PRN
Status: DISCONTINUED | OUTPATIENT
Start: 2023-01-01 | End: 2023-01-01 | Stop reason: HOSPADM

## 2023-01-01 RX ORDER — CITALOPRAM HYDROBROMIDE 40 MG/1
40 TABLET ORAL DAILY
Qty: 90 TABLET | Refills: 3 | Status: SHIPPED | OUTPATIENT
Start: 2023-01-01

## 2023-01-01 RX ORDER — TRAMADOL HYDROCHLORIDE 50 MG/1
50 TABLET ORAL EVERY 6 HOURS PRN
Qty: 120 TABLET | Refills: 2 | Status: SHIPPED | OUTPATIENT
Start: 2023-01-01 | End: 2023-01-01

## 2023-01-01 RX ORDER — FENTANYL CITRATE 50 UG/ML
INJECTION, SOLUTION INTRAMUSCULAR; INTRAVENOUS PRN
Status: DISCONTINUED | OUTPATIENT
Start: 2023-01-01 | End: 2023-01-01 | Stop reason: HOSPADM

## 2023-01-01 RX ORDER — NALOXONE HYDROCHLORIDE 0.4 MG/ML
0.4 INJECTION, SOLUTION INTRAMUSCULAR; INTRAVENOUS; SUBCUTANEOUS
Status: DISCONTINUED | OUTPATIENT
Start: 2023-01-01 | End: 2023-01-01 | Stop reason: HOSPADM

## 2023-01-01 RX ORDER — LISINOPRIL AND HYDROCHLOROTHIAZIDE 12.5; 2 MG/1; MG/1
2 TABLET ORAL DAILY
Qty: 180 TABLET | Refills: 3 | Status: SHIPPED | OUTPATIENT
Start: 2023-01-01

## 2023-01-01 RX ORDER — PREDNISONE 5 MG/1
TABLET ORAL
Qty: 90 TABLET | Refills: 3 | Status: SHIPPED | OUTPATIENT
Start: 2023-01-01

## 2023-01-01 RX ORDER — FUROSEMIDE 20 MG
20 TABLET ORAL EVERY MORNING
Qty: 90 TABLET | Refills: 3 | Status: SHIPPED | OUTPATIENT
Start: 2023-01-01

## 2023-01-01 RX ORDER — GABAPENTIN 100 MG/1
CAPSULE ORAL
Qty: 60 CAPSULE | Refills: 1 | Status: SHIPPED | OUTPATIENT
Start: 2023-01-01

## 2023-01-01 RX ORDER — PROCHLORPERAZINE MALEATE 5 MG
5 TABLET ORAL EVERY 6 HOURS PRN
Status: CANCELLED | OUTPATIENT
Start: 2023-01-01

## 2023-01-01 RX ORDER — LIDOCAINE 4 G/G
1 PATCH TOPICAL EVERY 24 HOURS
Qty: 15 PATCH | Refills: 0 | Status: SHIPPED | OUTPATIENT
Start: 2023-01-01

## 2023-01-01 RX ORDER — CEPHALEXIN 500 MG/1
500 CAPSULE ORAL 4 TIMES DAILY
Qty: 40 CAPSULE | Refills: 0 | Status: SHIPPED | OUTPATIENT
Start: 2023-01-01 | End: 2023-01-01

## 2023-01-01 RX ORDER — AMINOPHYLLINE 25 MG/ML
50 INJECTION, SOLUTION INTRAVENOUS
Status: DISCONTINUED | OUTPATIENT
Start: 2023-01-01 | End: 2023-01-01 | Stop reason: HOSPADM

## 2023-01-01 RX ORDER — EPINEPHRINE 1 MG/ML
0.1 INJECTION, SOLUTION INTRAMUSCULAR; SUBCUTANEOUS
Status: DISCONTINUED | OUTPATIENT
Start: 2023-01-01 | End: 2023-01-01 | Stop reason: HOSPADM

## 2023-01-01 RX ORDER — FERROUS SULFATE 325(65) MG
325 TABLET, DELAYED RELEASE (ENTERIC COATED) ORAL 2 TIMES DAILY
Qty: 60 TABLET | Refills: 3 | Status: SHIPPED | OUTPATIENT
Start: 2023-01-01

## 2023-01-01 RX ORDER — LORAZEPAM 0.5 MG/1
TABLET ORAL
Qty: 1 TABLET | Refills: 0 | Status: SHIPPED | OUTPATIENT
Start: 2023-01-01 | End: 2023-01-01

## 2023-01-01 RX ADMIN — Medication 40.6 MILLICURIE: at 09:15

## 2023-01-01 RX ADMIN — IRON SUCROSE 200 MG: 20 INJECTION, SOLUTION INTRAVENOUS at 17:25

## 2023-01-01 RX ADMIN — REGADENOSON 0.4 MG: 0.08 INJECTION, SOLUTION INTRAVENOUS at 09:14

## 2023-01-01 RX ADMIN — SODIUM CHLORIDE, POTASSIUM CHLORIDE, SODIUM LACTATE AND CALCIUM CHLORIDE 500 ML: 600; 310; 30; 20 INJECTION, SOLUTION INTRAVENOUS at 16:30

## 2023-01-01 RX ADMIN — Medication 10.8 MILLICURIE: at 07:45

## 2023-01-01 ASSESSMENT — ANXIETY QUESTIONNAIRES
GAD7 TOTAL SCORE: 9
GAD7 TOTAL SCORE: 9
3. WORRYING TOO MUCH ABOUT DIFFERENT THINGS: MORE THAN HALF THE DAYS
6. BECOMING EASILY ANNOYED OR IRRITABLE: SEVERAL DAYS
2. NOT BEING ABLE TO STOP OR CONTROL WORRYING: MORE THAN HALF THE DAYS
7. FEELING AFRAID AS IF SOMETHING AWFUL MIGHT HAPPEN: NOT AT ALL
4. TROUBLE RELAXING: MORE THAN HALF THE DAYS
5. BEING SO RESTLESS THAT IT IS HARD TO SIT STILL: NOT AT ALL
1. FEELING NERVOUS, ANXIOUS, OR ON EDGE: MORE THAN HALF THE DAYS
IF YOU CHECKED OFF ANY PROBLEMS ON THIS QUESTIONNAIRE, HOW DIFFICULT HAVE THESE PROBLEMS MADE IT FOR YOU TO DO YOUR WORK, TAKE CARE OF THINGS AT HOME, OR GET ALONG WITH OTHER PEOPLE: NOT DIFFICULT AT ALL

## 2023-01-01 ASSESSMENT — ENCOUNTER SYMPTOMS
JOINT SWELLING: 1
ARTHRALGIAS: 1
MYALGIAS: 1
WEAKNESS: 1

## 2023-01-01 ASSESSMENT — PATIENT HEALTH QUESTIONNAIRE - PHQ9
SUM OF ALL RESPONSES TO PHQ QUESTIONS 1-9: 5
SUM OF ALL RESPONSES TO PHQ QUESTIONS 1-9: 5
10. IF YOU CHECKED OFF ANY PROBLEMS, HOW DIFFICULT HAVE THESE PROBLEMS MADE IT FOR YOU TO DO YOUR WORK, TAKE CARE OF THINGS AT HOME, OR GET ALONG WITH OTHER PEOPLE: SOMEWHAT DIFFICULT

## 2023-01-01 ASSESSMENT — ACTIVITIES OF DAILY LIVING (ADL)
ADLS_ACUITY_SCORE: 33
ADLS_ACUITY_SCORE: 35
CURRENT_FUNCTION: NO ASSISTANCE NEEDED

## 2023-01-01 ASSESSMENT — PAIN SCALES - GENERAL
PAINLEVEL: NO PAIN (0)
PAINLEVEL: MODERATE PAIN (5)
PAINLEVEL: NO PAIN (0)
PAINLEVEL: NO PAIN (0)
PAINLEVEL: SEVERE PAIN (7)
PAINLEVEL: EXTREME PAIN (8)
PAINLEVEL: MODERATE PAIN (5)

## 2023-01-05 NOTE — TELEPHONE ENCOUNTER
Pt is concerned that she's on too many blood pressure meds. Her blood pressure is readings for today: 128/62, 87/49, 96/60. Says she gets light headed at times while standing.

## 2023-01-06 NOTE — PROGRESS NOTES
ANTICOAGULATION MANAGEMENT     Nancy Oropeza 77 year old female is on warfarin with supratherapeutic INR result. (Goal INR 2.0-3.0)    Recent labs: (last 7 days)     01/06/23  0000   INR 4.0*       ASSESSMENT       Source(s): Chart Review and Patient/Caregiver Call       Warfarin doses taken: Warfarin taken as instructed    Diet: Decreased greens/vitamin K in diet; plans to resume previous intake    New illness, injury, or hospitalization: No    Medication/supplement changes: patient is back on regular dose of 5 mg prednisone daily     Amlodipine discontinued today.    Signs or symptoms of bleeding or clotting: No    Previous INR: Supratherapeutic    Additional findings: patient already took reduced dose this morning       PLAN     Recommended plan for temporary change(s) affecting INR     Dosing Instructions: hold dose on Saturday and partial hold today continue with current dose with next INR in 1 week       Summary  As of 1/6/2023    Full warfarin instructions:  1/6: 1.25 mg; 1/7: Hold; Otherwise 1.25 mg every Mon, Wed, Sat; 2.5 mg all other days   Next INR check:  1/13/2023             Telephone call with Nancy who agrees to plan and repeated back plan correctly  Sent [employer] message with dosing and follow up instructions    Patient to recheck with home meter    Education provided:     Symptom monitoring: monitoring for bleeding signs and symptoms, when to seek medical attention/emergency care and if you hit your head or have a bad fall seek emergency care    Plan made per [facility] anticoagulation protocol    Evie Lagos, RN  Anticoagulation Clinic  1/6/2023    _______________________________________________________________________     Anticoagulation Episode Summary     Current INR goal:  2.0-3.0   TTR:  60.8 % (1 y)   Target end date:  Indefinite   Send INR reminders to:  [facility]    Indications    Pulmonary embolism (H) [I26.99]  Pulmonary embolism  unspecified chronicity  unspecified  pulmonary embolism type  unspecified whether acute cor pulmonale present (H) [I26.99]           Comments:  [facility] Home Meter// Manage by exception         Anticoagulation Care Providers     Provider Role Specialty Phone number    Julien Garnica MD Referring Family Medicine [phone]

## 2023-01-06 NOTE — TELEPHONE ENCOUNTER
Please notify patient.  Have patient stop taking amlodipine 5 mg daily.  Okay to continue lisinopril/HCTZ 20/12.5 use two tablets daily.  Report back to me in next 2 weeks to ensure dizziness with position change resolves as well as updated blood pressure readings for my review.  Thank you.

## 2023-01-13 NOTE — TELEPHONE ENCOUNTER
"Routing refill request to provider for review/approval because:  Labs out of range:  CR    Last Written Prescription Date:  2/4/2022  Last Fill Quantity: 90  # refills: 3   Last office visit provider: 12/02/2022     Requested Prescriptions   Pending Prescriptions Disp Refills     amLODIPine (NORVASC) 5 MG tablet [Pharmacy Med Name: AMLODIPINE BESYLATE TABS 5MG] 90 tablet 3     Sig: TAKE 1 TABLET DAILY (NEW   DOSE 6/1/18)       Calcium Channel Blockers Protocol  Failed - 1/12/2023 12:09 AM        Failed - Normal serum creatinine on file in past 12 months     Recent Labs   Lab Test 12/02/22  1457   CR 1.49*       Ok to refill medication if creatinine is low          Passed - Blood pressure under 140/90 in past 12 months     BP Readings from Last 3 Encounters:   12/02/22 120/60   10/14/22 138/70   07/22/22 (!) 140/70                 Passed - Recent (12 mo) or future (30 days) visit within the authorizing provider's specialty     Patient has had an office visit with the authorizing provider or a provider within the authorizing providers department within the previous 12 mos or has a future within next 30 days. See \"Patient Info\" tab in inbasket, or \"Choose Columns\" in Meds & Orders section of the refill encounter.              Passed - Medication is active on med list        Passed - Patient is age 18 or older        Passed - No active pregnancy on record        Passed - No positive pregnancy test in past 12 months             Leta Early RN 01/13/23 8:42 AM  "

## 2023-01-13 NOTE — PROGRESS NOTES
ANTICOAGULATION MANAGEMENT     Nancy RYANN Oropeza 77 year old female is on warfarin with therapeutic INR result. (Goal INR 2.0-3.0)    Recent labs: (last 7 days)     01/13/23  1651   INR 2.8       ASSESSMENT       Source(s): Chart Review and Patient/Caregiver Call       Warfarin doses taken: Warfarin taken as instructed    Diet: No new diet changes identified    New illness, injury, or hospitalization: No    Medication/supplement changes: None noted    Signs or symptoms of bleeding or clotting: No    Previous INR: Supratherapeutic    Additional findings: None       PLAN     Recommended plan for no diet, medication or health factor changes affecting INR     Dosing Instructions: Continue your current warfarin dose with next INR in 1 week       Summary  As of 1/13/2023    Full warfarin instructions:  1.25 mg every Mon, Wed, Sat; 2.5 mg all other days   Next INR check:  1/20/2023             Telephone call with Nancy who verbalizes understanding and agrees to plan and who agrees to plan and repeated back plan correctly    Patient to recheck with home meter    Education provided:     Please call back if any changes to your diet, medications or how you've been taking warfarin    Plan made per [facility] anticoagulation protocol    Richa Clark, RN  Anticoagulation Clinic  1/13/2023    _______________________________________________________________________     Anticoagulation Episode Summary     Current INR goal:  2.0-3.0   TTR:  59.7 % (1 y)   Target end date:  Indefinite   Send INR reminders to:  [facility]    Indications    Pulmonary embolism (H) [I26.99]  Pulmonary embolism  unspecified chronicity  unspecified pulmonary embolism type  unspecified whether acute cor pulmonale present (H) [I26.99]           Comments:  [employer] Home [employer]// Manage by exception         Anticoagulation Care Providers     Provider Role Specialty Phone number    Julien Garnica MD Referring Family Medicine [phone]

## 2023-01-13 NOTE — TELEPHONE ENCOUNTER
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      Per patient INR taken 01/13/2023 was 2.8     Date of last appointment with provider:     Could we send this information to you in [employer] or would you prefer to receive a phone call?:   Patient would prefer a phone call   Okay to leave a detailed message?: Yes at Cell number on file:    Telephone Information:   Mobile [phone]

## 2023-01-17 NOTE — PROGRESS NOTES
Assessment/Plan:    Anxiety state  Anxiety symptoms improved with resumption of citalopram 40 mg daily.  Did not tolerate buspirone 7.5 mg twice daily while therefore had discontinued previously.  Reassess at follow-up in 3 months.    Mild episode of recurrent major depressive disorder (H)  Symptoms of depression also continue to be controlled well with citalopram 40 mg daily use    Polymyalgia rheumatica (H)  Significant improvement after increasing prednisone up to 15 mg daily before subsequent taper.  States has resumed 5 mg daily dose currently instead of expected 10 mg dose but doing well and will continue current dosing as noted with reassessment at follow-up in 3 months.  We will update sedimentation rate with prior level having increased from 38 up to 61 prior to recent dose titration.  - Erythrocyte sedimentation rate auto    Pulmonary embolism, unspecified chronicity, unspecified pulmonary embolism type, unspecified whether acute cor pulmonale present (H)  History of pulmonary emboli and continuing warfarin 1.25 mg on Monday Wednesday and Saturday otherwise 2.5 mg daily.    Morbid obesity (H)  Severe obesity reviewed.  Dietary and exercise modifications for ongoing weight goal less than 270 pounds initially, less than 260 pounds ideally before scheduled follow-up in 3 months.    Stage 3a chronic kidney disease (H)  CKD stage III historically with recent decrease in renal function with creatinine 1.49 and GFR 35 will ensure adequate hydration.  - Basic metabolic panel    Normochromic normocytic anemia  History normochromic normocytic anemia reviewed.  Associated anemia of chronic disease likely.  Recent hemoglobin 9.2 with MCV 88.  Update hemogram.  - CBC with platelets               Subjective:    Nancy GARZON Satabhay is seen today for follow-up evaluation.  States that she is feeling better.  Prior concerns for anxiety however attempts at use of buspirone with intolerance therefore has resumed citalopram 40  mg daily and feels that this is been helpful for both anxiety and depression management.  Due to likely exacerbation of polymyalgia rheumatica had recently increased prednisone dosing from 5 mg up to 15 mg daily then titrated down to 12.5 mg daily and subsequently 10 mg daily.  Patient however states she is only using 1 tablet (5 mg) currently and continuing to do fine.  Understands that she is not supposed to be on high-dose prednisone for prolonged periods of time however did provide significant improvement in overall wellbeing and stamina.  States was cleaning her house every other day.  No insomnia issues.  PE historically.  Warfarin anticoagulation chronically.  Utilizing tramadol 50 mg daily for right shoulder pain.  This helps as well.  May require shoulder replacement surgery per patient.  Continues thyroid replacement also with current use of levothyroxine 125 mcg daily.  CKD stage IIIa with associated normochromic normocytic anemia with underlying anemia of chronic disease contributing.  Comprehensive review of systems as above otherwise all negative.       5 children (Airam, Nancy, Lakeisha, Akash, Thee) - Akash had coronary stent while Thee with Factor V abnormality and rectal cancer   14 grandchildren   16 great-grandchildren   Grew up in [place], [place] til age 12...   No smoke (quit 16 years ago after 1 ppd x 30+ years)   EtOH: occ wine   Mom -  88 COPD   Dad -  61 massive MI   1 bro -  67 blood clot (lung)   2 sis - one of which is  age 62 small cell lung CA (h/o smoker)   Surgeries: ventral hernia repair with muscle flap 10/4/12 with post-op complication of seroma with J.P. drain in place followed by interventional radiology q 2 weeks);  age 25; groin hernia age 3; CAPRICE-BSO  by Dr. Herbert Carmen (due to benign cyst x 2); right TKA 18 (Dr. Small)   Hospitalizations: as above   Work: retired  ([employer] as )     Past  Surgical History:   Procedure Laterality Date     arthroplasty for hammertoe-2nd toe R 2000 [facility][       BIOPSY BREAST Left 1970s     Bunion correction by Cheilectomy-had bunionectomy ? type of procedure L and R separate procedures.[       GYN SURGERY       HERNIA REPAIR       Hernia Repair Inguinal unilateral[       HYSTERECTOMY       HYSTERECTOMY  2010     IR ABSCESS TUBE CHANGE  11/9/2012     IR ABSCESS TUBE CHANGE  11/12/2012     IR ABSCESS TUBE CHANGE  12/4/2012     IR ABSCESS TUBE CHANGE  2/22/2013     IR ABSCESS TUBE CHANGE  3/1/2013     IR ABSCESS TUBE CHANGE  3/13/2013     OOPHORECTOMY  2010     ORTHOPEDIC SURGERY          Family History   Problem Relation Age of Onset     Breast Cancer Sister 75.00     Stomach Cancer Paternal Grandfather      Lung Cancer Sister      Chronic Obstructive Pulmonary Disease Mother      Heart Disease Mother      Coronary Artery Disease Father         Past Medical History:   Diagnosis Date     2019 novel coronavirus disease (COVID-19) 7/16/2020     Acute bilateral knee pain 7/18/2017     TEOFILO (acute kidney injury) (H) 7/16/2020     Anemia, unspecified     Created by Conversion      Angioedema 12/17/2015     Anticoagulant long-term use 3/6/2017     Back pain     Created by Conversion      Chronic pain syndrome 6/1/2018     COVID-19 virus infection 7/17/2020     Depression      Diarrhea 7/16/2020     Disease of thyroid gland      Edema     Created by Conversion      Essential hypertension with goal blood pressure less than 140/90     Created by Conversion  Replacement Utility updated for latest IMO load     History of pulmonary embolism 1/1/2015    Overview:  bilateral with acute cor pulmonale      HTN (hypertension)      Hypercholesteremia      Hypokalemia 12/17/2015     Hypothyroidism     Created by Conversion  Replacement Utility updated for latest IMO load     Hypoxia 7/16/2020     Impaired fasting glucose     Created by Conversion      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     Major  depressive disorder, recurrent episode (H)     Created by Conversion  Replacement Utility updated for latest IMO load     Mood disorder (H) 7/16/2020     Morbid obesity (H)      Multiple lung nodules on CT 12/17/2015     Obstructive sleep apnea (adult) (pediatric)     Created by Conversion      Osteoarthrosis involving lower leg     Created by Conversion  Replacement Utility updated for latest IMO load     Pain in joint, multiple sites     Created by Conversion      Polymyalgia rheumatica (H) 7/16/2020     Postoperative anemia due to acute blood loss 12/18/2018     Pulmonary emboli (H) 12/14/2015     Pulmonary embolism (H) 12/21/2015     Pure hypercholesterolemia     Created by Conversion      Sepsis (H) 7/28/2020     SIRS (systemic inflammatory response syndrome) (H) 7/28/2020     Unspecified cataract     Created by Conversion      Yeast infection         Social History     Tobacco Use     Smoking status: Former     Smokeless tobacco: Never     Tobacco comments:     quite 20 yrs ago   Substance Use Topics     Alcohol use: No     Comment: Alcoholic Drinks/day: occasionally     Drug use: No        Current Outpatient Medications   Medication Sig Dispense Refill     acetaminophen 500 MG CAPS Take 2 capsules by mouth 3 times daily as needed.       amLODIPine (NORVASC) 5 MG tablet TAKE 1 TABLET DAILY (NEW   DOSE 6/1/18) 90 tablet 3     BD ULTRA-FINE 33 LANCETS Use as directed       Cholecalciferol (VITAMIN D) 400 UNITS capsule Take 2 capsules by mouth daily.       citalopram (CELEXA) 40 MG tablet Take 1 tablet (40 mg) by mouth daily 90 tablet 3     furosemide (LASIX) 20 MG tablet Take 1 tablet (20 mg) by mouth every morning 90 tablet 3     Glucose Blood (ONE TOUCH ULTRA TEST) strip by In Vitro route daily Use as directed       levothyroxine (SYNTHROID/LEVOTHROID) 125 MCG tablet Take 1 tablet (125 mcg) by mouth daily 90 tablet 3     lisinopril-hydrochlorothiazide (ZESTORETIC) 20-12.5 MG tablet Take 2 tablets by mouth  daily 180 tablet 3     pravastatin (PRAVACHOL) 80 MG tablet Take 1 tablet (80 mg) by mouth At Bedtime 90 tablet 3     predniSONE (DELTASONE) 5 MG tablet TAKE 1 TABLET DAILY 90 tablet 3     traMADol (ULTRAM) 50 MG tablet TAKE 1 TABLET EVERY 6 HOURS AS NEEDED FOR SEVERE PAIN 60 tablet 0     warfarin ANTICOAGULANT (COUMADIN) 2.5 MG tablet take 1 tablet (2.5 mg) by mouth every Mon, Wed, Fri; take one-half tablet (1.25 mg) by mouth all other days of the week 60 tablet 5          Objective:    Vitals:    01/17/23 1002   BP: (!) 140/78   Pulse: 115   Resp: 21   SpO2: 96%   Weight: 125.6 kg (277 lb)      Body mass index is 44.71 kg/m .    Alert.  No apparent distress.  Chest clear.  Cardiac exam regular.  BMI 44.71.  Transfers independently.      This note has been dictated using voice recognition software and as a result may contain minor grammatical errors and unintended word substitutions.

## 2023-01-23 NOTE — PROGRESS NOTES
Incoming fax from [employer]    Date of result  1/21/23    INR result  3.1    ANTICOAGULATION MANAGEMENT     Nancy Oropeza 77 year old female is on warfarin with supratherapeutic INR result. (Goal INR 2.0-3.0)    Recent labs: (last 7 days)     01/21/23  0000   INR 3.1*       ASSESSMENT       Source(s): Chart Review and Patient/Caregiver Call       Warfarin doses taken: Less warfarin taken than planned which may be affecting INR    Diet: Decreased greens/vitamin K in diet; plans to resume previous intake    New illness, injury, or hospitalization: No    Medication/supplement changes: None noted    Signs or symptoms of bleeding or clotting: No    Previous INR: Therapeutic last visit; previously outside of goal range    Additional findings: Patient not 100% sure she checked INR on 1/21, could have been 1/22.        PLAN     Recommended plan for temporary change(s) affecting INR     Dosing Instructions: Continue your current warfarin dose with next INR in 5 days       Summary  As of 1/23/2023    Full warfarin instructions:  1.25 mg every Mon, Wed, Sat; 2.5 mg all other days   Next INR check:  1/27/2023             Telephone call with Nancy who verbalizes understanding and agrees to plan    Patient to recheck with home meter    Education provided:     Please call back if any changes to your diet, medications or how you've been taking warfarin    Taking warfarin: Importance of taking warfarin as instructed    Goal range and lab monitoring: goal range and significance of current result    Plan made per [facility] anticoagulation protocol    Vicky Mcclain RN  Anticoagulation Clinic  1/23/2023    _______________________________________________________________________     Anticoagulation Episode Summary     Current INR goal:  2.0-3.0   TTR:  61.4 % (1 y)   Target end date:  Indefinite   Send INR reminders to:  [facility]    Indications    Pulmonary embolism (H) [I26.99]  Pulmonary  embolism  unspecified chronicity  unspecified pulmonary embolism type  unspecified whether acute cor pulmonale present (H) [I26.99]           Comments:  [employer] Home Meter// Manage by exception         Anticoagulation Care Providers     Provider Role Specialty Phone number    Julien Garnica MD Referring Family Medicine [phone]

## 2023-01-30 NOTE — PROGRESS NOTES
ANTICOAGULATION MANAGEMENT     Nancy RYANN Oropeza 77 year old female is on warfarin with therapeutic INR result. (Goal INR 2.0-3.0)    Recent labs: (last 7 days)     01/27/23  0832   INR 2.3*       ASSESSMENT       Source(s): Chart Review and Patient/Caregiver Call       Warfarin doses taken: Warfarin taken as instructed    Diet: No new diet changes identified    New illness, injury, or hospitalization: No    Medication/supplement changes: None noted    Signs or symptoms of bleeding or clotting: No    Previous INR: Supratherapeutic    Additional findings: None       PLAN     Recommended plan for no diet, medication or health factor changes affecting INR     Dosing Instructions: Continue your current warfarin dose with next INR in 1 week       Summary  As of 1/30/2023    Full warfarin instructions:  1.25 mg every Mon, Wed, Sat; 2.5 mg all other days   Next INR check:  2/3/2023             Telephone call with Nancy who verbalizes understanding and agrees to plan    Patient to recheck with home meter    Education provided:     Please call back if any changes to your diet, medications or how you've been taking warfarin    Resume manage by exception with home monitor. Continue to submit INR results to home monitor company.You will only be called when your result is out of range. Please call and notify [facility] if new medication started, dose missed, signs or symptoms of bleeding or clotting, or a surgery/procedure is scheduled.    Plan made per [facility] anticoagulation protocol    Cora Jean-Baptiste RN  Anticoagulation Clinic  1/30/2023    _______________________________________________________________________     Anticoagulation Episode Summary     Current INR goal:  2.0-3.0   TTR:  63.0 % (1 y)   Target end date:  Indefinite   Send INR reminders to:  [facility]    Indications    Pulmonary embolism (H) [I26.99]  Pulmonary embolism  unspecified chronicity  unspecified pulmonary embolism type  unspecified whether acute  cor pulmonale present (H) [I26.99]           Comments:  [facility] Home Meter// Manage by exception         Anticoagulation Care Providers     Provider Role Specialty Phone number    Julien Garnica MD Referring Family Medicine [phone]

## 2023-02-03 NOTE — PROGRESS NOTES
ANTICOAGULATION MANAGEMENT     Nancy Oropeza 77 year old female is on warfarin with subtherapeutic INR result. (Goal INR 2.0-3.0)    Recent labs: (last 7 days)     02/03/23  1438   INR 1.9*       ASSESSMENT       Source(s): Chart Review and Patient/Caregiver Call       Warfarin doses taken: Warfarin taken as instructed    Diet: No new diet changes identified    New illness, injury, or hospitalization: No    Medication/supplement changes: None noted    Signs or symptoms of bleeding or clotting: No    Previous INR: Therapeutic last visit; previously outside of goal range    Additional findings: None       PLAN     Recommended plan for no diet, medication or health factor changes affecting INR     Dosing Instructions: Continue your current warfarin dose with next INR in 1 week       Summary  As of 2/3/2023    Full warfarin instructions:  1.25 mg every Mon, Wed, Sat; 2.5 mg all other days   Next INR check:  2/10/2023             Telephone call with Nancy who verbalizes understanding and agrees to plan    Patient to recheck with home meter    Education provided:     Please call back if any changes to your diet, medications or how you've been taking warfarin    Symptom monitoring: monitoring for clotting signs and symptoms    Plan made per [facility] anticoagulation protocol    Cora Jean-Baptiste, RN  Anticoagulation Clinic  2/3/2023    _______________________________________________________________________     Anticoagulation Episode Summary     Current INR goal:  2.0-3.0   TTR:  64.0 % (1 y)   Target end date:  Indefinite   Send INR reminders to:  [facility]    Indications    Pulmonary embolism (H) [I26.99]  Pulmonary embolism  unspecified chronicity  unspecified pulmonary embolism type  unspecified whether acute cor pulmonale present (H) [I26.99]           Comments:  [employer]// Manage by exception         Anticoagulation Care Providers     Provider Role Specialty Phone number    Julien Garnica MD  [facility] [phone]

## 2023-02-08 NOTE — TELEPHONE ENCOUNTER
Dr. Garnica consulted -     Ok to wait till appt time.  In mean time pt may try rowdy colace 2 pills twice a day   fleets enemia and metamucil.

## 2023-02-08 NOTE — TELEPHONE ENCOUNTER
Nurse Triage SBAR    Is this a 2nd Level Triage? YES, LICENSED PRACTITIONER REVIEW IS REQUIRED    Situation:   Abdominal pain, usually after eating for over 6 months and it is not going away.  Have not been going to the bathroom regular BM for a long time, little marbles with a lot of straining.      Background:   Heat pads helps.    Assessment:   Patient stated that she had to forced self to have BM on a daily basis.  Abdominal pain is hardly anything right now with the heating pads on, but it could get to 7-8/10, usually a little while after eating. Aching pain; not sharp stabbing pain. The pain is across her mid section of her abdomen.    Greasy foods- she is staying away from.    Intermittent abdominal pain; it takes a while for it to get painful after eating then it will go away after a while as well, with the heating pads on.      Protocol Recommended Disposition:   No disposition on file.    Recommendation:   Care advices given. Assisted pt with scheduling appt for Friday with provider as she stated that she does not drive and will not be able to come to clinic today as per nursing protocol. Will route to PCP to see if it is okay to wait until Friday to be seen and if PCP has any other advices in the mean time.    Patient verbalized understanding of the need to seek medical care right away if she is experiencing severe pain for one than 1 hour, constant pain lasting over 2 hours, intermittent pains over 48 hours, or if she becomes worst.      Routed to provider    Does the patient meet one of the following criteria for ADS visit consideration? 16+ years old, with an [facility] PCP     TIP  Providers, please consider if this condition is appropriate for management at one of our Acute and Diagnostic Services sites.     If patient is a good candidate, please use dotphrase <dot>triageresponse and select Refer to ADS to document.    Reason for Disposition    Age > 60 years    Additional Information    Negative: Passed  out (i.e., fainted, collapsed and was not responding)    Negative: Shock suspected (e.g., cold/pale/clammy skin, too weak to stand, low BP, rapid pulse)    Negative: Sounds like a life-threatening emergency to the triager    Negative: Chest pain    Negative: Pain is mainly in upper abdomen (if needed ask: 'is it mainly above the belly button?')    Negative: Abdominal pain and pregnant < 20 weeks    Negative: Abdominal pain and pregnant 20 or more weeks    Negative: SEVERE abdominal pain (e.g., excruciating)    Negative: Vomiting red blood or black (coffee ground) material    Negative: Bloody, black, or tarry bowel movements  (Exception: Chronic-unchanged black-grey bowel movements and is taking iron pills or Pepto-Bismol.)    Negative: Constant abdominal pain lasting > 2 hours    Negative: Vomiting bile (green color)    Negative: Patient sounds very sick or weak to the triager    Negative: Vomiting and abdomen looks much more swollen than usual    Negative: White of the eyes have turned yellow (i.e., jaundice)    Negative: Blood in urine (red, pink, or tea-colored)    Negative: Fever > 103 F (39.4 C)    Negative: Fever > 101 F (38.3 C) and over 60 years of age    Negative: Fever > 100.0 F (37.8 C) and has diabetes mellitus or a weak immune system (e.g., HIV positive, cancer chemotherapy, organ transplant, splenectomy, chronic steroids)    Negative: Fever > 100.0 F (37.8 C) and bedridden (e.g., nursing home patient, stroke, chronic illness, recovering from surgery)    Negative: Pregnant or could be pregnant (i.e., missed last menstrual period)    Negative: MODERATE pain (e.g., interferes with normal activities that comes and goes (cramps) lasts > 24 hours  (Exception: Pain with Vomiting or Diarrhea - see that Protocol.)    Negative: Unusual vaginal discharge    Negative: Patient wants to be seen    Protocols used: ABDOMINAL PAIN - FEMALE-A-OH    GERTRUDE ParrishN, RN  [facility]

## 2023-02-10 PROBLEM — R65.10 SIRS (SYSTEMIC INFLAMMATORY RESPONSE SYNDROME) (H): Status: ACTIVE | Noted: 2020-07-28

## 2023-02-10 PROBLEM — R73.01 IMPAIRED FASTING GLUCOSE: Status: ACTIVE | Noted: 2023-01-01

## 2023-02-10 PROBLEM — M25.561 ACUTE BILATERAL KNEE PAIN: Status: ACTIVE | Noted: 2017-07-18

## 2023-02-10 PROBLEM — M17.12 LEFT KNEE DJD: Status: ACTIVE | Noted: 2019-07-17

## 2023-02-10 PROBLEM — N17.9 AKI (ACUTE KIDNEY INJURY) (H): Status: ACTIVE | Noted: 2020-07-16

## 2023-02-10 PROBLEM — M15.0 PRIMARY OSTEOARTHRITIS INVOLVING MULTIPLE JOINTS: Status: ACTIVE | Noted: 2020-11-24

## 2023-02-10 PROBLEM — M25.50 PAIN IN JOINT, MULTIPLE SITES: Status: ACTIVE | Noted: 2023-01-01

## 2023-02-10 PROBLEM — F39 EPISODIC MOOD DISORDER (H): Status: ACTIVE | Noted: 2020-07-16

## 2023-02-10 PROBLEM — M17.11 PRIMARY OSTEOARTHRITIS OF RIGHT KNEE: Status: ACTIVE | Noted: 2018-12-17

## 2023-02-10 PROBLEM — F33.9 MAJOR DEPRESSION, RECURRENT (H): Status: ACTIVE | Noted: 2023-01-01

## 2023-02-10 PROBLEM — D62 POSTOPERATIVE ANEMIA DUE TO ACUTE BLOOD LOSS: Status: ACTIVE | Noted: 2018-12-18

## 2023-02-10 PROBLEM — G89.4 CHRONIC PAIN DISORDER: Status: ACTIVE | Noted: 2018-06-01

## 2023-02-10 PROBLEM — M25.562 ACUTE BILATERAL KNEE PAIN: Status: ACTIVE | Noted: 2017-07-18

## 2023-02-10 PROBLEM — M54.9 BACKACHE: Status: ACTIVE | Noted: 2023-01-01

## 2023-02-10 PROBLEM — Z79.01 LONG TERM CURRENT USE OF ANTICOAGULANT THERAPY: Status: ACTIVE | Noted: 2017-03-06

## 2023-02-10 PROBLEM — A41.9 SEPSIS (H): Status: ACTIVE | Noted: 2020-07-28

## 2023-02-10 PROBLEM — N18.30 CHRONIC KIDNEY DISEASE, STAGE 3 (H): Status: ACTIVE | Noted: 2021-07-09

## 2023-02-10 PROBLEM — M35.3 POLYMYALGIA RHEUMATICA (H): Status: ACTIVE | Noted: 2020-07-16

## 2023-02-10 PROBLEM — H26.9 CATARACT: Status: ACTIVE | Noted: 2023-01-01

## 2023-02-10 PROBLEM — U07.1 2019 NOVEL CORONAVIRUS DISEASE (COVID-19): Status: ACTIVE | Noted: 2020-07-16

## 2023-02-10 PROBLEM — R60.9 EDEMA: Status: ACTIVE | Noted: 2023-01-01

## 2023-02-10 NOTE — PROGRESS NOTES
Assessment and Plan:     Abdominal pain, generalized  Constipation, unspecified constipation type  Differentials include constipation, gas, cholelithiasis, pancreatitis, gastritis, gastric ulcer, GERD, urinary tract infection, nephrolithiasis.  I encouraged her to increase her fluid and fiber intake.  Recommend she starting using Miralax daily.  I encourage her to use a Fleet enema today to assist with moving the harder stool in the lower colon.  Will check lipase and hepatic panel.  She recently had a BMP showing stable CKD.  Will obtain urinalysis to rule out urinary tract infection and hematuria.  If lipase is elevated, will obtain ultrasound or referral to general surgery due to known gallstones.  If no improvement with with symptoms despite having more regular bowel movements, will refer to gastroenterology.  - Lipase  - Hepatic panel (Albumin, ALT, AST, Bili, Alk Phos, TP)  - UA Macro with Reflex to Micro and Culture - lab collect  - Lipase  - Hepatic panel (Albumin, ALT, AST, Bili, Alk Phos, TP)  - UA Macro with Reflex to Micro and Culture - lab collect    Chronic pain disorder  Patient continues tramadol.  We did discuss that this can cause constipation.    Anxiety state  Patient continues citalopram.        Subjective:     Nancy is a 77 year old female presenting to the clinic for concerns for abdominal pain for 6 months.  Patient complains of smaller, marble size bowel movements which are causing her to strain. She has increased her fluid intake and bought some fiber pills yesterday.  She has tried Metamucil, MiraLAX, Colace intermittently.  She does experience some abdominal cramping and discomfort 1 hour after eating.  This typically subsides after 20 minutes.  She applies a heating pad.  No heartburn has been present.  She denies blood or mucus in the stool.  She denies nausea or vomiting.  She has a history of gallstones which have not required surgical intervention.  She takes citalopram for  underlying anxiety and stress.  Patient is taking tramadol for chronic pain.    Reviewof Systems: A complete 14 point review of systems was obtained and is negative or as stated in the history of present illness.    Social History     Socioeconomic History     Marital status:      Spouse name: Not on file     Number of children: Not on file     Years of education: Not on file     Highest education level: Not on file   Occupational History     Not on file   Tobacco Use     Smoking status: Former     Smokeless tobacco: Never     Tobacco comments:     quite 20 yrs ago   Substance and Sexual Activity     Alcohol use: No     Comment: Alcoholic Drinks/day: occasionally     Drug use: No     Sexual activity: Not on file   Other Topics Concern     Not on file   Social History Narrative    Patient arrived in the ED with her sister Kassy 09/10/17       Social Determinants of Health     Financial Resource Strain: Not on file   Food Insecurity: Not on file   Transportation Needs: Not on file   Physical Activity: Not on file   Stress: Not on file   Social Connections: Not on file   Intimate Partner Violence: Not on file   Housing Stability: Not on file       Active Ambulatory Problems     Diagnosis Date Noted     Health Care Home 01/19/2013     Acquired spondylolisthesis 01/19/2013     Normochromic normocytic anemia 01/19/2013     Anxiety state 01/19/2013     Essential hypertension with goal blood pressure less than 140/90 01/19/2013     Benign paroxysmal positional vertigo 01/19/2013     Benign neoplasm of colon 01/19/2013     Carpal tunnel syndrome 01/19/2013     Diverticulosis of large intestine 01/19/2013     Constipation 01/19/2013     Coronary atherosclerosis 01/19/2013     Contact dermatitis and other eczema, due to unspecified cause 01/19/2013     Obesity 01/19/2013     Other chronic nonalcoholic liver disease 01/19/2013     Hyperlipidemia 01/19/2013     Hypothyroidism 01/19/2013     Lipoma 01/19/2013      Benign neoplasm of skin 01/19/2013     Sleep apnea 01/19/2013     Tinnitus 01/19/2013     Pulmonary embolism (H) 07/11/2021     Mild episode of recurrent major depressive disorder (H) 07/27/2021     Chronic kidney disease, stage 3 07/09/2021     Polymyalgia rheumatica (H) 07/16/2020     Chronic bilateral low back pain with right-sided sciatica 10/07/2021     Morbid obesity (H) 02/15/2022     Pulmonary embolism, unspecified chronicity, unspecified pulmonary embolism type, unspecified whether acute cor pulmonale present (H) 03/30/2022     Calculus of gallbladder with biliary obstruction but without cholecystitis 09/29/2022     Chronic right shoulder pain 12/02/2022     Acute bilateral knee pain 07/18/2017     TEOFILO (acute kidney injury) (H) 07/16/2020     Angioedema 12/17/2015     Backache 02/10/2023     Cataract 02/10/2023     2019 novel coronavirus disease (COVID-19) 07/16/2020     Edema 02/10/2023     Episodic mood disorder (H) 07/16/2020     History of pulmonary embolism 01/01/2015     Hypokalemia 12/17/2015     Impaired fasting glucose 02/10/2023     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     Primary osteoarthritis of right knee 12/17/2018     Long term current use of anticoagulant therapy 03/06/2017     Major depression, recurrent (H) 02/10/2023     Multiple lung nodules on CT 12/17/2015     Osteoarthrosis involving lower leg 02/10/2023     Pain in joint, multiple sites 02/10/2023     Postoperative anemia due to acute blood loss 12/18/2018     Primary osteoarthritis involving multiple joints 11/24/2020     Sepsis (H) 07/28/2020     SIRS (systemic inflammatory response syndrome) (H) 07/28/2020     Chronic pain disorder 06/01/2018     Resolved Ambulatory Problems     Diagnosis Date Noted     Disorder of carbohydrate transport and metabolism (H) 01/19/2013     Pulmonary emboli (H) 12/14/2015     PE (pulmonary thromboembolism) (H) 12/14/2015     Past Medical History:   Diagnosis Date     Anemia, unspecified      Anticoagulant  "long-term use 3/6/2017     Back pain      Chronic pain syndrome 6/1/2018     COVID-19 virus infection 7/17/2020     Depression      Diarrhea 7/16/2020     Disease of thyroid gland      HTN (hypertension)      Hypercholesteremia      Hypoxia 7/16/2020     Major depressive disorder, recurrent episode (H)      Mood disorder (H) 7/16/2020     Obstructive sleep apnea (adult) (pediatric)      Pure hypercholesterolemia      Unspecified cataract      Yeast infection        Family History   Problem Relation Age of Onset     Breast Cancer Sister 75.00     Stomach Cancer Paternal Grandfather      Lung Cancer Sister      Chronic Obstructive Pulmonary Disease Mother      Heart Disease Mother      Coronary Artery Disease Father        Objective:     /76 (BP Location: Left arm, Patient Position: Sitting, Cuff Size: Adult Large)   Pulse 96   Temp 98  F (36.7  C) (Oral)   Resp 20   Ht 1.695 m (5' 6.73\")   Wt 123.6 kg (272 lb 8 oz)   LMP  (LMP Unknown)   SpO2 96%   BMI 43.02 kg/m      Patient is alert, in no obvious distress.   Skin: Warm, dry.    Lungs:  Clear to auscultation. Respirations even and unlabored.  No wheezing or rales noted.   Heart:  Regular rate and rhythm.  No murmurs, S3, S4, gallops, or rubs.    Abdomen: Soft, tenderness to palpation right upper abdomen.  No organomegaly. Bowel sounds normoactive. No guarding or masses noted.               "

## 2023-02-10 NOTE — PROGRESS NOTES
ANTICOAGULATION MANAGEMENT     Nancy Oropeza 77 year old female is on warfarin with therapeutic INR result. (Goal INR 2.0-3.0)    Recent labs: (last 7 days)     02/10/23  1156   INR 3.0*       ASSESSMENT       Source(s): Chart Review    Previous INR was Subtherapeutic    Medication, diet, health changes since last INR; patient was seen in clinic today for abdominal pain. It does not appear any new medications were started, UA was negative.            PLAN     Recommended plan for no diet, medication or health factor changes affecting INR     Dosing Instructions: Continue your current warfarin dose with next INR in 1 week       Summary  As of 2/10/2023    Full warfarin instructions:  1.25 mg every Mon, Wed, Sat; 2.5 mg all other days   Next INR check:  2/17/2023             Detailed voice message left for Nancy with dosing instructions and follow up date.     Patient to recheck with home meter    Education provided:     Please call back if any changes to your diet, medications or how you've been taking warfarin    Contact [phone]  with any changes, questions or concerns.     Plan made per [facility] anticoagulation protocol    Cora Jean-Baptiste RN  Anticoagulation Clinic  2/10/2023    _______________________________________________________________________     Anticoagulation Episode Summary     Current INR goal:  2.0-3.0   TTR:  64.9 % (1 y)   Target end date:  Indefinite   Send INR reminders to:  [facility]    Indications    Pulmonary embolism (H) [I26.99]  Pulmonary embolism  unspecified chronicity  unspecified pulmonary embolism type  unspecified whether acute cor pulmonale present (H) [I26.99]           Comments:  [employer]// Manage by exception         Anticoagulation Care Providers     Provider Role Specialty Phone number    Julien Garnica MD Referring Family Medicine [phone]

## 2023-02-16 NOTE — PROGRESS NOTES
ANTICOAGULATION  MANAGEMENT-Home Monitor Managed by Exception    Nancy GARZON Sandip 77 year old female is on warfarin with therapeutic INR result. (Goal INR 2.0-3.0)    Recent labs: (last 7 days)     02/16/23  1505   INR 3.0         Previous INR was Therapeutic    Medication, diet, health changes since last INR:chart reviewed; none identified    Contacted within the last 12 weeks by phone on 2/10      ANGEL Cruz was NOT contacted regarding therapeutic result today per home monitoring policy manage by exception agreement.   Current warfarin dose is to be continued:     Summary  As of 2/16/2023    Full warfarin instructions:  1.25 mg every Mon, Wed, Sat; 2.5 mg all other days   Next INR check:             ?   Richa Clark, RN  Anticoagulation Clinic  2/16/2023    _______________________________________________________________________     Anticoagulation Episode Summary     Current INR goal:  2.0-3.0   TTR:  65.3 % (1 y)   Target end date:  Indefinite   Send INR reminders to:  [facility]    Indications    Pulmonary embolism (H) [I26.99]  Pulmonary embolism  unspecified chronicity  unspecified pulmonary embolism type  unspecified whether acute cor pulmonale present (H) [I26.99]           Comments:  [employer]// Manage by exception         Anticoagulation Care Providers     Provider Role Specialty Phone number    Julien Garnica MD Referring Family Medicine [phone]

## 2023-02-17 NOTE — PROGRESS NOTES
Incoming fax from [employer] home monitor [employer]    Date of result  2/17/23  10:323 AM    INR result 2.1    ANTICOAGULATION MANAGEMENT     Nancy Oropeza 77 year old female is on warfarin with therapeutic INR result. (Goal INR 2.0-3.0)    Recent labs: (last 7 days)     02/17/23  1023   INR 2.1*       ASSESSMENT       Source(s): Chart Review and Patient/Caregiver Call       Warfarin doses taken: Warfarin taken as instructed    Diet: No new diet changes identified    New illness, injury, or hospitalization: No    Medication/supplement changes: None noted    Signs or symptoms of bleeding or clotting: No    Previous INR: Therapeutic last visit; previously outside of goal range    Additional findings: Reviewed manage by exception        PLAN     Recommended plan for no diet, medication or health factor changes affecting INR     Dosing Instructions: Continue your current warfarin dose with next INR in 1 week       Summary  As of 2/17/2023    Full warfarin instructions:  1.25 mg every Mon, Wed, Sat; 2.5 mg all other days   Next INR check:  2/24/2023             Telephone call with Nancy who agrees to plan and repeated back plan correctly    Patient to recheck with home meter    Education provided:     Please call back if any changes to your diet, medications or how you've been taking warfarin    Taking warfarin: Importance of taking warfarin as instructed    Resume manage by exception with home monitor. Continue to submit INR results to home monitor company.You will only be called when your result is out of range. Please call and notify [facility] if new medication started, dose missed, signs or symptoms of bleeding or clotting, or a surgery/procedure is scheduled.    Plan made per [facility] anticoagulation protocol    Vicky Mcclain RN  Anticoagulation Clinic  2/17/2023    _______________________________________________________________________     Anticoagulation Episode Summary     Current INR goal:  2.0-3.0   TTR:  65.4 % (1 y)    Target end date:  Indefinite   Send INR reminders to:  [facility]    Indications    Pulmonary embolism (H) [I26.99]  Pulmonary embolism  unspecified chronicity  unspecified pulmonary embolism type  unspecified whether acute cor pulmonale present (H) [I26.99]           Comments:  [employer]// Manage by exception         Anticoagulation Care Providers     Provider Role Specialty Phone number    Julien Garnica MD Referring Family Medicine [phone]

## 2023-02-24 NOTE — PROGRESS NOTES
ANTICOAGULATION  MANAGEMENT-Home Monitor Managed by Exception    Nancy GARZON Sandip 77 year old female is on warfarin with therapeutic INR result. (Goal INR 2.0-3.0)    Recent labs: (last 7 days)     02/24/23  1449   INR 2.0         Previous INR was Therapeutic    Medication, diet, health changes since last INR:chart reviewed; none identified    Contacted within the last 12 weeks by phone on 2/17      ANGEL Cruz was NOT contacted regarding therapeutic result today per home monitoring policy manage by exception agreement.   Current warfarin dose is to be continued:     Summary  As of 2/24/2023    Full warfarin instructions:  1.25 mg every Mon, Wed, Sat; 2.5 mg all other days   Next INR check:             ?   Richa Clark, RN  Anticoagulation Clinic  2/24/2023    _______________________________________________________________________     Anticoagulation Episode Summary     Current INR goal:  2.0-3.0   TTR:  65.4 % (1 y)   Target end date:  Indefinite   Send INR reminders to:  [facility]    Indications    Pulmonary embolism (H) [I26.99]  Pulmonary embolism  unspecified chronicity  unspecified pulmonary embolism type  unspecified whether acute cor pulmonale present (H) [I26.99]           Comments:  [employer]// Manage by exception         Anticoagulation Care Providers     Provider Role Specialty Phone number    Julien Garnica MD Referring Family Medicine [phone]

## 2023-03-06 NOTE — PROGRESS NOTES
ANTICOAGULATION  MANAGEMENT-Home Monitor Managed by Enid GARZON Sandip 77 year old female is on warfarin with therapeutic INR result. (Goal INR 2.0-3.0)    Recent labs: (last 7 days)     03/03/23  1522   INR 2.1*     Note: Just received fax today for 3/3/23 result.    Previous INR was Therapeutic    Medication, diet, health changes since last INR:chart reviewed; none identified    Contacted within the last 12 weeks by phone on 2/17/23      ANGEL Cruz was NOT contacted regarding therapeutic result today per home monitoring policy manage by exception agreement.   Current warfarin dose is to be continued:     Summary  As of 3/6/2023    Full warfarin instructions:  1.25 mg every Mon, Wed, Sat; 2.5 mg all other days   Next INR check:  3/10/2023           ?   Melida Bergman RN  Anticoagulation Clinic  3/6/2023    _______________________________________________________________________     Anticoagulation Episode Summary     Current INR goal:  2.0-3.0   TTR:  67.2 % (1 y)   Target end date:  Indefinite   Send INR reminders to:  [facility]    Indications    Pulmonary embolism (H) [I26.99]  Pulmonary embolism  unspecified chronicity  unspecified pulmonary embolism type  unspecified whether acute cor pulmonale present (H) [I26.99]           Comments:  [employer]// Manage by exception         Anticoagulation Care Providers     Provider Role Specialty Phone number    Julien Garnica MD Referring Family Medicine [phone]

## 2023-03-10 NOTE — PROGRESS NOTES
ANTICOAGULATION  MANAGEMENT-Home Monitor Managed by Exception    Nancy GARZON Sandip 77 year old female is on warfarin with therapeutic INR result. (Goal INR 2.0-3.0)    Recent labs: (last 7 days)     03/10/23  1637   INR 2.2*         Previous INR was Therapeutic    Medication, diet, health changes since last INR:chart reviewed; none identified    Contacted within the last 12 weeks by phone on 02/17/2023      ANGEL Cruz was NOT contacted regarding therapeutic result today per home monitoring policy manage by exception agreement.   Current warfarin dose is to be continued:     Summary  As of 3/10/2023    Full warfarin instructions:  1.25 mg every Mon, Wed, Sat; 2.5 mg all other days   Next INR check:  3/17/2023           ?   Torey Cárdenas RN  Anticoagulation Clinic  3/10/2023    _______________________________________________________________________     Anticoagulation Episode Summary     Current INR goal:  2.0-3.0   TTR:  67.7 % (1 y)   Target end date:  Indefinite   Send INR reminders to:  [facility]    Indications    Pulmonary embolism (H) [I26.99]  Pulmonary embolism  unspecified chronicity  unspecified pulmonary embolism type  unspecified whether acute cor pulmonale present (H) [I26.99]           Comments:  [employer]// Manage by exception         Anticoagulation Care Providers     Provider Role Specialty Phone number    Julien Garnica MD Referring Family Medicine [phone]

## 2023-03-20 NOTE — PROGRESS NOTES
ANTICOAGULATION CLINIC REFERRAL RENEWAL REQUEST       An annual renewal order is required for all patients referred to [facility].?  Please review and sign the pended referral order for Nancy Oropeza.       ANTICOAGULATION SUMMARY      Warfarin indication(s)   PE    Mechanical heart valve present?  NO       Current goal range   INR: 2.0-3.0     Goal appropriate for indication? Goal INR 2-3, standard for indication(s) above     Time in Therapeutic Range (TTR)  (Goal > 60%) 69%       Office visit with referring provider's group within last year yes on 1/17/23       Cora Jean-Baptiste RN  [facility]

## 2023-03-20 NOTE — PROGRESS NOTES
ANTICOAGULATION MANAGEMENT     Nancy RYANN Oropeza 77 year old female is on warfarin with supratherapeutic INR result. (Goal INR 2.0-3.0)    Recent labs: (last 7 days)     03/19/23  1111   INR 3.1*       ASSESSMENT       Source(s): Chart Review and Patient/Caregiver Call       Warfarin doses taken: Warfarin taken as instructed    Diet: No new diet changes identified    New illness, injury, or hospitalization: No    Medication/supplement changes: None noted    Signs or symptoms of bleeding or clotting: No    Previous INR: Therapeutic last 2(+) visits    Additional findings: None         PLAN     Recommended plan for no diet, medication or health factor changes affecting INR     Dosing Instructions: Continue your current warfarin dose with next INR in 1 week       Summary  As of 3/20/2023    Full warfarin instructions:  1.25 mg every Mon, Wed, Sat; 2.5 mg all other days   Next INR check:  3/24/2023             Telephone call with Nancy who verbalizes understanding and agrees to plan    Patient to recheck with home meter    Education provided:     Please call back if any changes to your diet, medications or how you've been taking warfarin    Contact [phone]  with any changes, questions or concerns.     Plan made per [facility] anticoagulation protocol    Cora Jean-Baptiste RN  Anticoagulation Clinic  3/20/2023    _______________________________________________________________________     Anticoagulation Episode Summary     Current INR goal:  2.0-3.0   TTR:  68.6 % (1 y)   Target end date:  Indefinite   Send INR reminders to:  [facility]    Indications    Pulmonary embolism (H) [I26.99]  Pulmonary embolism  unspecified chronicity  unspecified pulmonary embolism type  unspecified whether acute cor pulmonale present (H) [I26.99]           Comments:  [employer]// Manage by exception         Anticoagulation Care Providers     Provider Role Specialty Phone number    Julien Garnica MD Referring Family  Medicine [phone]

## 2023-04-03 PROBLEM — M17.10 UNILATERAL PRIMARY OSTEOARTHRITIS, UNSPECIFIED KNEE: Status: ACTIVE | Noted: 2023-01-01

## 2023-04-03 NOTE — PROGRESS NOTES
ANTICOAGULATION MANAGEMENT     Nancy Oropeza 77 year old female is on warfarin with therapeutic INR result. (Goal INR 2.0-3.0)    Recent labs: (last 7 days)     04/03/23  1104   INR 2.1*       ASSESSMENT       Source(s): Chart Review    Previous INR was Supratherapeutic    Medication, diet, health changes since last INR chart reviewed; none identified             PLAN     Recommended plan for no diet, medication or health factor changes affecting INR     Dosing Instructions: Continue your current warfarin dose with next INR in 1 week       Summary  As of 4/3/2023    Full warfarin instructions:  1.25 mg every Mon, Wed, Sat; 2.5 mg all other days   Next INR check:  4/10/2023             Detailed voice message left for Nancy with dosing instructions and follow up date.     Patient to recheck with home meter    Education provided:     Please call back if any changes to your diet, medications or how you've been taking warfarin    Symptom monitoring: monitoring for bleeding signs and symptoms and monitoring for clotting signs and symptoms    Resume manage by exception with home monitor. Continue to submit INR results to home monitor company.You will only be called when your result is out of range. Please call and notify [facility] if new medication started, dose missed, signs or symptoms of bleeding or clotting, or a surgery/procedure is scheduled.    Contact [phone]  with any changes, questions or concerns.     Plan made per [facility] anticoagulation protocol    Cora Jean-Baptiste RN  Anticoagulation Clinic  4/3/2023    _______________________________________________________________________     Anticoagulation Episode Summary     Current INR goal:  2.0-3.0   TTR:  68.3 % (1 y)   Target end date:  Indefinite   Send INR reminders to:  [facility]    Indications    Pulmonary embolism (H) [I26.99]  Pulmonary embolism  unspecified chronicity  unspecified pulmonary embolism type  unspecified whether acute cor pulmonale  present (H) [I26.99]           Comments:  [employer]// Manage by exception         Anticoagulation Care Providers     Provider Role Specialty Phone number    Julien Garnica MD Referring Family Medicine [phone]

## 2023-04-03 NOTE — TELEPHONE ENCOUNTER
General Call    Contacts       Type Contact Phone/Fax    04/03/2023 11:08 AM CDT Phone (Incoming) Nancy Oropeza (Self) [phone] (M)        Reason for Call:  Patient returning missed call from clinic.    What are your questions or concerns:  At time of patient return call no ACN/INR contact info available in chart.     Date of last appointment with provider:     Could we send this information to you in "[employer], [place]" or would you prefer to receive a phone call?:   Patient would prefer a phone call   Okay to leave a detailed message?: Yes at Cell number on file:    Telephone Information:   Mobile [phone]

## 2023-04-05 NOTE — TELEPHONE ENCOUNTER
Reason for call:  Other   Patient called regarding (reason for call): call back  Additional comments: patient is calling to reschedule her appointment on 08/02/2023 stating [place] far please reschedule her closer to [place]. Please and thank you.     Phone number to reach patient:  Cell number on file:    Telephone Information:   Mobile [phone]       Best Time:  any    Can we leave a detailed message on this number?  YES

## 2023-04-14 NOTE — PROGRESS NOTES
ANTICOAGULATION  MANAGEMENT-Home Monitor Managed by Exception    Nancy GARZON Sandip 77 year old female is on warfarin with therapeutic INR result. (Goal INR 2.0-3.0)    Recent labs: (last 7 days)     04/14/23  1144   INR 2.2*         Previous INR was Therapeutic    Medication, diet, health changes since last INR:chart reviewed; none identified    Contacted within the last 12 weeks by phone on 04/03/2023      ANGEL Cruz was NOT contacted regarding therapeutic result today per home monitoring policy manage by exception agreement.   Current warfarin dose is to be continued:     Summary  As of 4/14/2023    Full warfarin instructions:  1.25 mg every Mon, Wed, Sat; 2.5 mg all other days   Next INR check:  4/21/2023           ?   Torey Cárdenas RN  Anticoagulation Clinic  4/14/2023    _______________________________________________________________________     Anticoagulation Episode Summary     Current INR goal:  2.0-3.0   TTR:  70.4 % (1 y)   Target end date:  Indefinite   Send INR reminders to:  [facility]    Indications    Pulmonary embolism (H) [I26.99]  Pulmonary embolism  unspecified chronicity  unspecified pulmonary embolism type  unspecified whether acute cor pulmonale present (H) [I26.99]           Comments:  [facility] Home Meter// Manage by exception         Anticoagulation Care Providers     Provider Role Specialty Phone number    Julien Garnica MD Referring Family Medicine [phone]

## 2023-04-14 NOTE — TELEPHONE ENCOUNTER
"  General Call    Contacts       Type Contact Phone/Fax    04/14/2023 04:10 PM CDT Phone (Incoming) Nancy Oropeza (Self) [phone] (M)        Reason for Call:  Coronary disease question    What are your questions or concerns:  Patient fell and hit her head, and went to be seen \"At ER in [facility]\" and she was told that she had coronary disease and she was unaware of, and would like for someone to call her on Monday, 4/17/2023    Date of last appointment with provider: n/a     would you prefer to receive a phone call?:   Patient would prefer a phone call     Okay to leave a detailed message?: Yes at Cell number on file:    Telephone Information:   Mobile [phone]     "

## 2023-04-15 NOTE — TELEPHONE ENCOUNTER
Situation:   Pt fell and hit her head on 4/11/2023. Pt [facility]. Pt is calling in to find out what her test results are. Pt also needs to schedule a follow up appointment.    Assessment:   Pt was advised that we do not have the results directly available because they are not in our system. Pt reports she only has a dull headache at this time. Pt reports she was advised by the [facility] to call back if symptoms worsen. Pt was advised to call back if dizziness, or vomiting. Pt reports she is bruised all over due to the blood thinners, but has no other symptoms.    Pt would like to discuss with her provider the fact that she just found out she has Coronary Disease.     Protocol Recommended Disposition:   See PCP Within 3 days    Recommendation:   Pt was advised to call the clinic on Monday to discuss her test results. Pt also needs to schedule an appointment for a follow up to be seen within 3 days. Pt was transferred to scheduling to make an appointment. Pt was advised to call back if new symptoms develop, or if she has more questions. Pt verbalized understanding.    Ephraim Carpio RN on 4/15/2023 at 1:53 PM        Reason for Disposition    Caller requesting an appointment, triage offered and declined    Additional Information    Negative: Lab calling with strep throat test results and triager can call in prescription    Negative: Lab calling with urinalysis test results and triager can call in prescription    Negative: Medication questions    Negative: Medication renewal and refill questions    Negative: Pre-operative or pre-procedural questions    Negative: ED call to PCP (i.e., primary care provider; doctor, NP, or PA)    Negative: Doctor (or NP/PA) call to PCP    Negative: Call about patient who is currently hospitalized    Negative: Lab or radiology calling with CRITICAL test results    Negative: [1] Follow-up call from patient regarding patient's clinical status AND [2] information  urgent    Negative: [1] Caller requests to speak ONLY to PCP AND [2] URGENT question    Negative: [1] Caller requests to speak to PCP now AND [2] won't tell us reason for call  (Exception: If 10 pm to 6 am, caller must first discuss reason for the call.)    Negative: Notification of hospital admission    Negative: Notification of death    Negative: Caller requesting lab results  (Exception: Routine or non-urgent lab result.)    Negative: Lab or radiology calling with test results    Negative: [1] Follow-up call from patient regarding patient's clinical status AND [2] information NON-URGENT    Negative: [1] Caller requests to speak ONLY to PCP AND [2] NON-URGENT question    Protocols used: PCP CALL - NO TRIAGE-A-

## 2023-04-17 NOTE — TELEPHONE ENCOUNTER
Reason for call:  Other   Patient called regarding (reason for call): call back  Additional comments: patients daughter is calling and asking to speak with Dr. Garnica about her appointment about her fall and the results of CT scan.please advise and call patient's daughter back please and thank you.    Phone number to reach patient:  Home number on file [phone] (home)    Best Time:  any    Can we leave a detailed message on this number?  YES

## 2023-04-20 NOTE — TELEPHONE ENCOUNTER
"Per PCP, \"FYI:  Will review \"coronary artery disease\" at office visit 4/21/23 as scheduled.     Julien Garnica MD\"    Writer called and relayed this to patient and patient verbalized agreement with this plan.    JULIO Jones, RN  [facility]    "

## 2023-04-21 NOTE — PROGRESS NOTES
Assessment/Plan:    Coronary artery disease involving native coronary artery of native heart without angina pectoris  Coronary artery disease noted on CT chest described as severe and will refer to cardiology.  Blood pressure well controlled blood pressure 114/56 on recheck.  Continues cholesterol management as well.  We will discuss if need for aspirin in addition to chronic warfarin anticoagulation due to history of pulmonary emboli  - Adult Cardiology [facility] Referral    Closed head injury, subsequent encounter  Recent closed head injury April 11, 2023 after slipping and falling on wet surface at [employer].  Was seen 3 days later through emergency room with head CT appearing unremarkable.  Remainder of x-rays negative.  Chest CT was completed which showed severe coronary artery disease.    Pulmonary embolism, unspecified chronicity, unspecified pulmonary embolism type, unspecified whether acute cor pulmonale present (H)  History of PE continues warfarin anticoagulation 2.5 mg daily except half tablet on Monday Wednesday and Friday.    Essential hypertension with goal blood pressure less than 140/90  Hypertension well controlled blood pressure 114/56.  Continuing lisinopril hydrochlorothiazide 20/12.5 using 2 tablets daily.  Amlodipine 5 mg daily.  - Basic metabolic panel    Pure hypercholesterolemia  Remains on pravastatin 80 mg at bedtime.  Fasting.  Update lipid cascade.  - Lipid panel reflex to direct LDL Fasting    Impaired fasting glucose  Impaired fasting glucose.  Prior A1c improved from 6.2% historically down to 5.7% and will repeat A1c to ensure no evidence for significant prediabetes or new onset diabetes.  - Basic metabolic panel  - Hemoglobin A1c          Subjective:    Nancy Oropeza is seen today for follow-up assessment.  Recent fall on slippery surface at [employer] in which she hit her head.  Was seen through emergency room 3 days later on April 14, 2023.  Chest CT  showing severe coronary artery disease.  Head CT appeared unremarkable.  X-rays of shoulder and knees without fracture.  Feeling better.  No history of known coronary artery disease.  Is treated for hypertension and hyperlipidemia.  Prior PE history and is on chronic warfarin anticoagulation.  Comprehensive review of systems as above otherwise all negative.         5 children (Airam, Nancy, Lakeisha, Akash, Thee) - Akash had coronary stent while Thee with Factor V abnormality and rectal cancer   14 grandchildren   16 great-grandchildren   Grew up in [place], [place] til age 12...   No smoke (quit 16 years ago after 1 ppd x 30+ years)   EtOH: occ wine   Mom -  88 COPD   Dad -  61 massive MI   1 bro -  67 blood clot (lung)   2 sis - one of which is  age 62 small cell lung CA (h/o smoker)   Surgeries: ventral hernia repair with muscle flap 10/4/12 with post-op complication of seroma with J.P. drain in place followed by interventional radiology q 2 weeks);  age 25; groin hernia age 3; CAPRICE-BSO  by Dr. Herbert Carmen (due to benign cyst x 2); right TKA 18 (Dr. Small)   Hospitalizations: as above   Work: retired  ([employer] as )     12/18/15 FYI: Patient was admitted for dyspnea, secondary to bilateral multiple pulmonary emboli. She overall did well and was discharged home on Lovenox, relatively high dose given her weight as well as Warfarin. I would like her to be seen today at your clinic. I believe she has an appointment for INR, CBC, and BMP check as well as re-dosing of her Warfarin. I suspect she will need an additional day of Lovenox as she is not quite therapeutic today. You can refer to my discharge summary for the INR's and the Warfarin dosing. Thank you. Dr. Garza      Past Surgical History:   Procedure Laterality Date     arthroplasty for hammertoe-2nd toe R  Biebl[       BIOPSY BREAST Left 1970s     Bunion correction by  Cheilectomy-had bunionectomy ? type of procedure L and R separate procedures.[       GYN SURGERY       HERNIA REPAIR       Hernia Repair Inguinal unilateral[       HYSTERECTOMY       HYSTERECTOMY  2010     IR ABSCESS TUBE CHANGE  11/9/2012     IR ABSCESS TUBE CHANGE  11/12/2012     IR ABSCESS TUBE CHANGE  12/4/2012     IR ABSCESS TUBE CHANGE  2/22/2013     IR ABSCESS TUBE CHANGE  3/1/2013     IR ABSCESS TUBE CHANGE  3/13/2013     OOPHORECTOMY  2010     ORTHOPEDIC SURGERY          Family History   Problem Relation Age of Onset     Breast Cancer Sister 75.00     Stomach Cancer Paternal Grandfather      Lung Cancer Sister      Chronic Obstructive Pulmonary Disease Mother      Heart Disease Mother      Coronary Artery Disease Father         Past Medical History:   Diagnosis Date     2019 novel coronavirus disease (COVID-19) 7/16/2020     Acute bilateral knee pain 7/18/2017     TEOFILO (acute kidney injury) (H) 7/16/2020     Anemia, unspecified     Created by Conversion      Angioedema 12/17/2015     Anticoagulant long-term use 3/6/2017     Back pain     Created by Conversion      Chronic pain syndrome 6/1/2018     COVID-19 virus infection 7/17/2020     Depression      Diarrhea 7/16/2020     Disease of thyroid gland      Edema     Created by Conversion      Essential hypertension with goal blood pressure less than 140/90     Created by Conversion  Replacement Utility updated for latest IMO load     History of pulmonary embolism 1/1/2015    Overview:  bilateral with acute cor pulmonale      HTN (hypertension)      Hypercholesteremia      Hypokalemia 12/17/2015     Hypothyroidism     Created by Conversion  Replacement Utility updated for latest IMO load     Hypoxia 7/16/2020     Impaired fasting glucose     Created by Conversion      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     Major depressive disorder, recurrent episode (H)     Created by Conversion  Replacement Utility updated for latest IMO load     Mood disorder (H) 7/16/2020     Morbid  obesity (H)      Multiple lung nodules on CT 12/17/2015     Obstructive sleep apnea (adult) (pediatric)     Created by Conversion      Osteoarthrosis involving lower leg     Created by Conversion  Replacement Utility updated for latest IMO load     Pain in joint, multiple sites     Created by Conversion      Polymyalgia rheumatica (H) 7/16/2020     Postoperative anemia due to acute blood loss 12/18/2018     Pulmonary emboli (H) 12/14/2015     Pulmonary embolism (H) 12/21/2015     Pure hypercholesterolemia     Created by Conversion      Sepsis (H) 7/28/2020     SIRS (systemic inflammatory response syndrome) (H) 7/28/2020     Unspecified cataract     Created by Conversion      Yeast infection         Social History     Tobacco Use     Smoking status: Former     Smokeless tobacco: Never     Tobacco comments:     quite 20 yrs ago   Substance Use Topics     Alcohol use: No     Comment: Alcoholic Drinks/day: occasionally     Drug use: No        Current Outpatient Medications   Medication Sig Dispense Refill     acetaminophen 500 MG CAPS Take 2 capsules by mouth 3 times daily as needed.       amLODIPine (NORVASC) 5 MG tablet TAKE 1 TABLET DAILY (NEW   DOSE 6/1/18) 90 tablet 3     BD ULTRA-FINE 33 LANCETS Use as directed       Cholecalciferol (VITAMIN D) 400 UNITS capsule Take 2 capsules by mouth daily.       citalopram (CELEXA) 40 MG tablet Take 1 tablet (40 mg) by mouth daily 90 tablet 3     furosemide (LASIX) 20 MG tablet Take 1 tablet (20 mg) by mouth every morning 90 tablet 3     Glucose Blood (ONE TOUCH ULTRA TEST) strip by In Vitro route daily Use as directed       levothyroxine (SYNTHROID/LEVOTHROID) 125 MCG tablet Take 1 tablet (125 mcg) by mouth daily 90 tablet 3     lisinopril-hydrochlorothiazide (ZESTORETIC) 20-12.5 MG tablet Take 2 tablets by mouth daily 180 tablet 3     pravastatin (PRAVACHOL) 80 MG tablet Take 1 tablet (80 mg) by mouth At Bedtime 90 tablet 3     predniSONE (DELTASONE) 5 MG tablet TAKE 1  TABLET DAILY 90 tablet 3     traMADol (ULTRAM) 50 MG tablet TAKE 1 TABLET EVERY 6 HOURS AS NEEDED FOR SEVERE PAIN 60 tablet 0     warfarin ANTICOAGULANT (COUMADIN) 2.5 MG tablet take 1 tablet (2.5 mg) by mouth every Mon, Wed, Fri; take one-half tablet (1.25 mg) by mouth all other days of the week 60 tablet 5          Objective:    Vitals:    04/21/23 1039 04/21/23 1041   BP: (!) 140/70 110/60   Pulse: 105    Resp: 22    Temp: 97.2  F (36.2  C)    SpO2: 94%    Weight: 123.4 kg (272 lb)       Body mass index is 42.94 kg/m .    Alert.  No apparent distress.  Chest clear.  Cardiac exam regular.  Extremities warm and dry.  Blood pressure 114/56 on recheck.  Transfers independently.      CT chest 4/14/23  Impression    1.  No CT evidence of acute traumatic injury in the chest on this noncontrast study.   2.  Coronary artery disease.   Narrative    For Patients: As a result of the 21st Century Cures Act, medical imaging exams and procedure reports are released immediately into your electronic medical record. You may view this report before your referring provider. If you have questions, please contact your health care provider.     EXAM: CT CHEST WO   LOCATION: [facility]   DATE/TIME: 4/14/2023 2:12 PM CDT     INDICATION: Chest trauma, blunt   COMPARISON: 01/08/2022   TECHNIQUE: CT chest without IV contrast. Multiplanar reformats were obtained. Dose reduction techniques were used.   CONTRAST: None.     FINDINGS:   LUNGS AND PLEURA: Lungs are clear. No pleural effusion.     MEDIASTINUM/AXILLAE: No lymphadenopathy. No thoracic aortic aneurysm.     CORONARY ARTERY CALCIFICATION: Severe.     UPPER ABDOMEN: Cholelithiasis.     MUSCULOSKELETAL: No fractures.          CT head 4/14/23  Impression    1.  No acute intracranial process.   2.  Stable mild chronic small vessel ischemic disease and mild to moderate generalized brain parenchymal volume loss.          This note has been dictated using voice  recognition software and as a result may contain minor grammatical errors and unintended word substitutions.

## 2023-04-21 NOTE — PROGRESS NOTES
ANTICOAGULATION  MANAGEMENT-Home Monitor Managed by Enid GARZON Sandip 77 year old female is on warfarin with therapeutic INR result. (Goal INR 2.0-3.0)    Recent labs: (last 7 days)     04/21/23  0000   INR 2.4*         Previous INR was Therapeutic    Medication, diet, health changes since last INR:Yes: patient suffered a fall and went the to ED. work-up was negative, but not anticipated to affect INR-discharge instructions did not advise any changes to warfarin dosing.    Contacted within the last 12 weeks by phone on 4/3/23    Patient was sent a [employer] chart message.      ANGEL Cruz was NOT contacted regarding therapeutic result today per home monitoring policy manage by exception agreement.   Current warfarin dose is to be continued:     Summary  As of 4/21/2023    Full warfarin instructions:  1.25 mg every Mon, Wed, Sat; 2.5 mg all other days   Next INR check:  4/28/2023           ?   Evie Lagos RN  Anticoagulation Clinic  4/21/2023    _______________________________________________________________________     Anticoagulation Episode Summary     Current INR goal:  2.0-3.0   TTR:  72.3 % (1 y)   Target end date:  Indefinite   Send INR reminders to:  [facility]    Indications    Pulmonary embolism (H) [I26.99]  Pulmonary embolism  unspecified chronicity  unspecified pulmonary embolism type  unspecified whether acute cor pulmonale present (H) [I26.99]           Comments:  [facility] Home Meter// Manage by exception         Anticoagulation Care Providers     Provider Role Specialty Phone number    Julien Garnica MD Referring Family Medicine [phone]

## 2023-04-25 NOTE — TELEPHONE ENCOUNTER
Nurse Triage SBAR    Is this a 2nd Level Triage? YES, LICENSED PRACTITIONER REVIEW IS REQUIRED    Situation:   Under left breast that looks like a bunch of pimples for 2 weeks. It's smaller, but it is still lump and white that is oozing.    Background:   Rash been there for 2 weeks; which she thinks it was initially just irritation from her bra.    Assessment:   Under left breast that looks like a bunch of pimples for 2 weeks. It's smaller now, but it is still lumpy and white in the inside  that is oozing and bloody drainage on band-aid. Patient noticed there is a bad odor with band-aid.     Redness currently is about an inch around; pimples around the outside of the redness with whiteness in the inside.    Stinging sensation once in a while.  It's sore around it. It is not itching.    Under bra and thinks bra was irritating it. No bug bites that she could recall.    The inside, which is white is protruding out. It oozes out when she squeeze it with a towel.    Denied having any fever.      Protocol Recommended Disposition:   Go To Office Now, See in Office Today, See More Appropriate Protocol    Recommendation:   Care advice given. No clinic appt available for today; pt doesn't want to [facility] to [facility]. Patient was wondering if PCP could send in an antibiotic for pt or some sort of cream to help. Patient advised to send photo of rash to PCP and she stated she will try to send in photo.    Routed to provider    Does the patient meet one of the following criteria for ADS visit consideration? 16+ years old, with an [facility] PCP     TIP  Providers, please consider if this condition is appropriate for management at one of our Acute and Diagnostic Services sites.     If patient is a good candidate, please use dotphrase <dot>triageresponse and select Refer to ADS to document.    Reason for Disposition    Wound infection suspected (i.e., pain, spreading redness, or pus; in a cut, puncture, scrape or sutured wound)    Pus or  cloudy fluid draining from wound    Looks like a boil, infected sore, deep ulcer, or other infected rash (spreading redness, pus)    Additional Information    Negative: Sounds like a life-threatening emergency to the triager    Negative: Insect bite(s) suspected    Negative: Athlete's Foot suspected (i.e., itchy rash between the toes)    Negative: Jock Itch suspected (i.e., itchy rash on inner thighs near genital area)    Negative: Localized lump (or swelling) without redness or rash    Negative: Poison ivy, oak, or sumac contact suspected    Negative: Rash of female genital area (vulva)    Negative: Rash of male genital area (penis or scrotum)    Negative: Redness of immunization site    Negative: Shingles suspected (i.e., painful rash, multiple small blisters grouped together in one area of body; dermatomal distribution)    Negative: Small spot, skin growth, or mole    Negative: Stitches and not infected    Negative: Surgical wound infection suspected (post-op)    Negative: Bright red, wide-spread, sunburn-like rash    Negative: Black (necrotic) or blisters develop in wound    Negative: Looks infected (spreading redness, red streak, pus) and fever    Negative: Patient sounds very sick or weak to the triager    Negative: Severe pain in the wound    Negative: Red streak runs from the wound    Negative: Facial wound looks infected (spreading redness)    Negative: Finger wound and entire finger swollen    Negative: Skin redness around the wound larger than 2 inches (5 cm)    Negative: Fever and localized purple or blood-colored spots or dots that are not from injury or friction    Negative: Patient sounds very sick or weak to the triager    Negative: Fever and localized rash is very painful    Negative: Painful rash with multiple small blisters grouped together (i.e., dermatomal distribution or 'band' or 'stripe')    Negative: Localized rash is very painful (no fever)    Negative: Localized purple or blood-colored  spots or dots that are not from injury or friction (no fever)    Negative: Lyme disease suspected (e.g., bull's-eye rash or tick bite / exposure)    Negative: Patient wants to be seen    Protocols used: RASH OR REDNESS - [id number], WOUND INFECTION-A-OH    GERTRUDE ParrishN, RN  [facility]

## 2023-04-26 NOTE — TELEPHONE ENCOUNTER
Provider Response to 2nd Level Triage Request    I have reviewed the RN documentation. My recommendation is:  Spoke with patient.  Patient describes skin changes consistent with local furuncle.  Cephalexin 500 mg 4 times daily x10 days.  Warm compresses locally.  Triple antibiotic ointment applied topically.  Wound cares.  Should follow-up in office if persistent or worsening symptoms.

## 2023-05-03 NOTE — PROGRESS NOTES
Preventive Cardiology Visit      Thank you, Dr. Julien Garnica, for asking the [facility] team to see Ms. Nancy Oropeza to evaluate severe coronary artery calcification.    Assessment/Recommendations   Assessment:    1.  Severe coronary artery calcification documented by CT imaging.  As you know, this does not assess the severity of narrowing but tells us that the patient has cholesterol plaque in their coronary arteries.  Patient reports no history of exertional chest discomfort but does report some mild exertional dyspnea.  Activity significantly limited by lower back pain.  Does have risk factors in the form of hypertension, hypercholesterolemia and previous tobacco use.  Recommend nuclear stress study for further evaluation.  2.  Essential hypertension, controlled  3.  Hypercholesterolemia with excellent LDL level on recent profile.  4.  Possible right carotid bruit.  Will order carotid ultrasound.    Plan:  1.  Continue current medications  2.  Schedule nuclear stress test and carotid ultrasound with further recommendations to follow       History of Present Illness    Ms. Nancy Oropeza is a 77 year old female with history of essential hypertension, hypercholesterolemia, prior tobacco history who fell in the grocery store 3 weeks ago after slipping on a wet floor.  She hit the back of her head.  She initially did not seek medical attention but was encouraged by family to go to the urgency room for evaluation as she is on warfarin anticoagulation for history of PE.  Fortunately, her head CT was unrevealing.  She did injure her shoulder and underwent a chest CT which revealed dense coronary artery calcifications which prompted her visit here.  Reports no prior history of known coronary artery disease.  Denies any history of exertional chest discomfort but does have mild exertional dyspnea.  Her activity has been significantly limited over the last several years due to chronic low back  pain.  Denies any chest discomfort at rest or awakening her from sleep.  No shortness of breath, orthopnea, PND.    ECG (personally reviewed): No recent ECG    Cardiac Imaging Studies (personally reviewed): No recent cardiac imaging     Physical Examination Review of Systems   /81 (BP Location: Left arm, Patient Position: Sitting, Cuff Size: Adult Large)   Pulse 99   Resp 16   Wt 123.4 kg (272 lb)   LMP  (LMP Unknown)   SpO2 97%   BMI 42.94 kg/m    Body mass index is 42.94 kg/m .  Wt Readings from Last 3 Encounters:   05/03/23 123.4 kg (272 lb)   04/21/23 123.4 kg (272 lb)   02/10/23 123.6 kg (272 lb 8 oz)     General Appearance:   Awake, Alert, No acute distress.   HEENT:  No scleral icterus; the mucous membranes were pink and moist.   Neck: No jugular venous distention.  Questionable soft bruit over the right carotid.   Chest: The spine was straight. The chest was symmetric.   Lungs:   Respirations unlabored; the lungs are clear to auscultation. No wheezing   Cardiovascular:    Regular rate and rhythm.  S1, S2 normal.  No murmur or gallop   Abdomen:  No organomegaly, masses, bruits, or tenderness. Bowels sounds are present   Extremities:  No peripheral edema bilaterally   Skin: No xanthelasma. Warm, Dry.   Musculoskeletal: No tenderness.   Neurologic: Mood and affect are appropriate.    Enc Vitals  BP: 134/81  Pulse: 99  Resp: 16  SpO2: 97 %  Weight: 123.4 kg (272 lb) (shoes on)                                         Medical History  Surgical History Family History Social History   Past Medical History:   Diagnosis Date     2019 novel coronavirus disease (COVID-19) 07/16/2020     Acute bilateral knee pain 07/18/2017     TEOFILO (acute kidney injury) (H) 07/16/2020     Anemia, unspecified     Created by Conversion      Angioedema 12/17/2015     Anticoagulant long-term use 03/06/2017     Back pain     Created by Conversion      Chronic pain syndrome 06/01/2018     Coronary artery disease     coronary artery  calcification     COVID-19 virus infection 07/17/2020     Depression      Diarrhea 07/16/2020     Disease of thyroid gland      Edema     Created by Conversion      Essential hypertension with goal blood pressure less than 140/90     Created by Conversion  Replacement Utility updated for latest IMO load     History of pulmonary embolism 01/01/2015    Overview:  bilateral with acute cor pulmonale      HTN (hypertension)      Hypercholesteremia      Hyperlipidemia      Hypokalemia 12/17/2015     Hypothyroidism     Created by Conversion  Replacement Utility updated for latest IMO load     Hypoxia 07/16/2020     Impaired fasting glucose     Created by Conversion      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     Major depressive disorder, recurrent episode (H)     Created by Conversion  Replacement Utility updated for latest IMO load     Mood disorder (H) 07/16/2020     Morbid obesity (H)      Multiple lung nodules on CT 12/17/2015     Obstructive sleep apnea (adult) (pediatric)     Created by Conversion      Osteoarthrosis involving lower leg     Created by Conversion  Replacement Utility updated for latest IMO load     Pain in joint, multiple sites     Created by Conversion      Polymyalgia rheumatica (H) 07/16/2020     Postoperative anemia due to acute blood loss 12/18/2018     Pulmonary emboli (H) 12/14/2015     Pulmonary embolism (H) 12/21/2015     Pure hypercholesterolemia     Created by Conversion      Sepsis (H) 07/28/2020     SIRS (systemic inflammatory response syndrome) (H) 07/28/2020     Unspecified cataract     Created by Conversion      Yeast infection     Past Surgical History:   Procedure Laterality Date     arthroplasty for hammertoe-2nd toe R 2000 Biebl[       BIOPSY BREAST Left 1970s     Bunion correction by Cheilectomy-had bunionectomy ? type of procedure L and R separate procedures.[       GYN SURGERY       HERNIA REPAIR       Hernia Repair Inguinal unilateral[       HYSTERECTOMY       HYSTERECTOMY  2010     IR  ABSCESS TUBE CHANGE  2012     IR ABSCESS TUBE CHANGE  2012     IR ABSCESS TUBE CHANGE  2012     IR ABSCESS TUBE CHANGE  2013     IR ABSCESS TUBE CHANGE  3/1/2013     IR ABSCESS TUBE CHANGE  3/13/2013     OOPHORECTOMY  2010     ORTHOPEDIC SURGERY      Family History   Problem Relation Age of Onset     Chronic Obstructive Pulmonary Disease Mother      Heart Disease Mother      Coronary Artery Disease Father 62        fatal MI     Stomach Cancer Paternal Grandfather      Cardiac Sudden Death Brother 67     Breast Cancer Sister 75     Coronary Artery Disease Sister      Lung Cancer Sister     Social History     Socioeconomic History     Marital status:      Spouse name: Not on file     Number of children: Not on file     Years of education: Not on file     Highest education level: Not on file   Occupational History     Not on file   Tobacco Use     Smoking status: Former     Packs/day: 1.00     Types: Cigarettes     Quit date:      Years since quittin.3     Smokeless tobacco: Never     Tobacco comments:     quite 20 yrs ago   Vaping Use     Vaping status: Not on file   Substance and Sexual Activity     Alcohol use: No     Comment: Alcoholic Drinks/day: occasionally     Drug use: Never     Sexual activity: Not on file   Other Topics Concern     Not on file   Social History Narrative    Patient arrived in the ED with her sister Kassy 09/10/17       Social Determinants of Health     Financial Resource Strain: Not on file   Food Insecurity: Not on file   Transportation Needs: Not on file   Physical Activity: Not on file   Stress: Not on file   Social Connections: Not on file   Intimate Partner Violence: Not on file   Housing Stability: Not on file          Medications  Allergies   Current Outpatient Medications   Medication Sig Dispense Refill     acetaminophen 500 MG CAPS Take 2 capsules by mouth 3 times daily as needed.       amLODIPine (NORVASC) 5 MG tablet TAKE 1 TABLET DAILY  (NEW   DOSE 6/1/18) 90 tablet 3     BD ULTRA-FINE 33 LANCETS Use as directed       cephALEXin (KEFLEX) 500 MG capsule Take 1 capsule (500 mg) by mouth 4 times daily for 10 days 40 capsule 0     Cholecalciferol (VITAMIN D) 400 UNITS capsule Take 2 capsules by mouth daily.       citalopram (CELEXA) 40 MG tablet Take 1 tablet (40 mg) by mouth daily 90 tablet 3     furosemide (LASIX) 20 MG tablet Take 1 tablet (20 mg) by mouth every morning 90 tablet 3     Glucose Blood (ONE TOUCH ULTRA TEST) strip by In Vitro route daily Use as directed       levothyroxine (SYNTHROID/LEVOTHROID) 125 MCG tablet Take 1 tablet (125 mcg) by mouth daily 90 tablet 3     lisinopril-hydrochlorothiazide (ZESTORETIC) 20-12.5 MG tablet Take 2 tablets by mouth daily 180 tablet 3     pravastatin (PRAVACHOL) 80 MG tablet Take 1 tablet (80 mg) by mouth At Bedtime 90 tablet 3     predniSONE (DELTASONE) 5 MG tablet TAKE 1 TABLET DAILY 90 tablet 3     traMADol (ULTRAM) 50 MG tablet Take 1 tablet (50 mg) by mouth every 6 hours as needed for severe pain 60 tablet 0     warfarin ANTICOAGULANT (COUMADIN) 2.5 MG tablet take 1 tablet (2.5 mg) by mouth every Mon, Wed, Fri; take one-half tablet (1.25 mg) by mouth all other days of the week 60 tablet 5      Allergies   Allergen Reactions     Sulfasalazine Rash     Pt reports she has never taken this med, so is not sure why it's on her allergy list       Atorvastatin      Paroxetine Unknown     Simvastatin      Sulfa Antibiotics          Lab Results    Chemistry/lipid CBC Cardiac Enzymes/BNP/TSH/INR   Recent Labs   Lab Test 04/21/23  1140   TRIG 158*   LDL 64   BUN 28.1*      CO2 27    Recent Labs   Lab Test 01/17/23  1048   WBC 11.3*   HGB 9.5*   HCT 29.9*   MCV 87       Recent Labs   Lab Test 05/03/23  0825 12/07/22  0000 12/02/22  1457 01/09/22  0000 01/08/22  1252 01/08/22  1251   TROPONINI  --   --   --   --  0.01  --    BNP  --   --   --   --   --  <10   TSH  --   --  0.38   < >  --   --    INR  1.6*   < >  --    < >  --  2.50*    < > = values in this interval not displayed.        A total of 50 minutes was spent reviewing patient's medical records, obtaining history and performing examination, as well as discussing diagnoses/ recommendations with patient and answering all questions.

## 2023-05-03 NOTE — LETTER
5/3/2023    Julien Garnica MD  [address]  [place] [facility] [place] [address]    RE: Nancy Oropeza       Dear Colleague,     I had the pleasure of seeing Nancy Oropeza in the [facility].  Preventive Cardiology Visit      Thank you, Dr. Julien Garnica, for asking the [facility] team to see Ms. Nancy Oropeza to evaluate severe coronary artery calcification.    Assessment/Recommendations   Assessment:    1.  Severe coronary artery calcification documented by CT imaging.  As you know, this does not assess the severity of narrowing but tells us that the patient has cholesterol plaque in their coronary arteries.  Patient reports no history of exertional chest discomfort but does report some mild exertional dyspnea.  Activity significantly limited by lower back pain.  Does have risk factors in the form of hypertension, hypercholesterolemia and previous tobacco use.  Recommend nuclear stress study for further evaluation.  2.  Essential hypertension, controlled  3.  Hypercholesterolemia with excellent LDL level on recent profile.  4.  Possible right carotid bruit.  Will order carotid ultrasound.    Plan:  1.  Continue current medications  2.  Schedule nuclear stress test and carotid ultrasound with further recommendations to follow       History of Present Illness    Ms. Nancy Oropeza is a 77 year old female with history of essential hypertension, hypercholesterolemia, prior tobacco history who fell in the grocery store 3 weeks ago after slipping on a wet floor.  She hit the back of her head.  She initially did not seek medical attention but was encouraged by family to go to the urgency room for evaluation as she is on warfarin anticoagulation for history of PE.  Fortunately, her head CT was unrevealing.  She did injure her shoulder and underwent a chest CT which revealed dense coronary artery calcifications which prompted her visit here.  Reports no prior history of  known coronary artery disease.  Denies any history of exertional chest discomfort but does have mild exertional dyspnea.  Her activity has been significantly limited over the last several years due to chronic low back pain.  Denies any chest discomfort at rest or awakening her from sleep.  No shortness of breath, orthopnea, PND.    ECG (personally reviewed): No recent ECG    Cardiac Imaging Studies (personally reviewed): No recent cardiac imaging     Physical Examination Review of Systems   /81 (BP Location: Left arm, Patient Position: Sitting, Cuff Size: Adult Large)   Pulse 99   Resp 16   Wt 123.4 kg (272 lb)   LMP  (LMP Unknown)   SpO2 97%   BMI 42.94 kg/m    Body mass index is 42.94 kg/m .  Wt Readings from Last 3 Encounters:   05/03/23 123.4 kg (272 lb)   04/21/23 123.4 kg (272 lb)   02/10/23 123.6 kg (272 lb 8 oz)     General Appearance:   Awake, Alert, No acute distress.   HEENT:  No scleral icterus; the mucous membranes were pink and moist.   Neck: No jugular venous distention.  Questionable soft bruit over the right carotid.   Chest: The spine was straight. The chest was symmetric.   Lungs:   Respirations unlabored; the lungs are clear to auscultation. No wheezing   Cardiovascular:    Regular rate and rhythm.  S1, S2 normal.  No murmur or gallop   Abdomen:  No organomegaly, masses, bruits, or tenderness. Bowels sounds are present   Extremities:  No peripheral edema bilaterally   Skin: No xanthelasma. Warm, Dry.   Musculoskeletal: No tenderness.   Neurologic: Mood and affect are appropriate.    Enc Vitals  BP: 134/81  Pulse: 99  Resp: 16  SpO2: 97 %  Weight: 123.4 kg (272 lb) (shoes on)                                         Medical History  Surgical History Family History Social History   Past Medical History:   Diagnosis Date    2019 novel coronavirus disease (COVID-19) 07/16/2020    Acute bilateral knee pain 07/18/2017    TEOFILO (acute kidney injury) (H) 07/16/2020    Anemia, unspecified      Created by Conversion     Angioedema 12/17/2015    Anticoagulant long-term use 03/06/2017    Back pain     Created by Conversion     Chronic pain syndrome 06/01/2018    Coronary artery disease     coronary artery calcification    COVID-19 virus infection 07/17/2020    Depression     Diarrhea 07/16/2020    Disease of thyroid gland     Edema     Created by Conversion     Essential hypertension with goal blood pressure less than 140/90     Created by Conversion  Replacement Utility updated for latest IMO load    History of pulmonary embolism 01/01/2015    Overview:  bilateral with acute cor pulmonale     HTN (hypertension)     Hypercholesteremia     Hyperlipidemia     Hypokalemia 12/17/2015    Hypothyroidism     Created by Conversion  Replacement Utility updated for latest IMO load    Hypoxia 07/16/2020    Impaired fasting glucose     Created by Conversion     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    Major depressive disorder, recurrent episode (H)     Created by Conversion  Replacement Utility updated for latest IMO load    Mood disorder (H) 07/16/2020    Morbid obesity (H)     Multiple lung nodules on CT 12/17/2015    Obstructive sleep apnea (adult) (pediatric)     Created by Conversion     Osteoarthrosis involving lower leg     Created by Conversion  Replacement Utility updated for latest IMO load    Pain in joint, multiple sites     Created by Conversion     Polymyalgia rheumatica (H) 07/16/2020    Postoperative anemia due to acute blood loss 12/18/2018    Pulmonary emboli (H) 12/14/2015    Pulmonary embolism (H) 12/21/2015    Pure hypercholesterolemia     Created by Conversion     Sepsis (H) 07/28/2020    SIRS (systemic inflammatory response syndrome) (H) 07/28/2020    Unspecified cataract     Created by Conversion     Yeast infection     Past Surgical History:   Procedure Laterality Date    arthroplasty for hammertoe-2nd toe R 2000 Biebl[      BIOPSY BREAST Left 1970s    Bunion correction by Cheilectomy-had bunionectomy ?  type of procedure L and R separate procedures.[      GYN SURGERY      HERNIA REPAIR      Hernia Repair Inguinal unilateral[      HYSTERECTOMY      HYSTERECTOMY  2010    IR ABSCESS TUBE CHANGE  2012    IR ABSCESS TUBE CHANGE  2012    IR ABSCESS TUBE CHANGE  2012    IR ABSCESS TUBE CHANGE  2013    IR ABSCESS TUBE CHANGE  3/1/2013    IR ABSCESS TUBE CHANGE  3/13/2013    OOPHORECTOMY  2010    ORTHOPEDIC SURGERY      Family History   Problem Relation Age of Onset    Chronic Obstructive Pulmonary Disease Mother     Heart Disease Mother     Coronary Artery Disease Father 62        fatal MI    Stomach Cancer Paternal Grandfather     Cardiac Sudden Death Brother 67    Breast Cancer Sister 75    Coronary Artery Disease Sister     Lung Cancer Sister     Social History     Socioeconomic History    Marital status:      Spouse name: Not on file    Number of children: Not on file    Years of education: Not on file    Highest education level: Not on file   Occupational History    Not on file   Tobacco Use    Smoking status: Former     Packs/day: 1.00     Types: Cigarettes     Quit date:      Years since quittin.3    Smokeless tobacco: Never    Tobacco comments:     quite 20 yrs ago   Vaping Use    Vaping status: Not on file   Substance and Sexual Activity    Alcohol use: No     Comment: Alcoholic Drinks/day: occasionally    Drug use: Never    Sexual activity: Not on file   Other Topics Concern    Not on file   Social History Narrative    Patient arrived in the ED with her sister Kassy 09/10/17       Social Determinants of Health     Financial Resource Strain: Not on file   Food Insecurity: Not on file   Transportation Needs: Not on file   Physical Activity: Not on file   Stress: Not on file   Social Connections: Not on file   Intimate Partner Violence: Not on file   Housing Stability: Not on file          Medications  Allergies   Current Outpatient Medications   Medication Sig Dispense  Refill    acetaminophen 500 MG CAPS Take 2 capsules by mouth 3 times daily as needed.      amLODIPine (NORVASC) 5 MG tablet TAKE 1 TABLET DAILY (NEW   DOSE 6/1/18) 90 tablet 3    BD ULTRA-FINE 33 LANCETS Use as directed      cephALEXin (KEFLEX) 500 MG capsule Take 1 capsule (500 mg) by mouth 4 times daily for 10 days 40 capsule 0    Cholecalciferol (VITAMIN D) 400 UNITS capsule Take 2 capsules by mouth daily.      citalopram (CELEXA) 40 MG tablet Take 1 tablet (40 mg) by mouth daily 90 tablet 3    furosemide (LASIX) 20 MG tablet Take 1 tablet (20 mg) by mouth every morning 90 tablet 3    Glucose Blood (ONE TOUCH ULTRA TEST) strip by In Vitro route daily Use as directed      levothyroxine (SYNTHROID/LEVOTHROID) 125 MCG tablet Take 1 tablet (125 mcg) by mouth daily 90 tablet 3    lisinopril-hydrochlorothiazide (ZESTORETIC) 20-12.5 MG tablet Take 2 tablets by mouth daily 180 tablet 3    pravastatin (PRAVACHOL) 80 MG tablet Take 1 tablet (80 mg) by mouth At Bedtime 90 tablet 3    predniSONE (DELTASONE) 5 MG tablet TAKE 1 TABLET DAILY 90 tablet 3    traMADol (ULTRAM) 50 MG tablet Take 1 tablet (50 mg) by mouth every 6 hours as needed for severe pain 60 tablet 0    warfarin ANTICOAGULANT (COUMADIN) 2.5 MG tablet take 1 tablet (2.5 mg) by mouth every Mon, Wed, Fri; take one-half tablet (1.25 mg) by mouth all other days of the week 60 tablet 5      Allergies   Allergen Reactions    Sulfasalazine Rash     Pt reports she has never taken this med, so is not sure why it's on her allergy list      Atorvastatin     Paroxetine Unknown    Simvastatin     Sulfa Antibiotics          Lab Results    Chemistry/lipid CBC Cardiac Enzymes/BNP/TSH/INR   Recent Labs   Lab Test 04/21/23  1140   TRIG 158*   LDL 64   BUN 28.1*      CO2 27    Recent Labs   Lab Test 01/17/23  1048   WBC 11.3*   HGB 9.5*   HCT 29.9*   MCV 87       Recent Labs   Lab Test 05/03/23  0825 12/07/22  0000 12/02/22  1457 01/09/22  0000 01/08/22  1252  01/08/22  1251   TROPONINI  --   --   --   --  0.01  --    BNP  --   --   --   --   --  <10   TSH  --   --  0.38   < >  --   --    INR 1.6*   < >  --    < >  --  2.50*    < > = values in this interval not displayed.        A total of 50 minutes was spent reviewing patient's medical records, obtaining history and performing examination, as well as discussing diagnoses/ recommendations with patient and answering all questions.                    Thank you for allowing me to participate in the care of your patient.      Sincerely,     Romina Hernandez MD     [facility]  cc:   Julien Garnica MD  [address] [facility] [address] [address]

## 2023-05-03 NOTE — PROGRESS NOTES
ANTICOAGULATION MANAGEMENT     Nancy Oropeza 77 year old female is on warfarin with subtherapeutic INR result. (Goal INR 2.0-3.0)    Recent labs: (last 7 days)     05/03/23  0825   INR 1.6*       ASSESSMENT       Source(s): Chart Review and Patient/Caregiver Call       Warfarin doses taken: Warfarin taken as instructed    Diet: No new diet changes identified    Medication/supplement changes: Cephalexin 500 mg 4 x a day 10 day course (dates: 4/26-/5/6) which may be decreasing INR today    New illness, injury, or hospitalization: Yes: infection on left breast taking antibiotic now.    4/14/23 fall incident in [facility]     Signs or symptoms of bleeding or clotting: No    Previous result: Therapeutic last 2(+) visits    Additional findings: None         PLAN     Recommended plan for temporary change(s) affecting INR     Dosing Instructions: booster dose then continue your current warfarin dose with next INR in 1 week       Summary  As of 5/3/2023    Full warfarin instructions:  5/3: 2.5 mg; Otherwise 1.25 mg every Mon, Wed, Sat; 2.5 mg all other days   Next INR check:  5/10/2023             Telephone call with Nancy who verbalizes understanding and agrees to plan and who agrees to plan and repeated back plan correctly    Patient to recheck with home meter    Education provided:     Taking warfarin: Importance of taking warfarin as instructed    Goal range and lab monitoring: goal range and significance of current result, Importance of therapeutic range and Importance of following up at instructed interval    Interaction IS anticipated between warfarin and cephalexin    Importance of notifying anticoagulation clinic for: changes in medications; a sooner lab recheck maybe needed    Plan made per [facility] anticoagulation protocol    Cora León, RN  Anticoagulation Clinic  5/3/2023    _______________________________________________________________________     Anticoagulation Episode Summary     Current INR goal:   2.0-3.0   TTR:  73.8 % (1 y)   Target end date:  Indefinite   Send INR reminders to:  ANTICOAG HOME MONITORING    Indications    Pulmonary embolism (H) [I26.99]  Pulmonary embolism  unspecified chronicity  unspecified pulmonary embolism type  unspecified whether acute cor pulmonale present (H) [I26.99]           Comments:  [facility] Home Meter// Manage by exception         Anticoagulation Care Providers     Provider Role Specialty Phone number    Julien Garnica MD Referring Family Medicine [phone]

## 2023-05-05 NOTE — TELEPHONE ENCOUNTER
Reason for call:  Other   Patient called regarding (reason for call): call back  Additional comments: Patient is calling stating she is having some test on 05/11/2023 and wondering she could get some thing to help her relax. Please advise and call patient back please and thank you.    Phone number to reach patient:  Cell number on file:    Telephone Information:   Mobile [phone]       Best Time:  any    Can we leave a detailed message on this number?  YES

## 2023-05-06 NOTE — TELEPHONE ENCOUNTER
Please notify patient.  I will send a prescription for lorazepam 0.5 mg tablet to be taken 2 hours prior to scheduled procedure due to anxiety.

## 2023-05-11 NOTE — TELEPHONE ENCOUNTER
Reason for call:  Other   Patient called regarding (reason for call): call back  Additional comments: patient is calling and wondering if you could call her and go over her results from today she is not understanding what it means. Please advise and call patient back please and thank you.    Phone number to reach patient:  Cell number on file:    Telephone Information:   Mobile [phone]       Best Time:  any    Can we leave a detailed message on this number?  YES

## 2023-05-12 NOTE — PROGRESS NOTES
ANTICOAGULATION MANAGEMENT     Nancyenrique Navarrorimikki 77 year old female is on warfarin with supratherapeutic INR result. (Goal INR 2.0-3.0)    Recent labs: (last 7 days)     05/12/23  0000   INR 4.1*       ASSESSMENT       Source(s): Chart Review and Patient/Caregiver Call       Warfarin doses taken: Warfarin taken differently, but did not change total weekly dose    Diet: No new diet changes identified    Medication/supplement changes: patient still has 1 1/2 days left of abx.    New illness, injury, or hospitalization: No    Signs or symptoms of bleeding or clotting: No    Previous result: Subtherapeutic    Additional findings: patient has already taken warfarin dose today-unable to hold, patient thought her Friday's dose of warfarin was 1.25 mg so that is what she already took before checking her INR         PLAN     Recommended plan for temporary change(s) affecting INR     Dosing Instructions: hold Saturday's doses then continue your current warfarin dose with next INR in 1 week       Summary  As of 5/12/2023    Full warfarin instructions:  5/12: 1.25 mg; 5/13: Hold; Otherwise 1.25 mg every Mon, Wed, Sat; 2.5 mg all other days   Next INR check:  5/19/2023             Telephone call with Nancy who verbalizes understanding and agrees to plan  Sent Sina message with dosing and follow up instructions    Patient to recheck with home meter    Education provided:     Dietary considerations: impact of vitamin K foods on INR    Symptom monitoring: monitoring for bleeding signs and symptoms, when to seek medical attention/emergency care and if you hit your head or have a bad fall seek emergency care    Plan made per [facility] anticoagulation protocol    Evie Lagos, RN  Anticoagulation Clinic  5/12/2023    _______________________________________________________________________     Anticoagulation Episode Summary     Current INR goal:  2.0-3.0   TTR:  73.8 % (1 y)   Target end date:  Indefinite   Send INR reminders to:   [facility]    Indications    Pulmonary embolism (H) [I26.99]  Pulmonary embolism  unspecified chronicity  unspecified pulmonary embolism type  unspecified whether acute cor pulmonale present (H) [I26.99]           Comments:  MDINR Home Meter// Manage by exception         Anticoagulation Care Providers     Provider Role Specialty Phone number    Julien Garnica MD Referring Family Medicine [phone]

## 2023-05-15 NOTE — PROGRESS NOTES
ANTICOAGULATION MANAGEMENT     Nancy Oropeza 78 year old female is on warfarin with supratherapeutic INR result. (Goal INR 2.0-3.0)    Recent labs: (last 7 days)     05/14/23  1155   INR 4.1*       ASSESSMENT       Source(s): Chart Review    Previous INR was Supratherapeutic    Medication, diet, health changes since last INR - according to last INR note, pt should be done with antibiotic (on 5/14?)              PLAN     Unable to reach pt today.    VM left to call [facility] back. Will try again later.     Follow up required to confirm warfarin dose taken and assess for changes    Jenn Collado, RN  Anticoagulation Clinic  5/15/2023

## 2023-05-15 NOTE — PROGRESS NOTES
ANTICOAGULATION MANAGEMENT     Nancy Oropeza 78 year old female is on warfarin with supratherapeutic INR result. (Goal INR 2.0-3.0)    Recent labs: (last 7 days)     05/14/23  1155   INR 4.1*       ASSESSMENT       Source(s): Chart Review    Previous INR was Supratherapeutic    Medication, diet, health changes since last INR chart reviewed; none identified      MyChart also sent        PLAN     Unable to reach pt today.    Left message to hold warfarin tonight. Request call back for assessment.    Follow up required to confirm warfarin dose taken and assess for changes    Jenn Collado, RN  Anticoagulation Clinic  5/15/2023

## 2023-05-16 NOTE — PROGRESS NOTES
ANTICOAGULATION MANAGEMENT     Nancy Oropeza 78 year old female is on warfarin with supratherapeutic INR result. (Goal INR 2.0-3.0)    Recent labs: (last 7 days)     05/14/23  1155   INR 4.1*       ASSESSMENT       Source(s): Chart Review and Patient/Caregiver Call       Warfarin doses taken: Warfarin taken as instructed    Diet: No new diet changes identified    Medication/supplement changes: finished up antibiotic on Monday 5/15, per pt.     New illness, injury, or hospitalization: No    Signs or symptoms of bleeding or clotting: No    Previous result: Supratherapeutic    Additional findings: None         PLAN     Recommended plan for temporary change(s) affecting INR     Dosing Instructions: hold Monday, 1.25 mg Tues, then resume dose  with next INR in 4 days       Summary  As of 5/15/2023    Full warfarin instructions:  5/15: Hold; 5/16: 1.25 mg; Otherwise 1.25 mg every Mon, Wed, Sat; 2.5 mg all other days   Next INR check:  5/19/2023             Telephone call with Nancy who verbalizes understanding and agrees to plan    Patient to recheck with home meter    Education provided:     Please call back if any changes to your diet, medications or how you've been taking warfarin    Goal range and lab monitoring: goal range and significance of current result, Importance of therapeutic range and Importance of following up at instructed interval    Symptom monitoring: monitoring for bleeding signs and symptoms    Plan made per [facility] anticoagulation protocol    Jenn Collado, RN  Anticoagulation Clinic  5/16/2023    _______________________________________________________________________     Anticoagulation Episode Summary     Current INR goal:  2.0-3.0   TTR:  74.2 % (1 y)   Target end date:  Indefinite   Send INR reminders to:  [facility]    Indications    Pulmonary embolism (H) [I26.99]  Pulmonary embolism  unspecified chronicity  unspecified pulmonary embolism type  unspecified whether acute cor  pulmonale present (H) [I26.99]           Comments:  MDINR Home Meter// Manage by exception         Anticoagulation Care Providers     Provider Role Specialty Phone number    Julien Garnica MD Referring Family Medicine [phone]

## 2023-05-17 NOTE — TELEPHONE ENCOUNTER
Notify patient that this shows a moderate buildup of plaque in her carotid arteries which is a fairly common finding based on age and history.  In addition to warfarin, okay to use a baby aspirin (81 mg) daily to decrease risk for future stroke.

## 2023-05-17 NOTE — TELEPHONE ENCOUNTER
Reason for call:  Other     Patient called regarding (reason for call): call back    Additional comments: patient is calling wondering what the number's mean for her results for Coronary artery stress test. Please advise and call patient back. thank you.    Phone number to reach patient:  Cell number on file:    Telephone Information:   Mobile [phone]       Best Time:  any    Can we leave a detailed message on this number?  YES

## 2023-05-17 NOTE — TELEPHONE ENCOUNTER
Carotid US 5/11/23  IMPRESSION:  1.  Moderate plaque formation, velocities consistent with 50-69% stenosis in the right internal carotid artery.  2.  Moderate plaque formation, velocities consistent with 50-69% stenosis in the left internal carotid artery.  3.  Flow within the vertebral arteries is antegrade.

## 2023-05-18 NOTE — TELEPHONE ENCOUNTER
Informed pt of provider's message and she verbalized understanding and asked writer to read off of what Dr Hernandez noted in her US Carotid result note. Read to her what Dr Hernandez stated she pt verbalized understanding, stating no other questions and will start the baby ASA daily in addition to the warfarin.     GERTRUDE ParrishN, RN  [facility]

## 2023-05-19 NOTE — TELEPHONE ENCOUNTER
Patient is calling not needing another refill just wanting to transfer this prescription to mail order for future refill.    Pending Prescriptions:                       Disp   Refills    traMADol (ULTRAM) 50 MG tablet            60 tab*0            Sig: Take 1 tablet (50 mg) by mouth every 6 hours as           needed for severe pain

## 2023-05-19 NOTE — TELEPHONE ENCOUNTER
Please see the May 19, 2023 Anticoagulation encounter for further information.    Kera Crespo RN    [facility]

## 2023-05-19 NOTE — PROGRESS NOTES
ANTICOAGULATION MANAGEMENT     Nancy Oropeza 78 year old female is on warfarin with subtherapeutic INR result. (Goal INR 2.0-3.0)    Recent labs: (last 7 days)     05/19/23  1012   INR 1.6*       ASSESSMENT       Source(s): Chart Review and Patient/Caregiver Call       Warfarin doses taken: Warfarin taken as instructed    Diet: No new diet changes identified    Medication/supplement changes: will be starting ASA 81 mg, off antibiotic for 4 days    New illness, injury, or hospitalization: No    Signs or symptoms of bleeding or clotting: No    Previous result: Supratherapeutic    Additional findings: None         PLAN     Recommended plan for temporary change(s) affecting INR     Dosing Instructions: booster dose then continue your current warfarin dose with next INR in 1 week       Summary  As of 5/19/2023    Full warfarin instructions:  5/19: 3.75 mg; Otherwise 1.25 mg every Mon, Wed, Sat; 2.5 mg all other days   Next INR check:  5/26/2023             Telephone call with Nancy who verbalizes understanding and agrees to plan    Patient to recheck with home meter    Education provided:     None required    Plan made per [facility] anticoagulation protocol    Kera Crespo, RN  Anticoagulation Clinic  5/19/2023    _______________________________________________________________________     Anticoagulation Episode Summary     Current INR goal:  2.0-3.0   TTR:  74.3 % (1 y)   Target end date:  Indefinite   Send INR reminders to:  [facility]    Indications    Pulmonary embolism (H) [I26.99]  Pulmonary embolism  unspecified chronicity  unspecified pulmonary embolism type  unspecified whether acute cor pulmonale present (H) [I26.99]           Comments:  MDINR Home Meter// Manage by exception         Anticoagulation Care Providers     Provider Role Specialty Phone number    Julien Garnica MD Referring Family Medicine [phone]

## 2023-05-19 NOTE — TELEPHONE ENCOUNTER
Patient Returning Call    Reason for call:  INR  Call back    Information relayed to patient:  n/a    Patient has additional questions:  Yes    What are your questions/concerns:  Please call patient back regarding INR results     Who does the patient want to speak with:  RN    Is an  needed?:  No      Could we send this information to you in [facility] or would you prefer to receive a phone call?:   Patient would prefer a phone call   Okay to leave a detailed message?: Yes at Cell number on file:    Telephone Information:   Mobile [phone]

## 2023-05-22 NOTE — TELEPHONE ENCOUNTER
Reason for call:  Other     Patient called regarding (reason for call): call back    Additional comments: Patient is calling asking if she should stop taking warfarin and start taking Xarelto instead. Please and call patient back please and thank you.     Phone number to reach patient:  Cell number on file:    Telephone Information:   Mobile [phone]       Best Time:  any    Can we leave a detailed message on this number?  YES

## 2023-05-22 NOTE — TELEPHONE ENCOUNTER
"Nurse SBAR    Situation:  \"Patient is calling asking if she should stop taking warfarin and start taking Xarelto instead.\"     Background:  Was put on Coumadin d/t PE a year ago.    Assessment:  Patient stated that she knows a few people that are on xarelto and they don't have to check INR every week and/or change their medication dosing all the time.    Patient stated that she can't bump into anything or else she will be all black and blue.    This morning she bumped the door at the store and right arm got all bloody from a cut and now she knows that arm will be all black and blue soon.     \"The black and blue\" just keep coming.    The people that she knows that are taking xarelto \"hardly ever get black and blue.\"    Patient stated she did check with her insurance and was told xarelto is on level 3 and it will cause her about $47 a month, which is kind of expensive.     Recommendation:  Advised pt that writer will route to PCP to review and advise if pt is a candidate and what are her options. Informed pt that she most likely might need to be seen in clinic for med change request or discuss further as provider will be able to review both meds with pt and see what her options are. Patient do want to know if she is a candidate for xarelto instead of coumadin. What is the difference between the two and which one will be best for pt.    OK to leave detailed messages.    Justin Vaughn, GERTRUDEN, RN  [facility]      "

## 2023-05-23 NOTE — TELEPHONE ENCOUNTER
Please help patient to schedule future office visit with me in order to review anticoagulation options and determine plan to transition, etc.  Best to review in person prior to potential change in medication due to nature of medication, etc.

## 2023-05-26 NOTE — PROGRESS NOTES
ANTICOAGULATION MANAGEMENT     Nancy Oropeza 78 year old female is on warfarin with therapeutic INR result. (Goal INR 2.0-3.0)    Recent labs: (last 7 days)     05/26/23  0000   INR 2.6*       ASSESSMENT       Source(s): Chart Review and Patient/Caregiver Call       Warfarin doses taken: Warfarin taken as instructed    Diet: No new diet changes identified    Medication/supplement changes: None noted    New illness, injury, or hospitalization: No    Signs or symptoms of bleeding or clotting: No    Previous result: Subtherapeutic    Additional findings: None     Patient has an appointment to meet with provider to discuss DOAC.         PLAN     Recommended plan for no diet, medication or health factor changes affecting INR     Dosing Instructions: Continue your current warfarin dose with next INR in 1 week       Summary  As of 5/26/2023    Full warfarin instructions:  1.25 mg every Mon, Wed, Sat; 2.5 mg all other days   Next INR check:  6/2/2023             Telephone call with Nancy who verbalizes understanding and agrees to plan  Sent [employer] message with dosing and follow up instructions    Patient to recheck with home meter    Education provided:     Please call back if any changes to your diet, medications or how you've been taking warfarin    Resume manage by exception with home monitor. Continue to submit INR results to home monitor company.You will only be called when your result is out of range. Please call and notify [facility] if new medication started, dose missed, signs or symptoms of bleeding or clotting, or a surgery/procedure is scheduled.    Plan made per [facility] anticoagulation protocol    Evie Lagos RN  Anticoagulation Clinic  5/26/2023    _______________________________________________________________________     Anticoagulation Episode Summary     Current INR goal:  2.0-3.0   TTR:  75.5 % (1 y)   Target end date:  Indefinite   Send INR reminders to:  [facility]    Indications     Pulmonary embolism (H) [I26.99]  Pulmonary embolism  unspecified chronicity  unspecified pulmonary embolism type  unspecified whether acute cor pulmonale present (H) [I26.99]           Comments:  MDINR Home Meter// Manage by exception         Anticoagulation Care Providers     Provider Role Specialty Phone number    Julien Garnica MD Referring Family Medicine [phone]

## 2023-05-26 NOTE — TELEPHONE ENCOUNTER
"Routing refill request to provider for review/approval because:  Allergy/contraindication    Last Written Prescription Date:  04/12/2022  Last Fill Quantity: 90,  # refills: 3   Last office visit provider:  04/21/2023     Requested Prescriptions   Pending Prescriptions Disp Refills     pravastatin (PRAVACHOL) 80 MG tablet [Pharmacy Med Name: PRAVASTATIN TABS 80MG] 90 tablet 3     Sig: TAKE 1 TABLET AT BEDTIME       Statins Protocol Passed - 5/26/2023  5:02 PM        Passed - LDL on file in past 12 months     Recent Labs   Lab Test 04/21/23  1140   LDL 64             Passed - No abnormal creatine kinase in past 12 months     Recent Labs   Lab Test 07/17/20  1433   CKT 88                Passed - Recent (12 mo) or future (30 days) visit within the authorizing provider's specialty     Patient has had an office visit with the authorizing provider or a provider within the authorizing providers department within the previous 12 mos or has a future within next 30 days. See \"Patient Info\" tab in inbasket, or \"Choose Columns\" in Meds & Orders section of the refill encounter.              Passed - Medication is active on med list        Passed - Patient is age 18 or older        Passed - No active pregnancy on record        Passed - No positive pregnancy test in past 12 months             Nohemi Neal RN 05/26/23 5:02 PM  "

## 2023-05-30 NOTE — TELEPHONE ENCOUNTER
Pt called back and wants  to know that she called around and other Anticoag medications are expensive so she is deciding to not change.     Appt was cancelled.

## 2023-06-02 NOTE — PROGRESS NOTES
ANTICOAGULATION MANAGEMENT     Nancy GARZON Sandip 78 year old female is on warfarin with supratherapeutic INR result. (Goal INR 2.0-3.0)    Recent labs: (last 7 days)     06/02/23  0000   INR 3.1*       ASSESSMENT       Source(s): Chart Review and Patient/Caregiver Call       Warfarin doses taken: Warfarin taken as instructed    Diet: No new diet changes identified    Medication/supplement changes: None noted    New illness, injury, or hospitalization: No    Signs or symptoms of bleeding or clotting: No    Previous result: Therapeutic last visit; previously outside of goal range    Additional findings: None         PLAN     Recommended plan for no diet, medication or health factor changes affecting INR     Dosing Instructions: Continue your current warfarin dose with next INR in 1 week       Summary  As of 6/2/2023    Full warfarin instructions:  1.25 mg every Mon, Wed, Sat; 2.5 mg all other days   Next INR check:  6/9/2023             Telephone call with Nancy who verbalizes understanding and agrees to plan  Sent [employer] message with dosing and follow up instructions    Patient to recheck with home meter    Education provided:     Please call back if any changes to your diet, medications or how you've been taking warfarin    Dietary considerations: importance of consistent vitamin K intake    Plan made per [facility] anticoagulation protocol    Evie Lagos, RN  Anticoagulation Clinic  6/2/2023    _______________________________________________________________________     Anticoagulation Episode Summary     Current INR goal:  2.0-3.0   TTR:  77.0 % (1 y)   Target end date:  Indefinite   Send INR reminders to:  [facility]    Indications    Pulmonary embolism (H) [I26.99]  Pulmonary embolism  unspecified chronicity  unspecified pulmonary embolism type  unspecified whether acute cor pulmonale present (H) [I26.99]           Comments:  MDINDIAN[facility] Home Meter// Manage by exception         Anticoagulation Care  Providers     Provider Role Specialty Phone number    Julien Garnica MD Referring Family Medicine [phone]

## 2023-06-07 NOTE — TELEPHONE ENCOUNTER
Medication last filled:05/19/2023    Last clinic visit: 05/03/2023     Clinic visit frequency required: Q 6  months    Next clinic visit: 06/09/2023    Controlled substance agreement on file: No.    Urine Drug Screen on file:  No     Pending Prescriptions:                       Disp   Refills    traMADol (ULTRAM) 50 MG tablet            60 tab*0            Sig: Take 1 tablet (50 mg) by mouth every 6 hours as           needed for severe pain

## 2023-06-09 NOTE — PROGRESS NOTES
ANTICOAGULATION  MANAGEMENT    Nancy Oropeza is being discharged from the [facility] ([facility]).    Reason for discharge: warfarin replaced by alternate therapy, Xarelto    Anticoagulation episode resolved, [facility] referral closed and Standing order discontinued    If patient needs warfarin management in the future, please send a new referral    Cora Jean-Baptiste RN

## 2023-06-09 NOTE — PROGRESS NOTES
Nancy is a 78 year old who is being evaluated via a billable video visit.      How would you like to obtain your AVS? MyChart  If the video visit is dropped, the invitation should be resent by: Text to cell phone: [phone]  Will anyone else be joining your video visit? No          Pulmonary embolism, unspecified chronicity, unspecified pulmonary embolism type, unspecified whether acute cor pulmonale present (H)  Pulmonary embolism historically in 2015.  Wants to switch from warfarin due to frustration of monitoring.  Has some bruising.  Prior fall after slipping on wet surface at [facility] otherwise no typical falls or concerns in this regard.  Does elect to switch to Xarelto and will utilize 10 mg once daily.  We will check INR first and ensure that it is less than 3.0 before initiating Xarelto.  Prefers to 30-day supply sent to local pharmacy currently.  Will remain off aspirin.  Reassess at annual wellness visit November 8, 2023.  - rivaroxaban ANTICOAGULANT (XARELTO) 10 MG TABS tablet  Dispense: 30 tablet; Refill: 11    Coronary artery disease involving native coronary artery of native heart without angina pectoris  CAD history, stable.    Essential hypertension with goal blood pressure less than 140/90  Continue current antihypertensive medication and remains on amlodipine 5 mg daily, lisinopril hydrochlorothiazide 20/12.5 using 2 tablets daily.           Subjective   Nancy is a 78 year old, presenting for the following health issues:  discuss being on blood thinner (Lots of bruising and tired of checking it every week)        2/10/2023    10:59 AM   Additional Questions   Roomed by Mae FREITAS       Virtual visit completed today.  Patient with history of PE described December 2015.  Multiple bilateral pulmonary emboli at that point.  Has been on chronic warfarin anticoagulation.  Is tired of using warfarin and monitoring.  Does have some bruising.  Did have closed head injury April 11, 2023 after  slipping on wet surface at [employer].  Has continued to do well otherwise.  Underlying history of CAD stable without angina.  Hypertension hypercholesterolemia present..  Fasting glucose historically.  Comprehensive review of systems as above otherwise all negative.         5 children (Airam, Nancy, Lakeisha, Akash, Thee) - Akash had coronary stent while Thee with Factor V abnormality and rectal cancer   14 grandchildren   16 great-grandchildren   Grew up in [place], [place] til age 12...   No smoke (quit 16 years ago after 1 ppd x 30+ years)   EtOH: occ wine   Mom -  88 COPD   Dad -  61 massive MI   1 bro -  67 blood clot (lung)   2 sis - one of which is  age 62 small cell lung CA (h/o smoker)   Surgeries: ventral hernia repair with muscle flap 10/4/12 with post-op complication of seroma with J.P. drain in place followed by interventional radiology q 2 weeks);  age 25; groin hernia age 3; CAPRICE-BSO  by Dr. Herbert Carmen (due to benign cyst x 2); right TKA 18 (Dr. Small)   Hospitalizations: as above   Work: retired  ([employer] as )     12/18/15 FYI: Patient was admitted for dyspnea, secondary to bilateral multiple pulmonary emboli. She overall did well and was discharged home on Lovenox, relatively high dose given her weight as well as Warfarin. I would like her to be seen today at your clinic. I believe she has an appointment for INR, CBC, and BMP check as well as re-dosing of her Warfarin. I suspect she will need an additional day of Lovenox as she is not quite therapeutic today. You can refer to my discharge summary for the INR's and the Warfarin dosing. Thank you. Dr. Garza        Review of Systems   Constitutional, HEENT, cardiovascular, pulmonary, GI, , musculoskeletal, neuro, skin, endocrine and psych systems are negative, except as otherwise noted.      Objective           Vitals:  No vitals were obtained today  due to virtual visit.    Physical Exam   GENERAL: Healthy, alert and no distress  EYES: Eyes grossly normal to inspection.  No discharge or erythema, or obvious scleral/conjunctival abnormalities.  RESP: No audible wheeze, cough, or visible cyanosis.  No visible retractions or increased work of breathing.    SKIN: Visible skin clear. No significant rash, abnormal pigmentation or lesions.  NEURO: Cranial nerves grossly intact.  Mentation and speech appropriate for age.  PSYCH: Mentation appears normal, affect normal/bright, judgement and insight intact, normal speech and appearance well-groomed.                Video-Visit Details    Type of service:  Video Visit   Video Start Time: 7:40 AM  Video End Time:8:01 AM    Originating Location (pt. Location): Home    Distant Location (provider location):  On-site  Platform used for Video Visit: Zientia

## 2023-06-09 NOTE — TELEPHONE ENCOUNTER
Calling in INR results:  2.5    Needs dosage for warfarin asap.  Please call today.  OK to LM on VM

## 2023-06-09 NOTE — PROGRESS NOTES
ANTICOAGULATION MANAGEMENT     Nancy Oropeza 78 year old female is on warfarin with therapeutic INR result. (Goal INR 2.0-3.0)    Recent labs: (last 7 days)     06/09/23  1530   INR 2.5*       ASSESSMENT       Source(s): Chart Review and Patient/Caregiver Call       Warfarin doses taken: Warfarin taken as instructed    Diet: No new diet changes identified    Medication/supplement changes: None noted    New illness, injury, or hospitalization: No    Signs or symptoms of bleeding or clotting: No    Previous result: Supratherapeutic    Additional findings: Patient switching to Xarelto         PLAN     Recommended plan for ongoing change(s) affecting INR     Dosing Instructions: Stop warfarin today, will be discharging patient from [facility] clinic today     Summary  As of 6/9/2023    Full warfarin instructions:  1.25 mg every Mon, Wed, Sat; 2.5 mg all other days   Next INR check:               Telephone call with Nancy who agrees to plan and repeated back plan correctly    Patient will call home meter company to return home meter    Education provided:     Symptom monitoring: monitoring for bleeding signs and symptoms and monitoring for clotting signs and symptoms    Contact [phone]  with any changes, questions or concerns.     Plan made per [facility] anticoagulation protocol    Cora Jean-Baptiste, RN  Anticoagulation Clinic  6/9/2023    _______________________________________________________________________     Anticoagulation Episode Summary     Current INR goal:  2.0-3.0   TTR:  78.6 % (1 y)   Target end date:  Indefinite   Send INR reminders to:  [facility]    Indications    Pulmonary embolism (H) [I26.99]  Pulmonary embolism  unspecified chronicity  unspecified pulmonary embolism type  unspecified whether acute cor pulmonale present (H) [I26.99]           Comments:  [employer]// Manage by exception         Anticoagulation Care Providers     Provider Role Specialty Phone number    Julien Garnica MD  [facility] [phone]

## 2023-07-07 NOTE — TELEPHONE ENCOUNTER
Pt contacted.  Dr. Garnica not in clinic till tues July 11. - ok to wait for provider to return to clinic

## 2023-07-07 NOTE — TELEPHONE ENCOUNTER
Please notify to continue ASA due to history of coronary artery calcification.  Okay to continue Xarelto due to history of pulmonary embolism.

## 2023-07-07 NOTE — TELEPHONE ENCOUNTER
Reason for call:  Other     Patient called regarding (reason for call): prescription    Additional comments: Patient is calling and asking if she should still be taking baby Asprin with Rivaroxaban  (Xarelto). Please advise and call patient back please and thank you.    Phone number to reach patient:  Cell number on file:    Telephone Information:   Mobile [phone]       Best Time:  any    Can we leave a detailed message on this number?  YES

## 2023-07-14 NOTE — TELEPHONE ENCOUNTER
Reason for call:  Other     Patient called regarding (reason for call): call back    Additional comments: Pt spoke with insurance and [facility] and would like to have a larger fill of tramadol sent in. Per pt, insurance will cover 120 tablets per fill. She states that she is running low and her refill hasn't arrived yet.    Phone number to reach patient:  Work number on file:  There is no work phone number on file.    Best Time:  Any    Can we leave a detailed message on this number?  YES

## 2023-07-19 NOTE — TELEPHONE ENCOUNTER
S-(situation): My Chart message:   Pt is calling wanting to know what Dr. Garnica recommends for the rash she gets under her breast. Pt states that he has recommended and or prescribed something in the past but just does not remember.     B-(background): Writer spoke with patient and relayed note from 4/25/23 from Dr. Garnica when patient  had a rash under her breast previously.  That note states the following;    I have reviewed the RN documentation. My recommendation is:  Spoke with patient.  Patient describes skin changes consistent with local furuncle.  Cephalexin 500 mg 4 times daily x10 days.  Warm compresses locally.  Triple antibiotic ointment applied topically.  Wound cares.  Should follow-up in office if persistent or worsening symptoms.    A-(assessment): Patient states that rash is not as bad as it was previously and the topical ointment should help. Patient denies any other symptoms related to this.    R-(recommendations): Informed patient to call if the triple antibiotic ointment does not help or if has any other concerns or if rash worsens. Patient verbalized understanding.     JULIO Michelle RN  [facility]

## 2023-07-19 NOTE — TELEPHONE ENCOUNTER
Pt is calling wanting to know what Dr. Garnica recommends for the rash she gets under her breast. Pt states that he has recommended and or prescribed something in the past but just does not remember.     Please advise and contact patient with recommendations.

## 2023-08-11 NOTE — PROGRESS NOTES
Assessment/Plan:    Orthostatic hypotension  Orthostatic hypotension noted.  Blood pressure 128/54 with pulse 80 at rest.  Blood pressure 104/48 with pulse 96 after standing 1 minute.  We will discontinue amlodipine 5 mg daily.  Continues lisinopril hydrochlorothiazide 20/12.5 using 2 tablets daily and will reassess no later than wellness visit November 8, 2023 otherwise sooner if persistent issues or if worsening.    Essential hypertension with goal blood pressure less than 140/90  As above, discontinue amlodipine due to orthostatic hypotension and continue lisinopril hydrochlorothiazide 20/12.5 using 2 tablets daily.  Medication monitoring completed.  - Basic metabolic panel    Stage 3b chronic kidney disease (H)  History of CKD stage IIIb and will ensure stable renal function.  Prior creatinine 1.29 and GFR 42.  - Basic metabolic panel    Anxiety  Benefits of citalopram 40 mg daily continuing.  TORIE-7 questionnaire 9 out of 21 with PHQ-9 questionnaire 5 out of 27.    Pulmonary embolism, unspecified chronicity, unspecified pulmonary embolism type, unspecified whether acute cor pulmonale present (H)  Xarelto 10 mg daily continues.  Has had normochromic normocytic anemia historically.  We will update CBC today.    Normochromic normocytic anemia  Update CBC while on Xarelto 10 mg daily with most recent hemoglobin 9.5 and MCV 87.  - CBC with platelets               Subjective:    Nancy GARZON Sandip is seen today for dizziness with standing.  Happens after she gets up typically otherwise no problems at rest.  Does use lisinopril hydrochlorothiazide 20/12.5 using 2 tablets daily in addition to amlodipine 5 mg daily for hypertension.  Citalopram 40 mg daily for anxiety management with benefits.  History of pulmonary emboli in no longer on warfarin after 8 years and now on Xarelto 10 mg daily.  Does notice some bruising otherwise happy with medication since she can eat what she wants and not have to have regular  monitoring.  Has had mild normochromic normocytic anemia with prior hemoglobin 9.5 and MCV 87 with history of polymyalgia rheumatica and continues prednisone 5 mg daily with described benefits noted.  Has been unable to wean from this dose due to recurrent symptoms unfortunately.  Comprehensive review of systems as above otherwise all negative.         5 children (Airam, Nancy, Lakeisha, Akash, Thee) - Akash had coronary stent while Thee with Factor V abnormality and rectal cancer   14 grandchildren   16 great-grandchildren   Grew up in [place], [place] til age 12...   No smoke (quit 16 years ago after 1 ppd x 30+ years)   EtOH: occ wine   Mom -  88 COPD   Dad -  61 massive MI   1 bro -  67 blood clot (lung)   2 sis - one of which is  age 62 small cell lung CA (h/o smoker)   Surgeries: ventral hernia repair with muscle flap 10/4/12 with post-op complication of seroma with J.P. drain in place followed by interventional radiology q 2 weeks);  age 25; groin hernia age 3; CAPRICE-BSO  by Dr. Herbert Carmen (due to benign cyst x 2); right TKA 18 (Dr. Small)   Hospitalizations: as above   Work: retired  ([employer] as )       Past Surgical History:   Procedure Laterality Date    arthroplasty for hammertoe-2nd toe R  Biebl[      BIOPSY BREAST Left 1970s    Bunion correction by Cheilectomy-had bunionectomy ? type of procedure L and R separate procedures.[      GYN SURGERY      HERNIA REPAIR      Hernia Repair Inguinal unilateral[      HYSTERECTOMY      HYSTERECTOMY  2010    IR ABSCESS TUBE CHANGE  2012    IR ABSCESS TUBE CHANGE  2012    IR ABSCESS TUBE CHANGE  2012    IR ABSCESS TUBE CHANGE  2013    IR ABSCESS TUBE CHANGE  3/1/2013    IR ABSCESS TUBE CHANGE  3/13/2013    OOPHORECTOMY  2010    ORTHOPEDIC SURGERY          Family History   Problem Relation Age of Onset    Chronic Obstructive Pulmonary Disease Mother     Heart  Disease Mother     Coronary Artery Disease Father 62        fatal MI    Stomach Cancer Paternal Grandfather     Cardiac Sudden Death Brother 67    Breast Cancer Sister 75    Coronary Artery Disease Sister     Lung Cancer Sister         Past Medical History:   Diagnosis Date    2019 novel coronavirus disease (COVID-19) 07/16/2020    Acute bilateral knee pain 07/18/2017    TEOFILO (acute kidney injury) (H) 07/16/2020    Anemia, unspecified     Created by Conversion     Angioedema 12/17/2015    Anticoagulant long-term use 03/06/2017    Back pain     Created by Conversion     Chronic pain syndrome 06/01/2018    Coronary artery disease     coronary artery calcification    COVID-19 virus infection 07/17/2020    Depression     Diarrhea 07/16/2020    Disease of thyroid gland     Edema     Created by Conversion     Essential hypertension with goal blood pressure less than 140/90     Created by Conversion  Replacement Utility updated for latest IMO load    History of pulmonary embolism 01/01/2015    Overview:  bilateral with acute cor pulmonale     HTN (hypertension)     Hypercholesteremia     Hyperlipidemia     Hypokalemia 12/17/2015    Hypothyroidism     Created by Conversion  Replacement Utility updated for latest IMO load    Hypoxia 07/16/2020    Impaired fasting glucose     Created by Conversion     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    Major depressive disorder, recurrent episode (H)     Created by Conversion  Replacement Utility updated for latest IMO load    Mood disorder (H) 07/16/2020    Morbid obesity (H)     Multiple lung nodules on CT 12/17/2015    Obstructive sleep apnea (adult) (pediatric)     Created by Conversion     Osteoarthrosis involving lower leg     Created by Conversion  Replacement Utility updated for latest IMO load    Pain in joint, multiple sites     Created by Conversion     Polymyalgia rheumatica (H) 07/16/2020    Postoperative anemia due to acute blood loss 12/18/2018    Pulmonary emboli (H) 12/14/2015     Pulmonary embolism (H) 2015    Pure hypercholesterolemia     Created by Conversion     Sepsis (H) 2020    SIRS (systemic inflammatory response syndrome) (H) 2020    Unspecified cataract     Created by Conversion     Yeast infection         Social History     Tobacco Use    Smoking status: Former     Packs/day: 1.00     Types: Cigarettes     Quit date:      Years since quittin.6    Smokeless tobacco: Never    Tobacco comments:     quite 20 yrs ago   Substance Use Topics    Alcohol use: No     Comment: Alcoholic Drinks/day: occasionally    Drug use: Never        Current Outpatient Medications   Medication Sig Dispense Refill    acetaminophen 500 MG CAPS Take 2 capsules by mouth 3 times daily as needed.      amLODIPine (NORVASC) 5 MG tablet TAKE 1 TABLET DAILY (NEW   DOSE 18) 90 tablet 3    BD ULTRA-FINE 33 LANCETS Use as directed      Cholecalciferol (VITAMIN D) 400 UNITS capsule Take 2 capsules by mouth daily.      citalopram (CELEXA) 40 MG tablet Take 1 tablet (40 mg) by mouth daily 90 tablet 3    furosemide (LASIX) 20 MG tablet Take 1 tablet (20 mg) by mouth every morning 90 tablet 3    Glucose Blood (ONE TOUCH ULTRA TEST) strip by In Vitro route daily Use as directed      levothyroxine (SYNTHROID/LEVOTHROID) 125 MCG tablet Take 1 tablet (125 mcg) by mouth daily 90 tablet 3    lisinopril-hydrochlorothiazide (ZESTORETIC) 20-12.5 MG tablet Take 2 tablets by mouth daily 180 tablet 3    pravastatin (PRAVACHOL) 80 MG tablet TAKE 1 TABLET AT BEDTIME 90 tablet 3    predniSONE (DELTASONE) 5 MG tablet TAKE 1 TABLET DAILY 90 tablet 3    rivaroxaban ANTICOAGULANT (XARELTO) 10 MG TABS tablet Take 1 tablet (10 mg) by mouth daily (with dinner) 30 tablet 11    traMADol (ULTRAM) 50 MG tablet Take 1 tablet (50 mg) by mouth every 6 hours as needed for severe pain 120 tablet 2    warfarin ANTICOAGULANT (COUMADIN) 2.5 MG tablet take 1 tablet (2.5 mg) by mouth every Mon, Wed, Fri; take one-half tablet  (1.25 mg) by mouth all other days of the week (Patient not taking: Reported on 8/11/2023) 60 tablet 5          Objective:    Vitals:    08/11/23 1324 08/11/23 1326   BP: (!) 140/68 120/64   Pulse: 105    Resp: 19    Temp: (!) 96.3  F (35.7  C)    SpO2: 96%    Weight: 120.2 kg (265 lb)       Body mass index is 41.84 kg/m .    Alert.  No apparent distress.  Chest clear.  Cardiac exam regular without cardiac ectopy.  Blood pressure 128/54 with pulse 80 at rest.  Blood pressure 104/48 with pulse 96 after standing 1 minute.      This note has been dictated using voice recognition software and as a result may contain minor grammatical errors and unintended word substitutions.       Answers submitted by the patient for this visit:  Patient Health Questionnaire (Submitted on 8/11/2023)  If you checked off any problems, how difficult have these problems made it for you to do your work, take care of things at home, or get along with other people?: Somewhat difficult  PHQ9 TOTAL SCORE: 5  TORIE-7 (Submitted on 8/11/2023)  TORIE 7 TOTAL SCORE: 9  General Questionnaire (Submitted on 8/11/2023)  Chief Complaint: Chronic problems general questions HPI Form  What is the reason for your visit today? : dizziness  How many servings of fruits and vegetables do you eat daily?: 2-3  On average, how many sweetened beverages do you drink each day (Examples: soda, juice, sweet tea, etc.  Do NOT count diet or artificially sweetened beverages)?: 0  How many minutes a day do you exercise enough to make your heart beat faster?: 9 or less  How many days a week do you exercise enough to make your heart beat faster?: 3 or less  How many days per week do you miss taking your medication?: 0

## 2023-08-16 NOTE — PROGRESS NOTES
Assessment/Plan:    Normochromic normocytic anemia  Normochromic normocytic anemia with likely acute blood loss etiology.  Hemoglobin at 7.9 stable since Friday, August 11, 2023.  Due to profound orthostatic hypotension on exam did refer patient to [facility] emergency department I did speak with healthcare provider at that location who agrees with transfer.  Iron studies and ferritin were added through local lab draw here.  - CBC with platelets  - CBC with platelets  - Iron & Iron Binding Capacity  - Ferritin    Orthostatic hypotension  Orthostatic hypotension with blood pressure 108/56 with pulse 80 sitting.  Blood pressure 84/48 with pulse 108 after standing 1 minute.    Essential hypertension with goal blood pressure less than 140/90  Hypertension noted.  Recent discontinuation of amlodipine 5 mg daily August 11, 2023 due to prior concerns with orthostatic hypotension as above.  Further decision regarding holding lisinopril hydrochlorothiazide 20/12.5 in which she is using 2 tablets daily still following ER evaluation.  - Basic metabolic panel    Pulmonary embolism, unspecified chronicity, unspecified pulmonary embolism type, unspecified whether acute cor pulmonale present (H)  Prior use of warfarin following PE historically.  No cough or shortness of breath.  No hemoptysis.  Had switch to Xarelto 10 mg daily 2 months ago in order to eliminate need for monitoring.  This was held since last Friday after noting hemoglobin 7.9.    Stage 3b chronic kidney disease (H)  CKD stage IIIb.  Recent creatinine at 1.61 and GFR 32 and will ensure stable or improved renal function.  Needs to ensure adequate hydration.  - Basic metabolic panel    40 minutes total time spent on the date of encounter including patient chart review, counseling and coordination of cares, and documentation.           Subjective:    Nancy Oropeza is seen today for follow-up evaluation.  Noted acute on chronic anemia at prior office visit August  2023 with hemoglobin 7.9.  Creatinine 1.61 and GFR 32.  Did discontinue amlodipine 5 mg daily.  Had continue lisinopril hydrochlorothiazide 20/12.5 using 2 tablets daily.  Prior history of pulmonary embolus in the past.  Had been on warfarin for 8 years then switched a couple months ago to Xarelto 10 mg daily in order to avoid monitoring requirements etc.  Had noted decrease of normal hemoglobin down to 9.5 with MCV 87 with known history of polymyalgia rheumatica questions regarding anemia of chronic disease while continuing prednisone 5 mg daily for PMR management.  Comprehensive review of systems as above otherwise all negative.  Patient does describe feeling somewhat better over past several days until today getting to the clinic and feeling lightheaded upon getting out of her car and standing.        5 children (Airam, Nancy, Lakeisha, Akash, Thee) - Akash had coronary stent while Thee with Factor V abnormality and rectal cancer  14 grandchildren  16 great-grandchildren  Grew up in [place], [place] til age 12...  No smoke (quit 16 years ago after 1 ppd x 30+ years)  EtOH: occ wine  Mom -  88 COPD  Dad -  61 massive MI  1 bro -  67 blood clot (lung)  2 sis - one of which is  age 62 small cell lung CA (h/o smoker)  Surgeries: ventral hernia repair with muscle flap 10/4/12 with post-op complication of seroma with J.P. drain in place followed by interventional radiology q 2 weeks);  age 25; groin hernia age 3; CAPRICE-BSO  by Dr. Herbert Carmen (due to benign cyst x 2); right TKA 18 (Dr. Small)  Hospitalizations: as above  Work: retired  ([employer] as )         Past Surgical History:   Procedure Laterality Date    arthroplasty for hammertoe-2nd toe R  Biebl[      BIOPSY BREAST Left 1970s    Bunion correction by Cheilectomy-had bunionectomy ? type of procedure L and R separate procedures.[      GYN SURGERY      HERNIA REPAIR       Hernia Repair Inguinal unilateral[      HYSTERECTOMY      HYSTERECTOMY  2010    IR ABSCESS TUBE CHANGE  11/9/2012    IR ABSCESS TUBE CHANGE  11/12/2012    IR ABSCESS TUBE CHANGE  12/4/2012    IR ABSCESS TUBE CHANGE  2/22/2013    IR ABSCESS TUBE CHANGE  3/1/2013    IR ABSCESS TUBE CHANGE  3/13/2013    OOPHORECTOMY  2010    ORTHOPEDIC SURGERY          Family History   Problem Relation Age of Onset    Chronic Obstructive Pulmonary Disease Mother     Heart Disease Mother     Coronary Artery Disease Father 62        fatal MI    Stomach Cancer Paternal Grandfather     Cardiac Sudden Death Brother 67    Breast Cancer Sister 75    Coronary Artery Disease Sister     Lung Cancer Sister         Past Medical History:   Diagnosis Date    2019 novel coronavirus disease (COVID-19) 07/16/2020    Acute bilateral knee pain 07/18/2017    TEOFILO (acute kidney injury) (H) 07/16/2020    Anemia, unspecified     Created by Conversion     Angioedema 12/17/2015    Anticoagulant long-term use 03/06/2017    Back pain     Created by Conversion     Chronic pain syndrome 06/01/2018    Coronary artery disease     coronary artery calcification    COVID-19 virus infection 07/17/2020    Depression     Diarrhea 07/16/2020    Disease of thyroid gland     Edema     Created by Conversion     Essential hypertension with goal blood pressure less than 140/90     Created by Conversion  Replacement Utility updated for latest IMO load    History of pulmonary embolism 01/01/2015    Overview:  bilateral with acute cor pulmonale     HTN (hypertension)     Hypercholesteremia     Hyperlipidemia     Hypokalemia 12/17/2015    Hypothyroidism     Created by Conversion  Replacement Utility updated for latest IMO load    Hypoxia 07/16/2020    Impaired fasting glucose     Created by Conversion     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    Major depressive disorder, recurrent episode (H)     Created by Conversion  Replacement Utility updated for latest IMO load    Mood disorder (H)  2020    Morbid obesity (H)     Multiple lung nodules on CT 2015    Obstructive sleep apnea (adult) (pediatric)     Created by Conversion     Osteoarthrosis involving lower leg     Created by Conversion  Replacement Utility updated for latest IMO load    Pain in joint, multiple sites     Created by Conversion     Polymyalgia rheumatica (H) 2020    Postoperative anemia due to acute blood loss 2018    Pulmonary emboli (H) 2015    Pulmonary embolism (H) 2015    Pure hypercholesterolemia     Created by Conversion     Sepsis (H) 2020    SIRS (systemic inflammatory response syndrome) (H) 2020    Unspecified cataract     Created by Conversion     Yeast infection         Social History     Tobacco Use    Smoking status: Former     Packs/day: 1.00     Types: Cigarettes     Quit date:      Years since quittin.6    Smokeless tobacco: Never    Tobacco comments:     quite 20 yrs ago   Substance Use Topics    Alcohol use: No     Comment: Alcoholic Drinks/day: occasionally    Drug use: Never        Current Outpatient Medications   Medication Sig Dispense Refill    acetaminophen 500 MG CAPS Take 2 capsules by mouth 3 times daily as needed.      BD ULTRA-FINE 33 LANCETS Use as directed      Cholecalciferol (VITAMIN D) 400 UNITS capsule Take 2 capsules by mouth daily.      citalopram (CELEXA) 40 MG tablet Take 1 tablet (40 mg) by mouth daily 90 tablet 3    furosemide (LASIX) 20 MG tablet Take 1 tablet (20 mg) by mouth every morning 90 tablet 3    Glucose Blood (ONE TOUCH ULTRA TEST) strip by In Vitro route daily Use as directed      levothyroxine (SYNTHROID/LEVOTHROID) 125 MCG tablet Take 1 tablet (125 mcg) by mouth daily 90 tablet 3    lisinopril-hydrochlorothiazide (ZESTORETIC) 20-12.5 MG tablet Take 2 tablets by mouth daily 180 tablet 3    pravastatin (PRAVACHOL) 80 MG tablet TAKE 1 TABLET AT BEDTIME 90 tablet 3    predniSONE (DELTASONE) 5 MG tablet TAKE 1 TABLET DAILY 90  tablet 3    traMADol (ULTRAM) 50 MG tablet Take 1 tablet (50 mg) by mouth every 6 hours as needed for severe pain 120 tablet 2    amLODIPine (NORVASC) 5 MG tablet TAKE 1 TABLET DAILY (NEW   DOSE 6/1/18) (Patient not taking: Reported on 8/16/2023) 90 tablet 3    rivaroxaban ANTICOAGULANT (XARELTO) 10 MG TABS tablet Take 1 tablet (10 mg) by mouth daily (with dinner) (Patient not taking: Reported on 8/16/2023) 30 tablet 11          Objective:    Vitals:    08/16/23 1348   BP: 110/60   Pulse: 93   Resp: 15   Temp: 96.8  F (36  C)   SpO2: 97%   Weight: 120.2 kg (265 lb)      Body mass index is 41.84 kg/m .    Alert.  No apparent distress.  Chest clear.  Cardiac exam regular.  Blood pressure 108/56 with pulse 80 sitting and then pulse blood pressure 84/48 with pulse 108 after standing 1 minute.  No evidence for pallor.  No psychomotor agitation.  Cooperative.  Does demonstrate slight lightheadedness upon standing otherwise able to ambulate without ataxia.      This note has been dictated using voice recognition software and as a result may contain minor grammatical errors and unintended word substitutions.

## 2023-08-16 NOTE — ED NOTES
Expected Patient Referral to ED  2:33 PM    Referring Clinic/Provider:  Primary clinic    Reason for referral/Clinical facts:  Recently seen for orthostatic dizziness.  Hgb dropped to 7.9 last week when she was seen in clinic.  Xarelto stopped as a result.  Pt was previously on Warfarin for remote blood clots, but switched to Xarelto 2 months ago due to not wanting to do INR monitoring. No evidence of acute blood loss with no reported bleeding or black stools.  Pt seen in office on recheck today and Hgb stable today, but remains symptomatic and orthostatic.  Wanting pt further looked at, determine if she should be transfused/hospitalized/stabilized further.  Coming by private vehicle with family.    Recommendations provided:  Send to ED for further evaluation    Caller was informed that this institution does possess the capabilities and/or resources to provide for patient and should be transferred to our facility.    Discussed that if direct admit is sought and any hurdles are encountered, this ED would be happy to see the patient and evaluate.    Informed caller that recommendations provided are recommendations based only on the facts provided and that they responsible to accept or reject the advice, or to seek a formal in person consultation as needed and that this ED will see/treat patient should they arrive.      Bertram Christian MD  [facility] EMERGENCY DEPARTMENT  [facility] [facility] [id number] [phone]       Bertram Christian MD  08/16/23 4224

## 2023-08-16 NOTE — DISCHARGE INSTRUCTIONS
You were seen in the emergency department today for lightheadedness and low hemoglobin.  Your hemoglobin was repeated again in the emergency department and is fairly consistent with your value from a week ago making an acute GI bleed unlikely at this time. You were given an iron infusion today. Please continue to ensure adequate calorie and fluid intake as well as iron intake. You should follow up with your PCP within the next week for results of fecal occult blood testing and further evaluation of your orthostatic hypotension (lightheadedness upon standing), this may require further adjustment of your blood pressure medications. Additionally, given that you have had dark stools and your blood work shows evidence of possible bleed, you should follow up with gastroenterology (GI) for colonoscopy and possible upper endoscopy to evaluate for GI bleed. As we discussed, your PCP can place this referral for you. Please return to the ED if you experience any worsening symptoms.

## 2023-08-16 NOTE — ED PROVIDER NOTES
Emergency Department Encounter   NAME: Nancy Oropeza ; AGE: 78 year old female ; YOB: 1945 ; MRN: [id number] ; PCP: Julien Garnica   ED PROVIDER: Shelby Birmingham PA-C    Evaluation Date & Time:   8/16/2023  4:22 PM    CHIEF COMPLAINT:  Abnormal Labs      Impression and Plan   MDM:   Nancy Oropeza is an 78 year old female with PMH of diverticulosis, CAD, hypothyroidism, and constipation presenting to the ED for low hemoglobin after being sent by her PCP. She reports lightheadedness for the past several months, particularly upon immediately standing. She saw her PCP on 8/11 who found her to have a normochromic normocytic anemia with a hemoglobin of 7.9 and hematocrit 25.6.  He stopped her Xarelto, ASA, and amlodipine at this time due to concern for acute bleed. She then saw her PCP again today for a follow up where her hemoglobin was still 7.9 and hematocrit 25.1. He also documented orthostatic hypotension with a BP of 108/56 sitting, dropping to 84/46 after standing for 1 minute.  She reports occasional dark stools over the past few months that she feels are associated to consumption of chocolate as well as on and off bouts of constipation over the past year. She denies any abdominal pain, shell blood in the stool, nausea, or blood in the urine. No headaches, visual changes, or focal weakness. No chest pain or shortness of breath.    Vitals reviewed and unremarkable, BP of 124/60 at rest. GCS 15.On exam she is resting comfortably. Her skin is warm and dry with several bruises present on her extremities. Her abdomen is soft and non distended, no tenderness. Pulses 2+ bilaterally in the UE and LE.     Differential diagnosis includes but not limited to GI bleed, colon cancer, anemia. She was given fluids in the ED today. Orthostatic BP after 500mL of fluids was 108/58 with standing. FOBT sent to the lab. UA found no evidence of UTI or blood in the urine. No imaging to evaluate for acute GI bleed  was performed today in the ED as she is currently hemodynamically stable. I am not concerned for lightheadedness due to cardiac or neurologic cause as she has no chest pain, SOB, vital abnormalities, headache, visual changes, or focal weakness. She is currently hemodynamically stable and her recent lab work over the course of the past week shows consistent low hemoglobin. This is not concerning for acute GI bleed at this time. Discussed options today in the ED including blood transfusion and iron transfusion. She agreed to an iron transfusion today and tolerated it well. Discussed follow up with GI for further evaluation of dark stools and anemia as well as continued follow up with PCP.  Discussed reasons to return to the emergency department. Patient is agreeable to this plan.    Medical Decision Making    History:  Supplemental history from: Documented in chart, if applicable  External Record(s) reviewed: Documented in chart, if applicable.    Work Up:  Chart documentation includes differential considered and any EKGs or imaging independently interpreted by provider, where specified.  In additional to work up documented, I considered the following work up: Documented in chart, if applicable.    External consultation:  Discussion of management with another provider: Dr. Armin ANDRES, ED  Complicating factors:  Care impacted by chronic illness: N/A  Care affected by social determinants of health: N/A    Disposition considerations: Discharge. No recommendations on prescription strength medication(s). See documentation for any additional details.      ED COURSE:  4:30 I met and introduced myself to the patient. I gathered initial history and performed my physical exam. We discussed plan for initial workup.   4:50 I have staffed the patient with Dr. Funez, ED MD, who has evaluated the patient and agrees with all aspects of today's care.   5:40 PM checked on patient, she is receiving iron infusion and doing  well.  6:34 PM I rechecked the patient and discussed results, discharge, follow up, and reasons to return to the ED.     At the conclusion of the encounter I discussed the results of all the tests and the disposition. The questions were answered. The patient or family acknowledged understanding and was agreeable with the care plan.    FINAL IMPRESSION:    ICD-10-CM    1. Lightheadedness  R42             MEDICATIONS GIVEN IN THE EMERGENCY DEPARTMENT:  Medications   lactated ringers BOLUS 500 mL (0 mLs Intravenous Stopped 8/16/23 0137)   iron sucrose (VENOFER) 200 mg in sodium chloride 0.9 % 120 mL intermittent infusion (0 mg Intravenous Stopped 8/16/23 5942)         NEW PRESCRIPTIONS STARTED AT TODAY'S ED VISIT:  Discharge Medication List as of 8/16/2023  6:42 PM            HPI   Patient information was obtained from: Patient  Use of Intrepreter: N/A     Nancy Oropeza is an 78 year old female with PMH of diverticulosis, CAD, hypothyroidism, and constipation presenting to the ED for low hemoglobin after being sent by her PCP. She reports lightheadedness for the past several months, particularly upon immediately standing. She saw her PCP on 8/11 who found her to have a normochromic normocytic anemia with a hemoglobin of 7.9 and hematocrit 25.6.  He stopped her Xarelto, ASA, and amlodipine at this time due to concern for acute bleed. She then saw her PCP again today for a follow up where her hemoglobin was still 7.9 and hematocrit 25.1. He also documented orthostatic hypotension with a BP of 108/56 sitting, dropping to 84/46 after standing for 1 minute.  She reports occasional dark stools over the past few months that she feels are associated to consumption of chocolate as well as on and off bouts of constipation over the past year. She denies any abdominal pain, shell blood in the stool, nausea, or blood in the urine. No headaches, chest pain, or shortness of breath.      Medical History     Past Medical History:    Diagnosis Date    2019 novel coronavirus disease (COVID-19) 07/16/2020    Acute bilateral knee pain 07/18/2017    TEOFILO (acute kidney injury) (H) 07/16/2020    Anemia, unspecified     Angioedema 12/17/2015    Anticoagulant long-term use 03/06/2017    Back pain     Chronic pain syndrome 06/01/2018    Coronary artery disease     COVID-19 virus infection 07/17/2020    Depression     Diarrhea 07/16/2020    Disease of thyroid gland     Edema     Essential hypertension with goal blood pressure less than 140/90     History of pulmonary embolism 01/01/2015    HTN (hypertension)     Hypercholesteremia     Hyperlipidemia     Hypokalemia 12/17/2015    Hypothyroidism     Hypoxia 07/16/2020    Impaired fasting glucose     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    Major depressive disorder, recurrent episode (H)     Mood disorder (H) 07/16/2020    Morbid obesity (H)     Multiple lung nodules on CT 12/17/2015    Obstructive sleep apnea (adult) (pediatric)     Osteoarthrosis involving lower leg     Pain in joint, multiple sites     Polymyalgia rheumatica (H) 07/16/2020    Postoperative anemia due to acute blood loss 12/18/2018    Pulmonary emboli (H) 12/14/2015    Pulmonary embolism (H) 12/21/2015    Pure hypercholesterolemia     Sepsis (H) 07/28/2020    SIRS (systemic inflammatory response syndrome) (H) 07/28/2020    Unspecified cataract     Yeast infection        Past Surgical History:   Procedure Laterality Date    arthroplasty for hammertoe-2nd toe R 2000 Biebl[      BIOPSY BREAST Left 1970s    Bunion correction by Cheilectomy-had bunionectomy ? type of procedure L and R separate procedures.[      GYN SURGERY      HERNIA REPAIR      Hernia Repair Inguinal unilateral[      HYSTERECTOMY      HYSTERECTOMY  2010    IR ABSCESS TUBE CHANGE  11/9/2012    IR ABSCESS TUBE CHANGE  11/12/2012    IR ABSCESS TUBE CHANGE  12/4/2012    IR ABSCESS TUBE CHANGE  2/22/2013    IR ABSCESS TUBE CHANGE  3/1/2013    IR ABSCESS TUBE CHANGE  3/13/2013    OOPHORECTOMY   2010    ORTHOPEDIC SURGERY         Family History   Problem Relation Age of Onset    Chronic Obstructive Pulmonary Disease Mother     Heart Disease Mother     Coronary Artery Disease Father 62        fatal MI    Stomach Cancer Paternal Grandfather     Cardiac Sudden Death Brother 67    Breast Cancer Sister 75    Coronary Artery Disease Sister     Lung Cancer Sister        Social History     Tobacco Use    Smoking status: Former     Packs/day: 1.00     Types: Cigarettes     Quit date:      Years since quittin.6    Smokeless tobacco: Never    Tobacco comments:     quite 20 yrs ago   Substance Use Topics    Alcohol use: No     Comment: Alcoholic Drinks/day: occasionally    Drug use: Never       acetaminophen 500 MG CAPS  BD ULTRA-FINE 33 LANCETS  Cholecalciferol (VITAMIN D) 400 UNITS capsule  citalopram (CELEXA) 40 MG tablet  furosemide (LASIX) 20 MG tablet  Glucose Blood (ONE TOUCH ULTRA TEST) strip  levothyroxine (SYNTHROID/LEVOTHROID) 125 MCG tablet  lisinopril-hydrochlorothiazide (ZESTORETIC) 20-12.5 MG tablet  pravastatin (PRAVACHOL) 80 MG tablet  predniSONE (DELTASONE) 5 MG tablet  rivaroxaban ANTICOAGULANT (XARELTO) 10 MG TABS tablet  traMADol (ULTRAM) 50 MG tablet          Physical Exam     First Vitals:  Patient Vitals for the past 24 hrs:   BP Temp Temp src Pulse Resp SpO2 Weight   23 1845 117/58 -- -- -- -- 94 % --   23 1745 127/60 -- -- 66 -- 93 % --   23 1700 114/66 -- -- 79 -- 97 % --   23 1522 124/60 98.4  F (36.9  C) Oral 94 18 95 % 120.2 kg (265 lb)         PHYSICAL EXAM:   Physical Exam  Exam conducted with a chaperone present.   Constitutional:       General: She is not in acute distress.     Appearance: Normal appearance.   Cardiovascular:      Rate and Rhythm: Normal rate and regular rhythm.      Pulses: Normal pulses.      Heart sounds: Normal heart sounds.   Pulmonary:      Effort: Pulmonary effort is normal.      Breath sounds: Normal breath sounds.   Abdominal:       General: Abdomen is flat. There is no distension.      Palpations: Abdomen is soft.      Tenderness: There is no abdominal tenderness.   Genitourinary:     Comments: No evidence of hemorrhoid or fissure externally. No tenderness with rectal exam. No evidence of internal hemorrhoid.  Skin:     General: Skin is warm and dry.      Capillary Refill: Capillary refill takes less than 2 seconds.      Findings: Bruising present.      Comments: Scattered bruising present throughout the body   Neurological:      General: No focal deficit present.      Mental Status: She is alert.             Results     LAB:  All pertinent labs reviewed and interpreted  Labs Ordered and Resulted from Time of ED Arrival to Time of ED Departure   COMPREHENSIVE METABOLIC PANEL - Abnormal       Result Value    Sodium 139      Potassium 3.9      Chloride 99      Carbon Dioxide (CO2) 30 (*)     Anion Gap 10      Urea Nitrogen 29.6 (*)     Creatinine 1.59 (*)     Calcium 11.0 (*)     Glucose 108 (*)     Alkaline Phosphatase 82      AST 22      ALT 9      Protein Total 7.0      Albumin 4.2      Bilirubin Total 0.4      GFR Estimate 32 (*)    CBC WITH PLATELETS AND DIFFERENTIAL - Abnormal    WBC Count 12.1 (*)     RBC Count 2.98 (*)     Hemoglobin 7.8 (*)     Hematocrit 26.5 (*)     MCV 89      MCH 26.2 (*)     MCHC 29.4 (*)     RDW 14.4      Platelet Count 450      % Neutrophils 69      % Lymphocytes 23      % Monocytes 6      % Eosinophils 1      % Basophils 0      % Immature Granulocytes 1      NRBCs per 100 WBC 0      Absolute Neutrophils 8.4 (*)     Absolute Lymphocytes 2.8      Absolute Monocytes 0.7      Absolute Eosinophils 0.1      Absolute Basophils 0.1      Absolute Immature Granulocytes 0.1      Absolute NRBCs 0.0     ROUTINE UA WITH MICROSCOPIC REFLEX TO CULTURE - Abnormal    Color Urine Light Yellow      Appearance Urine Turbid (*)     Glucose Urine Negative      Bilirubin Urine Negative      Ketones Urine Negative      Specific  Gravity Urine 1.013      Blood Urine Negative      pH Urine 5.5      Protein Albumin Urine Negative      Urobilinogen Urine <2.0      Nitrite Urine Negative      Leukocyte Esterase Urine Negative      Bacteria Urine Few (*)     Mucus Urine Present (*)     RBC Urine 6 (*)     WBC Urine 0      Squamous Epithelials Urine 1      Hyaline Casts Urine 24 (*)    INR - Normal    INR 1.04     OCCULT BLOOD STOOL - Normal    Occult Blood Negative     TYPE AND SCREEN, ADULT    ABO/RH(D) O POS      Antibody Screen Negative      SPECIMEN EXPIRATION DATE [id number]     ABO/RH TYPE AND SCREEN       RADIOLOGY:  No orders to display       Shelby Birmingham PA-C   Emergency Medicine   [facility] EMERGENCY DEPARTMENT       Shelby Birmingham PA-C  08/17/23 0038

## 2023-08-16 NOTE — ED TRIAGE NOTES
The pt presents for evaluation of abnormal labs. She had blood drawn on Friday and her HGB was 7.9. She had labs rechecked today, and her HGB is still 7.9. She reports some dizziness/light headedness, generalized fatigue. She denies SOB or dark stools or obvious bleeding.

## 2023-08-16 NOTE — ED NOTES
Pt alert and pleasant, reports she feels lightheadedness when standing, states she is fine when she is sitting down. Denies dizziness and vision changes.

## 2023-08-17 NOTE — TELEPHONE ENCOUNTER
"Pt calls. Pt informs she took her INR with home machine and it is 1.0 on 8/17/23. Pt states \"that is too low\"    Reviewed pt's labs obtained at ER visit yesterday (see below)              Component Ref Range & Units 1 d ago  (8/16/23) 1 yr ago  (1/8/22) 2 yr ago  (7/20/21) 2 yr ago  (7/9/21) 2 yr ago  (7/5/21) 2 yr ago  (7/2/21) 2 yr ago  (7/1/21)     INR 0.85 - 1.15 1.04 2.50 High  1.8 R 2.60 High  R 3.20 Abnormal  R 4.80 Abnormal  R 5.70 Abnormal  R   Resulting Agency  [facility]          Informed pt that her INR was taken at ER visit yesterday and INR was 1.04 which fell within reference range.    Pt informs she is still holding her Xarelto and aspirin as advised at OV on 8/11/23 with Dr. Garnica due to critically low Hgb (see relevant visit notes below)    Pulmonary embolism, unspecified chronicity, unspecified pulmonary embolism type, unspecified whether acute cor pulmonale present (H)  Prior use of warfarin following PE historically.  No cough or shortness of breath.  No hemoptysis.  Had switch to Xarelto 10 mg daily 2 months ago in order to eliminate need for monitoring.  This was held since last Friday after noting hemoglobin 7.9.    Pt wonders when she can resume anticoagulant. Pt also expresses preference to go back to Warfarin instead of resuming Xarelto.    Pt requests a call back with provider recommendations.  Routing to PCP to review and advise.    Madina JIANG RN    "

## 2023-08-17 NOTE — TELEPHONE ENCOUNTER
Please notify patient to remain on Xarelto currently until her hemoglobin improved significantly and we can ensure that she does not have any gastrointestinal bleeding etc.  Her INR should be around 1.0 when she is not taking warfarin which she had stopped 2 months ago.  She may resume warfarin in the future once it is safe to do so.

## 2023-08-17 NOTE — TELEPHONE ENCOUNTER
Informed pt of info and she verbalized understanding. Then she asked about her endoscopy and colonoscopy. Informed per order placed as well and provided her the phone number to call to scheduled procedure with [facility].    GERTRUDE ParrishN, RN  [facility]

## 2023-08-18 NOTE — TELEPHONE ENCOUNTER
Reason for call:  Other     Patient called regarding (reason for call): call back    Additional comments: Patient calling and wanting to talk with either Haritha Alvarado or Dr. Garnica about get some test done and  she wants to know if she can have it done before the 09/07/2023. Please advise and call patient back with updates please and thank you.    Phone number to reach patient:  Cell number on file:    Telephone Information:   Mobile [phone]       Best Time:  any    Can we leave a detailed message on this number?  YES

## 2023-08-22 NOTE — TELEPHONE ENCOUNTER
Please notify patient to schedule a lab only visit in order to recheck her hemoglobin this week.  We will then determine follow-up recommendation next week since Haritha is out of office this week.

## 2023-08-22 NOTE — TELEPHONE ENCOUNTER
Patient has apt scheduled for October, wanting to get in sooner, wondering if we can call and see if we can get her a apt in September.

## 2023-08-23 NOTE — TELEPHONE ENCOUNTER
Patient returned call and I relayed message from provider and got patient scheduled for a lab only visit on 08/24/2023

## 2023-08-25 NOTE — TELEPHONE ENCOUNTER
"[facility] referral placed on 8/17/23 for upper endoscopy and colonoscopy with \"Routine\" for Priority. Routing to PCP to review and advise if Priority should be changed or okay to wait until October as stated by pt.    Justin Vaughn, GERTRUDEN, RN  [facility]        "

## 2023-08-28 NOTE — TELEPHONE ENCOUNTER
"Believed pt is referring to her hgb level of 8.0, which was previously at 7.8 and 7.9 on 8/16/23.    From Telephone encounter on 8/18/23, Dr Garnica stated on 8/22/23:  \"Please notify patient to schedule a lab only visit in order to recheck her hemoglobin this week.  We will then determine follow-up recommendation next week since Haritha is out of office this week.\"    GERTRUDE ParrishN, RN  [facility]        "

## 2023-09-19 NOTE — TELEPHONE ENCOUNTER
Alisha from [facility] calling requesting a 2 day hold on the Xarelto. Patient having upper endoscopy and colonoscopy 10-16-23. If only a 1 day hold is advised a creatinine will need to be done. Ok to leave detailed message for Alisha @ [phone]

## 2023-09-26 NOTE — TELEPHONE ENCOUNTER
Reason for call:  Other     Patient called regarding (reason for call): orders    Additional comments: Hood is calling from [facility] and stating that he has not heard anything about bridging holding orders for this patient. Please and call Hood back with updates please and thank you.    Phone number to reach patient:  Other phone number:  [phone]    Best Time:  any

## 2023-10-02 NOTE — TELEPHONE ENCOUNTER
Reason for Call: Request for an order or referral:    Order or referral being requested: Hold and bridge orders.    Date needed: as soon as possible    Additional comments: received a call from [facility] requesting 2 day hold and bridge orders for an EGD on 10/16 for patients xarelto. If unable to hold then creatine level is needed.     Phone number [facility] RN LINE can be reached at:  Other phone number:  [phone]    Best Time:  anytime    Can we leave a detailed message on this number?  YES    Call taken on 10/2/2023 at 3:51 PM by Etelivna Dozier

## 2023-10-02 NOTE — TELEPHONE ENCOUNTER
Could you please review and make recommendation for requested hold and bridge order.  Received a call from [facility] requesting 2 day hold and bridge orders for an EGD on 10/16 for patients xarelto. If unable to hold then creatine level is needed.    Julien Garnica MD

## 2023-10-04 NOTE — TELEPHONE ENCOUNTER
Left detailed message for [facility] Nurse Line that per Dr Garnica, OK for 2 day hold of xarelto without bridging order. To call back if they have any questions or needing clarifcaitons.    GERTRUDE ParrishN, RN  [facility]

## 2023-10-04 NOTE — TELEPHONE ENCOUNTER
TAVIA-PROCEDURAL ANTICOAGULATION  MANAGEMENT    SUBJECTIVE/OBJECTIVE     Nancy Oropeza, a 78 year old female on Rivaroxaban (Xarelto), with planned EGD with Colonoscopy on 10/16/23.    Indication for Anticoagulation: PE (12/13/15)  November 26, 2018. Hypercoagulable workup negative (Factor V Leiden, Factor II Prothrombin and Lupus anticoagulant and Cardiolipin antibody)    Wt Readings from Last 2 Encounters:   08/16/23 120.2 kg (265 lb)   08/16/23 120.2 kg (265 lb)      Lab Results   Component Value Date    CR 1.59 (H) 08/16/2023    CR 1.57 (H) 08/16/2023     Estimated Creatinine Clearance: 37.7 mL/min (A) (based on SCr of 1.59 mg/dL (H)).    ASSESSMENT/RECOMMENDATION     DOAC hold duration is bleeding risk of the procedure, renal function of the patient.     Tavia-Procedure Risk stratification for thromboembolism: low 2022 Chest Perioperative Management of Antithrombotic Therapy     2022 Chest Perioperative Management of Antithrombotic Therapy guidelines suggest against perioperative bridging in DOAC patients; considering their rapid offset and rapid onset of action.    Pre-Procedure:  Hold rivaroxaban (Xarelto) 48 hours prior to procedure for CrCl >= 30 ml/min for potential high bleeding risk procedure. Take last dose on 10/13/23 AM  No Bridge    Post-Procedure:  Resume therapeutic Rivaroxaban 10 mg Daily if okay with provider ~ 24 hours post procedure     Plan routed to referring provider for approval  ?   Bentley Miramontes

## 2023-10-06 NOTE — H&P (VIEW-ONLY)
Assessment/Plan:    Iron deficiency anemia, unspecified iron deficiency anemia type  Reviewed recent anemia with hemoglobin decreased to 7.9.  Most recently 8.0 with MCV 88.  Ferritin low end of normal with iron studies suggesting iron deficiency currently using iron supplement as described with upcoming upper and lower endoscopy October 16, 2023.  - CBC with Platelets & Differential  - Iron & Iron Binding Capacity  - Ferritin    Normochromic normocytic anemia  History normochromic normocytic anemia as noted with evidence of iron deficiency on prior lab testing August 2023 and will ensure stable or improved anemia while awaiting upcoming upper and lower endoscopy October 16, 2023.  - Basic metabolic panel    Essential hypertension with goal blood pressure less than 140/90  Hypertension appears stable.  Prior orthostatic hypotension while on amlodipine 5 mg daily which was discontinued August 11, 2023.  Utilizing lisinopril hydrochlorothiazide 20/12.5 taking 2 tablets daily.    Stage 3b chronic kidney disease (H)  Kd stage IIIb and will ensure stable renal function.  Microalbumin screen updated.  - Albumin Random Urine Quantitative with Creat Ratio  - Basic metabolic panel    Chronic right shoulder pain  Refill provided with tramadol.  Urine drug confirmation panel to be completed.  - traMADol (ULTRAM) 50 MG tablet  Dispense: 120 tablet; Refill: 2    Chronic pain disorder  PDMP website reviewed.  Urine drug screen confirmation panel to be completed.  - [id number] - Urine Drug Confirmation Panel (Comprehensive)    Pure hypercholesterolemia  We will update lipid cascade currently.  Continues pravastatin 80 mg at bedtime.  - Lipid panel reflex to direct LDL Fasting    Encounter for immunization  High-dose flu shot provided today.  Will receive COVID vaccination once available.  - INFLUENZA VACCINE 65+ (FLUZONE HD)               Subjective:    Nancy Oropeza is seen today for follow-up assessment.  Noted acute on  chronic anemia at prior office visit 2023 with hemoglobin 7.9.  Subsequently seen through [facility] emergency department.  Had discontinued Xarelto which she had been utilizing for pulmonary embolism history of requiring prophylaxis.  Stools are dark.  Using iron supplement.  No blood or mucus.  No hematemesis.  Most recent creatinine 1.59 with GFR 32.  Did discontinue amlodipine 5 mg daily due to prior orthostatic hypotension. Had continue lisinopril hydrochlorothiazide 20/12.5 using 2 tablets daily. Prior history of pulmonary embolus in the past. Had been on warfarin for 8 years then switched a couple months ago to Xarelto 10 mg daily in order to avoid monitoring requirements etc. Had noted decrease of normal hemoglobin down to 9.5 with MCV 87 with known history of polymyalgia rheumatica questions regarding anemia of chronic disease while continuing prednisone 5 mg daily for PMR management. Comprehensive review of systems as above otherwise all negative.          5 children (Airam, Nancy, Lakeisha, Akash, Thee) - Akash had coronary stent while Thee with Factor V abnormality and rectal cancer   14 grandchildren   16 great-grandchildren   Grew up in [place], [place] til age 12...   No smoke (quit 16 years ago after 1 ppd x 30+ years)   EtOH: occ wine   Mom -  88 COPD   Dad -  61 massive MI   1 bro -  67 blood clot (lung)   2 sis - one of which is  age 62 small cell lung CA (h/o smoker)   Surgeries: ventral hernia repair with muscle flap 10/4/12 with post-op complication of seroma with J.P. drain in place followed by interventional radiology q 2 weeks);  age 25; groin hernia age 3; CAPRICE-BSO  by Dr. Herbert Carmen (due to benign cyst x 2); right TKA 18 (Dr. Small)   Hospitalizations: as above   Work: retired  ([employer] as )     12/18/15 FYI: Patient was admitted for dyspnea, secondary to bilateral multiple pulmonary emboli.  She overall did well and was discharged home on Lovenox, relatively high dose given her weight as well as Warfarin. I would like her to be seen today at your clinic. I believe she has an appointment for INR, CBC, and BMP check as well as re-dosing of her Warfarin. I suspect she will need an additional day of Lovenox as she is not quite therapeutic today. You can refer to my discharge summary for the INR's and the Warfarin dosing. Thank you. Dr. Garza       Past Surgical History:   Procedure Laterality Date    arthroplasty for hammertoe-2nd toe R 2000 [facility][      BIOPSY BREAST Left 1970s    Bunion correction by Cheilectomy-had bunionectomy ? type of procedure L and R separate procedures.[      GYN SURGERY      HERNIA REPAIR      Hernia Repair Inguinal unilateral[      HYSTERECTOMY      HYSTERECTOMY  2010    IR ABSCESS TUBE CHANGE  11/9/2012    IR ABSCESS TUBE CHANGE  11/12/2012    IR ABSCESS TUBE CHANGE  12/4/2012    IR ABSCESS TUBE CHANGE  2/22/2013    IR ABSCESS TUBE CHANGE  3/1/2013    IR ABSCESS TUBE CHANGE  3/13/2013    OOPHORECTOMY  2010    ORTHOPEDIC SURGERY          Family History   Problem Relation Age of Onset    Chronic Obstructive Pulmonary Disease Mother     Heart Disease Mother     Coronary Artery Disease Father 62        fatal MI    Stomach Cancer Paternal Grandfather     Cardiac Sudden Death Brother 67    Breast Cancer Sister 75    Coronary Artery Disease Sister     Lung Cancer Sister         Past Medical History:   Diagnosis Date    2019 novel coronavirus disease (COVID-19) 07/16/2020    Acute bilateral knee pain 07/18/2017    TEOFILO (acute kidney injury) (H24) 07/16/2020    Anemia, unspecified     Created by Conversion     Angioedema 12/17/2015    Anticoagulant long-term use 03/06/2017    Back pain     Created by Conversion     Chronic pain syndrome 06/01/2018    Coronary artery disease     coronary artery calcification    COVID-19 virus infection 07/17/2020    Depression     Diarrhea 07/16/2020     Disease of thyroid gland     Edema     Created by Conversion     Essential hypertension with goal blood pressure less than 140/90     Created by Conversion  Replacement Utility updated for latest IMO load    History of pulmonary embolism 2015    Overview:  bilateral with acute cor pulmonale     HTN (hypertension)     Hypercholesteremia     Hyperlipidemia     Hypokalemia 2015    Hypothyroidism     Created by Conversion  Replacement Utility updated for latest IMO load    Hypoxia 2020    Impaired fasting glucose     Created by Conversion     Left knee DJD 2019    Major depressive disorder, recurrent episode (H24)     Created by Conversion  Replacement Utility updated for latest IMO load    Mood disorder (H24) 2020    Morbid obesity (H)     Multiple lung nodules on CT 2015    Obstructive sleep apnea (adult) (pediatric)     Created by Conversion     Osteoarthrosis involving lower leg     Created by Conversion  Replacement Utility updated for latest IMO load    Pain in joint, multiple sites     Created by Conversion     Polymyalgia rheumatica (H24) 2020    Postoperative anemia due to acute blood loss 2018    Pulmonary emboli (H) 2015    Pulmonary embolism (H) 2015    Pure hypercholesterolemia     Created by Conversion     Sepsis (H) 2020    SIRS (systemic inflammatory response syndrome) (H) 2020    Unspecified cataract     Created by Conversion     Yeast infection         Social History     Tobacco Use    Smoking status: Former     Packs/day: 1.00     Types: Cigarettes     Quit date:      Years since quittin.7    Smokeless tobacco: Never    Tobacco comments:     quite 20 yrs ago   Substance Use Topics    Alcohol use: No     Comment: Alcoholic Drinks/day: occasionally    Drug use: Never        Current Outpatient Medications   Medication Sig Dispense Refill    acetaminophen 500 MG CAPS Take 2 capsules by mouth 3 times daily as needed.      BD  ULTRA-FINE 33 LANCETS Use as directed      Cholecalciferol (VITAMIN D) 400 UNITS capsule Take 2 capsules by mouth daily.      citalopram (CELEXA) 40 MG tablet TAKE 1 TABLET DAILY 90 tablet 3    ferrous sulfate (FE TABS) 325 (65 Fe) MG EC tablet Take 1 tablet (325 mg) by mouth 2 times daily 60 tablet 3    furosemide (LASIX) 20 MG tablet Take 1 tablet (20 mg) by mouth every morning 90 tablet 3    Glucose Blood (ONE TOUCH ULTRA TEST) strip by In Vitro route daily Use as directed      levothyroxine (SYNTHROID/LEVOTHROID) 125 MCG tablet TAKE 1 TABLET DAILY (DECREASED FROM 137 MCG) 90 tablet 0    lisinopril-hydrochlorothiazide (ZESTORETIC) 20-12.5 MG tablet Take 2 tablets by mouth daily 180 tablet 3    pravastatin (PRAVACHOL) 80 MG tablet TAKE 1 TABLET AT BEDTIME 90 tablet 3    predniSONE (DELTASONE) 5 MG tablet TAKE 1 TABLET DAILY 90 tablet 3    rivaroxaban ANTICOAGULANT (XARELTO) 10 MG TABS tablet Take 1 tablet (10 mg) by mouth daily (with dinner) 30 tablet 11    traMADol (ULTRAM) 50 MG tablet Take 1 tablet (50 mg) by mouth every 6 hours as needed for severe pain 120 tablet 2          Objective:    Vitals:    10/06/23 1044 10/06/23 1049   BP: (!) 146/72 136/70   BP Location: Left arm Left arm   Patient Position: Sitting Sitting   Cuff Size: Adult Large Adult Large   Pulse: 89    Resp: 18    Temp: (!) 96.3  F (35.7  C)    TempSrc: Temporal    SpO2: 96%    Weight: 122.3 kg (269 lb 11.2 oz)       Body mass index is 42.58 kg/m .    Alert.  No apparent distress.  Transfers independently.  Chest clear.  Cardiac exam regular.  Extremities warm and dry.  No evidence for significant pallor.      This note has been dictated using voice recognition software and as a result may contain minor grammatical errors and unintended word substitutions.

## 2023-10-06 NOTE — PROGRESS NOTES
Assessment/Plan:    Iron deficiency anemia, unspecified iron deficiency anemia type  Reviewed recent anemia with hemoglobin decreased to 7.9.  Most recently 8.0 with MCV 88.  Ferritin low end of normal with iron studies suggesting iron deficiency currently using iron supplement as described with upcoming upper and lower endoscopy October 16, 2023.  - CBC with Platelets & Differential  - Iron & Iron Binding Capacity  - Ferritin    Normochromic normocytic anemia  History normochromic normocytic anemia as noted with evidence of iron deficiency on prior lab testing August 2023 and will ensure stable or improved anemia while awaiting upcoming upper and lower endoscopy October 16, 2023.  - Basic metabolic panel    Essential hypertension with goal blood pressure less than 140/90  Hypertension appears stable.  Prior orthostatic hypotension while on amlodipine 5 mg daily which was discontinued August 11, 2023.  Utilizing lisinopril hydrochlorothiazide 20/12.5 taking 2 tablets daily.    Stage 3b chronic kidney disease (H)  Kd stage IIIb and will ensure stable renal function.  Microalbumin screen updated.  - Albumin Random Urine Quantitative with Creat Ratio  - Basic metabolic panel    Chronic right shoulder pain  Refill provided with tramadol.  Urine drug confirmation panel to be completed.  - traMADol (ULTRAM) 50 MG tablet  Dispense: 120 tablet; Refill: 2    Chronic pain disorder  PDMP website reviewed.  Urine drug screen confirmation panel to be completed.  - [id number] - Urine Drug Confirmation Panel (Comprehensive)    Pure hypercholesterolemia  We will update lipid cascade currently.  Continues pravastatin 80 mg at bedtime.  - Lipid panel reflex to direct LDL Fasting    Encounter for immunization  High-dose flu shot provided today.  Will receive COVID vaccination once available.  - INFLUENZA VACCINE 65+ (FLUZONE HD)               Subjective:    Nancy Oropeza is seen today for follow-up assessment.  Noted acute on  chronic anemia at prior office visit 2023 with hemoglobin 7.9.  Subsequently seen through [facility] emergency department.  Had discontinued Xarelto which she had been utilizing for pulmonary embolism history of requiring prophylaxis.  Stools are dark.  Using iron supplement.  No blood or mucus.  No hematemesis.  Most recent creatinine 1.59 with GFR 32.  Did discontinue amlodipine 5 mg daily due to prior orthostatic hypotension. Had continue lisinopril hydrochlorothiazide 20/12.5 using 2 tablets daily. Prior history of pulmonary embolus in the past. Had been on warfarin for 8 years then switched a couple months ago to Xarelto 10 mg daily in order to avoid monitoring requirements etc. Had noted decrease of normal hemoglobin down to 9.5 with MCV 87 with known history of polymyalgia rheumatica questions regarding anemia of chronic disease while continuing prednisone 5 mg daily for PMR management. Comprehensive review of systems as above otherwise all negative.          5 children (Airam, Nancy, Lakeisha, Akash, Thee) - Akash had coronary stent while Thee with Factor V abnormality and rectal cancer   14 grandchildren   16 great-grandchildren   Grew up in [place], [place] til age 12...   No smoke (quit 16 years ago after 1 ppd x 30+ years)   EtOH: occ wine   Mom -  88 COPD   Dad -  61 massive MI   1 bro -  67 blood clot (lung)   2 sis - one of which is  age 62 small cell lung CA (h/o smoker)   Surgeries: ventral hernia repair with muscle flap 10/4/12 with post-op complication of seroma with J.P. drain in place followed by interventional radiology q 2 weeks);  age 25; groin hernia age 3; CAPRICE-BSO  by Dr. Herbert Carmen (due to benign cyst x 2); right TKA 18 (Dr. Small)   Hospitalizations: as above   Work: retired  ([employer] as )     12/18/15 FYI: Patient was admitted for dyspnea, secondary to bilateral multiple pulmonary emboli.  She overall did well and was discharged home on Lovenox, relatively high dose given her weight as well as Warfarin. I would like her to be seen today at your clinic. I believe she has an appointment for INR, CBC, and BMP check as well as re-dosing of her Warfarin. I suspect she will need an additional day of Lovenox as she is not quite therapeutic today. You can refer to my discharge summary for the INR's and the Warfarin dosing. Thank you. Dr. Garza       Past Surgical History:   Procedure Laterality Date    arthroplasty for hammertoe-2nd toe R 2000 [facility][      BIOPSY BREAST Left 1970s    Bunion correction by Cheilectomy-had bunionectomy ? type of procedure L and R separate procedures.[      GYN SURGERY      HERNIA REPAIR      Hernia Repair Inguinal unilateral[      HYSTERECTOMY      HYSTERECTOMY  2010    IR ABSCESS TUBE CHANGE  11/9/2012    IR ABSCESS TUBE CHANGE  11/12/2012    IR ABSCESS TUBE CHANGE  12/4/2012    IR ABSCESS TUBE CHANGE  2/22/2013    IR ABSCESS TUBE CHANGE  3/1/2013    IR ABSCESS TUBE CHANGE  3/13/2013    OOPHORECTOMY  2010    ORTHOPEDIC SURGERY          Family History   Problem Relation Age of Onset    Chronic Obstructive Pulmonary Disease Mother     Heart Disease Mother     Coronary Artery Disease Father 62        fatal MI    Stomach Cancer Paternal Grandfather     Cardiac Sudden Death Brother 67    Breast Cancer Sister 75    Coronary Artery Disease Sister     Lung Cancer Sister         Past Medical History:   Diagnosis Date    2019 novel coronavirus disease (COVID-19) 07/16/2020    Acute bilateral knee pain 07/18/2017    TEOFILO (acute kidney injury) (H24) 07/16/2020    Anemia, unspecified     Created by Conversion     Angioedema 12/17/2015    Anticoagulant long-term use 03/06/2017    Back pain     Created by Conversion     Chronic pain syndrome 06/01/2018    Coronary artery disease     coronary artery calcification    COVID-19 virus infection 07/17/2020    Depression     Diarrhea 07/16/2020     Disease of thyroid gland     Edema     Created by Conversion     Essential hypertension with goal blood pressure less than 140/90     Created by Conversion  Replacement Utility updated for latest IMO load    History of pulmonary embolism 2015    Overview:  bilateral with acute cor pulmonale     HTN (hypertension)     Hypercholesteremia     Hyperlipidemia     Hypokalemia 2015    Hypothyroidism     Created by Conversion  Replacement Utility updated for latest IMO load    Hypoxia 2020    Impaired fasting glucose     Created by Conversion     Left knee DJD 2019    Major depressive disorder, recurrent episode (H24)     Created by Conversion  Replacement Utility updated for latest IMO load    Mood disorder (H24) 2020    Morbid obesity (H)     Multiple lung nodules on CT 2015    Obstructive sleep apnea (adult) (pediatric)     Created by Conversion     Osteoarthrosis involving lower leg     Created by Conversion  Replacement Utility updated for latest IMO load    Pain in joint, multiple sites     Created by Conversion     Polymyalgia rheumatica (H24) 2020    Postoperative anemia due to acute blood loss 2018    Pulmonary emboli (H) 2015    Pulmonary embolism (H) 2015    Pure hypercholesterolemia     Created by Conversion     Sepsis (H) 2020    SIRS (systemic inflammatory response syndrome) (H) 2020    Unspecified cataract     Created by Conversion     Yeast infection         Social History     Tobacco Use    Smoking status: Former     Packs/day: 1.00     Types: Cigarettes     Quit date:      Years since quittin.7    Smokeless tobacco: Never    Tobacco comments:     quite 20 yrs ago   Substance Use Topics    Alcohol use: No     Comment: Alcoholic Drinks/day: occasionally    Drug use: Never        Current Outpatient Medications   Medication Sig Dispense Refill    acetaminophen 500 MG CAPS Take 2 capsules by mouth 3 times daily as needed.      BD  ULTRA-FINE 33 LANCETS Use as directed      Cholecalciferol (VITAMIN D) 400 UNITS capsule Take 2 capsules by mouth daily.      citalopram (CELEXA) 40 MG tablet TAKE 1 TABLET DAILY 90 tablet 3    ferrous sulfate (FE TABS) 325 (65 Fe) MG EC tablet Take 1 tablet (325 mg) by mouth 2 times daily 60 tablet 3    furosemide (LASIX) 20 MG tablet Take 1 tablet (20 mg) by mouth every morning 90 tablet 3    Glucose Blood (ONE TOUCH ULTRA TEST) strip by In Vitro route daily Use as directed      levothyroxine (SYNTHROID/LEVOTHROID) 125 MCG tablet TAKE 1 TABLET DAILY (DECREASED FROM 137 MCG) 90 tablet 0    lisinopril-hydrochlorothiazide (ZESTORETIC) 20-12.5 MG tablet Take 2 tablets by mouth daily 180 tablet 3    pravastatin (PRAVACHOL) 80 MG tablet TAKE 1 TABLET AT BEDTIME 90 tablet 3    predniSONE (DELTASONE) 5 MG tablet TAKE 1 TABLET DAILY 90 tablet 3    rivaroxaban ANTICOAGULANT (XARELTO) 10 MG TABS tablet Take 1 tablet (10 mg) by mouth daily (with dinner) 30 tablet 11    traMADol (ULTRAM) 50 MG tablet Take 1 tablet (50 mg) by mouth every 6 hours as needed for severe pain 120 tablet 2          Objective:    Vitals:    10/06/23 1044 10/06/23 1049   BP: (!) 146/72 136/70   BP Location: Left arm Left arm   Patient Position: Sitting Sitting   Cuff Size: Adult Large Adult Large   Pulse: 89    Resp: 18    Temp: (!) 96.3  F (35.7  C)    TempSrc: Temporal    SpO2: 96%    Weight: 122.3 kg (269 lb 11.2 oz)       Body mass index is 42.58 kg/m .    Alert.  No apparent distress.  Transfers independently.  Chest clear.  Cardiac exam regular.  Extremities warm and dry.  No evidence for significant pallor.      This note has been dictated using voice recognition software and as a result may contain minor grammatical errors and unintended word substitutions.

## 2023-10-12 NOTE — TELEPHONE ENCOUNTER
Patient has esophagogastroduodenoscopy on Monday, 10/16 and was told by Gastro team to connect with PCP to determine if she should hold Xarelto prior to procedure and for how long.     Routed to PCP to review and advise.     Alona Keane RN  [facility]

## 2023-10-13 NOTE — TELEPHONE ENCOUNTER
TAVIA-PROCEDURAL ANTICOAGULATION  MANAGEMENT    SUBJECTIVE/OBJECTIVE     Nancy Oropeza, a 78 year old female on Rivaroxaban (Xarelto), with planned EGD on 10/16/23.    See recent 10/4/23 encounter: Recommendations for 2 day hold reviewed and approved by Dr. Garnica and called to [facility].    Agree with previously outlined plan:    Pre-Procedure:  Hold rivaroxaban (Xarelto) 48 hours prior to procedure. Take last dose on 10/13/23  No Bridge     Post-Procedure:  Resume therapeutic Rivaroxaban 10 mg daily if okay with provider ~ 24 hours post procedure     Niya Stewart, [facility]

## 2023-10-16 NOTE — INTERVAL H&P NOTE
"I have reviewed the surgical (or preoperative) H&P that is linked to this encounter, and examined the patient. There are no significant changes    Clinical Conditions Present on Arrival:  Clinically Significant Risk Factors Present on Admission          # Hypercalcemia: Highest Ca = 10.8 mg/dL in last 30 days, will monitor as appropriate       # Drug Induced Coagulation Defect: home medication list includes an anticoagulant medication   # Severe Obesity: Estimated body mass index is 41.04 kg/m  as calculated from the following:    Height as of this encounter: 1.702 m (5' 7\").    Weight as of this encounter: 118.8 kg (262 lb).       "

## 2023-10-16 NOTE — TELEPHONE ENCOUNTER
Writer called the patient to verify that she has been holding her Xarelto prior to the esophagogastroduodenoscopy on 10/16/23 at [facility]     Patient states she has been holding her Xarelto for 3 days prior to her procedure today, 10/16/23, Monday.     At [facility] right now for her esophagogastroduodenoscopy     Also reports some left leg sciatica pain that has been ongoing for some time    Patient states that she will go to the [facility] today, 10/16/23, after she is done with her esophagogastroduodenoscopy to have her left leg pain evaluated.    Denies other questions or concerns at this time    Kavitha Funez RN, BSN  [facility]

## 2023-10-16 NOTE — PATIENT INSTRUCTIONS
You have a lot of degenerative changes in your spine including bone-on-bone vertebrae L2-L3.  No broken bones.    Recommend Tylenol 1000 mg 3-4 times daily.    You can use lidocaine patches to your lower back.  This will help numb up the area.    Take short walks frequently throughout the day.    Recommend seeing the spine clinic as soon as possible.  You can call when you get home for an appointment.     Watch for worsening numbness, tingling, weakness in your legs or feet.  Back here or go to ER if this happens.

## 2023-10-16 NOTE — PROGRESS NOTES
Assessment & Plan     Acute left-sided low back pain with left-sided sciatica    - XR Lumbar Spine 2/3 Views  - Spine  Referral  - Lidocaine (LIDOCARE) 4 % Patch  Dispense: 15 patch; Refill: 0     Patient with about 8 days of achy pain in the left lower back rating down the left lateral thigh.  No weakness in the leg.  Had an EGD done today.  Would like to avoid NSAIDs.  Is already on chronic prednisone due to polymyalgia rheumatica and chronic blood thinners for PE history.    Recommend increasing Tylenol to 1000 mg 3-4 times daily, lidocaine patches, see the spine clinic for possible injections.  X-ray does show multiple areas of degeneration incl retrolisthesis and anterolisthesis.  Did have some midline spinal tenderness related to this.  No fracture.    Red flags for immediate return discussed.  28 minutes spent by me on the date of the encounter doing chart review, review of test results, interpretation of tests, patient visit, and documentation         Return in about 10 days (around 10/26/2023).    [facility] Rai [facility]    Linda Curz is a 78 year old female who presents to clinic today for the following health issues:  Chief Complaint   Patient presents with    Pain     Possible sciatica nerve since the 8th, Lt side lower back and pain goes down front of leg, throbbing pain, has been doing tylenol, ice and heat pad, pain with walking     HPI    Achy pain in left lower back radiating down left lateral thigh.  No weakness, just pain.      Of note, patient had an EGD done today.    Is on daily 5 mg prednisone related to polymyalgia rheumatica, Rivaroxaban for clot.    Taking Tylenol taking 4000 mg daily.  Taking Tramadol for arm pain, chronic x 3 days - doesn't help.      Can't sleep well due to pain.  Getting 2-3 hours of sleep.      Trying heat/cold packs.     Has had spinal injections - last about 3 years ago.            Review of Systems    Denies  numbness, tingling, loss of bowel or bladder control.    Denies recent falls.  Reports history of known spinal arthritis.          Objective    /82   Pulse 98   Temp 98  F (36.7  C) (Oral)   Resp 16   LMP  (LMP Unknown)   SpO2 95%   Physical Exam  Constitutional:       Appearance: Normal appearance.   Cardiovascular:      Pulses: Normal pulses.   Musculoskeletal:         General: Tenderness (Midline lumbar spine tenderness.  Tender across the entire lower back in general.) present.   Neurological:      Mental Status: She is alert and oriented to person, place, and time.      Motor: No weakness.      Gait: Gait (Moves slowly) normal.      Comments: Strength about equal bilaterally. St leg raise test causes pain in the low back as well as a little radiation down the left leg.   Psychiatric:         Mood and Affect: Mood normal.        Results for orders placed or performed during the hospital encounter of 10/16/23   XR Lumbar Spine 2/3 Views     Status: None    Narrative    EXAM: XR LUMBAR SPINE 2/3 VIEWS  LOCATION: [facility]  DATE: 10/16/2023    INDICATION: Midline spinal tenderness with low back pain.  COMPARISON: None.      Impression    IMPRESSION: Exaggerated lumbar lordosis. Retrolisthesis L2 on L3 measuring 4 mm. Retrolisthesis L3 on L4 measuring 4 mm. Grade 1 anterolisthesis L4 on L5 measuring approximately 9 mm. Vertebral body heights are maintained. Advanced L2-L3 disc   degeneration. Mild to moderate disc degeneration elsewhere. Facet degeneration is advanced at L4-L5 and L5-S1. Normal extraspinal structures. Vascular calcifications are present in the aorta.   Results for orders placed or performed during the hospital encounter of 10/16/23   UPPER GI ENDOSCOPY     Status: None   Result Value Ref Range    Upper GI Endoscopy       [facility]  [address] [place]  [address]  _______________________________________________________________________________  Patient Name: Nancy Oropeza        Procedure Date: 10/16/2023 8:21 AM  MRN: [id number]                       Account Number: [id number]  YOB: 1945              Admit Type: Outpatient  Age: 78                               Room: [place] [address]  Note Status: Finalized                Attending MD: MONICA HERNANDEZ , ,   Instrument Name: EGD Scope 2066         _______________________________________________________________________________     Procedure:             Upper GI endoscopy  Indications:           Iron deficiency anemia  Providers:             MONICA HERNANDEZ  Referring MD:          ARASH OCHOA MD  Medicines:             Fentanyl 75 micrograms IV, Midazolam 5 mg IV  Complications:         No immediate complications.  _______________________________________________________________________________  Procedure:              Pre-Anesthesia Assessment:                         - Prior to the procedure, a History and Physical was                          performed, and patient medications, allergies and                          sensitivities were reviewed. The patient's tolerance                          of previous anesthesia was reviewed.                         - The risks and benefits of the procedure and the                          sedation options and risks were discussed with the                          patient. All questions were answered and informed                          consent was obtained.                         After obtaining informed consent, the endoscope was                          passed under direct vision. Throughout the procedure,                          the patient's blood pressure, pulse, and oxygen                          saturations were monitored continuously. The endoscope                          was introduced through the mouth, and advanced to the                            second part of duodenum. The upper GI endoscopy was                          accomplished without difficulty. The patient tolerated                          the procedure well.                                                                                   Findings:       Esophagogastric landmarks were identified: the Z-line was found at 35 cm        and the upper extent of the gastric folds was found at 35 cm from the        incisors.       A single nodular lesion which appeared to be submucosal with no bleeding        and no stigmata of recent bleeding was found on the greater curvature of        the stomach. Biopsies of the stomach taken for H pylori include this        area.       The examined duodenum was normal. Biopsies were taken with a cold        forceps for histology.                                                                                   Moderate Sedation:       Moderate (conscious) sedation was administered by the nurse and        supervised by  gerardo endoscopist. The following parameters were monitored:        oxygen saturation, heart rate, blood pressure, respiratory rate, EKG,        adequacy of pulmonary ventilation, and response to care.  Impression:            - Esophagogastric landmarks identified.                         - A single subepithelial nodule ~1.5cm found in the                          stomach.                         - Normal examined duodenum. Biopsied.  Recommendation:        - Await pathology results.                         - Proceed to same day colonoscopy. Consider CT scan of                          the abdomen for further evaluation of the                          anemia/further characterization of the submucosal                          gastric nodule seen.                                                                                     Monica Momin MD  ______________  MONICA MOMIN,   10/16/2023 9:25:54 AM  I was physically present for the entire viewing  portion of the exam.  ______________________ ____  Signature of teaching physician  Shannan/Randolph HERNANDEZ  Number of Addenda: 0    Note Initiated On: 10/16/2023 8:21 AM  Scope In: 9:12:06 AM  Scope Out: 9:17:03 AM     COLONOSCOPY     Status: None   Result Value Ref Range    COLONOSCOPY       [place] [address] [place] [address]  _______________________________________________________________________________  Patient Name: Nancy Navarrorimikki        Procedure Date: 10/16/2023 9:21 AM  MRN: [id number]                       Account Number: [id number]  YOB: 1945              Admit Type: Outpatient  Age: 78                               Room: [place] [address]  Note Status: Supervisor Override      Attending MD: MONICA HERNANDEZ , ,   Instrument Name: Adult Colonoscope 1196   _______________________________________________________________________________     Procedure:             Colonoscopy  Indications:           High risk colon cancer surveillance: Personal history                          of colonic polyps  Providers:             MONICA HERNANDEZ  Referring MD:          ARASH OCHOA MD  Medicines:             Midazolam 1 mg IV, Fentanyl 50 micrograms IV (in                          addition to meds given at same day EGD)  Complic ations:         No immediate complications.  _______________________________________________________________________________  Procedure:             Pre-Anesthesia Assessment:                         - Prior to the procedure, a History and Physical was                          performed, and patient medications, allergies and                          sensitivities were reviewed. The patient's tolerance                          of previous anesthesia was reviewed.                         - The risks and benefits of the procedure and the                          sedation options and risks were discussed with the                          patient. All  questions were answered and informed                          consent was obtained.                         After obtaining informed consent, the colonoscope was                          passed under direct vision. Throughout the procedure,                          the patient's blood pressure, pulse, and oxygen                          satur ations were monitored continuously. The adult                          colonoscope was introduced through the anus and                          advanced to the cecum, identified by appendiceal                          orifice and ileocecal valve. The quality of the bowel                          preparation was fair.                                                                                   Findings:       Many small and large-mouthed diverticula were found in the entire colon,        most severe in the left colon.       The colon (entire examined portion) appeared normal within the severe        limitations of the prep. No large polyps/masses or active bleeding was        noted in the colon. Terminal ileum intubation could not be achieved due        to body habitus and patient discomfort due to looping, but no blood was        noted to be emanating from the IC valve.                                                                                   Moderate Sedation:       Moder ate (conscious) sedation was administered by the nurse and        supervised by the endoscopist. The following parameters were monitored:        oxygen saturation, heart rate, blood pressure, respiratory rate, EKG,        adequacy of pulmonary ventilation, and response to care.  Impression:            - Diverticulosis in the entire examined colon.                         - No specimens collected.                         - OK to resume anticoagulation today.  Recommendation:        No obvious causes of anemia identified on the upper                          endoscopy or colonoscopy today. Consider  cross                          sectional imaging with CT scan to further investigate                          submucosa/extramural gastric nodular bulge seen on EGD.                                                                                     Monica Momin MD  ______________  MONICA MOMIN,   10/16/2023 10:02:34 AM  I was physically present for the entire viewing portion of the exam.  __ ________________________  Signature of teaching physician  Shannan/Randolph MOMIN  Number of Addenda: 0    Note Initiated On: 10/16/2023 9:21 AM  Scope In: 9:28:47 AM  Scope Out: 9:50:17 AM       I independently visualized the xray:   Negative for fracture, multiple areas of disc degeneration.

## 2023-10-17 NOTE — TELEPHONE ENCOUNTER
Pt is calling wanting to get a message with Dr. Garnica and Haritha about getting a different prescription than the Lidocaine (LIDOCARE) 4 % Patch  that was prescribed yesterday at a visit. Pt states that this has been ongoing for 6 days as the pain is really getting unbearable and the patches are not doing anything at all.    Please advise and send an alternative to the pharmacy.     [facility] [address] [place] [address] [[address]

## 2023-10-17 NOTE — TELEPHONE ENCOUNTER
Patient calling back checking status of message left this morning, informed that sometimes they don't get to messages until end of day.

## 2023-10-23 NOTE — TELEPHONE ENCOUNTER
Pending Prescriptions:                       Disp   Refills    methylPREDNISolone (MEDROL DOSEPAK) 4 MG *21 tab*0            Sig: Follow Package Directions

## 2023-10-24 NOTE — PROGRESS NOTES
ASSESSMENT: Nancy Oropeza is a 78 year old female with past medical history significant for BPPV, CTS, tinnitus, polymyalgia rheumatica, chronic pain disorder, sleep apnea, obesity, hyperlipidemia, hypothyroidism, hypertension, history of PE (on Xarelto), coronary atherosclerosis, osteoarthritis, CKD stage III, normochromic normocytic anemia, history of SIRS, depression, anxiety who presents today for new patient evaluation of chronic low back pain with a recent flare radiating to the left lower extremity.  My review of an MRI lumbar spine from [facility] dated August 30, 2021 shows 6 mm grade 1 degenerative spondylolisthesis at L4-5 where there is severe facet arthropathy and severe spinal canal stenosis.  At L5-S1 there is moderate right and left facet arthropathy with a right-sided synovial cyst with right L5 and S1 impingement.  Patient does not have any right lower extremity radicular symptoms.  Pain is most consistent with lumbar spinal stenosis with limited walking and standing tolerance.  She did have a flare of the left lower extremity radicular pain recently but that has resolved.  On exam patient reported a sensory deficit in the toes of both feet.  She had weakness in the right hip flexors.  She had diminished reflexes.    PLAN:  A shared decision making model was used.  The patient's values and choices were respected.  The following represents what was discussed and decided upon by the physician assistant and the patient.      1.  DIAGNOSTIC TESTS:  - I reviewed the x-ray lumbar spine.  - I reviewed the MRI lumbar spine from 2021.  Patient reports that her pain is very similar to the pain that she was experiencing in 2021.  I did not order an updated MRI lumbar spine at this point.  If pain persist/worsens she may need an updated MRI.    2.  PHYSICAL THERAPY: Patient went to physical therapy for this pain in the past.  She states it was not helpful.  No additional physical therapy was  ordered.    3.  MEDICATIONS:  - I prescribed gabapentin 100 mg at bedtime x3 days then 100 mg twice daily.  We potentially titrate her dose higher, depending on response, but I would be cautious due to her chronic kidney disease.  - Patient completed a Medrol Dosepak which was helpful for her flare of left lower extremity radicular pain.  - Patient can continue lidocaine patch as needed.  - Patient takes tramadol 50 mg 2-3 times per day  - Patient takes Tylenol  - Patient is also on prednisone 5 mg daily for polymyalgia rheumatica.    4.  INTERVENTIONS:  - I offer the patient bilateral L4-5 transforaminal epidural steroid injections.  I explained how this to be different than her previous injections.  Patient decayed she would like to proceed and an order was placed.  - If this does not help I would recommend an L5-S1 interlaminar epidural steroid injection.  We would need to get medical clearance for the patient to hold her Xarelto for that procedure.    5.  PATIENT EDUCATION: Patient is in agreement the above plan.  All questions were answered.  -I did tell the patient that some people with this problem go on to need spine surgery.  Patient indicated she has no interest in pursuing spine surgery at her age.    6.  FOLLOW-UP:   Patient will follow-up with me 2 weeks after her bilateral L4-5 transforaminal epidural steroid injections.  If she has questions or concerns in the meantime, she should not hesitate to call.      SUBJECTIVE:  Nancy Oropeza  Is a 78 year old female who presents today in consultation at the request of Dasha Rai CNP for new patient evaluation of chronic low back pain with a recent flare of pain radiating into the left lower extremity.  Patient reports that she has had chronic low back pain for many years.  Within the past 1 month she had a flare of pain that radiated into the left lower extremity.  She states she has only had pain down the leg twice in her life.  She went to the  walk-in clinic.  They prescribed a Medrol Dosepak and lidocaine patches which were helpful for her flare.  She is no longer experiencing the pain down the leg.  She is back to her baseline low back pain.  This pain significantly impacts her function.  She can only walk less than 100 feet before she has to stop and rest because of her pain.    Patient complains of left greater than right low back pain.  Pain is looking the lower lumbar region and extends into the upper buttocks.  When she was having the leg pain that radiated into the left posterolateral thigh about care home between the hip and knee.  She denies any leg pain today.  She has chronic numbness and tingling in both feet which she thinks is due to neuropathy.  She states sometimes at night it feels like she has socks on.  She denies weakness in the leg but states that her back pain alters her gait and causes her to lose her balance.  She did have 2 falls at home 1 year ago.  No recent falls.  She describes her back pain as a toothache type pain.  She also states it feels like a pressure or fatigue in her lower back with walking.  It is aggravated with walking.  She is able to walk much further if she can lean forward, such as on a shopping cart.  She reports her pain resolves completely with sitting.  Applying heat and ice also helps to alleviate the pain.    Treatment to date:  - Physical therapy several years ago which was not helpful  - No chiropractic treatment  - No spine surgeries  - Bilateral L5-S1 transforaminal epidural steroid injection March 4, 2019 with no relief  - Bilateral sacroiliac joint injections October 1, 2018 with no lasting relief  - Bilateral L3, 4, 5 medial branch blocks August 10, 2018-failed  - Bilateral L3, L4, L5 medial branch blocks July 23, 2018-positive response  - Bilateral L4-5 facet joint injections July 3, 2018 with short-term relief  -Medrol Dosepak helpful for her flare of pain  -Lidocaine patch helpful for flare of  pain  -Tramadol 50 mg 2-3 times daily helpful  - Tylenol helpful  - Prednisone 5 mg daily for polymyalgia rheumatica    Current Outpatient Medications   Medication    acetaminophen 500 MG CAPS    BD ULTRA-FINE 33 LANCETS    Cholecalciferol (VITAMIN D) 400 UNITS capsule    citalopram (CELEXA) 40 MG tablet    ferrous sulfate (FE TABS) 325 (65 Fe) MG EC tablet    furosemide (LASIX) 20 MG tablet    Glucose Blood (ONE TOUCH ULTRA TEST) strip    levothyroxine (SYNTHROID/LEVOTHROID) 125 MCG tablet    Lidocaine (LIDOCARE) 4 % Patch    lisinopril-hydrochlorothiazide (ZESTORETIC) 20-12.5 MG tablet    methylPREDNISolone (MEDROL DOSEPAK) 4 MG tablet therapy pack    pravastatin (PRAVACHOL) 80 MG tablet    predniSONE (DELTASONE) 5 MG tablet    rivaroxaban ANTICOAGULANT (XARELTO) 10 MG TABS tablet    traMADol (ULTRAM) 50 MG tablet     No current facility-administered medications for this visit.       Allergies   Allergen Reactions    Sulfasalazine Rash     Pt reports she has never taken this med, so is not sure why it's on her allergy list      Paroxetine Unknown    Sulfa Antibiotics     Atorvastatin Rash    Simvastatin Rash       Past Medical History:   Diagnosis Date    2019 novel coronavirus disease (COVID-19) 07/16/2020    Acute bilateral knee pain 07/18/2017    TEOFILO (acute kidney injury) (H24) 07/16/2020    Anemia, unspecified     Created by Conversion     Angioedema 12/17/2015    Anticoagulant long-term use 03/06/2017    Back pain     Created by Conversion     Chronic pain syndrome 06/01/2018    Coronary artery disease     coronary artery calcification    COVID-19 virus infection 07/17/2020    Depression     Diarrhea 07/16/2020    Disease of thyroid gland     Edema     Created by Conversion     Essential hypertension with goal blood pressure less than 140/90     Created by Conversion  Replacement Utility updated for latest IMO load    History of pulmonary embolism 01/01/2015    Overview:  bilateral with acute cor pulmonale      HTN (hypertension)     Hypercholesteremia     Hyperlipidemia     Hypokalemia 12/17/2015    Hypothyroidism     Created by Conversion  Replacement Utility updated for latest IMO load    Hypoxia 07/16/2020    Impaired fasting glucose     Created by Conversion     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    Major depressive disorder, recurrent episode (H24)     Created by Conversion  Replacement Utility updated for latest IMO load    Mood disorder (H24) 07/16/2020    Morbid obesity (H)     Multiple lung nodules on CT 12/17/2015    Obstructive sleep apnea (adult) (pediatric)     Created by Conversion     Osteoarthrosis involving lower leg     Created by Conversion  Replacement Utility updated for latest IMO load    Pain in joint, multiple sites     Created by Conversion     Polymyalgia rheumatica (H24) 07/16/2020    Postoperative anemia due to acute blood loss 12/18/2018    Pulmonary emboli (H) 12/14/2015    Pulmonary embolism (H) 12/21/2015    Pure hypercholesterolemia     Created by Conversion     Sepsis (H) 07/28/2020    SIRS (systemic inflammatory response syndrome) (H) 07/28/2020    Unspecified cataract     Created by Conversion     Yeast infection         Patient Active Problem List   Diagnosis    Health Care Home    Acquired spondylolisthesis    Normochromic normocytic anemia    Anxiety state    Essential hypertension with goal blood pressure less than 140/90    Benign paroxysmal positional vertigo    Benign neoplasm of colon    Carpal tunnel syndrome    Diverticulosis of large intestine    Constipation    Coronary atherosclerosis    Contact dermatitis and other eczema, due to unspecified cause    Obesity    Other chronic nonalcoholic liver disease    Hyperlipidemia    Hypothyroidism    Lipoma    Benign neoplasm of skin    Sleep apnea    Tinnitus    Pulmonary embolism (H)    Mild episode of recurrent major depressive disorder (H24)    Chronic kidney disease, stage 3    Polymyalgia rheumatica (H24)    Chronic bilateral low back  pain with right-sided sciatica    Morbid obesity (H)    Pulmonary embolism, unspecified chronicity, unspecified pulmonary embolism type, unspecified whether acute cor pulmonale present (H)    Calculus of gallbladder with biliary obstruction but without cholecystitis    Chronic right shoulder pain    Acute bilateral knee pain    TEOFILO (acute kidney injury) (H24)    Angioedema    Backache    Cataract    2019 novel coronavirus disease (COVID-19)    Edema    Episodic mood disorder (H24)    History of pulmonary embolism    Hypokalemia    Impaired fasting glucose    Left knee DJD    Primary osteoarthritis of right knee    Long term current use of anticoagulant therapy    Major depression, recurrent (H24)    Multiple lung nodules on CT    Osteoarthrosis involving lower leg    Pain in joint, multiple sites    Postoperative anemia due to acute blood loss    Primary osteoarthritis involving multiple joints    Sepsis (H)    SIRS (systemic inflammatory response syndrome) (H)    Chronic pain disorder       Past Surgical History:   Procedure Laterality Date    arthroplasty for hammertoe-2nd toe R 2000 [facility][      BIOPSY BREAST Left 1970s    Bunion correction by Cheilectomy-had bunionectomy ? type of procedure L and R separate procedures.[      COLONOSCOPY N/A 10/16/2023    Procedure: WITH COLONOSCOPY;  Surgeon: Jl Momin MD;  Location: [facility]    ESOPHAGOSCOPY, GASTROSCOPY, DUODENOSCOPY (EGD), COMBINED N/A 10/16/2023    Procedure: ESOPHAGOGASTRODUODENOSCOPY WITH BIOPSIES;  Surgeon: Jl Momin MD;  Location: [facility]    GYN SURGERY      HERNIA REPAIR      Hernia Repair Inguinal unilateral[      HYSTERECTOMY      HYSTERECTOMY  2010    IR ABSCESS TUBE CHANGE  11/9/2012    IR ABSCESS TUBE CHANGE  11/12/2012    IR ABSCESS TUBE CHANGE  12/4/2012    IR ABSCESS TUBE CHANGE  2/22/2013    IR ABSCESS TUBE CHANGE  3/1/2013    IR ABSCESS TUBE CHANGE  3/13/2013    OOPHORECTOMY  2010    ORTHOPEDIC SURGERY         Family History    Problem Relation Age of Onset    Chronic Obstructive Pulmonary Disease Mother     Heart Disease Mother     Coronary Artery Disease Father 62        fatal MI    Stomach Cancer Paternal Grandfather     Cardiac Sudden Death Brother 67    Breast Cancer Sister 75    Coronary Artery Disease Sister     Lung Cancer Sister        Social history: Patient is retired.  She denies tobacco, alcohol, illicit drug use.      ROS: Positive for weight loss, ringing in ears, joint pain, muscle pain, muscle fatigue, sciatica, imbalance, easy bruising, anxiety, depression.  Specifically negative for bowel/bladder dysfunction, fevers,chills, appetite changes, unexplained weight loss.   Otherwise 13 systems reviewed are negative.  Please see the patient's intake questionnaire from today for details.      OBJECTIVE:  PHYSICAL EXAMINATION:    CONSTITUTIONAL:  Vital signs as above.  No acute distress.  The patient is well nourished and well groomed.  PSYCHIATRIC:  The patient is awake, alert, oriented to person, place, time and answering questions appropriately with clear speech.    HEENT: Normocephalic, atraumatic.  Sclera clear.  Neck is supple.  SKIN:  Skin over the face, bilateral lower extremities, and posterior torso is clean, dry, intact without rashes.    GAIT:  Gait is unsteady.  She ambulates with a flexed forward posture at the hips.  The patient is able to rise onto toes and heels bilaterally with support.    STANDING EXAMINATION:  Tender to palpation bilateral lower lumbar paraspinous muscles and left greater than right sacroiliac joints.  Lumbar flexion is full.  Lumbar extension within normal limits.  MUSCLE STRENGTH:  The patient has 4/5 strength right hip flexors (possibly pain limited), otherwise 5/5 strength for the left hip flexors, bilateral knee flexors/extensors, ankle dorsiflexors/plantar flexors, great toe extensors, ankle evertors/invertors.  NEUROLOGICAL: Trace patellar, and achilles reflexes bilaterally.   Negative Babinski's bilaterally.  No ankle clonus bilaterally.  Subjective diminished sensation toes bilateral feet.  VASCULAR:  1/4 dorsalis pedis pulses bilaterally.  Bilateral lower extremities are warm.  There is edema of the bilateral lower extremities.  ABDOMINAL:  Soft, non-distended, non-tender throughout all quadrants.  No pulsatile mass palpated in the left lower quadrant.  LYMPH NODES:  No palpable or tender inguinal lymph nodes.  MUSCULOSKELETAL: Straight leg raise negative bilaterally.    RESULTS: I reviewed the x-ray lumbar spine from [facility] dated October 16, 2023.  This shows grade 1 anterolisthesis L4-5 measuring 9 mm.  There is advanced L2-3 disc degeneration and mild to moderate disc degeneration elsewhere.  There is advanced facet arthropathy L4-5 and L5-S1.    I reviewed the MRI lumbar spine from [facility] dated August 30, 2021.  This shows a large right L5-S1 synovial cyst with right L5/S1 neural impingement.  At L4-5 there is severe facet arthropathy with grade 1 degenerative spondylolisthesis and severe spinal stenosis.

## 2023-10-30 NOTE — PATIENT INSTRUCTIONS
Bilateral L4/5 epidural steroid injection has been ordered today.      Please note that this injection uses cortisone.  The cortisone may somewhat weaken the immune system.  It is unknown how much the immune system is weakened.  It is unknown if it is weakened to the point that you may be more likely to get the COVID-19 virus, or if you do get the COVID-19 virus, if you would be sicker than you would have been if you had not had the cortisone injection.  If you do not wish to proceed with the injection, please let the nurse/physician know and do NOT schedule the injection.    Please note that since your immune system is weakened from the cortisone, having any vaccine/shot may be less effective if you have this vaccine within 2 weeks from your cortisone injection.  It is advised to wait 2 weeks after your cortisone injection to have any vaccine (or if you have a vaccine first, wait 2 weeks before you have the cortisone injection).    Please schedule this injection at least 1 week  from now to allow time for insurance prior authorization.  On the day of your injection, you cannot be sick or taking antibiotics.  If you become sick and are prescribed, please call the clinic so your injection can be rescheduled for once you have completed your antibiotics.  You will need to bring a  with you for your injection.   If you have any questions or concerns prior to your injection, please do not hesitate to call the nurse navigation line at [phone] or contact Rosa Gay through [employer].

## 2023-10-30 NOTE — LETTER
10/30/2023         RE: Nancy Oropeza  [address]  [facility] [address]        Dear Colleague,    Thank you for referring your patient, Nancy Oropeza, to the [facility]. Please see a copy of my visit note below.    ASSESSMENT: Nancy Oropeza is a 78 year old female with past medical history significant for BPPV, CTS, tinnitus, polymyalgia rheumatica, chronic pain disorder, sleep apnea, obesity, hyperlipidemia, hypothyroidism, hypertension, history of PE (on Xarelto), coronary atherosclerosis, osteoarthritis, CKD stage III, normochromic normocytic anemia, history of SIRS, depression, anxiety who presents today for new patient evaluation of chronic low back pain with a recent flare radiating to the left lower extremity.  My review of an MRI lumbar spine from [facility] dated August 30, 2021 shows 6 mm grade 1 degenerative spondylolisthesis at L4-5 where there is severe facet arthropathy and severe spinal canal stenosis.  At L5-S1 there is moderate right and left facet arthropathy with a right-sided synovial cyst with right L5 and S1 impingement.  Patient does not have any right lower extremity radicular symptoms.  Pain is most consistent with lumbar spinal stenosis with limited walking and standing tolerance.  She did have a flare of the left lower extremity radicular pain recently but that has resolved.  On exam patient reported a sensory deficit in the toes of both feet.  She had weakness in the right hip flexors.  She had diminished reflexes.    PLAN:  A shared decision making model was used.  The patient's values and choices were respected.  The following represents what was discussed and decided upon by the physician assistant and the patient.      1.  DIAGNOSTIC TESTS:  - I reviewed the x-ray lumbar spine.  - I reviewed the MRI lumbar spine from 2021.  Patient reports that her pain is very similar to the pain that she was experiencing in 2021.  I did  not order an updated MRI lumbar spine at this point.  If pain persist/worsens she may need an updated MRI.    2.  PHYSICAL THERAPY: Patient went to physical therapy for this pain in the past.  She states it was not helpful.  No additional physical therapy was ordered.    3.  MEDICATIONS:  - I prescribed gabapentin 100 mg at bedtime x3 days then 100 mg twice daily.  We potentially titrate her dose higher, depending on response, but I would be cautious due to her chronic kidney disease.  - Patient completed a Medrol Dosepak which was helpful for her flare of left lower extremity radicular pain.  - Patient can continue lidocaine patch as needed.  - Patient takes tramadol 50 mg 2-3 times per day  - Patient takes Tylenol  - Patient is also on prednisone 5 mg daily for polymyalgia rheumatica.    4.  INTERVENTIONS:  - I offer the patient bilateral L4-5 transforaminal epidural steroid injections.  I explained how this to be different than her previous injections.  Patient decayed she would like to proceed and an order was placed.  - If this does not help I would recommend an L5-S1 interlaminar epidural steroid injection.  We would need to get medical clearance for the patient to hold her Xarelto for that procedure.    5.  PATIENT EDUCATION: Patient is in agreement the above plan.  All questions were answered.  -I did tell the patient that some people with this problem go on to need spine surgery.  Patient indicated she has no interest in pursuing spine surgery at her age.    6.  FOLLOW-UP:   Patient will follow-up with me 2 weeks after her bilateral L4-5 transforaminal epidural steroid injections.  If she has questions or concerns in the meantime, she should not hesitate to call.      SUBJECTIVE:  Nancy Oropeza  Is a 78 year old female who presents today in consultation at the request of Dasha Rai CNP for new patient evaluation of chronic low back pain with a recent flare of pain radiating into the left lower  extremity.  Patient reports that she has had chronic low back pain for many years.  Within the past 1 month she had a flare of pain that radiated into the left lower extremity.  She states she has only had pain down the leg twice in her life.  She went to the walk-in clinic.  They prescribed a Medrol Dosepak and lidocaine patches which were helpful for her flare.  She is no longer experiencing the pain down the leg.  She is back to her baseline low back pain.  This pain significantly impacts her function.  She can only walk less than 100 feet before she has to stop and rest because of her pain.    Patient complains of left greater than right low back pain.  Pain is looking the lower lumbar region and extends into the upper buttocks.  When she was having the leg pain that radiated into the left posterolateral thigh about snf between the hip and knee.  She denies any leg pain today.  She has chronic numbness and tingling in both feet which she thinks is due to neuropathy.  She states sometimes at night it feels like she has socks on.  She denies weakness in the leg but states that her back pain alters her gait and causes her to lose her balance.  She did have 2 falls at home 1 year ago.  No recent falls.  She describes her back pain as a toothache type pain.  She also states it feels like a pressure or fatigue in her lower back with walking.  It is aggravated with walking.  She is able to walk much further if she can lean forward, such as on a shopping cart.  She reports her pain resolves completely with sitting.  Applying heat and ice also helps to alleviate the pain.    Treatment to date:  - Physical therapy several years ago which was not helpful  - No chiropractic treatment  - No spine surgeries  - Bilateral L5-S1 transforaminal epidural steroid injection March 4, 2019 with no relief  - Bilateral sacroiliac joint injections October 1, 2018 with no lasting relief  - Bilateral L3, 4, 5 medial branch blocks  August 10, 2018-failed  - Bilateral L3, L4, L5 medial branch blocks July 23, 2018-positive response  - Bilateral L4-5 facet joint injections July 3, 2018 with short-term relief  -Medrol Dosepak helpful for her flare of pain  -Lidocaine patch helpful for flare of pain  -Tramadol 50 mg 2-3 times daily helpful  - Tylenol helpful  - Prednisone 5 mg daily for polymyalgia rheumatica    Current Outpatient Medications   Medication     acetaminophen 500 MG CAPS     BD ULTRA-FINE 33 LANCETS     Cholecalciferol (VITAMIN D) 400 UNITS capsule     citalopram (CELEXA) 40 MG tablet     ferrous sulfate (FE TABS) 325 (65 Fe) MG EC tablet     furosemide (LASIX) 20 MG tablet     Glucose Blood (ONE TOUCH ULTRA TEST) strip     levothyroxine (SYNTHROID/LEVOTHROID) 125 MCG tablet     Lidocaine (LIDOCARE) 4 % Patch     lisinopril-hydrochlorothiazide (ZESTORETIC) 20-12.5 MG tablet     methylPREDNISolone (MEDROL DOSEPAK) 4 MG tablet therapy pack     pravastatin (PRAVACHOL) 80 MG tablet     predniSONE (DELTASONE) 5 MG tablet     rivaroxaban ANTICOAGULANT (XARELTO) 10 MG TABS tablet     traMADol (ULTRAM) 50 MG tablet     No current facility-administered medications for this visit.       Allergies   Allergen Reactions     Sulfasalazine Rash     Pt reports she has never taken this med, so is not sure why it's on her allergy list       Paroxetine Unknown     Sulfa Antibiotics      Atorvastatin Rash     Simvastatin Rash       Past Medical History:   Diagnosis Date     2019 novel coronavirus disease (COVID-19) 07/16/2020     Acute bilateral knee pain 07/18/2017     TEOFILO (acute kidney injury) (H24) 07/16/2020     Anemia, unspecified     Created by Conversion      Angioedema 12/17/2015     Anticoagulant long-term use 03/06/2017     Back pain     Created by Conversion      Chronic pain syndrome 06/01/2018     Coronary artery disease     coronary artery calcification     COVID-19 virus infection 07/17/2020     Depression      Diarrhea 07/16/2020      Disease of thyroid gland      Edema     Created by Conversion      Essential hypertension with goal blood pressure less than 140/90     Created by Conversion  Replacement Utility updated for latest IMO load     History of pulmonary embolism 01/01/2015    Overview:  bilateral with acute cor pulmonale      HTN (hypertension)      Hypercholesteremia      Hyperlipidemia      Hypokalemia 12/17/2015     Hypothyroidism     Created by Conversion  Replacement Utility updated for latest IMO load     Hypoxia 07/16/2020     Impaired fasting glucose     Created by Conversion      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     Major depressive disorder, recurrent episode (H24)     Created by Conversion  Replacement Utility updated for latest IMO load     Mood disorder (H24) 07/16/2020     Morbid obesity (H)      Multiple lung nodules on CT 12/17/2015     Obstructive sleep apnea (adult) (pediatric)     Created by Conversion      Osteoarthrosis involving lower leg     Created by Conversion  Replacement Utility updated for latest IMO load     Pain in joint, multiple sites     Created by Conversion      Polymyalgia rheumatica (H24) 07/16/2020     Postoperative anemia due to acute blood loss 12/18/2018     Pulmonary emboli (H) 12/14/2015     Pulmonary embolism (H) 12/21/2015     Pure hypercholesterolemia     Created by Conversion      Sepsis (H) 07/28/2020     SIRS (systemic inflammatory response syndrome) (H) 07/28/2020     Unspecified cataract     Created by Conversion      Yeast infection         Patient Active Problem List   Diagnosis     Health Care Home     Acquired spondylolisthesis     Normochromic normocytic anemia     Anxiety state     Essential hypertension with goal blood pressure less than 140/90     Benign paroxysmal positional vertigo     Benign neoplasm of colon     Carpal tunnel syndrome     Diverticulosis of large intestine     Constipation     Coronary atherosclerosis     Contact dermatitis and other eczema, due to unspecified cause      Obesity     Other chronic nonalcoholic liver disease     Hyperlipidemia     Hypothyroidism     Lipoma     Benign neoplasm of skin     Sleep apnea     Tinnitus     Pulmonary embolism (H)     Mild episode of recurrent major depressive disorder (H24)     Chronic kidney disease, stage 3     Polymyalgia rheumatica (H24)     Chronic bilateral low back pain with right-sided sciatica     Morbid obesity (H)     Pulmonary embolism, unspecified chronicity, unspecified pulmonary embolism type, unspecified whether acute cor pulmonale present (H)     Calculus of gallbladder with biliary obstruction but without cholecystitis     Chronic right shoulder pain     Acute bilateral knee pain     TEOFILO (acute kidney injury) (H24)     Angioedema     Backache     Cataract     2019 novel coronavirus disease (COVID-19)     Edema     Episodic mood disorder (H24)     History of pulmonary embolism     Hypokalemia     Impaired fasting glucose     Left knee DJD     Primary osteoarthritis of right knee     Long term current use of anticoagulant therapy     Major depression, recurrent (H24)     Multiple lung nodules on CT     Osteoarthrosis involving lower leg     Pain in joint, multiple sites     Postoperative anemia due to acute blood loss     Primary osteoarthritis involving multiple joints     Sepsis (H)     SIRS (systemic inflammatory response syndrome) (H)     Chronic pain disorder       Past Surgical History:   Procedure Laterality Date     arthroplasty for hammertoe-2nd toe R 2000 [facility][       BIOPSY BREAST Left 1970s     Bunion correction by Cheilectomy-had bunionectomy ? type of procedure L and R separate procedures.[       COLONOSCOPY N/A 10/16/2023    Procedure: WITH COLONOSCOPY;  Surgeon: Jl Momin MD;  Location: [facility]     ESOPHAGOSCOPY, GASTROSCOPY, DUODENOSCOPY (EGD), COMBINED N/A 10/16/2023    Procedure: ESOPHAGOGASTRODUODENOSCOPY WITH BIOPSIES;  Surgeon: Jl Momin MD;  Location: [facility]     GYN SURGERY        HERNIA REPAIR       Hernia Repair Inguinal unilateral[       HYSTERECTOMY       HYSTERECTOMY  2010     IR ABSCESS TUBE CHANGE  11/9/2012     IR ABSCESS TUBE CHANGE  11/12/2012     IR ABSCESS TUBE CHANGE  12/4/2012     IR ABSCESS TUBE CHANGE  2/22/2013     IR ABSCESS TUBE CHANGE  3/1/2013     IR ABSCESS TUBE CHANGE  3/13/2013     OOPHORECTOMY  2010     ORTHOPEDIC SURGERY         Family History   Problem Relation Age of Onset     Chronic Obstructive Pulmonary Disease Mother      Heart Disease Mother      Coronary Artery Disease Father 62        fatal MI     Stomach Cancer Paternal Grandfather      Cardiac Sudden Death Brother 67     Breast Cancer Sister 75     Coronary Artery Disease Sister      Lung Cancer Sister        Social history: Patient is retired.  She denies tobacco, alcohol, illicit drug use.      ROS: Positive for weight loss, ringing in ears, joint pain, muscle pain, muscle fatigue, sciatica, imbalance, easy bruising, anxiety, depression.  Specifically negative for bowel/bladder dysfunction, fevers,chills, appetite changes, unexplained weight loss.   Otherwise 13 systems reviewed are negative.  Please see the patient's intake questionnaire from today for details.      OBJECTIVE:  PHYSICAL EXAMINATION:    CONSTITUTIONAL:  Vital signs as above.  No acute distress.  The patient is well nourished and well groomed.  PSYCHIATRIC:  The patient is awake, alert, oriented to person, place, time and answering questions appropriately with clear speech.    HEENT: Normocephalic, atraumatic.  Sclera clear.  Neck is supple.  SKIN:  Skin over the face, bilateral lower extremities, and posterior torso is clean, dry, intact without rashes.    GAIT:  Gait is unsteady.  She ambulates with a flexed forward posture at the hips.  The patient is able to rise onto toes and heels bilaterally with support.    STANDING EXAMINATION:  Tender to palpation bilateral lower lumbar paraspinous muscles and left greater than right sacroiliac  joints.  Lumbar flexion is full.  Lumbar extension within normal limits.  MUSCLE STRENGTH:  The patient has 4/5 strength right hip flexors (possibly pain limited), otherwise 5/5 strength for the left hip flexors, bilateral knee flexors/extensors, ankle dorsiflexors/plantar flexors, great toe extensors, ankle evertors/invertors.  NEUROLOGICAL: Trace patellar, and achilles reflexes bilaterally.  Negative Babinski's bilaterally.  No ankle clonus bilaterally.  Subjective diminished sensation toes bilateral feet.  VASCULAR:  1/4 dorsalis pedis pulses bilaterally.  Bilateral lower extremities are warm.  There is edema of the bilateral lower extremities.  ABDOMINAL:  Soft, non-distended, non-tender throughout all quadrants.  No pulsatile mass palpated in the left lower quadrant.  LYMPH NODES:  No palpable or tender inguinal lymph nodes.  MUSCULOSKELETAL: Straight leg raise negative bilaterally.    RESULTS: I reviewed the x-ray lumbar spine from [facility] dated October 16, 2023.  This shows grade 1 anterolisthesis L4-5 measuring 9 mm.  There is advanced L2-3 disc degeneration and mild to moderate disc degeneration elsewhere.  There is advanced facet arthropathy L4-5 and L5-S1.    I reviewed the MRI lumbar spine from [facility] dated August 30, 2021.  This shows a large right L5-S1 synovial cyst with right L5/S1 neural impingement.  At L4-5 there is severe facet arthropathy with grade 1 degenerative spondylolisthesis and severe spinal stenosis.      Again, thank you for allowing me to participate in the care of your patient.        Sincerely,        Rosa Gay PA-C

## 2023-11-08 PROBLEM — K64.9 HEMORRHOIDS: Status: ACTIVE | Noted: 2017-10-12

## 2023-11-08 PROBLEM — Z86.718 HISTORY OF DEEP VENOUS THROMBOSIS: Status: ACTIVE | Noted: 2023-01-01

## 2023-11-08 PROBLEM — R19.5 ABNORMAL FECES: Status: ACTIVE | Noted: 2017-10-16

## 2023-11-08 PROBLEM — K63.5 POLYP OF COLON: Status: ACTIVE | Noted: 2017-10-12

## 2023-11-08 PROBLEM — I82.409 DEEP VEIN THROMBOSIS (DVT) (H): Status: ACTIVE | Noted: 2023-01-01

## 2023-11-08 NOTE — PATIENT INSTRUCTIONS
Patient Education   Personalized Prevention Plan  You are due for the preventive services outlined below.  Your care team is available to assist you in scheduling these services.  If you have already completed any of these items, please share that information with your care team to update in your medical record.  Health Maintenance Due   Topic Date Due     Depression Action Plan  Never done     Hepatitis A Vaccine (1 of 2 - Risk 2-dose series) Never done     Hepatitis B Vaccine (1 of 3 - Risk 3-dose series) Never done     RSV VACCINE (Pregnancy & 60+) (1 - 1-dose 60+ series) Never done     Zoster (Shingles) Vaccine (2 of 3) 07/21/2009     COVID-19 Vaccine (5 - 2023-24 season) 09/01/2023     Thyroid Function Lab  12/02/2023     Preventing Falls: Care Instructions  Injuries and health problems such as trouble walking or poor eyesight can increase your risk of falling. So can some medicines. But there are things you can do to help prevent falls. You can exercise to get stronger. You can also arrange your home to make it safer.    Talk to your doctor about the medicines you take. Ask if any of them increase the risk of falls and whether they can be changed or stopped.   Try to exercise regularly. It can help improve your strength and balance. This can help lower your risk of falling.     Practice fall safety and prevention.    Wear low-heeled shoes that fit well and give your feet good support. Talk to your doctor if you have foot problems that make this hard.  Carry a cellphone or wear a medical alert device that you can use to call for help.  Use stepladders instead of chairs to reach high objects. Don't climb if you're at risk for falls. Ask for help, if needed.  Wear the correct eyeglasses, if you need them.    Make your home safer.    Remove rugs, cords, clutter, and furniture from walkways.  Keep your house well lit. Use night-lights in hallways and bathrooms.  Install and use sturdy handrails on stairways.  Wear  "nonskid footwear, even inside. Don't walk barefoot or in socks without shoes.    Be safe outside.    Use handrails, curb cuts, and ramps whenever possible.  Keep your hands free by using a shoulder bag or backpack.  Try to walk in well-lit areas. Watch out for uneven ground, changes in pavement, and debris.  Be careful in the winter. Walk on the grass or gravel when sidewalks are slippery. Use de-icer on steps and walkways. Add non-slip devices to shoes.    Put grab bars and nonskid mats in your shower or tub and near the toilet. Try to use a shower chair or bath bench when bathing.   Get into a tub or shower by putting in your weaker leg first. Get out with your strong side first. Have a phone or medical alert device in the bathroom with you.   Where can you learn more?  Go to https://www.[employer].Photobucket/patiented  Enter G117 in the search box to learn more about \"Preventing Falls: Care Instructions.\"  Current as of: July 18, 2023               Content Version: 13.8    5705-4026 [employer].   Care instructions adapted under license by your healthcare professional. If you have questions about a medical condition or this instruction, always ask your healthcare professional. [employer] disclaims any warranty or liability for your use of this information.      How to Get Up Safely After a Fall: Care Instructions  Overview     If you have injuries, health problems, or other reasons that may make it easy for you to fall at home, it is a good idea to learn how to get up safely after a fall. Learning how to get up correctly can help you avoid making an injury worse.  Also, knowing what to do if you cannot get up can help you stay safe until help arrives.  Follow-up care is a key part of your treatment and safety. Be sure to make and go to all appointments, and call your doctor if you are having problems. It's also a good idea to know your test results and keep a list of the medicines you " take.  How can you care for yourself after a fall?  If you think you can get up  First lie still for a few minutes and think about how you feel. If your body feels okay and you think you can get up safely, follow the rest of the steps below:  Look for a chair or other piece of furniture that is close to you.  Roll onto your side and rest. Roll by turning your head in the direction you want to roll, move your shoulder and arm, then hip and leg in the same direction.  Lie still for a moment to let your blood pressure adjust.  Slowly push your upper body up, lift your head, and take a moment to rest.  Slowly get up on your hands and knees, and crawl to the chair or other stable piece of furniture.  Put your hands on the chair.  Move one foot forward, and place it flat on the floor. Your other leg should be bent with the knee on the floor.  Rise slowly, turn your body, and sit in the chair. Stay seated for a bit and think about how you feel. Call for help. Even if you feel okay, let someone know what happened to you. You might not know that you have a serious injury.  If you cannot get up  If you think you are injured after a fall or you cannot get up, try not to panic.  Call out for help.  If you have a phone within reach or you have an emergency call device, use it to call for help.  If you do not have a phone within reach, try to slide yourself toward it. If you cannot get to the phone, try to slide toward a door or window or a place where you think you can be heard.  [place] or use an object to make noise so someone might hear you.  If you can reach something that you can use for a pillow, place it under your head. Try to stay warm by covering yourself with a blanket or clothing while you wait for help.  When should you call for help?   Call 911 anytime you think you may need emergency care. For example, call if:    You passed out (lost consciousness).     You cannot get up after a fall.     You have severe pain.  "  Call your doctor now or seek immediate medical care if:    You have new or worse pain.     You are dizzy or lightheaded.     You hit your head.   Watch closely for changes in your health, and be sure to contact your doctor if:    You do not get better as expected.   Where can you learn more?  Go to https://www.[employer].net/patiented  Enter G513 in the search box to learn more about \"How to Get Up Safely After a Fall: Care Instructions.\"  Current as of: November 13, 2022               Content Version: 13.8    7502-8447 [employer].   Care instructions adapted under license by your healthcare professional. If you have questions about a medical condition or this instruction, always ask your healthcare professional. [employer] disclaims any warranty or liability for your use of this information.         "

## 2023-11-08 NOTE — PROGRESS NOTES
"SUBJECTIVE:     Nancy is a 78 year old who presents for Preventive Visit.      11/8/2023    10:12 AM   Additional Questions   Roomed by Madina       Are you in the first 12 months of your Medicare coverage?  No    Healthy Habits:     In general, how would you rate your overall health?  Fair    Frequency of exercise:  1 day/week    Duration of exercise:  Less than 15 minutes    Do you usually eat at least 4 servings of fruit and vegetables a day, include whole grains    & fiber and avoid regularly eating high fat or \"junk\" foods?  No    Taking medications regularly:  Yes    Medication side effects:  No muscle aches    Ability to successfully perform activities of daily living:  No assistance needed    Home Safety:  No safety concerns identified    Hearing Impairment:  No hearing concerns    In the past 6 months, have you been bothered by leaking of urine?  No    In general, how would you rate your overall mental or emotional health?  Fair      Annual wellness visit completed.  Risk questionnaire reviewed in detail.  Falls risk prevention discussed.  History of iron deficiency anemia.  Recent upper endoscopy and colonoscopy with evidence for duodenitis.  Diverticulosis on colonoscopy.  Stopped taking iron supplement within the last week due to some intolerance and constipation findings.  Had discontinued warfarin previously now on Xarelto 10 mg daily tolerating well.  No change in stools.  Levothyroxine 125 mcg daily after prior dose decrease from 137 mcg secondary to suppressed TSH.  Still has mild cognitive impairment forgetful at times.  This appears stable per patient.  Tramadol utilized for right shoulder pain as well as lower back pain.  Does have upcoming appointment in November 14, 2023 with Dr. Sanabria with [facility] spine neurosurgery for likely epidural steroid injection at lower level than previously received.  Citalopram 40 mg daily for depression management.  Pravastatin 80 mg at bedtime for " lipid management.  CKD stage IIIb historically with prior creatinine 1.59 and GFR 32.  Lisinopril hydrochlorothiazide 20/12.5 using 2 tablets daily plus amlodipine 5 mg daily for hypertension management.  Prednisone is not on patient's med list today but she states she is continuing 5 mg daily dose with prior inability to further decrease for polymyalgia rheumatica management.  Otherwise tolerates fine.  Comprehensive review of systems as above otherwise all negative.           5 children (Airam, Nancy, Lakeisha, Akash, Thee) - Akash had coronary stent while Thee with Factor V abnormality and rectal cancer   14 grandchildren   16 great-grandchildren   Grew up in [facility] [place] age 12...   No smoke (quit 16 years ago after 1 ppd x 30+ years)   EtOH: occ wine   Mom -  88 COPD   Dad -  61 massive MI   1 bro -  67 blood clot (lung)   2 sis - one of which is  age 62 small cell lung CA (h/o smoker)   Surgeries: ventral hernia repair with muscle flap 10/4/12 with post-op complication of seroma with J.P. drain in place followed by interventional radiology q 2 weeks);  age 25; groin hernia age 3; CAPRICE-BSO 2011 by Dr. Herbert Carmen (due to benign cyst x 2); right TKA 18 (Dr. Small)   Hospitalizations: as above   Work: retired  ([employer] as )       Have you ever done Advance Care Planning? (For example, a Health Directive, POLST, or a discussion with a medical provider or your loved ones about your wishes): Yes, advance care planning is on file.       Fall risk  Fallen 2 or more times in the past year?: Yes  Any fall with injury in the past year?: No    Cognitive Screening   1) Repeat 3 items (Leader, Season, Table)    2) Clock draw: NORMAL  3) 3 item recall: Recalls 1 object   Results: NORMAL clock, 1-2 items recalled: COGNITIVE IMPAIRMENT LESS LIKELY    Mini-CogTM Copyright S Joe. Licensed by the author for use in [facility]  Services; reprinted with permission (soob@[facility]). All rights reserved.      Do you have sleep apnea, excessive snoring or daytime drowsiness? : no    Reviewed and updated as needed this visit by clinical staff   Tobacco  Allergies  Meds  Problems             Reviewed and updated as needed this visit by Provider    Allergies  Meds  Problems            Social History     Tobacco Use    Smoking status: Former     Packs/day: 1     Types: Cigarettes     Quit date:      Years since quittin.8    Smokeless tobacco: Never    Tobacco comments:     quite 20 yrs ago   Substance Use Topics    Alcohol use: No     Comment: Alcoholic Drinks/day: occasionally             2023    10:00 AM   Alcohol Use   Prescreen: >3 drinks/day or >7 drinks/week? No     Do you have a current opioid prescription? Yes   How severe is your pain on a scale from 1-10? 10            No data to display              Low Risk (0-3)  Moderate Risk (4-7)  High Risk (>8)  Do you use any other controlled substances or medications that are not prescribed by a provider? None              Current providers sharing in care for this patient include:   Patient Care Team:  Julien Garnica MD as PCP - General (Family Medicine)  Tigist Hooks, PharmD as Pharmacist (Pharmacist)  Julien Garnica MD as Assigned PCP  Sid Nguyen MD as Assigned Surgical Provider  Anna Borja PA-C as Physician Assistant (Dermatology)  Julien Garnica MD as Assigned Pain Medication Provider  Romina Hernandez MD as Assigned Heart and Vascular Provider    The following health maintenance items are reviewed in Epic and correct as of today:  Health Maintenance   Topic Date Due    DEPRESSION ACTION PLAN  Never done    HEPATITIS A IMMUNIZATION (1 of 2 - Risk 2-dose series) Never done    HEPATITIS B IMMUNIZATION (1 of 3 - Risk 3-dose series) Never done    RSV VACCINE (Pregnancy & 60+) (1 - 1-dose 60+ series) Never done    ZOSTER IMMUNIZATION (2 of 3)  07/21/2009    TSH W/FREE T4 REFLEX  12/02/2023    PHQ-9  02/11/2024    BMP  10/06/2024    LIPID  10/06/2024    MICROALBUMIN  10/06/2024    URINE DRUG SCREEN  10/06/2024    ANNUAL REVIEW OF HM ORDERS  10/06/2024    HEMOGLOBIN  10/06/2024    MEDICARE ANNUAL WELLNESS VISIT  11/08/2024    FALL RISK ASSESSMENT  11/08/2024    ADVANCE CARE PLANNING  11/08/2028    DTAP/TDAP/TD IMMUNIZATION (3 - Td or Tdap) 12/02/2032    DEXA  12/21/2037    INFLUENZA VACCINE  Completed    Pneumococcal Vaccine: 65+ Years  Completed    URINALYSIS  Completed    COVID-19 Vaccine  Completed    IPV IMMUNIZATION  Aged Out    HPV IMMUNIZATION  Aged Out    MENINGITIS IMMUNIZATION  Aged Out    RSV MONOCLONAL ANTIBODY  Aged Out    COLORECTAL CANCER SCREENING  Discontinued    LUNG CANCER SCREENING  Discontinued     Lab work is in process  Labs reviewed in EPIC  BP Readings from Last 3 Encounters:   11/08/23 122/64   10/30/23 129/59   10/16/23 120/82    Wt Readings from Last 3 Encounters:   11/08/23 120.6 kg (265 lb 12.8 oz)   10/30/23 119.5 kg (263 lb 8 oz)   10/16/23 118.8 kg (262 lb)                  Patient Active Problem List   Diagnosis    Health Care Home    Acquired spondylolisthesis    Normochromic normocytic anemia    Anxiety state    Essential hypertension with goal blood pressure less than 140/90    Benign paroxysmal positional vertigo    Benign neoplasm of colon    Carpal tunnel syndrome    Diverticulosis of large intestine    Constipation    Coronary atherosclerosis    Contact dermatitis and other eczema, due to unspecified cause    Obesity    Other chronic nonalcoholic liver disease    Hyperlipidemia    Hypothyroidism    Lipoma    Benign neoplasm of skin    Sleep apnea    Tinnitus    Pulmonary embolism (H)    Mild episode of recurrent major depressive disorder (H24)    Chronic kidney disease, stage 3    Polymyalgia rheumatica (H24)    Chronic bilateral low back pain with right-sided sciatica    Morbid obesity (H)    Pulmonary embolism,  unspecified chronicity, unspecified pulmonary embolism type, unspecified whether acute cor pulmonale present (H)    Calculus of gallbladder with biliary obstruction but without cholecystitis    Chronic right shoulder pain    Acute bilateral knee pain    TEOFILO (acute kidney injury) (H24)    Angioedema    Backache    Cataract    2019 novel coronavirus disease (COVID-19)    Edema    Episodic mood disorder (H24)    History of pulmonary embolism    Hypokalemia    Impaired fasting glucose    Left knee DJD    Primary osteoarthritis of right knee    Long term current use of anticoagulant therapy    Major depression, recurrent (H24)    Multiple lung nodules on CT    Osteoarthrosis involving lower leg    Pain in joint, multiple sites    Postoperative anemia due to acute blood loss    Primary osteoarthritis involving multiple joints    Sepsis (H)    SIRS (systemic inflammatory response syndrome) (H)    Chronic pain disorder    Abnormal feces    Deep vein thrombosis (DVT) (H)    Hemorrhoids    History of deep venous thrombosis    Polyp of colon     Past Surgical History:   Procedure Laterality Date    arthroplasty for hammertoe-2nd toe R 2000 Biebl[      BIOPSY BREAST Left 1970s    Bunion correction by Cheilectomy-had bunionectomy ? type of procedure L and R separate procedures.[      COLONOSCOPY N/A 10/16/2023    Procedure: WITH COLONOSCOPY;  Surgeon: Jl Momin MD;  Location: [facility]    ESOPHAGOSCOPY, GASTROSCOPY, DUODENOSCOPY (EGD), COMBINED N/A 10/16/2023    Procedure: ESOPHAGOGASTRODUODENOSCOPY WITH BIOPSIES;  Surgeon: Jl Momin MD;  Location: [facility]    GYN SURGERY      HERNIA REPAIR      Hernia Repair Inguinal unilateral[      HYSTERECTOMY      HYSTERECTOMY  2010    IR ABSCESS TUBE CHANGE  11/9/2012    IR ABSCESS TUBE CHANGE  11/12/2012    IR ABSCESS TUBE CHANGE  12/4/2012    IR ABSCESS TUBE CHANGE  2/22/2013    IR ABSCESS TUBE CHANGE  3/1/2013    IR ABSCESS TUBE CHANGE  3/13/2013    OOPHORECTOMY  2010     ORTHOPEDIC SURGERY         Social History     Tobacco Use    Smoking status: Former     Packs/day: 1     Types: Cigarettes     Quit date:      Years since quittin.8    Smokeless tobacco: Never    Tobacco comments:     quite 20 yrs ago   Substance Use Topics    Alcohol use: No     Comment: Alcoholic Drinks/day: occasionally     Family History   Problem Relation Age of Onset    Chronic Obstructive Pulmonary Disease Mother     Heart Disease Mother     Coronary Artery Disease Father 62        fatal MI    Stomach Cancer Paternal Grandfather     Cardiac Sudden Death Brother 67    Breast Cancer Sister 75    Coronary Artery Disease Sister     Lung Cancer Sister          Current Outpatient Medications   Medication Sig Dispense Refill    acetaminophen 500 MG CAPS Take 2 capsules by mouth 3 times daily as needed.      Cholecalciferol (VITAMIN D) 400 UNITS capsule Take 2 capsules by mouth daily.      citalopram (CELEXA) 40 MG tablet TAKE 1 TABLET DAILY 90 tablet 3    furosemide (LASIX) 20 MG tablet TAKE 1 TABLET EVERY MORNING 90 tablet 3    gabapentin (NEURONTIN) 100 MG capsule Take 1 capsule at bedtime x 3 days, then take 1 capsule twice dialy 60 capsule 1    levothyroxine (SYNTHROID/LEVOTHROID) 125 MCG tablet TAKE 1 TABLET BY MOUTH DAILY 90 tablet 3    Lidocaine (LIDOCARE) 4 % Patch Place 1 patch onto the skin every 24 hours Take patch off after 12 hours and replace at the same time the following day 15 patch 0    lisinopril-hydrochlorothiazide (ZESTORETIC) 20-12.5 MG tablet TAKE 2 TABLETS DAILY 180 tablet 3    pravastatin (PRAVACHOL) 80 MG tablet TAKE 1 TABLET AT BEDTIME 90 tablet 3    predniSONE (DELTASONE) 5 MG tablet TAKE 1 TABLET DAILY 90 tablet 3    rivaroxaban ANTICOAGULANT (XARELTO) 10 MG TABS tablet Take 1 tablet (10 mg) by mouth daily (with dinner) 30 tablet 11    traMADol (ULTRAM) 50 MG tablet Take 1 tablet (50 mg) by mouth every 6 hours as needed for severe pain 120 tablet 2    BD ULTRA-FINE 33 LANCETS  "Use as directed (Patient not taking: Reported on 11/8/2023)      ferrous sulfate (FE TABS) 325 (65 Fe) MG EC tablet Take 1 tablet (325 mg) by mouth 2 times daily (Patient not taking: Reported on 11/8/2023) 60 tablet 3    Glucose Blood (ONE TOUCH ULTRA TEST) strip by In Vitro route daily Use as directed (Patient not taking: Reported on 11/8/2023)       Allergies   Allergen Reactions    Sulfasalazine Rash     Pt reports she has never taken this med, so is not sure why it's on her allergy list      Paroxetine Unknown    Sulfa Antibiotics     Atorvastatin Rash    Simvastatin Rash     Needs updated COVID booster today    Mammogram Screening - Patient over age 75, has elected to continue with screening.  Pertinent mammograms are reviewed under the imaging tab.    Review of Systems   Cardiovascular:  Positive for peripheral edema.   Breasts:  Negative for tenderness and discharge.   Genitourinary:  Negative for vaginal bleeding.   Musculoskeletal:  Positive for arthralgias, joint swelling and myalgias.   Neurological:  Positive for weakness.     Constitutional, HEENT, cardiovascular, pulmonary, GI, , musculoskeletal, neuro, skin, endocrine and psych systems are negative, except as otherwise noted.    OBJECTIVE:   /64 (BP Location: Left arm, Patient Position: Sitting, Cuff Size: Adult Large)   Pulse 95   Temp 97.8  F (36.6  C) (Temporal)   Resp 16   Ht 1.681 m (5' 6.18\")   Wt 120.6 kg (265 lb 12.8 oz)   LMP  (LMP Unknown)   SpO2 95%   BMI 42.67 kg/m   Estimated body mass index is 42.67 kg/m  as calculated from the following:    Height as of this encounter: 1.681 m (5' 6.18\").    Weight as of this encounter: 120.6 kg (265 lb 12.8 oz).    Physical Exam  GENERAL APPEARANCE: healthy, alert and no distress  EYES: Eyes grossly normal to inspection, PERRL and conjunctivae and sclerae normal  HENT: ear canals and TM's normal, nose and mouth without ulcers or lesions, oropharynx clear and oral mucous membranes " moist  NECK: no adenopathy, no asymmetry, masses, or scars and thyroid normal to palpation  RESP: lungs clear to auscultation - no rales, rhonchi or wheezes  CV: regular rate and rhythm, normal S1 S2, no S3 or S4, no murmur, click or rub, no peripheral edema and peripheral pulses strong  ABDOMEN: soft, nontender, no hepatosplenomegaly, no masses and bowel sounds normal  MS: no musculoskeletal defects are noted and gait is age appropriate without ataxia  SKIN: no suspicious lesions or rashes  NEURO: Normal strength and tone, sensory exam grossly normal, mentation intact and speech normal  PSYCH: mentation appears normal and affect normal/bright    Diagnostic Test Results:  Labs reviewed in Epic  No results found. However, due to the size of the patient record, not all encounters were searched. Please check Results Review for a complete set of results.    ASSESSMENT / PLAN:     Encounter for Medicare annual wellness exam  Annual wellness visit completed.  Risk questionnaire reviewed.  Falls risk prevention discussed.  Annual physical exams to continue.    Essential hypertension with goal blood pressure less than 140/90  Hypertension appears stable with use of lisinopril hydrochlorothiazide 20/12.5 using 2 tablets daily.  Anticipate blood pressure recheck at follow-up in 3 months.  Patient to confirm no longer using amlodipine 5 mg daily since being discontinued August 16, 2023 with orthostatic hypotension with hemoglobin 7.9 at that time.  - Comprehensive metabolic panel    Pure hypercholesterolemia  Hypercholesterolemia.  Pravastatin 80 mg at bedtime.  Lipid cascade October 6, 2023 at goal.    Iron deficiency anemia, unspecified iron deficiency anemia type  History of iron deficiency anemia and will update ferritin as well as iron studies and CBC.  Prior hemoglobin improving up to 9 and will continue to follow for goal greater than 10 ideally with known history of polymyalgia rheumatica and chronic kidney disease  stage IIIb which will impact patient's anemia as well.  Reviewed recent colonoscopy and upper endoscopy results.  - Ferritin  - CBC with platelets  - Iron & Iron Binding Capacity    Hypothyroidism, unspecified type  Update TSH while on levothyroxine 125 mcg daily.  Prior suppressed TSH while on 137 mcg daily noted.  - levothyroxine (SYNTHROID/LEVOTHROID) 125 MCG tablet  Dispense: 90 tablet; Refill: 3  - TSH with free T4 reflex    Chronic left-sided low back pain with left-sided sciatica  Chronic left lower back pain.  Has upcoming appointment November 14, 2023 with Dr. Sanabria with [facility] spine and neurosurgery for epidural steroid injection at lumbosacral junction.  - Lidocaine (LIDOCARE) 4 % Patch  Dispense: 15 patch; Refill: 0    Polymyalgia rheumatica (H24)  PMR.  States using prednisone 5 mg daily.  Has not been able to wean from prednisone to lower dose due to recurrent concerns.  - predniSONE (DELTASONE) 5 MG tablet  Dispense: 90 tablet; Refill: 3    Chronic pain disorder  Chronic pain syndrome.  Tramadol as directed for right shoulder pain and lower back pain.    Stage 3b chronic kidney disease (H)  Known history of CKD stage IIIb and will update renal function.  - Comprehensive metabolic panel    Mild episode of recurrent major depressive disorder (H24)  Underlying depression.  Citalopram 40 mg daily with benefits.    Encounter for immunization  Updated COVID booster provided today.  Immunizations appear otherwise up-to-date.  - COVID-19 12+ (2023-24) (PFIZER)    Class 3 severe obesity with serious comorbidity and body mass index (BMI) of 40.0 to 44.9 in adult, unspecified obesity type (H)  Dietary and exercise modifications for weight goal less than 240 pounds initially, less than 220 pounds ideally.    Pulmonary embolism, unspecified chronicity, unspecified pulmonary embolism type, unspecified whether acute cor pulmonale present (H)  History of pulmonary emboli reviewed.  Xarelto 10 mg daily  "without evidence for recurrence.       Patient has been advised of split billing requirements and indicates understanding: Yes      COUNSELING:  Reviewed preventive health counseling, as reflected in patient instructions       Regular exercise       Healthy diet/nutrition       Vision screening       Hearing screening       Dental care       Bladder control       Fall risk prevention       Osteoporosis prevention/bone health       Colon cancer screening       (Lawanda)menopause management       Advanced Planning       BMI:   Estimated body mass index is 42.67 kg/m  as calculated from the following:    Height as of this encounter: 1.681 m (5' 6.18\").    Weight as of this encounter: 120.6 kg (265 lb 12.8 oz).     Weight management plan: Discussed healthy diet and exercise guidelines      She reports that she quit smoking about 27 years ago. Her smoking use included cigarettes. She smoked an average of 1 pack per day. She has never used smokeless tobacco.      Appropriate preventive services were discussed with this patient, including applicable screening as appropriate for fall prevention, nutrition, physical activity, Tobacco-use cessation, weight loss and cognition.  Checklist reviewing preventive services available has been given to the patient.    Reviewed patients plan of care and provided an AVS. The Intermediate Care Plan ( asthma action plan, low back pain action plan, and migraine action plan) for Nancy meets the Care Plan requirement. This Care Plan has been established and reviewed with the Patient.          Julien Garnica MD  [facility]    Identified Health Risks:  I have reviewed Opioid Use Disorder and Substance Use Disorder risk factors and made any needed referrals. She is at risk for falling and has been provided with information to reduce the risk of falling at home.  "

## 2023-11-13 NOTE — TELEPHONE ENCOUNTER
Attempted to contact patient, but she was unreachable at this time.  A message was left with the patient informing her that her lumbar epidural steroid injection appointment with Dr. Browning on 11/14/23 at the [facility] would need to be rescheduled on account of the COVID vaccine she received on 11/8/23.      She was encouraged to contact the [facility] Care Navigation Team at the Spine Center to further discuss at her earliest convenience.

## 2023-11-14 NOTE — TELEPHONE ENCOUNTER
Patient was contacted and informed of need to reschedule her lumbar epidural steroid injection due to recent COVID vaccine that was received on 11/8/23.    The patient verbalized understanding and was made aware that the scheduling team would be reaching out to reschedule her injection next week.    She was encouraged to contact the Spine Center if she had any questions or concerns in the meantime.

## 2023-12-25 DIAGNOSIS — E03.9 HYPOTHYROIDISM, UNSPECIFIED TYPE: ICD-10-CM

## 2023-12-25 RX ORDER — LEVOTHYROXINE SODIUM 125 UG/1
TABLET ORAL
Qty: 90 TABLET | Refills: 3 | OUTPATIENT
Start: 2023-12-25

## 2024-05-01 NOTE — PROGRESS NOTES
ANTICOAGULATION MANAGEMENT     Nancy Oropeza 76 year old female is on warfarin with subtherapeutic INR result. (Goal INR 2.0-3.0)    Recent labs: (last 7 days)     01/05/22  1518   INR 1.8*       ASSESSMENT     Source(s): Chart Review and Patient/Caregiver Call     Warfarin doses taken: Warfarin taken as instructed  Diet: Poor diet due to sickness may be affecting diet and INR  New illness, injury, or hospitalization: Yes: sinusitis  Medication/supplement changes: Amoxicillin antibiotic 1/5-1/15  Signs or symptoms of bleeding or clotting: No  Previous INR: Subtherapeutic  Additional findings: None     PLAN     Recommended plan for temporary change(s) affecting INR     Dosing Instructions:  Increase your warfarin dose (8.3% change) with next INR in 5 days       Summary  As of 1/5/2022    Full warfarin instructions:  1.25 mg every Sat; 2.5 mg all other days   Next INR check:  1/12/2022             Telephone call with Nancy who verbalizes understanding and agrees to plan    Patient to recheck with home meter    Education provided: None required    Plan made per [facility] anticoagulation protocol    Iram Alexander RN  Anticoagulation Clinic  1/5/2022    _______________________________________________________________________     Anticoagulation Episode Summary     Current INR goal:  2.0-3.0   TTR:  53.5 % (1 y)   Target end date:  Indefinite   Send INR reminders to:  XENIA MURILLO    Indications    Pulmonary embolism (H) [I26.99]           Comments:           Anticoagulation Care Providers     Provider Role Specialty Phone number    Julien Garnica MD Referring Family Medicine [phone]           English

## 2024-08-09 NOTE — TELEPHONE ENCOUNTER
ANTICOAGULATION  MANAGEMENT    Assessment     Today's INR result of 3.8 is Supratherapeutic (goal INR of 2.0-3.0)        Warfarin taken as previously instructed- has been holding since 8/7    No new diet changes affecting INR    No new medication/supplements affecting INR    Continues to tolerate warfarin with no reported s/s of bleeding or thromboembolism     Previous INR was Supratherapeutic     ED visit 8/7 for peripheral edema    Plan:     Spoke on phone with Nancy regarding INR result and instructed:     Warfarin Dosing Instructions:  Hold today then change warfarin dose to 1.25 mg daily.    Instructed patient to follow up no later than: Thurs 8/13.    Education provided: importance of taking warfarin as instructed    Nancy verbalizes understanding and agrees to warfarin dosing plan.    Instructed to call the [facility] for any changes, questions or concerns. (#[phone])   ?   Vincent Baird RN    Subjective/Objective:      Nancy Oropeza, a 75 y.o. female is on warfarin.     Nancy reports:     Home warfarin dose: verbally confirmed home dose with pt and updated on anticoagulation calendar     Missed doses: No     Medication changes:  No     S/S of bleeding or thromboembolism:  No     New Injury or illness:  Yes: leg swelling.     Changes in diet or alcohol consumption:  No     Upcoming surgery, procedure or cardioversion:  No    Anticoagulation Episode Summary     Current INR goal:   2.0-3.0   TTR:   51.6 % (11.6 mo)   Next INR check:   8/13/2020   INR from last check:   3.80! (8/10/2020)   Weekly max warfarin dose:      Target end date:   12/21/2016   INR check location:      Preferred lab:      Send INR reminders to:   XENIA HURTADO    Indications    Pulmonary embolism (H) [I26.99]           Comments:            Anticoagulation Care Providers     Provider Role Specialty Phone number    Julien Garnica MD Referring Family Medicine [phone]         ANTONIO SMILEY CNP

## 2025-04-14 NOTE — TELEPHONE ENCOUNTER
Depression Response    Patient completed the PHQ-9 assessment for depression and scored >9? Yes  Question 9 on the PHQ-9 was positive for suicidality? Yes  Does patient have current mental health provider? Yes    Is this a virtual visit? Yes   Does patient have suicidal ideation (positive question 9)? Yes, but no referral needed. Mental Health appointment.     I personally notified the following: visit provider           Pt is calling back stating that she has already seen someone at [facility] and they tell her she needs a shoulder replacement but patient refused stating its due to her age and is just wanting to manage her pain with pain med's.     Pt wants to speak with Dr. Garnica or Haritha to discuss her requests further.

## (undated) DEVICE — CATH SUCTION 14FR W/O CTRL DYND41962

## (undated) DEVICE — BITE BLOCK SCOPE DISP 20 X 27 000429

## (undated) DEVICE — SUCTION CANISTER 1000ML 65651-510

## (undated) DEVICE — SOL WATER IRRIG 500ML BOTTLE 2F7113

## (undated) DEVICE — TUBING ENDOGATOR + H2O PORT CO

## (undated) DEVICE — KIT CONNECTOR FOR OLYMPUS ENDOSCOPES DEFENDO 100310

## (undated) DEVICE — SYR 03ML LL W/O NDL 309657

## (undated) DEVICE — TUBING SUCTION MEDI-VAC 1/4"X20' N620A - HE

## (undated) DEVICE — CANNULA NASAL COMFORT SOFT 25FT 0537

## (undated) DEVICE — KIT SCOPE CLEANING ENDOSCOPY BX00719705

## (undated) DEVICE — SWABCAP DISINFECTANT GREEN CFF10-250

## (undated) DEVICE — KIT IV START DYNDV1431

## (undated) DEVICE — PLATE GROUNDING ADULT W/CORD 9165L

## (undated) DEVICE — FORCEP BIOPSY 2.3MM DISP COATED 000388

## (undated) DEVICE — CONNECTOR ONE-LINK INJECTION SITE LF 7N8399